# Patient Record
Sex: MALE | Race: WHITE | NOT HISPANIC OR LATINO | URBAN - METROPOLITAN AREA
[De-identification: names, ages, dates, MRNs, and addresses within clinical notes are randomized per-mention and may not be internally consistent; named-entity substitution may affect disease eponyms.]

---

## 2017-03-01 ENCOUNTER — OUTPATIENT (OUTPATIENT)
Dept: OUTPATIENT SERVICES | Facility: HOSPITAL | Age: 82
LOS: 1 days | End: 2017-03-01
Payer: MEDICARE

## 2017-03-01 ENCOUNTER — APPOINTMENT (OUTPATIENT)
Dept: UROLOGY | Facility: CLINIC | Age: 82
End: 2017-03-01

## 2017-03-01 VITALS
HEART RATE: 80 BPM | SYSTOLIC BLOOD PRESSURE: 149 MMHG | RESPIRATION RATE: 16 BRPM | DIASTOLIC BLOOD PRESSURE: 65 MMHG | TEMPERATURE: 98 F

## 2017-03-01 DIAGNOSIS — Z98.89 OTHER SPECIFIED POSTPROCEDURAL STATES: Chronic | ICD-10-CM

## 2017-03-01 DIAGNOSIS — R35.0 FREQUENCY OF MICTURITION: ICD-10-CM

## 2017-03-01 PROCEDURE — 96402 CHEMO HORMON ANTINEOPL SQ/IM: CPT

## 2017-03-01 RX ORDER — LEUPROLIDE ACETATE 30 MG
30 KIT INTRAMUSCULAR
Qty: 1 | Refills: 0 | Status: COMPLETED | OUTPATIENT
Start: 2017-03-01

## 2017-03-01 RX ORDER — LEUPROLIDE ACETATE 30 MG
30 KIT INTRAMUSCULAR
Qty: 1 | Refills: 0 | Status: COMPLETED | OUTPATIENT
Start: 2017-02-28 | End: 2017-03-01

## 2017-03-01 RX ADMIN — LEUPROLIDE ACETATE 0 MG: KIT at 00:00

## 2017-03-02 LAB
APPEARANCE: CLEAR
BACTERIA: NEGATIVE
BILIRUBIN URINE: NEGATIVE
BLOOD URINE: NEGATIVE
COLOR: YELLOW
GLUCOSE QUALITATIVE U: NORMAL MG/DL
HYALINE CASTS: 0 /LPF
KETONES URINE: NEGATIVE
LEUKOCYTE ESTERASE URINE: NEGATIVE
MICROSCOPIC-UA: NORMAL
NITRITE URINE: NEGATIVE
PH URINE: 5.5
PROTEIN URINE: NEGATIVE MG/DL
PSA FREE FLD-MCNC: 24.7 %
PSA FREE SERPL-MCNC: 0.57 NG/ML
PSA SERPL-MCNC: 2.3 NG/ML
RED BLOOD CELLS URINE: 2 /HPF
SPECIFIC GRAVITY URINE: 1.02
SQUAMOUS EPITHELIAL CELLS: 2 /HPF
UROBILINOGEN URINE: NORMAL MG/DL
WHITE BLOOD CELLS URINE: 5 /HPF

## 2017-03-08 DIAGNOSIS — C61 MALIGNANT NEOPLASM OF PROSTATE: ICD-10-CM

## 2017-03-08 DIAGNOSIS — R97.20 ELEVATED PROSTATE SPECIFIC ANTIGEN [PSA]: ICD-10-CM

## 2017-03-28 LAB
APPEARANCE: CLEAR
BACTERIA: NEGATIVE
BILIRUBIN URINE: NEGATIVE
BLOOD URINE: ABNORMAL
COLOR: YELLOW
GLUCOSE QUALITATIVE U: NORMAL MG/DL
KETONES URINE: NEGATIVE
LEUKOCYTE ESTERASE URINE: NEGATIVE
MICROSCOPIC-UA: NORMAL
NITRITE URINE: NEGATIVE
PH URINE: 5.5
PROTEIN URINE: NEGATIVE MG/DL
RED BLOOD CELLS URINE: 4 /HPF
SPECIFIC GRAVITY URINE: 1.02
SQUAMOUS EPITHELIAL CELLS: 0 /HPF
UROBILINOGEN URINE: NORMAL MG/DL
WHITE BLOOD CELLS URINE: 1 /HPF

## 2017-03-29 LAB — BACTERIA UR CULT: NORMAL

## 2017-03-30 LAB — CORE LAB FLUID CYTOLOGY: NORMAL

## 2017-07-06 ENCOUNTER — APPOINTMENT (OUTPATIENT)
Dept: UROLOGY | Facility: CLINIC | Age: 82
End: 2017-07-06

## 2017-07-06 ENCOUNTER — OUTPATIENT (OUTPATIENT)
Dept: OUTPATIENT SERVICES | Facility: HOSPITAL | Age: 82
LOS: 1 days | End: 2017-07-06
Payer: MEDICARE

## 2017-07-06 DIAGNOSIS — R35.0 FREQUENCY OF MICTURITION: ICD-10-CM

## 2017-07-06 DIAGNOSIS — Z98.89 OTHER SPECIFIED POSTPROCEDURAL STATES: Chronic | ICD-10-CM

## 2017-07-06 PROCEDURE — 96402 CHEMO HORMON ANTINEOPL SQ/IM: CPT

## 2017-07-06 RX ORDER — LEUPROLIDE ACETATE 30 MG
30 KIT INTRAMUSCULAR
Qty: 1 | Refills: 0 | Status: COMPLETED | OUTPATIENT
Start: 2017-07-06

## 2017-07-06 RX ORDER — LEUPROLIDE ACETATE 30 MG
30 KIT INTRAMUSCULAR
Qty: 1 | Refills: 0 | Status: COMPLETED | OUTPATIENT
Start: 2017-07-05 | End: 2017-07-06

## 2017-07-06 RX ADMIN — LEUPROLIDE ACETATE 0 MG: KIT at 00:00

## 2017-07-07 LAB
APPEARANCE: CLEAR
BACTERIA: NEGATIVE
BILIRUBIN URINE: NEGATIVE
BLOOD URINE: NEGATIVE
COLOR: ABNORMAL
GLUCOSE QUALITATIVE U: NORMAL MG/DL
HYALINE CASTS: 1 /LPF
KETONES URINE: NEGATIVE
LEUKOCYTE ESTERASE URINE: NEGATIVE
MICROSCOPIC-UA: NORMAL
NITRITE URINE: NEGATIVE
PH URINE: 5.5
PROTEIN URINE: NEGATIVE MG/DL
PSA FREE FLD-MCNC: 26.9
PSA FREE SERPL-MCNC: 0.9 NG/ML
PSA SERPL-MCNC: 3.34 NG/ML
RED BLOOD CELLS URINE: 4 /HPF
SPECIFIC GRAVITY URINE: 1.02
SQUAMOUS EPITHELIAL CELLS: 1 /HPF
UROBILINOGEN URINE: NORMAL MG/DL
WHITE BLOOD CELLS URINE: 1 /HPF

## 2017-07-09 LAB — CORE LAB FLUID CYTOLOGY: NORMAL

## 2017-07-19 DIAGNOSIS — C61 MALIGNANT NEOPLASM OF PROSTATE: ICD-10-CM

## 2017-07-19 DIAGNOSIS — R97.20 ELEVATED PROSTATE SPECIFIC ANTIGEN [PSA]: ICD-10-CM

## 2017-11-29 ENCOUNTER — APPOINTMENT (OUTPATIENT)
Dept: UROLOGY | Facility: CLINIC | Age: 82
End: 2017-11-29
Payer: MEDICARE

## 2017-11-29 ENCOUNTER — OUTPATIENT (OUTPATIENT)
Dept: OUTPATIENT SERVICES | Facility: HOSPITAL | Age: 82
LOS: 1 days | End: 2017-11-29
Payer: MEDICARE

## 2017-11-29 DIAGNOSIS — Z98.89 OTHER SPECIFIED POSTPROCEDURAL STATES: Chronic | ICD-10-CM

## 2017-11-29 DIAGNOSIS — R35.0 FREQUENCY OF MICTURITION: ICD-10-CM

## 2017-11-29 PROCEDURE — 99213 OFFICE O/P EST LOW 20 MIN: CPT

## 2017-11-29 RX ORDER — LEUPROLIDE ACETATE 30 MG
30 KIT INTRAMUSCULAR
Qty: 1 | Refills: 0 | Status: COMPLETED | OUTPATIENT
Start: 2017-11-29

## 2017-11-29 RX ORDER — LEUPROLIDE ACETATE 30 MG
30 KIT INTRAMUSCULAR
Qty: 1 | Refills: 0 | Status: COMPLETED | OUTPATIENT
Start: 2017-11-28 | End: 2017-11-29

## 2017-11-29 RX ADMIN — LEUPROLIDE ACETATE 0 MG: KIT at 00:00

## 2017-11-30 LAB
CORE LAB FLUID CYTOLOGY: NORMAL
PSA FREE FLD-MCNC: 29
PSA FREE SERPL-MCNC: 1.17 NG/ML
PSA SERPL-MCNC: 4.04 NG/ML

## 2017-11-30 PROCEDURE — 96402 CHEMO HORMON ANTINEOPL SQ/IM: CPT

## 2017-12-04 DIAGNOSIS — C61 MALIGNANT NEOPLASM OF PROSTATE: ICD-10-CM

## 2017-12-04 DIAGNOSIS — R97.20 ELEVATED PROSTATE SPECIFIC ANTIGEN [PSA]: ICD-10-CM

## 2017-12-14 LAB
APPEARANCE: CLEAR
BACTERIA: NEGATIVE
BILIRUBIN URINE: NEGATIVE
BLOOD URINE: NEGATIVE
COLOR: ABNORMAL
GLUCOSE QUALITATIVE U: NEGATIVE MG/DL
HYALINE CASTS: 5 /LPF
KETONES URINE: ABNORMAL
LEUKOCYTE ESTERASE URINE: NEGATIVE
MICROSCOPIC-UA: NORMAL
NITRITE URINE: NEGATIVE
PH URINE: 5
PROTEIN URINE: NEGATIVE MG/DL
RED BLOOD CELLS URINE: 8 /HPF
SPECIFIC GRAVITY URINE: 1.02
SQUAMOUS EPITHELIAL CELLS: 2 /HPF
UROBILINOGEN URINE: NEGATIVE MG/DL
WHITE BLOOD CELLS URINE: 3 /HPF

## 2018-01-04 ENCOUNTER — EMERGENCY (EMERGENCY)
Facility: HOSPITAL | Age: 83
LOS: 1 days | Discharge: HOME | End: 2018-01-04
Attending: EMERGENCY MEDICINE | Admitting: EMERGENCY MEDICINE
Payer: MEDICARE

## 2018-01-04 VITALS
RESPIRATION RATE: 17 BRPM | DIASTOLIC BLOOD PRESSURE: 79 MMHG | TEMPERATURE: 99 F | SYSTOLIC BLOOD PRESSURE: 171 MMHG | HEART RATE: 76 BPM | OXYGEN SATURATION: 95 %

## 2018-01-04 VITALS
RESPIRATION RATE: 17 BRPM | HEART RATE: 68 BPM | SYSTOLIC BLOOD PRESSURE: 151 MMHG | TEMPERATURE: 98 F | DIASTOLIC BLOOD PRESSURE: 79 MMHG | OXYGEN SATURATION: 95 %

## 2018-01-04 DIAGNOSIS — Z98.89 OTHER SPECIFIED POSTPROCEDURAL STATES: Chronic | ICD-10-CM

## 2018-01-04 LAB
ALBUMIN SERPL ELPH-MCNC: 4.2 G/DL — SIGNIFICANT CHANGE UP (ref 3.3–5)
ALP SERPL-CCNC: 31 U/L — LOW (ref 40–120)
ALT FLD-CCNC: 28 U/L RC — SIGNIFICANT CHANGE UP (ref 10–45)
ANION GAP SERPL CALC-SCNC: 12 MMOL/L — SIGNIFICANT CHANGE UP (ref 5–17)
AST SERPL-CCNC: 50 U/L — HIGH (ref 10–40)
BASOPHILS # BLD AUTO: 0 K/UL — SIGNIFICANT CHANGE UP (ref 0–0.2)
BASOPHILS NFR BLD AUTO: 0.4 % — SIGNIFICANT CHANGE UP (ref 0–2)
BILIRUB SERPL-MCNC: 0.2 MG/DL — SIGNIFICANT CHANGE UP (ref 0.2–1.2)
BUN SERPL-MCNC: 18 MG/DL — SIGNIFICANT CHANGE UP (ref 7–23)
CALCIUM SERPL-MCNC: 9.4 MG/DL — SIGNIFICANT CHANGE UP (ref 8.4–10.5)
CHLORIDE SERPL-SCNC: 99 MMOL/L — SIGNIFICANT CHANGE UP (ref 96–108)
CK SERPL-CCNC: 110 U/L — SIGNIFICANT CHANGE UP (ref 30–200)
CO2 SERPL-SCNC: 25 MMOL/L — SIGNIFICANT CHANGE UP (ref 22–31)
CREAT SERPL-MCNC: 1.1 MG/DL — SIGNIFICANT CHANGE UP (ref 0.5–1.3)
EOSINOPHIL # BLD AUTO: 0.1 K/UL — SIGNIFICANT CHANGE UP (ref 0–0.5)
EOSINOPHIL NFR BLD AUTO: 1.8 % — SIGNIFICANT CHANGE UP (ref 0–6)
GLUCOSE SERPL-MCNC: 136 MG/DL — HIGH (ref 70–99)
HCT VFR BLD CALC: 38.1 % — LOW (ref 39–50)
HGB BLD-MCNC: 13.4 G/DL — SIGNIFICANT CHANGE UP (ref 13–17)
LYMPHOCYTES # BLD AUTO: 2.2 K/UL — SIGNIFICANT CHANGE UP (ref 1–3.3)
LYMPHOCYTES # BLD AUTO: 41.6 % — SIGNIFICANT CHANGE UP (ref 13–44)
MAGNESIUM SERPL-MCNC: 2.1 MG/DL — SIGNIFICANT CHANGE UP (ref 1.6–2.6)
MCHC RBC-ENTMCNC: 33.5 PG — SIGNIFICANT CHANGE UP (ref 27–34)
MCHC RBC-ENTMCNC: 35.1 GM/DL — SIGNIFICANT CHANGE UP (ref 32–36)
MCV RBC AUTO: 95.6 FL — SIGNIFICANT CHANGE UP (ref 80–100)
MONOCYTES # BLD AUTO: 0.6 K/UL — SIGNIFICANT CHANGE UP (ref 0–0.9)
MONOCYTES NFR BLD AUTO: 10.8 % — SIGNIFICANT CHANGE UP (ref 2–14)
NEUTROPHILS # BLD AUTO: 2.4 K/UL — SIGNIFICANT CHANGE UP (ref 1.8–7.4)
NEUTROPHILS NFR BLD AUTO: 45.4 % — SIGNIFICANT CHANGE UP (ref 43–77)
PHOSPHATE SERPL-MCNC: 2.9 MG/DL — SIGNIFICANT CHANGE UP (ref 2.5–4.5)
PLATELET # BLD AUTO: 152 K/UL — SIGNIFICANT CHANGE UP (ref 150–400)
POTASSIUM SERPL-MCNC: 4.6 MMOL/L — SIGNIFICANT CHANGE UP (ref 3.5–5.3)
POTASSIUM SERPL-SCNC: 4.6 MMOL/L — SIGNIFICANT CHANGE UP (ref 3.5–5.3)
PROT SERPL-MCNC: 7.4 G/DL — SIGNIFICANT CHANGE UP (ref 6–8.3)
RBC # BLD: 3.99 M/UL — LOW (ref 4.2–5.8)
RBC # FLD: 11.1 % — SIGNIFICANT CHANGE UP (ref 10.3–14.5)
SODIUM SERPL-SCNC: 136 MMOL/L — SIGNIFICANT CHANGE UP (ref 135–145)
WBC # BLD: 5.3 K/UL — SIGNIFICANT CHANGE UP (ref 3.8–10.5)
WBC # FLD AUTO: 5.3 K/UL — SIGNIFICANT CHANGE UP (ref 3.8–10.5)

## 2018-01-04 PROCEDURE — 72100 X-RAY EXAM L-S SPINE 2/3 VWS: CPT | Mod: 26

## 2018-01-04 PROCEDURE — 72170 X-RAY EXAM OF PELVIS: CPT

## 2018-01-04 PROCEDURE — 99284 EMERGENCY DEPT VISIT MOD MDM: CPT

## 2018-01-04 PROCEDURE — 99284 EMERGENCY DEPT VISIT MOD MDM: CPT | Mod: 25

## 2018-01-04 PROCEDURE — 96374 THER/PROPH/DIAG INJ IV PUSH: CPT

## 2018-01-04 PROCEDURE — 82550 ASSAY OF CK (CPK): CPT

## 2018-01-04 PROCEDURE — 85027 COMPLETE CBC AUTOMATED: CPT

## 2018-01-04 PROCEDURE — 83735 ASSAY OF MAGNESIUM: CPT

## 2018-01-04 PROCEDURE — 80053 COMPREHEN METABOLIC PANEL: CPT

## 2018-01-04 PROCEDURE — 84100 ASSAY OF PHOSPHORUS: CPT

## 2018-01-04 PROCEDURE — 72100 X-RAY EXAM L-S SPINE 2/3 VWS: CPT

## 2018-01-04 PROCEDURE — 72170 X-RAY EXAM OF PELVIS: CPT | Mod: 26

## 2018-01-04 RX ORDER — OXYCODONE HYDROCHLORIDE 5 MG/1
5 TABLET ORAL ONCE
Qty: 0 | Refills: 0 | Status: DISCONTINUED | OUTPATIENT
Start: 2018-01-04 | End: 2018-01-04

## 2018-01-04 RX ORDER — KETOROLAC TROMETHAMINE 30 MG/ML
15 SYRINGE (ML) INJECTION ONCE
Qty: 0 | Refills: 0 | Status: DISCONTINUED | OUTPATIENT
Start: 2018-01-04 | End: 2018-01-04

## 2018-01-04 RX ORDER — OXYCODONE HYDROCHLORIDE 5 MG/1
1 TABLET ORAL
Qty: 12 | Refills: 0
Start: 2018-01-04 | End: 2018-01-06

## 2018-01-04 RX ORDER — POLYETHYLENE GLYCOL 3350 17 G/17G
17 POWDER, FOR SOLUTION ORAL
Qty: 1 | Refills: 0
Start: 2018-01-04 | End: 2018-01-06

## 2018-01-04 RX ORDER — FAMOTIDINE 10 MG/ML
20 INJECTION INTRAVENOUS ONCE
Qty: 0 | Refills: 0 | Status: COMPLETED | OUTPATIENT
Start: 2018-01-04 | End: 2018-01-04

## 2018-01-04 RX ORDER — SENNA PLUS 8.6 MG/1
2 TABLET ORAL
Qty: 12 | Refills: 0
Start: 2018-01-04 | End: 2018-01-06

## 2018-01-04 RX ORDER — SODIUM CHLORIDE 9 MG/ML
500 INJECTION INTRAMUSCULAR; INTRAVENOUS; SUBCUTANEOUS ONCE
Qty: 0 | Refills: 0 | Status: COMPLETED | OUTPATIENT
Start: 2018-01-04 | End: 2018-01-04

## 2018-01-04 RX ADMIN — OXYCODONE HYDROCHLORIDE 5 MILLIGRAM(S): 5 TABLET ORAL at 20:43

## 2018-01-04 RX ADMIN — Medication 15 MILLIGRAM(S): at 20:43

## 2018-01-04 RX ADMIN — SODIUM CHLORIDE 1000 MILLILITER(S): 9 INJECTION INTRAMUSCULAR; INTRAVENOUS; SUBCUTANEOUS at 20:43

## 2018-01-04 NOTE — ED PROVIDER NOTE - ATTENDING CONTRIBUTION TO CARE
Patient with anterior bilateral thigh pain. No fever. No chills. + history of Prostate CA status post radical prostatectomy and lung metastatic lesion with recent resection. Patient took Tylenol and  Motrin with relief with ibuprofen. No fever, no chills.   5/5 str to bilateral UE and LE, 5/5 sensory to bilateral UE and LE, CN 2-12 intact, normal coordination, normal finger to nose, normal heel to shin, negative Romberg, no pronator drift, no gait instability, 5/5 strength in upper and lower extremities, normal sensory throughout, alert and oriented to person, place, time, and situation. no rashes, no petechiae, cooperative, alert, trachea midline, ctab, non-tachypneic, non-tachycardic  will get xray ls spine and pelvis, will give analgesia, will run ck and electrolytes and reassess  Will follow up on labs, analgesia, reassess and disposition as clinically indicated.   The patient was re-examined after interventions and is feeling much better.  The patient will follow up with their primary physician this week.   No immediate life threatening issues present on history or clinical exam. Patient is a safe disposition home, has capacity and insight into their condition, ambulatory in the emergency department and will follow up with their doctor(s) this week. Patient and family  understand anticipatory guidance were given strict return and follow up precautions.  The patient and family have been informed of all concerning signs and symptoms to return to Emergency Department, the necessity to follow up with the PMD/Clinic/follow up provided within 2-3 days was explained, and the patient and/or family reports understanding of above with capacity and insight.

## 2018-01-04 NOTE — ED PROVIDER NOTE - MEDICAL DECISION MAKING DETAILS
85 y/o M hx of prostate Ca on Leupron, HTN, HLD presents with bilateral radiating thigh pain. DDx includes MSK spasm/strain, prostatic metastasis to bone, IT band hypertonicity.  No red flag syndromes, exam inconsistent with sciatica; low suspicion cord compression or cauda equina. Basic labs, pain control, plain films, ambulate, reassess.

## 2018-01-04 NOTE — ED PROVIDER NOTE - CARE PLAN
Principal Discharge DX:	Bilateral thigh pain  Instructions for follow-up, activity and diet:	There are spine specialists within the [facility] you may call [phone] for your back pain, call and arrange your follow up appointment.   Take oxycodone as written for breakthrough pain, take with Miralax and/or Senna while on this medication. Do not drive or operate equipment while under the influence of oxycodone.   Follow up with your medical doctor in 2-3 days or call our clinic at [phone] and state you were seen in the Emergency Department and would like to be seen in clinic.     Take Tylenol 1 g every six hours and supplement (if allowed by your physician) with ibuprofen 600 mg, with food, every six hours which can be taken three hours apart from the Tylenol to have a layered effect.    Drink at least 2 Liters or 64 Ounces of water each day (UNLESS you are supposed to restrict fluids or have a history of congestive heart failure (CHF)).    Return for any persistent, worsening symptoms, or ANY concerns at all.

## 2018-01-04 NOTE — ED PROVIDER NOTE - PLAN OF CARE
There are spine specialists within the [facility] system you may call [phone] for your back pain, call and arrange your follow up appointment.   Take oxycodone as written for breakthrough pain, take with Miralax and/or Senna while on this medication. Do not drive or operate equipment while under the influence of oxycodone.   Follow up with your medical doctor in 2-3 days or call our clinic at [phone] and state you were seen in the Emergency Department and would like to be seen in clinic.     Take Tylenol 1 g every six hours and supplement (if allowed by your physician) with ibuprofen 600 mg, with food, every six hours which can be taken three hours apart from the Tylenol to have a layered effect.    Drink at least 2 Liters or 64 Ounces of water each day (UNLESS you are supposed to restrict fluids or have a history of congestive heart failure (CHF)).    Return for any persistent, worsening symptoms, or ANY concerns at all.

## 2018-01-04 NOTE — ED ADULT NURSE NOTE - OBJECTIVE STATEMENT
85 y/o male presents to the ED via wheelchair with family. A&Ox3. C/O bilateral hip pain radiating to the front upper leg and lower back. No recent trauma or fall. Pt. states pain woke him up at night, last tylenol taken at 3pm. +easy work of breathing, clear lung sounds. full range of motion of all extremities. no leg drift. motor and sensory intact. peripheral pulse present, cap refill<2 seconds. no edema present. bowel sounds present, nontender and nondistended. pt. denies sob, headache, dizziness, chest pain, n/v/d, or numbness and tingling. 85 y/o male presents to the ED via wheelchair with family. A&Ox3. C/O bilateral hip pain radiating to the front upper leg and lower back. No recent trauma or fall. Pt. states pain woke him up at night, last tylenol taken at 3pm. +easy work of breathing, clear lung sounds. full range of motion of all extremities. no leg drift. motor and sensory intact. peripheral pulse present, cap refill<2 seconds. no edema present. bowel sounds present, nontender and nondistended. pt. denies sob, headache, dizziness, chest pain, n/v/d, or numbness and tingling. Pt. states he was able to bear weight on both legs at home. No incontinence.

## 2018-01-04 NOTE — ED PROVIDER NOTE - OBJECTIVE STATEMENT
83 y/o M hx of HTN, prostate Ca on Leupron, HLD presenting with acute onset of bilateral crampy leg pain that awoke patient from sleep yesterday evening. Patient describes pain a radiating to buttocks bilaterally, relieved with ibuprofen yesterday evening. Patient states that pain recurred today strongly and took tylenol; however was concerned so came to ED. Denies any fever, chills, vomiting, back pain, bladder/bowel dysfunction, saddle anesthesia. 83 y/o M hx of HTN, prostate Ca on Leupron, HLD presenting with acute onset of bilateral crampy leg pain that awoke patient from sleep yesterday evening. Patient describes pain a radiating to buttocks bilaterally, relieved with ibuprofen yesterday evening. Patient states that pain recurred today strongly and took tylenol; however was concerned so came to ED. Denies any fever, chills, vomiting, back pain, bladder/bowel dysfunction, saddle anesthesia, leg weakness. numbness or tingling.   Patient has not experienced pain of similar nature before. Denies any fall, trauma or injury.

## 2018-01-24 ENCOUNTER — APPOINTMENT (OUTPATIENT)
Dept: NUCLEAR MEDICINE | Facility: IMAGING CENTER | Age: 83
End: 2018-01-24
Payer: MEDICARE

## 2018-01-24 ENCOUNTER — OUTPATIENT (OUTPATIENT)
Dept: OUTPATIENT SERVICES | Facility: HOSPITAL | Age: 83
LOS: 1 days | End: 2018-01-24
Payer: MEDICARE

## 2018-01-24 DIAGNOSIS — Z98.89 OTHER SPECIFIED POSTPROCEDURAL STATES: Chronic | ICD-10-CM

## 2018-01-24 DIAGNOSIS — C61 MALIGNANT NEOPLASM OF PROSTATE: ICD-10-CM

## 2018-01-24 PROCEDURE — 78306 BONE IMAGING WHOLE BODY: CPT | Mod: 26

## 2018-01-24 PROCEDURE — 78320: CPT | Mod: 26

## 2018-01-24 PROCEDURE — 78999 UNLISTED MISC PX DX NUC MED: CPT

## 2018-01-24 PROCEDURE — A9561: CPT

## 2018-01-24 PROCEDURE — 78306 BONE IMAGING WHOLE BODY: CPT

## 2018-04-18 ENCOUNTER — APPOINTMENT (OUTPATIENT)
Dept: UROLOGY | Facility: CLINIC | Age: 83
End: 2018-04-18
Payer: MEDICARE

## 2018-04-18 ENCOUNTER — OUTPATIENT (OUTPATIENT)
Dept: OUTPATIENT SERVICES | Facility: HOSPITAL | Age: 83
LOS: 1 days | End: 2018-04-18
Payer: MEDICARE

## 2018-04-18 DIAGNOSIS — Z98.89 OTHER SPECIFIED POSTPROCEDURAL STATES: Chronic | ICD-10-CM

## 2018-04-18 DIAGNOSIS — R35.0 FREQUENCY OF MICTURITION: ICD-10-CM

## 2018-04-18 LAB
APPEARANCE: CLEAR
BACTERIA: NEGATIVE
BILIRUBIN URINE: NEGATIVE
BLOOD URINE: NEGATIVE
COLOR: ABNORMAL
GLUCOSE QUALITATIVE U: NEGATIVE MG/DL
HYALINE CASTS: 4 /LPF
KETONES URINE: NEGATIVE
LEUKOCYTE ESTERASE URINE: NEGATIVE
MICROSCOPIC-UA: NORMAL
NITRITE URINE: NEGATIVE
PH URINE: 5
PROTEIN URINE: NEGATIVE MG/DL
RED BLOOD CELLS URINE: 5 /HPF
SPECIFIC GRAVITY URINE: 1.02
SQUAMOUS EPITHELIAL CELLS: 1 /HPF
UROBILINOGEN URINE: NEGATIVE MG/DL
WHITE BLOOD CELLS URINE: 2 /HPF

## 2018-04-18 PROCEDURE — 99213 OFFICE O/P EST LOW 20 MIN: CPT

## 2018-04-18 PROCEDURE — 96402 CHEMO HORMON ANTINEOPL SQ/IM: CPT

## 2018-04-18 RX ORDER — LEUPROLIDE ACETATE 30 MG
30 KIT INTRAMUSCULAR
Qty: 1 | Refills: 0 | Status: COMPLETED | OUTPATIENT
Start: 2018-04-18

## 2018-04-18 RX ORDER — LEUPROLIDE ACETATE 30 MG
30 KIT INTRAMUSCULAR
Qty: 1 | Refills: 0 | Status: COMPLETED | OUTPATIENT
Start: 2018-04-17 | End: 2018-04-18

## 2018-04-18 RX ADMIN — LEUPROLIDE ACETATE 0 MG: KIT at 00:00

## 2018-04-19 LAB
PSA FREE FLD-MCNC: 28.7
PSA FREE SERPL-MCNC: 1.51 NG/ML
PSA SERPL-MCNC: 5.26 NG/ML

## 2018-04-20 LAB — CORE LAB FLUID CYTOLOGY: NORMAL

## 2018-04-30 DIAGNOSIS — R97.20 ELEVATED PROSTATE SPECIFIC ANTIGEN [PSA]: ICD-10-CM

## 2018-04-30 DIAGNOSIS — C61 MALIGNANT NEOPLASM OF PROSTATE: ICD-10-CM

## 2018-08-29 ENCOUNTER — APPOINTMENT (OUTPATIENT)
Dept: UROLOGY | Facility: CLINIC | Age: 83
End: 2018-08-29
Payer: MEDICARE

## 2018-08-29 ENCOUNTER — OUTPATIENT (OUTPATIENT)
Dept: OUTPATIENT SERVICES | Facility: HOSPITAL | Age: 83
LOS: 1 days | End: 2018-08-29
Payer: MEDICARE

## 2018-08-29 DIAGNOSIS — R97.20 ELEVATED PROSTATE SPECIFIC ANTIGEN [PSA]: ICD-10-CM

## 2018-08-29 DIAGNOSIS — R35.0 FREQUENCY OF MICTURITION: ICD-10-CM

## 2018-08-29 DIAGNOSIS — Z98.89 OTHER SPECIFIED POSTPROCEDURAL STATES: Chronic | ICD-10-CM

## 2018-08-29 DIAGNOSIS — C61 MALIGNANT NEOPLASM OF PROSTATE: ICD-10-CM

## 2018-08-29 PROCEDURE — 99214 OFFICE O/P EST MOD 30 MIN: CPT

## 2018-08-29 PROCEDURE — 96402 CHEMO HORMON ANTINEOPL SQ/IM: CPT

## 2018-08-29 RX ORDER — LEUPROLIDE ACETATE 30 MG
30 KIT INTRAMUSCULAR
Qty: 1 | Refills: 0 | Status: COMPLETED | OUTPATIENT
Start: 2018-08-29

## 2018-08-29 RX ADMIN — LEUPROLIDE ACETATE 0 MG: KIT at 00:00

## 2018-08-30 LAB
APPEARANCE: CLEAR
BACTERIA: NEGATIVE
BILIRUBIN URINE: NEGATIVE
BLOOD URINE: NEGATIVE
COLOR: ABNORMAL
GLUCOSE QUALITATIVE U: NEGATIVE MG/DL
HYALINE CASTS: 7 /LPF
KETONES URINE: NEGATIVE
LEUKOCYTE ESTERASE URINE: NEGATIVE
MICROSCOPIC-UA: NORMAL
NITRITE URINE: NEGATIVE
PH URINE: 5
PROTEIN URINE: ABNORMAL MG/DL
PSA FREE FLD-MCNC: 24.1
PSA FREE SERPL-MCNC: 0.26 NG/ML
PSA SERPL-MCNC: 1.08 NG/ML
RED BLOOD CELLS URINE: 10 /HPF
SPECIFIC GRAVITY URINE: 1.02
SQUAMOUS EPITHELIAL CELLS: 2 /HPF
UROBILINOGEN URINE: NEGATIVE MG/DL
WHITE BLOOD CELLS URINE: 2 /HPF

## 2019-01-10 ENCOUNTER — OUTPATIENT (OUTPATIENT)
Dept: OUTPATIENT SERVICES | Facility: HOSPITAL | Age: 84
LOS: 1 days | End: 2019-01-10
Payer: MEDICARE

## 2019-01-10 ENCOUNTER — APPOINTMENT (OUTPATIENT)
Dept: UROLOGY | Facility: CLINIC | Age: 84
End: 2019-01-10
Payer: MEDICARE

## 2019-01-10 DIAGNOSIS — Z98.89 OTHER SPECIFIED POSTPROCEDURAL STATES: Chronic | ICD-10-CM

## 2019-01-10 DIAGNOSIS — R35.0 FREQUENCY OF MICTURITION: ICD-10-CM

## 2019-01-10 PROCEDURE — 99213 OFFICE O/P EST LOW 20 MIN: CPT

## 2019-01-10 PROCEDURE — 96402 CHEMO HORMON ANTINEOPL SQ/IM: CPT

## 2019-01-10 RX ORDER — LEUPROLIDE ACETATE 30 MG
30 KIT INTRAMUSCULAR
Qty: 1 | Refills: 0 | Status: COMPLETED | OUTPATIENT
Start: 2019-01-10 | End: 2019-01-10

## 2019-01-10 RX ORDER — LEUPROLIDE ACETATE 30 MG
30 KIT INTRAMUSCULAR
Qty: 1 | Refills: 0 | Status: COMPLETED | OUTPATIENT
Start: 2019-01-10

## 2019-01-11 LAB
APPEARANCE: CLEAR
BACTERIA: NEGATIVE
BILIRUBIN URINE: NEGATIVE
BLOOD URINE: NEGATIVE
COLOR: ABNORMAL
GLUCOSE QUALITATIVE U: NEGATIVE MG/DL
HYALINE CASTS: 2 /LPF
KETONES URINE: NEGATIVE
LEUKOCYTE ESTERASE URINE: NEGATIVE
MICROSCOPIC-UA: NORMAL
NITRITE URINE: NEGATIVE
PH URINE: 5
PROTEIN URINE: NEGATIVE MG/DL
PSA FREE FLD-MCNC: 29 %
PSA FREE SERPL-MCNC: 0.18 NG/ML
PSA SERPL-MCNC: 0.63 NG/ML
RED BLOOD CELLS URINE: 3 /HPF
SPECIFIC GRAVITY URINE: 1.02
SQUAMOUS EPITHELIAL CELLS: 1 /HPF
UROBILINOGEN URINE: NEGATIVE MG/DL
WHITE BLOOD CELLS URINE: 1 /HPF

## 2019-01-14 DIAGNOSIS — R97.20 ELEVATED PROSTATE SPECIFIC ANTIGEN [PSA]: ICD-10-CM

## 2019-01-14 DIAGNOSIS — C61 MALIGNANT NEOPLASM OF PROSTATE: ICD-10-CM

## 2019-05-22 ENCOUNTER — APPOINTMENT (OUTPATIENT)
Dept: UROLOGY | Facility: CLINIC | Age: 84
End: 2019-05-22
Payer: MEDICARE

## 2019-05-22 ENCOUNTER — OUTPATIENT (OUTPATIENT)
Dept: OUTPATIENT SERVICES | Facility: HOSPITAL | Age: 84
LOS: 1 days | End: 2019-05-22
Payer: MEDICARE

## 2019-05-22 DIAGNOSIS — Z98.89 OTHER SPECIFIED POSTPROCEDURAL STATES: Chronic | ICD-10-CM

## 2019-05-22 DIAGNOSIS — R35.0 FREQUENCY OF MICTURITION: ICD-10-CM

## 2019-05-22 LAB
APPEARANCE: CLEAR
BACTERIA: NEGATIVE
BILIRUBIN URINE: NEGATIVE
BLOOD URINE: NEGATIVE
COLOR: YELLOW
GLUCOSE QUALITATIVE U: NEGATIVE
HYALINE CASTS: 3 /LPF
KETONES URINE: NEGATIVE
LEUKOCYTE ESTERASE URINE: NEGATIVE
MICROSCOPIC-UA: NORMAL
NITRITE URINE: NEGATIVE
PH URINE: 5.5
PROTEIN URINE: NORMAL
RED BLOOD CELLS URINE: 9 /HPF
SPECIFIC GRAVITY URINE: 1.02
SQUAMOUS EPITHELIAL CELLS: 1 /HPF
UROBILINOGEN URINE: NORMAL
WHITE BLOOD CELLS URINE: 2 /HPF

## 2019-05-22 PROCEDURE — 99214 OFFICE O/P EST MOD 30 MIN: CPT

## 2019-05-22 PROCEDURE — 96402 CHEMO HORMON ANTINEOPL SQ/IM: CPT

## 2019-05-22 RX ORDER — LEUPROLIDE ACETATE 30 MG
30 KIT INTRAMUSCULAR
Qty: 1 | Refills: 0 | Status: COMPLETED | OUTPATIENT
Start: 2019-05-22

## 2019-05-22 RX ORDER — LEUPROLIDE ACETATE 30 MG
30 KIT INTRAMUSCULAR
Qty: 1 | Refills: 0 | Status: COMPLETED | OUTPATIENT
Start: 2019-05-21 | End: 2019-05-22

## 2019-05-22 RX ADMIN — LEUPROLIDE ACETATE 0 MG: KIT at 00:00

## 2019-05-23 LAB
PSA FREE FLD-MCNC: 25 %
PSA FREE SERPL-MCNC: 0.14 NG/ML
PSA SERPL-MCNC: 0.56 NG/ML

## 2019-05-31 DIAGNOSIS — C61 MALIGNANT NEOPLASM OF PROSTATE: ICD-10-CM

## 2019-06-06 ENCOUNTER — EMERGENCY (EMERGENCY)
Facility: HOSPITAL | Age: 84
LOS: 1 days | Discharge: HOME | End: 2019-06-06
Attending: EMERGENCY MEDICINE
Payer: MEDICARE

## 2019-06-06 VITALS
RESPIRATION RATE: 18 BRPM | WEIGHT: 210.1 LBS | OXYGEN SATURATION: 94 % | HEIGHT: 60 IN | TEMPERATURE: 98 F | HEART RATE: 83 BPM | DIASTOLIC BLOOD PRESSURE: 75 MMHG | SYSTOLIC BLOOD PRESSURE: 145 MMHG

## 2019-06-06 VITALS
SYSTOLIC BLOOD PRESSURE: 137 MMHG | HEART RATE: 92 BPM | OXYGEN SATURATION: 95 % | TEMPERATURE: 98 F | RESPIRATION RATE: 18 BRPM | DIASTOLIC BLOOD PRESSURE: 71 MMHG

## 2019-06-06 DIAGNOSIS — Z98.89 OTHER SPECIFIED POSTPROCEDURAL STATES: Chronic | ICD-10-CM

## 2019-06-06 LAB
ALBUMIN SERPL ELPH-MCNC: 4.3 G/DL — SIGNIFICANT CHANGE UP (ref 3.3–5)
ALP SERPL-CCNC: 46 U/L — SIGNIFICANT CHANGE UP (ref 40–120)
ALT FLD-CCNC: 11 U/L — SIGNIFICANT CHANGE UP (ref 10–45)
ANION GAP SERPL CALC-SCNC: 12 MMOL/L — SIGNIFICANT CHANGE UP (ref 5–17)
AST SERPL-CCNC: 36 U/L — SIGNIFICANT CHANGE UP (ref 10–40)
BILIRUB SERPL-MCNC: 0.5 MG/DL — SIGNIFICANT CHANGE UP (ref 0.2–1.2)
BUN SERPL-MCNC: 15 MG/DL — SIGNIFICANT CHANGE UP (ref 7–23)
CALCIUM SERPL-MCNC: 9.9 MG/DL — SIGNIFICANT CHANGE UP (ref 8.4–10.5)
CHLORIDE SERPL-SCNC: 100 MMOL/L — SIGNIFICANT CHANGE UP (ref 96–108)
CO2 SERPL-SCNC: 24 MMOL/L — SIGNIFICANT CHANGE UP (ref 22–31)
CREAT SERPL-MCNC: 1.12 MG/DL — SIGNIFICANT CHANGE UP (ref 0.5–1.3)
GLUCOSE SERPL-MCNC: 159 MG/DL — HIGH (ref 70–99)
HCT VFR BLD CALC: 36.2 % — LOW (ref 39–50)
HGB BLD-MCNC: 12.8 G/DL — LOW (ref 13–17)
MCHC RBC-ENTMCNC: 33.4 PG — SIGNIFICANT CHANGE UP (ref 27–34)
MCHC RBC-ENTMCNC: 35.2 GM/DL — SIGNIFICANT CHANGE UP (ref 32–36)
MCV RBC AUTO: 94.7 FL — SIGNIFICANT CHANGE UP (ref 80–100)
PLATELET # BLD AUTO: 171 K/UL — SIGNIFICANT CHANGE UP (ref 150–400)
POTASSIUM SERPL-MCNC: 4.6 MMOL/L — SIGNIFICANT CHANGE UP (ref 3.5–5.3)
POTASSIUM SERPL-SCNC: 4.6 MMOL/L — SIGNIFICANT CHANGE UP (ref 3.5–5.3)
PROT SERPL-MCNC: 8.2 G/DL — SIGNIFICANT CHANGE UP (ref 6–8.3)
RAPID RVP RESULT: SIGNIFICANT CHANGE UP
RBC # BLD: 3.83 M/UL — LOW (ref 4.2–5.8)
RBC # FLD: 11 % — SIGNIFICANT CHANGE UP (ref 10.3–14.5)
SODIUM SERPL-SCNC: 136 MMOL/L — SIGNIFICANT CHANGE UP (ref 135–145)
WBC # BLD: 5.6 K/UL — SIGNIFICANT CHANGE UP (ref 3.8–10.5)
WBC # FLD AUTO: 5.6 K/UL — SIGNIFICANT CHANGE UP (ref 3.8–10.5)

## 2019-06-06 PROCEDURE — 85027 COMPLETE CBC AUTOMATED: CPT

## 2019-06-06 PROCEDURE — 80053 COMPREHEN METABOLIC PANEL: CPT

## 2019-06-06 PROCEDURE — 87581 M.PNEUMON DNA AMP PROBE: CPT

## 2019-06-06 PROCEDURE — 71045 X-RAY EXAM CHEST 1 VIEW: CPT | Mod: 26

## 2019-06-06 PROCEDURE — 99284 EMERGENCY DEPT VISIT MOD MDM: CPT | Mod: 25

## 2019-06-06 PROCEDURE — 94640 AIRWAY INHALATION TREATMENT: CPT

## 2019-06-06 PROCEDURE — 71045 X-RAY EXAM CHEST 1 VIEW: CPT

## 2019-06-06 PROCEDURE — 99283 EMERGENCY DEPT VISIT LOW MDM: CPT | Mod: 25

## 2019-06-06 PROCEDURE — 87633 RESP VIRUS 12-25 TARGETS: CPT

## 2019-06-06 PROCEDURE — 87486 CHLMYD PNEUM DNA AMP PROBE: CPT

## 2019-06-06 PROCEDURE — 87798 DETECT AGENT NOS DNA AMP: CPT

## 2019-06-06 RX ORDER — IPRATROPIUM/ALBUTEROL SULFATE 18-103MCG
3 AEROSOL WITH ADAPTER (GRAM) INHALATION ONCE
Refills: 0 | Status: COMPLETED | OUTPATIENT
Start: 2019-06-06 | End: 2019-06-06

## 2019-06-06 RX ADMIN — Medication 3 MILLILITER(S): at 04:25

## 2019-06-06 NOTE — ED PROVIDER NOTE - PHYSICAL EXAMINATION
Physical Exam:   General: sitting comfortably in bed, NAD   Neck: supple, full ROM, no lymphadenopathy  CV: RRR S1S2  Lungs: CTA b/l, good air flow b/l   Abd: Soft, NTND   Ext: NT b/l, no edema

## 2019-06-06 NOTE — ED PROVIDER NOTE - NSFOLLOWUPINSTRUCTIONS_ED_ALL_ED_FT
Discharge instructions:    1. Please follow up with your Primary Care Doctor in 1-2 days.  2. Take any prescribed medications as instructed.   3. Be sure to return to the ED if you develop new or worsening symptoms. Specific signs and symptoms to be vigilant of: fever or chills, chest pain, difficulty breathing, palpitations, severe coughing, blood in the sputum.

## 2019-06-06 NOTE — ED PROVIDER NOTE - OBJECTIVE STATEMENT
86 yo M PMHx sig for HTN, HLD, prostate cancer s/p prostatectomy in 1991, now on Leupron, ?R lung lobe resection for mets (per daughter 4 yrs ago), no chemo or radiation p/w worsening cough x5 days productive of grey sputum. Patient reports chills, but denies fevers. Wife recently sick with similar symptoms s/p Abx. Patient saw his PCP this morning for his symptoms and was prescribed Levaquin, steroids and albuterol. He reports no improvement with inhaler use. He presented to the ER due to worsening cough, now causing him resp distress, lightheadedness, dizziness during coughing episodes. He reports runny nose and chest soreness associated with cough. 86 yo M PMHx sig for HTN, HLD, prostate cancer s/p prostatectomy in , now on Leupron, ?R lung lobe resection for mets (per daughter 4 yrs ago), no chemo or radiation p/w worsening cough x5 days productive of grey sputum. Patient reports chills, but denies fevers. Wife recently sick with similar symptoms s/p Abx. Patient saw his PCP this morning for his symptoms and was prescribed Levaquin, steroids and albuterol. He reports no improvement with inhaler use. He presented to the ER due to worsening cough, now causing him resp distress, lightheadedness, dizziness during coughing episodes. He reports runny nose and chest soreness associated with cough.    Attendinyo male presents with cough and chest congestion.  no fever/chills.  tolerating po well.  started on antibiotics, prednisone and albuterol today.  no dyspnea on exertion.

## 2019-06-06 NOTE — ED ADULT NURSE NOTE - OBJECTIVE STATEMENT
86 y/o male with PMH HTN, prostate cancer with mets to lung on hormone therapy, high cholesterol, arrives to ED with c/o cough, chest congestion x 1 week. Patient reports starting monday he had cough, with gray sputum. Went to PCP was prescribed antibiotic (unknown) and prednisone, over last two nights patient has been having dry cough, sweating at night and at times chest pain when coughing. Patient is a/ox4, family at bedside. Patient breath sounds clear BL, dry unproductive cough noted. Abdomen round, distended, NT. Denies fevers/chills, sick contacts or recent travel. Family reports decreased PO intake. No urinary symptoms.

## 2019-06-06 NOTE — ED PROVIDER NOTE - NS ED ROS FT
GENERAL: +chills, no fevers  HEENT: +productive cough, no throat pain, congestion, dysphagia  CARDIAC: +chest soreness assoc with cough, no palpitations  PULM: +cough, no shortness of breath, wheezing   GI: no abdominal pain, nausea, vomiting, diarrhea, constipation  : no dysuria, frequency  NEURO: +lightheadedness assoc with coughing episodes, no headache   MSK: no joint or muscle pain  SKIN: no rashes  HEME: no active bleeding

## 2019-06-06 NOTE — ED PROVIDER NOTE - CLINICAL SUMMARY MEDICAL DECISION MAKING FREE TEXT BOX
86 yo M PMHx HTN, HLD, prostate cancer s/p prostatectomy presents with productive cough. Afebrile with vitals wnl. Will f/u CXR to eval for consolidations and RVP for URI. F/u CBC and CMP for WBC and electrolytes. 86 yo M PMHx HTN, HLD, prostate cancer s/p prostatectomy presents with productive cough. Afebrile with vitals wnl. Will f/u CXR to eval for consolidations and RVP for URI. F/u CBC and CMP for WBC and electrolytes.    5:30am: CXR clear. Patient remains afebrile. WBC wnl. Patient reports some improvement after duoneb tx. Pt OK for discharge with outpatient tx as prescribed by his PCP. Patient agrees with plan.

## 2019-09-18 ENCOUNTER — TRANSCRIPTION ENCOUNTER (OUTPATIENT)
Age: 84
End: 2019-09-18

## 2019-09-25 ENCOUNTER — APPOINTMENT (OUTPATIENT)
Dept: UROLOGY | Facility: CLINIC | Age: 84
End: 2019-09-25
Payer: MEDICARE

## 2019-09-25 ENCOUNTER — OTHER (OUTPATIENT)
Age: 84
End: 2019-09-25

## 2019-09-25 ENCOUNTER — OUTPATIENT (OUTPATIENT)
Dept: OUTPATIENT SERVICES | Facility: HOSPITAL | Age: 84
LOS: 1 days | End: 2019-09-25
Payer: MEDICARE

## 2019-09-25 DIAGNOSIS — Z98.89 OTHER SPECIFIED POSTPROCEDURAL STATES: Chronic | ICD-10-CM

## 2019-09-25 DIAGNOSIS — R35.0 FREQUENCY OF MICTURITION: ICD-10-CM

## 2019-09-25 LAB
APPEARANCE: CLEAR
BACTERIA: NEGATIVE
BILIRUBIN URINE: NEGATIVE
BLOOD URINE: NORMAL
COLOR: YELLOW
GLUCOSE QUALITATIVE U: NEGATIVE
HYALINE CASTS: 3 /LPF
KETONES URINE: NEGATIVE
LEUKOCYTE ESTERASE URINE: NEGATIVE
MICROSCOPIC-UA: NORMAL
NITRITE URINE: NEGATIVE
PH URINE: 5.5
PROTEIN URINE: NORMAL
RED BLOOD CELLS URINE: 13 /HPF
SPECIFIC GRAVITY URINE: 1.03
SQUAMOUS EPITHELIAL CELLS: 4 /HPF
UROBILINOGEN URINE: NORMAL
WHITE BLOOD CELLS URINE: 5 /HPF

## 2019-09-25 PROCEDURE — 99214 OFFICE O/P EST MOD 30 MIN: CPT

## 2019-09-25 PROCEDURE — 96402 CHEMO HORMON ANTINEOPL SQ/IM: CPT

## 2019-09-25 RX ORDER — LEUPROLIDE ACETATE 30 MG
30 KIT INTRAMUSCULAR
Qty: 1 | Refills: 0 | Status: COMPLETED | OUTPATIENT
Start: 2019-09-25

## 2019-09-25 RX ADMIN — LEUPROLIDE ACETATE 0 MG: KIT at 00:00

## 2019-09-25 NOTE — PHYSICAL EXAM
[General Appearance - Well Developed] : well developed [General Appearance - Well Nourished] : well nourished [Normal Appearance] : normal appearance [Well Groomed] : well groomed [General Appearance - In No Acute Distress] : no acute distress [Abdomen Soft] : soft [Abdomen Tenderness] : non-tender [Costovertebral Angle Tenderness] : no ~M costovertebral angle tenderness [Urethral Meatus] : meatus normal [Urinary Bladder Findings] : the bladder was normal on palpation [Scrotum] : the scrotum was normal [Testes Mass (___cm)] : there were no testicular masses [No Prostate Nodules] : no prostate nodules [Edema] : no peripheral edema [] : no respiratory distress [Oriented To Time, Place, And Person] : oriented to person, place, and time [Exaggerated Use Of Accessory Muscles For Inspiration] : no accessory muscle use [Respiration, Rhythm And Depth] : normal respiratory rhythm and effort [Affect] : the affect was normal [Mood] : the mood was normal [Not Anxious] : not anxious [Normal Station and Gait] : the gait and station were normal for the patient's age [No Focal Deficits] : no focal deficits [No Palpable Adenopathy] : no palpable adenopathy

## 2019-09-26 LAB
PSA FREE FLD-MCNC: 26 %
PSA FREE SERPL-MCNC: 0.18 NG/ML
PSA SERPL-MCNC: 0.69 NG/ML

## 2019-10-03 DIAGNOSIS — C61 MALIGNANT NEOPLASM OF PROSTATE: ICD-10-CM

## 2019-10-03 DIAGNOSIS — R97.20 ELEVATED PROSTATE SPECIFIC ANTIGEN [PSA]: ICD-10-CM

## 2020-01-29 ENCOUNTER — APPOINTMENT (OUTPATIENT)
Dept: UROLOGY | Facility: CLINIC | Age: 85
End: 2020-01-29
Payer: MEDICARE

## 2020-01-29 ENCOUNTER — OUTPATIENT (OUTPATIENT)
Dept: OUTPATIENT SERVICES | Facility: HOSPITAL | Age: 85
LOS: 1 days | End: 2020-01-29
Payer: MEDICARE

## 2020-01-29 DIAGNOSIS — Z98.89 OTHER SPECIFIED POSTPROCEDURAL STATES: Chronic | ICD-10-CM

## 2020-01-29 DIAGNOSIS — R35.0 FREQUENCY OF MICTURITION: ICD-10-CM

## 2020-01-29 PROCEDURE — 96402 CHEMO HORMON ANTINEOPL SQ/IM: CPT

## 2020-01-29 PROCEDURE — 99213 OFFICE O/P EST LOW 20 MIN: CPT

## 2020-01-29 RX ORDER — LEUPROLIDE ACETATE 30 MG
30 KIT INTRAMUSCULAR
Qty: 1 | Refills: 0 | Status: COMPLETED | OUTPATIENT
Start: 2020-01-28 | End: 2020-01-29

## 2020-01-29 RX ORDER — LEUPROLIDE ACETATE 30 MG
30 KIT INTRAMUSCULAR
Qty: 1 | Refills: 0 | Status: COMPLETED | OUTPATIENT
Start: 2020-01-29

## 2020-01-29 RX ADMIN — LEUPROLIDE ACETATE 0 MG: KIT at 00:00

## 2020-01-30 LAB
PSA FREE FLD-MCNC: 23 %
PSA FREE SERPL-MCNC: 0.15 NG/ML
PSA SERPL-MCNC: 0.65 NG/ML

## 2020-02-10 DIAGNOSIS — C61 MALIGNANT NEOPLASM OF PROSTATE: ICD-10-CM

## 2020-03-16 ENCOUNTER — RX RENEWAL (OUTPATIENT)
Age: 85
End: 2020-03-16

## 2020-06-03 ENCOUNTER — APPOINTMENT (OUTPATIENT)
Dept: UROLOGY | Facility: CLINIC | Age: 85
End: 2020-06-03
Payer: MEDICARE

## 2020-06-03 ENCOUNTER — OUTPATIENT (OUTPATIENT)
Dept: OUTPATIENT SERVICES | Facility: HOSPITAL | Age: 85
LOS: 1 days | End: 2020-06-03
Payer: MEDICARE

## 2020-06-03 VITALS — TEMPERATURE: 97 F

## 2020-06-03 DIAGNOSIS — Z98.89 OTHER SPECIFIED POSTPROCEDURAL STATES: Chronic | ICD-10-CM

## 2020-06-03 DIAGNOSIS — R35.0 FREQUENCY OF MICTURITION: ICD-10-CM

## 2020-06-03 PROCEDURE — 99213 OFFICE O/P EST LOW 20 MIN: CPT

## 2020-06-03 PROCEDURE — 96402 CHEMO HORMON ANTINEOPL SQ/IM: CPT

## 2020-06-03 RX ORDER — LEUPROLIDE ACETATE 30 MG
30 KIT INTRAMUSCULAR
Qty: 1 | Refills: 0 | Status: COMPLETED | OUTPATIENT
Start: 2020-06-02 | End: 2020-06-03

## 2020-06-03 RX ORDER — LEUPROLIDE ACETATE 30 MG
30 KIT INTRAMUSCULAR
Qty: 1 | Refills: 0 | Status: COMPLETED | OUTPATIENT
Start: 2020-06-03

## 2020-06-03 RX ADMIN — LEUPROLIDE ACETATE 0 MG: KIT at 00:00

## 2020-06-03 NOTE — PHYSICAL EXAM
[General Appearance - Well Developed] : well developed [General Appearance - Well Nourished] : well nourished [Normal Appearance] : normal appearance [General Appearance - In No Acute Distress] : no acute distress [Well Groomed] : well groomed [Abdomen Soft] : soft [Abdomen Tenderness] : non-tender [Costovertebral Angle Tenderness] : no ~M costovertebral angle tenderness [Urethral Meatus] : meatus normal [Urinary Bladder Findings] : the bladder was normal on palpation [Scrotum] : the scrotum was normal [No Prostate Nodules] : no prostate nodules [Testes Mass (___cm)] : there were no testicular masses [Edema] : no peripheral edema [] : no respiratory distress [Respiration, Rhythm And Depth] : normal respiratory rhythm and effort [Oriented To Time, Place, And Person] : oriented to person, place, and time [Exaggerated Use Of Accessory Muscles For Inspiration] : no accessory muscle use [Affect] : the affect was normal [Mood] : the mood was normal [Not Anxious] : not anxious [Normal Station and Gait] : the gait and station were normal for the patient's age [No Focal Deficits] : no focal deficits [No Palpable Adenopathy] : no palpable adenopathy

## 2020-06-04 LAB
APPEARANCE: CLEAR
BACTERIA: NEGATIVE
BILIRUBIN URINE: NEGATIVE
BLOOD URINE: NEGATIVE
COLOR: YELLOW
GLUCOSE QUALITATIVE U: NEGATIVE
HYALINE CASTS: 0 /LPF
KETONES URINE: NEGATIVE
LEUKOCYTE ESTERASE URINE: NEGATIVE
MICROSCOPIC-UA: NORMAL
NITRITE URINE: NEGATIVE
PH URINE: 5.5
PROTEIN URINE: NORMAL
RED BLOOD CELLS URINE: 4 /HPF
SPECIFIC GRAVITY URINE: 1.02
SQUAMOUS EPITHELIAL CELLS: 1 /HPF
UROBILINOGEN URINE: NORMAL
WHITE BLOOD CELLS URINE: 2 /HPF

## 2020-06-05 DIAGNOSIS — C61 MALIGNANT NEOPLASM OF PROSTATE: ICD-10-CM

## 2020-10-07 ENCOUNTER — APPOINTMENT (OUTPATIENT)
Dept: UROLOGY | Facility: CLINIC | Age: 85
End: 2020-10-07
Payer: MEDICARE

## 2020-10-07 ENCOUNTER — OUTPATIENT (OUTPATIENT)
Dept: OUTPATIENT SERVICES | Facility: HOSPITAL | Age: 85
LOS: 1 days | End: 2020-10-07
Payer: MEDICARE

## 2020-10-07 VITALS — TEMPERATURE: 97.5 F

## 2020-10-07 DIAGNOSIS — Z98.89 OTHER SPECIFIED POSTPROCEDURAL STATES: Chronic | ICD-10-CM

## 2020-10-07 DIAGNOSIS — R35.0 FREQUENCY OF MICTURITION: ICD-10-CM

## 2020-10-07 PROCEDURE — 99213 OFFICE O/P EST LOW 20 MIN: CPT

## 2020-10-07 PROCEDURE — 96402 CHEMO HORMON ANTINEOPL SQ/IM: CPT

## 2020-10-07 RX ORDER — LEUPROLIDE ACETATE 30 MG/.5ML
30 INJECTION, SUSPENSION, EXTENDED RELEASE SUBCUTANEOUS
Refills: 0 | Status: COMPLETED | OUTPATIENT
Start: 2020-10-07

## 2020-10-07 RX ORDER — LEUPROLIDE ACETATE 30 MG
30 KIT INTRAMUSCULAR
Qty: 1 | Refills: 0 | Status: DISCONTINUED | OUTPATIENT
Start: 2018-08-28 | End: 2020-10-07

## 2020-10-07 RX ORDER — LEUPROLIDE ACETATE 30 MG
30 KIT INTRAMUSCULAR
Qty: 1 | Refills: 0 | Status: DISCONTINUED | OUTPATIENT
Start: 2019-01-08 | End: 2020-10-07

## 2020-10-07 RX ORDER — LEUPROLIDE ACETATE 30 MG/.5ML
30 INJECTION, SUSPENSION, EXTENDED RELEASE SUBCUTANEOUS DAILY
Qty: 1 | Refills: 0 | Status: COMPLETED | OUTPATIENT
Start: 2020-10-06 | End: 2020-10-07

## 2020-10-07 RX ORDER — LEUPROLIDE ACETATE 30 MG
30 KIT INTRAMUSCULAR
Qty: 1 | Refills: 0 | Status: DISCONTINUED | OUTPATIENT
Start: 2019-09-25 | End: 2020-10-07

## 2020-10-07 RX ADMIN — LEUPROLIDE ACETATE 0 MG: KIT SUBCUTANEOUS at 00:00

## 2020-10-08 LAB
APPEARANCE: CLEAR
BACTERIA: NEGATIVE
BILIRUBIN URINE: NEGATIVE
BLOOD URINE: NEGATIVE
COLOR: NORMAL
GLUCOSE QUALITATIVE U: NEGATIVE
HYALINE CASTS: 0 /LPF
KETONES URINE: NEGATIVE
LEUKOCYTE ESTERASE URINE: NEGATIVE
MICROSCOPIC-UA: NORMAL
NITRITE URINE: NEGATIVE
PH URINE: 6
PROTEIN URINE: NEGATIVE
PSA FREE FLD-MCNC: 27 %
PSA FREE SERPL-MCNC: 0.21 NG/ML
PSA SERPL-MCNC: 0.78 NG/ML
RED BLOOD CELLS URINE: 3 /HPF
SPECIFIC GRAVITY URINE: 1.01
SQUAMOUS EPITHELIAL CELLS: 1 /HPF
UROBILINOGEN URINE: NORMAL
WHITE BLOOD CELLS URINE: 0 /HPF

## 2020-10-12 DIAGNOSIS — C61 MALIGNANT NEOPLASM OF PROSTATE: ICD-10-CM

## 2021-02-10 ENCOUNTER — OUTPATIENT (OUTPATIENT)
Dept: OUTPATIENT SERVICES | Facility: HOSPITAL | Age: 86
LOS: 1 days | End: 2021-02-10
Payer: MEDICARE

## 2021-02-10 ENCOUNTER — APPOINTMENT (OUTPATIENT)
Dept: UROLOGY | Facility: CLINIC | Age: 86
End: 2021-02-10
Payer: MEDICARE

## 2021-02-10 DIAGNOSIS — R35.0 FREQUENCY OF MICTURITION: ICD-10-CM

## 2021-02-10 DIAGNOSIS — Z98.89 OTHER SPECIFIED POSTPROCEDURAL STATES: Chronic | ICD-10-CM

## 2021-02-10 PROCEDURE — 99072 ADDL SUPL MATRL&STAF TM PHE: CPT

## 2021-02-10 PROCEDURE — 96402 CHEMO HORMON ANTINEOPL SQ/IM: CPT

## 2021-02-10 PROCEDURE — 99213 OFFICE O/P EST LOW 20 MIN: CPT

## 2021-02-10 RX ORDER — LEUPROLIDE ACETATE 30 MG/.5ML
30 INJECTION, SUSPENSION, EXTENDED RELEASE SUBCUTANEOUS
Refills: 0 | Status: COMPLETED | OUTPATIENT
Start: 2021-02-10

## 2021-02-10 RX ORDER — LEUPROLIDE ACETATE 30 MG/.5ML
30 INJECTION, SUSPENSION, EXTENDED RELEASE SUBCUTANEOUS DAILY
Qty: 1 | Refills: 0 | Status: COMPLETED | OUTPATIENT
Start: 2021-02-10 | End: 2021-02-10

## 2021-02-10 RX ORDER — LEUPROLIDE ACETATE 30 MG/.5ML
30 INJECTION, SUSPENSION, EXTENDED RELEASE SUBCUTANEOUS DAILY
Refills: 0 | Status: COMPLETED | OUTPATIENT
Start: 2021-02-10

## 2021-02-10 RX ADMIN — LEUPROLIDE ACETATE 30 MG: KIT at 00:00

## 2021-02-10 RX ADMIN — LEUPROLIDE ACETATE 0 MG: KIT SUBCUTANEOUS at 00:00

## 2021-02-11 DIAGNOSIS — C61 MALIGNANT NEOPLASM OF PROSTATE: ICD-10-CM

## 2021-04-28 ENCOUNTER — APPOINTMENT (OUTPATIENT)
Dept: INTERNAL MEDICINE | Facility: CLINIC | Age: 86
End: 2021-04-28
Payer: MEDICARE

## 2021-04-28 VITALS
BODY MASS INDEX: 32.18 KG/M2 | SYSTOLIC BLOOD PRESSURE: 134 MMHG | WEIGHT: 205 LBS | HEART RATE: 78 BPM | OXYGEN SATURATION: 97 % | TEMPERATURE: 97.3 F | HEIGHT: 67 IN | DIASTOLIC BLOOD PRESSURE: 53 MMHG

## 2021-04-28 PROCEDURE — G0447 BEHAVIOR COUNSEL OBESITY 15M: CPT | Mod: 59

## 2021-04-28 PROCEDURE — G0439: CPT

## 2021-04-28 PROCEDURE — G0442 ANNUAL ALCOHOL SCREEN 15 MIN: CPT | Mod: 59

## 2021-04-28 PROCEDURE — G0444 DEPRESSION SCREEN ANNUAL: CPT | Mod: 59

## 2021-04-28 PROCEDURE — 99072 ADDL SUPL MATRL&STAF TM PHE: CPT

## 2021-04-28 NOTE — HEALTH RISK ASSESSMENT
[Good] : ~his/her~  mood as  good [No] : No [No falls in past year] : Patient reported no falls in the past year [None] : None [With Family] : lives with family [Fully functional (bathing, dressing, toileting, transferring, walking, feeding)] : Fully functional (bathing, dressing, toileting, transferring, walking, feeding) [Fully functional (using the telephone, shopping, preparing meals, housekeeping, doing laundry, using] : Fully functional and needs no help or supervision to perform IADLs (using the telephone, shopping, preparing meals, housekeeping, doing laundry, using transportation, managing medications and managing finances) [Reports normal functional visual acuity (ie: able to read med bottle)] : Reports normal functional visual acuity [Smoke Detector] : smoke detector [Carbon Monoxide Detector] : carbon monoxide detector [Safety elements used in home] : safety elements used in home [Seat Belt] :  uses seat belt [Sunscreen] : uses sunscreen [Reviewed no changes] : Reviewed no changes [] : No [Change in mental status noted] : No change in mental status noted [Language] : denies difficulty with language [Behavior] : denies difficulty with behavior [Learning/Retaining New Information] : denies difficulty learning/retaining new information [Handling Complex Tasks] : denies difficulty handling complex tasks [Reasoning] : denies difficulty with reasoning [Spatial Ability and Orientation] : denies difficulty with spatial ability and orientation [Reports changes in hearing] : Reports no changes in hearing [Reports changes in vision] : Reports no changes in vision [Reports changes in dental health] : Reports no changes in dental health [Guns at Home] : no guns at home [Travel to Developing Areas] : does not  travel to developing areas [TB Exposure] : is not being exposed to tuberculosis [Caregiver Concerns] : does not have caregiver concerns [AdvancecareDate] : 04/21

## 2021-04-28 NOTE — ASSESSMENT
[FreeTextEntry1] : \par Preventive visit: patient is up to date with vaccines; he is up to date with colonoscopy screening and sees Urology for prostate cancer; he does not smoke cigarettes and does not misuse alcohol; he feels safe at home and has smoke/CO detectors in the house; he wears seatbelts when in vehicles\par \par Obesity counselling: 15 mins, assessed BMI at 30 and above now in obese range; advised weight loss, exercise routine and diet; patient agreed to start exercise program for target weight loss 20 lbs; assisted patient with resources including nutrition referral as needed and arranged for follow-up to monitor weight loss progress over next several months\par \par Annual alcohol misuse screen, 15 mins, done; negative.\par \par Depression screen performed today, PHQ2=0, negative.\par

## 2021-04-28 NOTE — HISTORY OF PRESENT ILLNESS
[FreeTextEntry1] : Presents to establish care for preventive visit. [de-identified] : \par Sees Ortho Dr. Chase for treatment of prostate cancer with Lupron shots. Patient feels well today and denies any complaints.

## 2021-06-10 ENCOUNTER — APPOINTMENT (OUTPATIENT)
Dept: UROLOGY | Facility: CLINIC | Age: 86
End: 2021-06-10
Payer: MEDICARE

## 2021-06-10 ENCOUNTER — OUTPATIENT (OUTPATIENT)
Dept: OUTPATIENT SERVICES | Facility: HOSPITAL | Age: 86
LOS: 1 days | End: 2021-06-10
Payer: MEDICARE

## 2021-06-10 DIAGNOSIS — R35.0 FREQUENCY OF MICTURITION: ICD-10-CM

## 2021-06-10 DIAGNOSIS — R97.20 ELEVATED PROSTATE SPECIFIC ANTIGEN [PSA]: ICD-10-CM

## 2021-06-10 DIAGNOSIS — C61 MALIGNANT NEOPLASM OF PROSTATE: ICD-10-CM

## 2021-06-10 DIAGNOSIS — Z98.89 OTHER SPECIFIED POSTPROCEDURAL STATES: Chronic | ICD-10-CM

## 2021-06-10 PROCEDURE — 99214 OFFICE O/P EST MOD 30 MIN: CPT | Mod: 25

## 2021-06-10 PROCEDURE — 96402U: CUSTOM | Mod: NC

## 2021-06-10 PROCEDURE — 99072 ADDL SUPL MATRL&STAF TM PHE: CPT

## 2021-06-10 PROCEDURE — 96402 CHEMO HORMON ANTINEOPL SQ/IM: CPT

## 2021-06-10 RX ORDER — LEUPROLIDE ACETATE 30 MG/.5ML
30 INJECTION, SUSPENSION, EXTENDED RELEASE SUBCUTANEOUS DAILY
Qty: 1 | Refills: 0 | Status: COMPLETED | OUTPATIENT
Start: 2021-02-09 | End: 2021-02-10

## 2021-06-10 RX ORDER — LEUPROLIDE ACETATE 30 MG/.5ML
30 INJECTION, SUSPENSION, EXTENDED RELEASE SUBCUTANEOUS
Refills: 0 | Status: COMPLETED | OUTPATIENT
Start: 2021-06-10

## 2021-06-10 RX ORDER — LEUPROLIDE ACETATE 30 MG/.5ML
30 INJECTION, SUSPENSION, EXTENDED RELEASE SUBCUTANEOUS DAILY
Qty: 1 | Refills: 0 | Status: COMPLETED | OUTPATIENT
Start: 2021-06-08 | End: 2021-06-10

## 2021-06-10 RX ADMIN — LEUPROLIDE ACETATE 0 MG: KIT SUBCUTANEOUS at 00:00

## 2021-06-11 LAB
APPEARANCE: CLEAR
BACTERIA: NEGATIVE
BILIRUBIN URINE: NEGATIVE
BLOOD URINE: NEGATIVE
COLOR: YELLOW
GLUCOSE QUALITATIVE U: NEGATIVE
HYALINE CASTS: 4 /LPF
KETONES URINE: NEGATIVE
LEUKOCYTE ESTERASE URINE: NEGATIVE
MICROSCOPIC-UA: NORMAL
NITRITE URINE: NEGATIVE
PH URINE: 5.5
PROTEIN URINE: NORMAL
PSA FREE FLD-MCNC: 37 %
PSA FREE SERPL-MCNC: 0.46 NG/ML
PSA SERPL-MCNC: 1.24 NG/ML
RED BLOOD CELLS URINE: 5 /HPF
SPECIFIC GRAVITY URINE: 1.02
SQUAMOUS EPITHELIAL CELLS: 2 /HPF
UROBILINOGEN URINE: NORMAL
WHITE BLOOD CELLS URINE: 3 /HPF

## 2021-06-25 ENCOUNTER — RESULT REVIEW (OUTPATIENT)
Age: 86
End: 2021-06-25

## 2021-06-25 ENCOUNTER — APPOINTMENT (OUTPATIENT)
Dept: CT IMAGING | Facility: HOSPITAL | Age: 86
End: 2021-06-25

## 2021-06-25 ENCOUNTER — OUTPATIENT (OUTPATIENT)
Dept: OUTPATIENT SERVICES | Facility: HOSPITAL | Age: 86
LOS: 1 days | End: 2021-06-25
Payer: MEDICARE

## 2021-06-25 DIAGNOSIS — C78.00 SECONDARY MALIGNANT NEOPLASM OF UNSPECIFIED LUNG: ICD-10-CM

## 2021-06-25 DIAGNOSIS — J98.4 OTHER DISORDERS OF LUNG: ICD-10-CM

## 2021-06-25 DIAGNOSIS — R91.8 OTHER NONSPECIFIC ABNORMAL FINDING OF LUNG FIELD: ICD-10-CM

## 2021-06-25 DIAGNOSIS — C61 MALIGNANT NEOPLASM OF PROSTATE: ICD-10-CM

## 2021-06-25 DIAGNOSIS — Z98.89 OTHER SPECIFIED POSTPROCEDURAL STATES: Chronic | ICD-10-CM

## 2021-06-25 PROCEDURE — 88305 TISSUE EXAM BY PATHOLOGIST: CPT | Mod: 26

## 2021-06-25 PROCEDURE — 71045 X-RAY EXAM CHEST 1 VIEW: CPT | Mod: 26

## 2021-06-25 PROCEDURE — 88173 CYTOPATH EVAL FNA REPORT: CPT

## 2021-06-25 PROCEDURE — 32408 CORE NDL BX LNG/MED PERQ: CPT

## 2021-06-25 PROCEDURE — 88305 TISSUE EXAM BY PATHOLOGIST: CPT

## 2021-06-25 PROCEDURE — 71045 X-RAY EXAM CHEST 1 VIEW: CPT

## 2021-06-25 PROCEDURE — 88173 CYTOPATH EVAL FNA REPORT: CPT | Mod: 26

## 2021-06-25 PROCEDURE — 88172 CYTP DX EVAL FNA 1ST EA SITE: CPT

## 2021-06-30 LAB — NON-GYNECOLOGICAL CYTOLOGY STUDY: SIGNIFICANT CHANGE UP

## 2021-07-07 NOTE — PRE-ANESTHESIA EVALUATION ADULT - NSATTENDATTESTRD_GEN_ALL_CORE
The patient has been re-examined and I agree with the above assessment or I updated with my findings.
Additional Complaints

## 2021-10-12 ENCOUNTER — APPOINTMENT (OUTPATIENT)
Dept: INTERNAL MEDICINE | Facility: CLINIC | Age: 86
End: 2021-10-12
Payer: MEDICARE

## 2021-10-12 VITALS
SYSTOLIC BLOOD PRESSURE: 118 MMHG | HEART RATE: 66 BPM | DIASTOLIC BLOOD PRESSURE: 65 MMHG | OXYGEN SATURATION: 98 % | BODY MASS INDEX: 31.39 KG/M2 | WEIGHT: 200 LBS | HEIGHT: 67 IN | TEMPERATURE: 97.6 F

## 2021-10-12 DIAGNOSIS — Z00.00 ENCOUNTER FOR GENERAL ADULT MEDICAL EXAMINATION W/OUT ABNORMAL FINDINGS: ICD-10-CM

## 2021-10-12 PROCEDURE — 99213 OFFICE O/P EST LOW 20 MIN: CPT

## 2021-10-12 NOTE — HISTORY OF PRESENT ILLNESS
[FreeTextEntry1] : Depressed, anxious [de-identified] : \par Feeling down, anxious about the current state of things. 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 Also with hx prostate cancer. Pandemic is overwhelming at times too, feels tired, depressed. Has good family system for support, his daughter and his wife.

## 2021-10-12 NOTE — ASSESSMENT
[FreeTextEntry1] : \par Start Zoloft for anxiety, depression. Advised may not feel effect until 4-6 weeks of starting it, but to be consistent with compliance.

## 2021-10-13 ENCOUNTER — APPOINTMENT (OUTPATIENT)
Dept: UROLOGY | Facility: CLINIC | Age: 86
End: 2021-10-13
Payer: MEDICARE

## 2021-10-13 ENCOUNTER — OUTPATIENT (OUTPATIENT)
Dept: OUTPATIENT SERVICES | Facility: HOSPITAL | Age: 86
LOS: 1 days | End: 2021-10-13
Payer: MEDICARE

## 2021-10-13 DIAGNOSIS — R97.20 ELEVATED PROSTATE SPECIFIC ANTIGEN [PSA]: ICD-10-CM

## 2021-10-13 DIAGNOSIS — R35.0 FREQUENCY OF MICTURITION: ICD-10-CM

## 2021-10-13 DIAGNOSIS — Z98.89 OTHER SPECIFIED POSTPROCEDURAL STATES: Chronic | ICD-10-CM

## 2021-10-13 DIAGNOSIS — C61 MALIGNANT NEOPLASM OF PROSTATE: ICD-10-CM

## 2021-10-13 LAB
APPEARANCE: CLEAR
BACTERIA: NEGATIVE
BILIRUBIN URINE: NEGATIVE
BLOOD URINE: NEGATIVE
COLOR: YELLOW
GLUCOSE QUALITATIVE U: NEGATIVE
HYALINE CASTS: 1 /LPF
KETONES URINE: NEGATIVE
LEUKOCYTE ESTERASE URINE: NEGATIVE
MICROSCOPIC-UA: NORMAL
NITRITE URINE: NEGATIVE
PH URINE: 5.5
PROTEIN URINE: NORMAL
RED BLOOD CELLS URINE: 4 /HPF
SPECIFIC GRAVITY URINE: 1.02
SQUAMOUS EPITHELIAL CELLS: 1 /HPF
UROBILINOGEN URINE: NORMAL
WHITE BLOOD CELLS URINE: 1 /HPF

## 2021-10-13 PROCEDURE — 99214 OFFICE O/P EST MOD 30 MIN: CPT | Mod: 25

## 2021-10-13 PROCEDURE — 96402 CHEMO HORMON ANTINEOPL SQ/IM: CPT

## 2021-10-13 PROCEDURE — 96402U: CUSTOM | Mod: NC

## 2021-10-13 RX ORDER — LEUPROLIDE ACETATE 30 MG/.5ML
30 INJECTION, SUSPENSION, EXTENDED RELEASE SUBCUTANEOUS DAILY
Qty: 1 | Refills: 0 | Status: COMPLETED | OUTPATIENT
Start: 2021-10-12 | End: 2021-10-13

## 2021-10-13 RX ORDER — LEUPROLIDE ACETATE 30 MG/.5ML
30 INJECTION, SUSPENSION, EXTENDED RELEASE SUBCUTANEOUS
Refills: 0 | Status: COMPLETED | OUTPATIENT
Start: 2021-10-13

## 2021-10-13 RX ADMIN — LEUPROLIDE ACETATE 0 MG: KIT SUBCUTANEOUS at 00:00

## 2021-10-13 NOTE — PHYSICAL EXAM
[General Appearance - Well Developed] : well developed [General Appearance - Well Nourished] : well nourished [Normal Appearance] : normal appearance [Well Groomed] : well groomed [General Appearance - In No Acute Distress] : no acute distress [Abdomen Soft] : soft [Abdomen Tenderness] : non-tender [Costovertebral Angle Tenderness] : no ~M costovertebral angle tenderness [Urethral Meatus] : meatus normal [Scrotum] : the scrotum was normal [Urinary Bladder Findings] : the bladder was normal on palpation [Testes Mass (___cm)] : there were no testicular masses [No Prostate Nodules] : no prostate nodules [Edema] : no peripheral edema [] : no respiratory distress [Respiration, Rhythm And Depth] : normal respiratory rhythm and effort [Exaggerated Use Of Accessory Muscles For Inspiration] : no accessory muscle use [Oriented To Time, Place, And Person] : oriented to person, place, and time [Affect] : the affect was normal [Not Anxious] : not anxious [Mood] : the mood was normal [Normal Station and Gait] : the gait and station were normal for the patient's age [No Focal Deficits] : no focal deficits [No Palpable Adenopathy] : no palpable adenopathy

## 2021-10-14 LAB
PSA FREE FLD-MCNC: 30 %
PSA FREE SERPL-MCNC: 0.47 NG/ML
PSA SERPL-MCNC: 1.58 NG/ML

## 2021-10-18 LAB
CHOLEST SERPL-MCNC: 138 MG/DL
ESTIMATED AVERAGE GLUCOSE: 108 MG/DL
HBA1C MFR BLD HPLC: 5.4 %
HDLC SERPL-MCNC: 32 MG/DL
LDLC SERPL CALC-MCNC: 71 MG/DL
NONHDLC SERPL-MCNC: 106 MG/DL
TRIGL SERPL-MCNC: 172 MG/DL

## 2022-01-10 ENCOUNTER — RX RENEWAL (OUTPATIENT)
Age: 87
End: 2022-01-10

## 2022-02-16 ENCOUNTER — APPOINTMENT (OUTPATIENT)
Dept: UROLOGY | Facility: CLINIC | Age: 87
End: 2022-02-16
Payer: MEDICARE

## 2022-02-16 ENCOUNTER — OUTPATIENT (OUTPATIENT)
Dept: OUTPATIENT SERVICES | Facility: HOSPITAL | Age: 87
LOS: 1 days | End: 2022-02-16
Payer: MEDICARE

## 2022-02-16 DIAGNOSIS — R35.0 FREQUENCY OF MICTURITION: ICD-10-CM

## 2022-02-16 DIAGNOSIS — Z98.89 OTHER SPECIFIED POSTPROCEDURAL STATES: Chronic | ICD-10-CM

## 2022-02-16 DIAGNOSIS — C61 MALIGNANT NEOPLASM OF PROSTATE: ICD-10-CM

## 2022-02-16 PROCEDURE — 99213 OFFICE O/P EST LOW 20 MIN: CPT | Mod: 25

## 2022-02-16 PROCEDURE — 96402U: CUSTOM | Mod: NC

## 2022-02-16 PROCEDURE — 96402 CHEMO HORMON ANTINEOPL SQ/IM: CPT

## 2022-02-16 RX ORDER — LEUPROLIDE ACETATE 30 MG/.5ML
30 INJECTION, SUSPENSION, EXTENDED RELEASE SUBCUTANEOUS
Refills: 0 | Status: COMPLETED | OUTPATIENT
Start: 2022-02-16

## 2022-02-16 RX ADMIN — LEUPROLIDE ACETATE 0 MG: KIT SUBCUTANEOUS at 00:00

## 2022-02-16 NOTE — PHYSICAL EXAM
[General Appearance - Well Developed] : well developed [General Appearance - Well Nourished] : well nourished [Normal Appearance] : normal appearance [Well Groomed] : well groomed [General Appearance - In No Acute Distress] : no acute distress [Abdomen Soft] : soft [Abdomen Tenderness] : non-tender [Costovertebral Angle Tenderness] : no ~M costovertebral angle tenderness [Urethral Meatus] : meatus normal [Scrotum] : the scrotum was normal [Urinary Bladder Findings] : the bladder was normal on palpation [Testes Mass (___cm)] : there were no testicular masses [No Prostate Nodules] : no prostate nodules [Edema] : no peripheral edema [] : no respiratory distress [Respiration, Rhythm And Depth] : normal respiratory rhythm and effort [Oriented To Time, Place, And Person] : oriented to person, place, and time [Exaggerated Use Of Accessory Muscles For Inspiration] : no accessory muscle use [Affect] : the affect was normal [Mood] : the mood was normal [Not Anxious] : not anxious [Normal Station and Gait] : the gait and station were normal for the patient's age [No Focal Deficits] : no focal deficits [No Palpable Adenopathy] : no palpable adenopathy

## 2022-02-17 LAB
APPEARANCE: CLEAR
BACTERIA: NEGATIVE
BILIRUBIN URINE: NEGATIVE
BLOOD URINE: NEGATIVE
COLOR: YELLOW
GLUCOSE QUALITATIVE U: NEGATIVE
HYALINE CASTS: 0 /LPF
KETONES URINE: NEGATIVE
LEUKOCYTE ESTERASE URINE: NEGATIVE
MICROSCOPIC-UA: NORMAL
NITRITE URINE: NEGATIVE
PH URINE: 5.5
PROTEIN URINE: NEGATIVE
PSA FREE FLD-MCNC: 28 %
PSA FREE SERPL-MCNC: 0.3 NG/ML
PSA SERPL-MCNC: 1.07 NG/ML
RED BLOOD CELLS URINE: 2 /HPF
SPECIFIC GRAVITY URINE: 1.02
SQUAMOUS EPITHELIAL CELLS: 0 /HPF
UROBILINOGEN URINE: NORMAL
WHITE BLOOD CELLS URINE: 1 /HPF

## 2022-03-31 ENCOUNTER — RX RENEWAL (OUTPATIENT)
Age: 87
End: 2022-03-31

## 2022-04-18 ENCOUNTER — RX RENEWAL (OUTPATIENT)
Age: 87
End: 2022-04-18

## 2022-04-19 ENCOUNTER — TRANSCRIPTION ENCOUNTER (OUTPATIENT)
Age: 87
End: 2022-04-19

## 2022-06-22 ENCOUNTER — APPOINTMENT (OUTPATIENT)
Dept: UROLOGY | Facility: CLINIC | Age: 87
End: 2022-06-22
Payer: MEDICARE

## 2022-06-22 ENCOUNTER — OUTPATIENT (OUTPATIENT)
Dept: OUTPATIENT SERVICES | Facility: HOSPITAL | Age: 87
LOS: 1 days | End: 2022-06-22
Payer: MEDICARE

## 2022-06-22 VITALS — DIASTOLIC BLOOD PRESSURE: 65 MMHG | SYSTOLIC BLOOD PRESSURE: 131 MMHG | HEART RATE: 71 BPM

## 2022-06-22 DIAGNOSIS — Z98.89 OTHER SPECIFIED POSTPROCEDURAL STATES: Chronic | ICD-10-CM

## 2022-06-22 DIAGNOSIS — R35.0 FREQUENCY OF MICTURITION: ICD-10-CM

## 2022-06-22 DIAGNOSIS — C61 MALIGNANT NEOPLASM OF PROSTATE: ICD-10-CM

## 2022-06-22 PROCEDURE — 96402 CHEMO HORMON ANTINEOPL SQ/IM: CPT

## 2022-06-22 PROCEDURE — 96402U: CUSTOM | Mod: NC

## 2022-06-22 PROCEDURE — 99213 OFFICE O/P EST LOW 20 MIN: CPT | Mod: 25

## 2022-06-22 RX ORDER — LEUPROLIDE ACETATE 30 MG/.5ML
30 INJECTION, SUSPENSION, EXTENDED RELEASE SUBCUTANEOUS
Refills: 0 | Status: COMPLETED | OUTPATIENT
Start: 2022-06-22

## 2022-06-22 RX ORDER — LEUPROLIDE ACETATE 30 MG
30 KIT INTRAMUSCULAR
Qty: 1 | Refills: 2 | Status: DISCONTINUED | OUTPATIENT
Start: 2022-02-15 | End: 2022-06-22

## 2022-06-22 RX ORDER — LEUPROLIDE ACETATE 30 MG/.5ML
30 INJECTION, SUSPENSION, EXTENDED RELEASE SUBCUTANEOUS DAILY
Qty: 1 | Refills: 0 | Status: COMPLETED | OUTPATIENT
Start: 2022-06-21 | End: 2022-06-22

## 2022-06-22 RX ORDER — LEUPROLIDE ACETATE 30 MG/.5ML
30 INJECTION, SUSPENSION, EXTENDED RELEASE SUBCUTANEOUS DAILY
Qty: 1 | Refills: 0 | Status: COMPLETED | OUTPATIENT
Start: 2022-02-16 | End: 2022-06-22

## 2022-06-22 RX ADMIN — LEUPROLIDE ACETATE 0 MG: KIT SUBCUTANEOUS at 00:00

## 2022-06-23 LAB
APPEARANCE: CLEAR
BACTERIA: NEGATIVE
BILIRUBIN URINE: NEGATIVE
BLOOD URINE: NEGATIVE
COLOR: YELLOW
GLUCOSE QUALITATIVE U: NEGATIVE
HYALINE CASTS: 3 /LPF
KETONES URINE: NEGATIVE
LEUKOCYTE ESTERASE URINE: ABNORMAL
MICROSCOPIC-UA: NORMAL
NITRITE URINE: NEGATIVE
PH URINE: 5.5
PROTEIN URINE: NORMAL
PSA FREE FLD-MCNC: 33 %
PSA FREE SERPL-MCNC: 0.24 NG/ML
PSA SERPL-MCNC: 0.71 NG/ML
RED BLOOD CELLS URINE: 4 /HPF
SPECIFIC GRAVITY URINE: 1.02
SQUAMOUS EPITHELIAL CELLS: 2 /HPF
UROBILINOGEN URINE: NORMAL
WHITE BLOOD CELLS URINE: 4 /HPF

## 2022-07-25 ENCOUNTER — LABORATORY RESULT (OUTPATIENT)
Age: 87
End: 2022-07-25

## 2022-07-25 ENCOUNTER — APPOINTMENT (OUTPATIENT)
Dept: INTERNAL MEDICINE | Facility: CLINIC | Age: 87
End: 2022-07-25

## 2022-07-25 VITALS
SYSTOLIC BLOOD PRESSURE: 136 MMHG | BODY MASS INDEX: 30.45 KG/M2 | HEIGHT: 67 IN | OXYGEN SATURATION: 97 % | HEART RATE: 70 BPM | DIASTOLIC BLOOD PRESSURE: 67 MMHG | WEIGHT: 194 LBS | TEMPERATURE: 97.3 F

## 2022-07-25 DIAGNOSIS — I10 ESSENTIAL (PRIMARY) HYPERTENSION: ICD-10-CM

## 2022-07-25 PROCEDURE — 99215 OFFICE O/P EST HI 40 MIN: CPT

## 2022-07-25 NOTE — ASSESSMENT
[FreeTextEntry1] : \par Continue Zoloft for anxiety, depression. Check labs today. Coordinated follow-up care with Urology for hx prostate cancer.

## 2022-07-25 NOTE — HISTORY OF PRESENT ILLNESS
[FreeTextEntry1] : Depressed, anxious [de-identified] : \par Feeling down, anxious about the current state of things. 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 Also with hx prostate cancer. Pandemic is overwhelming at times too, feels tired, depressed. Has good family system for support, his daughter and his wife.

## 2022-07-27 LAB
25(OH)D3 SERPL-MCNC: 58.3 NG/ML
ALBUMIN SERPL ELPH-MCNC: 4.5 G/DL
ALP BLD-CCNC: 48 U/L
ALT SERPL-CCNC: 18 U/L
ANION GAP SERPL CALC-SCNC: 12 MMOL/L
AST SERPL-CCNC: 49 U/L
BASOPHILS # BLD AUTO: 0.02 K/UL
BASOPHILS NFR BLD AUTO: 0.3 %
BILIRUB SERPL-MCNC: 0.4 MG/DL
BUN SERPL-MCNC: 22 MG/DL
CALCIUM SERPL-MCNC: 9.8 MG/DL
CHLORIDE SERPL-SCNC: 101 MMOL/L
CHOLEST SERPL-MCNC: 151 MG/DL
CO2 SERPL-SCNC: 26 MMOL/L
CREAT SERPL-MCNC: 1.28 MG/DL
EGFR: 54 ML/MIN/1.73M2
EOSINOPHIL # BLD AUTO: 0.15 K/UL
EOSINOPHIL NFR BLD AUTO: 2.6 %
ESTIMATED AVERAGE GLUCOSE: 108 MG/DL
GLUCOSE SERPL-MCNC: 105 MG/DL
HBA1C MFR BLD HPLC: 5.4 %
HCT VFR BLD CALC: 38.6 %
HDLC SERPL-MCNC: 37 MG/DL
HGB BLD-MCNC: 12.7 G/DL
IMM GRANULOCYTES NFR BLD AUTO: 0.2 %
LDLC SERPL CALC-MCNC: 84 MG/DL
LYMPHOCYTES # BLD AUTO: 1.77 K/UL
LYMPHOCYTES NFR BLD AUTO: 30.6 %
MAN DIFF?: NORMAL
MCHC RBC-ENTMCNC: 32.7 PG
MCHC RBC-ENTMCNC: 32.9 GM/DL
MCV RBC AUTO: 99.5 FL
MONOCYTES # BLD AUTO: 0.58 K/UL
MONOCYTES NFR BLD AUTO: 10 %
NEUTROPHILS # BLD AUTO: 3.26 K/UL
NEUTROPHILS NFR BLD AUTO: 56.3 %
NONHDLC SERPL-MCNC: 114 MG/DL
PLATELET # BLD AUTO: 156 K/UL
POTASSIUM SERPL-SCNC: 5.5 MMOL/L
PROT SERPL-MCNC: 7.5 G/DL
RBC # BLD: 3.88 M/UL
RBC # FLD: 12.2 %
SODIUM SERPL-SCNC: 139 MMOL/L
TRIGL SERPL-MCNC: 149 MG/DL
TSH SERPL-ACNC: 4.3 UIU/ML
WBC # FLD AUTO: 5.79 K/UL

## 2022-07-29 ENCOUNTER — RX RENEWAL (OUTPATIENT)
Age: 87
End: 2022-07-29

## 2022-11-02 ENCOUNTER — APPOINTMENT (OUTPATIENT)
Dept: UROLOGY | Facility: CLINIC | Age: 87
End: 2022-11-02

## 2022-11-02 ENCOUNTER — OUTPATIENT (OUTPATIENT)
Dept: OUTPATIENT SERVICES | Facility: HOSPITAL | Age: 87
LOS: 1 days | End: 2022-11-02
Payer: MEDICARE

## 2022-11-02 VITALS
OXYGEN SATURATION: 98 % | TEMPERATURE: 96.6 F | HEART RATE: 69 BPM | SYSTOLIC BLOOD PRESSURE: 149 MMHG | DIASTOLIC BLOOD PRESSURE: 63 MMHG | RESPIRATION RATE: 15 BRPM

## 2022-11-02 DIAGNOSIS — Z98.89 OTHER SPECIFIED POSTPROCEDURAL STATES: Chronic | ICD-10-CM

## 2022-11-02 DIAGNOSIS — R35.0 FREQUENCY OF MICTURITION: ICD-10-CM

## 2022-11-02 PROCEDURE — 96402 CHEMO HORMON ANTINEOPL SQ/IM: CPT

## 2022-11-02 PROCEDURE — 99213 OFFICE O/P EST LOW 20 MIN: CPT | Mod: 25

## 2022-11-02 PROCEDURE — 96402U: CUSTOM | Mod: NC

## 2022-11-02 RX ORDER — LEUPROLIDE ACETATE 30 MG/.5ML
30 INJECTION, SUSPENSION, EXTENDED RELEASE SUBCUTANEOUS DAILY
Qty: 1 | Refills: 0 | Status: COMPLETED | OUTPATIENT
Start: 2022-11-01 | End: 2022-11-02

## 2022-11-02 RX ORDER — LEUPROLIDE ACETATE 30 MG/.5ML
30 INJECTION, SUSPENSION, EXTENDED RELEASE SUBCUTANEOUS
Refills: 0 | Status: COMPLETED | OUTPATIENT
Start: 2022-11-02

## 2022-11-02 RX ADMIN — LEUPROLIDE ACETATE 0 MG: KIT SUBCUTANEOUS at 00:00

## 2022-11-03 LAB
APPEARANCE: CLEAR
BACTERIA: NEGATIVE
BILIRUBIN URINE: NEGATIVE
BLOOD URINE: NEGATIVE
COLOR: YELLOW
GLUCOSE QUALITATIVE U: NEGATIVE
GRANULAR CASTS: 1 /LPF
HYALINE CASTS: 3 /LPF
KETONES URINE: NORMAL
LEUKOCYTE ESTERASE URINE: NEGATIVE
MICROSCOPIC-UA: NORMAL
NITRITE URINE: NEGATIVE
PH URINE: 6
PROTEIN URINE: NORMAL
PSA FREE FLD-MCNC: 41 %
PSA FREE SERPL-MCNC: 7.92 NG/ML
PSA SERPL-MCNC: 19.1 NG/ML
RED BLOOD CELLS URINE: 2 /HPF
SPECIFIC GRAVITY URINE: >=1.03
SQUAMOUS EPITHELIAL CELLS: 9 /HPF
UROBILINOGEN URINE: NORMAL
WHITE BLOOD CELLS URINE: 5 /HPF

## 2022-11-09 DIAGNOSIS — C61 MALIGNANT NEOPLASM OF PROSTATE: ICD-10-CM

## 2022-11-21 LAB
PSA FREE FLD-MCNC: 36 %
PSA FREE SERPL-MCNC: 7.81 NG/ML
PSA SERPL-MCNC: 21.8 NG/ML

## 2022-12-08 ENCOUNTER — APPOINTMENT (OUTPATIENT)
Dept: INTERNAL MEDICINE | Facility: CLINIC | Age: 87
End: 2022-12-08

## 2022-12-08 VITALS
HEART RATE: 66 BPM | WEIGHT: 195 LBS | TEMPERATURE: 97.2 F | SYSTOLIC BLOOD PRESSURE: 133 MMHG | HEIGHT: 67 IN | DIASTOLIC BLOOD PRESSURE: 64 MMHG | BODY MASS INDEX: 30.61 KG/M2 | OXYGEN SATURATION: 97 %

## 2022-12-08 PROCEDURE — 99215 OFFICE O/P EST HI 40 MIN: CPT

## 2022-12-08 NOTE — HISTORY OF PRESENT ILLNESS
[No Pertinent Cardiac History] : no history of aortic stenosis, atrial fibrillation, coronary artery disease, recent myocardial infarction, or implantable device/pacemaker [Aortic Stenosis] : no aortic stenosis [Atrial Fibrillation] : no atrial fibrillation [Coronary Artery Disease] : no coronary artery disease [Recent Myocardial Infarction] : no recent myocardial infarction [Implantable Device/Pacemaker] : no implantable device/pacemaker [No Pertinent Pulmonary History] : no history of asthma, COPD, sleep apnea, or smoking [Asthma] : no asthma [COPD] : no COPD [Sleep Apnea] : no sleep apnea [Smoker] : not a smoker [No Adverse Anesthesia Reaction] : no adverse anesthesia reaction in self or family member [Self] : no previous adverse anesthesia reaction [Chronic Anticoagulation] : no chronic anticoagulation [Chronic Kidney Disease] : no chronic kidney disease [Diabetes] : no diabetes [(Patient denies any chest pain, claudication, dyspnea on exertion, orthopnea, palpitations or syncope)] : Patient denies any chest pain, claudication, dyspnea on exertion, orthopnea, palpitations or syncope [Good (7-10 METs)] : Good (7-10 METs) [FreeTextEntry1] : tear duct surgery [FreeTextEntry2] : 12/12/22 [FreeTextEntry3] : Dr. Lewis Orozco [Family Member] : family member

## 2022-12-08 NOTE — ASSESSMENT
[High Risk Surgery - Intraperitoneal, Intrathoracic or Supringuinal Vascular Procedures] : High Risk Surgery - Intraperitoneal, Intrathoracic or Supringuinal Vascular Procedures - No (0) [Ischemic Heart Disease] : Ischemic Heart Disease - No (0) [Congestive Heart Failure] : Congestive Heart Failure - No (0) [Prior Cerebrovascular Accident or TIA] : Prior Cerebrovascular Accident or TIA - No (0) [Creatinine >= 2mg/dL (1 Point)] : Creatinine >= 2mg/dL - No (0) [Insulin-dependent Diabetic (1 Point)] : Insulin-dependent Diabetic - No (0) [0] : 0 , RCRI Class: I, Risk of Post-Op Cardiac Complications: 3.9%, 95% CI for Risk Estimate: 2.8% - 5.4% [Patient Optimized for Surgery] : Patient optimized for surgery [No Further Testing Recommended] : no further testing recommended [FreeTextEntry4] : \par EKG reviewed and normal. There is no medical contraindication to proceed with the planned surgery at this time.

## 2023-01-11 ENCOUNTER — RX RENEWAL (OUTPATIENT)
Age: 88
End: 2023-01-11

## 2023-01-18 ENCOUNTER — TRANSCRIPTION ENCOUNTER (OUTPATIENT)
Age: 88
End: 2023-01-18

## 2023-01-24 ENCOUNTER — OUTPATIENT (OUTPATIENT)
Dept: OUTPATIENT SERVICES | Facility: HOSPITAL | Age: 88
LOS: 1 days | End: 2023-01-24
Payer: MEDICARE

## 2023-01-24 ENCOUNTER — APPOINTMENT (OUTPATIENT)
Dept: ULTRASOUND IMAGING | Facility: CLINIC | Age: 88
End: 2023-01-24
Payer: MEDICARE

## 2023-01-24 DIAGNOSIS — R97.20 ELEVATED PROSTATE SPECIFIC ANTIGEN [PSA]: ICD-10-CM

## 2023-01-24 DIAGNOSIS — Z00.8 ENCOUNTER FOR OTHER GENERAL EXAMINATION: ICD-10-CM

## 2023-01-24 DIAGNOSIS — Z98.89 OTHER SPECIFIED POSTPROCEDURAL STATES: Chronic | ICD-10-CM

## 2023-01-24 PROCEDURE — 76870 US EXAM SCROTUM: CPT

## 2023-01-24 PROCEDURE — 76870 US EXAM SCROTUM: CPT | Mod: 26

## 2023-02-02 ENCOUNTER — RX RENEWAL (OUTPATIENT)
Age: 88
End: 2023-02-02

## 2023-02-15 ENCOUNTER — OUTPATIENT (OUTPATIENT)
Dept: OUTPATIENT SERVICES | Facility: HOSPITAL | Age: 88
LOS: 1 days | End: 2023-02-15
Payer: MEDICARE

## 2023-02-15 ENCOUNTER — APPOINTMENT (OUTPATIENT)
Dept: UROLOGY | Facility: CLINIC | Age: 88
End: 2023-02-15
Payer: MEDICARE

## 2023-02-15 VITALS
HEART RATE: 82 BPM | SYSTOLIC BLOOD PRESSURE: 187 MMHG | RESPIRATION RATE: 16 BRPM | TEMPERATURE: 97.6 F | DIASTOLIC BLOOD PRESSURE: 72 MMHG | OXYGEN SATURATION: 99 %

## 2023-02-15 DIAGNOSIS — R97.20 ELEVATED PROSTATE, SPECIFIC ANTIGEN [PSA]: ICD-10-CM

## 2023-02-15 DIAGNOSIS — R35.0 FREQUENCY OF MICTURITION: ICD-10-CM

## 2023-02-15 DIAGNOSIS — Z98.89 OTHER SPECIFIED POSTPROCEDURAL STATES: Chronic | ICD-10-CM

## 2023-02-15 PROCEDURE — 96402U: CUSTOM | Mod: NC

## 2023-02-15 PROCEDURE — 99213 OFFICE O/P EST LOW 20 MIN: CPT | Mod: 25

## 2023-02-15 PROCEDURE — 96402 CHEMO HORMON ANTINEOPL SQ/IM: CPT

## 2023-02-15 RX ORDER — LEUPROLIDE ACETATE 30 MG/.5ML
30 INJECTION, SUSPENSION, EXTENDED RELEASE SUBCUTANEOUS DAILY
Qty: 1 | Refills: 0 | Status: COMPLETED | OUTPATIENT
Start: 2023-02-14 | End: 2023-02-15

## 2023-02-15 RX ORDER — LEUPROLIDE ACETATE 30 MG/.5ML
30 INJECTION, SUSPENSION, EXTENDED RELEASE SUBCUTANEOUS
Refills: 0 | Status: COMPLETED | OUTPATIENT
Start: 2023-02-15

## 2023-02-15 RX ADMIN — LEUPROLIDE ACETATE 0 MG: KIT SUBCUTANEOUS at 00:00

## 2023-02-17 DIAGNOSIS — C61 MALIGNANT NEOPLASM OF PROSTATE: ICD-10-CM

## 2023-02-17 DIAGNOSIS — R97.20 ELEVATED PROSTATE SPECIFIC ANTIGEN [PSA]: ICD-10-CM

## 2023-02-20 ENCOUNTER — INPATIENT (INPATIENT)
Facility: HOSPITAL | Age: 88
LOS: 2 days | Discharge: HOME | DRG: 700 | End: 2023-02-23
Attending: UROLOGY | Admitting: UROLOGY
Payer: MEDICARE

## 2023-02-20 VITALS
HEIGHT: 66 IN | SYSTOLIC BLOOD PRESSURE: 148 MMHG | TEMPERATURE: 98 F | OXYGEN SATURATION: 97 % | HEART RATE: 84 BPM | DIASTOLIC BLOOD PRESSURE: 80 MMHG | RESPIRATION RATE: 20 BRPM | WEIGHT: 188.94 LBS

## 2023-02-20 DIAGNOSIS — Z98.89 OTHER SPECIFIED POSTPROCEDURAL STATES: Chronic | ICD-10-CM

## 2023-02-20 LAB
ALBUMIN SERPL ELPH-MCNC: 4.5 G/DL — SIGNIFICANT CHANGE UP (ref 3.3–5)
ALP SERPL-CCNC: 37 U/L — LOW (ref 40–120)
ALT FLD-CCNC: 13 U/L — SIGNIFICANT CHANGE UP (ref 10–45)
ANION GAP SERPL CALC-SCNC: 14 MMOL/L — SIGNIFICANT CHANGE UP (ref 5–17)
APPEARANCE UR: ABNORMAL
AST SERPL-CCNC: 48 U/L — HIGH (ref 10–40)
BACTERIA # UR AUTO: NEGATIVE — SIGNIFICANT CHANGE UP
BASOPHILS # BLD AUTO: 0.03 K/UL — SIGNIFICANT CHANGE UP (ref 0–0.2)
BASOPHILS NFR BLD AUTO: 0.4 % — SIGNIFICANT CHANGE UP (ref 0–2)
BILIRUB SERPL-MCNC: 0.2 MG/DL — SIGNIFICANT CHANGE UP (ref 0.2–1.2)
BILIRUB UR-MCNC: NEGATIVE — SIGNIFICANT CHANGE UP
BUN SERPL-MCNC: 26 MG/DL — HIGH (ref 7–23)
CALCIUM SERPL-MCNC: 9.9 MG/DL — SIGNIFICANT CHANGE UP (ref 8.4–10.5)
CHLORIDE SERPL-SCNC: 98 MMOL/L — SIGNIFICANT CHANGE UP (ref 96–108)
CO2 SERPL-SCNC: 24 MMOL/L — SIGNIFICANT CHANGE UP (ref 22–31)
COLOR SPEC: ABNORMAL
CREAT SERPL-MCNC: 1.1 MG/DL — SIGNIFICANT CHANGE UP (ref 0.5–1.3)
DIFF PNL FLD: ABNORMAL
EGFR: 64 ML/MIN/1.73M2 — SIGNIFICANT CHANGE UP
EOSINOPHIL # BLD AUTO: 0.03 K/UL — SIGNIFICANT CHANGE UP (ref 0–0.5)
EOSINOPHIL NFR BLD AUTO: 0.4 % — SIGNIFICANT CHANGE UP (ref 0–6)
EPI CELLS # UR: 1 /HPF — SIGNIFICANT CHANGE UP
GLUCOSE SERPL-MCNC: 151 MG/DL — HIGH (ref 70–99)
GLUCOSE UR QL: NEGATIVE — SIGNIFICANT CHANGE UP
HCT VFR BLD CALC: 37.3 % — LOW (ref 39–50)
HGB BLD-MCNC: 12.5 G/DL — LOW (ref 13–17)
HYALINE CASTS # UR AUTO: 1 /LPF — SIGNIFICANT CHANGE UP (ref 0–2)
IMM GRANULOCYTES NFR BLD AUTO: 0.1 % — SIGNIFICANT CHANGE UP (ref 0–0.9)
KETONES UR-MCNC: NEGATIVE — SIGNIFICANT CHANGE UP
LEUKOCYTE ESTERASE UR-ACNC: NEGATIVE — SIGNIFICANT CHANGE UP
LYMPHOCYTES # BLD AUTO: 1.4 K/UL — SIGNIFICANT CHANGE UP (ref 1–3.3)
LYMPHOCYTES # BLD AUTO: 20.5 % — SIGNIFICANT CHANGE UP (ref 13–44)
MCHC RBC-ENTMCNC: 32.7 PG — SIGNIFICANT CHANGE UP (ref 27–34)
MCHC RBC-ENTMCNC: 33.5 GM/DL — SIGNIFICANT CHANGE UP (ref 32–36)
MCV RBC AUTO: 97.6 FL — SIGNIFICANT CHANGE UP (ref 80–100)
MONOCYTES # BLD AUTO: 0.51 K/UL — SIGNIFICANT CHANGE UP (ref 0–0.9)
MONOCYTES NFR BLD AUTO: 7.5 % — SIGNIFICANT CHANGE UP (ref 2–14)
NEUTROPHILS # BLD AUTO: 4.86 K/UL — SIGNIFICANT CHANGE UP (ref 1.8–7.4)
NEUTROPHILS NFR BLD AUTO: 71.1 % — SIGNIFICANT CHANGE UP (ref 43–77)
NITRITE UR-MCNC: NEGATIVE — SIGNIFICANT CHANGE UP
NRBC # BLD: 0 /100 WBCS — SIGNIFICANT CHANGE UP (ref 0–0)
PH UR: 7 — SIGNIFICANT CHANGE UP (ref 5–8)
PLATELET # BLD AUTO: 173 K/UL — SIGNIFICANT CHANGE UP (ref 150–400)
POTASSIUM SERPL-MCNC: 5.2 MMOL/L — SIGNIFICANT CHANGE UP (ref 3.5–5.3)
POTASSIUM SERPL-SCNC: 5.2 MMOL/L — SIGNIFICANT CHANGE UP (ref 3.5–5.3)
PROT SERPL-MCNC: 7.9 G/DL — SIGNIFICANT CHANGE UP (ref 6–8.3)
PROT UR-MCNC: ABNORMAL
RBC # BLD: 3.82 M/UL — LOW (ref 4.2–5.8)
RBC # FLD: 12 % — SIGNIFICANT CHANGE UP (ref 10.3–14.5)
RBC CASTS # UR COMP ASSIST: >100 /HPF — HIGH (ref 0–4)
SODIUM SERPL-SCNC: 136 MMOL/L — SIGNIFICANT CHANGE UP (ref 135–145)
SP GR SPEC: 1.02 — SIGNIFICANT CHANGE UP (ref 1.01–1.02)
UROBILINOGEN FLD QL: NEGATIVE — SIGNIFICANT CHANGE UP
WBC # BLD: 6.84 K/UL — SIGNIFICANT CHANGE UP (ref 3.8–10.5)
WBC # FLD AUTO: 6.84 K/UL — SIGNIFICANT CHANGE UP (ref 3.8–10.5)
WBC UR QL: 2 /HPF — SIGNIFICANT CHANGE UP (ref 0–5)

## 2023-02-20 PROCEDURE — 99285 EMERGENCY DEPT VISIT HI MDM: CPT

## 2023-02-20 NOTE — ED PROVIDER NOTE - PROGRESS NOTE DETAILS
Hoang PGY2   Garrett continues to drain blood w/clots. UA not concerning for infection.   Consulted urology for CBI. Hoang PGY2  Admit to urology for further management.

## 2023-02-20 NOTE — ED PROVIDER NOTE - PHYSICAL EXAMINATION
Vitals: I have reviewed the patients vital signs  General: Well dressed, well appearing, no acute distress  HEENT: Atraumatic, normocephalic, airway patent  Eyes: EOMI, tracking appropriately  Neck: no tracheal deviation, no JVD  Chest/Lungs: no trauma, symmetric chest rise, speaking in complete sentences, no WOB  Heart: skin and extremities well perfused, regular rate and rhythm  Abdomen: soft, nontender, chinchilla catheter in place   Neuro: A+Ox3, ambulating without difficulty, CN grossly intact  MSK: strength at baseline in all extremities, no muscle wasting or atrophy  Skin: no cyanosis, no jaundice, no new emergent lesions

## 2023-02-20 NOTE — ED PROVIDER NOTE - CLINICAL SUMMARY MEDICAL DECISION MAKING FREE TEXT BOX
Patient is a 89 year-old-male with history of prostate cancer, HTN and HLD presents with acute urinary retention and hematuria. Garrett catheter placed by rapid assessment nurse, drained about 800cc of sydney blood at bedside; continue to monitor. Basic labs unremarkable. Will send for UA/UC. Disposition pending reassessment. Will consult urology consult if continue to drain sydney blood. Patient is a 89 year-old-male with history of prostate cancer, HTN and HLD presents with acute urinary retention and hematuria. Chinchilla catheter placed by rapid assessment nurse, drained about 800cc of sydney blood at bedside; continue to monitor. Basic labs unremarkable. Will send for UA/UC. Disposition pending reassessment. Will consult urology consult if continue to drain sydney blood.    Charo Newsome MD - Attending Physician: Pt here with urinary retention and hematuria. Very uncomfortable on arrival, chinchilla placed with clots, did not clear on drainage, so urology consulted. Labs nonactionable, UA bloody but no overt signs of infection. Dispo per Urology

## 2023-02-20 NOTE — ED ADULT NURSE NOTE - NSFALLRSKUNASSIST_ED_ALL_ED
Reason for Call:  Form, our goal is to have forms completed with 72 hours, however, some forms may require a visit or additional information.    Type of letter, form or note:  medical    Who is the form from?: [facility] (if other please explain)    Where did the form come from: form was faxed in    What clinic location was the form placed at?: [place]    Where the form was placed: [facility]    What number is listed as a contact on the form?: [phone]       Additional comments: please complete, fax to [phone]    Call taken on 4/10/2019 at 5:02 PM by Estefania Andres         no

## 2023-02-20 NOTE — ED PROVIDER NOTE - RAPID ASSESSMENT
89M w/ PMHx of kidney stones and prostate cancer presents to ED c/o hematuria, dysuria a/w discomfort onset this morning. Pt states increase frequency of urination and presence of clots. Pt denies F/C/V, back pain and being on blood thinners. 89M w/ PMHx of kidney stones and prostate cancer presents to ED c/o hematuria, dysuria and incomplete voiding sensation a/w discomfort onset this morning. Pt states increase frequency of urination and presence of clots. Pt denies F/C/V, back pain and being on blood thinners.    Aissatou Rai PA-C: The scribe's documentation has been prepared under my direction and personally reviewed by me in its entirety. I confirm that the note above accurately reflects history obtained.   Patient was rapidly assessed via telemedicine encounter; a limited history was obtained. The patient will be seen and further examined and worked up in the main ED and their care will be completed by the main ED team. Receiving team will follow up on labs, analgesia, any clinical imaging, and perform reassessment and disposition of the patient as clinically indicated. All decisions regarding the progression of care will be made at their discretion. 89M w/ PMHx of kidney stones and prostate cancer presents to ED c/o hematuria, dysuria and incomplete voiding sensation a/w discomfort onset this morning. Pt states increase frequency of urination and presence of clots. Pt denies F/C/V, back pain and being on blood thinners.    Aissatou Rai PA-C: The scribe's documentation has been prepared under my direction and personally reviewed by me in its entirety. I confirm that the note above accurately reflects history obtained.   Patient was rapidly assessed via telemedicine encounter; a limited history was obtained. The patient will be seen and further examined and worked up in the main ED and their care will be completed by the main ED team. Receiving team will follow up on labs, analgesia, any clinical imaging, and perform reassessment and disposition of the patient as clinically indicated. All decisions regarding the progression of care will be made at their discretion.  Patient seen in the Pa- Dr. Muller available for questions regarding this patient and no questions were asked. 89M w/ PMHx of kidney stones and prostate cancer presents to ED c/o hematuria, dysuria and incomplete voiding sensation a/w discomfort onset this morning. Pt states increase frequency of urination and presence of clots. Pt denies F/C/V, back pain and being on blood thinners.    Aissatou Rai PA-C: The scribe's documentation has been prepared under my direction and personally reviewed by me in its entirety. I confirm that the note above accurately reflects history obtained.   Patient was rapidly assessed via telemedicine encounter; a limited history was obtained. The patient will be seen and further examined and worked up in the main ED and their care will be completed by the main ED team. Receiving team will follow up on labs, analgesia, any clinical imaging, and perform reassessment and disposition of the patient as clinically indicated. All decisions regarding the progression of care will be made at their discretion.  Patient seen in the Pa- Dr. Muller available for questions regarding this patient and no questions were asked.    I have reviewed the rapid assessment note above. This note reflects the initial assessment performed by an ACP in triage during high volume times.  I was available for supervision if needed but was not presented this assessment nor directly involved in the rapid assessment of this patient. NATHANIEL Anders MD

## 2023-02-20 NOTE — ED PROVIDER NOTE - OBJECTIVE STATEMENT
[place]i-speaking; daughter Carmen Vaz providing translation service  Patient is a 89 year-old-male with history of prostate cancer, HTN and HLD presents with acute urinary retention. States that around 1230, started to notice hematuria with dysuria and an incomplete voiding sensation. Brought to the ED, while in the waiting room, started to have more discomfort due to retention; has not urinated since 1230. Denies fever, chills, nausea, vomiting. No recent illness. Urologist: Dr. Chloe Chase

## 2023-02-20 NOTE — ED ADULT NURSE NOTE - OBJECTIVE STATEMENT
89y M hx prostate ca & kidney stones presents with hematuria and incomplete bladder emptying x 1 day. pt endorses when he is urinating he is passing some clots, experiencing frequency and not emptying bladder completely. on arrival blader scan showed retention and pt c/o suprapubic pain. Indwelling urinary  catheter inserted using sterile technique. Procedure, risks, and benefits of catheter explained to patient, patient verbalized understanding. Second RN present to confirm sterility. Pt tolerated well. Urinary catheter drained clear urine, no clots visualized. Bedside drainage to gravity. Stat lock in place.

## 2023-02-20 NOTE — ED ADULT NURSE REASSESSMENT NOTE - NS ED NURSE REASSESS COMMENT FT1
Garrett catheter placed with MD order. Aseptic technique maintained. Two RNs at bedside during procedure. Catheter draining sydney red blood initially.

## 2023-02-20 NOTE — ED PROVIDER NOTE - GENITOURINARY [+], MLM
HEMATURIA V-Y Flap Text: The defect edges were debeveled with a #15 scalpel blade.  Given the location of the defect, shape of the defect and the proximity to free margins a V-Y flap was deemed most appropriate.  Using a sterile surgical marker, an appropriate advancement flap was drawn incorporating the defect and placing the expected incisions within the relaxed skin tension lines where possible.    The area thus outlined was incised deep to adipose tissue with a #15 scalpel blade.  The skin margins were undermined to an appropriate distance in all directions utilizing iris scissors.

## 2023-02-21 DIAGNOSIS — R33.9 RETENTION OF URINE, UNSPECIFIED: ICD-10-CM

## 2023-02-21 LAB
ANION GAP SERPL CALC-SCNC: 13 MMOL/L — SIGNIFICANT CHANGE UP (ref 5–17)
BUN SERPL-MCNC: 29 MG/DL — HIGH (ref 7–23)
CALCIUM SERPL-MCNC: 9.4 MG/DL — SIGNIFICANT CHANGE UP (ref 8.4–10.5)
CHLORIDE SERPL-SCNC: 101 MMOL/L — SIGNIFICANT CHANGE UP (ref 96–108)
CO2 SERPL-SCNC: 23 MMOL/L — SIGNIFICANT CHANGE UP (ref 22–31)
CREAT SERPL-MCNC: 1.27 MG/DL — SIGNIFICANT CHANGE UP (ref 0.5–1.3)
CULTURE RESULTS: NO GROWTH — SIGNIFICANT CHANGE UP
EGFR: 54 ML/MIN/1.73M2 — LOW
GLUCOSE SERPL-MCNC: 112 MG/DL — HIGH (ref 70–99)
HCT VFR BLD CALC: 31.6 % — LOW (ref 39–50)
HGB BLD-MCNC: 10.7 G/DL — LOW (ref 13–17)
MCHC RBC-ENTMCNC: 33.1 PG — SIGNIFICANT CHANGE UP (ref 27–34)
MCHC RBC-ENTMCNC: 33.9 GM/DL — SIGNIFICANT CHANGE UP (ref 32–36)
MCV RBC AUTO: 97.8 FL — SIGNIFICANT CHANGE UP (ref 80–100)
NRBC # BLD: 0 /100 WBCS — SIGNIFICANT CHANGE UP (ref 0–0)
PLATELET # BLD AUTO: 145 K/UL — LOW (ref 150–400)
POTASSIUM SERPL-MCNC: 4.6 MMOL/L — SIGNIFICANT CHANGE UP (ref 3.5–5.3)
POTASSIUM SERPL-SCNC: 4.6 MMOL/L — SIGNIFICANT CHANGE UP (ref 3.5–5.3)
RBC # BLD: 3.23 M/UL — LOW (ref 4.2–5.8)
RBC # FLD: 12.1 % — SIGNIFICANT CHANGE UP (ref 10.3–14.5)
SARS-COV-2 RNA SPEC QL NAA+PROBE: SIGNIFICANT CHANGE UP
SODIUM SERPL-SCNC: 137 MMOL/L — SIGNIFICANT CHANGE UP (ref 135–145)
SPECIMEN SOURCE: SIGNIFICANT CHANGE UP
WBC # BLD: 5.89 K/UL — SIGNIFICANT CHANGE UP (ref 3.8–10.5)
WBC # FLD AUTO: 5.89 K/UL — SIGNIFICANT CHANGE UP (ref 3.8–10.5)

## 2023-02-21 PROCEDURE — 51700 IRRIGATION OF BLADDER: CPT | Mod: 59

## 2023-02-21 PROCEDURE — 52000 CYSTOURETHROSCOPY: CPT

## 2023-02-21 PROCEDURE — 99232 SBSQ HOSP IP/OBS MODERATE 35: CPT

## 2023-02-21 RX ORDER — KETOROLAC TROMETHAMINE 30 MG/ML
15 SYRINGE (ML) INJECTION ONCE
Refills: 0 | Status: DISCONTINUED | OUTPATIENT
Start: 2023-02-21 | End: 2023-02-21

## 2023-02-21 RX ORDER — APALUTAMIDE 240 MG/1
240 TABLET, FILM COATED ORAL
Refills: 0 | Status: DISCONTINUED | OUTPATIENT
Start: 2023-02-21 | End: 2023-02-23

## 2023-02-21 RX ORDER — MORPHINE SULFATE 50 MG/1
4 CAPSULE, EXTENDED RELEASE ORAL ONCE
Refills: 0 | Status: DISCONTINUED | OUTPATIENT
Start: 2023-02-21 | End: 2023-02-21

## 2023-02-21 RX ORDER — METOPROLOL TARTRATE 50 MG
25 TABLET ORAL DAILY
Refills: 0 | Status: DISCONTINUED | OUTPATIENT
Start: 2023-02-21 | End: 2023-02-23

## 2023-02-21 RX ORDER — SERTRALINE 25 MG/1
25 TABLET, FILM COATED ORAL DAILY
Refills: 0 | Status: DISCONTINUED | OUTPATIENT
Start: 2023-02-21 | End: 2023-02-23

## 2023-02-21 RX ORDER — ACETAMINOPHEN 500 MG
650 TABLET ORAL EVERY 6 HOURS
Refills: 0 | Status: DISCONTINUED | OUTPATIENT
Start: 2023-02-21 | End: 2023-02-23

## 2023-02-21 RX ORDER — SODIUM CHLORIDE 9 MG/ML
1000 INJECTION, SOLUTION INTRAVENOUS
Refills: 0 | Status: DISCONTINUED | OUTPATIENT
Start: 2023-02-21 | End: 2023-02-23

## 2023-02-21 RX ORDER — ONDANSETRON 8 MG/1
4 TABLET, FILM COATED ORAL EVERY 6 HOURS
Refills: 0 | Status: DISCONTINUED | OUTPATIENT
Start: 2023-02-21 | End: 2023-02-23

## 2023-02-21 RX ORDER — CEFTRIAXONE 500 MG/1
1000 INJECTION, POWDER, FOR SOLUTION INTRAMUSCULAR; INTRAVENOUS EVERY 24 HOURS
Refills: 0 | Status: DISCONTINUED | OUTPATIENT
Start: 2023-02-21 | End: 2023-02-23

## 2023-02-21 RX ORDER — SENNA PLUS 8.6 MG/1
2 TABLET ORAL AT BEDTIME
Refills: 0 | Status: DISCONTINUED | OUTPATIENT
Start: 2023-02-21 | End: 2023-02-23

## 2023-02-21 RX ORDER — OXYBUTYNIN CHLORIDE 5 MG
5 TABLET ORAL EVERY 8 HOURS
Refills: 0 | Status: DISCONTINUED | OUTPATIENT
Start: 2023-02-21 | End: 2023-02-23

## 2023-02-21 RX ORDER — LISINOPRIL 2.5 MG/1
40 TABLET ORAL DAILY
Refills: 0 | Status: DISCONTINUED | OUTPATIENT
Start: 2023-02-21 | End: 2023-02-23

## 2023-02-21 RX ORDER — POLYETHYLENE GLYCOL 3350 17 G/17G
17 POWDER, FOR SOLUTION ORAL DAILY
Refills: 0 | Status: DISCONTINUED | OUTPATIENT
Start: 2023-02-21 | End: 2023-02-23

## 2023-02-21 RX ORDER — ATORVASTATIN CALCIUM 80 MG/1
10 TABLET, FILM COATED ORAL AT BEDTIME
Refills: 0 | Status: DISCONTINUED | OUTPATIENT
Start: 2023-02-21 | End: 2023-02-23

## 2023-02-21 RX ORDER — HEPARIN SODIUM 5000 [USP'U]/ML
5000 INJECTION INTRAVENOUS; SUBCUTANEOUS EVERY 8 HOURS
Refills: 0 | Status: DISCONTINUED | OUTPATIENT
Start: 2023-02-21 | End: 2023-02-23

## 2023-02-21 RX ADMIN — MORPHINE SULFATE 4 MILLIGRAM(S): 50 CAPSULE, EXTENDED RELEASE ORAL at 00:37

## 2023-02-21 RX ADMIN — Medication 25 MILLIGRAM(S): at 15:34

## 2023-02-21 RX ADMIN — HEPARIN SODIUM 5000 UNIT(S): 5000 INJECTION INTRAVENOUS; SUBCUTANEOUS at 15:34

## 2023-02-21 RX ADMIN — Medication 15 MILLIGRAM(S): at 00:37

## 2023-02-21 RX ADMIN — CEFTRIAXONE 100 MILLIGRAM(S): 500 INJECTION, POWDER, FOR SOLUTION INTRAMUSCULAR; INTRAVENOUS at 04:38

## 2023-02-21 RX ADMIN — Medication 15 MILLIGRAM(S): at 01:10

## 2023-02-21 RX ADMIN — SODIUM CHLORIDE 75 MILLILITER(S): 9 INJECTION, SOLUTION INTRAVENOUS at 04:53

## 2023-02-21 RX ADMIN — ATORVASTATIN CALCIUM 10 MILLIGRAM(S): 80 TABLET, FILM COATED ORAL at 22:12

## 2023-02-21 RX ADMIN — HEPARIN SODIUM 5000 UNIT(S): 5000 INJECTION INTRAVENOUS; SUBCUTANEOUS at 22:11

## 2023-02-21 RX ADMIN — Medication 650 MILLIGRAM(S): at 14:10

## 2023-02-21 RX ADMIN — MORPHINE SULFATE 4 MILLIGRAM(S): 50 CAPSULE, EXTENDED RELEASE ORAL at 01:10

## 2023-02-21 RX ADMIN — SERTRALINE 25 MILLIGRAM(S): 25 TABLET, FILM COATED ORAL at 15:34

## 2023-02-21 NOTE — ED ADULT NURSE REASSESSMENT NOTE - NS ED NURSE REASSESS COMMENT FT1
CBI bags changed multiple times and drop titrated to pink tinged urine color- no new clots visualized at this time. pt resting comfortable. Garrett bag emptied x 3. pending bed assignment.

## 2023-02-21 NOTE — ED ADULT NURSE REASSESSMENT NOTE - NS ED NURSE REASSESS COMMENT FT1
1200 CBI in progress. Garrett bag emptired multiple times. Urine remains reddish. Pt A&Ox4. Denies dizziness. Continues to await bed upstairs. Iv fluid infusing well via IV pump 1200 CBI in progress. Garrett bag emptired multiple times. Urine remains reddish. Pt A&Ox4. Denies dizziness. Continues to await bed upstairs. Iv fluid infusing well via IV pump. Urology GAURANG Penn notified about reddish urine

## 2023-02-21 NOTE — PATIENT PROFILE ADULT - HOW PATIENT ADDRESSED, PROFILE
Henry Rhombic Flap Text: The defect edges were debeveled with a #15 scalpel blade.  Given the location of the defect and the proximity to free margins a rhombic flap was deemed most appropriate.  Using a sterile surgical marker, an appropriate rhombic flap was drawn incorporating the defect.    The area thus outlined was incised deep to adipose tissue with a #15 scalpel blade.  The skin margins were undermined to an appropriate distance in all directions utilizing iris scissors.

## 2023-02-21 NOTE — H&P ADULT - HISTORY OF PRESENT ILLNESS
89 year-old-male with history of prostate cancer s/p prostatectomy in 1991 and XRT, currently on lupron, HTN and HLD presents with acute urinary retention. States he had been having hematuria this morning when suddenly around 12:30pm, he was unable to void completely. In the ED, he was able to be decompressed by 16f chinchilla placement, which removed ~800cc of urine. He otherwise denies fevers, chills, nausea, vomiting, dysuria, flank pain. Subsequently, urology was consulted for gross hematuria and possible need for CBI. Upon arrival to the ED, was attempted to irrigate, without ability to clear urine. Then tried to upsize to a 3 way catheter, without success, thus requiring cystoscopy (see note). Ultimately patient required dilation and 22f 3 way was placed and CBI was initiated. ~100cc of clot evacuated.  In the ED, AVSS. Labs showed WBC 6.84, H/h 12.5/37.3, Cr 1.10.

## 2023-02-21 NOTE — H&P ADULT - NSHPPHYSICALEXAM_GEN_ALL_CORE
General: NAD, Lying in bed comfortably  Neuro: A+Ox3, no focal deficits  Resp: No respiratory distress or accessory muscle use. no supplemental O2  Abd: Soft, NT/ND, no rebound/guarding  : s/p cysto and urethral dilation for stricture for chinchilla placement; 22f 3 way irrigated with NS, evacuated 100cc of clot. cbi wide open, urine peach  Vascular: All 4 extremities warm and appear well perfused  Skin: Intact, no breakdown  Musculoskeletal: All 4 extremities moving spontaneously, no limitations.

## 2023-02-21 NOTE — H&P ADULT - NSHPLABSRESULTS_GEN_ALL_CORE
12.5   6.84  )-----------( 173      ( 2023 17:26 )             37.3       02-20    136  |  98  |  26<H>  ----------------------------<  151<H>  5.2   |  24  |  1.10    Ca    9.9      2023 17:26    TPro  7.9  /  Alb  4.5  /  TBili  0.2  /  DBili  x   /  AST  48<H>  /  ALT  13  /  AlkPhos  37<L>  02        Urinalysis Basic - ( 2023 20:35 )    Color: Red / Appearance: Slightly Turbid / S.022 / pH: x  Gluc: x / Ketone: Negative  / Bili: Negative / Urobili: Negative   Blood: x / Protein: 300 mg/dL / Nitrite: Negative   Leuk Esterase: Negative / RBC: >100 /hpf / WBC 2 /HPF   Sq Epi: x / Non Sq Epi: 1 /hpf / Bacteria: Negative

## 2023-02-21 NOTE — PATIENT PROFILE ADULT - PRO INTERPRETER NEED 2
See Long My Chart message & last dictation.  I called pt & agreed she needs RTN appt with  as advised by other providers.  She agreed & we made appt.    I asked Benja Hodges to transfer outside images & reports of injections & MRI Hip that she mentioned in her message.    Call back prn  Pt agreed.  Amada Hill RN.    
[place]

## 2023-02-21 NOTE — H&P ADULT - ATTENDING COMMENTS
Pt seen and examined  chinchilla in place, clearing  comfortable  d/w pt and family plans for management, and criteria for discharge

## 2023-02-21 NOTE — PATIENT PROFILE ADULT - FALL HARM RISK - RISK INTERVENTIONS
Per Dr. Virgie Nam, Infection Control called (spoke to Brad Tidwell) and notified of pts s/s and plan is to transfer pt to ER at [facility].  Report given to Stephen Rodriguez Assistance OOB with selected safe patient handling equipment/Assistance with ambulation/Communicate Fall Risk and Risk Factors to all staff, patient, and family/Reinforce activity limits and safety measures with patient and family/Visual Cue: Yellow wristband/Bed in lowest position, wheels locked, appropriate side rails in place/Call bell, personal items and telephone in reach/Instruct patient to call for assistance before getting out of bed or chair/Non-slip footwear when patient is out of bed/[place] to call system/Physically safe environment - no spills, clutter or unnecessary equipment/Purposeful Proactive Rounding/Room/bathroom lighting operational, light cord in reach

## 2023-02-21 NOTE — ED ADULT NURSE REASSESSMENT NOTE - NS ED NURSE REASSESS COMMENT FT1
0711 Pt in ER gold rm 15. TBA. No bed yet. A&Ox4. Fall risk precautions maintained. Chinchilla intact. CBI in progress. Emptied 2005cc from chinchilla. IV intact without sx of infilt R ACF with IV fluid infusing well via IV pump.

## 2023-02-21 NOTE — H&P ADULT - ASSESSMENT
89 year-old-male with history of prostate cancer s/p prostatectomy in 1991 and XRT, currently on lupron, HTN and HLD presented to the ED with clot retention. Patient s/p cystoscopy, urethral dilation for placement of 22f 3 way, for bulbar stricture, now on CBI.  -admit to urology to Dr. Hoenig  -keep chinchilla  -irrigate PRN  -continue CBI, wean as tolerated  -trend h/h  -f/u urine culture  -abx for ppx given extensive  manipulation  -patient seen and examined with Dr. Flores, d/w Dr. Hoenig

## 2023-02-21 NOTE — PROCEDURE NOTE - ADDITIONAL PROCEDURE DETAILS
Pt with hx of prostatectomy with adjuvant radiation Px with hematuria.  Unable to pass 16 fr catheter for hematuria.  Informed consent was obtained and patient underwent cystoscopy which revealed multiple pendulous and bulbar urethral strictures.  Able to pass a wire through and thencystoscope over wire to enter into the patient's bladder.  At this point cystoscope was withdrawn and amplantz dilators used to dilate urethra to 24 fr.  At this point a 22 fr catheter was passed over a wire with + return of bloody urine. ~100cc of clot then evacuated and CBI initiated.

## 2023-02-22 LAB
ANION GAP SERPL CALC-SCNC: 12 MMOL/L — SIGNIFICANT CHANGE UP (ref 5–17)
BUN SERPL-MCNC: 24 MG/DL — HIGH (ref 7–23)
CALCIUM SERPL-MCNC: 8.9 MG/DL — SIGNIFICANT CHANGE UP (ref 8.4–10.5)
CHLORIDE SERPL-SCNC: 103 MMOL/L — SIGNIFICANT CHANGE UP (ref 96–108)
CO2 SERPL-SCNC: 23 MMOL/L — SIGNIFICANT CHANGE UP (ref 22–31)
CREAT SERPL-MCNC: 1.26 MG/DL — SIGNIFICANT CHANGE UP (ref 0.5–1.3)
EGFR: 55 ML/MIN/1.73M2 — LOW
GLUCOSE SERPL-MCNC: 144 MG/DL — HIGH (ref 70–99)
HCT VFR BLD CALC: 29.8 % — LOW (ref 39–50)
HGB BLD-MCNC: 9.8 G/DL — LOW (ref 13–17)
MCHC RBC-ENTMCNC: 32.3 PG — SIGNIFICANT CHANGE UP (ref 27–34)
MCHC RBC-ENTMCNC: 32.9 GM/DL — SIGNIFICANT CHANGE UP (ref 32–36)
MCV RBC AUTO: 98.3 FL — SIGNIFICANT CHANGE UP (ref 80–100)
NRBC # BLD: 0 /100 WBCS — SIGNIFICANT CHANGE UP (ref 0–0)
PLATELET # BLD AUTO: 135 K/UL — LOW (ref 150–400)
POTASSIUM SERPL-MCNC: 4.2 MMOL/L — SIGNIFICANT CHANGE UP (ref 3.5–5.3)
POTASSIUM SERPL-SCNC: 4.2 MMOL/L — SIGNIFICANT CHANGE UP (ref 3.5–5.3)
RBC # BLD: 3.03 M/UL — LOW (ref 4.2–5.8)
RBC # FLD: 12.1 % — SIGNIFICANT CHANGE UP (ref 10.3–14.5)
SODIUM SERPL-SCNC: 138 MMOL/L — SIGNIFICANT CHANGE UP (ref 135–145)
WBC # BLD: 5.03 K/UL — SIGNIFICANT CHANGE UP (ref 3.8–10.5)
WBC # FLD AUTO: 5.03 K/UL — SIGNIFICANT CHANGE UP (ref 3.8–10.5)

## 2023-02-22 PROCEDURE — 99231 SBSQ HOSP IP/OBS SF/LOW 25: CPT

## 2023-02-22 RX ORDER — LIDOCAINE 4 G/100G
1 CREAM TOPICAL
Refills: 0 | Status: DISCONTINUED | OUTPATIENT
Start: 2023-02-22 | End: 2023-02-23

## 2023-02-22 RX ADMIN — Medication 650 MILLIGRAM(S): at 11:16

## 2023-02-22 RX ADMIN — HEPARIN SODIUM 5000 UNIT(S): 5000 INJECTION INTRAVENOUS; SUBCUTANEOUS at 13:24

## 2023-02-22 RX ADMIN — HEPARIN SODIUM 5000 UNIT(S): 5000 INJECTION INTRAVENOUS; SUBCUTANEOUS at 05:05

## 2023-02-22 RX ADMIN — LIDOCAINE 1 APPLICATION(S): 4 CREAM TOPICAL at 15:29

## 2023-02-22 RX ADMIN — LISINOPRIL 40 MILLIGRAM(S): 2.5 TABLET ORAL at 06:16

## 2023-02-22 RX ADMIN — Medication 650 MILLIGRAM(S): at 06:11

## 2023-02-22 RX ADMIN — SERTRALINE 25 MILLIGRAM(S): 25 TABLET, FILM COATED ORAL at 11:26

## 2023-02-22 RX ADMIN — CEFTRIAXONE 100 MILLIGRAM(S): 500 INJECTION, POWDER, FOR SOLUTION INTRAMUSCULAR; INTRAVENOUS at 05:05

## 2023-02-22 RX ADMIN — Medication 5 MILLIGRAM(S): at 15:39

## 2023-02-22 RX ADMIN — Medication 25 MILLIGRAM(S): at 06:16

## 2023-02-22 RX ADMIN — ATORVASTATIN CALCIUM 10 MILLIGRAM(S): 80 TABLET, FILM COATED ORAL at 22:44

## 2023-02-22 RX ADMIN — HEPARIN SODIUM 5000 UNIT(S): 5000 INJECTION INTRAVENOUS; SUBCUTANEOUS at 22:44

## 2023-02-22 RX ADMIN — Medication 650 MILLIGRAM(S): at 10:31

## 2023-02-22 RX ADMIN — Medication 650 MILLIGRAM(S): at 05:11

## 2023-02-22 RX ADMIN — POLYETHYLENE GLYCOL 3350 17 GRAM(S): 17 POWDER, FOR SOLUTION ORAL at 23:55

## 2023-02-22 NOTE — PROGRESS NOTE ADULT - ASSESSMENT
89 year-old-male with history of prostate cancer s/p prostatectomy in 1991 and XRT, currently on lupron, HTN and HLD admitted with clot retention. Patient s/p cystoscopy, urethral dilation for placement of 22f 3 way, for bulbar stricture, now on CBI.  - keep chinchilla  - irrigate PRN  - continue CBI, wean as tolerated  - trend h/h  - urine culture- neg  - abx for ppx given extensive  manipulation  - am labs  - d/c planning if able to wean off CBI

## 2023-02-22 NOTE — CONSULT NOTE ADULT - SUBJECTIVE AND OBJECTIVE BOX
HPI  88 yo man met prostate cancer lately on lupron and apalutamide, adm with urinary retension with CBI hematuria has improved  pmh sh fh unchanged comp ros neg  physical  elderly'  vs  t98.5 69 127/61 18 96 pct  lungs clear  cor s1s2  abd soft nontender  ext no edema  chinchilla with koolaid colored urine    data  wbc 5030 hgb 9.8 plt [id number] cr 1.27  u/s scrotum 1 cm nodule indeterminate

## 2023-02-22 NOTE — PROGRESS NOTE ADULT - SUBJECTIVE AND OBJECTIVE BOX
UROLOGY PA PROGRESS NOTE:     Subjective:  no acute events overnight. CBI continued without issue.     Objective:  Vital signs  T(F): , Max: 99.3 (23 @ 13:47)  HR: 69 (23 @ 05:00)  BP: 127/61 (23 @ 05:00)  SpO2: 96% (23 @ 05:00)  Wt(kg): --    Output      @ 07:01  -   @ 07:00  --------------------------------------------------------  IN: 1224 mL / OUT: 0 mL / NET: 1224 mL    CBI    Physical Exam:  Gen: NAD  Abd: soft, NT/ND  : +chinchilla draining clear on slow rate CBI    Labs:    5.03  / 29.8  /x        5.89  / 31.6  /1.27                           9.8    5.03  )-----------( 135      ( 2023 07:09 )             29.8         137  |  101  |  29<H>  ----------------------------<  112<H>  4.6   |  23  |  1.27    Ca    9.4      2023 06:12    TPro  7.9  /  Alb  4.5  /  TBili  0.2  /  DBili  x   /  AST  48<H>  /  ALT  13  /  AlkPhos  37<L>        Urinalysis Basic - ( 2023 20:35 )    Color: Red / Appearance: Slightly Turbid / S.022 / pH: x  Gluc: x / Ketone: Negative  / Bili: Negative / Urobili: Negative   Blood: x / Protein: 300 mg/dL / Nitrite: Negative   Leuk Esterase: Negative / RBC: >100 /hpf / WBC 2 /HPF   Sq Epi: x / Non Sq Epi: 1 /hpf / Bacteria: Negative        Urine Cx:    Culture - Urine (collected 2023 20:36)  Source: Clean Catch Clean Catch (Midstream)  Final Report (2023 20:05):    No growth

## 2023-02-22 NOTE — CONSULT NOTE ADULT - ASSESSMENT
met prostate cancer on lupron and apalutamide has scrotal nodule indeterminate ?met, urology f/u required for this  urinary retension improved with CBI.

## 2023-02-23 ENCOUNTER — TRANSCRIPTION ENCOUNTER (OUTPATIENT)
Age: 88
End: 2023-02-23

## 2023-02-23 VITALS
TEMPERATURE: 98 F | DIASTOLIC BLOOD PRESSURE: 66 MMHG | HEART RATE: 71 BPM | RESPIRATION RATE: 18 BRPM | SYSTOLIC BLOOD PRESSURE: 118 MMHG | OXYGEN SATURATION: 94 %

## 2023-02-23 LAB
ANION GAP SERPL CALC-SCNC: 12 MMOL/L — SIGNIFICANT CHANGE UP (ref 5–17)
BUN SERPL-MCNC: 18 MG/DL — SIGNIFICANT CHANGE UP (ref 7–23)
CALCIUM SERPL-MCNC: 9.6 MG/DL — SIGNIFICANT CHANGE UP (ref 8.4–10.5)
CHLORIDE SERPL-SCNC: 102 MMOL/L — SIGNIFICANT CHANGE UP (ref 96–108)
CO2 SERPL-SCNC: 24 MMOL/L — SIGNIFICANT CHANGE UP (ref 22–31)
CREAT SERPL-MCNC: 1.11 MG/DL — SIGNIFICANT CHANGE UP (ref 0.5–1.3)
EGFR: 63 ML/MIN/1.73M2 — SIGNIFICANT CHANGE UP
GLUCOSE SERPL-MCNC: 106 MG/DL — HIGH (ref 70–99)
HCT VFR BLD CALC: 32.5 % — LOW (ref 39–50)
HGB BLD-MCNC: 10.8 G/DL — LOW (ref 13–17)
MCHC RBC-ENTMCNC: 32.8 PG — SIGNIFICANT CHANGE UP (ref 27–34)
MCHC RBC-ENTMCNC: 33.2 GM/DL — SIGNIFICANT CHANGE UP (ref 32–36)
MCV RBC AUTO: 98.8 FL — SIGNIFICANT CHANGE UP (ref 80–100)
NRBC # BLD: 0 /100 WBCS — SIGNIFICANT CHANGE UP (ref 0–0)
PLATELET # BLD AUTO: 164 K/UL — SIGNIFICANT CHANGE UP (ref 150–400)
POTASSIUM SERPL-MCNC: 4.6 MMOL/L — SIGNIFICANT CHANGE UP (ref 3.5–5.3)
POTASSIUM SERPL-SCNC: 4.6 MMOL/L — SIGNIFICANT CHANGE UP (ref 3.5–5.3)
RBC # BLD: 3.29 M/UL — LOW (ref 4.2–5.8)
RBC # FLD: 12.2 % — SIGNIFICANT CHANGE UP (ref 10.3–14.5)
SODIUM SERPL-SCNC: 138 MMOL/L — SIGNIFICANT CHANGE UP (ref 135–145)
WBC # BLD: 7.09 K/UL — SIGNIFICANT CHANGE UP (ref 3.8–10.5)
WBC # FLD AUTO: 7.09 K/UL — SIGNIFICANT CHANGE UP (ref 3.8–10.5)

## 2023-02-23 PROCEDURE — 80048 BASIC METABOLIC PNL TOTAL CA: CPT

## 2023-02-23 PROCEDURE — 80053 COMPREHEN METABOLIC PANEL: CPT

## 2023-02-23 PROCEDURE — 76770 US EXAM ABDO BACK WALL COMP: CPT

## 2023-02-23 PROCEDURE — 81001 URINALYSIS AUTO W/SCOPE: CPT

## 2023-02-23 PROCEDURE — 87635 SARS-COV-2 COVID-19 AMP PRB: CPT

## 2023-02-23 PROCEDURE — 96375 TX/PRO/DX INJ NEW DRUG ADDON: CPT

## 2023-02-23 PROCEDURE — 85025 COMPLETE CBC W/AUTO DIFF WBC: CPT

## 2023-02-23 PROCEDURE — 76857 US EXAM PELVIC LIMITED: CPT

## 2023-02-23 PROCEDURE — 85027 COMPLETE CBC AUTOMATED: CPT

## 2023-02-23 PROCEDURE — 99285 EMERGENCY DEPT VISIT HI MDM: CPT | Mod: 25

## 2023-02-23 PROCEDURE — 76770 US EXAM ABDO BACK WALL COMP: CPT | Mod: 26

## 2023-02-23 PROCEDURE — G0378: CPT

## 2023-02-23 PROCEDURE — 87086 URINE CULTURE/COLONY COUNT: CPT

## 2023-02-23 PROCEDURE — 76857 US EXAM PELVIC LIMITED: CPT | Mod: 26

## 2023-02-23 PROCEDURE — 96374 THER/PROPH/DIAG INJ IV PUSH: CPT

## 2023-02-23 PROCEDURE — 36415 COLL VENOUS BLD VENIPUNCTURE: CPT

## 2023-02-23 RX ORDER — METOPROLOL TARTRATE 50 MG
0 TABLET ORAL
Qty: 0 | Refills: 0 | DISCHARGE

## 2023-02-23 RX ORDER — METOPROLOL TARTRATE 50 MG
1 TABLET ORAL
Qty: 0 | Refills: 0 | DISCHARGE
Start: 2023-02-23

## 2023-02-23 RX ADMIN — Medication 25 MILLIGRAM(S): at 05:54

## 2023-02-23 RX ADMIN — SODIUM CHLORIDE 75 MILLILITER(S): 9 INJECTION, SOLUTION INTRAVENOUS at 06:53

## 2023-02-23 RX ADMIN — CEFTRIAXONE 100 MILLIGRAM(S): 500 INJECTION, POWDER, FOR SOLUTION INTRAMUSCULAR; INTRAVENOUS at 05:53

## 2023-02-23 RX ADMIN — HEPARIN SODIUM 5000 UNIT(S): 5000 INJECTION INTRAVENOUS; SUBCUTANEOUS at 06:53

## 2023-02-23 RX ADMIN — SERTRALINE 25 MILLIGRAM(S): 25 TABLET, FILM COATED ORAL at 12:13

## 2023-02-23 RX ADMIN — LISINOPRIL 40 MILLIGRAM(S): 2.5 TABLET ORAL at 05:54

## 2023-02-23 NOTE — DISCHARGE NOTE NURSING/CASE MANAGEMENT/SOCIAL WORK - NSDCPEFALRISK_GEN_ALL_CORE
For information on Fall & Injury Prevention, visit: [email or web address] OR  [email or web address] OR  https://www.cdc.gov/steadi/patient.html

## 2023-02-23 NOTE — DISCHARGE NOTE PROVIDER - CARE PROVIDER_API CALL
Chloe Chase)  Urology  [address] [place] [address], [place] [address]  Phone: [phone]  Fax: [phone]  Follow Up Time: 1 week

## 2023-02-23 NOTE — DISCHARGE NOTE PROVIDER - NSDCMRMEDTOKEN_GEN_ALL_CORE_FT
acetaminophen 325 mg oral tablet: 2 tab(s) orally every 6 hours, As needed, For Temp over 37.9 C (100.2 F)  calcium: 1 tab(s) orally once a day  docusate sodium 100 mg oral capsule: 1 cap(s) orally 3 times a day  Erleada 60 mg oral tablet: 4 tab(s) orally once a day  Flax Seed Oil oral capsule:  orally   lovastatin 40 mg oral tablet: 1 tab(s) orally once a day  Lupron:   metoprolol succinate 25 mg oral tablet, extended release: 1 tab(s) orally once a day  MiraLax oral powder for reconstitution: 17 gram(s) orally once a day   Multiple Vitamins oral tablet: 1 tab(s) orally once a day  multivitamin: 1 tab(s) orally once a day  quinapril 40 mg oral tablet: 1 tab(s) orally once a day  senna 15 mg oral tablet: 2 tab(s) orally 2 times a day, As Needed   sertraline 25 mg oral tablet: 1 tab(s) orally once a day  vitamin D 3: 1 cap(s) orally once a day  vitamin E: 1 cap(s) orally once a day

## 2023-02-23 NOTE — DISCHARGE NOTE PROVIDER - HOSPITAL COURSE
88yo male admitted 2/21 with gross hematuria. He was also noted to have a bulbar stricture which required a cysto dilation prior to starting CBI.   Pt irrigated of about 100cc of clot. 2/22 CBI was clamped. Required hand irrigation x2 but otherwise remained a clear red/pink.   2/23 - renal bladder US performed. no hydro and no clot in bladder. irrigation resulted in no additional clots. urine remained light pink in chinchilla.   H/H stable, urine cx negative. ambulating , no pain. no abx on discharge. home with chinchilla. f/u next week. 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 He was also noted to have a bulbar stricture which required a cysto dilation prior to starting CBI.   Pt irrigated of about 100cc of clot. 2/22 CBI was clamped. Required hand irrigation x2 but otherwise remained a clear red/pink.   2/23 - renal bladder US performed. no hydro and no clot in bladder. irrigation resulted in no additional clots. urine remained light pink in chinchilla.   H/H stable, urine cx negative. ambulating , no pain. no abx on discharge. home with chinchilla. f/u next week.

## 2023-02-23 NOTE — PROGRESS NOTE ADULT - ASSESSMENT
89 year-old-male with history of prostate cancer s/p prostatectomy in 1991 and XRT, currently on lupron, HTN and HLD admitted with clot retention. Patient s/p cystoscopy, urethral dilation for placement of 22f 3 way, for bulbar stricture with gross heamturia requiring cbi which is now held     - keep chinchilla  - irrigate PRN  - trend h/h  - urine culture- neg  - abx for ppx given extensive  manipulation  - am labs  - d/c planning for today home with chinchilla

## 2023-02-23 NOTE — DISCHARGE NOTE PROVIDER - NSRESEARCHGRANT_PROPHYLAXISRECOMFT_GEN_A_CORE
Pt with acute coughing fit, c/o feeling like \"I need to cough it all up\". Speaking in broken sentences, RR~35, SPO2 90% on 4L NC, increased to 8L with improvement of sats to 98% in approx 10min. MD notified, no PRN inhalers or breathing treatments in MAR. RT called and enroute to assess for possibility of going back on BiPAP. RN remains at bedside. This is a surgical and/or non-medical patient.

## 2023-02-23 NOTE — DISCHARGE NOTE NURSING/CASE MANAGEMENT/SOCIAL WORK - PATIENT PORTAL LINK FT
You can access the FollowMyHealth Patient Portal offered by [facility] by registering at the following website: [email or web address]. By joining [facility]’s FollowMyHealth portal, you will also be able to view your health information using other applications (apps) compatible with our system.

## 2023-02-23 NOTE — DISCHARGE NOTE PROVIDER - NSDCCPCAREPLAN_GEN_ALL_CORE_FT
PRINCIPAL DISCHARGE DIAGNOSIS  Diagnosis: Urinary retention  Assessment and Plan of Treatment: Call the office if you have fever greater than 101, difficulty urinating, pain not relieved with pain medication, nausea/vomiting. Drink plenty of fluids.  No heavy lifting or straining for 4 to 6 weeks. Avoid becoming constipated. You may have intermittent pink tinged urine and urinary dribbling.  This is normal.   If your urine becomes bright red or with clots, please call the office.      SECONDARY DISCHARGE DIAGNOSES  Diagnosis: Hematuria  Assessment and Plan of Treatment:

## 2023-02-23 NOTE — PROGRESS NOTE ADULT - SUBJECTIVE AND OBJECTIVE BOX
Subjective  feeling well; cbi remains off   Objective    Vital signs  T(F): , Max: 98.6 (02-23-23 @ 05:05)  HR: 65 (02-23-23 @ 05:05)  BP: 123/68 (02-23-23 @ 05:05)  SpO2: 94% (02-23-23 @ 05:05)  Wt(kg): --    Output     02-22 @ 07:01  -  02-23 @ 07:00  --------------------------------------------------------  IN: 410 mL / OUT: 3450 mL / NET: -3040 mL        Gen awake alert nad axox3  Abd flat soft ntnd   Back no cvat bl    nonpalp bladder chinchilla in place urine dilute strawberry     Labs      02-22 @ 07:13    WBC --    / Hct --    / SCr 1.26     02-22 @ 07:09    WBC 5.03  / Hct 29.8  / SCr --         Urine Cx: Culture - Urine (02.20.23 @ 20:36)    Specimen Source: Clean Catch Clean Catch (Midstream)    Culture Results:   No growth    Imaging  < from: US Testicles (01.24.23 @ 15:02) >  RIGHT:  Right testis: 3.2 cm x 0.9 cm x  2.1 cm. Diffusely hypoechoic with no   masses or areas of architectural distortion. Diminished arterial blood   flow.  Right epididymis: Within normal limits.  Right hydrocele: None.  Right varicocele: None.    1 cm hypoechoic solid nodule separate from the testis within the lateral   scrotum or inguinal canal.    LEFT:  Left testis: 2.7 cm x 1.0 cm x 2.3 cm. Diffusely hypoechoic with no   masses or areas of architectural distortion.Diminished arterial blood   flow.  Left epididymis: Within normal limits.  Left hydrocele: None.  Left varicocele: None.    IMPRESSION:    Bilateral testicular atrophy.    Indeterminate 1 cm nodule in the right hemiscrotum separate from testis.      < end of copied text >

## 2023-02-24 ENCOUNTER — NON-APPOINTMENT (OUTPATIENT)
Age: 88
End: 2023-02-24

## 2023-02-26 ENCOUNTER — INPATIENT (INPATIENT)
Facility: HOSPITAL | Age: 88
LOS: 5 days | Discharge: HOME HEALTH | DRG: 669 | End: 2023-03-04
Attending: UROLOGY | Admitting: HOSPITALIST
Payer: MEDICARE

## 2023-02-26 VITALS
RESPIRATION RATE: 18 BRPM | WEIGHT: 160.06 LBS | HEART RATE: 81 BPM | SYSTOLIC BLOOD PRESSURE: 131 MMHG | HEIGHT: 66 IN | DIASTOLIC BLOOD PRESSURE: 74 MMHG | OXYGEN SATURATION: 98 % | TEMPERATURE: 98 F

## 2023-02-26 DIAGNOSIS — Z98.89 OTHER SPECIFIED POSTPROCEDURAL STATES: Chronic | ICD-10-CM

## 2023-02-26 LAB
BASOPHILS # BLD AUTO: 0.03 K/UL — SIGNIFICANT CHANGE UP (ref 0–0.2)
BASOPHILS NFR BLD AUTO: 0.5 % — SIGNIFICANT CHANGE UP (ref 0–2)
EOSINOPHIL # BLD AUTO: 0.16 K/UL — SIGNIFICANT CHANGE UP (ref 0–0.5)
EOSINOPHIL NFR BLD AUTO: 2.6 % — SIGNIFICANT CHANGE UP (ref 0–6)
HCT VFR BLD CALC: 28.5 % — LOW (ref 39–50)
HGB BLD-MCNC: 9.4 G/DL — LOW (ref 13–17)
IMM GRANULOCYTES NFR BLD AUTO: 0.5 % — SIGNIFICANT CHANGE UP (ref 0–0.9)
LYMPHOCYTES # BLD AUTO: 1.43 K/UL — SIGNIFICANT CHANGE UP (ref 1–3.3)
LYMPHOCYTES # BLD AUTO: 23.3 % — SIGNIFICANT CHANGE UP (ref 13–44)
MCHC RBC-ENTMCNC: 32.5 PG — SIGNIFICANT CHANGE UP (ref 27–34)
MCHC RBC-ENTMCNC: 33 GM/DL — SIGNIFICANT CHANGE UP (ref 32–36)
MCV RBC AUTO: 98.6 FL — SIGNIFICANT CHANGE UP (ref 80–100)
MONOCYTES # BLD AUTO: 0.78 K/UL — SIGNIFICANT CHANGE UP (ref 0–0.9)
MONOCYTES NFR BLD AUTO: 12.7 % — SIGNIFICANT CHANGE UP (ref 2–14)
NEUTROPHILS # BLD AUTO: 3.7 K/UL — SIGNIFICANT CHANGE UP (ref 1.8–7.4)
NEUTROPHILS NFR BLD AUTO: 60.4 % — SIGNIFICANT CHANGE UP (ref 43–77)
NRBC # BLD: 0 /100 WBCS — SIGNIFICANT CHANGE UP (ref 0–0)
PLATELET # BLD AUTO: 187 K/UL — SIGNIFICANT CHANGE UP (ref 150–400)
RBC # BLD: 2.89 M/UL — LOW (ref 4.2–5.8)
RBC # FLD: 12.3 % — SIGNIFICANT CHANGE UP (ref 10.3–14.5)
WBC # BLD: 6.13 K/UL — SIGNIFICANT CHANGE UP (ref 3.8–10.5)
WBC # FLD AUTO: 6.13 K/UL — SIGNIFICANT CHANGE UP (ref 3.8–10.5)

## 2023-02-26 PROCEDURE — 99285 EMERGENCY DEPT VISIT HI MDM: CPT

## 2023-02-26 NOTE — CONSULT NOTE ADULT - SUBJECTIVE AND OBJECTIVE BOX
HPI:  89y Male with PMHx of prostate cancer s/p prostatectomy in 1991 and XRT, currently on lupron, HTN and HLD presenting to ED with penile and abdominal pain since 9pm tonight. 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 On 2/23 a renal US was done which showed no clot in bladder, urine remained pink in color off CBI and was sent home with the catheter for a follow up in 1 week with Dr Chase. Pt states urine has been pink since discharge, however today noted to have more red urine in tubing. Around 9pm, pt developed excruciating penile pain and abdominal pressure and called EMS. As per patient, pain resolved in 1 hour when patient felt a clot pass along with urine afterwords and since then, patient has been comfortable. Chinchilla tubing with clear urine along with some small clots. Pt endorsed dysuria prior, now resolved. Denies fever/chills, nausea/vomiting, increased activity, tugging of catheter, straining to have bowel movements. Pt is not on AC.     PAST MEDICAL & SURGICAL HISTORY:  HTN (hypertension)      HLD (hyperlipidemia)      Prostate cancer      S/P prostatectomy          FAMILY HISTORY:  No known  malignancy     Social History:  Denies alcohol and drug abuse, nonsmoker     MEDICATIONS  (STANDING):    MEDICATIONS  (PRN):    Allergies    No Known Allergies    Intolerances      REVIEW OF SYSTEMS: Pertinent positives and negatives as stated in HPI, otherwise negative    Vital signs  T(C): 36.7 (02-26-23 @ 21:55), Max: 36.7 (02-26-23 @ 21:55)  HR: 81 (02-26-23 @ 21:55)  BP: 131/74 (02-26-23 @ 21:55)  SpO2: 98% (02-26-23 @ 21:55)  Wt(kg): --    Physical Exam  Gen: NAD  HEENT: normocephalic, atraumatic  Pulm: CTA b/l, No respiratory distress   CV: S1 S2, RRR  Abd: Soft, NT, ND, no rebound tenderness or guarding  : Circumcised, no lesions.  No discharge or blood at urethral meatus. 22F 3 way chinchilla in place with 3rd port capped, draining clear urine with some clot in tubing. Irrigated with sterile water with aspiration of ~20cc clot. Chinchilla draining clear urine.   MSK:  No CVAT, Moving all extremities, full ROM in all extremities, No edema present  NEURO: A&Ox3, no focal neurological deficits, CN 2-12 grossly intact  SKIN: warm, dry, no rash.    LABS: pending       Urine Cx: pending       Radiology:     Bedside ED US pending      < from: US Urinary Bladder (02.23.23 @ 11:38) >  ACC: [id number] EXAM:  US URINARY BLADDER   ORDERED BY: BAR PASTRANA     PROCEDURE DATE:  02/23/2023          INTERPRETATION:  CLINICAL INFORMATION: Gross hematuria.    COMPARISON: Renal sonogram dated 2/23/2023.    TECHNIQUE: Sonography of the bladder. Sterile saline was administered   through the Chinchilla catheter to distend the bladder.    FINDINGS:  Bladder: Moderately distended. Chinchilla catheter balloon. No echogenic   material within the bladder lumen to suggest presence of blood clot.    Reproductive organs: Prostate not well visualized.    IMPRESSION:  Normal bladder ultrasound. No blood clot seen within the bladder lumen.        --- End of Report ---            PAULETTE BOWEN MD; Attending Radiologist  This document has been electronically signed. Feb 23 2023 11:48AM    < end of copied text >   HPI:  89y Male with PMHx of prostate cancer s/p prostatectomy in 1991 and XRT, currently on lupron, HTN and HLD presenting to ED with penile and abdominal pain since 9pm tonight. 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 On 2/23 a renal US was done which showed no clot in bladder, urine remained pink in color off CBI and was sent home with the catheter for a follow up in 1 week with Dr Chase. Pt states urine has been pink since discharge, however today noted to have more red urine in tubing. Around 9pm, pt developed excruciating penile pain and abdominal pressure and called EMS. As per patient, pain resolved in 1 hour when patient felt a clot pass along with urine afterwords and since then, patient has been comfortable. Chinchilla tubing with clear urine along with some small clots. Pt endorsed dysuria prior, now resolved. Denies fever/chills, nausea/vomiting, increased activity, tugging of catheter, straining to have bowel movements. Pt is not on AC.     PAST MEDICAL & SURGICAL HISTORY:  HTN (hypertension)      HLD (hyperlipidemia)      Prostate cancer      S/P prostatectomy          FAMILY HISTORY:  No known  malignancy     Social History:  Denies alcohol and drug abuse, nonsmoker     MEDICATIONS  (STANDING):    MEDICATIONS  (PRN):    Allergies    No Known Allergies    Intolerances      REVIEW OF SYSTEMS: Pertinent positives and negatives as stated in HPI, otherwise negative    Vital signs  T(C): 36.7 (02-26-23 @ 21:55), Max: 36.7 (02-26-23 @ 21:55)  HR: 81 (02-26-23 @ 21:55)  BP: 131/74 (02-26-23 @ 21:55)  SpO2: 98% (02-26-23 @ 21:55)  Wt(kg): --    Physical Exam  Gen: NAD  HEENT: normocephalic, atraumatic  Pulm: CTA b/l, No respiratory distress   CV: S1 S2, RRR  Abd: Soft, NT, ND, no rebound tenderness or guarding  : Circumcised, no lesions.  No discharge or blood at urethral meatus. 22F 3 way chinchilla in place with 3rd port capped, draining clear urine with some clot in tubing. Irrigated with sterile water with aspiration of ~20cc clot. Chinchilla draining clear urine.   MSK:  No CVAT, Moving all extremities, full ROM in all extremities, No edema present  NEURO: A&Ox3, no focal neurological deficits, CN 2-12 grossly intact  SKIN: warm, dry, no rash.    LABS: pending       Urine Cx: pending       Radiology:     Bedside ED US pending      < from: US Urinary Bladder (02.23.23 @ 11:38) >  ACC: [id number] EXAM:  US URINARY BLADDER   ORDERED BY: BAR PASTRANA     PROCEDURE DATE:  02/23/2023          INTERPRETATION:  CLINICAL INFORMATION: Gross hematuria.    COMPARISON: Renal sonogram dated 2/23/2023.    TECHNIQUE: Sonography of the bladder. Sterile saline was administered   through the Chinchilla catheter to distend the bladder.    FINDINGS:  Bladder: Moderately distended. Chinchilla catheter balloon. No echogenic   material within the bladder lumen to suggest presence of blood clot.    Reproductive organs: Prostate not well visualized.    IMPRESSION:  Normal bladder ultrasound. No blood clot seen within the bladder lumen.        --- End of Report ---            PAULETTE BOWEN MD; Attending Radiologist  This document has been electronically signed. Feb 23 2023 11:48AM    < end of copied text >   HPI:  89y Male with PMHx of prostate cancer s/p prostatectomy in 1991 and XRT, currently on lupron, HTN and HLD presenting to ED with penile and abdominal pain since 9pm tonight. 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 On 2/23 a renal US was done which showed no clot in bladder, urine remained pink in color off CBI and was sent home with the catheter for a follow up in 1 week with Dr Chase. Pt states urine has been pink since discharge, however today noted to have more red urine in tubing. Around 9pm, pt developed excruciating penile pain and abdominal pressure and called EMS. As per patient, pain resolved in 1 hour when patient felt a clot pass along with urine afterwords and since then, patient has been comfortable. Chinchilla tubing with clear urine along with some small clots. Pt endorsed dysuria prior, now resolved. Denies fever/chills, nausea/vomiting, increased activity, tugging of catheter, straining to have bowel movements. Pt is not on AC.     In ED: pt is AVSS, H/H 9.4/28.5 from 10.8/32.5 on 2/23, BMP, UA pending. bedside US pending.     PAST MEDICAL & SURGICAL HISTORY:    HTN (hypertension)      HLD (hyperlipidemia)      Prostate cancer      S/P prostatectomy          FAMILY HISTORY:  No known  malignancy     Social History:  Denies alcohol and drug abuse, nonsmoker     MEDICATIONS  (STANDING):    MEDICATIONS  (PRN):    Allergies    No Known Allergies    Intolerances      REVIEW OF SYSTEMS: Pertinent positives and negatives as stated in HPI, otherwise negative    Vital signs  T(C): 36.7 (02-26-23 @ 21:55), Max: 36.7 (02-26-23 @ 21:55)  HR: 81 (02-26-23 @ 21:55)  BP: 131/74 (02-26-23 @ 21:55)  SpO2: 98% (02-26-23 @ 21:55)  Wt(kg): --    Physical Exam  Gen: NAD  HEENT: normocephalic, atraumatic  Pulm: CTA b/l, No respiratory distress   CV: S1 S2, RRR  Abd: Soft, NT, ND, no rebound tenderness or guarding  : Circumcised, no lesions.  No discharge or blood at urethral meatus. 22F 3 way chinchilla in place with 3rd port capped, draining clear urine with some clot in tubing. Irrigated with sterile water with aspiration of ~20cc clot. Chinchilla draining clear urine.   MSK:  No CVAT, Moving all extremities, full ROM in all extremities, No edema present  NEURO: A&Ox3, no focal neurological deficits, CN 2-12 grossly intact  SKIN: warm, dry, no rash.    LABS: pending       Urine Cx: pending       Radiology:     Bedside ED US pending      < from: US Urinary Bladder (02.23.23 @ 11:38) >  ACC: [id number] EXAM:  US URINARY BLADDER   ORDERED BY: BAR PASTRANA     PROCEDURE DATE:  02/23/2023          INTERPRETATION:  CLINICAL INFORMATION: Gross hematuria.    COMPARISON: Renal sonogram dated 2/23/2023.    TECHNIQUE: Sonography of the bladder. Sterile saline was administered   through the Chinchilla catheter to distend the bladder.    FINDINGS:  Bladder: Moderately distended. Chinchilla catheter balloon. No echogenic   material within the bladder lumen to suggest presence of blood clot.    Reproductive organs: Prostate not well visualized.    IMPRESSION:  Normal bladder ultrasound. No blood clot seen within the bladder lumen.        --- End of Report ---            PAULETTE BOWEN MD; Attending Radiologist  This document has been electronically signed. Feb 23 2023 11:48AM    < end of copied text >   HPI:  89y Male with PMHx of prostate cancer s/p prostatectomy in 1991 and XRT, currently on lupron, HTN and HLD presenting to ED with penile and abdominal pain since 9pm tonight. 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 On 2/23 a renal US was done which showed no clot in bladder, urine remained pink in color off CBI and was sent home with the catheter for a follow up in 1 week with Dr Chase. Pt states urine has been pink since discharge, however today noted to have more red urine in tubing. Also has been having intermittent leakage of urine around catheter since discharge. Around 9pm, pt developed excruciating penile pain and abdominal pressure and called EMS. As per patient, pain resolved in 1 hour when patient felt a clot pass along with urine afterwords and since then, patient has been comfortable. Chinchilla tubing with clear urine along with some small clots. Pt endorsed dysuria prior, now resolved. Denies fever/chills, nausea/vomiting, increased activity, tugging of catheter, straining to have bowel movements. Pt is not on AC.     In ED: pt is AVSS, H/H 9.4/28.5 from 10.8/32.5 on 2/23, BMP, UA pending. bedside US pending.     PAST MEDICAL & SURGICAL HISTORY:    HTN (hypertension)      HLD (hyperlipidemia)      Prostate cancer      S/P prostatectomy          FAMILY HISTORY:  No known  malignancy     Social History:  Denies alcohol and drug abuse, nonsmoker     MEDICATIONS  (STANDING):    MEDICATIONS  (PRN):    Allergies    No Known Allergies    Intolerances      REVIEW OF SYSTEMS: Pertinent positives and negatives as stated in HPI, otherwise negative    Vital signs  T(C): 36.7 (02-26-23 @ 21:55), Max: 36.7 (02-26-23 @ 21:55)  HR: 81 (02-26-23 @ 21:55)  BP: 131/74 (02-26-23 @ 21:55)  SpO2: 98% (02-26-23 @ 21:55)  Wt(kg): --    Physical Exam  Gen: NAD  HEENT: normocephalic, atraumatic  Pulm: CTA b/l, No respiratory distress   CV: S1 S2, RRR  Abd: Soft, NT, ND, no rebound tenderness or guarding  : Circumcised, no lesions.  No discharge or blood at urethral meatus. 22F 3 way chinchilla in place with 3rd port capped, draining clear urine with some clot in tubing. Irrigated with sterile water with aspiration of ~20cc clot. Chinchilla draining clear urine.   MSK:  No CVAT, Moving all extremities, full ROM in all extremities, No edema present  NEURO: A&Ox3, no focal neurological deficits, CN 2-12 grossly intact  SKIN: warm, dry, no rash.    LABS: pending       Urine Cx: pending       Radiology:     Bedside ED US pending      < from: US Urinary Bladder (02.23.23 @ 11:38) >  ACC: [id number] EXAM:  US URINARY BLADDER   ORDERED BY: BAR PASTRANA     PROCEDURE DATE:  02/23/2023          INTERPRETATION:  CLINICAL INFORMATION: Gross hematuria.    COMPARISON: Renal sonogram dated 2/23/2023.    TECHNIQUE: Sonography of the bladder. Sterile saline was administered   through the Chinchilla catheter to distend the bladder.    FINDINGS:  Bladder: Moderately distended. Chinchilla catheter balloon. No echogenic   material within the bladder lumen to suggest presence of blood clot.    Reproductive organs: Prostate not well visualized.    IMPRESSION:  Normal bladder ultrasound. No blood clot seen within the bladder lumen.        --- End of Report ---            PAULETTE BOWEN MD; Attending Radiologist  This document has been electronically signed. Feb 23 2023 11:48AM    < end of copied text >   HPI:  89y Male with PMHx of prostate cancer s/p prostatectomy in 1991 and XRT, currently on lupron, HTN and HLD presenting to ED with penile and abdominal pain since 9pm tonight. 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 On 2/23 a renal US was done which showed no clot in bladder, urine remained pink in color off CBI and was sent home with the catheter for a follow up in 1 week with Dr Chase. Pt states urine has been pink since discharge, however today noted to have more red urine in tubing. Also has been having intermittent leakage of urine around catheter since discharge. Around 9pm, pt developed excruciating penile pain and abdominal pressure and called EMS. As per patient, pain resolved in 1 hour when patient felt a clot pass along with urine afterwords and since then, patient has been comfortable. Chinchilla tubing with clear urine along with some small clots. Pt endorsed dysuria prior, now resolved. Denies fever/chills, nausea/vomiting, increased activity, tugging of catheter, straining to have bowel movements. Pt is not on AC.     In ED: pt is AVSS, H/H 9.4/28.5 from 10.8/32.5 on 2/23, Cr. 1.09, UA pending.     PAST MEDICAL & SURGICAL HISTORY:    HTN (hypertension)      HLD (hyperlipidemia)      Prostate cancer      S/P prostatectomy          FAMILY HISTORY:  No known  malignancy     Social History:  Denies alcohol and drug abuse, nonsmoker     MEDICATIONS  (STANDING):    MEDICATIONS  (PRN):    Allergies    No Known Allergies    Intolerances      REVIEW OF SYSTEMS: Pertinent positives and negatives as stated in HPI, otherwise negative    Vital signs  T(C): 36.7 (02-26-23 @ 21:55), Max: 36.7 (02-26-23 @ 21:55)  HR: 81 (02-26-23 @ 21:55)  BP: 131/74 (02-26-23 @ 21:55)  SpO2: 98% (02-26-23 @ 21:55)  Wt(kg): --    Physical Exam  Gen: NAD  HEENT: normocephalic, atraumatic  Pulm: CTA b/l, No respiratory distress   CV: S1 S2, RRR  Abd: Soft, NT, ND, no rebound tenderness or guarding  : Circumcised, no lesions.  No discharge or blood at urethral meatus. 22F 3 way chinchilla in place with 3rd port capped, draining clear urine with some clot in tubing. Irrigated with sterile water with aspiration of ~20cc clot. Chinchilla draining clear urine.   MSK:  No CVAT, Moving all extremities, full ROM in all extremities, No edema present  NEURO: A&Ox3, no focal neurological deficits, CN 2-12 grossly intact  SKIN: warm, dry, no rash.    LABS: pending       Urine Cx: pending       Radiology:     Bedside ED US pending      < from: US Urinary Bladder (02.23.23 @ 11:38) >  ACC: [id number] EXAM:  US URINARY BLADDER   ORDERED BY: BAR PASTRANA     PROCEDURE DATE:  02/23/2023          INTERPRETATION:  CLINICAL INFORMATION: Gross hematuria.    COMPARISON: Renal sonogram dated 2/23/2023.    TECHNIQUE: Sonography of the bladder. Sterile saline was administered   through the Chinchilla catheter to distend the bladder.    FINDINGS:  Bladder: Moderately distended. Chinchilla catheter balloon. No echogenic   material within the bladder lumen to suggest presence of blood clot.    Reproductive organs: Prostate not well visualized.    IMPRESSION:  Normal bladder ultrasound. No blood clot seen within the bladder lumen.        --- End of Report ---            PAULETTE BOWEN MD; Attending Radiologist  This document has been electronically signed. Feb 23 2023 11:48AM    < end of copied text >

## 2023-02-26 NOTE — CONSULT NOTE ADULT - ASSESSMENT
89y Male with PMHx of prostate cancer s/p prostatectomy in 1991 and XRT, currently on lupron, HTN and HLD presenting to ED with penile and abdominal pain since 9pm tonight. 89y Male with PMHx of prostate cancer s/p prostatectomy in 1991 and XRT, currently on lupron, HTN and HLD presenting to ED with penile and abdominal pain since 9pm tonight. Chinchilla irrigated with aspiration of ~20cc clot, chinchilla draining clear urine. Pt is AVSS, H/H 9.4/28.5 from 10.8/32.5 on 2/23, BMP, UA pending. bedside US pending.  89y Male with PMHx of prostate cancer s/p prostatectomy in 1991 and XRT, currently on lupron, HTN and HLD presenting to ED with penile and abdominal pain since 9pm tonight. Chinchilla irrigated with aspiration of ~20cc clot, chinchilla draining clear urine. Pt is AVSS, H/H 9.4/28.5 from 10.8/32.5 on 2/23, Cr 1.09, UA pending.     -recommend increased hydration, continue using stool softeners   -follow up UA  -no acute urological intervention at this time   -dc home with chinchilla   -patient should keep follow up with Dr. Chase for this week  89y Male with PMHx of prostate cancer s/p prostatectomy in 1991 and XRT, currently on lupron, HTN and HLD presenting to ED with penile and abdominal pain since 9pm tonight. Chinchilla irrigated with aspiration of ~20cc clot, chinchilla draining clear urine. Pt is AVSS, H/H 9.4/28.5 from 10.8/32.5 on 2/23, Cr 1.09, UA pending.     -recommend increased hydration, continue using stool softeners   -follow up UA  -no acute urological intervention at this time   -dc home with chinchilla   -patient should keep follow up with Dr. Chase for this week     case discussed with on call resident Dr. Ahmadi

## 2023-02-26 NOTE — ED ADULT NURSE NOTE - OBJECTIVE STATEMENT
89 year-old-male with history of prostate cancer s/p prostatectomy in 1991, HTN and HLD recently discharge with clot retention in [place]. now presenting to ED with hematuria and clots in [place] cath associated with burning and pain. denies fever, SOB, chest, abd pain, NVD, not on any AC.

## 2023-02-27 DIAGNOSIS — Z29.9 ENCOUNTER FOR PROPHYLACTIC MEASURES, UNSPECIFIED: ICD-10-CM

## 2023-02-27 DIAGNOSIS — I10 ESSENTIAL (PRIMARY) HYPERTENSION: ICD-10-CM

## 2023-02-27 DIAGNOSIS — R31.9 HEMATURIA, UNSPECIFIED: ICD-10-CM

## 2023-02-27 DIAGNOSIS — C61 MALIGNANT NEOPLASM OF PROSTATE: ICD-10-CM

## 2023-02-27 DIAGNOSIS — E78.5 HYPERLIPIDEMIA, UNSPECIFIED: ICD-10-CM

## 2023-02-27 LAB
ALBUMIN SERPL ELPH-MCNC: 3.3 G/DL — SIGNIFICANT CHANGE UP (ref 3.3–5)
ALBUMIN SERPL ELPH-MCNC: 3.5 G/DL — SIGNIFICANT CHANGE UP (ref 3.3–5)
ALBUMIN SERPL ELPH-MCNC: 4 G/DL — SIGNIFICANT CHANGE UP (ref 3.3–5)
ALP SERPL-CCNC: 35 U/L — LOW (ref 40–120)
ALP SERPL-CCNC: 36 U/L — LOW (ref 40–120)
ALP SERPL-CCNC: 41 U/L — SIGNIFICANT CHANGE UP (ref 40–120)
ALT FLD-CCNC: 16 U/L — SIGNIFICANT CHANGE UP (ref 10–45)
ALT FLD-CCNC: 18 U/L — SIGNIFICANT CHANGE UP (ref 10–45)
ALT FLD-CCNC: 21 U/L — SIGNIFICANT CHANGE UP (ref 10–45)
ANION GAP SERPL CALC-SCNC: 10 MMOL/L — SIGNIFICANT CHANGE UP (ref 5–17)
ANION GAP SERPL CALC-SCNC: 11 MMOL/L — SIGNIFICANT CHANGE UP (ref 5–17)
ANION GAP SERPL CALC-SCNC: 11 MMOL/L — SIGNIFICANT CHANGE UP (ref 5–17)
ANION GAP SERPL CALC-SCNC: 12 MMOL/L — SIGNIFICANT CHANGE UP (ref 5–17)
APPEARANCE UR: CLEAR — SIGNIFICANT CHANGE UP
APTT BLD: 31.2 SEC — SIGNIFICANT CHANGE UP (ref 27.5–35.5)
AST SERPL-CCNC: 44 U/L — HIGH (ref 10–40)
AST SERPL-CCNC: 48 U/L — HIGH (ref 10–40)
AST SERPL-CCNC: 52 U/L — HIGH (ref 10–40)
BACTERIA # UR AUTO: NEGATIVE — SIGNIFICANT CHANGE UP
BILIRUB SERPL-MCNC: 0.2 MG/DL — SIGNIFICANT CHANGE UP (ref 0.2–1.2)
BILIRUB SERPL-MCNC: 0.2 MG/DL — SIGNIFICANT CHANGE UP (ref 0.2–1.2)
BILIRUB SERPL-MCNC: 0.3 MG/DL — SIGNIFICANT CHANGE UP (ref 0.2–1.2)
BILIRUB UR-MCNC: NEGATIVE — SIGNIFICANT CHANGE UP
BUN SERPL-MCNC: 12 MG/DL — SIGNIFICANT CHANGE UP (ref 7–23)
BUN SERPL-MCNC: 13 MG/DL — SIGNIFICANT CHANGE UP (ref 7–23)
BUN SERPL-MCNC: 14 MG/DL — SIGNIFICANT CHANGE UP (ref 7–23)
BUN SERPL-MCNC: 16 MG/DL — SIGNIFICANT CHANGE UP (ref 7–23)
CALCIUM SERPL-MCNC: 8.9 MG/DL — SIGNIFICANT CHANGE UP (ref 8.4–10.5)
CALCIUM SERPL-MCNC: 9 MG/DL — SIGNIFICANT CHANGE UP (ref 8.4–10.5)
CALCIUM SERPL-MCNC: 9.3 MG/DL — SIGNIFICANT CHANGE UP (ref 8.4–10.5)
CALCIUM SERPL-MCNC: 9.3 MG/DL — SIGNIFICANT CHANGE UP (ref 8.4–10.5)
CHLORIDE SERPL-SCNC: 100 MMOL/L — SIGNIFICANT CHANGE UP (ref 96–108)
CHLORIDE SERPL-SCNC: 103 MMOL/L — SIGNIFICANT CHANGE UP (ref 96–108)
CHLORIDE SERPL-SCNC: 105 MMOL/L — SIGNIFICANT CHANGE UP (ref 96–108)
CHLORIDE SERPL-SCNC: 99 MMOL/L — SIGNIFICANT CHANGE UP (ref 96–108)
CO2 SERPL-SCNC: 22 MMOL/L — SIGNIFICANT CHANGE UP (ref 22–31)
CO2 SERPL-SCNC: 24 MMOL/L — SIGNIFICANT CHANGE UP (ref 22–31)
CO2 SERPL-SCNC: 25 MMOL/L — SIGNIFICANT CHANGE UP (ref 22–31)
CO2 SERPL-SCNC: 25 MMOL/L — SIGNIFICANT CHANGE UP (ref 22–31)
COLOR SPEC: COLORLESS — SIGNIFICANT CHANGE UP
CREAT SERPL-MCNC: 0.99 MG/DL — SIGNIFICANT CHANGE UP (ref 0.5–1.3)
CREAT SERPL-MCNC: 1.03 MG/DL — SIGNIFICANT CHANGE UP (ref 0.5–1.3)
CREAT SERPL-MCNC: 1.09 MG/DL — SIGNIFICANT CHANGE UP (ref 0.5–1.3)
CREAT SERPL-MCNC: 1.1 MG/DL — SIGNIFICANT CHANGE UP (ref 0.5–1.3)
CULTURE RESULTS: NO GROWTH — SIGNIFICANT CHANGE UP
DIFF PNL FLD: ABNORMAL
EGFR: 64 ML/MIN/1.73M2 — SIGNIFICANT CHANGE UP
EGFR: 65 ML/MIN/1.73M2 — SIGNIFICANT CHANGE UP
EGFR: 69 ML/MIN/1.73M2 — SIGNIFICANT CHANGE UP
EGFR: 73 ML/MIN/1.73M2 — SIGNIFICANT CHANGE UP
EPI CELLS # UR: 0 /HPF — SIGNIFICANT CHANGE UP
FLUAV AG NPH QL: SIGNIFICANT CHANGE UP
FLUBV AG NPH QL: SIGNIFICANT CHANGE UP
GLUCOSE SERPL-MCNC: 102 MG/DL — HIGH (ref 70–99)
GLUCOSE SERPL-MCNC: 120 MG/DL — HIGH (ref 70–99)
GLUCOSE SERPL-MCNC: 98 MG/DL — SIGNIFICANT CHANGE UP (ref 70–99)
GLUCOSE SERPL-MCNC: 99 MG/DL — SIGNIFICANT CHANGE UP (ref 70–99)
GLUCOSE UR QL: NEGATIVE — SIGNIFICANT CHANGE UP
INR BLD: 1.09 RATIO — SIGNIFICANT CHANGE UP (ref 0.88–1.16)
KETONES UR-MCNC: NEGATIVE — SIGNIFICANT CHANGE UP
LEUKOCYTE ESTERASE UR-ACNC: ABNORMAL
LIDOCAIN IGE QN: 60 U/L — SIGNIFICANT CHANGE UP (ref 7–60)
MAGNESIUM SERPL-MCNC: 2.2 MG/DL — SIGNIFICANT CHANGE UP (ref 1.6–2.6)
MAGNESIUM SERPL-MCNC: 2.2 MG/DL — SIGNIFICANT CHANGE UP (ref 1.6–2.6)
NITRITE UR-MCNC: NEGATIVE — SIGNIFICANT CHANGE UP
PH UR: 7 — SIGNIFICANT CHANGE UP (ref 5–8)
PHOSPHATE SERPL-MCNC: 3.2 MG/DL — SIGNIFICANT CHANGE UP (ref 2.5–4.5)
PHOSPHATE SERPL-MCNC: 3.3 MG/DL — SIGNIFICANT CHANGE UP (ref 2.5–4.5)
POTASSIUM SERPL-MCNC: 4.2 MMOL/L — SIGNIFICANT CHANGE UP (ref 3.5–5.3)
POTASSIUM SERPL-MCNC: 4.4 MMOL/L — SIGNIFICANT CHANGE UP (ref 3.5–5.3)
POTASSIUM SERPL-MCNC: 4.5 MMOL/L — SIGNIFICANT CHANGE UP (ref 3.5–5.3)
POTASSIUM SERPL-MCNC: 4.8 MMOL/L — SIGNIFICANT CHANGE UP (ref 3.5–5.3)
POTASSIUM SERPL-SCNC: 4.2 MMOL/L — SIGNIFICANT CHANGE UP (ref 3.5–5.3)
POTASSIUM SERPL-SCNC: 4.4 MMOL/L — SIGNIFICANT CHANGE UP (ref 3.5–5.3)
POTASSIUM SERPL-SCNC: 4.5 MMOL/L — SIGNIFICANT CHANGE UP (ref 3.5–5.3)
POTASSIUM SERPL-SCNC: 4.8 MMOL/L — SIGNIFICANT CHANGE UP (ref 3.5–5.3)
PROT SERPL-MCNC: 6.2 G/DL — SIGNIFICANT CHANGE UP (ref 6–8.3)
PROT SERPL-MCNC: 6.5 G/DL — SIGNIFICANT CHANGE UP (ref 6–8.3)
PROT SERPL-MCNC: 7.1 G/DL — SIGNIFICANT CHANGE UP (ref 6–8.3)
PROT UR-MCNC: ABNORMAL
PROTHROM AB SERPL-ACNC: 12.5 SEC — SIGNIFICANT CHANGE UP (ref 10.5–13.4)
RBC CASTS # UR COMP ASSIST: 41 /HPF — HIGH (ref 0–4)
RSV RNA NPH QL NAA+NON-PROBE: SIGNIFICANT CHANGE UP
SARS-COV-2 RNA SPEC QL NAA+PROBE: SIGNIFICANT CHANGE UP
SODIUM SERPL-SCNC: 135 MMOL/L — SIGNIFICANT CHANGE UP (ref 135–145)
SODIUM SERPL-SCNC: 135 MMOL/L — SIGNIFICANT CHANGE UP (ref 135–145)
SODIUM SERPL-SCNC: 137 MMOL/L — SIGNIFICANT CHANGE UP (ref 135–145)
SODIUM SERPL-SCNC: 140 MMOL/L — SIGNIFICANT CHANGE UP (ref 135–145)
SP GR SPEC: 1 — LOW (ref 1.01–1.02)
SPECIMEN SOURCE: SIGNIFICANT CHANGE UP
UROBILINOGEN FLD QL: NEGATIVE — SIGNIFICANT CHANGE UP
WBC UR QL: 3 /HPF — SIGNIFICANT CHANGE UP (ref 0–5)

## 2023-02-27 PROCEDURE — 99223 1ST HOSP IP/OBS HIGH 75: CPT | Mod: GC

## 2023-02-27 PROCEDURE — 76775 US EXAM ABDO BACK WALL LIM: CPT | Mod: 26

## 2023-02-27 RX ORDER — SERTRALINE 25 MG/1
25 TABLET, FILM COATED ORAL DAILY
Refills: 0 | Status: DISCONTINUED | OUTPATIENT
Start: 2023-02-27 | End: 2023-03-04

## 2023-02-27 RX ORDER — ACETAMINOPHEN 500 MG
650 TABLET ORAL EVERY 6 HOURS
Refills: 0 | Status: DISCONTINUED | OUTPATIENT
Start: 2023-02-27 | End: 2023-03-04

## 2023-02-27 RX ORDER — SODIUM CHLORIDE 9 MG/ML
500 INJECTION, SOLUTION INTRAVENOUS ONCE
Refills: 0 | Status: COMPLETED | OUTPATIENT
Start: 2023-02-27 | End: 2023-02-27

## 2023-02-27 RX ORDER — SODIUM CHLORIDE 9 MG/ML
500 INJECTION INTRAMUSCULAR; INTRAVENOUS; SUBCUTANEOUS ONCE
Refills: 0 | Status: COMPLETED | OUTPATIENT
Start: 2023-02-27 | End: 2023-02-27

## 2023-02-27 RX ADMIN — SERTRALINE 25 MILLIGRAM(S): 25 TABLET, FILM COATED ORAL at 18:45

## 2023-02-27 RX ADMIN — SODIUM CHLORIDE 500 MILLILITER(S): 9 INJECTION INTRAMUSCULAR; INTRAVENOUS; SUBCUTANEOUS at 05:04

## 2023-02-27 RX ADMIN — SODIUM CHLORIDE 500 MILLILITER(S): 9 INJECTION, SOLUTION INTRAVENOUS at 03:03

## 2023-02-27 NOTE — ED PROVIDER NOTE - COVID-19 ORDERING FACILITY
Office of Ayaka TINEO -  Pt's transportation company made an error today in her  time resulting in her needing to cancel new patient appointment today.  She indicates next option is mid September.  Pt asking if she can be seen sooner for acute issues (mentioned in CM encounter on 8/7/2020) including breast pain, dizziness, hot flashes.  Are there any options for a sooner appt for these needs?  Thank you for your assistance.  LK          Situation: Pt called [facility] and left voicemail.  Contacted pt back at this time.   states she had issue with transportation again and had to cancel appt to establish care with new PCP.  No availability until mid September.        Key Assessments:  She states she had issue with transportation again and had to cancel appt to establish care with new PCP.  No availability until mid September.   She is concerned with waiting that long due to issues with breast pain and hot flashes described last week to [facility].  She is hoping to be seen for these issues sooner and can work through complete appt/est care at a later time.  She also would like to schedule a mammogram as she is due for this in August.        Actions Taken: Message to new provider to identify if any sooner appointments available for acute issues.    Advised pt that if no sooner appointments, she can follow up with Dr. Prabhakar until she can be seen by new provider and she understands this.     Plan: Contact pt back once appointment options identified.    Mammogran order will be needed as well.    See hyperlinks within encounter for full documentation     LAURA/Lynette

## 2023-02-27 NOTE — H&P ADULT - ASSESSMENT
88yo M w/ pmhx Prostate CA s/p prostatectomy (1991) and XRT on Lupron, also HTN, HLD presented to ED with worsening pelvic and genital area pain that started over the weekend and acutely worsened the night prior to presentation after cystoscopic dilation and CBI on recent admission.

## 2023-02-27 NOTE — ED PROVIDER NOTE - COVID-19 RESULT DATE/TIME
Timothy Griffith)  Urology  [address], [address] [place] [address]  Phone: [phone]  Fax: [phone]  Follow Up Time: 1 week  
21-Feb-2023 03:24

## 2023-02-27 NOTE — PROGRESS NOTE ADULT - SUBJECTIVE AND OBJECTIVE BOX
UROLOGY PA PROGRESS NOTE:     Subjective:  pt admitted overnight for post obstructive diuresis. no longer having gross hematuria.     Objective:  Vital signs  T(F): , Max: 99.6 (23 @ 23:50)  HR: 74 (23 @ 03:39)  BP: 150/60 (23 @ 03:39)  SpO2: 99% (23 @ 03:39)  Wt(kg): --    Output   1500cc / 4 hours     Physical Exam:  Gen: NAD  Abd: soft, NT/ND  : +chinchilla draining clear urine    Labs:    x     / x     /0.99     6.13  / 28.5  /1.09                           9.4    6.13  )-----------( 187      ( 2023 23:41 )             28.5         137  |  103  |  14  ----------------------------<  102<H>  4.4   |  22  |  0.99    Ca    8.9      2023 05:10    TPro  6.2  /  Alb  3.3  /  TBili  0.3  /  DBili  x   /  AST  48<H>  /  ALT  18  /  AlkPhos  35<L>      PT/INR - ( 2023 23:41 )   PT: 12.5 sec;   INR: 1.09 ratio         PTT - ( 2023 23:41 )  PTT:31.2 sec  Urinalysis Basic - ( 2023 01:12 )    Color: Colorless / Appearance: Clear / S.005 / pH: x  Gluc: x / Ketone: Negative  / Bili: Negative / Urobili: Negative   Blood: x / Protein: 30 mg/dL / Nitrite: Negative   Leuk Esterase: Small / RBC: 41 /hpf / WBC 3 /HPF   Sq Epi: x / Non Sq Epi: 0 /hpf / Bacteria: Negative

## 2023-02-27 NOTE — H&P ADULT - ATTENDING COMMENTS
Patient seen and evaluated on  bedside rounds.Patient with recent acute urinary retention s/p chinchilla with cystoscopy with bulbar stricture presents with acute urinary retention 2/2 large clot, evacuated. at time of interview, paitent without pain, has clear urine. No chest pain, sOB, abdominal pain, n/v/d/c, numbness or tingling, cramping of extremities.   Labs reviewed    #Acute urinary retnetion with post-obstructive diuresis  -monitor overnight with strict i/o, BMP q6-8 hours.   -encourage po intake and fliuds.  -if maintains electrolytes and no further clotting episodes, can likely be ischarged with outpatient urology follow up.     #hematuria  -currently resolved, appreciate urology input, no CBI, keep chinchilla in.    rest as above

## 2023-02-27 NOTE — H&P ADULT - NSHPLABSRESULTS_GEN_ALL_CORE
.  LABS:                         9.4    6.13  )-----------( 187      ( 2023 23:41 )             28.5         140  |  105  |  13  ----------------------------<  98  4.8   |  25  |  1.10    Ca    9.0      2023 13:35  Phos  3.2       Mg     2.2         TPro  6.5  /  Alb  3.5  /  TBili  0.2  /  DBili  x   /  AST  44<H>  /  ALT  16  /  AlkPhos  36<L>      PT/INR - ( 2023 23:41 )   PT: 12.5 sec;   INR: 1.09 ratio         PTT - ( 2023 23:41 )  PTT:31.2 sec  Urinalysis Basic - ( 2023 01:12 )    Color: Colorless / Appearance: Clear / S.005 / pH: x  Gluc: x / Ketone: Negative  / Bili: Negative / Urobili: Negative   Blood: x / Protein: 30 mg/dL / Nitrite: Negative   Leuk Esterase: Small / RBC: 41 /hpf / WBC 3 /HPF   Sq Epi: x / Non Sq Epi: 0 /hpf / Bacteria: Negative

## 2023-02-27 NOTE — ED PROVIDER NOTE - NS ED ROS FT
Review of Systems:  -General: no fever   -Gastrointestinal: +abdominal pain, no nausea, no vomiting  -Genitourinary: see hpi  -Endocrine: No h/o diabetes or thyroid disease    All else negative unless otherwise specified elsewhere in this note.

## 2023-02-27 NOTE — H&P ADULT - PROBLEM SELECTOR PLAN 1
pt presented w/ after severe pelvic & genital pain, and acute urinary retention that resolved after passing clot  - Urology consulted in ED; irrigated chinchilla and aspirated 20cc clot  - now w/ clear urine; small clots seen in chinchilla tube  - pt now comfortable; now w/ post obstructives diuresis w/ UOP ~1.5L in ~4hrs  - monitor electrolytes q6h  - Strict I/Os  - f/u Uro recs

## 2023-02-27 NOTE — ED ADULT NURSE REASSESSMENT NOTE - NS ED NURSE REASSESS COMMENT FT1
Received report from WILIAM Morley. Pt is awake, alert, and speaking in full coherent sentences. Vital signs are stable. Pt resting comfortably in stretcher. Side rails up, bed in lowest position, and call bell within reach. Pt awaiting admitting bed.

## 2023-02-27 NOTE — H&P ADULT - NSHPPHYSICALEXAM_GEN_ALL_CORE
PHYSICAL EXAM    Vital Signs Last 24 Hrs  T(C): 36.9 (27 Feb 2023 09:31), Max: 37.6 (26 Feb 2023 23:50)  T(F): 98.4 (27 Feb 2023 09:31), Max: 99.6 (26 Feb 2023 23:50)  HR: 69 (27 Feb 2023 09:31) (69 - 81)  BP: 119/62 (27 Feb 2023 09:31) (119/62 - 150/60)  BP(mean): 68 (27 Feb 2023 07:30) (68 - 85)  RR: 18 (27 Feb 2023 09:31) (16 - 18)  SpO2: 96% (27 Feb 2023 09:31) (96% - 99%)    Parameters below as of 27 Feb 2023 09:31  Patient On (Oxygen Delivery Method): room air          GENERAL: NAD, lying comfortably in bed   HEAD:  Atraumatic, Normocephalic  EYES: EOMI b/l, PERRLA b/l, conjunctiva and sclera clear  NECK: Supple, No JVD, No LAD   CHEST/LUNG: Clear to auscultation bilaterally; No wheeze or rhonchi  HEART: Regular rate and rhythm; S1 and S2 present, No murmurs, rubs, or gallops  ABDOMEN: Soft, Nontender, Nondistended; Bowel sounds present  EXTREMITIES:  2+ Peripheral Pulses, No clubbing, cyanosis, or edema  NEURO: AAOx3, non-focal   SKIN: No rashes or lesions PHYSICAL EXAM    Vital Signs Last 24 Hrs  T(C): 36.9 (27 Feb 2023 09:31), Max: 37.6 (26 Feb 2023 23:50)  T(F): 98.4 (27 Feb 2023 09:31), Max: 99.6 (26 Feb 2023 23:50)  HR: 69 (27 Feb 2023 09:31) (69 - 81)  BP: 119/62 (27 Feb 2023 09:31) (119/62 - 150/60)  BP(mean): 68 (27 Feb 2023 07:30) (68 - 85)  RR: 18 (27 Feb 2023 09:31) (16 - 18)  SpO2: 96% (27 Feb 2023 09:31) (96% - 99%)    Parameters below as of 27 Feb 2023 09:31  Patient On (Oxygen Delivery Method): room air          GENERAL: NAD, lying comfortably in bed   HEAD:  Atraumatic, Normocephalic  EYES: EOMI b/l, PERRLA b/l, conjunctiva and sclera clear  NECK: Supple, No JVD, No LAD   CHEST/LUNG: Clear to auscultation bilaterally; No wheeze or rhonchi  HEART: Regular rate and rhythm; S1 and S2 present, No murmurs, rubs, or gallops  ABDOMEN: Soft, Nontender, Nondistended; Bowel sounds present  : No penile tenderness to palpation. Chinchilla with grossly clear urine in chinchilla bag  EXTREMITIES:  2+ Peripheral Pulses, No clubbing, cyanosis, or edema  NEURO: AAOx3, non-focal   SKIN: No rashes or lesions

## 2023-02-27 NOTE — PATIENT PROFILE ADULT - NSPROPTRIGHTREPNAME_GEN_A__NUR
----- Message from Liana Worthington MD sent at 5/7/2020  9:12 AM CDT -----  Please inform patient of negative result   Jessee Tijerina (daughter)

## 2023-02-27 NOTE — PATIENT PROFILE ADULT - FALL HARM RISK - HARM RISK INTERVENTIONS

## 2023-02-27 NOTE — ED PROVIDER NOTE - ATTENDING CONTRIBUTION TO CARE
Attending note (Chago): 89 male, history of prostate cancer s/p prostatectomy 1991 and XRT, on Lupron, also history of HTN, HLD, presenting with pain in lower abdomen and genitals since approximately 9 PM tonight.  Of note patient had recently presented to this hospital with acute urinary retention status post placement of 16 [place] Garrett with hematuria admitted to hospital and required cystoscopy dilatation due to bulbar stricture and continuous bladder irrigation.  Is now status post flushing of Garrett with clear yellow urine draining.  Point-of-care ultrasound shows small amount of heterogenic contents within the bladder but not appearing to be in retention now.  Suspect some clot still retained within the bladder but given clear yellow drainage and in consultation with urology PA further irrigation or CBI is not warranted.  Additionally urology recommending not attending replacement of Garrett catheter.  We will check screening labs including CBC (rule out anemia), CMP (to rule out electrolyte abnormalities or renal dysfunction), and urinalysis (evaluate for urinary tract infection).  Send urinary culture.  Monitor urine output for signs of postobstructive diuresis or increased clotting.  If continues to have normal urine output and there are no actionable lab abnormalities, can likely discharge with outpatient urology follow-up.  Please see above progress notes above for updates to medical decision making and the patient's clinical course.

## 2023-02-27 NOTE — ED PROVIDER NOTE - CLINICAL SUMMARY MEDICAL DECISION MAKING FREE TEXT BOX
Attending note (Chago): 89 male, history of prostate cancer s/p prostatectomy 1991 and XRT, on Lupron, also history of HTN, HLD, presenting with pain in lower abdomen and genitals since approximately 9 PM tonight.  Of note patient had recently presented to this hospital with acute urinary retention status post placement of 16 [place] Garrett with hematuria admitted to hospital and required cystoscopy dilatation due to bulbar stricture and continuous bladder irrigation.  Is now status post flushing of Garrett with clear yellow urine draining.  Point-of-care ultrasound shows small amount of heterogenic contents within the bladder but not appearing to be in retention now.  Suspect some clot still retained within the bladder but given clear yellow drainage and in consultation with urology PA further irrigation or CBI is not warranted.  Additionally urology recommending not attending replacement of Garrett catheter.  We will check screening labs including CBC (rule out anemia), CMP (to rule out electrolyte abnormalities or renal dysfunction), and urinalysis (evaluate for urinary tract infection).  Send urinary culture.  Monitor urine output for signs of postobstructive diuresis or increased clotting.  If continues to have normal urine output and there are no actionable lab abnormalities, can likely discharge with outpatient urology follow-up.  Please see above progress notes above for updates to medical decision making and the patient's clinical course.

## 2023-02-27 NOTE — ED PROVIDER NOTE - PHYSICAL EXAMINATION
On Physical Exam:  General: well appearing, in NAD, speaking clearly in full sentences and without difficulty; cooperative with exam  HEENT: anicteric sclera, airway patent  Neck: no JVD  Cardiac: s1s2/regular  Lungs: CTABL  Abdomen: soft nontender/nondistended  : mild bladder tenderness but no distension; chinchilla catheter in place (patient seen after irrigation and chinchilla now draining clear yellow urine but noted to have blood/clots in bag). Penis with no lesions or bleeding at meatus, no urine leaking around catheter at this time  Skin: intact, no rash  Extremities: no peripheral edema, no gross deformities

## 2023-02-27 NOTE — PROGRESS NOTE ADULT - ASSESSMENT
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 chinchilla was irrigated and cleared to a yellow color, but now with post obstructive diuresis. Currently with 1500cc of clear yellow urine if chinchilla bag, last drained about 4 hours ago according to ED staff.   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 chinchilla was irrigated and cleared to a yellow color, but now with post obstructive diuresis. Currently with 1500cc of clear yellow urine if chinchilla bag, last drained about 4 hours ago according to ED staff.     Patient seen again in the afternoon with Dr. Barahona. Chinchilla draining clear yellow urine    - pt now admitted to medicine for concern for post-obstructive diuresis   - strict I&Os, please record hourly urine output  - pt should drink to thirst, if unable to keep up with self hydration, primary team may consider supplementing with IVF based on hourly urine output  - q6hour BMP, replete electrolytes as needed  - please call urology with further questions   - pt to continue chinchilla catheter   - patient to follow up with Dr. Chase 1-2 weeks after discharge

## 2023-02-27 NOTE — ED PROVIDER NOTE - PROGRESS NOTE DETAILS
Attending note (Chago): after initial output and flushing (total ~1L but with irrigation), over last 1/2 hour has put out ~500cc of urine. Will give 500cc bolus of iv lfuid to replete and then reassess output in next hour.

## 2023-02-27 NOTE — H&P ADULT - HISTORY OF PRESENT ILLNESS
IN PROGRESS    89yo M w/ mhx of prostate cancer s/p prostatectomy 1991 and XRT, on Lupron, also history of HTN, HLD, presenting with pain in lower abdomen and genitals since approximately 9 PM tonight.  Of note patient had recently presented to this hospital with acute urinary retention status post placement of 16 [place] Garrett with hematuria admitted to hospital and required cystoscopy dilatation due to bulbar stricture and continuous bladder irrigation. Ultimately discharged 2/23, and had been doing well at home until tonight.  Family notes that there has been blood and clots in bladder preceding pain.  Denies any trauma.  No fevers no vomiting. 89yo M w/ pmhx Prostate CA s/p prostatectomy (1991) and XRT on Lupron, also HTN, HLD presented to ED with worsening pelvic and genital area pain that started over the weekend and acutely worsened the night prior to presentation. He describes the pain as sharp, 10/10 in severity starting in the "bladder" and radiating to his genital area; he called EMS and was brought into the ED. He reported feeling like a clot passed and afterwards felt better.  Denies fever/chills, nausea/vomiting, constipation, abnormal bleeding, SOB, chest pain.    Of note, the patient was recently admitted for hematuria and acute urinary retention; was found to have bulbar stricture. Urology performed cytoscopic dilation and placed a catheter for CBI. Once the urine clear the patient was discharged home with indwelling chinchilla and instructed to follow up in 1 week with Urology. Since then the patient reported having worsening pelvic/genital pain which ultimately lead his to this ED presentation. He reported having pink to red urine and some leakage around the chinchilla.       ED COURSE  In th ED the patient was afebrile, hemodynamically stable. Urology was consulted who upon examination irrigated the chinchilla and found 20cc clot on aspiration. He received 1L LR. U/A was remarkable for RBC, blood, protein and LE.

## 2023-02-27 NOTE — ED CLERICAL - NSCLERICAL TASK_GEN_ALL_ED
Pre-Hospital Care Report (PCR) Pt will ambulate using RW or least restrictive AD for 100 ft with CGA by discharge to facilitate return to PLOF.

## 2023-02-28 ENCOUNTER — TRANSCRIPTION ENCOUNTER (OUTPATIENT)
Age: 88
End: 2023-02-28

## 2023-02-28 ENCOUNTER — APPOINTMENT (OUTPATIENT)
Dept: UROLOGY | Facility: CLINIC | Age: 88
End: 2023-02-28

## 2023-02-28 LAB
ALBUMIN SERPL ELPH-MCNC: 3.4 G/DL — SIGNIFICANT CHANGE UP (ref 3.3–5)
ALP SERPL-CCNC: 39 U/L — LOW (ref 40–120)
ALT FLD-CCNC: 18 U/L — SIGNIFICANT CHANGE UP (ref 10–45)
ANION GAP SERPL CALC-SCNC: 10 MMOL/L — SIGNIFICANT CHANGE UP (ref 5–17)
ANION GAP SERPL CALC-SCNC: 11 MMOL/L — SIGNIFICANT CHANGE UP (ref 5–17)
ANION GAP SERPL CALC-SCNC: 9 MMOL/L — SIGNIFICANT CHANGE UP (ref 5–17)
AST SERPL-CCNC: 42 U/L — HIGH (ref 10–40)
BILIRUB SERPL-MCNC: 0.3 MG/DL — SIGNIFICANT CHANGE UP (ref 0.2–1.2)
BUN SERPL-MCNC: 12 MG/DL — SIGNIFICANT CHANGE UP (ref 7–23)
BUN SERPL-MCNC: 12 MG/DL — SIGNIFICANT CHANGE UP (ref 7–23)
BUN SERPL-MCNC: 13 MG/DL — SIGNIFICANT CHANGE UP (ref 7–23)
CALCIUM SERPL-MCNC: 9 MG/DL — SIGNIFICANT CHANGE UP (ref 8.4–10.5)
CALCIUM SERPL-MCNC: 9 MG/DL — SIGNIFICANT CHANGE UP (ref 8.4–10.5)
CALCIUM SERPL-MCNC: 9.1 MG/DL — SIGNIFICANT CHANGE UP (ref 8.4–10.5)
CHLORIDE SERPL-SCNC: 102 MMOL/L — SIGNIFICANT CHANGE UP (ref 96–108)
CHLORIDE SERPL-SCNC: 103 MMOL/L — SIGNIFICANT CHANGE UP (ref 96–108)
CHLORIDE SERPL-SCNC: 104 MMOL/L — SIGNIFICANT CHANGE UP (ref 96–108)
CO2 SERPL-SCNC: 24 MMOL/L — SIGNIFICANT CHANGE UP (ref 22–31)
CO2 SERPL-SCNC: 25 MMOL/L — SIGNIFICANT CHANGE UP (ref 22–31)
CO2 SERPL-SCNC: 26 MMOL/L — SIGNIFICANT CHANGE UP (ref 22–31)
CREAT SERPL-MCNC: 1.04 MG/DL — SIGNIFICANT CHANGE UP (ref 0.5–1.3)
CREAT SERPL-MCNC: 1.09 MG/DL — SIGNIFICANT CHANGE UP (ref 0.5–1.3)
CREAT SERPL-MCNC: 1.22 MG/DL — SIGNIFICANT CHANGE UP (ref 0.5–1.3)
EGFR: 57 ML/MIN/1.73M2 — LOW
EGFR: 65 ML/MIN/1.73M2 — SIGNIFICANT CHANGE UP
EGFR: 69 ML/MIN/1.73M2 — SIGNIFICANT CHANGE UP
GLUCOSE SERPL-MCNC: 101 MG/DL — HIGH (ref 70–99)
GLUCOSE SERPL-MCNC: 109 MG/DL — HIGH (ref 70–99)
GLUCOSE SERPL-MCNC: 99 MG/DL — SIGNIFICANT CHANGE UP (ref 70–99)
HCT VFR BLD CALC: 26.9 % — LOW (ref 39–50)
HGB BLD-MCNC: 8.8 G/DL — LOW (ref 13–17)
MAGNESIUM SERPL-MCNC: 2.1 MG/DL — SIGNIFICANT CHANGE UP (ref 1.6–2.6)
MAGNESIUM SERPL-MCNC: 2.2 MG/DL — SIGNIFICANT CHANGE UP (ref 1.6–2.6)
MAGNESIUM SERPL-MCNC: 2.2 MG/DL — SIGNIFICANT CHANGE UP (ref 1.6–2.6)
MCHC RBC-ENTMCNC: 32.6 PG — SIGNIFICANT CHANGE UP (ref 27–34)
MCHC RBC-ENTMCNC: 32.7 GM/DL — SIGNIFICANT CHANGE UP (ref 32–36)
MCV RBC AUTO: 99.6 FL — SIGNIFICANT CHANGE UP (ref 80–100)
NRBC # BLD: 0 /100 WBCS — SIGNIFICANT CHANGE UP (ref 0–0)
PHOSPHATE SERPL-MCNC: 3.6 MG/DL — SIGNIFICANT CHANGE UP (ref 2.5–4.5)
PHOSPHATE SERPL-MCNC: 3.7 MG/DL — SIGNIFICANT CHANGE UP (ref 2.5–4.5)
PHOSPHATE SERPL-MCNC: 4 MG/DL — SIGNIFICANT CHANGE UP (ref 2.5–4.5)
PLATELET # BLD AUTO: 198 K/UL — SIGNIFICANT CHANGE UP (ref 150–400)
POTASSIUM SERPL-MCNC: 4.3 MMOL/L — SIGNIFICANT CHANGE UP (ref 3.5–5.3)
POTASSIUM SERPL-MCNC: 4.4 MMOL/L — SIGNIFICANT CHANGE UP (ref 3.5–5.3)
POTASSIUM SERPL-MCNC: 4.5 MMOL/L — SIGNIFICANT CHANGE UP (ref 3.5–5.3)
POTASSIUM SERPL-SCNC: 4.3 MMOL/L — SIGNIFICANT CHANGE UP (ref 3.5–5.3)
POTASSIUM SERPL-SCNC: 4.4 MMOL/L — SIGNIFICANT CHANGE UP (ref 3.5–5.3)
POTASSIUM SERPL-SCNC: 4.5 MMOL/L — SIGNIFICANT CHANGE UP (ref 3.5–5.3)
PROT SERPL-MCNC: 6.4 G/DL — SIGNIFICANT CHANGE UP (ref 6–8.3)
RBC # BLD: 2.7 M/UL — LOW (ref 4.2–5.8)
RBC # FLD: 12.3 % — SIGNIFICANT CHANGE UP (ref 10.3–14.5)
SODIUM SERPL-SCNC: 137 MMOL/L — SIGNIFICANT CHANGE UP (ref 135–145)
SODIUM SERPL-SCNC: 138 MMOL/L — SIGNIFICANT CHANGE UP (ref 135–145)
SODIUM SERPL-SCNC: 139 MMOL/L — SIGNIFICANT CHANGE UP (ref 135–145)
WBC # BLD: 4.13 K/UL — SIGNIFICANT CHANGE UP (ref 3.8–10.5)
WBC # FLD AUTO: 4.13 K/UL — SIGNIFICANT CHANGE UP (ref 3.8–10.5)

## 2023-02-28 PROCEDURE — 99233 SBSQ HOSP IP/OBS HIGH 50: CPT | Mod: GC

## 2023-02-28 PROCEDURE — 51700 IRRIGATION OF BLADDER: CPT

## 2023-02-28 PROCEDURE — 99233 SBSQ HOSP IP/OBS HIGH 50: CPT | Mod: 25

## 2023-02-28 RX ORDER — POLYETHYLENE GLYCOL 3350 17 G/17G
17 POWDER, FOR SOLUTION ORAL DAILY
Refills: 0 | Status: DISCONTINUED | OUTPATIENT
Start: 2023-02-28 | End: 2023-03-01

## 2023-02-28 RX ORDER — METOPROLOL TARTRATE 50 MG
25 TABLET ORAL DAILY
Refills: 0 | Status: DISCONTINUED | OUTPATIENT
Start: 2023-02-28 | End: 2023-03-04

## 2023-02-28 RX ORDER — MORPHINE SULFATE 50 MG/1
2 CAPSULE, EXTENDED RELEASE ORAL EVERY 8 HOURS
Refills: 0 | Status: DISCONTINUED | OUTPATIENT
Start: 2023-02-28 | End: 2023-02-28

## 2023-02-28 RX ADMIN — MORPHINE SULFATE 2 MILLIGRAM(S): 50 CAPSULE, EXTENDED RELEASE ORAL at 10:38

## 2023-02-28 RX ADMIN — MORPHINE SULFATE 2 MILLIGRAM(S): 50 CAPSULE, EXTENDED RELEASE ORAL at 22:10

## 2023-02-28 RX ADMIN — POLYETHYLENE GLYCOL 3350 17 GRAM(S): 17 POWDER, FOR SOLUTION ORAL at 11:24

## 2023-02-28 RX ADMIN — Medication 650 MILLIGRAM(S): at 08:47

## 2023-02-28 RX ADMIN — Medication 25 MILLIGRAM(S): at 16:31

## 2023-02-28 RX ADMIN — Medication 650 MILLIGRAM(S): at 00:08

## 2023-02-28 RX ADMIN — MORPHINE SULFATE 2 MILLIGRAM(S): 50 CAPSULE, EXTENDED RELEASE ORAL at 23:10

## 2023-02-28 RX ADMIN — Medication 650 MILLIGRAM(S): at 09:17

## 2023-02-28 RX ADMIN — Medication 650 MILLIGRAM(S): at 01:08

## 2023-02-28 RX ADMIN — MORPHINE SULFATE 2 MILLIGRAM(S): 50 CAPSULE, EXTENDED RELEASE ORAL at 10:08

## 2023-02-28 RX ADMIN — SERTRALINE 25 MILLIGRAM(S): 25 TABLET, FILM COATED ORAL at 11:22

## 2023-02-28 NOTE — CHART NOTE - NSCHARTNOTEFT_GEN_A_CORE
Called to see patient for catheter with hematuria and not draining, patient in pain.  Irrigated with NS and sterile water, about 40cc of clot, now a light punch color.    Will watch for now, if does not improve may need CBI.  Patient also experiencing bladder spasms.  Can try Valium 2mg PO.

## 2023-02-28 NOTE — DISCHARGE NOTE PROVIDER - HOSPITAL COURSE
HPI:  89yo M w/ pmhx Prostate CA s/p prostatectomy (1991) and XRT on Lupron, also HTN, HLD presented to ED with worsening pelvic and genital area pain that started over the weekend and acutely worsened the night prior to presentation. He describes the pain as sharp, 10/10 in severity starting in the "bladder" and radiating to his genital area; he called EMS and was brought into the ED. He reported feeling like a clot passed and afterwards felt better.  Denies fever/chills, nausea/vomiting, constipation, abnormal bleeding, SOB, chest pain.    Of note, the patient was recently admitted for hematuria and acute urinary retention; was found to have bulbar stricture. Urology performed cytoscopic dilation and placed a catheter for CBI. Once the urine clear the patient was discharged home with indwelling chinchilla and instructed to follow up in 1 week with Urology. Since then the patient reported having worsening pelvic/genital pain which ultimately lead his to this ED presentation. He reported having pink to red urine and some leakage around the chinchilla.       ED COURSE  In th ED the patient was afebrile, hemodynamically stable. Urology was consulted who upon examination irrigated the chinchilla and found 20cc clot on aspiration. He received 1L LR. U/A was remarkable for RBC, blood, protein and LE.    (27 Feb 2023 09:32)    Hospital Course  During admission patient had no further episodes of pelvic/genital pain or hematuria. He had urine output of >3L overnight. Electrolytes were monitored q6h without abnormalities. The patient remained hemodynamically stable and was discharged home. He will need to follow up w/ Urology in 1-2 weeks.    HPI:  88yo M w/ pmhx Prostate CA s/p prostatectomy (1991) and XRT on Lupron, also HTN, HLD presented to ED with worsening pelvic and genital area pain that started over the weekend and acutely worsened the night prior to presentation. He describes the pain as sharp, 10/10 in severity starting in the "bladder" and radiating to his genital area; he called EMS and was brought into the ED. He reported feeling like a clot passed and afterwards felt better.  Denies fever/chills, nausea/vomiting, constipation, abnormal bleeding, SOB, chest pain.    Of note, the patient was recently admitted for hematuria and acute urinary retention; was found to have bulbar stricture. Urology performed cytoscopic dilation and placed a catheter for CBI. Once the urine clear the patient was discharged home with indwelling chinchilla and instructed to follow up in 1 week with Urology. Since then the patient reported having worsening pelvic/genital pain which ultimately lead his to this ED presentation. He reported having pink to red urine and some leakage around the chinchilla.       ED COURSE  In th ED the patient was afebrile, hemodynamically stable. Urology was consulted who upon examination irrigated the chinchilla and found 20cc clot on aspiration. He received 1L LR. U/A was remarkable for RBC, blood, protein and LE.    (27 Feb 2023 09:32)    Hospital Course  During admission patient had no further episodes of pelvic/genital pain or hematuria. He had urine output of >3L overnight. Electrolytes were monitored q6h without abnormalities. He experienced one episode of severe pain associated with sydney hematuria. Urology reevaluated him and were able to aspirate a 40cc blood clot. Afterwards the patient was comfortable and urine improved. The patient remained hemodynamically stable and was discharged home. He will need to follow up w/ Urology in 1-2 weeks.    HPI:  88yo M w/ pmhx Prostate CA s/p prostatectomy (1991) and XRT on Lupron, also HTN, HLD presented to ED with worsening pelvic and genital area pain that started over the weekend and acutely worsened the night prior to presentation. He describes the pain as sharp, 10/10 in severity starting in the "bladder" and radiating to his genital area; he called EMS and was brought into the ED. He reported feeling like a clot passed and afterwards felt better.  Denies fever/chills, nausea/vomiting, constipation, abnormal bleeding, SOB, chest pain.    Of note, the patient was recently admitted for hematuria and acute urinary retention; was found to have bulbar stricture. Urology performed cytoscopic dilation and placed a catheter for CBI. Once the urine clear the patient was discharged home with indwelling chinchilla and instructed to follow up in 1 week with Urology. Since then the patient reported having worsening pelvic/genital pain which ultimately lead his to this ED presentation. He reported having pink to red urine and some leakage around the chinchilla.       ED COURSE  In th ED the patient was afebrile, hemodynamically stable. Urology was consulted who upon examination irrigated the chinchilla and found 20cc clot on aspiration. He received 1L LR. U/A was remarkable for RBC, blood, protein and LE.    (27 Feb 2023 09:32)    Hospital Course  During admission patient had no further episodes of pelvic/genital pain or hematuria. He had urine output of >3L overnight. Electrolytes were monitored q6h without abnormalities. He experienced one episode of severe pain associated with sydney hematuria. Urology reevaluated him and were able to aspirate a 40cc blood clot. Underwent cysto clot evacuation 3/1/23. He was placed on ALUM cbi for 24 hours and urine remained clear thereafter. He was dcd home in stable condition with chinchilla catheter    HPI:  88yo M w/ pmhx Prostate CA s/p prostatectomy (1991) and XRT on Lupron, also HTN, HLD presented to ED with worsening pelvic and genital area pain that started over the weekend and acutely worsened the night prior to presentation. He describes the pain as sharp, 10/10 in severity starting in the "bladder" and radiating to his genital area; he called EMS and was brought into the ED. He reported feeling like a clot passed and afterwards felt better.  Denies fever/chills, nausea/vomiting, constipation, abnormal bleeding, SOB, chest pain.    Of note, the patient was recently admitted for hematuria and acute urinary retention; was found to have bulbar stricture. Urology performed cytoscopic dilation and placed a catheter for CBI. Once the urine clear the patient was discharged home with indwelling chinchilla and instructed to follow up in 1 week with Urology. Since then the patient reported having worsening pelvic/genital pain which ultimately lead his to this ED presentation. He reported having pink to red urine and some leakage around the chinchilla.       ED COURSE  In th ED the patient was afebrile, hemodynamically stable. Urology was consulted who upon examination irrigated the chinchilla and found 20cc clot on aspiration. He received 1L LR. U/A was remarkable for RBC, blood, protein and LE.    (27 Feb 2023 09:32)    Hospital Course  During admission patient had no further episodes of pelvic/genital pain or hematuria. He had urine output of >3L overnight. Electrolytes were monitored q6h without abnormalities. He experienced one episode of severe pain associated with sydney hematuria. Urology reevaluated him and were able to aspirate a 40cc blood clot. Underwent cysto clot evacuation 3/1/23. He was placed on ALUM cbi for 24 hours and urine remained clear thereafter. Chinchilla was removed for TOV, pt passed with acceptable pvr of 17cc. urine remained yellow. on 3/3 orthostatic and received 1U pRBCs with appropriate response. Pt did well post op. At the time of discharge, the patient was hemodynamically stable, was tolerating PO diet, was voiding urine and passing stool, was ambulating, and was comfortable with adequate pain control. Pt/family given discharge plan/instructions and agrees with plan.

## 2023-02-28 NOTE — PROGRESS NOTE ADULT - PROBLEM SELECTOR PLAN 5
DVT ppx: SCDs  Diet: regular DASH  Dispo: pending hospital course DVT ppx: SCDs  Diet: regular DASH  Dispo: pending hospital course    Daughter updated via phone 2/28.

## 2023-02-28 NOTE — PROGRESS NOTE ADULT - SUBJECTIVE AND OBJECTIVE BOX
UROLOGY PA PROGRESS NOTE:     Subjective:  Pt admitted for post obstructive diuresis. Urine bloody once again after being clear for a day.    Objective:  Afeb, VSS    Physical Exam:  Gen: NAD  Abd: soft, NT/ND  : +chinchilla draining dark bloody urine    Labs:  02-27  x     / x     /0.99   02-26  6.13  / 28.5  /1.09                           9.4    6.13  )-----------( 187      ( 26 Feb 2023 23:41 )             28.5     02-27    137  |  103  |  14  ----------------------------<  102<H>  4.4   |  22  |  0.99    Ca    8.9      27 Feb 2023 05:10    TPro  6.2  /  Alb  3.3  /  TBili  0.3  /  DBili  x   /  AST  48<H>  /  ALT  18  /  AlkPhos  35<L>  02-27    PT/INR - ( 26 Feb 2023 23:41 )   PT: 12.5 sec;   INR: 1.09 ratio         PTT - ( 26 Feb 2023 23:41 )  PTT:31.2 sec  Urinalysis Basic - ( 27 Feb 2023 01:12 )

## 2023-02-28 NOTE — DISCHARGE NOTE PROVIDER - NSDCFUSCHEDAPPT_GEN_ALL_CORE_FT
[facility]  UROLOGY [address]  Scheduled Appointment: 02/28/2023    Chloe Chase  [facility]  UROLOGY [address]  Scheduled Appointment: 02/28/2023     Chloe Chase Physician [facility]  UROLOGY [address]  Scheduled Appointment: 03/29/2023

## 2023-02-28 NOTE — DISCHARGE NOTE PROVIDER - NSDCCPCAREPLAN_GEN_ALL_CORE_FT
PRINCIPAL DISCHARGE DIAGNOSIS  Diagnosis: Hematuria  Assessment and Plan of Treatment: You were readmitted after recently being in the hospital due to severe pain and bloody urine. Urology reevaluated you and determined you have some retained clots that were causing your symptoms. The recommended you follow up in their office.   If you experience pain or hematuria again please return to the ED. It is expected to experienced mild blood in the urine (pink appearance and transparent) and small clots, if this worsens call your urologist or return to the ED.       PRINCIPAL DISCHARGE DIAGNOSIS  Diagnosis: Hematuria  Assessment and Plan of Treatment: You required a clot evacuation and Alum continuous bladder irrigations   Call the office if you experience fever, chills, uncontrolled pain, the inability to tolerate liquids, or the urine does not drain  You will be going home with chinchilla catheter. Please care for it as you have been instructed.   do not take a bath while chinchilla is in place , continue to walk frequently, return to daily living activities slowly, no heavy lifting greater than 10lbs for 4-6 weeks, follow up Dr Hoenig in one week       PRINCIPAL DISCHARGE DIAGNOSIS  Diagnosis: Hematuria  Assessment and Plan of Treatment: You required a clot evacuation and Alum continuous bladder irrigations   Call the office if you experience fever, chills, uncontrolled pain, the inability to tolerate liquids, inability to urinate or urine becomes bloody or with clots.  continue to walk frequently, return to daily living activities slowly, no heavy lifting greater than 10lbs for 4-6 weeks, follow up Dr Hoenig in one week

## 2023-02-28 NOTE — DISCHARGE NOTE PROVIDER - NSDCFUADDAPPT_GEN_ALL_CORE_FT
APPTS ARE READY TO BE MADE: [X] YES    Best Family or Patient Contact (if needed): Jessee Toth (Daughter) [phone]  A  dditional Information about above appointments (if needed):    1: Needs new urology appointment with Dr. Chase 1-2 weeks  2:   3:     Other comments or requests:    APPTS ARE READY TO BE MADE: [X] YES    Best Family or Patient Contact (if needed): Jessee Toth (Daughter) [phone]    Additional Information about above appointments (if needed):    1: Needs new urology appointment with Dr. Chase 1-2 weeks  2:   3:     Other comments or requests:    APPTS ARE READY TO BE MADE: [X] YES    Best Family or Patient Contact (if needed): Jessee Toth (Daughter) [phone]    Additional Information about above appointments (if needed):    1: Needs new urology appointment with Dr. Chase 1-2 weeks  2:   3:     Other comments or requests:   Patient was previously scheduled with Dr. Chloe Chase on 3/29/23 at 8:50AM at [address] [place] [address].

## 2023-02-28 NOTE — CHART NOTE - NSCHARTNOTEFT_GEN_A_CORE
Patient seen and evaluated at bedside. Urine cherry red.  CBI set up at bedside, fast drip, urine peach.    Plan for patient to go to OR tomorrow for cystoscopy, clot evac, fulguration.    -please keep patient npo p mn  -please ensure active type & screen  -trend h/h  -will obtain consent in AM  -continue cbi

## 2023-02-28 NOTE — PROGRESS NOTE ADULT - SUBJECTIVE AND OBJECTIVE BOX
PROGRESS NOTE:   Authored by Dr. Medardo La / Internal Medicine Resident    Patient is a 89y old  Male who presents with a chief complaint of Hematuria and urinary retention (2023 09:32)      SUBJECTIVE / OVERNIGHT EVENTS:    ADDITIONAL REVIEW OF SYSTEMS:    MEDICATIONS  (STANDING):  sertraline 25 milliGRAM(s) Oral daily    MEDICATIONS  (PRN):  acetaminophen     Tablet .. 650 milliGRAM(s) Oral every 6 hours PRN Temp greater or equal to 38C (100.4F), Mild Pain (1 - 3), Moderate Pain (4 - 6)      CAPILLARY BLOOD GLUCOSE        I&O's Summary    2023 07:01  -  2023 07:00  --------------------------------------------------------  IN: 540 mL / OUT: 3900 mL / NET: -3360 mL        PHYSICAL EXAM:  Vital Signs Last 24 Hrs  T(C): 36.8 (2023 05:31), Max: 37 (2023 18:09)  T(F): 98.2 (2023 05:31), Max: 98.6 (2023 18:09)  HR: 71 (2023 05:31) (69 - 81)  BP: 132/60 (2023 05:31) (119/62 - 138/52)  BP(mean): 68 (2023 07:30) (68 - 68)  RR: 18 (2023 05:31) (17 - 18)  SpO2: 96% (2023 05:31) (95% - 96%)    Parameters below as of 2023 05:31  Patient On (Oxygen Delivery Method): room air        GENERAL: NAD, lying comfortably in bed  HEAD: Atraumatic, normocephalic  EYES: EOMI b/l PERRLA b/l, conjunctiva and sclera clear  NECK: Supple, No JVD, No LAD  RESPIRATORY: Normal respiratory effort; lungs are clear to auscultation bilaterally  CARDIOVASCULAR: Regular rate and rhythm, normal S1 and S2, no murmur/rub/gallop; No lower extremity edema  ABDOMEN: Nontender, normoactive bowel sounds, no rebound/guarding; No hepatosplenomegaly  MUSCULOSKELETAL: no clubbing or cyanosis of digits; no joint swelling or tenderness to palpation  NEURO: Non focal   PSYCH: A+O to person, place, and time; affect appropriate    LABS:                          9.4    6.13  )-----------( 187      ( 2023 23:41 )             28.5         139  |  104  |  12  ----------------------------<  101<H>  4.4   |  24  |  1.04    Ca    9.0      2023 00:20  Phos  3.7       Mg     2.2         TPro  6.5  /  Alb  3.5  /  TBili  0.2  /  DBili  x   /  AST  44<H>  /  ALT  16  /  AlkPhos  36<L>      PT/INR - ( 2023 23:41 )   PT: 12.5 sec;   INR: 1.09 ratio         PTT - ( 2023 23:41 )  PTT:31.2 sec      Urinalysis Basic - ( 2023 01:12 )    Color: Colorless / Appearance: Clear / S.005 / pH: x  Gluc: x / Ketone: Negative  / Bili: Negative / Urobili: Negative   Blood: x / Protein: 30 mg/dL / Nitrite: Negative   Leuk Esterase: Small / RBC: 41 /hpf / WBC 3 /HPF   Sq Epi: x / Non Sq Epi: 0 /hpf / Bacteria: Negative        Culture - Urine (collected 2023 01:12)  Source: Catheterized Catheterized  Final Report (2023 22:53):    No growth       PROGRESS NOTE:   Authored by Dr. Medardo La / Internal Medicine Resident    Patient is a 89y old  Male who presents with a chief complaint of Hematuria and urinary retention (2023 09:32)      SUBJECTIVE / OVERNIGHT EVENTS: No overnight events. In the morning prior to rounds patient experienced severe pelvic pain that self resolved. Examined at bedside, chinchilla bag full of blood. Urology called.    ADDITIONAL REVIEW OF SYSTEMS:  Constitutional: No fever, No weight loss, good appetite/po intake  Head: No headache   Eyes: No blurry vision, No diplopia  Neuro: No tremors, No muscle weakness   Cardiovascular: No chest pain, No palpitations  Respiratory: No SOB, No cough  GI: No nausea, No vomiting, No diarrhea  : heamturia, severe pelvic pain  Skin: No rash  MSK: No joint pain   Psych: No depression  Heme: No abnormal bruising, no abnormal bleeding      MEDICATIONS  (STANDING):  sertraline 25 milliGRAM(s) Oral daily    MEDICATIONS  (PRN):  acetaminophen     Tablet .. 650 milliGRAM(s) Oral every 6 hours PRN Temp greater or equal to 38C (100.4F), Mild Pain (1 - 3), Moderate Pain (4 - 6)      CAPILLARY BLOOD GLUCOSE        I&O's Summary    2023 07:01  -  2023 07:00  --------------------------------------------------------  IN: 540 mL / OUT: 3900 mL / NET: -3360 mL        PHYSICAL EXAM:  Vital Signs Last 24 Hrs  T(C): 36.8 (2023 05:31), Max: 37 (2023 18:09)  T(F): 98.2 (2023 05:31), Max: 98.6 (2023 18:09)  HR: 71 (2023 05:31) (69 - 81)  BP: 132/60 (2023 05:31) (119/62 - 138/52)  BP(mean): 68 (2023 07:30) (68 - 68)  RR: 18 (2023 05:31) (17 - 18)  SpO2: 96% (2023 05:31) (95% - 96%)    Parameters below as of 2023 05:31  Patient On (Oxygen Delivery Method): room air        GENERAL: NAD, lying comfortably in bed  HEAD: Atraumatic, normocephalic  EYES: EOMI b/l PERRLA b/l, conjunctiva and sclera clear  NECK: Supple, No JVD, No LAD  RESPIRATORY: Normal respiratory effort; lungs are clear to auscultation bilaterally  CARDIOVASCULAR: Regular rate and rhythm, normal S1 and S2, no murmur/rub/gallop; No lower extremity edema  ABDOMEN: Nontender, normoactive bowel sounds, no rebound/guarding; No hepatosplenomegaly  MUSCULOSKELETAL: no clubbing or cyanosis of digits; no joint swelling or tenderness to palpation  NEURO: Non focal   PSYCH: A+O to person, place, and time; affect appropriate  : blood seen around urinary meatus, blood un chinchilla bag and tube    LABS:                          9.4    6.13  )-----------( 187      ( 2023 23:41 )             28.5     -    139  |  104  |  12  ----------------------------<  101<H>  4.4   |  24  |  1.04    Ca    9.0      2023 00:20  Phos  3.7       Mg     2.2         TPro  6.5  /  Alb  3.5  /  TBili  0.2  /  DBili  x   /  AST  44<H>  /  ALT  16  /  AlkPhos  36<L>  -27    PT/INR - ( 2023 23:41 )   PT: 12.5 sec;   INR: 1.09 ratio         PTT - ( 2023 23:41 )  PTT:31.2 sec      Urinalysis Basic - ( 2023 01:12 )    Color: Colorless / Appearance: Clear / S.005 / pH: x  Gluc: x / Ketone: Negative  / Bili: Negative / Urobili: Negative   Blood: x / Protein: 30 mg/dL / Nitrite: Negative   Leuk Esterase: Small / RBC: 41 /hpf / WBC 3 /HPF   Sq Epi: x / Non Sq Epi: 0 /hpf / Bacteria: Negative        Culture - Urine (collected 2023 01:12)  Source: Catheterized Catheterized  Final Report (2023 22:53):    No growth

## 2023-02-28 NOTE — PROGRESS NOTE ADULT - PROBLEM SELECTOR PLAN 1
pt presented w/ after severe pelvic & genital pain, and acute urinary retention that resolved after passing clot  - Urology consulted in ED; irrigated chinchilla and aspirated 20cc clot  - now w/ clear urine; small clots seen in chinchilla tube  - pt now comfortable; now w/ post obstructives diuresis w/ UOP ~1.5L in ~4hrs  - monitor electrolytes q6h  - Strict I/Os: -3.3L  - f/u Uro recs pt presented w/ after severe pelvic & genital pain, and acute urinary retention that resolved after passing clot  - Urology consulted in ED; irrigated chinchilla and aspirated 20cc clot  - now w/ clear urine; small clots seen in chinchilla tube  - pt now comfortable; now w/ post obstructives diuresis w/ UOP ~1.5L in ~4hrs  - monitor electrolytes q6h  - Strict I/Os: -3.3L  - f/u Uro recs  - this morning had episode of severe pain and hematuria > urology evaluated and aspirated 40cc clot; urine improved afterwards.

## 2023-02-28 NOTE — DISCHARGE NOTE PROVIDER - CARE PROVIDERS DIRECT ADDRESSES
[email or web address]

## 2023-02-28 NOTE — DISCHARGE NOTE PROVIDER - NSDCMRMEDTOKEN_GEN_ALL_CORE_FT
acetaminophen 325 mg oral tablet: 2 tab(s) orally every 6 hours, As needed, For Temp over 37.9 C (100.2 F)  docusate sodium 100 mg oral capsule: 1 cap(s) orally 3 times a day  Erleada 60 mg oral tablet: 4 tab(s) orally once a day  Flax Seed Oil oral capsule:  orally   lovastatin 40 mg oral tablet: 1 tab(s) orally once a day  Lupron:   metoprolol succinate 25 mg oral tablet, extended release: 1 tab(s) orally once a day  MiraLax oral powder for reconstitution: 17 gram(s) orally once a day   Multiple Vitamins oral tablet: 1 tab(s) orally once a day  quinapril 40 mg oral tablet: 1 tab(s) orally once a day  senna 15 mg oral tablet: 2 tab(s) orally 2 times a day, As Needed   sertraline 25 mg oral tablet: 1 tab(s) orally once a day   acetaminophen 325 mg oral tablet: 2 tab(s) orally every 6 hours, As needed, For Temp over 37.9 C (100.2 F)  docusate sodium 100 mg oral capsule: 1 cap(s) orally 3 times a day  Erleada 60 mg oral tablet: 4 tab(s) orally once a day  Flax Seed Oil oral capsule:  orally   lovastatin 40 mg oral tablet: 1 tab(s) orally once a day  Lupron:   metoprolol succinate 25 mg oral tablet, extended release: 1 tab(s) orally once a day  MiraLax oral powder for reconstitution: 17 gram(s) orally once a day   Multiple Vitamins oral tablet: 1 tab(s) orally once a day  quinapril 40 mg oral tablet: 1 tab(s) orally once a day  senna 15 mg oral tablet: 2 tab(s) orally 2 times a day, As Needed   senna leaf extract oral tablet: 2 tab(s) orally once a day (at bedtime)  sertraline 25 mg oral tablet: 1 tab(s) orally once a day   acetaminophen 325 mg oral tablet: 2 tab(s) orally every 6 hours, As needed, For Temp over 37.9 C (100.2 F)  docusate sodium 100 mg oral capsule: 1 cap(s) orally 3 times a day  Erleada 60 mg oral tablet: 4 tab(s) orally once a day  Flax Seed Oil oral capsule:  orally   Lupron:   metoprolol succinate 25 mg oral tablet, extended release: 1 tab(s) orally once a day  Multiple Vitamins oral tablet: 1 tab(s) orally once a day  quinapril 40 mg oral tablet: 1 tab(s) orally once a day  senna leaf extract oral tablet: 2 tab(s) orally once a day (at bedtime)  sertraline 25 mg oral tablet: 1 tab(s) orally once a day

## 2023-02-28 NOTE — DISCHARGE NOTE PROVIDER - CARE PROVIDER_API CALL
Hoenig, David M (MD)  Urology  [facility]- Dept. of Urology, [address] [place] [address]  Phone: [phone]  Fax: [phone]  Follow Up Time:

## 2023-02-28 NOTE — DISCHARGE NOTE PROVIDER - NSDCCPTREATMENT_GEN_ALL_CORE_FT
PRINCIPAL PROCEDURE  Procedure: Cystoscopy, with bladder fulguration, with clot evacuation if indicated  Findings and Treatment: for management of radiation cystitis. As well as 24 hour intravesicular instillation of ALUM.

## 2023-02-28 NOTE — PROGRESS NOTE ADULT - ASSESSMENT
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 Urine initially cleared, but has become bloody once again.    Required manual irrigation and evacuation of clots today    Urine still bloody  Start CBI  Irrigate prn for clots  Plan for OR tomorrow for cystoscopy clot evacuation if urine color does not improve  NPO after midnight

## 2023-02-28 NOTE — DISCHARGE NOTE PROVIDER - NSDCACTIVITY_GEN_ALL_CORE
No restrictions Return to Work/School allowed/Showering allowed/Stairs allowed/Walking - Indoors allowed/No heavy lifting/straining/Walking - Outdoors allowed

## 2023-02-28 NOTE — PROGRESS NOTE ADULT - PROBLEM SELECTOR PLAN 4
- s/p radical prostectomy in 1991  - on Erleada 240mg QD and Lepron  - called daughter to confirm meds; not able to reach her - s/p radical prostectomy in 1991  - on Erleada 240mg QD and Lepron

## 2023-03-01 LAB
ANION GAP SERPL CALC-SCNC: 13 MMOL/L — SIGNIFICANT CHANGE UP (ref 5–17)
ANION GAP SERPL CALC-SCNC: 9 MMOL/L — SIGNIFICANT CHANGE UP (ref 5–17)
BLD GP AB SCN SERPL QL: NEGATIVE — SIGNIFICANT CHANGE UP
BUN SERPL-MCNC: 14 MG/DL — SIGNIFICANT CHANGE UP (ref 7–23)
BUN SERPL-MCNC: 15 MG/DL — SIGNIFICANT CHANGE UP (ref 7–23)
CALCIUM SERPL-MCNC: 9.2 MG/DL — SIGNIFICANT CHANGE UP (ref 8.4–10.5)
CALCIUM SERPL-MCNC: 9.5 MG/DL — SIGNIFICANT CHANGE UP (ref 8.4–10.5)
CHLORIDE SERPL-SCNC: 99 MMOL/L — SIGNIFICANT CHANGE UP (ref 96–108)
CHLORIDE SERPL-SCNC: 99 MMOL/L — SIGNIFICANT CHANGE UP (ref 96–108)
CO2 SERPL-SCNC: 22 MMOL/L — SIGNIFICANT CHANGE UP (ref 22–31)
CO2 SERPL-SCNC: 28 MMOL/L — SIGNIFICANT CHANGE UP (ref 22–31)
CREAT SERPL-MCNC: 1.13 MG/DL — SIGNIFICANT CHANGE UP (ref 0.5–1.3)
CREAT SERPL-MCNC: 1.2 MG/DL — SIGNIFICANT CHANGE UP (ref 0.5–1.3)
EGFR: 58 ML/MIN/1.73M2 — LOW
EGFR: 62 ML/MIN/1.73M2 — SIGNIFICANT CHANGE UP
GLUCOSE SERPL-MCNC: 115 MG/DL — HIGH (ref 70–99)
GLUCOSE SERPL-MCNC: 98 MG/DL — SIGNIFICANT CHANGE UP (ref 70–99)
HCT VFR BLD CALC: 27 % — LOW (ref 39–50)
HGB BLD-MCNC: 9.3 G/DL — LOW (ref 13–17)
MAGNESIUM SERPL-MCNC: 2.2 MG/DL — SIGNIFICANT CHANGE UP (ref 1.6–2.6)
MAGNESIUM SERPL-MCNC: 2.2 MG/DL — SIGNIFICANT CHANGE UP (ref 1.6–2.6)
MCHC RBC-ENTMCNC: 33.1 PG — SIGNIFICANT CHANGE UP (ref 27–34)
MCHC RBC-ENTMCNC: 34.4 GM/DL — SIGNIFICANT CHANGE UP (ref 32–36)
MCV RBC AUTO: 96.1 FL — SIGNIFICANT CHANGE UP (ref 80–100)
NRBC # BLD: 0 /100 WBCS — SIGNIFICANT CHANGE UP (ref 0–0)
PHOSPHATE SERPL-MCNC: 3.9 MG/DL — SIGNIFICANT CHANGE UP (ref 2.5–4.5)
PHOSPHATE SERPL-MCNC: 4.2 MG/DL — SIGNIFICANT CHANGE UP (ref 2.5–4.5)
PLATELET # BLD AUTO: 217 K/UL — SIGNIFICANT CHANGE UP (ref 150–400)
POTASSIUM SERPL-MCNC: 4.3 MMOL/L — SIGNIFICANT CHANGE UP (ref 3.5–5.3)
POTASSIUM SERPL-MCNC: 4.6 MMOL/L — SIGNIFICANT CHANGE UP (ref 3.5–5.3)
POTASSIUM SERPL-SCNC: 4.3 MMOL/L — SIGNIFICANT CHANGE UP (ref 3.5–5.3)
POTASSIUM SERPL-SCNC: 4.6 MMOL/L — SIGNIFICANT CHANGE UP (ref 3.5–5.3)
RBC # BLD: 2.81 M/UL — LOW (ref 4.2–5.8)
RBC # FLD: 12.2 % — SIGNIFICANT CHANGE UP (ref 10.3–14.5)
RH IG SCN BLD-IMP: POSITIVE — SIGNIFICANT CHANGE UP
SODIUM SERPL-SCNC: 134 MMOL/L — LOW (ref 135–145)
SODIUM SERPL-SCNC: 136 MMOL/L — SIGNIFICANT CHANGE UP (ref 135–145)
WBC # BLD: 4.83 K/UL — SIGNIFICANT CHANGE UP (ref 3.8–10.5)
WBC # FLD AUTO: 4.83 K/UL — SIGNIFICANT CHANGE UP (ref 3.8–10.5)

## 2023-03-01 PROCEDURE — 52001 CYSTO W/IRRG&EVAC MLT CLOTS: CPT | Mod: 59

## 2023-03-01 PROCEDURE — 99233 SBSQ HOSP IP/OBS HIGH 50: CPT | Mod: GC

## 2023-03-01 PROCEDURE — 52214 CYSTOSCOPY AND TREATMENT: CPT

## 2023-03-01 RX ORDER — SENNA PLUS 8.6 MG/1
2 TABLET ORAL AT BEDTIME
Refills: 0 | Status: DISCONTINUED | OUTPATIENT
Start: 2023-03-01 | End: 2023-03-04

## 2023-03-01 RX ORDER — HYDROMORPHONE HYDROCHLORIDE 2 MG/ML
0.25 INJECTION INTRAMUSCULAR; INTRAVENOUS; SUBCUTANEOUS
Refills: 0 | Status: DISCONTINUED | OUTPATIENT
Start: 2023-03-01 | End: 2023-03-04

## 2023-03-01 RX ORDER — SODIUM CHLORIDE 9 MG/ML
1000 INJECTION, SOLUTION INTRAVENOUS
Refills: 0 | Status: DISCONTINUED | OUTPATIENT
Start: 2023-03-01 | End: 2023-03-04

## 2023-03-01 RX ORDER — ONDANSETRON 8 MG/1
4 TABLET, FILM COATED ORAL ONCE
Refills: 0 | Status: DISCONTINUED | OUTPATIENT
Start: 2023-03-01 | End: 2023-03-01

## 2023-03-01 RX ORDER — ALUMINUM SULFATE
1 POWDER (GRAM) MISCELLANEOUS DAILY
Refills: 0 | Status: DISCONTINUED | OUTPATIENT
Start: 2023-03-01 | End: 2023-03-04

## 2023-03-01 RX ORDER — MORPHINE SULFATE 50 MG/1
2 CAPSULE, EXTENDED RELEASE ORAL
Refills: 0 | Status: DISCONTINUED | OUTPATIENT
Start: 2023-03-01 | End: 2023-03-01

## 2023-03-01 RX ORDER — CEFAZOLIN SODIUM 1 G
2000 VIAL (EA) INJECTION EVERY 8 HOURS
Refills: 0 | Status: DISCONTINUED | OUTPATIENT
Start: 2023-03-01 | End: 2023-03-04

## 2023-03-01 RX ORDER — POLYETHYLENE GLYCOL 3350 17 G/17G
17 POWDER, FOR SOLUTION ORAL DAILY
Refills: 0 | Status: DISCONTINUED | OUTPATIENT
Start: 2023-03-01 | End: 2023-03-04

## 2023-03-01 RX ORDER — HEPARIN SODIUM 5000 [USP'U]/ML
5000 INJECTION INTRAVENOUS; SUBCUTANEOUS EVERY 8 HOURS
Refills: 0 | Status: DISCONTINUED | OUTPATIENT
Start: 2023-03-01 | End: 2023-03-04

## 2023-03-01 RX ADMIN — SODIUM CHLORIDE 50 MILLILITER(S): 9 INJECTION, SOLUTION INTRAVENOUS at 23:04

## 2023-03-01 RX ADMIN — Medication 1 APPLICATION(S): at 17:55

## 2023-03-01 RX ADMIN — MORPHINE SULFATE 2 MILLIGRAM(S): 50 CAPSULE, EXTENDED RELEASE ORAL at 16:35

## 2023-03-01 RX ADMIN — HYDROMORPHONE HYDROCHLORIDE 0.25 MILLIGRAM(S): 2 INJECTION INTRAMUSCULAR; INTRAVENOUS; SUBCUTANEOUS at 16:28

## 2023-03-01 RX ADMIN — HYDROMORPHONE HYDROCHLORIDE 0.25 MILLIGRAM(S): 2 INJECTION INTRAMUSCULAR; INTRAVENOUS; SUBCUTANEOUS at 16:11

## 2023-03-01 RX ADMIN — HEPARIN SODIUM 5000 UNIT(S): 5000 INJECTION INTRAVENOUS; SUBCUTANEOUS at 23:03

## 2023-03-01 RX ADMIN — SODIUM CHLORIDE 50 MILLILITER(S): 9 INJECTION, SOLUTION INTRAVENOUS at 16:38

## 2023-03-01 RX ADMIN — Medication 650 MILLIGRAM(S): at 07:13

## 2023-03-01 RX ADMIN — SENNA PLUS 2 TABLET(S): 8.6 TABLET ORAL at 23:03

## 2023-03-01 RX ADMIN — Medication 25 MILLIGRAM(S): at 06:10

## 2023-03-01 RX ADMIN — MORPHINE SULFATE 2 MILLIGRAM(S): 50 CAPSULE, EXTENDED RELEASE ORAL at 16:50

## 2023-03-01 RX ADMIN — Medication 650 MILLIGRAM(S): at 06:13

## 2023-03-01 RX ADMIN — SODIUM CHLORIDE 50 MILLILITER(S): 9 INJECTION, SOLUTION INTRAVENOUS at 12:00

## 2023-03-01 RX ADMIN — Medication 100 MILLIGRAM(S): at 23:03

## 2023-03-01 RX ADMIN — MORPHINE SULFATE 2 MILLIGRAM(S): 50 CAPSULE, EXTENDED RELEASE ORAL at 15:54

## 2023-03-01 RX ADMIN — MORPHINE SULFATE 2 MILLIGRAM(S): 50 CAPSULE, EXTENDED RELEASE ORAL at 16:28

## 2023-03-01 RX ADMIN — SERTRALINE 25 MILLIGRAM(S): 25 TABLET, FILM COATED ORAL at 12:00

## 2023-03-01 NOTE — PROGRESS NOTE ADULT - PROBLEM SELECTOR PLAN 1
pt presented w/ after severe pelvic & genital pain, and acute urinary retention that resolved after passing clot  - Urology consulted in ED; irrigated chinchilla and aspirated 20cc clot  - now w/ clear urine; small clots seen in chinchilla tube  - pt now comfortable; now w/ post obstructives diuresis w/ UOP ~1.5L in ~4hrs  - monitor electrolytes q6h  - Strict I/Os: -3.3L  - f/u Uro recs  - this morning had episode of severe pain and hematuria > urology evaluated and aspirated 40cc clot; urine improved afterwards.  - recurrent hematuria after urine cleared; CBI started; Uro to take him to OR for cytoscopy if no improvement.

## 2023-03-01 NOTE — PROGRESS NOTE ADULT - PROBLEM SELECTOR PLAN 3
- on Lovastatin 40mg QD at home  - hold for now as this is non-formulary; can restart on discharge
- on Lovastatin 40mg QD at home  - hold for now as this is non-formulary; can restart on discharge

## 2023-03-01 NOTE — PROGRESS NOTE ADULT - SUBJECTIVE AND OBJECTIVE BOX
Post op Check    Pt seen and examined c/o back pain. improved with repositioning. Pain is now controlled. Denies SOB/CP/N/V.     Vital Signs Last 24 Hrs  T(C): 36.4 (01 Mar 2023 16:00), Max: 36.9 (01 Mar 2023 01:32)  T(F): 97.5 (01 Mar 2023 16:00), Max: 98.4 (01 Mar 2023 01:32)  HR: 65 (01 Mar 2023 16:30) (65 - 81)  BP: 123/60 (01 Mar 2023 16:30) (122/66 - 177/74)  BP(mean): 86 (01 Mar 2023 16:30) (68 - 115)  RR: 14 (01 Mar 2023 16:30) (14 - 18)  SpO2: 100% (01 Mar 2023 16:30) (95% - 100%)    Parameters below as of 01 Mar 2023 16:30  Patient On (Oxygen Delivery Method): nasal cannula  O2 Flow (L/min): 2      I&O's Summary    28 Feb 2023 07:01  -  01 Mar 2023 07:00  --------------------------------------------------------  IN: 960 mL / OUT: 2000 mL / NET: -1040 mL    CBI    Physical Exam  Gen: NAD, A&Ox3  Pulm: No respiratory distress, no subcostal retractions  CV: RRR, no JVD  Abd: Soft, NT, ND  : +chinchilla draining clear on CBI                           8.8    4.13  )-----------( 198      ( 28 Feb 2023 07:06 )             26.9       03-01    136  |  99  |  14  ----------------------------<  98  4.6   |  28  |  1.20    Ca    9.5      01 Mar 2023 07:16  Phos  4.2     03-01  Mg     2.2     03-01    TPro  6.4  /  Alb  3.4  /  TBili  0.3  /  DBili  x   /  AST  42<H>  /  ALT  18  /  AlkPhos  39<L>  02-28

## 2023-03-01 NOTE — BRIEF OPERATIVE NOTE - OPERATION/FINDINGS
Procedure: cystoscopy, clot evacuation and fulguration   Preop dx: gross hematuria  Postop dx: gross hematuria

## 2023-03-01 NOTE — PROGRESS NOTE ADULT - PROBLEM SELECTOR PLAN 5
DVT ppx: SCDs  Diet: regular DASH  Dispo: pending hospital course    Daughter updated via phone 2/28.

## 2023-03-01 NOTE — PROGRESS NOTE ADULT - SUBJECTIVE AND OBJECTIVE BOX
DAILY PROGRESS NOTE:         24 hr events:  cbi on overnight, clear this am so clamped    Objective:    Vital Signs Last 24 Hrs  T(C): 36.7 (01 Mar 2023 05:21), Max: 36.9 (01 Mar 2023 01:32)  T(F): 98 (01 Mar 2023 05:21), Max: 98.4 (01 Mar 2023 01:32)  HR: 68 (01 Mar 2023 05:21) (68 - 88)  BP: 122/66 (01 Mar 2023 05:21) (122/66 - 147/65)  BP(mean): --  RR: 18 (01 Mar 2023 05:21) (16 - 18)  SpO2: 95% (01 Mar 2023 05:21) (95% - 96%)    Parameters below as of 01 Mar 2023 05:21  Patient On (Oxygen Delivery Method): room air        I&O's Detail    28 Feb 2023 07:01  -  01 Mar 2023 07:00  --------------------------------------------------------  IN:    Oral Fluid: 960 mL  Total IN: 960 mL    OUT:    Indwelling Catheter - Urethral (mL): 2000 mL  Total OUT: 2000 mL    Total NET: -1040 mL          Physical Exam:    General: NAD, well-nourished  Resp: Breathing comfortably on RA  CV: regular rate   : urine clear yellow off cbi       Laboratory Results:                          8.8    4.13  )-----------( 198      ( 28 Feb 2023 07:06 )             26.9     03-01    136  |  99  |  14  ----------------------------<  98  4.6   |  28  |  1.20    Ca    9.5      01 Mar 2023 07:16  Phos  4.2     03-01  Mg     2.2     03-01    TPro  6.4  /  Alb  3.4  /  TBili  0.3  /  DBili  x   /  AST  42<H>  /  ALT  18  /  AlkPhos  39<L>  02-28

## 2023-03-01 NOTE — PROGRESS NOTE ADULT - SUBJECTIVE AND OBJECTIVE BOX
PROGRESS NOTE:   Authored by Dr. Medardo La / Internal Medicine Resident    Patient is a 89y old  Male who presents with a chief complaint of Hematuria and urinary retention (28 Feb 2023 23:36)      SUBJECTIVE / OVERNIGHT EVENTS:    ADDITIONAL REVIEW OF SYSTEMS:    MEDICATIONS  (STANDING):  metoprolol succinate ER 25 milliGRAM(s) Oral daily  sertraline 25 milliGRAM(s) Oral daily    MEDICATIONS  (PRN):  acetaminophen     Tablet .. 650 milliGRAM(s) Oral every 6 hours PRN Temp greater or equal to 38C (100.4F), Mild Pain (1 - 3), Moderate Pain (4 - 6)  morphine  - Injectable 2 milliGRAM(s) IV Push every 8 hours PRN Severe Pain (7 - 10)  polyethylene glycol 3350 17 Gram(s) Oral daily PRN Constipation      CAPILLARY BLOOD GLUCOSE        I&O's Summary    28 Feb 2023 07:01  -  01 Mar 2023 07:00  --------------------------------------------------------  IN: 960 mL / OUT: 2000 mL / NET: -1040 mL        PHYSICAL EXAM:  Vital Signs Last 24 Hrs  T(C): 36.7 (01 Mar 2023 05:21), Max: 36.9 (01 Mar 2023 01:32)  T(F): 98 (01 Mar 2023 05:21), Max: 98.4 (01 Mar 2023 01:32)  HR: 68 (01 Mar 2023 05:21) (68 - 88)  BP: 122/66 (01 Mar 2023 05:21) (122/66 - 147/65)  BP(mean): --  RR: 18 (01 Mar 2023 05:21) (16 - 18)  SpO2: 95% (01 Mar 2023 05:21) (95% - 96%)    Parameters below as of 01 Mar 2023 05:21  Patient On (Oxygen Delivery Method): room air        GENERAL: NAD, lying comfortably in bed  HEAD: Atraumatic, normocephalic  EYES: EOMI b/l PERRLA b/l, conjunctiva and sclera clear  NECK: Supple, No JVD, No LAD  RESPIRATORY: Normal respiratory effort; lungs are clear to auscultation bilaterally  CARDIOVASCULAR: Regular rate and rhythm, normal S1 and S2, no murmur/rub/gallop; No lower extremity edema  ABDOMEN: Nontender, normoactive bowel sounds, no rebound/guarding; No hepatosplenomegaly  MUSCULOSKELETAL: no clubbing or cyanosis of digits; no joint swelling or tenderness to palpation  NEURO: Non focal   PSYCH: A+O to person, place, and time; affect appropriate    LABS:                          8.8    4.13  )-----------( 198      ( 28 Feb 2023 07:06 )             26.9     02-28    138  |  103  |  13  ----------------------------<  109<H>  4.3   |  26  |  1.22    Ca    9.0      28 Feb 2023 16:30  Phos  3.6     02-28  Mg     2.1     02-28    TPro  6.4  /  Alb  3.4  /  TBili  0.3  /  DBili  x   /  AST  42<H>  /  ALT  18  /  AlkPhos  39<L>  02-28              Culture - Urine (collected 27 Feb 2023 01:12)  Source: Catheterized Catheterized  Final Report (27 Feb 2023 22:53):    No growth       PROGRESS NOTE:   Authored by Dr. Medardo La / Internal Medicine Resident    Patient is a 89y old  Male who presents with a chief complaint of Hematuria and urinary retention (28 Feb 2023 23:36)      SUBJECTIVE / OVERNIGHT EVENTS: Overnight no further episodes of pain. Had recurrence of hematuria. Uro evaluated; possible OR w/ uro today.     ADDITIONAL REVIEW OF SYSTEMS:  Review of Systems:  Constitutional: No fever, No weight loss, good appetite/po intake, + tired  Head: No headache   Eyes: No blurry vision, No diplopia  Neuro: No tremors, No muscle weakness   Cardiovascular: No chest pain, No palpitations  Respiratory: No SOB, No cough  GI: No nausea, No vomiting, No diarrhea  : No dysuria, + hematuria  Skin: No rash  MSK: No joint pain   Psych: No depression  Heme: No abnormal bruising, no abnormal bleeding      MEDICATIONS  (STANDING):  metoprolol succinate ER 25 milliGRAM(s) Oral daily  sertraline 25 milliGRAM(s) Oral daily    MEDICATIONS  (PRN):  acetaminophen     Tablet .. 650 milliGRAM(s) Oral every 6 hours PRN Temp greater or equal to 38C (100.4F), Mild Pain (1 - 3), Moderate Pain (4 - 6)  morphine  - Injectable 2 milliGRAM(s) IV Push every 8 hours PRN Severe Pain (7 - 10)  polyethylene glycol 3350 17 Gram(s) Oral daily PRN Constipation      CAPILLARY BLOOD GLUCOSE        I&O's Summary    28 Feb 2023 07:01  -  01 Mar 2023 07:00  --------------------------------------------------------  IN: 960 mL / OUT: 2000 mL / NET: -1040 mL        PHYSICAL EXAM:  Vital Signs Last 24 Hrs  T(C): 36.7 (01 Mar 2023 05:21), Max: 36.9 (01 Mar 2023 01:32)  T(F): 98 (01 Mar 2023 05:21), Max: 98.4 (01 Mar 2023 01:32)  HR: 68 (01 Mar 2023 05:21) (68 - 88)  BP: 122/66 (01 Mar 2023 05:21) (122/66 - 147/65)  BP(mean): --  RR: 18 (01 Mar 2023 05:21) (16 - 18)  SpO2: 95% (01 Mar 2023 05:21) (95% - 96%)    Parameters below as of 01 Mar 2023 05:21  Patient On (Oxygen Delivery Method): room air        GENERAL: NAD, lying comfortably in bed  HEAD: Atraumatic, normocephalic  EYES: EOMI b/l PERRLA b/l, conjunctiva and sclera clear  NECK: Supple, No JVD, No LAD  RESPIRATORY: Normal respiratory effort; lungs are clear to auscultation bilaterally  CARDIOVASCULAR: Regular rate and rhythm, normal S1 and S2, no murmur/rub/gallop; No lower extremity edema  ABDOMEN: Nontender, normoactive bowel sounds, no rebound/guarding; No hepatosplenomegaly  MUSCULOSKELETAL: no clubbing or cyanosis of digits; no joint swelling or tenderness to palpation  NEURO: Non focal   PSYCH: A+O to person, place, and time; affect appropriate  : CBI in place; hematuria in bag and tube    LABS:                          8.8    4.13  )-----------( 198      ( 28 Feb 2023 07:06 )             26.9     02-28    138  |  103  |  13  ----------------------------<  109<H>  4.3   |  26  |  1.22    Ca    9.0      28 Feb 2023 16:30  Phos  3.6     02-28  Mg     2.1     02-28    TPro  6.4  /  Alb  3.4  /  TBili  0.3  /  DBili  x   /  AST  42<H>  /  ALT  18  /  AlkPhos  39<L>  02-28              Culture - Urine (collected 27 Feb 2023 01:12)  Source: Catheterized Catheterized  Final Report (27 Feb 2023 22:53):    No growth

## 2023-03-01 NOTE — PROGRESS NOTE ADULT - ASSESSMENT
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   DVT prophylaxis/OOB  Incentive spirometry  continue CBI  alum for 24 hours  Analgesia and antiemetics as needed  regular Diet  f/u PACU labs   AM labs  ancef

## 2023-03-01 NOTE — PROGRESS NOTE ADULT - ASSESSMENT
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 Urine initially cleared, but has become bloody once again.    --cbi clear on am rounds, clamped  --will reassess color  --will plan for OR today for cysto/clot evac. will re-evaluate OR plan if patient remains clear off cbi  --h/h from yesterday stable. pending rpt value today

## 2023-03-01 NOTE — PROGRESS NOTE ADULT - PROBLEM SELECTOR PLAN 2
at home on Metoprolol Succ 25mg  - can restart home meds
at home on Metoprolol Succ 25mg  - can restart home meds

## 2023-03-02 ENCOUNTER — TRANSCRIPTION ENCOUNTER (OUTPATIENT)
Age: 88
End: 2023-03-02

## 2023-03-02 LAB
ANION GAP SERPL CALC-SCNC: 10 MMOL/L — SIGNIFICANT CHANGE UP (ref 5–17)
BUN SERPL-MCNC: 14 MG/DL — SIGNIFICANT CHANGE UP (ref 7–23)
CALCIUM SERPL-MCNC: 9 MG/DL — SIGNIFICANT CHANGE UP (ref 8.4–10.5)
CHLORIDE SERPL-SCNC: 100 MMOL/L — SIGNIFICANT CHANGE UP (ref 96–108)
CO2 SERPL-SCNC: 27 MMOL/L — SIGNIFICANT CHANGE UP (ref 22–31)
CREAT SERPL-MCNC: 1.12 MG/DL — SIGNIFICANT CHANGE UP (ref 0.5–1.3)
EGFR: 63 ML/MIN/1.73M2 — SIGNIFICANT CHANGE UP
GLUCOSE SERPL-MCNC: 104 MG/DL — HIGH (ref 70–99)
HCT VFR BLD CALC: 25.9 % — LOW (ref 39–50)
HGB BLD-MCNC: 8.7 G/DL — LOW (ref 13–17)
MCHC RBC-ENTMCNC: 33 PG — SIGNIFICANT CHANGE UP (ref 27–34)
MCHC RBC-ENTMCNC: 33.6 GM/DL — SIGNIFICANT CHANGE UP (ref 32–36)
MCV RBC AUTO: 98.1 FL — SIGNIFICANT CHANGE UP (ref 80–100)
NRBC # BLD: 0 /100 WBCS — SIGNIFICANT CHANGE UP (ref 0–0)
PLATELET # BLD AUTO: 219 K/UL — SIGNIFICANT CHANGE UP (ref 150–400)
POTASSIUM SERPL-MCNC: 4.3 MMOL/L — SIGNIFICANT CHANGE UP (ref 3.5–5.3)
POTASSIUM SERPL-SCNC: 4.3 MMOL/L — SIGNIFICANT CHANGE UP (ref 3.5–5.3)
RBC # BLD: 2.64 M/UL — LOW (ref 4.2–5.8)
RBC # FLD: 12.5 % — SIGNIFICANT CHANGE UP (ref 10.3–14.5)
SODIUM SERPL-SCNC: 137 MMOL/L — SIGNIFICANT CHANGE UP (ref 135–145)
WBC # BLD: 5.22 K/UL — SIGNIFICANT CHANGE UP (ref 3.8–10.5)
WBC # FLD AUTO: 5.22 K/UL — SIGNIFICANT CHANGE UP (ref 3.8–10.5)

## 2023-03-02 PROCEDURE — 99232 SBSQ HOSP IP/OBS MODERATE 35: CPT | Mod: 25

## 2023-03-02 RX ORDER — SODIUM CHLORIDE 9 MG/ML
500 INJECTION, SOLUTION INTRAVENOUS ONCE
Refills: 0 | Status: COMPLETED | OUTPATIENT
Start: 2023-03-02 | End: 2023-03-02

## 2023-03-02 RX ORDER — LIDOCAINE 4 G/100G
1 CREAM TOPICAL
Refills: 0 | Status: DISCONTINUED | OUTPATIENT
Start: 2023-03-02 | End: 2023-03-04

## 2023-03-02 RX ADMIN — Medication 25 MILLIGRAM(S): at 05:19

## 2023-03-02 RX ADMIN — Medication 1 APPLICATION(S): at 11:23

## 2023-03-02 RX ADMIN — POLYETHYLENE GLYCOL 3350 17 GRAM(S): 17 POWDER, FOR SOLUTION ORAL at 11:22

## 2023-03-02 RX ADMIN — HEPARIN SODIUM 5000 UNIT(S): 5000 INJECTION INTRAVENOUS; SUBCUTANEOUS at 05:19

## 2023-03-02 RX ADMIN — Medication 100 MILLIGRAM(S): at 05:19

## 2023-03-02 RX ADMIN — Medication 100 MILLIGRAM(S): at 13:13

## 2023-03-02 RX ADMIN — Medication 650 MILLIGRAM(S): at 16:44

## 2023-03-02 RX ADMIN — HEPARIN SODIUM 5000 UNIT(S): 5000 INJECTION INTRAVENOUS; SUBCUTANEOUS at 13:13

## 2023-03-02 RX ADMIN — Medication 650 MILLIGRAM(S): at 17:44

## 2023-03-02 RX ADMIN — LIDOCAINE 1 APPLICATION(S): 4 CREAM TOPICAL at 23:33

## 2023-03-02 RX ADMIN — SENNA PLUS 2 TABLET(S): 8.6 TABLET ORAL at 21:41

## 2023-03-02 RX ADMIN — SERTRALINE 25 MILLIGRAM(S): 25 TABLET, FILM COATED ORAL at 11:22

## 2023-03-02 RX ADMIN — Medication 100 MILLIGRAM(S): at 21:41

## 2023-03-02 RX ADMIN — HEPARIN SODIUM 5000 UNIT(S): 5000 INJECTION INTRAVENOUS; SUBCUTANEOUS at 21:41

## 2023-03-02 RX ADMIN — SODIUM CHLORIDE 500 MILLILITER(S): 9 INJECTION, SOLUTION INTRAVENOUS at 10:11

## 2023-03-02 NOTE — DISCHARGE NOTE NURSING/CASE MANAGEMENT/SOCIAL WORK - NSDCFUADDAPPT_GEN_ALL_CORE_FT
APPTS ARE READY TO BE MADE: [X] YES    Best Family or Patient Contact (if needed): Jessee Toth (Daughter) [phone]    Additional Information about above appointments (if needed):    1: Needs new urology appointment with Dr. Chase 1-2 weeks  2:   3:     Other comments or requests:

## 2023-03-02 NOTE — DISCHARGE NOTE NURSING/CASE MANAGEMENT/SOCIAL WORK - PATIENT PORTAL LINK FT
You can access the FollowMyHealth Patient Portal offered by [facility] by registering at the following website: [email or web address]. By joining [facility]’s FollowMyHealth portal, you will also be able to view your health information using other applications (apps) compatible with our system.

## 2023-03-02 NOTE — PROGRESS NOTE ADULT - ASSESSMENT
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     DVT prophylaxis/OOB  Incentive spirometry  continue CBI  alum for 24 hours  Analgesia and antiemetics as needed  regular Diet  AM labs  ancef

## 2023-03-02 NOTE — PROGRESS NOTE ADULT - SUBJECTIVE AND OBJECTIVE BOX
Subjective  feeling well without complaints; Alum running overnight   Objective    Vital signs  T(F): , Max: 98.6 (03-02-23 @ 05:07)  HR: 79 (03-02-23 @ 05:07)  BP: 108/65 (03-02-23 @ 05:07)  SpO2: 94% (03-02-23 @ 05:07)  Wt(kg): --    Output     03-01 @ 07:01  -  03-02 @ 07:00  --------------------------------------------------------  IN: 1470 mL / OUT: 850 mL / NET: 620 mL        Gen awake alert nad axox3  Abd soft ntnd   Back no cvat bl   chinchilla in place urine yellow on slow drip     Labs      03-02 @ 06:18    WBC 5.22  / Hct 25.9  / SCr 1.12     03-01 @ 16:43    WBC 4.83  / Hct 27.0  / SCr 1.13       Urine Cx: Culture - Urine (02.27.23 @ 01:12)    Specimen Source: Catheterized Catheterized    Culture Results:   No growth    Imaging  < from: US Urinary Bladder (02.23.23 @ 11:38) >  IMPRESSION:  Normal bladder ultrasound. No blood clot seen within the bladder lumen.        < end of copied text >

## 2023-03-02 NOTE — DISCHARGE NOTE NURSING/CASE MANAGEMENT/SOCIAL WORK - NSDCPEFALRISK_GEN_ALL_CORE
For information on Fall & Injury Prevention, visit: [email or web address] OR  [email or web address] OR  https://www.cdc.gov/steadi/patient.html

## 2023-03-03 LAB
ANION GAP SERPL CALC-SCNC: 11 MMOL/L — SIGNIFICANT CHANGE UP (ref 5–17)
BUN SERPL-MCNC: 15 MG/DL — SIGNIFICANT CHANGE UP (ref 7–23)
CALCIUM SERPL-MCNC: 9 MG/DL — SIGNIFICANT CHANGE UP (ref 8.4–10.5)
CHLORIDE SERPL-SCNC: 99 MMOL/L — SIGNIFICANT CHANGE UP (ref 96–108)
CO2 SERPL-SCNC: 27 MMOL/L — SIGNIFICANT CHANGE UP (ref 22–31)
CREAT SERPL-MCNC: 1.15 MG/DL — SIGNIFICANT CHANGE UP (ref 0.5–1.3)
EGFR: 61 ML/MIN/1.73M2 — SIGNIFICANT CHANGE UP
GLUCOSE SERPL-MCNC: 102 MG/DL — HIGH (ref 70–99)
HCT VFR BLD CALC: 24.7 % — LOW (ref 39–50)
HCT VFR BLD CALC: 27.6 % — LOW (ref 39–50)
HGB BLD-MCNC: 8.3 G/DL — LOW (ref 13–17)
HGB BLD-MCNC: 9.1 G/DL — LOW (ref 13–17)
MCHC RBC-ENTMCNC: 32.2 PG — SIGNIFICANT CHANGE UP (ref 27–34)
MCHC RBC-ENTMCNC: 33 GM/DL — SIGNIFICANT CHANGE UP (ref 32–36)
MCHC RBC-ENTMCNC: 33.6 GM/DL — SIGNIFICANT CHANGE UP (ref 32–36)
MCHC RBC-ENTMCNC: 33.6 PG — SIGNIFICANT CHANGE UP (ref 27–34)
MCV RBC AUTO: 100 FL — SIGNIFICANT CHANGE UP (ref 80–100)
MCV RBC AUTO: 97.5 FL — SIGNIFICANT CHANGE UP (ref 80–100)
NRBC # BLD: 0 /100 WBCS — SIGNIFICANT CHANGE UP (ref 0–0)
NRBC # BLD: 0 /100 WBCS — SIGNIFICANT CHANGE UP (ref 0–0)
PLATELET # BLD AUTO: 216 K/UL — SIGNIFICANT CHANGE UP (ref 150–400)
PLATELET # BLD AUTO: 219 K/UL — SIGNIFICANT CHANGE UP (ref 150–400)
POTASSIUM SERPL-MCNC: 4.2 MMOL/L — SIGNIFICANT CHANGE UP (ref 3.5–5.3)
POTASSIUM SERPL-SCNC: 4.2 MMOL/L — SIGNIFICANT CHANGE UP (ref 3.5–5.3)
RBC # BLD: 2.47 M/UL — LOW (ref 4.2–5.8)
RBC # BLD: 2.83 M/UL — LOW (ref 4.2–5.8)
RBC # FLD: 12.6 % — SIGNIFICANT CHANGE UP (ref 10.3–14.5)
RBC # FLD: 13 % — SIGNIFICANT CHANGE UP (ref 10.3–14.5)
SODIUM SERPL-SCNC: 137 MMOL/L — SIGNIFICANT CHANGE UP (ref 135–145)
WBC # BLD: 5.59 K/UL — SIGNIFICANT CHANGE UP (ref 3.8–10.5)
WBC # BLD: 6.27 K/UL — SIGNIFICANT CHANGE UP (ref 3.8–10.5)
WBC # FLD AUTO: 5.59 K/UL — SIGNIFICANT CHANGE UP (ref 3.8–10.5)
WBC # FLD AUTO: 6.27 K/UL — SIGNIFICANT CHANGE UP (ref 3.8–10.5)

## 2023-03-03 PROCEDURE — 99232 SBSQ HOSP IP/OBS MODERATE 35: CPT

## 2023-03-03 RX ORDER — SENNA PLUS 8.6 MG/1
2 TABLET ORAL
Qty: 0 | Refills: 0 | DISCHARGE
Start: 2023-03-03

## 2023-03-03 RX ORDER — PHENAZOPYRIDINE HCL 100 MG
100 TABLET ORAL ONCE
Refills: 0 | Status: COMPLETED | OUTPATIENT
Start: 2023-03-03 | End: 2023-03-04

## 2023-03-03 RX ADMIN — HEPARIN SODIUM 5000 UNIT(S): 5000 INJECTION INTRAVENOUS; SUBCUTANEOUS at 05:19

## 2023-03-03 RX ADMIN — Medication 100 MILLIGRAM(S): at 13:17

## 2023-03-03 RX ADMIN — SERTRALINE 25 MILLIGRAM(S): 25 TABLET, FILM COATED ORAL at 11:54

## 2023-03-03 RX ADMIN — Medication 100 MILLIGRAM(S): at 22:02

## 2023-03-03 RX ADMIN — POLYETHYLENE GLYCOL 3350 17 GRAM(S): 17 POWDER, FOR SOLUTION ORAL at 11:54

## 2023-03-03 RX ADMIN — LIDOCAINE 1 APPLICATION(S): 4 CREAM TOPICAL at 11:54

## 2023-03-03 RX ADMIN — Medication 25 MILLIGRAM(S): at 05:19

## 2023-03-03 RX ADMIN — HEPARIN SODIUM 5000 UNIT(S): 5000 INJECTION INTRAVENOUS; SUBCUTANEOUS at 22:02

## 2023-03-03 RX ADMIN — HEPARIN SODIUM 5000 UNIT(S): 5000 INJECTION INTRAVENOUS; SUBCUTANEOUS at 13:17

## 2023-03-03 RX ADMIN — Medication 100 MILLIGRAM(S): at 05:19

## 2023-03-03 NOTE — PROGRESS NOTE ADULT - SUBJECTIVE AND OBJECTIVE BOX
Subjective  feeling well without complaints   Objective    Vital signs  T(F): , Max: 98.9 (03-03-23 @ 05:12)  HR: 70 (03-03-23 @ 05:12)  BP: 122/68 (03-03-23 @ 05:12)  SpO2: 95% (03-03-23 @ 05:12)  Wt(kg): --    Output     03-02 @ 07:01  -  03-03 @ 07:00  --------------------------------------------------------  IN: 1820 mL / OUT: 8550 mL / NET: -6730 mL        Gen awake alert nad axox3  Abd soft ntnd   Back no cvat bl    nonpalp bladder chinchilla in place urine yellow     Labs      03-02 @ 06:18    WBC 5.22  / Hct 25.9  / SCr 1.12     03-01 @ 16:43    WBC 4.83  / Hct 27.0  / SCr 1.13       Urine Cx: Culture - Urine (02.27.23 @ 01:12)    Specimen Source: Catheterized Catheterized    Culture Results:   No growth

## 2023-03-03 NOTE — PROVIDER CONTACT NOTE (OTHER) - SITUATION
127/70 laying, sitting 98/61, pt stood up and felt hot and dizzy, no BP reading achieved and pt back in the bed
pt first oob. hypotensive 80/40s when laying and standing. also complains of some dizziness upon standing

## 2023-03-03 NOTE — PROGRESS NOTE ADULT - ATTENDING COMMENTS
As above  Voiding trial  Check PVR  Transfuse 1 unit PRBC for acute blood loss anemia
OR today for cystoscopy, clot evacuation and fulguration  Will likely start alum irrigation post op if radiation cystitis confirmed  consent obtained
Pt seen and examined with team  See initial consult note  Pt now admitted for post obstructive diuresis  Urine now clear yellow  Outpatient follow up with Dr. Chase once discharged
As above  s/p cystoscopy clot evac and fulguration  Alum via CBI x 24 hrs, then can resume normal CBI
Patient seen on bedside rounds. PAitent in acute pain this morning, urology re-called, aspirated 40cc of clot. Garrett bag with blood=tinged urine, no furhter clots, abdominal pain is improved.     #Acute urinary retnetion with post-obstructive diuresis  -CBI placed by urology overnight, still with red urine.   -plan for cystoscopy today with urology.   -appreciate urology recs.   -gentle LR @ 50cc/hr given output and slight rise un creatinine.       #hematuria  -see above    rest as above
Patient seen on bedside rounds. PAitent in acute pain this morning, urology re-called, aspirated 40cc of clot. Garrett bag with blood=tinged urine, no furhter clots, abdominal pain is improved.     #Acute urinary retnetion with post-obstructive diuresis  -electrolytes normal, but new obstructive symptoms this morning with new extra ction of large clot burden.  -follow up urology re: duration of monitoring and need for CBI.  -can check BMP q12 hrs.     #hematuria  -see above    rest as above

## 2023-03-03 NOTE — PROGRESS NOTE ADULT - ASSESSMENT
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

## 2023-03-03 NOTE — PROVIDER CONTACT NOTE (OTHER) - ASSESSMENT
vss no signs of bleeding. CBI clamped and chinchilla draining yellow clear urine. pt dizzy and hot upon standing. 3/2 pt was orthostatic but improved after 500cc bolus
Symptomatic dizziness upon standing. On CBI, running slow, urine yellow and clear.

## 2023-03-04 VITALS
DIASTOLIC BLOOD PRESSURE: 69 MMHG | OXYGEN SATURATION: 95 % | TEMPERATURE: 98 F | HEART RATE: 79 BPM | RESPIRATION RATE: 18 BRPM | SYSTOLIC BLOOD PRESSURE: 145 MMHG

## 2023-03-04 LAB
ANION GAP SERPL CALC-SCNC: 13 MMOL/L — SIGNIFICANT CHANGE UP (ref 5–17)
BLD GP AB SCN SERPL QL: NEGATIVE — SIGNIFICANT CHANGE UP
BUN SERPL-MCNC: 19 MG/DL — SIGNIFICANT CHANGE UP (ref 7–23)
CALCIUM SERPL-MCNC: 8.9 MG/DL — SIGNIFICANT CHANGE UP (ref 8.4–10.5)
CHLORIDE SERPL-SCNC: 101 MMOL/L — SIGNIFICANT CHANGE UP (ref 96–108)
CO2 SERPL-SCNC: 25 MMOL/L — SIGNIFICANT CHANGE UP (ref 22–31)
CREAT SERPL-MCNC: 1.18 MG/DL — SIGNIFICANT CHANGE UP (ref 0.5–1.3)
EGFR: 59 ML/MIN/1.73M2 — LOW
GLUCOSE SERPL-MCNC: 105 MG/DL — HIGH (ref 70–99)
HCT VFR BLD CALC: 28.6 % — LOW (ref 39–50)
HGB BLD-MCNC: 9.4 G/DL — LOW (ref 13–17)
MCHC RBC-ENTMCNC: 32.8 PG — SIGNIFICANT CHANGE UP (ref 27–34)
MCHC RBC-ENTMCNC: 32.9 GM/DL — SIGNIFICANT CHANGE UP (ref 32–36)
MCV RBC AUTO: 99.7 FL — SIGNIFICANT CHANGE UP (ref 80–100)
NRBC # BLD: 0 /100 WBCS — SIGNIFICANT CHANGE UP (ref 0–0)
PLATELET # BLD AUTO: 214 K/UL — SIGNIFICANT CHANGE UP (ref 150–400)
POTASSIUM SERPL-MCNC: 4.3 MMOL/L — SIGNIFICANT CHANGE UP (ref 3.5–5.3)
POTASSIUM SERPL-SCNC: 4.3 MMOL/L — SIGNIFICANT CHANGE UP (ref 3.5–5.3)
RBC # BLD: 2.87 M/UL — LOW (ref 4.2–5.8)
RBC # FLD: 13.4 % — SIGNIFICANT CHANGE UP (ref 10.3–14.5)
RH IG SCN BLD-IMP: POSITIVE — SIGNIFICANT CHANGE UP
SODIUM SERPL-SCNC: 139 MMOL/L — SIGNIFICANT CHANGE UP (ref 135–145)
WBC # BLD: 5.35 K/UL — SIGNIFICANT CHANGE UP (ref 3.8–10.5)
WBC # FLD AUTO: 5.35 K/UL — SIGNIFICANT CHANGE UP (ref 3.8–10.5)

## 2023-03-04 PROCEDURE — P9016: CPT

## 2023-03-04 PROCEDURE — 36415 COLL VENOUS BLD VENIPUNCTURE: CPT

## 2023-03-04 PROCEDURE — 76775 US EXAM ABDO BACK WALL LIM: CPT

## 2023-03-04 PROCEDURE — 85025 COMPLETE CBC W/AUTO DIFF WBC: CPT

## 2023-03-04 PROCEDURE — 83690 ASSAY OF LIPASE: CPT

## 2023-03-04 PROCEDURE — 85730 THROMBOPLASTIN TIME PARTIAL: CPT

## 2023-03-04 PROCEDURE — 84100 ASSAY OF PHOSPHORUS: CPT

## 2023-03-04 PROCEDURE — 86923 COMPATIBILITY TEST ELECTRIC: CPT

## 2023-03-04 PROCEDURE — 97161 PT EVAL LOW COMPLEX 20 MIN: CPT

## 2023-03-04 PROCEDURE — 86901 BLOOD TYPING SEROLOGIC RH(D): CPT

## 2023-03-04 PROCEDURE — 85610 PROTHROMBIN TIME: CPT

## 2023-03-04 PROCEDURE — 81001 URINALYSIS AUTO W/SCOPE: CPT

## 2023-03-04 PROCEDURE — 86900 BLOOD TYPING SEROLOGIC ABO: CPT

## 2023-03-04 PROCEDURE — 85027 COMPLETE CBC AUTOMATED: CPT

## 2023-03-04 PROCEDURE — 80048 BASIC METABOLIC PNL TOTAL CA: CPT

## 2023-03-04 PROCEDURE — 80053 COMPREHEN METABOLIC PANEL: CPT

## 2023-03-04 PROCEDURE — 83735 ASSAY OF MAGNESIUM: CPT

## 2023-03-04 PROCEDURE — 86850 RBC ANTIBODY SCREEN: CPT

## 2023-03-04 PROCEDURE — 36430 TRANSFUSION BLD/BLD COMPNT: CPT

## 2023-03-04 PROCEDURE — 99232 SBSQ HOSP IP/OBS MODERATE 35: CPT

## 2023-03-04 PROCEDURE — 99285 EMERGENCY DEPT VISIT HI MDM: CPT

## 2023-03-04 PROCEDURE — 87637 SARSCOV2&INF A&B&RSV AMP PRB: CPT

## 2023-03-04 PROCEDURE — 87086 URINE CULTURE/COLONY COUNT: CPT

## 2023-03-04 RX ADMIN — SERTRALINE 25 MILLIGRAM(S): 25 TABLET, FILM COATED ORAL at 12:01

## 2023-03-04 RX ADMIN — Medication 100 MILLIGRAM(S): at 06:55

## 2023-03-04 RX ADMIN — Medication 25 MILLIGRAM(S): at 06:55

## 2023-03-04 RX ADMIN — Medication 100 MILLIGRAM(S): at 12:04

## 2023-03-04 RX ADMIN — HEPARIN SODIUM 5000 UNIT(S): 5000 INJECTION INTRAVENOUS; SUBCUTANEOUS at 06:55

## 2023-03-04 NOTE — PHYSICAL THERAPY INITIAL EVALUATION ADULT - PERTINENT HX OF CURRENT PROBLEM, REHAB EVAL
Patient admitted with sharp pelvic and genital pain, hematuria, urinary retention. Pt with h/o prostate ca proctectomy 1991. Pt with recent admit for hematuria, urinary retention, bulbar stricture. Cystoscopic dilation and CBI. Sent home with indwelling catheter. Increased pain since d/c presents today with above c/o. Irrigations performed in ED and on floor 2/28. CBI set on third episode of hematuria. OR 3/1 cystoscopy with clot evacuation. Decreased h/h 8.3/24.7 + transfusion 3/3.

## 2023-03-04 NOTE — PROGRESS NOTE ADULT - ASSESSMENT
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 voided urine now yellow    DVT prophylaxis/OOB  Incentive spirometry  Analgesia and antiemetics as needed  regular Diet  AM labs reviewed  ancef inpt  likely dc home later today

## 2023-03-04 NOTE — PROGRESS NOTE ADULT - REASON FOR ADMISSION
Hematuria and urinary retention

## 2023-03-04 NOTE — PROGRESS NOTE ADULT - SUBJECTIVE AND OBJECTIVE BOX
Subjective  pt seen and examined. no overnight events. feels well. denies acute complaints. voiding yellow urine. able to stand and walk around. would like to go home    Objective    Vital signs  T(F): , Max: 98.9 (03-03-23 @ 17:41)  HR: 79 (03-04-23 @ 05:17)  BP: 113/63 (03-04-23 @ 05:17)  SpO2: 95% (03-04-23 @ 05:17)  Wt(kg): --    Output     OUT:    Indwelling Catheter - Urethral (mL): 700 mL    Voided (mL): 1300 mL  Total OUT: 2000 mL    Total NET: -2000 mL      OUT:    Voided (mL): 200 mL  Total OUT: 200 mL    Total NET: -200 mL          Gen: NAD  Resp: no resp distress  Abd: s/nt/nd  : voided urine yellow in urinal    Labs                          9.4    5.35  )-----------( 214      ( 04 Mar 2023 07:20 )             28.6     03-04    139  |  101  |  19  ----------------------------<  105<H>  4.3   |  25  |  1.18    Ca    8.9      04 Mar 2023 07:19

## 2023-03-06 ENCOUNTER — NON-APPOINTMENT (OUTPATIENT)
Age: 88
End: 2023-03-06

## 2023-03-24 ENCOUNTER — TRANSCRIPTION ENCOUNTER (OUTPATIENT)
Age: 88
End: 2023-03-24

## 2023-03-24 NOTE — PRE-OP CHECKLIST - DNR CLARIFICATION FORM COMPLETED
Prep Survey    Flowsheet Row Responses   [facility] patient discharged from? [facility]   Is LACE score < 7 ? No   Eligibility Readm Mgmt   Discharge diagnosis Acute appendicitis with localized peritonitis s/p Laparoscopic appendectomy   Does the patient have one of the following disease processes/diagnoses(primary or secondary)? General Surgery   Does the patient have Home health ordered? No   Is there a DME ordered? No   Prep survey completed? Yes          Karla REECE - Registered Nurse        
n/a

## 2023-03-29 ENCOUNTER — APPOINTMENT (OUTPATIENT)
Dept: UROLOGY | Facility: CLINIC | Age: 88
End: 2023-03-29
Payer: MEDICARE

## 2023-03-29 PROCEDURE — 88112 CYTOPATH CELL ENHANCE TECH: CPT | Mod: 26

## 2023-03-29 PROCEDURE — 99214 OFFICE O/P EST MOD 30 MIN: CPT

## 2023-03-30 LAB
APPEARANCE: ABNORMAL
BACTERIA UR CULT: NORMAL
BACTERIA: NEGATIVE
BILIRUBIN URINE: NEGATIVE
BLOOD URINE: ABNORMAL
COLOR: YELLOW
GLUCOSE QUALITATIVE U: NEGATIVE
HYALINE CASTS: 0 /LPF
KETONES URINE: NEGATIVE
LEUKOCYTE ESTERASE URINE: ABNORMAL
MICROSCOPIC-UA: NORMAL
NITRITE URINE: NEGATIVE
PH URINE: 6
PROTEIN URINE: ABNORMAL
RED BLOOD CELLS URINE: 11 /HPF
SPECIFIC GRAVITY URINE: 1.02
SQUAMOUS EPITHELIAL CELLS: 1 /HPF
UROBILINOGEN URINE: NORMAL
WHITE BLOOD CELLS URINE: 78 /HPF

## 2023-04-03 LAB — URINE CYTOLOGY: NORMAL

## 2023-04-25 ENCOUNTER — RX RENEWAL (OUTPATIENT)
Age: 88
End: 2023-04-25

## 2023-06-14 ENCOUNTER — EMERGENCY (EMERGENCY)
Facility: HOSPITAL | Age: 88
LOS: 1 days | Discharge: HOME | End: 2023-06-14
Attending: STUDENT IN AN ORGANIZED HEALTH CARE EDUCATION/TRAINING PROGRAM | Admitting: STUDENT IN AN ORGANIZED HEALTH CARE EDUCATION/TRAINING PROGRAM
Payer: MEDICARE

## 2023-06-14 VITALS
HEIGHT: 66 IN | OXYGEN SATURATION: 93 % | DIASTOLIC BLOOD PRESSURE: 74 MMHG | SYSTOLIC BLOOD PRESSURE: 207 MMHG | RESPIRATION RATE: 22 BRPM | TEMPERATURE: 98 F | WEIGHT: 184.97 LBS | HEART RATE: 87 BPM

## 2023-06-14 VITALS — DIASTOLIC BLOOD PRESSURE: 63 MMHG | HEART RATE: 92 BPM | SYSTOLIC BLOOD PRESSURE: 112 MMHG

## 2023-06-14 DIAGNOSIS — Z98.89 OTHER SPECIFIED POSTPROCEDURAL STATES: Chronic | ICD-10-CM

## 2023-06-14 LAB
APPEARANCE UR: ABNORMAL
BACTERIA # UR AUTO: ABNORMAL /HPF
BILIRUB UR-MCNC: NEGATIVE — SIGNIFICANT CHANGE UP
COLOR SPEC: ABNORMAL
DIFF PNL FLD: ABNORMAL
EPI CELLS # UR: PRESENT
GLUCOSE UR QL: NEGATIVE MG/DL — SIGNIFICANT CHANGE UP
KETONES UR-MCNC: NEGATIVE MG/DL — SIGNIFICANT CHANGE UP
LEUKOCYTE ESTERASE UR-ACNC: ABNORMAL
NITRITE UR-MCNC: NEGATIVE — SIGNIFICANT CHANGE UP
PH UR: 5.5 — SIGNIFICANT CHANGE UP (ref 5–8)
PROT UR-MCNC: 300 MG/DL
RBC CASTS # UR COMP ASSIST: SIGNIFICANT CHANGE UP /HPF (ref 0–4)
SP GR SPEC: 1.01 — SIGNIFICANT CHANGE UP (ref 1–1.03)
UROBILINOGEN FLD QL: 0.2 MG/DL — SIGNIFICANT CHANGE UP (ref 0.2–1)
WBC UR QL: 32 /HPF — HIGH (ref 0–5)

## 2023-06-14 PROCEDURE — 99284 EMERGENCY DEPT VISIT MOD MDM: CPT

## 2023-06-14 PROCEDURE — 81001 URINALYSIS AUTO W/SCOPE: CPT

## 2023-06-14 PROCEDURE — 87077 CULTURE AEROBIC IDENTIFY: CPT

## 2023-06-14 PROCEDURE — 99283 EMERGENCY DEPT VISIT LOW MDM: CPT

## 2023-06-14 PROCEDURE — 87186 SC STD MICRODIL/AGAR DIL: CPT

## 2023-06-14 PROCEDURE — 87086 URINE CULTURE/COLONY COUNT: CPT

## 2023-06-14 RX ORDER — CEFPODOXIME PROXETIL 100 MG
200 TABLET ORAL ONCE
Refills: 0 | Status: COMPLETED | OUTPATIENT
Start: 2023-06-14 | End: 2023-06-14

## 2023-06-14 RX ORDER — CEFPODOXIME PROXETIL 100 MG
1 TABLET ORAL
Qty: 20 | Refills: 0
Start: 2023-06-14 | End: 2023-06-23

## 2023-06-14 RX ADMIN — Medication 200 MILLIGRAM(S): at 19:10

## 2023-06-14 NOTE — ED PROVIDER NOTE - WET READ LAUNCH FT
Patients wife Ruth calling and states that patient has had a cough for awhile now. Elena was treated with antibiotic for sinus infection.  States cough is continuing and now coughing up streaks of blood in phlegm which has been going on for a couple weeks now.   does have occ wheezing.  Denies fever.  Ruth advised to schedule an appointment today and Ruth verbalized understanding.    Lisa Priest RN on 2/24/2022 at 7:56 AM      Reason for Disposition    Coughing up paul-colored (reddish-brown) or blood-tinged sputum    Additional Information    Negative: Coughed up > 1 tablespoon (15 ml) blood (Exception: blood-tinged sputum)    Negative: Fever > 103 F (39.4 C)    Negative: Fever > 101 F (38.3 C) and over 60 years of age    Negative: Fever > 100.0 F (37.8 C) and has diabetes mellitus or a weak immune system (e.g., HIV positive, cancer chemotherapy, organ transplant, splenectomy, chronic steroids)    Negative: Fever > 100.0 F (37.8 C) and bedridden (e.g., nursing home patient, stroke, chronic illness, recovering from surgery)    Negative: Increasing ankle swelling    Negative: Wheezing is present    Negative: Patient sounds very sick or weak to the triager    Negative: Chest pain present when not coughing    Negative: Difficulty breathing    Negative: Passed out (i.e., fainted, collapsed and was not responding)    Negative: Previous asthma attacks and this feels like asthma attack    Negative: Bluish (or gray) lips or face    Negative: Severe difficulty breathing (e.g., struggling for each breath, speaks in single words)    Negative: Rapid onset of cough and has hives    Negative: Coughing started suddenly after medicine, an allergic food or bee sting    Negative: Difficulty breathing after exposure to flames, smoke, or fumes    Negative: Sounds like a life-threatening emergency to the triager    Negative: SEVERE coughing spells (e.g., whooping sound after coughing, vomiting after coughing)    Protocols  used: COUGH-A-OH       There are no Wet Read(s) to document.

## 2023-06-14 NOTE — ED PROVIDER NOTE - PROGRESS NOTE DETAILS
chinchilla placed by RN. + UTI with blood. no clots, chinchilla draining. will discharge with antibiotics and uro follow up.

## 2023-06-14 NOTE — ED PROVIDER NOTE - OBJECTIVE STATEMENT
89 male, history of prostate cancer s/p prostatectomy 1991 and XRT, on Lupron, also history of HTN, HLD, 89 male, history of prostate cancer s/p prostatectomy 1991 and XRT, on Lupron, also history of HTN, HLD, had some burining with urination then saw a small clot, has not been able to urinate since this morning. no fevers, chills.

## 2023-06-14 NOTE — ED PROVIDER NOTE - NSFOLLOWUPINSTRUCTIONS_ED_ALL_ED_FT
Please follow up with your Primary Care Physician and any specialists- Urologist. If you can not see your urologist you can contact the on call urologist listed below..  Please take your medications as prescribed and or instructed.  If your symptoms persist or worsen, please seek care. Either return to the Emergency Department, go to urgent care or see your primary care doctor.  Please refer to general information and instructions attached or below:     Urinary Tract Infection, Adult  ImageA urinary tract infection (UTI) is an infection of any part of the urinary tract, which includes the kidneys, ureters, bladder, and urethra. These organs make, store, and get rid of urine in the body. UTI can be a bladder infection (cystitis) or kidney infection (pyelonephritis).    What are the causes?  This infection may be caused by fungi, viruses, or bacteria. Bacteria are the most common cause of UTIs. This condition can also be caused by repeated incomplete emptying of the bladder during urination.    What increases the risk?  This condition is more likely to develop if:    You ignore your need to urinate or hold urine for long periods of time.  You do not empty your bladder completely during urination.  You wipe back to front after urinating or having a bowel movement, if you are female.  You are uncircumcised, if you are male.  You are constipated.  You have a urinary catheter that stays in place (indwelling).  You have a weak defense (immune) system.  You have a medical condition that affects your bowels, kidneys, or bladder.  You have diabetes.  You take antibiotic medicines frequently or for long periods of time, and the antibiotics no longer work well against certain types of infections (antibiotic resistance).  You take medicines that irritate your urinary tract.  You are exposed to chemicals that irritate your urinary tract.  You are female.    What are the signs or symptoms?  Symptoms of this condition include:    Fever.  Frequent urination or passing small amounts of urine frequently.  Needing to urinate urgently.  Pain or burning with urination.  Urine that smells bad or unusual.  Cloudy urine.  Pain in the lower abdomen or back.  Trouble urinating.  Blood in the urine.  Vomiting or being less hungry than normal.  Diarrhea or abdominal pain.  Vaginal discharge, if you are female.    How is this diagnosed?  This condition is diagnosed with a medical history and physical exam. You will also need to provide a urine sample to test your urine. Other tests may be done, including:    Blood tests.  Sexually transmitted disease (STD) testing.    If you have had more than one UTI, a cystoscopy or imaging studies may be done to determine the cause of the infections.    How is this treated?  Treatment for this condition often includes a combination of two or more of the following:    Antibiotic medicine.  Other medicines to treat less common causes of UTI.  Over-the-counter medicines to treat pain.  Drinking enough water to stay hydrated.    Follow these instructions at home:  Take over-the-counter and prescription medicines only as told by your health care provider.  If you were prescribed an antibiotic, take it as told by your health care provider. Do not stop taking the antibiotic even if you start to feel better.  Avoid alcohol, caffeine, tea, and carbonated beverages. They can irritate your bladder.  Drink enough fluid to keep your urine clear or pale yellow.  Keep all follow-up visits as told by your health care provider. This is important.  ImageMake sure to:    Empty your bladder often and completely. Do not hold urine for long periods of time.  Empty your bladder before and after sex.  Wipe from front to back after a bowel movement if you are female. Use each tissue one time when you wipe.    Contact a health care provider if:  You have back pain.  You have a fever.  You feel nauseous or vomit.  Your symptoms do not get better after 3 days.  Your symptoms go away and then return.  Get help right away if:  You have severe back pain or lower abdominal pain.  You are vomiting and cannot keep down any medicines or water.  This information is not intended to replace advice given to you by your health care provider. Make sure you discuss any questions you have with your health care provider.

## 2023-06-14 NOTE — ED ADULT NURSE NOTE - NSFALLUNIVINTERV_ED_ALL_ED
Bed/Stretcher in lowest position, wheels locked, appropriate side rails in place/Call bell, personal items and telephone in reach/Instruct patient to call for assistance before getting out of bed/chair/stretcher/Non-slip footwear applied when patient is off stretcher/[place] to call system/Physically safe environment - no spills, clutter or unnecessary equipment/Purposeful proactive rounding/Room/bathroom lighting operational, light cord in reach

## 2023-06-14 NOTE — ED ADULT NURSE NOTE - OBJECTIVE STATEMENT
Unable to urinate, although noted dribbling of urine.  No noted blood clots.  Noted 385ml urine in bladder via bladder scanner.  14 Fr coude initiated as per MD.  Pt tolerated well with noted tea colored urine output with not blood clots noted.

## 2023-06-14 NOTE — ED PROVIDER NOTE - PATIENT PORTAL LINK FT
You can access the FollowMyHealth Patient Portal offered by [facility] by registering at the following website: [email or web address]. By joining [employer]’s FollowMyHealth portal, you will also be able to view your health information using other applications (apps) compatible with our system.

## 2023-06-14 NOTE — ED PROVIDER NOTE - PHYSICAL EXAMINATION
Vital signs as available reviewed.  General:  appears uncomfortable.  Head:  Normocephalic, atraumatic.  Eyes:  Conjunctiva pink, no icterus.  Abdomen:  suprapubic fullness, lower abdominal tenderness to palpation.  Musculoskeletal:  No obvious deformity.  Neurologic: Alert and oriented, moving all extremities.  Skin:  Warm and dry.

## 2023-06-14 NOTE — ED PROVIDER NOTE - CARE PLAN
Principal Discharge DX:	Hemorrhagic cystitis  Secondary Diagnosis:	Acute UTI  Secondary Diagnosis:	Hematuria   1

## 2023-06-14 NOTE — ED ADULT TRIAGE NOTE - CHIEF COMPLAINT QUOTE
I have bladder issues.  I had a procedure a few months ago for a blockage or a stricture.  last night, I started with burning with urination and today, the pain is severe, and I now have blood.  last output was this am.

## 2023-06-15 ENCOUNTER — EMERGENCY (EMERGENCY)
Facility: HOSPITAL | Age: 88
LOS: 1 days | Discharge: HOME | End: 2023-06-15
Attending: EMERGENCY MEDICINE | Admitting: EMERGENCY MEDICINE
Payer: MEDICARE

## 2023-06-15 ENCOUNTER — NON-APPOINTMENT (OUTPATIENT)
Age: 88
End: 2023-06-15

## 2023-06-15 VITALS
TEMPERATURE: 99 F | SYSTOLIC BLOOD PRESSURE: 126 MMHG | RESPIRATION RATE: 17 BRPM | OXYGEN SATURATION: 98 % | DIASTOLIC BLOOD PRESSURE: 64 MMHG | HEART RATE: 67 BPM

## 2023-06-15 VITALS
RESPIRATION RATE: 18 BRPM | DIASTOLIC BLOOD PRESSURE: 61 MMHG | TEMPERATURE: 98 F | SYSTOLIC BLOOD PRESSURE: 99 MMHG | OXYGEN SATURATION: 97 % | HEART RATE: 81 BPM | HEIGHT: 66 IN | WEIGHT: 184.97 LBS

## 2023-06-15 DIAGNOSIS — Z98.89 OTHER SPECIFIED POSTPROCEDURAL STATES: Chronic | ICD-10-CM

## 2023-06-15 PROCEDURE — 99284 EMERGENCY DEPT VISIT MOD MDM: CPT

## 2023-06-15 PROCEDURE — 99284 EMERGENCY DEPT VISIT MOD MDM: CPT | Mod: 25

## 2023-06-15 PROCEDURE — 74176 CT ABD & PELVIS W/O CONTRAST: CPT | Mod: MA

## 2023-06-15 PROCEDURE — 74176 CT ABD & PELVIS W/O CONTRAST: CPT | Mod: 26,MA

## 2023-06-15 RX ORDER — LIDOCAINE HCL 20 MG/ML
10 VIAL (ML) INJECTION ONCE
Refills: 0 | Status: DISCONTINUED | OUTPATIENT
Start: 2023-06-15 | End: 2023-06-18

## 2023-06-15 NOTE — ED ADULT TRIAGE NOTE - CHIEF COMPLAINT QUOTE
pt reports he was discharged from ED earlier last night, had chinchilla catheter placed. pt reports he has burning and continued pain. pt reports he was discharged from ED earlier last night, had chinchilla catheter placed. pt reports he has burning, bleeding and continued pain.

## 2023-06-15 NOTE — ED ADULT NURSE NOTE - NSFALLUNIVINTERV_ED_ALL_ED
Bed/Stretcher in lowest position, wheels locked, appropriate side rails in place/Call bell, personal items and telephone in reach/Instruct patient to call for assistance before getting out of bed/chair/stretcher/Non-slip footwear applied when patient is off stretcher/[place] to call system/Physically safe environment - no spills, clutter or unnecessary equipment/Purposeful proactive rounding/Room/bathroom lighting operational, light cord in reach

## 2023-06-15 NOTE — ED PROVIDER NOTE - PATIENT PORTAL LINK FT
You can access the FollowMyHealth Patient Portal offered by [facility] by registering at the following website: [email or web address]. By joining [employer]’s FollowMyHealth portal, you will also be able to view your health information using other applications (apps) compatible with our system.

## 2023-06-15 NOTE — ED PROVIDER NOTE - NSFOLLOWUPINSTRUCTIONS_ED_ALL_ED_FT
ORTIZ CATHETER PLACEMENT AND CARE - AfterCare(R) Instructions(ER/ED)    Ortiz Catheter Placement and Care    WHAT YOU NEED TO KNOW:    A Ortiz catheter is a sterile tube that is inserted into your bladder to drain urine. It is also called an indwelling urinary catheter.    DISCHARGE INSTRUCTIONS:    Return to the emergency department if:    Your catheter comes out.    You suddenly have material that looks like sand in the tubing or drainage bag.    No urine is draining into the bag and you have checked the system.    You have pain in your hip, back, pelvis, or lower abdomen.    You are confused or cannot think clearly.  Call your doctor or urologist if:    You have a fever.    You have bladder spasms for more than 1 day after the catheter is placed.    You see blood in the tubing or drainage bag.    You have a rash or itching where the catheter tube is secured to your skin.    Urine leaks from or around the catheter, tubing, or drainage bag.    The closed drainage system has accidently come open or apart.    You see a layer of crystals inside the tubing.    You have questions or concerns about your condition or care.  Care for your catheter and drainage bag: You can reduce your risk for infection and injury by caring for your catheter and drainage bag properly.    Wash your hands often. Wash before and after you touch your catheter, tubing, or drainage bag. Use soap and water. Wear clean disposable gloves when you care for your catheter or disconnect the drainage bag. Wash your hands before you prepare or eat food.  Handwashing      Clean your genital area 2 times every day. Clean your catheter area and anal opening after every bowel movement.  For men: Use a soapy cloth to clean the tip of your penis. Start where the catheter enters. Wipe backward making sure to pull back the foreskin. Then use a cloth with clear water in the same direction to clean away the soap.    For women: Use a soapy cloth to clean the area that the catheter enters your body. Make sure to separate your labia and wipe toward the anus. Then use a cloth with clear water and wipe in the same direction.    Secure the catheter tube so you do not pull or move the catheter. This helps prevent pain and bladder spasms. Healthcare providers will show you how to use medical tape or a strap to secure the catheter tube to your body.    Keep a closed drainage system. Your catheter should always be attached to the drainage bag to form a closed system. Do not disconnect any part of the closed system unless you need to change the bag.    Keep the drainage bag below the level of your waist. This helps stop urine from moving back up the tubing and into your bladder. Do not loop or kink the tubing. This can cause urine to back up and collect in your bladder. Do not let the drainage bag touch or lie on the floor.    Empty the drainage bag when needed. The weight of a full drainage bag can be painful. Empty the drainage bag every 3 to 6 hours or when it is ? full.    Clean and change the drainage bag as directed. Ask your healthcare provider how often you should change the drainage bag and what cleaning solution to use. Wear disposable gloves when you change the bag. Do not allow the end of the catheter or tubing to touch anything. Clean the ends with an alcohol pad before you reconnect them.  What to do if problems develop:    No urine is draining into the bag:  Check for kinks in the tubing and straighten them out.    Check the tape or strap used to secure the catheter tube to your skin. Make sure it is not blocking the tube.    Make sure you are not sitting or lying on the tubing.    Make sure the urine bag is hanging below the level of your waist.    Urine leaks from or around the catheter, tubing, or drainage bag: Check if the closed drainage system has accidently come open or apart. Clean the catheter and tubing ends with a new alcohol pad and reconnect them.  Follow up with your doctor or urologist as directed: Write down your questions so you remember to ask them during your visits.

## 2023-06-15 NOTE — ED PROVIDER NOTE - PROGRESS NOTE DETAILS
Pt endorsed to Dr. Cooney to follow Reevaluated patient at bedside.  Patient feeling well.  Discussed the results of all diagnostic testing in ED and copies of all reports given, they are already on abx, already aware of mets, comfortable with plan to d/c to f/u as outpt with  his uro (jenny).   An opportunity to ask questions was given.  Discussed the importance of prompt, close medical follow-up.  Patient will return with any changes, concerns or persistent / worsening symptoms.  Understanding of all instructions verbalized.

## 2023-06-15 NOTE — ED ADULT NURSE NOTE - OBJECTIVE STATEMENT
received pt from triage with chinchilla catheter in place with leg bag pt c/o s/p discharged yesterday evening  home and experience burning and bloody urine and pain pt placed on strercher with foey irrigated  with some clots noted and chinchilla bag changed awaiting to be seen by Md pt verbalizes some relief from pain received pt from triage with chinchilla catheter in place with leg bag pt c/o s/p discharged yesterday evening  home and experience burning and bloody urine and pain pt placed on strercher with foey irrigated  with some clots noted and chinchilla bag changed awaiting to be seen by Md pt verbalizes some relief from pain pt evaluated and chinchilla catheter changed to 16 fr and draining at present pt with no c/o of pain  and awaiting for ct scan

## 2023-06-15 NOTE — ED PROVIDER NOTE - OBJECTIVE STATEMENT
89-year-old male who is presenting with his daughter who reports patient had dysuria that started yesterday morning and about 1 to 2 PM in the afternoon he began to have hematuria followed by urinary retention.  Patient was seen in the [facility] ED yesterday afternoon had a Garrett catheter placed, urine showed UTI and he was discharged home with antibiotics.  Patient is presenting again to the ED for catheter not draining.  Catheter was flushed and intermittently drained but then was stopped again.  Patient had a 14 Dutch Garrett that was placed yesterday.  Abdomen is soft nontender.  Patient is already on antibiotics.  No vomiting.  No change Garrett to a larger catheter to prevent no obstruction catheter from bladder debris.  Daughter is also concerned that patient may have a new mass as he has a history of prostate cancer 30 years ago so she requested a CT scan.

## 2023-06-15 NOTE — ED PROVIDER NOTE - CARE PROVIDER_API CALL
Chloe Chase  Urology  [address] [place] [address], [address] [address]  Phone: [phone]  Fax: [phone]  Follow Up Time: 1-3 Days

## 2023-06-15 NOTE — ED ADULT NURSE NOTE - CHIEF COMPLAINT QUOTE
pt reports he was discharged from ED earlier last night, had chinchilla catheter placed. pt reports he has burning, bleeding and continued pain.

## 2023-06-15 NOTE — ED ADULT NURSE REASSESSMENT NOTE - NS ED NURSE REASSESS COMMENT FT1
0715:  received patient.  he is alert / oriented x4.  chinchilla draining, urine remains bloody.  family at bedside.  kendra segal.

## 2023-06-15 NOTE — ED PROVIDER NOTE - CLINICAL SUMMARY MEDICAL DECISION MAKING FREE TEXT BOX
89-year-old female presenting with Garrett catheter malfunction.  Catheter was flushed intermittently began to drain.  Will place a larger catheter.  A 16 Omani was placed.  CT abdomen pelvis to rule out stone or bladder mass as cause for hematuria.  Urinalysis from yesterday was reviewed shows UTI.  Patient started on antibiotics.  Reassess after CT.

## 2023-06-18 LAB
-  CEFTRIAXONE: SIGNIFICANT CHANGE UP
-  LEVOFLOXACIN: SIGNIFICANT CHANGE UP
-  PENICILLIN: SIGNIFICANT CHANGE UP
-  VANCOMYCIN: SIGNIFICANT CHANGE UP
CULTURE RESULTS: SIGNIFICANT CHANGE UP
METHOD TYPE: SIGNIFICANT CHANGE UP
ORGANISM # SPEC MICROSCOPIC CNT: SIGNIFICANT CHANGE UP
ORGANISM # SPEC MICROSCOPIC CNT: SIGNIFICANT CHANGE UP
SPECIMEN SOURCE: SIGNIFICANT CHANGE UP

## 2023-06-19 NOTE — PRE-ANESTHESIA EVALUATION ADULT - NSANTHDISPORD_GEN_ALL_CORE
Fili Galindo M.D.  [facility]  [place]  Phone:  [phone]  Fax:  [phone]:  Chief Complaint   Patient presents with    New Patient     New patient here to establish care with Dr Nicole Palmer. [employer]  is used for this visit. His previous provider lives too far away. Patient has a history of hypothyroidism, htn and high cholesterol     Cholesterol Problem     Patient has not been taking his atorvastin because it is hard for him to swallow pills due to a surgery on his neck for cancer. He was previously on crestor and he could swallow that. Gastroesophageal Reflux     Patient has not been taking Omeprazole because he is not having heartburn right now. Thyroid Problem     Patient states he takes levothyroxine, he had a surgery on his neck many years ago- he is not sure what the surgery for other than to remove cancer. He used to work in a [employer] and it caused an infection. Hypertension     Patient takes his blood pressure medication as needed. He is under the care of a cardiologist.           HISTORY OF PRESENT ILLNESS:  Mr. Eden Schmidt is a 70 y.o. male  who presents as a new patient. Patient states that he has had a history of thyroid cancer. He has had surgery on his thyroid. He currently takes Synthroid 50 mcg daily. He has a history of hypercholesterolemia. He was given a prescription for Lipitor but is unable to swallow tablets. He is to take Crestor and would like to get that instead of the Lipitor. He denies any muscle pain or cramping. His blood pressure has been under control. He takes Cozaar 25 mg daily and Metoprolol 25 mg daily. He also sees a cardiologist. Denies chest pain, shortness of breath, headaches or blurred vision. No other complaints. Taking medications as prescribed. Medications reviewed.     HISTORY:  No Known Allergies      REVIEW OF SYSTEMS:  Review of systems is as
PACU

## 2023-06-21 ENCOUNTER — APPOINTMENT (OUTPATIENT)
Dept: UROLOGY | Facility: CLINIC | Age: 88
End: 2023-06-21
Payer: MEDICARE

## 2023-06-21 ENCOUNTER — OUTPATIENT (OUTPATIENT)
Dept: OUTPATIENT SERVICES | Facility: HOSPITAL | Age: 88
LOS: 1 days | End: 2023-06-21
Payer: MEDICARE

## 2023-06-21 VITALS
HEART RATE: 67 BPM | OXYGEN SATURATION: 99 % | SYSTOLIC BLOOD PRESSURE: 121 MMHG | WEIGHT: 185 LBS | RESPIRATION RATE: 16 BRPM | DIASTOLIC BLOOD PRESSURE: 71 MMHG | BODY MASS INDEX: 29.03 KG/M2 | HEIGHT: 67 IN | TEMPERATURE: 97.4 F

## 2023-06-21 DIAGNOSIS — N30.90 CYSTITIS, UNSPECIFIED W/OUT HEMATURIA: ICD-10-CM

## 2023-06-21 DIAGNOSIS — Z98.89 OTHER SPECIFIED POSTPROCEDURAL STATES: Chronic | ICD-10-CM

## 2023-06-21 DIAGNOSIS — R35.0 FREQUENCY OF MICTURITION: ICD-10-CM

## 2023-06-21 PROCEDURE — 96402 CHEMO HORMON ANTINEOPL SQ/IM: CPT

## 2023-06-21 PROCEDURE — 99214 OFFICE O/P EST MOD 30 MIN: CPT | Mod: 25

## 2023-06-21 PROCEDURE — 96402U: CUSTOM | Mod: NC

## 2023-06-21 PROCEDURE — 88112 CYTOPATH CELL ENHANCE TECH: CPT | Mod: 26

## 2023-06-21 RX ORDER — BICALUTAMIDE 50 MG/1
50 TABLET ORAL
Qty: 90 | Refills: 0 | Status: DISCONTINUED | COMMUNITY
Start: 2019-01-10 | End: 2023-06-21

## 2023-06-21 RX ORDER — LEUPROLIDE ACETATE 30 MG/.5ML
30 INJECTION, SUSPENSION, EXTENDED RELEASE SUBCUTANEOUS DAILY
Qty: 1 | Refills: 0 | Status: COMPLETED | OUTPATIENT
Start: 2023-06-20 | End: 2023-06-21

## 2023-06-21 RX ORDER — LEUPROLIDE ACETATE 30 MG/.5ML
30 INJECTION, SUSPENSION, EXTENDED RELEASE SUBCUTANEOUS
Refills: 0 | Status: COMPLETED | OUTPATIENT
Start: 2023-06-21

## 2023-06-21 RX ORDER — BICALUTAMIDE 50 MG/1
50 TABLET ORAL
Qty: 90 | Refills: 0 | Status: DISCONTINUED | COMMUNITY
Start: 2020-11-03 | End: 2023-06-21

## 2023-06-21 RX ORDER — BICALUTAMIDE 50 MG/1
50 TABLET ORAL
Qty: 90 | Refills: 3 | Status: DISCONTINUED | COMMUNITY
Start: 2018-04-19 | End: 2023-06-21

## 2023-06-21 RX ORDER — BICALUTAMIDE 50 MG/1
50 TABLET ORAL
Qty: 90 | Refills: 3 | Status: DISCONTINUED | COMMUNITY
Start: 2020-11-06 | End: 2023-06-21

## 2023-06-21 RX ORDER — NIRMATRELVIR AND RITONAVIR 300-100 MG
20 X 150 MG & KIT ORAL
Qty: 1 | Refills: 0 | Status: DISCONTINUED | COMMUNITY
Start: 2022-11-18 | End: 2023-06-21

## 2023-06-21 RX ADMIN — LEUPROLIDE ACETATE 0 MG: KIT SUBCUTANEOUS at 00:00

## 2023-06-21 NOTE — PHYSICAL EXAM
[General Appearance - Well Developed] : well developed [General Appearance - Well Nourished] : well nourished [Normal Appearance] : normal appearance [Well Groomed] : well groomed [General Appearance - In No Acute Distress] : no acute distress [Abdomen Soft] : soft [Abdomen Tenderness] : non-tender [Costovertebral Angle Tenderness] : no ~M costovertebral angle tenderness [Urethral Meatus] : meatus normal [Urinary Bladder Findings] : the bladder was normal on palpation [Scrotum] : the scrotum was normal [No Prostate Nodules] : no prostate nodules [Testes Mass (___cm)] : there were no testicular masses [] : no respiratory distress [Edema] : no peripheral edema [Respiration, Rhythm And Depth] : normal respiratory rhythm and effort [Exaggerated Use Of Accessory Muscles For Inspiration] : no accessory muscle use [Oriented To Time, Place, And Person] : oriented to person, place, and time [Affect] : the affect was normal [Mood] : the mood was normal [Not Anxious] : not anxious [Normal Station and Gait] : the gait and station were normal for the patient's age [No Focal Deficits] : no focal deficits [No Palpable Adenopathy] : no palpable adenopathy

## 2023-06-22 ENCOUNTER — EMERGENCY (EMERGENCY)
Facility: HOSPITAL | Age: 88
LOS: 1 days | Discharge: HOME | End: 2023-06-22
Attending: EMERGENCY MEDICINE | Admitting: EMERGENCY MEDICINE
Payer: MEDICARE

## 2023-06-22 VITALS
TEMPERATURE: 98 F | HEIGHT: 66 IN | RESPIRATION RATE: 18 BRPM | SYSTOLIC BLOOD PRESSURE: 116 MMHG | DIASTOLIC BLOOD PRESSURE: 70 MMHG | OXYGEN SATURATION: 98 % | WEIGHT: 184.97 LBS | HEART RATE: 78 BPM

## 2023-06-22 DIAGNOSIS — Z98.89 OTHER SPECIFIED POSTPROCEDURAL STATES: Chronic | ICD-10-CM

## 2023-06-22 LAB
APPEARANCE: ABNORMAL
BACTERIA: ABNORMAL /HPF
BILIRUBIN URINE: NEGATIVE
BLOOD URINE: ABNORMAL
CAST: 2 /LPF
COLOR: YELLOW
EPITHELIAL CELLS: 10 /HPF
GLUCOSE QUALITATIVE U: NEGATIVE MG/DL
KETONES URINE: NEGATIVE MG/DL
LEUKOCYTE ESTERASE URINE: ABNORMAL
MICROSCOPIC-UA: NORMAL
NITRITE URINE: NEGATIVE
PH URINE: 6
PROTEIN URINE: >=1000 MG/DL
RED BLOOD CELLS URINE: ABNORMAL /HPF
REVIEW: NORMAL
SPECIFIC GRAVITY URINE: 1.02
URINE CYTOLOGY: NORMAL
UROBILINOGEN URINE: 0.2 MG/DL
WHITE BLOOD CELLS URINE: 86 /HPF

## 2023-06-22 PROCEDURE — 99284 EMERGENCY DEPT VISIT MOD MDM: CPT

## 2023-06-22 NOTE — ED ADULT NURSE NOTE - NSFALLASSESSNEED_ED_ALL_ED
2 RN Skin Assessment Completed by Amy FREED RN and Dee Dee JEFFERSON RN.    Head: WDL  Ears: WDL  Nose: WDL  Mouth: WDL  Neck: WDL  Breasts/Chest: WDL  Shoulder Blades: WDL  Spine: WDL  (R) Arm/Elbow/hand: WDL  (L) Arm/Elbow/hand: WDL  Abdomen:WDL  Groin: WDL  Sacrum/Coccyx/Buttocks: WDL  (R) Leg: WDL  (L) Leg: WDL  (R) Heel/Foot/Toe: redness and blanching  (L) Heel/Foot/Toe: redness and blanching          Devices in place: ECG, BP Cuff and Pulse Ox    Interventions in place: Pillows, Q2 turns, Low air loss mattress , Barrier Cream and Dri-Cyrus Pads    Possible skin injury found: No    Pictures uploaded into Epic: N/A  Wound Consult Placed: N/A       yes

## 2023-06-22 NOTE — ED ADULT NURSE NOTE - NSFALLHARMRISKINTERV_ED_ALL_ED
Assistance OOB with selected safe patient handling equipment if applicable/Assistance with ambulation/Communicate risk of Fall with Harm to all staff, patient, and family/Monitor gait and stability/Provide visual cue: red socks, yellow wristband, yellow gown, etc/Reinforce activity limits and safety measures with patient and family/Bed in lowest position, wheels locked, appropriate side rails in place/Call bell, personal items and telephone in reach/Instruct patient to call for assistance before getting out of bed/chair/stretcher/Non-slip footwear applied when patient is off stretcher/[place] to call system/Physically safe environment - no spills, clutter or unnecessary equipment/Purposeful Proactive Rounding/Room/bathroom lighting operational, light cord in reach

## 2023-06-22 NOTE — ED ADULT NURSE NOTE - OBJECTIVE STATEMENT
Patient A&O x 4 with c/c of urinary retention since Wednesday. As per patient, he had a chinchilla placed and removed due to urinary retention. Patient stated he is currently in pain 6/10. Patient had a bladder scan performed 390 mL of urine noted; patient urinated slightly afterwards with pain upon urination, 350 mL noted. Patient stated he feels relieved, VSS, no signs of distress noted, all safety measures in place. Will continue to provide care for patient.

## 2023-06-22 NOTE — ED ADULT NURSE NOTE - CHPI ED NUR SYMPTOMS NEG
2nd attempt to contact pt for enrollment in Covid Surveillance program. No answer x2. Left voicemail. Due to 2 unsuccessful attempts to reach pt for enrollment, per surveillance policy, will un-enroll pt from surveillance and enroll in text home symptom monitoring. DID NOT remove surveillance tasks so pt still eligible to participate by logging into [email or web address] and submitting program consent. Sent [email or web address] message advising of this. No further attempts to reach pt for enrollment will be made.     Reason for Disposition   Message left on identified voice mail    Protocols used: NO CONTACT OR DUPLICATE CONTACT CALL-A-AH      
no abdominal distension

## 2023-06-23 VITALS
OXYGEN SATURATION: 95 % | SYSTOLIC BLOOD PRESSURE: 130 MMHG | DIASTOLIC BLOOD PRESSURE: 72 MMHG | HEART RATE: 65 BPM | TEMPERATURE: 98 F | RESPIRATION RATE: 16 BRPM

## 2023-06-23 LAB
APPEARANCE UR: CLEAR — SIGNIFICANT CHANGE UP
BILIRUB UR-MCNC: NEGATIVE — SIGNIFICANT CHANGE UP
COLOR SPEC: YELLOW — SIGNIFICANT CHANGE UP
DIFF PNL FLD: ABNORMAL
GLUCOSE UR QL: NEGATIVE MG/DL — SIGNIFICANT CHANGE UP
KETONES UR-MCNC: NEGATIVE MG/DL — SIGNIFICANT CHANGE UP
LEUKOCYTE ESTERASE UR-ACNC: ABNORMAL
NITRITE UR-MCNC: NEGATIVE — SIGNIFICANT CHANGE UP
PH UR: 6.5 — SIGNIFICANT CHANGE UP (ref 5–8)
PROT UR-MCNC: 30 MG/DL
SP GR SPEC: 1.01 — SIGNIFICANT CHANGE UP (ref 1–1.03)
UROBILINOGEN FLD QL: 0.2 MG/DL — SIGNIFICANT CHANGE UP (ref 0.2–1)

## 2023-06-23 PROCEDURE — 99283 EMERGENCY DEPT VISIT LOW MDM: CPT

## 2023-06-23 PROCEDURE — 87086 URINE CULTURE/COLONY COUNT: CPT

## 2023-06-23 PROCEDURE — 81001 URINALYSIS AUTO W/SCOPE: CPT

## 2023-06-23 RX ORDER — LEVOFLOXACIN 5 MG/ML
500 INJECTION, SOLUTION INTRAVENOUS EVERY 24 HOURS
Refills: 0 | Status: DISCONTINUED | OUTPATIENT
Start: 2023-06-23 | End: 2023-06-23

## 2023-06-23 RX ORDER — LEVOFLOXACIN 5 MG/ML
1 INJECTION, SOLUTION INTRAVENOUS
Qty: 7 | Refills: 0
Start: 2023-06-23 | End: 2023-06-29

## 2023-06-23 NOTE — ED PROVIDER NOTE - IV ALTEPLASE ADMIN OUTSIDE HIDDEN
show

## 2023-06-23 NOTE — ED PROVIDER NOTE - NSFOLLOWUPINSTRUCTIONS_ED_ALL_ED_FT
1) Take Levaquin 1 tablet once a day for 7 days  2)Follow with Dr. Rena Aviles tomorrow 1) Take Levaquin 1 tablet once a day for 7 days  2)Follow with Dr. Chase Call tomorrow    Urinary Tract Infection, Adult    A urinary tract infection (UTI) is an infection of any part of the urinary tract. The urinary tract includes:  The kidneys.  The ureters.  The bladder.  The urethra.  These organs make, store, and get rid of pee (urine) in the body.    What are the causes?  This infection is caused by germs (bacteria) in your genital area. These germs grow and cause swelling (inflammation) of your urinary tract.    What increases the risk?  The following factors may make you more likely to develop this condition:  Using a small, thin tube (catheter) to drain pee.  Not being able to control when you pee or poop (incontinence).  Being female. If you are female, these things can increase the risk:  Using these methods to prevent pregnancy:  A medicine that kills sperm (spermicide).  A device that blocks sperm (diaphragm).  Having low levels of a female hormone (estrogen).  Being pregnant.  You are more likely to develop this condition if:  You have genes that add to your risk.  You are sexually active.  You take antibiotic medicines.  You have trouble peeing because of:  A prostate that is bigger than normal, if you are male.  A blockage in the part of your body that drains pee from the bladder.  A kidney stone.  A nerve condition that affects your bladder.  Not getting enough to drink.  Not peeing often enough.  You have other conditions, such as:  Diabetes.  A weak disease-fighting system (immune system).  Sickle cell disease.  Gout.  Injury of the spine.  What are the signs or symptoms?  Symptoms of this condition include:  Needing to pee right away.  Peeing small amounts often.  Pain or burning when peeing.  Blood in the pee.  Pee that smells bad or not like normal.  Trouble peeing.  Pee that is cloudy.  Fluid coming from the vagina, if you are female.  Pain in the belly or lower back.  Other symptoms include:  Vomiting.  Not feeling hungry.  Feeling mixed up (confused). This may be the first symptom in older adults.  Being tired and grouchy (irritable).  A fever.  Watery poop (diarrhea).  How is this treated?  Taking antibiotic medicine.  Taking other medicines.  Drinking enough water.  In some cases, you may need to see a specialist.    Follow these instructions at home:    Medicines    Take over-the-counter and prescription medicines only as told by your doctor.  If you were prescribed an antibiotic medicine, take it as told by your doctor. Do not stop taking it even if you start to feel better.  General instructions    Make sure you:  Pee until your bladder is empty.  Do not hold pee for a long time.  Empty your bladder after sex.  Wipe from front to back after peeing or pooping if you are a female. Use each tissue one time when you wipe.  Drink enough fluid to keep your pee pale yellow.  Keep all follow-up visits.  Contact a doctor if:  You do not get better after 1–2 days.  Your symptoms go away and then come back.  Get help right away if:  You have very bad back pain.  You have very bad pain in your lower belly.  You have a fever.  You have chills.  You feeling like you will vomit or you vomit.  Summary  A urinary tract infection (UTI) is an infection of any part of the urinary tract.  This condition is caused by germs in your genital area.  There are many risk factors for a UTI.  Treatment includes antibiotic medicines.  Drink enough fluid to keep your pee pale yellow.  This information is not intended to replace advice given to you by your health care provider. Make sure you discuss any questions you have with your health care provider.

## 2023-06-23 NOTE — ED PROVIDER NOTE - CLINICAL SUMMARY MEDICAL DECISION MAKING FREE TEXT BOX
88 y/o male with hx of prostate Ca on Lupron, seen here 2x last week for urinary retention had chinchilla placed, chinchilla removed yesterday, pt unable to urinate for several hours tonight, + dysuria, no hematuria, no fever, no chills, no lower back pain, no CP, no SOB,   on bladder scan pt with 399cc of urine, pt able to spontaneously urinate 400 cc of urine, c/o dysuria,   Pt finished course of cefpodoxime, urine culture shows streptococcus gally... which is sensitive to ceftriaxone  Will obtain repeat U/A, urine culture, if u/a positive will treat with antibiotics  will give fluids and see if able to urinate on his own  will have follow up with angelica

## 2023-06-23 NOTE — ED PROVIDER NOTE - PATIENT PORTAL LINK FT
You can access the FollowMyHealth Patient Portal offered by [facility] by registering at the following website: [email or web address]. By joining [employer]’s FollowMyHealth portal, you will also be able to view your health information using other applications (apps) compatible with our system.

## 2023-06-23 NOTE — ED PROVIDER NOTE - PROGRESS NOTE DETAILS
Pt and daughter aware of CT findings from last week, Pt signed out to me by Dr. Quinonez to reassess pt after voiding trial.   Pt was able to void spontaneously  Stable for discharge

## 2023-06-23 NOTE — ED PROVIDER NOTE - CARE PROVIDER_API CALL
Chloe Chase  Urology  [address] [place] [address], [address]  [place] [address]  Phone: [phone]  Fax: [phone]  Established Patient  Follow Up Time: Urgent

## 2023-06-23 NOTE — ED PROVIDER NOTE - TEMPLATE, MLM
Family Medicine Office Visit  Kenia Dewitt 13 y o  male   MRN: [id number] : 2006  ENCOUNTER: 3/1/2022 1:22 PM    Assessment & Plan   This is a healthy 79-year-old male here for pre participation sports physical   Physical examination within normal limits  1  Routine sports physical exam  Pre participation sports physical form completed and signed, returned to patient  2  Environmental allergies  loratadine (CLARITIN) 10 mg tablet             Nutrition and Exercise Counseling: The patient's Body mass index is 22 35 kg/m²  This is 75 %ile (Z= 0 67) based on CDC (Boys, 2-20 Years) BMI-for-age based on BMI available as of 3/1/2022  Nutrition counseling provided:  Educational material provided to patient/parent regarding nutrition  Exercise counseling provided:  Educational material provided to patient/family on physical activity  Depression Screening and Follow-up Plan:     Depression screening was negative with PHQ-A score of 0  Patient does not have thoughts of ending their life in the past month  Patient has not attempted suicide in their lifetime  Call office if has onset of new symptoms and/or has any injury during sports or otherwise  Patient and father verbalized understanding and agree with the plan  Return in about 1 year (around 3/1/2023) for Annual physical, or sooner prn  Subjective   CC: Physical Exam      HPI  Kenia Dewitt is a 13y o -year-old male who  has a past medical history of Asthma and Erythema infectiosum (fifth disease) ()        Today he presents for Physical Exam    Feels well  No complaints   Place both basketball and baseball  Today's physical is for upcoming baseball season, he has just finished his basketball season   Father patient states that he has played sports for many seasons without issues   Denies history of concussion  Denies history of any joint injuries including shoulders and knees    Denies extremity weakness and fatigue  Denies dizziness   Patient would like refill for allergy medications  He is taking 10 mg Claritin daily p r n  and Flonase 2 sprays in each nostril daily p r n  [place] Simple  All vaccinations including HPV vaccine up-to-date except for COVID vaccine and flu vaccine  Father does not want patient to get either at this time  Offered to as her any questions they had about either vaccine but they both stated that it not have any questions  Review of Systems   Constitutional: Negative for chills, fatigue, fever and unexpected weight change  Eyes: Negative for visual disturbance  Respiratory: Negative for cough, chest tightness, shortness of breath and wheezing  Cardiovascular: Negative for chest pain and palpitations  Gastrointestinal: Negative for abdominal pain, constipation, diarrhea, nausea and vomiting  Endocrine: Negative for polydipsia and polyuria  Genitourinary: Negative for penile pain, penile swelling, scrotal swelling and testicular pain  Musculoskeletal: Negative for arthralgias, joint swelling, neck pain and neck stiffness  Skin: Negative for color change and rash  Neurological: Negative for dizziness, light-headedness and headaches  Psychiatric/Behavioral: Negative for dysphoric mood  The patient is not nervous/anxious  Allergies   Allergen Reactions    Grass Extracts  [Gramineae Pollens]     Seasonal Ic  [Cholestatin]        Past Medical History, Past Surgical History, Family History, and Social History were reviewed and updated today as appropriate  Objective   BP (!) 100/52   Pulse 84   Temp 98 °F (36 7 °C)   Resp 16   Ht 5' 8" (1 727 m)   Wt 66 7 kg (147 lb)   SpO2 98%   BMI 22 35 kg/m²     Physical Exam  Constitutional:       General: He is not in acute distress  Appearance: Normal appearance  He is not ill-appearing, toxic-appearing or diaphoretic  HENT:      Head: Normocephalic and atraumatic        Right Ear: Tympanic membrane, ear canal and external ear normal       Left Ear: Tympanic membrane, ear canal and external ear normal       Nose: Nose normal       Mouth/Throat:      Mouth: Mucous membranes are moist       Pharynx: No oropharyngeal exudate  Eyes:      Extraocular Movements: Extraocular movements intact  Pupils: Pupils are equal, round, and reactive to light  Cardiovascular:      Rate and Rhythm: Normal rate and regular rhythm  Pulses: Normal pulses  Heart sounds: Normal heart sounds  Pulmonary:      Effort: Pulmonary effort is normal       Breath sounds: Normal breath sounds  Abdominal:      General: Abdomen is flat  Bowel sounds are normal       Palpations: Abdomen is soft  Musculoskeletal:         General: Normal range of motion  Cervical back: Neck supple  Comments: Normal active and passive shoulder, hip, and knee range of motion  No scoliosis noted on exam    Lymphadenopathy:      Cervical: No cervical adenopathy  Skin:     General: Skin is warm and dry  Capillary Refill: Capillary refill takes less than 2 seconds  Neurological:      Mental Status: He is alert  Sensory: No sensory deficit  Motor: No weakness  Gait: Gait normal    Psychiatric:         Mood and Affect: Mood normal             Labs: I have reviewed all lab results           Wade Brady MD   [address]    Parts of this note were dictated using [employer] dictation software Abdominal Pain, N/V/D

## 2023-06-23 NOTE — ED PROVIDER NOTE - OBJECTIVE STATEMENT
89 y/o male with hx of prostate cancer on Lupron, seen here 2x last week for urinary retention had chinchilla placed then changed over to larger one, yesterday pt had chinchilla removed, today pt unable to urinate for several hours, here pt complains of dysuria, able to urinate on his own, bladder scan showed 399cc, here pt put out approximately 400cc and felt relief but complaining of dysuria, no fever, no chills, no CP, no SOB, c/o chronic upper back pain,

## 2023-06-24 LAB
CULTURE RESULTS: NO GROWTH — SIGNIFICANT CHANGE UP
SPECIMEN SOURCE: SIGNIFICANT CHANGE UP

## 2023-06-26 DIAGNOSIS — N30.90 CYSTITIS, UNSPECIFIED WITHOUT HEMATURIA: ICD-10-CM

## 2023-06-26 DIAGNOSIS — R31.0 GROSS HEMATURIA: ICD-10-CM

## 2023-06-26 LAB — BACTERIA UR CULT: ABNORMAL

## 2023-07-20 ENCOUNTER — EMERGENCY (EMERGENCY)
Facility: HOSPITAL | Age: 88
LOS: 1 days | Discharge: HOME | End: 2023-07-20
Attending: EMERGENCY MEDICINE | Admitting: EMERGENCY MEDICINE
Payer: MEDICARE

## 2023-07-20 VITALS
HEART RATE: 88 BPM | TEMPERATURE: 99 F | HEIGHT: 66 IN | OXYGEN SATURATION: 97 % | WEIGHT: 188.94 LBS | DIASTOLIC BLOOD PRESSURE: 68 MMHG | SYSTOLIC BLOOD PRESSURE: 126 MMHG | RESPIRATION RATE: 16 BRPM

## 2023-07-20 VITALS
HEART RATE: 79 BPM | OXYGEN SATURATION: 96 % | DIASTOLIC BLOOD PRESSURE: 76 MMHG | TEMPERATURE: 98 F | RESPIRATION RATE: 16 BRPM | SYSTOLIC BLOOD PRESSURE: 134 MMHG

## 2023-07-20 DIAGNOSIS — Z98.89 OTHER SPECIFIED POSTPROCEDURAL STATES: Chronic | ICD-10-CM

## 2023-07-20 LAB
APPEARANCE UR: ABNORMAL
BILIRUB UR-MCNC: NEGATIVE — SIGNIFICANT CHANGE UP
COLOR SPEC: SIGNIFICANT CHANGE UP
DIFF PNL FLD: ABNORMAL
GLUCOSE UR QL: NEGATIVE MG/DL — SIGNIFICANT CHANGE UP
KETONES UR-MCNC: NEGATIVE MG/DL — SIGNIFICANT CHANGE UP
LEUKOCYTE ESTERASE UR-ACNC: ABNORMAL
NITRITE UR-MCNC: POSITIVE
PH UR: 7 — SIGNIFICANT CHANGE UP (ref 5–8)
PROT UR-MCNC: 300 MG/DL
SP GR SPEC: 1.02 — SIGNIFICANT CHANGE UP (ref 1–1.03)
UROBILINOGEN FLD QL: 1 MG/DL — SIGNIFICANT CHANGE UP (ref 0.2–1)

## 2023-07-20 PROCEDURE — 99284 EMERGENCY DEPT VISIT MOD MDM: CPT

## 2023-07-20 PROCEDURE — 99283 EMERGENCY DEPT VISIT LOW MDM: CPT

## 2023-07-20 PROCEDURE — 87086 URINE CULTURE/COLONY COUNT: CPT

## 2023-07-20 PROCEDURE — 87077 CULTURE AEROBIC IDENTIFY: CPT

## 2023-07-20 PROCEDURE — 87186 SC STD MICRODIL/AGAR DIL: CPT

## 2023-07-20 PROCEDURE — 81001 URINALYSIS AUTO W/SCOPE: CPT

## 2023-07-20 RX ORDER — CEFUROXIME AXETIL 250 MG
1 TABLET ORAL
Qty: 20 | Refills: 0
Start: 2023-07-20 | End: 2023-07-29

## 2023-07-20 RX ORDER — CEFUROXIME AXETIL 250 MG
500 TABLET ORAL ONCE
Refills: 0 | Status: COMPLETED | OUTPATIENT
Start: 2023-07-20 | End: 2023-07-20

## 2023-07-20 RX ADMIN — Medication 500 MILLIGRAM(S): at 22:47

## 2023-07-20 NOTE — ED ADULT TRIAGE NOTE - CHIEF COMPLAINT QUOTE
c/o burning with urination x few days, noted blood in urine this afternoon, bladder pain, pt states he is able to fully empty bladder when urinating, does not feel he is retaining urine.

## 2023-07-20 NOTE — ED ADULT NURSE NOTE - NSFALLUNIVINTERV_ED_ALL_ED
Bed/Stretcher in lowest position, wheels locked, appropriate side rails in place/Call bell, personal items and telephone in reach/Instruct patient to call for assistance before getting out of bed/chair/stretcher/Non-slip footwear applied when patient is off stretcher/[place] to call system/Physically safe environment - no spills, clutter or unnecessary equipment/Purposeful proactive rounding/Room/bathroom lighting operational, light cord in reach

## 2023-07-20 NOTE — ED PROVIDER NOTE - OBJECTIVE STATEMENT
89-year-old male with significant past medical history for frequent UTI, prostate CA, hyperlipidemia, hypertension presents to the ED with complaints of dysuria, burning with urination and suprapubic tenderness times few days.  Patient denies any fevers.  Patient with recent UTI 1 month ago.

## 2023-07-20 NOTE — ED PROVIDER NOTE - PATIENT PORTAL LINK FT
You can access the FollowMyHealth Patient Portal offered by [facility] by registering at the following website: [email or web address]. By joining [employer]’s FollowMyHealth portal, you will also be able to view your health information using other applications (apps) compatible with our system.

## 2023-07-20 NOTE — ED PROVIDER NOTE - CLINICAL SUMMARY MEDICAL DECISION MAKING FREE TEXT BOX
89-year-old male with frequent UTIs now with dysuria times few days.  UA urine culture antibiotics as needed.

## 2023-07-21 ENCOUNTER — NON-APPOINTMENT (OUTPATIENT)
Age: 88
End: 2023-07-21

## 2023-07-22 NOTE — ED PROVIDER NOTE - OBJECTIVE STATEMENT
Attending note (Chago): 89 male, history of prostate cancer s/p prostatectomy 1991 and XRT, on Lupron, also history of HTN, HLD, presenting with pain in lower abdomen and genitals since approximately 9 PM tonight.  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  Ultimately discharged 2/23, and had been doing well at home until tonight.  Family notes that there has been blood and clots in bladder preceding pain.  Denies any trauma.  No fevers no vomiting. Pablo Dougherty)

## 2023-08-12 LAB
APPEARANCE: ABNORMAL
BACTERIA: NEGATIVE /HPF
BILIRUBIN URINE: NEGATIVE
BLOOD URINE: NEGATIVE
CAST: 5 /LPF
COLOR: YELLOW
EPITHELIAL CELLS: 14 /HPF
GLUCOSE QUALITATIVE U: NEGATIVE MG/DL
KETONES URINE: NEGATIVE MG/DL
LEUKOCYTE ESTERASE URINE: ABNORMAL
MICROSCOPIC-UA: NORMAL
NITRITE URINE: NEGATIVE
PH URINE: 5.5
PROTEIN URINE: 100 MG/DL
RED BLOOD CELLS URINE: 8 /HPF
REVIEW: NORMAL
SPECIFIC GRAVITY URINE: 1.02
UROBILINOGEN URINE: 0.2 MG/DL
WHITE BLOOD CELLS URINE: 29 /HPF

## 2023-08-13 LAB — BACTERIA UR CULT: NORMAL

## 2023-08-31 ENCOUNTER — APPOINTMENT (OUTPATIENT)
Dept: UROLOGY | Facility: CLINIC | Age: 88
End: 2023-08-31

## 2023-10-25 ENCOUNTER — APPOINTMENT (OUTPATIENT)
Dept: UROLOGY | Facility: CLINIC | Age: 88
End: 2023-10-25
Payer: MEDICARE

## 2023-10-25 ENCOUNTER — OUTPATIENT (OUTPATIENT)
Dept: OUTPATIENT SERVICES | Facility: HOSPITAL | Age: 88
LOS: 1 days | End: 2023-10-25
Payer: MEDICARE

## 2023-10-25 VITALS
HEART RATE: 75 BPM | RESPIRATION RATE: 16 BRPM | TEMPERATURE: 97.6 F | OXYGEN SATURATION: 98 % | DIASTOLIC BLOOD PRESSURE: 72 MMHG | SYSTOLIC BLOOD PRESSURE: 134 MMHG

## 2023-10-25 DIAGNOSIS — R31.0 GROSS HEMATURIA: ICD-10-CM

## 2023-10-25 DIAGNOSIS — R35.0 FREQUENCY OF MICTURITION: ICD-10-CM

## 2023-10-25 DIAGNOSIS — Z98.89 OTHER SPECIFIED POSTPROCEDURAL STATES: Chronic | ICD-10-CM

## 2023-10-25 PROCEDURE — 96402 CHEMO HORMON ANTINEOPL SQ/IM: CPT

## 2023-10-25 PROCEDURE — 99214 OFFICE O/P EST MOD 30 MIN: CPT | Mod: 25

## 2023-10-25 PROCEDURE — 96401U: CUSTOM | Mod: NC

## 2023-10-25 RX ORDER — LEUPROLIDE ACETATE 30 MG/.5ML
30 INJECTION, SUSPENSION, EXTENDED RELEASE SUBCUTANEOUS
Refills: 0 | Status: COMPLETED | OUTPATIENT
Start: 2023-10-25

## 2023-10-25 RX ORDER — LEUPROLIDE ACETATE 30 MG/.5ML
30 INJECTION, SUSPENSION, EXTENDED RELEASE SUBCUTANEOUS DAILY
Qty: 1 | Refills: 0 | Status: COMPLETED | OUTPATIENT
Start: 2023-10-24 | End: 2023-10-25

## 2023-10-25 RX ADMIN — LEUPROLIDE ACETATE 0 MG: KIT SUBCUTANEOUS at 00:00

## 2023-10-26 DIAGNOSIS — R31.0 GROSS HEMATURIA: ICD-10-CM

## 2023-10-26 DIAGNOSIS — C61 MALIGNANT NEOPLASM OF PROSTATE: ICD-10-CM

## 2023-10-26 LAB
APPEARANCE: ABNORMAL
BACTERIA UR CULT: NORMAL
BACTERIA: NEGATIVE /HPF
BILIRUBIN URINE: NEGATIVE
BLOOD URINE: ABNORMAL
CAST: 3 /LPF
COLOR: NORMAL
EPITHELIAL CELLS: 2 /HPF
GLUCOSE QUALITATIVE U: NEGATIVE MG/DL
KETONES URINE: NEGATIVE MG/DL
LEUKOCYTE ESTERASE URINE: ABNORMAL
MICROSCOPIC-UA: NORMAL
NITRITE URINE: NEGATIVE
PH URINE: 5.5
PROTEIN URINE: 100 MG/DL
RED BLOOD CELLS URINE: 153 /HPF
REVIEW: NORMAL
SPECIFIC GRAVITY URINE: 1.02
URINE CYTOLOGY: NORMAL
UROBILINOGEN URINE: 0.2 MG/DL
WHITE BLOOD CELLS URINE: 54 /HPF

## 2023-10-31 ENCOUNTER — TRANSCRIPTION ENCOUNTER (OUTPATIENT)
Age: 88
End: 2023-10-31

## 2023-11-02 NOTE — DISCHARGE NOTE NURSING/CASE MANAGEMENT/SOCIAL WORK - NURSING SECTION COMPLETE
Patient/Caregiver provided printed discharge information. I have re-evaluated the patient's fluid status and reviewed vital signs. Clinical perfusion assessment was performed.

## 2023-11-10 ENCOUNTER — EMERGENCY (EMERGENCY)
Facility: HOSPITAL | Age: 88
LOS: 1 days | Discharge: HOME | End: 2023-11-10
Attending: INTERNAL MEDICINE | Admitting: INTERNAL MEDICINE
Payer: MEDICARE

## 2023-11-10 VITALS
OXYGEN SATURATION: 99 % | RESPIRATION RATE: 20 BRPM | TEMPERATURE: 98 F | WEIGHT: 186.07 LBS | HEIGHT: 66 IN | DIASTOLIC BLOOD PRESSURE: 72 MMHG | HEART RATE: 78 BPM | SYSTOLIC BLOOD PRESSURE: 191 MMHG

## 2023-11-10 VITALS
SYSTOLIC BLOOD PRESSURE: 125 MMHG | TEMPERATURE: 98 F | RESPIRATION RATE: 18 BRPM | OXYGEN SATURATION: 100 % | DIASTOLIC BLOOD PRESSURE: 68 MMHG | HEART RATE: 69 BPM

## 2023-11-10 DIAGNOSIS — Z98.89 OTHER SPECIFIED POSTPROCEDURAL STATES: Chronic | ICD-10-CM

## 2023-11-10 LAB
APPEARANCE UR: ABNORMAL
APPEARANCE UR: ABNORMAL
BACTERIA # UR AUTO: ABNORMAL /HPF
BACTERIA # UR AUTO: ABNORMAL /HPF
BILIRUB UR-MCNC: NEGATIVE — SIGNIFICANT CHANGE UP
BILIRUB UR-MCNC: NEGATIVE — SIGNIFICANT CHANGE UP
COLOR SPEC: YELLOW — SIGNIFICANT CHANGE UP
COLOR SPEC: YELLOW — SIGNIFICANT CHANGE UP
DIFF PNL FLD: ABNORMAL
DIFF PNL FLD: ABNORMAL
EPI CELLS # UR: SIGNIFICANT CHANGE UP
EPI CELLS # UR: SIGNIFICANT CHANGE UP
GLUCOSE UR QL: NEGATIVE MG/DL — SIGNIFICANT CHANGE UP
GLUCOSE UR QL: NEGATIVE MG/DL — SIGNIFICANT CHANGE UP
KETONES UR-MCNC: NEGATIVE MG/DL — SIGNIFICANT CHANGE UP
KETONES UR-MCNC: NEGATIVE MG/DL — SIGNIFICANT CHANGE UP
LEUKOCYTE ESTERASE UR-ACNC: ABNORMAL
LEUKOCYTE ESTERASE UR-ACNC: ABNORMAL
NITRITE UR-MCNC: NEGATIVE — SIGNIFICANT CHANGE UP
NITRITE UR-MCNC: NEGATIVE — SIGNIFICANT CHANGE UP
PH UR: 5.5 — SIGNIFICANT CHANGE UP (ref 5–8)
PH UR: 5.5 — SIGNIFICANT CHANGE UP (ref 5–8)
PROT UR-MCNC: 100 MG/DL
PROT UR-MCNC: 100 MG/DL
RBC CASTS # UR COMP ASSIST: >50 /HPF — SIGNIFICANT CHANGE UP (ref 0–4)
RBC CASTS # UR COMP ASSIST: >50 /HPF — SIGNIFICANT CHANGE UP (ref 0–4)
SP GR SPEC: 1.02 — SIGNIFICANT CHANGE UP (ref 1–1.03)
SP GR SPEC: 1.02 — SIGNIFICANT CHANGE UP (ref 1–1.03)
UROBILINOGEN FLD QL: 0.2 MG/DL — SIGNIFICANT CHANGE UP (ref 0.2–1)
UROBILINOGEN FLD QL: 0.2 MG/DL — SIGNIFICANT CHANGE UP (ref 0.2–1)
WBC UR QL: SIGNIFICANT CHANGE UP /HPF (ref 0–5)
WBC UR QL: SIGNIFICANT CHANGE UP /HPF (ref 0–5)

## 2023-11-10 PROCEDURE — 51702 INSERT TEMP BLADDER CATH: CPT

## 2023-11-10 PROCEDURE — 99283 EMERGENCY DEPT VISIT LOW MDM: CPT | Mod: 25

## 2023-11-10 PROCEDURE — 99284 EMERGENCY DEPT VISIT MOD MDM: CPT | Mod: 25

## 2023-11-10 PROCEDURE — 81001 URINALYSIS AUTO W/SCOPE: CPT

## 2023-11-10 RX ORDER — CEFUROXIME AXETIL 250 MG
1 TABLET ORAL
Qty: 20 | Refills: 0
Start: 2023-11-10 | End: 2023-11-19

## 2023-11-10 RX ORDER — CEFUROXIME AXETIL 250 MG
500 TABLET ORAL ONCE
Refills: 0 | Status: COMPLETED | OUTPATIENT
Start: 2023-11-10 | End: 2023-11-10

## 2023-11-10 RX ADMIN — Medication 500 MILLIGRAM(S): at 03:56

## 2023-11-10 NOTE — ED PROVIDER NOTE - PATIENT PORTAL LINK FT
You can access the FollowMyHealth Patient Portal offered by [facility] by registering at the following website: [email or web address]. By joining [employer]’s FollowMyHealth portal, you will also be able to view your health information using other applications (apps) compatible with our system.

## 2023-11-10 NOTE — ED PROVIDER NOTE - NSFOLLOWUPINSTRUCTIONS_ED_ALL_ED_FT
Follow up with your urologist or the referral provided   Take Ceftin 500mg twice daily for the Urinary Tract Infection    A urinary tract infection (UTI) is an infection of any part of the urinary tract, which includes the kidneys, ureters, bladder, and urethra. Risk factors include ignoring your need to urinate, wiping back to front if female, being an uncircumcised male, and having diabetes or a weak immune system. Symptoms include frequent urination, pain or burning with urination, foul smelling urine, cloudy urine, pain in the lower abdomen, blood in the urine, and fever. If you were prescribed an antibiotic medicine, take it as told by your health care provider. Do not stop taking the antibiotic even if you start to feel better.    SEEK IMMEDIATE MEDICAL CARE IF YOU HAVE ANY OF THE FOLLOWING SYMPTOMS: severe back or abdominal pain, fever, inability to keep fluids or medicine down, dizziness/lightheadedness, or a change in mental status.

## 2023-11-10 NOTE — ED ADULT NURSE REASSESSMENT NOTE - NS ED NURSE REASSESS COMMENT FT1
pt with 14 FR coude in place by MD Ferrari pt with clear urine output  noted and urine sent to lab awaiting for dispo
pt stable reavaluated medicated with ceftin and chinchilla changed to leg bag vital signs stable urine clear and d/c home to follow up with urologist

## 2023-11-10 NOTE — ED ADULT NURSE NOTE - OBJECTIVE STATEMENT
received pt with c/o urinary complications last urination at 2000 pt evaluated by MD and chinchilla in place

## 2023-11-10 NOTE — ED PROVIDER NOTE - CONSTITUTIONAL, MLM
Progress Notes by Magui Jara PTA at 2017  4:30 PM     Author: Magui Jara PTA Service: -- Author Type: Physical Therapist Assistant    Filed: 2017  5:41 PM Encounter Date: 2017 Status: Attested    : Magui Jara PTA (Physical Therapist Assistant) Cosigner: Steffanie Pedroza PT at 2017  8:59 AM    Attestation signed by Steffanie Pedroza PT at 2017  8:59 AM    Pt will be discharged. Goals are met.                [facility] Daily Progress     Patient Name: Hillary Signh  Date: 2017  Visit #: 7  PTA visit #: 5  Referral Diagnosis: Acute right - sided low back pain without sciatica  Referring provider: Rachel Mckeon PA-C  Visit Diagnosis:     ICD-10-CM    1. Acute right-sided low back pain without sciatica M54.5    2. Generalized muscle weakness M62.81          Assessment:     Initial goals have been met.  Pt should do well with an independent home program.  NIKKO: 20%  Goal Status: Met  Pt. will be independent with home exercise program in : 4 weeks (to self-manage apin and improve function) MET  Pt will: do yardwork for 2 hours with less than 3/10 pain in 8 weeks: Met  Pt will: be able to walk for 60 min for return to recreational activities in 8 weeks with less than 3/10 pain. Met     Plan / Patient Education:     No further visits scheduled at this time. Pt was instructed to call this clinic if questions arise.     Subjective:   Pt reports she has not had any R LE tingling since her last visit. Pt had recently taken a long road trip. Pt states she did get out of her car every 2 hours.   Pt states she is 90% improved.   Pt was able to mow her lawn for 10 minutes. Pt report some aggravation with this activity.     Pain Ratin/10        Objective:   Lumbar ROM: All planes WNL and painfree  LE strength:   Hip extension: 5/5 B  Hip AB: 5/5 B  Hip ER: +4/5 B        Treatment Today     TREATMENT MINUTES COMMENTS   Evaluation     Self-care/ Home  management     Manual therapy     Neuromuscular Re-education     Therapeutic Activity     Therapeutic Exercises 30 See flow sheet  Reviewed and finalized HEP.   Gait training     Modality__________________                Total 30    Blank areas are intentional and mean the treatment did not include these items.       Magui Jara, PTA,CLT  9/25/2017          Well appearing, awake, alert, oriented to person, place, time/situation and in mild to moderate  distress. normal...

## 2023-11-10 NOTE — ED PROVIDER NOTE - CARE PROVIDER_API CALL
Norman Lester Olaf  Urology  [address] [address], [address]  [place] [address]  Phone: [phone]  Fax: [phone]  Follow Up Time: 1-3 Days

## 2023-11-10 NOTE — ED PROVIDER NOTE - CLINICAL SUMMARY MEDICAL DECISION MAKING FREE TEXT BOX
acute urinary retention, Garrett cathter inserted, Take Ceftin 500mg twice daily for the Urinary Tract Infection

## 2023-11-14 ENCOUNTER — OUTPATIENT (OUTPATIENT)
Dept: OUTPATIENT SERVICES | Facility: HOSPITAL | Age: 88
LOS: 1 days | End: 2023-11-14
Payer: MEDICARE

## 2023-11-14 ENCOUNTER — APPOINTMENT (OUTPATIENT)
Dept: UROLOGY | Facility: CLINIC | Age: 88
End: 2023-11-14

## 2023-11-14 ENCOUNTER — EMERGENCY (EMERGENCY)
Facility: HOSPITAL | Age: 88
LOS: 1 days | Discharge: HOME | End: 2023-11-14
Attending: EMERGENCY MEDICINE | Admitting: EMERGENCY MEDICINE
Payer: MEDICARE

## 2023-11-14 ENCOUNTER — APPOINTMENT (OUTPATIENT)
Dept: UROLOGY | Facility: CLINIC | Age: 88
End: 2023-11-14
Payer: MEDICARE

## 2023-11-14 VITALS — DIASTOLIC BLOOD PRESSURE: 73 MMHG | HEART RATE: 73 BPM | RESPIRATION RATE: 16 BRPM | SYSTOLIC BLOOD PRESSURE: 171 MMHG

## 2023-11-14 VITALS
RESPIRATION RATE: 18 BRPM | DIASTOLIC BLOOD PRESSURE: 72 MMHG | OXYGEN SATURATION: 98 % | TEMPERATURE: 98 F | HEART RATE: 80 BPM | SYSTOLIC BLOOD PRESSURE: 140 MMHG

## 2023-11-14 VITALS
OXYGEN SATURATION: 97 % | HEART RATE: 78 BPM | DIASTOLIC BLOOD PRESSURE: 65 MMHG | RESPIRATION RATE: 18 BRPM | HEIGHT: 66 IN | SYSTOLIC BLOOD PRESSURE: 145 MMHG | WEIGHT: 184.97 LBS

## 2023-11-14 DIAGNOSIS — Z98.89 OTHER SPECIFIED POSTPROCEDURAL STATES: Chronic | ICD-10-CM

## 2023-11-14 DIAGNOSIS — Z97.8 PRESENCE OF OTHER SPECIFIED DEVICES: ICD-10-CM

## 2023-11-14 DIAGNOSIS — R33.8 OTHER RETENTION OF URINE: ICD-10-CM

## 2023-11-14 DIAGNOSIS — R35.0 FREQUENCY OF MICTURITION: ICD-10-CM

## 2023-11-14 PROCEDURE — 51700 IRRIGATION OF BLADDER: CPT

## 2023-11-14 PROCEDURE — 99283 EMERGENCY DEPT VISIT LOW MDM: CPT | Mod: 25

## 2023-11-14 PROCEDURE — 51702 INSERT TEMP BLADDER CATH: CPT

## 2023-11-14 PROCEDURE — 51798 US URINE CAPACITY MEASURE: CPT

## 2023-11-14 PROCEDURE — 99284 EMERGENCY DEPT VISIT MOD MDM: CPT

## 2023-11-14 NOTE — ED ADULT NURSE NOTE - NSICDXPASTMEDICALHX_GEN_ALL_CORE_FT
06-Jan-2020 10:00 PAST MEDICAL HISTORY:  HLD (hyperlipidemia)     HTN (hypertension)     Prostate cancer

## 2023-11-14 NOTE — ED PROVIDER NOTE - PHYSICAL EXAMINATION
Gen: Alert, NAD  Head/eyes: NC/AT, PERRL  ENT: airway patent  Neck: supple  Pulm/lung: Bilateral clear BS  CV/heart: RRR  GI/Abd: soft, NT/ND, +suprapubic fullness +BS, no guarding/rebound tenderness  Musculoskeletal: no edema/erythema/cyanosis  Skin: no rash  Neuro: AAOx3, grossly intact

## 2023-11-14 NOTE — ED ADULT NURSE NOTE - NSFALLHARMRISKINTERV_ED_ALL_ED

## 2023-11-14 NOTE — ED PROVIDER NOTE - CLINICAL SUMMARY MEDICAL DECISION MAKING FREE TEXT BOX
89-year-old male history of hyperlipidemia hypertension prostate CA status post prostatectomy complaining of difficulty urinating since having his Chinchilla removed at his urologist office around 4 PM today.  Was seen 4 days ago for similar complaint had a Chinchilla placed in the ED started on Ceftin currently still on it.  Denies any fever or chills.  Denies any nausea vomiting.  Urologist Dr. Sánchez Chase.    urinary retention, chinchilla catheter, pt on ceftin currently, f/u with urologist

## 2023-11-14 NOTE — ED PROVIDER NOTE - PATIENT PORTAL LINK FT
You can access the FollowMyHealth Patient Portal offered by [facility] by registering at the following website: [email or web address]. By joining [employer]’s FollowMyHealth portal, you will also be able to view your health information using other applications (apps) compatible with our system.

## 2023-11-14 NOTE — ED PROVIDER NOTE - CARE PROVIDER_API CALL
Chloe Chase  Urology  [address] [place] [address], [address] [address]  Phone: [phone]  Fax: [phone]  Follow Up Time:

## 2023-11-14 NOTE — ED PROVIDER NOTE - OBJECTIVE STATEMENT
89-year-old male history of hyperlipidemia hypertension prostate CA status post prostatectomy complaining of difficulty urinating since having his Garrett removed at his urologist office around 4 PM today.  Was seen 4 days ago for similar complaint had a Garrett placed in the ED started on Ceftin currently still on it.  Denies any fever or chills.  Denies any nausea vomiting.  Urologist Dr. Sánchez Chase.

## 2023-11-14 NOTE — ED PROVIDER NOTE - NSFOLLOWUPINSTRUCTIONS_ED_ALL_ED_FT
1) Follow-up with your urologist. Call today / next business day for prompt follow-up.  2) Return to Emergency room for any worsening or persistent pain, weakness, fever, or any other concerning symptoms.  3) See attached instruction sheets for additional information, including information regarding signs and symptoms to look out for, reasons to seek immediate care and other important instructions.  4) Follow-up with any specialists as discussed / noted as well.  5) Continue ceftin as prescribed. 1) Follow-up with your urologist. Call today / next business day for prompt follow-up.  2) Return to Emergency room for any worsening or persistent pain, weakness, fever, or any other concerning symptoms.  3) See attached instruction sheets for additional information, including information regarding signs and symptoms to look out for, reasons to seek immediate care and other important instructions.  4) Follow-up with any specialists as discussed / noted as well.  5) Continue ceftin as prescribed.    Acute urinary retention is a condition in which a person is unable to pass urine or can only pass a little urine. This condition can happen suddenly and last for a short time. If left untreated, it can become long-term (chronic) and result in kidney damage or other serious complications.    What are the causes?  This condition may be caused by:  Obstruction or narrowing of the tube that drains the bladder (urethra). This may be caused by surgery, problems with nearby organs, or injury to the bladder or urethra.  Problems with the nerves in the bladder.  Tumors in the area of the pelvis, bladder, or urethra.  Certain medicines.  Bladder or urinary tract infection.  Constipation.  What increases the risk?  This condition is more likely to develop in older men. As men age, their prostate may become larger and may start to press or squeeze on the bladder or the urethra. Other chronic health conditions can increase the risk of acute urinary retention. These include:  Diseases such as multiple sclerosis.  Spinal cord injuries.  Diabetes.  Degenerative cognitive conditions, such as delirium or dementia.  Psychological conditions. A man may hold his urine due to trauma or because he does not want to use the bathroom.  What are the signs or symptoms?  Symptoms of this condition include:  Trouble urinating.  Pain in the lower abdomen.  How is this diagnosed?  This condition is diagnosed based on a physical exam and your medical history. You may also have other tests, including:  An ultrasound of the bladder or kidneys or both.  Blood tests.  A urine analysis.  Additional tests may be needed, such as a CT scan, MRI, and kidney or bladder function tests.  How is this treated?  Treatment for this condition may include:  Medicines.  Placing a thin, sterile tube (catheter) into the bladder to drain urine out of the body. This is called an indwelling urinary catheter. After it is inserted, the catheter is held in place with a small balloon that is filled with sterile water. Urine drains from the catheter into a collection bag outside of the body.  Behavioral therapy.  Treatment for other conditions.  If needed, you may be treated in the hospital for kidney function problems or to manage other complications.    Follow these instructions at home:  Medicines    Take over-the-counter and prescription medicines only as told by your health care provider. Avoid certain medicines, such as decongestants, antihistamines, and some prescription medicines. Do not take any medicine unless your health care provider approves.  If you were prescribed an antibiotic medicine, take it as told by your health care provider. Do not stop using the antibiotic even if you start to feel better.  General instructions    Do not use any products that contain nicotine or tobacco. These products include cigarettes, chewing tobacco, and vaping devices, such as e-cigarettes. If you need help quitting, ask your health care provider.  Drink enough fluid to keep your urine pale yellow.  If you have an indwelling urinary catheter, follow the instructions from your health care provider.  Monitor any changes in your symptoms. Tell your health care provider about any changes.  If instructed, monitor your blood pressure at home. Report changes as told by your health care provider.  Keep all follow-up visits. This is important.  Contact a health care provider if:  You have uncomfortable bladder contractions that you cannot control (spasms).  You leak urine with the spasms.  Get help right away if:  You have chills or a fever.  You have blood in your urine.  You have a catheter and the following happens:  Your catheter stops draining urine.  Your catheter falls out.  Summary  Acute urinary retention is a condition in which a person is unable to pass urine or can only pass a little urine. If left untreated, this condition can result in kidney damage or other serious complications.  An enlarged prostate may cause this condition. As men age, their prostate gland may become larger and may press or squeeze on the bladder or the urethra.  Treatment for this condition may include medicines and placement of an indwelling urinary catheter.  Monitor any changes in your symptoms. Tell your health care provider about any changes.  This information is not intended to replace advice given to you by your health care provider. Make sure you discuss any questions you have with your health care provider.

## 2023-11-14 NOTE — ED ADULT TRIAGE NOTE - CHIEF COMPLAINT QUOTE
Patient ambulatory to triage due to in ability to urinate.  Patient had Garrett removed from his Urologist office.  (Dr. Chase)

## 2023-11-29 ENCOUNTER — APPOINTMENT (OUTPATIENT)
Dept: INTERNAL MEDICINE | Facility: CLINIC | Age: 88
End: 2023-11-29
Payer: MEDICARE

## 2023-11-29 ENCOUNTER — OUTPATIENT (OUTPATIENT)
Dept: OUTPATIENT SERVICES | Facility: HOSPITAL | Age: 88
LOS: 1 days | End: 2023-11-29
Payer: MEDICARE

## 2023-11-29 VITALS
BODY MASS INDEX: 29.19 KG/M2 | HEIGHT: 67 IN | SYSTOLIC BLOOD PRESSURE: 160 MMHG | OXYGEN SATURATION: 90 % | DIASTOLIC BLOOD PRESSURE: 60 MMHG | HEART RATE: 57 BPM | WEIGHT: 186 LBS

## 2023-11-29 VITALS — DIASTOLIC BLOOD PRESSURE: 50 MMHG | SYSTOLIC BLOOD PRESSURE: 122 MMHG

## 2023-11-29 DIAGNOSIS — Z87.898 PERSONAL HISTORY OF OTHER SPECIFIED CONDITIONS: ICD-10-CM

## 2023-11-29 DIAGNOSIS — I10 ESSENTIAL (PRIMARY) HYPERTENSION: ICD-10-CM

## 2023-11-29 DIAGNOSIS — Z23 ENCOUNTER FOR IMMUNIZATION: ICD-10-CM

## 2023-11-29 DIAGNOSIS — F41.9 ANXIETY DISORDER, UNSPECIFIED: ICD-10-CM

## 2023-11-29 DIAGNOSIS — Z13.39 ENCOUNTER FOR SCREENING EXAM FOR OTHER MENTAL HEALTH AND BEHAVIORAL DISORDERS: ICD-10-CM

## 2023-11-29 DIAGNOSIS — Z00.00 ENCOUNTER FOR GENERAL ADULT MEDICAL EXAMINATION WITHOUT ABNORMAL FINDINGS: ICD-10-CM

## 2023-11-29 DIAGNOSIS — Z98.89 OTHER SPECIFIED POSTPROCEDURAL STATES: Chronic | ICD-10-CM

## 2023-11-29 DIAGNOSIS — D64.9 ANEMIA, UNSPECIFIED: ICD-10-CM

## 2023-11-29 DIAGNOSIS — Z86.69 PERSONAL HISTORY OF OTHER DISEASES OF THE NERVOUS SYSTEM AND SENSE ORGANS: ICD-10-CM

## 2023-11-29 DIAGNOSIS — Z01.818 ENCOUNTER FOR OTHER PREPROCEDURAL EXAMINATION: ICD-10-CM

## 2023-11-29 DIAGNOSIS — F32.A ANXIETY DISORDER, UNSPECIFIED: ICD-10-CM

## 2023-11-29 DIAGNOSIS — Z87.891 PERSONAL HISTORY OF NICOTINE DEPENDENCE: ICD-10-CM

## 2023-11-29 DIAGNOSIS — Z13.31 ENCOUNTER FOR SCREENING FOR DEPRESSION: ICD-10-CM

## 2023-11-29 DIAGNOSIS — F32.A DEPRESSION, UNSPECIFIED: ICD-10-CM

## 2023-11-29 PROCEDURE — G0439: CPT

## 2023-11-29 RX ORDER — APALUTAMIDE 240 MG/1
TABLET, FILM COATED ORAL
Refills: 0 | Status: ACTIVE | COMMUNITY
Start: 2023-06-21

## 2023-11-29 RX ORDER — TAMSULOSIN HYDROCHLORIDE 0.4 MG/1
0.4 CAPSULE ORAL
Qty: 90 | Refills: 0 | Status: DISCONTINUED | COMMUNITY
Start: 2023-11-14 | End: 2023-11-29

## 2023-11-29 RX ORDER — CEFUROXIME AXETIL 500 MG/1
500 TABLET ORAL
Qty: 60 | Refills: 3 | Status: DISCONTINUED | COMMUNITY
Start: 2023-07-24 | End: 2023-11-29

## 2023-11-30 ENCOUNTER — TRANSCRIPTION ENCOUNTER (OUTPATIENT)
Age: 88
End: 2023-11-30

## 2023-11-30 ENCOUNTER — NON-APPOINTMENT (OUTPATIENT)
Age: 88
End: 2023-11-30

## 2023-11-30 LAB
ALBUMIN SERPL ELPH-MCNC: 4.1 G/DL
ALP BLD-CCNC: 61 U/L
ALT SERPL-CCNC: 9 U/L
ANION GAP SERPL CALC-SCNC: 13 MMOL/L
AST SERPL-CCNC: 31 U/L
BILIRUB SERPL-MCNC: 0.5 MG/DL
BUN SERPL-MCNC: 14 MG/DL
CALCIUM SERPL-MCNC: 9.6 MG/DL
CHLORIDE SERPL-SCNC: 99 MMOL/L
CHOLEST SERPL-MCNC: 183 MG/DL
CO2 SERPL-SCNC: 25 MMOL/L
CREAT SERPL-MCNC: 1.17 MG/DL
EGFR: 59 ML/MIN/1.73M2
ESTIMATED AVERAGE GLUCOSE: 114 MG/DL
FERRITIN SERPL-MCNC: 123 NG/ML
GLUCOSE SERPL-MCNC: 121 MG/DL
HBA1C MFR BLD HPLC: 5.6 %
HCT VFR BLD CALC: 40.2 %
HDLC SERPL-MCNC: 42 MG/DL
HGB BLD-MCNC: 12.9 G/DL
IRON SATN MFR SERPL: 22 %
IRON SERPL-MCNC: 65 UG/DL
LDLC SERPL CALC-MCNC: 117 MG/DL
MCHC RBC-ENTMCNC: 31.9 PG
MCHC RBC-ENTMCNC: 32.1 GM/DL
MCV RBC AUTO: 99.3 FL
NONHDLC SERPL-MCNC: 142 MG/DL
PLATELET # BLD AUTO: 233 K/UL
POTASSIUM SERPL-SCNC: 5.1 MMOL/L
PROT SERPL-MCNC: 7.3 G/DL
RBC # BLD: 4.05 M/UL
RBC # FLD: 12.2 %
SODIUM SERPL-SCNC: 137 MMOL/L
TIBC SERPL-MCNC: 291 UG/DL
TRIGL SERPL-MCNC: 138 MG/DL
TSH SERPL-ACNC: 29.2 UIU/ML
UIBC SERPL-MCNC: 226 UG/DL
VIT B12 SERPL-MCNC: 633 PG/ML
WBC # FLD AUTO: 6.07 K/UL

## 2023-12-01 ENCOUNTER — RX RENEWAL (OUTPATIENT)
Age: 88
End: 2023-12-01

## 2023-12-03 ENCOUNTER — EMERGENCY (EMERGENCY)
Facility: HOSPITAL | Age: 88
LOS: 1 days | Discharge: HOME | End: 2023-12-03
Attending: STUDENT IN AN ORGANIZED HEALTH CARE EDUCATION/TRAINING PROGRAM | Admitting: STUDENT IN AN ORGANIZED HEALTH CARE EDUCATION/TRAINING PROGRAM
Payer: MEDICARE

## 2023-12-03 ENCOUNTER — INPATIENT (INPATIENT)
Facility: HOSPITAL | Age: 88
LOS: 2 days | Discharge: HOME HEALTH | DRG: 700 | End: 2023-12-06
Attending: UROLOGY | Admitting: UROLOGY
Payer: MEDICARE

## 2023-12-03 VITALS
RESPIRATION RATE: 16 BRPM | WEIGHT: 186.07 LBS | HEART RATE: 78 BPM | TEMPERATURE: 98 F | SYSTOLIC BLOOD PRESSURE: 100 MMHG | OXYGEN SATURATION: 98 % | DIASTOLIC BLOOD PRESSURE: 61 MMHG | HEIGHT: 66 IN

## 2023-12-03 VITALS
OXYGEN SATURATION: 96 % | DIASTOLIC BLOOD PRESSURE: 68 MMHG | TEMPERATURE: 98 F | HEART RATE: 86 BPM | RESPIRATION RATE: 16 BRPM | SYSTOLIC BLOOD PRESSURE: 122 MMHG

## 2023-12-03 VITALS
OXYGEN SATURATION: 98 % | SYSTOLIC BLOOD PRESSURE: 91 MMHG | RESPIRATION RATE: 15 BRPM | HEART RATE: 96 BPM | HEIGHT: 66 IN | WEIGHT: 184.97 LBS | DIASTOLIC BLOOD PRESSURE: 56 MMHG | TEMPERATURE: 98 F

## 2023-12-03 DIAGNOSIS — R31.0 GROSS HEMATURIA: ICD-10-CM

## 2023-12-03 DIAGNOSIS — Z98.89 OTHER SPECIFIED POSTPROCEDURAL STATES: Chronic | ICD-10-CM

## 2023-12-03 LAB
ALBUMIN SERPL ELPH-MCNC: 3.2 G/DL — LOW (ref 3.3–5)
ALBUMIN SERPL ELPH-MCNC: 3.2 G/DL — LOW (ref 3.3–5)
ALBUMIN SERPL ELPH-MCNC: 3.6 G/DL — SIGNIFICANT CHANGE UP (ref 3.3–5)
ALBUMIN SERPL ELPH-MCNC: 3.6 G/DL — SIGNIFICANT CHANGE UP (ref 3.3–5)
ALP SERPL-CCNC: 54 U/L — SIGNIFICANT CHANGE UP (ref 40–120)
ALP SERPL-CCNC: 54 U/L — SIGNIFICANT CHANGE UP (ref 40–120)
ALP SERPL-CCNC: 55 U/L — SIGNIFICANT CHANGE UP (ref 40–120)
ALP SERPL-CCNC: 55 U/L — SIGNIFICANT CHANGE UP (ref 40–120)
ALT FLD-CCNC: 14 U/L — SIGNIFICANT CHANGE UP (ref 12–78)
ALT FLD-CCNC: 14 U/L — SIGNIFICANT CHANGE UP (ref 12–78)
ALT FLD-CCNC: 7 U/L — LOW (ref 10–45)
ALT FLD-CCNC: 7 U/L — LOW (ref 10–45)
ANION GAP SERPL CALC-SCNC: 14 MMOL/L — SIGNIFICANT CHANGE UP (ref 5–17)
ANION GAP SERPL CALC-SCNC: 14 MMOL/L — SIGNIFICANT CHANGE UP (ref 5–17)
ANION GAP SERPL CALC-SCNC: 8 MMOL/L — SIGNIFICANT CHANGE UP (ref 5–17)
ANION GAP SERPL CALC-SCNC: 8 MMOL/L — SIGNIFICANT CHANGE UP (ref 5–17)
APPEARANCE UR: ABNORMAL
APTT BLD: 33.8 SEC — SIGNIFICANT CHANGE UP (ref 24.5–35.6)
APTT BLD: 33.8 SEC — SIGNIFICANT CHANGE UP (ref 24.5–35.6)
AST SERPL-CCNC: 29 U/L — SIGNIFICANT CHANGE UP (ref 10–40)
AST SERPL-CCNC: 29 U/L — SIGNIFICANT CHANGE UP (ref 10–40)
AST SERPL-CCNC: 34 U/L — SIGNIFICANT CHANGE UP (ref 15–37)
AST SERPL-CCNC: 34 U/L — SIGNIFICANT CHANGE UP (ref 15–37)
BACTERIA # UR AUTO: ABNORMAL /HPF
BASOPHILS # BLD AUTO: 0.01 K/UL — SIGNIFICANT CHANGE UP (ref 0–0.2)
BASOPHILS # BLD AUTO: 0.01 K/UL — SIGNIFICANT CHANGE UP (ref 0–0.2)
BASOPHILS # BLD AUTO: 0.02 K/UL — SIGNIFICANT CHANGE UP (ref 0–0.2)
BASOPHILS # BLD AUTO: 0.02 K/UL — SIGNIFICANT CHANGE UP (ref 0–0.2)
BASOPHILS NFR BLD AUTO: 0.2 % — SIGNIFICANT CHANGE UP (ref 0–2)
BASOPHILS NFR BLD AUTO: 0.2 % — SIGNIFICANT CHANGE UP (ref 0–2)
BASOPHILS NFR BLD AUTO: 0.4 % — SIGNIFICANT CHANGE UP (ref 0–2)
BASOPHILS NFR BLD AUTO: 0.4 % — SIGNIFICANT CHANGE UP (ref 0–2)
BILIRUB SERPL-MCNC: 0.3 MG/DL — SIGNIFICANT CHANGE UP (ref 0.2–1.2)
BILIRUB SERPL-MCNC: 0.3 MG/DL — SIGNIFICANT CHANGE UP (ref 0.2–1.2)
BILIRUB SERPL-MCNC: 0.5 MG/DL — SIGNIFICANT CHANGE UP (ref 0.2–1.2)
BILIRUB SERPL-MCNC: 0.5 MG/DL — SIGNIFICANT CHANGE UP (ref 0.2–1.2)
BILIRUB UR-MCNC: ABNORMAL
BILIRUB UR-MCNC: ABNORMAL
BILIRUB UR-MCNC: NEGATIVE — SIGNIFICANT CHANGE UP
BILIRUB UR-MCNC: NEGATIVE — SIGNIFICANT CHANGE UP
BLD GP AB SCN SERPL QL: NEGATIVE — SIGNIFICANT CHANGE UP
BLD GP AB SCN SERPL QL: NEGATIVE — SIGNIFICANT CHANGE UP
BUN SERPL-MCNC: 21 MG/DL — SIGNIFICANT CHANGE UP (ref 7–23)
CALCIUM SERPL-MCNC: 9.1 MG/DL — SIGNIFICANT CHANGE UP (ref 8.4–10.5)
CALCIUM SERPL-MCNC: 9.1 MG/DL — SIGNIFICANT CHANGE UP (ref 8.4–10.5)
CALCIUM SERPL-MCNC: 9.2 MG/DL — SIGNIFICANT CHANGE UP (ref 8.5–10.1)
CALCIUM SERPL-MCNC: 9.2 MG/DL — SIGNIFICANT CHANGE UP (ref 8.5–10.1)
CAST: 0 /LPF — SIGNIFICANT CHANGE UP (ref 0–4)
CAST: 0 /LPF — SIGNIFICANT CHANGE UP (ref 0–4)
CHLORIDE SERPL-SCNC: 103 MMOL/L — SIGNIFICANT CHANGE UP (ref 96–108)
CHLORIDE SERPL-SCNC: 103 MMOL/L — SIGNIFICANT CHANGE UP (ref 96–108)
CHLORIDE SERPL-SCNC: 99 MMOL/L — SIGNIFICANT CHANGE UP (ref 96–108)
CHLORIDE SERPL-SCNC: 99 MMOL/L — SIGNIFICANT CHANGE UP (ref 96–108)
CO2 SERPL-SCNC: 24 MMOL/L — SIGNIFICANT CHANGE UP (ref 22–31)
CO2 SERPL-SCNC: 24 MMOL/L — SIGNIFICANT CHANGE UP (ref 22–31)
CO2 SERPL-SCNC: 26 MMOL/L — SIGNIFICANT CHANGE UP (ref 22–31)
CO2 SERPL-SCNC: 26 MMOL/L — SIGNIFICANT CHANGE UP (ref 22–31)
COLOR SPEC: ABNORMAL
COMMENT - URINE: SIGNIFICANT CHANGE UP
COMMENT - URINE: SIGNIFICANT CHANGE UP
CREAT SERPL-MCNC: 1.3 MG/DL — SIGNIFICANT CHANGE UP (ref 0.5–1.3)
CREAT SERPL-MCNC: 1.3 MG/DL — SIGNIFICANT CHANGE UP (ref 0.5–1.3)
CREAT SERPL-MCNC: 1.34 MG/DL — HIGH (ref 0.5–1.3)
CREAT SERPL-MCNC: 1.34 MG/DL — HIGH (ref 0.5–1.3)
DIFF PNL FLD: ABNORMAL
EGFR: 50 ML/MIN/1.73M2 — LOW
EGFR: 50 ML/MIN/1.73M2 — LOW
EGFR: 52 ML/MIN/1.73M2 — LOW
EGFR: 52 ML/MIN/1.73M2 — LOW
EOSINOPHIL # BLD AUTO: 0.03 K/UL — SIGNIFICANT CHANGE UP (ref 0–0.5)
EOSINOPHIL # BLD AUTO: 0.03 K/UL — SIGNIFICANT CHANGE UP (ref 0–0.5)
EOSINOPHIL # BLD AUTO: 0.06 K/UL — SIGNIFICANT CHANGE UP (ref 0–0.5)
EOSINOPHIL # BLD AUTO: 0.06 K/UL — SIGNIFICANT CHANGE UP (ref 0–0.5)
EOSINOPHIL NFR BLD AUTO: 0.6 % — SIGNIFICANT CHANGE UP (ref 0–6)
EOSINOPHIL NFR BLD AUTO: 0.6 % — SIGNIFICANT CHANGE UP (ref 0–6)
EOSINOPHIL NFR BLD AUTO: 1 % — SIGNIFICANT CHANGE UP (ref 0–6)
EOSINOPHIL NFR BLD AUTO: 1 % — SIGNIFICANT CHANGE UP (ref 0–6)
GLUCOSE SERPL-MCNC: 116 MG/DL — HIGH (ref 70–99)
GLUCOSE SERPL-MCNC: 116 MG/DL — HIGH (ref 70–99)
GLUCOSE SERPL-MCNC: 140 MG/DL — HIGH (ref 70–99)
GLUCOSE SERPL-MCNC: 140 MG/DL — HIGH (ref 70–99)
GLUCOSE UR QL: ABNORMAL
GLUCOSE UR QL: ABNORMAL
GLUCOSE UR QL: NEGATIVE MG/DL — SIGNIFICANT CHANGE UP
GLUCOSE UR QL: NEGATIVE MG/DL — SIGNIFICANT CHANGE UP
HCT VFR BLD CALC: 30.9 % — LOW (ref 39–50)
HCT VFR BLD CALC: 30.9 % — LOW (ref 39–50)
HCT VFR BLD CALC: 31.3 % — LOW (ref 39–50)
HCT VFR BLD CALC: 31.3 % — LOW (ref 39–50)
HGB BLD-MCNC: 10.5 G/DL — LOW (ref 13–17)
HGB BLD-MCNC: 10.5 G/DL — LOW (ref 13–17)
HGB BLD-MCNC: 10.7 G/DL — LOW (ref 13–17)
HGB BLD-MCNC: 10.7 G/DL — LOW (ref 13–17)
IMM GRANULOCYTES NFR BLD AUTO: 0.3 % — SIGNIFICANT CHANGE UP (ref 0–0.9)
IMM GRANULOCYTES NFR BLD AUTO: 0.3 % — SIGNIFICANT CHANGE UP (ref 0–0.9)
IMM GRANULOCYTES NFR BLD AUTO: 0.4 % — SIGNIFICANT CHANGE UP (ref 0–0.9)
IMM GRANULOCYTES NFR BLD AUTO: 0.4 % — SIGNIFICANT CHANGE UP (ref 0–0.9)
INR BLD: 1.14 RATIO — SIGNIFICANT CHANGE UP (ref 0.85–1.18)
INR BLD: 1.14 RATIO — SIGNIFICANT CHANGE UP (ref 0.85–1.18)
KETONES UR-MCNC: ABNORMAL
KETONES UR-MCNC: ABNORMAL
KETONES UR-MCNC: NEGATIVE MG/DL — SIGNIFICANT CHANGE UP
KETONES UR-MCNC: NEGATIVE MG/DL — SIGNIFICANT CHANGE UP
LEUKOCYTE ESTERASE UR-ACNC: ABNORMAL
LYMPHOCYTES # BLD AUTO: 1.18 K/UL — SIGNIFICANT CHANGE UP (ref 1–3.3)
LYMPHOCYTES # BLD AUTO: 1.18 K/UL — SIGNIFICANT CHANGE UP (ref 1–3.3)
LYMPHOCYTES # BLD AUTO: 1.25 K/UL — SIGNIFICANT CHANGE UP (ref 1–3.3)
LYMPHOCYTES # BLD AUTO: 1.25 K/UL — SIGNIFICANT CHANGE UP (ref 1–3.3)
LYMPHOCYTES # BLD AUTO: 20.1 % — SIGNIFICANT CHANGE UP (ref 13–44)
LYMPHOCYTES # BLD AUTO: 20.1 % — SIGNIFICANT CHANGE UP (ref 13–44)
LYMPHOCYTES # BLD AUTO: 24.3 % — SIGNIFICANT CHANGE UP (ref 13–44)
LYMPHOCYTES # BLD AUTO: 24.3 % — SIGNIFICANT CHANGE UP (ref 13–44)
MCHC RBC-ENTMCNC: 32.8 PG — SIGNIFICANT CHANGE UP (ref 27–34)
MCHC RBC-ENTMCNC: 34 GM/DL — SIGNIFICANT CHANGE UP (ref 32–36)
MCHC RBC-ENTMCNC: 34 GM/DL — SIGNIFICANT CHANGE UP (ref 32–36)
MCHC RBC-ENTMCNC: 34.2 GM/DL — SIGNIFICANT CHANGE UP (ref 32–36)
MCHC RBC-ENTMCNC: 34.2 GM/DL — SIGNIFICANT CHANGE UP (ref 32–36)
MCV RBC AUTO: 96 FL — SIGNIFICANT CHANGE UP (ref 80–100)
MCV RBC AUTO: 96 FL — SIGNIFICANT CHANGE UP (ref 80–100)
MCV RBC AUTO: 96.6 FL — SIGNIFICANT CHANGE UP (ref 80–100)
MCV RBC AUTO: 96.6 FL — SIGNIFICANT CHANGE UP (ref 80–100)
MONOCYTES # BLD AUTO: 0.64 K/UL — SIGNIFICANT CHANGE UP (ref 0–0.9)
MONOCYTES # BLD AUTO: 0.64 K/UL — SIGNIFICANT CHANGE UP (ref 0–0.9)
MONOCYTES # BLD AUTO: 0.71 K/UL — SIGNIFICANT CHANGE UP (ref 0–0.9)
MONOCYTES # BLD AUTO: 0.71 K/UL — SIGNIFICANT CHANGE UP (ref 0–0.9)
MONOCYTES NFR BLD AUTO: 12.1 % — SIGNIFICANT CHANGE UP (ref 2–14)
MONOCYTES NFR BLD AUTO: 12.1 % — SIGNIFICANT CHANGE UP (ref 2–14)
MONOCYTES NFR BLD AUTO: 12.5 % — SIGNIFICANT CHANGE UP (ref 2–14)
MONOCYTES NFR BLD AUTO: 12.5 % — SIGNIFICANT CHANGE UP (ref 2–14)
NEUTROPHILS # BLD AUTO: 3.18 K/UL — SIGNIFICANT CHANGE UP (ref 1.8–7.4)
NEUTROPHILS # BLD AUTO: 3.18 K/UL — SIGNIFICANT CHANGE UP (ref 1.8–7.4)
NEUTROPHILS # BLD AUTO: 3.88 K/UL — SIGNIFICANT CHANGE UP (ref 1.8–7.4)
NEUTROPHILS # BLD AUTO: 3.88 K/UL — SIGNIFICANT CHANGE UP (ref 1.8–7.4)
NEUTROPHILS NFR BLD AUTO: 61.8 % — SIGNIFICANT CHANGE UP (ref 43–77)
NEUTROPHILS NFR BLD AUTO: 61.8 % — SIGNIFICANT CHANGE UP (ref 43–77)
NEUTROPHILS NFR BLD AUTO: 66.3 % — SIGNIFICANT CHANGE UP (ref 43–77)
NEUTROPHILS NFR BLD AUTO: 66.3 % — SIGNIFICANT CHANGE UP (ref 43–77)
NITRITE UR-MCNC: NEGATIVE — SIGNIFICANT CHANGE UP
NITRITE UR-MCNC: NEGATIVE — SIGNIFICANT CHANGE UP
NITRITE UR-MCNC: POSITIVE
NITRITE UR-MCNC: POSITIVE
NRBC # BLD: 0 /100 WBCS — SIGNIFICANT CHANGE UP (ref 0–0)
PH UR: 7.5 — SIGNIFICANT CHANGE UP (ref 5–8)
PH UR: 7.5 — SIGNIFICANT CHANGE UP (ref 5–8)
PH UR: 8.5 — SIGNIFICANT CHANGE UP (ref 5–8)
PH UR: 8.5 — SIGNIFICANT CHANGE UP (ref 5–8)
PLATELET # BLD AUTO: 208 K/UL — SIGNIFICANT CHANGE UP (ref 150–400)
PLATELET # BLD AUTO: 208 K/UL — SIGNIFICANT CHANGE UP (ref 150–400)
PLATELET # BLD AUTO: 217 K/UL — SIGNIFICANT CHANGE UP (ref 150–400)
PLATELET # BLD AUTO: 217 K/UL — SIGNIFICANT CHANGE UP (ref 150–400)
POTASSIUM SERPL-MCNC: 4.4 MMOL/L — SIGNIFICANT CHANGE UP (ref 3.5–5.3)
POTASSIUM SERPL-MCNC: 4.4 MMOL/L — SIGNIFICANT CHANGE UP (ref 3.5–5.3)
POTASSIUM SERPL-MCNC: 5 MMOL/L — SIGNIFICANT CHANGE UP (ref 3.5–5.3)
POTASSIUM SERPL-MCNC: 5 MMOL/L — SIGNIFICANT CHANGE UP (ref 3.5–5.3)
POTASSIUM SERPL-SCNC: 4.4 MMOL/L — SIGNIFICANT CHANGE UP (ref 3.5–5.3)
POTASSIUM SERPL-SCNC: 4.4 MMOL/L — SIGNIFICANT CHANGE UP (ref 3.5–5.3)
POTASSIUM SERPL-SCNC: 5 MMOL/L — SIGNIFICANT CHANGE UP (ref 3.5–5.3)
POTASSIUM SERPL-SCNC: 5 MMOL/L — SIGNIFICANT CHANGE UP (ref 3.5–5.3)
PROT SERPL-MCNC: 6.9 G/DL — SIGNIFICANT CHANGE UP (ref 6–8.3)
PROT SERPL-MCNC: 6.9 G/DL — SIGNIFICANT CHANGE UP (ref 6–8.3)
PROT SERPL-MCNC: 7.1 G/DL — SIGNIFICANT CHANGE UP (ref 6–8.3)
PROT SERPL-MCNC: 7.1 G/DL — SIGNIFICANT CHANGE UP (ref 6–8.3)
PROT UR-MCNC: >=1000 MG/DL
PROT UR-MCNC: >=1000 MG/DL
PROT UR-MCNC: ABNORMAL
PROT UR-MCNC: ABNORMAL
PROTHROM AB SERPL-ACNC: 12.5 SEC — SIGNIFICANT CHANGE UP (ref 9.5–13)
PROTHROM AB SERPL-ACNC: 12.5 SEC — SIGNIFICANT CHANGE UP (ref 9.5–13)
RBC # BLD: 3.2 M/UL — LOW (ref 4.2–5.8)
RBC # BLD: 3.2 M/UL — LOW (ref 4.2–5.8)
RBC # BLD: 3.26 M/UL — LOW (ref 4.2–5.8)
RBC # BLD: 3.26 M/UL — LOW (ref 4.2–5.8)
RBC # FLD: 11.9 % — SIGNIFICANT CHANGE UP (ref 10.3–14.5)
RBC # FLD: 11.9 % — SIGNIFICANT CHANGE UP (ref 10.3–14.5)
RBC # FLD: 12 % — SIGNIFICANT CHANGE UP (ref 10.3–14.5)
RBC # FLD: 12 % — SIGNIFICANT CHANGE UP (ref 10.3–14.5)
RBC CASTS # UR COMP ASSIST: ABNORMAL /HPF
REVIEW: SIGNIFICANT CHANGE UP
REVIEW: SIGNIFICANT CHANGE UP
RH IG SCN BLD-IMP: POSITIVE — SIGNIFICANT CHANGE UP
RH IG SCN BLD-IMP: POSITIVE — SIGNIFICANT CHANGE UP
SODIUM SERPL-SCNC: 137 MMOL/L — SIGNIFICANT CHANGE UP (ref 135–145)
SP GR SPEC: 1.02 — SIGNIFICANT CHANGE UP (ref 1–1.03)
SP GR SPEC: 1.02 — SIGNIFICANT CHANGE UP (ref 1–1.03)
SP GR SPEC: <=1.005 — SIGNIFICANT CHANGE UP
SP GR SPEC: <=1.005 — SIGNIFICANT CHANGE UP
SQUAMOUS # UR AUTO: 0 /HPF — SIGNIFICANT CHANGE UP (ref 0–5)
SQUAMOUS # UR AUTO: 0 /HPF — SIGNIFICANT CHANGE UP (ref 0–5)
UROBILINOGEN FLD QL: 0.2 MG/DL — SIGNIFICANT CHANGE UP (ref 0.2–1)
UROBILINOGEN FLD QL: 0.2 MG/DL — SIGNIFICANT CHANGE UP (ref 0.2–1)
UROBILINOGEN FLD QL: ABNORMAL
UROBILINOGEN FLD QL: ABNORMAL
WBC # BLD: 5.14 K/UL — SIGNIFICANT CHANGE UP (ref 3.8–10.5)
WBC # BLD: 5.14 K/UL — SIGNIFICANT CHANGE UP (ref 3.8–10.5)
WBC # BLD: 5.86 K/UL — SIGNIFICANT CHANGE UP (ref 3.8–10.5)
WBC # BLD: 5.86 K/UL — SIGNIFICANT CHANGE UP (ref 3.8–10.5)
WBC # FLD AUTO: 5.14 K/UL — SIGNIFICANT CHANGE UP (ref 3.8–10.5)
WBC # FLD AUTO: 5.14 K/UL — SIGNIFICANT CHANGE UP (ref 3.8–10.5)
WBC # FLD AUTO: 5.86 K/UL — SIGNIFICANT CHANGE UP (ref 3.8–10.5)
WBC # FLD AUTO: 5.86 K/UL — SIGNIFICANT CHANGE UP (ref 3.8–10.5)
WBC UR QL: 6 /HPF — HIGH (ref 0–5)
WBC UR QL: 6 /HPF — HIGH (ref 0–5)
WBC UR QL: >998 /HPF — HIGH (ref 0–5)
WBC UR QL: >998 /HPF — HIGH (ref 0–5)

## 2023-12-03 PROCEDURE — 74177 CT ABD & PELVIS W/CONTRAST: CPT | Mod: 26,MA

## 2023-12-03 PROCEDURE — 36415 COLL VENOUS BLD VENIPUNCTURE: CPT

## 2023-12-03 PROCEDURE — 99284 EMERGENCY DEPT VISIT MOD MDM: CPT

## 2023-12-03 PROCEDURE — 51703 INSERT BLADDER CATH COMPLEX: CPT

## 2023-12-03 PROCEDURE — 87186 SC STD MICRODIL/AGAR DIL: CPT

## 2023-12-03 PROCEDURE — 85025 COMPLETE CBC W/AUTO DIFF WBC: CPT

## 2023-12-03 PROCEDURE — 87086 URINE CULTURE/COLONY COUNT: CPT

## 2023-12-03 PROCEDURE — 99285 EMERGENCY DEPT VISIT HI MDM: CPT

## 2023-12-03 PROCEDURE — 87077 CULTURE AEROBIC IDENTIFY: CPT

## 2023-12-03 PROCEDURE — 99284 EMERGENCY DEPT VISIT MOD MDM: CPT | Mod: 25

## 2023-12-03 PROCEDURE — 80053 COMPREHEN METABOLIC PANEL: CPT

## 2023-12-03 PROCEDURE — 81001 URINALYSIS AUTO W/SCOPE: CPT

## 2023-12-03 RX ORDER — LISINOPRIL 2.5 MG/1
40 TABLET ORAL DAILY
Refills: 0 | Status: DISCONTINUED | OUTPATIENT
Start: 2023-12-03 | End: 2023-12-06

## 2023-12-03 RX ORDER — ACETAMINOPHEN 500 MG
1000 TABLET ORAL ONCE
Refills: 0 | Status: DISCONTINUED | OUTPATIENT
Start: 2023-12-03 | End: 2023-12-06

## 2023-12-03 RX ORDER — CEFTRIAXONE 500 MG/1
1000 INJECTION, POWDER, FOR SOLUTION INTRAMUSCULAR; INTRAVENOUS ONCE
Refills: 0 | Status: DISCONTINUED | OUTPATIENT
Start: 2023-12-03 | End: 2023-12-03

## 2023-12-03 RX ORDER — ATORVASTATIN CALCIUM 80 MG/1
10 TABLET, FILM COATED ORAL AT BEDTIME
Refills: 0 | Status: DISCONTINUED | OUTPATIENT
Start: 2023-12-03 | End: 2023-12-06

## 2023-12-03 RX ORDER — SENNA PLUS 8.6 MG/1
2 TABLET ORAL AT BEDTIME
Refills: 0 | Status: DISCONTINUED | OUTPATIENT
Start: 2023-12-03 | End: 2023-12-06

## 2023-12-03 RX ORDER — SODIUM CHLORIDE 9 MG/ML
500 INJECTION, SOLUTION INTRAVENOUS ONCE
Refills: 0 | Status: COMPLETED | OUTPATIENT
Start: 2023-12-03 | End: 2023-12-03

## 2023-12-03 RX ORDER — METOPROLOL TARTRATE 50 MG
25 TABLET ORAL DAILY
Refills: 0 | Status: DISCONTINUED | OUTPATIENT
Start: 2023-12-03 | End: 2023-12-06

## 2023-12-03 RX ORDER — HEPARIN SODIUM 5000 [USP'U]/ML
5000 INJECTION INTRAVENOUS; SUBCUTANEOUS EVERY 8 HOURS
Refills: 0 | Status: DISCONTINUED | OUTPATIENT
Start: 2023-12-03 | End: 2023-12-06

## 2023-12-03 RX ORDER — ONDANSETRON 8 MG/1
4 TABLET, FILM COATED ORAL EVERY 6 HOURS
Refills: 0 | Status: DISCONTINUED | OUTPATIENT
Start: 2023-12-03 | End: 2023-12-06

## 2023-12-03 RX ORDER — CEFEPIME 1 G/1
2000 INJECTION, POWDER, FOR SOLUTION INTRAMUSCULAR; INTRAVENOUS ONCE
Refills: 0 | Status: COMPLETED | OUTPATIENT
Start: 2023-12-03 | End: 2023-12-03

## 2023-12-03 RX ORDER — CEFTRIAXONE 500 MG/1
1000 INJECTION, POWDER, FOR SOLUTION INTRAMUSCULAR; INTRAVENOUS EVERY 24 HOURS
Refills: 0 | Status: DISCONTINUED | OUTPATIENT
Start: 2023-12-04 | End: 2023-12-06

## 2023-12-03 RX ORDER — OXYBUTYNIN CHLORIDE 5 MG
5 TABLET ORAL EVERY 8 HOURS
Refills: 0 | Status: DISCONTINUED | OUTPATIENT
Start: 2023-12-03 | End: 2023-12-06

## 2023-12-03 RX ORDER — LIDOCAINE 4 G/100G
1 CREAM TOPICAL ONCE
Refills: 0 | Status: DISCONTINUED | OUTPATIENT
Start: 2023-12-03 | End: 2023-12-03

## 2023-12-03 RX ORDER — SERTRALINE 25 MG/1
25 TABLET, FILM COATED ORAL DAILY
Refills: 0 | Status: DISCONTINUED | OUTPATIENT
Start: 2023-12-03 | End: 2023-12-06

## 2023-12-03 RX ORDER — SODIUM CHLORIDE 9 MG/ML
1000 INJECTION INTRAMUSCULAR; INTRAVENOUS; SUBCUTANEOUS
Refills: 0 | Status: DISCONTINUED | OUTPATIENT
Start: 2023-12-03 | End: 2023-12-06

## 2023-12-03 RX ORDER — LIDOCAINE HCL 20 MG/ML
5 VIAL (ML) INJECTION ONCE
Refills: 0 | Status: DISCONTINUED | OUTPATIENT
Start: 2023-12-03 | End: 2023-12-06

## 2023-12-03 RX ORDER — SENNA PLUS 8.6 MG/1
2 TABLET ORAL AT BEDTIME
Refills: 0 | Status: DISCONTINUED | OUTPATIENT
Start: 2023-12-03 | End: 2023-12-03

## 2023-12-03 RX ORDER — CEFTRIAXONE 500 MG/1
INJECTION, POWDER, FOR SOLUTION INTRAMUSCULAR; INTRAVENOUS
Refills: 0 | Status: DISCONTINUED | OUTPATIENT
Start: 2023-12-03 | End: 2023-12-03

## 2023-12-03 RX ORDER — ACETAMINOPHEN 500 MG
1000 TABLET ORAL ONCE
Refills: 0 | Status: COMPLETED | OUTPATIENT
Start: 2023-12-03 | End: 2023-12-03

## 2023-12-03 RX ADMIN — CEFEPIME 100 MILLIGRAM(S): 1 INJECTION, POWDER, FOR SOLUTION INTRAMUSCULAR; INTRAVENOUS at 20:45

## 2023-12-03 RX ADMIN — SENNA PLUS 2 TABLET(S): 8.6 TABLET ORAL at 21:52

## 2023-12-03 RX ADMIN — SODIUM CHLORIDE 50 MILLILITER(S): 9 INJECTION INTRAMUSCULAR; INTRAVENOUS; SUBCUTANEOUS at 21:55

## 2023-12-03 RX ADMIN — SODIUM CHLORIDE 500 MILLILITER(S): 9 INJECTION, SOLUTION INTRAVENOUS at 18:14

## 2023-12-03 RX ADMIN — ATORVASTATIN CALCIUM 10 MILLIGRAM(S): 80 TABLET, FILM COATED ORAL at 21:52

## 2023-12-03 RX ADMIN — Medication 400 MILLIGRAM(S): at 18:12

## 2023-12-03 RX ADMIN — HEPARIN SODIUM 5000 UNIT(S): 5000 INJECTION INTRAVENOUS; SUBCUTANEOUS at 21:55

## 2023-12-03 NOTE — H&P ADULT - ASSESSMENT
89y M past medical history hyperlipidemia, hypertension, prostate Cancer status post radiation and prostatectomy, prior urethral strictures, radiation cystitis here with gross hematuria requiring CBI    - admit to Dr. Chase  - continue CBI and wean as tolerated  - abx  - f/u urine culture  - trend h/h, cr  - home meds added

## 2023-12-03 NOTE — ED PROVIDER NOTE - IV ALTEPLASE INCLUSION HIDDEN
"Pt called. Continues to have abdominal pain and diarrhea. Intake poor. States as soon as she eats it \"goes right through her\".Advised she needs to complete stool testing that was ordered. Pt states she will complete it. Also pt wondering if Depo shot could have caused this? Marisabel Garcia RN  " show

## 2023-12-03 NOTE — ED PROCEDURE NOTE - PROCEDURE ADDITIONAL DETAILS
POCUS: Emergency Department Focused Ultrasound performed at patient's bedside.  The complete report will be available in PACS.      Bladder appears to be decompressed around Garrett catheter balloon.  No obvious hydronephrosis of bilateral kidneys however multiple renal cyst visualized.  Recommend CT abdomen pelvis for further characterization of renal cysts

## 2023-12-03 NOTE — ED ADULT NURSE NOTE - CHIEF COMPLAINT QUOTE
Patient complaining of lower abdominal pain and blood in urine has a chinchilla catheter inserted 2 weeks ago being seen by Dr. Chase prescribed oxybutinin

## 2023-12-03 NOTE — ED PROVIDER NOTE - PHYSICAL EXAMINATION
GEN: Pt fatigued, non-toxic, mildly uncomfortable appearing, alert.  PSYCH: Affect and mood appropriate.  EYES: Sclera white w/o injection, EOMI.  ENT: Neck supple. Airway patent.  RESP: Normal effort.  CARDIAC: RRR.  ABD: Soft, suprapubic ttp, no rebound or guarding.  MSK: BAILON spontaneously.  NEURO: No focal motor or sensory deficits.  VASC: Strong distal pulses.  SKIN: No rashes or lesions. GEN: Pt fatigued, non-toxic, mildly uncomfortable appearing, alert.  PSYCH: Affect and mood appropriate.  EYES: Sclera white w/o injection, EOMI.  ENT: Neck supple. Airway patent.  RESP: Normal effort.  CARDIAC: RRR.  ABD: Soft, suprapubic ttp, no rebound or guarding. Garrett w/ dark merlot colored urine.  MSK: BAILON spontaneously.  NEURO: No focal motor or sensory deficits.  VASC: Strong distal pulses.  SKIN: No rashes or lesions.

## 2023-12-03 NOTE — ED ADULT NURSE NOTE - OBJECTIVE STATEMENT
89yM, PMH HTN, HLD, prostate Ca s/p radiation and prostatectomy, prior urethral strictures, presents to the ED complaining of gross hematuria with suprapubic abdominal pain x 2 days. Pt was seen at [facility] this evening for same sx, they replaced his 16 Bermudian chinchilla with a 14 Bermudian, flushed the chinchilla and d/cd him home. arlier today. Hematuria and pain persisted so came to [facility] for evaluation. Pt is not on thinners. Gross neuro intact, no difficulty speaking in complete sentences, pulses x 4, moving all extremities, abdomen soft and round, skin intact. Pt denies fevers/chills, numbness/tingling, weakness, headache, dizziness, vision changes, cp, sob, cough, n/v/d, dysuria, bloody stools, back pain. 89yM, PMH HTN, HLD, prostate Ca s/p radiation and prostatectomy, prior urethral strictures, presents to the ED complaining of gross hematuria with suprapubic abdominal pain x 2 days. Pt was seen at [facility] this evening for same sx, they replaced his 16 Niuean chinchilla with a 14 Niuean, flushed the chinchilla and d/cd him home. arlier today. Hematuria and pain persisted so came to [facility] for evaluation. Pt is not on thinners. Gross neuro intact, no difficulty speaking in complete sentences, pulses x 4, moving all extremities, abdomen soft and round, skin intact. Pt denies fevers/chills, numbness/tingling, weakness, headache, dizziness, vision changes, cp, sob, cough, n/v/d, dysuria, bloody stools, back pain.

## 2023-12-03 NOTE — ED ADULT NURSE NOTE - NSFALLHARMRISKINTERV_ED_ALL_ED
Communicate risk of Fall with Harm to all staff, patient, and family/Provide visual cue: red socks, yellow wristband, yellow gown, etc/Reinforce activity limits and safety measures with patient and family/Bed in lowest position, wheels locked, appropriate side rails in place/Call bell, personal items and telephone in reach/Instruct patient to call for assistance before getting out of bed/chair/stretcher/Non-slip footwear applied when patient is off stretcher/[place] to call system/Physically safe environment - no spills, clutter or unnecessary equipment/Purposeful Proactive Rounding/Room/bathroom lighting operational, light cord in reach Communicate risk of Fall with Harm to all staff, patient, and family/Provide visual cue: red socks, yellow wristband, yellow gown, etc/Reinforce activity limits and safety measures with patient and family/Bed in lowest position, wheels locked, appropriate side rails in place/Call bell, personal items and telephone in reach/Instruct patient to call for assistance before getting out of bed/chair/stretcher/Non-slip footwear applied when patient is off stretcher/[place] to call system/Physically safe environment - no spills, clutter or unnecessary equipment/Purposeful Proactive Rounding/Room/bathroom lighting operational, light cord in reach Communicate risk of Fall with Harm to all staff, patient, and family/Provide visual cue: red socks, yellow wristband, yellow gown, etc/Reinforce activity limits and safety measures with patient and family/Bed in lowest position, wheels locked, appropriate side rails in place/Call bell, personal items and telephone in reach/Instruct patient to call for assistance before getting out of bed/chair/stretcher/Non-slip footwear applied when patient is off stretcher/[place] to call system/Physically safe environment - no spills, clutter or unnecessary equipment/Purposeful Proactive Rounding/Room/bathroom lighting operational, light cord in reach

## 2023-12-03 NOTE — ED PROVIDER NOTE - PATIENT PORTAL LINK FT
You can access the FollowMyHealth Patient Portal offered by [facility] by registering at the following website: [email or web address]. By joining [employer]’s FollowMyHealth portal, you will also be able to view your health information using other applications (apps) compatible with our system. You can access the FollowMyHealth Patient Portal offered by [facility] by registering at the following website: [email or web address]. By joining [employer]’s FollowMyHealth portal, you will also be able to view your health information using other applications (apps) compatible with our system. You can access the FollowMyHealth Patient Portal offered by [facility] by registering at the following website: [email or web address]. By joining [employer]’s FollowMyHealth portal, you will also be able to view your health information using other applications (apps) compatible with our system.

## 2023-12-03 NOTE — H&P ADULT - HISTORY OF PRESENT ILLNESS
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  Patient was seen at outside hospital ED earlier today.  Had 16 Lebanese Garrett exchanged for 14 Lebanese Garrett.  Patient was discharged and had continuing hematuria so came here for evaluation.  No AC use.  No fevers, chills, nausea, vomiting. Urologist Dr. Chloe Chase. 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  Patient was seen at outside hospital ED earlier today.  Had 16 Liberian Garrett exchanged for 14 Liberian Garrett.  Patient was discharged and had continuing hematuria so came here for evaluation.  No AC use.  No fevers, chills, nausea, vomiting. Urologist Dr. Chloe Chase.

## 2023-12-03 NOTE — ED PROVIDER NOTE - PROGRESS NOTE DETAILS
Sánchez Benjamin PA-C: Spoke with urology, will see pt in ED. Attending MD Jesus: 20 Occitan three-way catheter placed by urology.  Hand irrigated significant amount of clot out.  Some lightening of urine however starting to become more hematuric.  They will be starting CBI now.  Nitrite positive urine with some evidence of cystitis radiographically so we will treat with cefepime for now.  Ultimate disposition will be in accordance with response to CBI and urology recommendations. Attending MD Jesus: 20 Khmer three-way catheter placed by urology.  Hand irrigated significant amount of clot out.  Some lightening of urine however starting to become more hematuric.  They will be starting CBI now.  Nitrite positive urine with some evidence of cystitis radiographically so we will treat with cefepime for now.  Ultimate disposition will be in accordance with response to CBI and urology recommendations. Sánchez Benjamin PA-C: CT results d/w pt and daughter at bedside: Finding of 5.5cm mass vs blood clot. Daughter already aware of hepatic lesion and pt follows with oncology. 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  pt currently getting CBI with pink tinged fluid in chinchilla, no clots.

## 2023-12-03 NOTE — PROCEDURE NOTE - ADDITIONAL PROCEDURE DETAILS
using traditional sterile technique a 20f six eye placed with dark maroon urine return. catheter irrigated with NS with removal of ~100cc of clot. area reprepped and 20f 3way placed. started on medium gtt cbi with pink output.

## 2023-12-03 NOTE — H&P ADULT - NSHPLABSRESULTS_GEN_ALL_CORE
10.7   5.86  )-----------( 217      ( 03 Dec 2023 17:27 )             31.3         137  |  99  |  21  ----------------------------<  140<H>  4.4   |  24  |  1.34<H>    Ca    9.1      03 Dec 2023 17:27    TPro  6.9  /  Alb  3.6  /  TBili  0.3  /  DBili  x   /  AST  29  /  ALT  7<L>  /  AlkPhos  54      Urinalysis Basic - ( 03 Dec 2023 17:43 )    Color: Red / Appearance: Turbid / S.021 / pH: x  Gluc: x / Ketone: Large  / Bili: Large / Urobili: >=8 mg/dL   Blood: x / Protein: 300 mg/dL / Nitrite: Positive   Leuk Esterase: Large / RBC: >50 /HPF / WBC >50 /HPF   Sq Epi: x / Non Sq Epi: 0 /HPF / Bacteria: Few /HPF    < from: CT Abdomen and Pelvis w/ IV Cont (23 @ 18:00) >    ACC: [id number] EXAM:  CT ABDOMEN AND PELVIS IC   ORDERED BY: JHONATAN MCDANIEL     PROCEDURE DATE:  2023          INTERPRETATION:  CLINICAL INFORMATION: Gross hematuria. Suprapubic pain.    COMPARISON: CT 6/15/2023.    CONTRAST/COMPLICATIONS:  IV Contrast: Omnipaque 350  90 cc administered   10 cc discarded  Oral Contrast: NONE  Complications: None reported at time of study completion    PROCEDURE:  CT of the Abdomen and Pelvis was performed.  Sagittal and coronal reformats were performed.    FINDINGS:  LOWER CHEST: Heart is enlarged. Coronary artery calcification. Aortic   valve calcification.    LIVER:  Mildly nodular liver contour. Left hepatic lobe cyst. Vague areas   of hypoattenuation within the periphery of the right hepatic lobe.  BILE DUCTS: Normal caliber.  GALLBLADDER: Within normal limits.  SPLEEN: Within normal limits.  PANCREAS: Within normal limits.  ADRENALS: Within normal limits.  KIDNEYS/URETERS: No hydronephrosis. Bilateral renal cysts. No solid renal   mass.    BLADDER: Wall thickened. Mucosal hyperenhancement. Pericystic   infiltration. A 5.5 cm irregular density within the bladder either   reflects a large blood clot or tumor.  REPRODUCTIVE ORGANS: Prostatectomy.    BOWEL: No bowel obstruction.  PERITONEUM: No ascites.  VESSELS: Atherosclerotic change. Occluded SMA with distal reconstitution   of flow. No CT signs of acute bowel ischemia.  RETROPERITONEUM/LYMPH NODES: No lymphadenopathy.  ABDOMINAL WALL: Within normal limits.  BONES: Degenerative changes of the spine.    IMPRESSION:  *  Cystitis. A 5.5 cm density within the bladder either reflects large   blood clot or tumor.  *  Mildly nodular liver contour. Vague areas of hypoattenuation within   the periphery of the right hepatic lobe. Nonemergent MRI abdomen with IV   contrast recommended to evaluate for hepatic tumor.  *  Proximal occlusion of the SMA with distal reconstitution of flow. No   CT signs of acute bowel ischemia.      --- End of Report ---             KEITH DE GUZMAN DO; Attending Radiologist  This document has been electronically signed. Dec  3 2023  6:29PM    < end of copied text > 10.7   5.86  )-----------( 217      ( 03 Dec 2023 17:27 )             31.3         137  |  99  |  21  ----------------------------<  140<H>  4.4   |  24  |  1.34<H>    Ca    9.1      03 Dec 2023 17:27    TPro  6.9  /  Alb  3.6  /  TBili  0.3  /  DBili  x   /  AST  29  /  ALT  7<L>  /  AlkPhos  54      Urinalysis Basic - ( 03 Dec 2023 17:43 )    Color: Red / Appearance: Turbid / S.021 / pH: x  Gluc: x / Ketone: Large  / Bili: Large / Urobili: >=8 mg/dL   Blood: x / Protein: 300 mg/dL / Nitrite: Positive   Leuk Esterase: Large / RBC: >50 /HPF / WBC >50 /HPF   Sq Epi: x / Non Sq Epi: 0 /HPF / Bacteria: Few /HPF    < from: CT Abdomen and Pelvis w/ IV Cont (23 @ 18:00) >    ACC: [id number] EXAM:  CT ABDOMEN AND PELVIS IC   ORDERED BY: JHONATAN MCDANIEL     PROCEDURE DATE:  2023          INTERPRETATION:  CLINICAL INFORMATION: Gross hematuria. Suprapubic pain.    COMPARISON: CT 6/15/2023.    CONTRAST/COMPLICATIONS:  IV Contrast: Omnipaque 350  90 cc administered   10 cc discarded  Oral Contrast: NONE  Complications: None reported at time of study completion    PROCEDURE:  CT of the Abdomen and Pelvis was performed.  Sagittal and coronal reformats were performed.    FINDINGS:  LOWER CHEST: Heart is enlarged. Coronary artery calcification. Aortic   valve calcification.    LIVER:  Mildly nodular liver contour. Left hepatic lobe cyst. Vague areas   of hypoattenuation within the periphery of the right hepatic lobe.  BILE DUCTS: Normal caliber.  GALLBLADDER: Within normal limits.  SPLEEN: Within normal limits.  PANCREAS: Within normal limits.  ADRENALS: Within normal limits.  KIDNEYS/URETERS: No hydronephrosis. Bilateral renal cysts. No solid renal   mass.    BLADDER: Wall thickened. Mucosal hyperenhancement. Pericystic   infiltration. A 5.5 cm irregular density within the bladder either   reflects a large blood clot or tumor.  REPRODUCTIVE ORGANS: Prostatectomy.    BOWEL: No bowel obstruction.  PERITONEUM: No ascites.  VESSELS: Atherosclerotic change. Occluded SMA with distal reconstitution   of flow. No CT signs of acute bowel ischemia.  RETROPERITONEUM/LYMPH NODES: No lymphadenopathy.  ABDOMINAL WALL: Within normal limits.  BONES: Degenerative changes of the spine.    IMPRESSION:  *  Cystitis. A 5.5 cm density within the bladder either reflects large   blood clot or tumor.  *  Mildly nodular liver contour. Vague areas of hypoattenuation within   the periphery of the right hepatic lobe. Nonemergent MRI abdomen with IV   contrast recommended to evaluate for hepatic tumor.  *  Proximal occlusion of the SMA with distal reconstitution of flow. No   CT signs of acute bowel ischemia.      --- End of Report ---             KEITH DE GUZMAN DO; Attending Radiologist  This document has been electronically signed. Dec  3 2023  6:29PM    < end of copied text >

## 2023-12-03 NOTE — ED PROVIDER NOTE - OBJECTIVE STATEMENT
89y M past medical history hyperlipidemia, hypertension, prostate Cancer status post radiation and prostatectomy, prior urethral strictures, presents to ED complaining of gross hematuria with suprapubic abdominal pain x 2 days.  Patient was seen at outside hospital ED earlier today.  Had 16 Slovenian Garrett exchanged for 14 Slovenian Garrett.  Patient was discharged and had continuing hematuria so came here for evaluation.  No AC use.  No fevers, chills, nausea, vomiting. Urologist Dr. Chloe Chase. 89y M past medical history hyperlipidemia, hypertension, prostate Cancer status post radiation and prostatectomy, prior urethral strictures, presents to ED complaining of gross hematuria with suprapubic abdominal pain x 2 days.  Patient was seen at outside hospital ED earlier today.  Had 16 Arabic Garrett exchanged for 14 Arabic Garrett.  Patient was discharged and had continuing hematuria so came here for evaluation.  No AC use.  No fevers, chills, nausea, vomiting. Urologist Dr. Chloe Chase.

## 2023-12-03 NOTE — ED PROVIDER NOTE - OBJECTIVE STATEMENT
89-year-old male history of hyperlipidemia hypertension prostate CA status post prostatectomy complaining of blood in chinchilla bag and difficulty urinating. pt had chinchilla placed 2 weeks ago, before weekend noted leaking around chinchilla, called urologist dr dominguez and was given rx for oxybutynin to help with bladder spasms. then noted to have blood in the urine which is not causing clot and obstructing the outflow causing abd discomfort. no fevrs, chills, nausea, vomiting

## 2023-12-03 NOTE — ED PROVIDER NOTE - CLINICAL SUMMARY MEDICAL DECISION MAKING FREE TEXT BOX
89-year-old male history of hyperlipidemia hypertension prostate CA status post prostatectomy complaining of difficulty urinating since noting blood in his urine/chinchilla bag last 2 days. no fevers, chills   will flush chinchilla   check labs

## 2023-12-03 NOTE — ED ADULT NURSE REASSESSMENT NOTE - NS ED NURSE REASSESS COMMENT FT1
Report taken from LUIS SWAIN. Pt and family introduced to oncoming RN and updated on plan of care. pt is A&OX4, VSS. Call bell in reach, pt educated on use. Bed locked and in lowest position. Denies any needs or complaints at this time.

## 2023-12-03 NOTE — ED ADULT NURSE NOTE - CHIEF COMPLAINT QUOTE
seen in [place] for the same issue still has blood in the bag seen in [place] for the same issue still has blood in the bag

## 2023-12-03 NOTE — ED PROVIDER NOTE - ATTENDING APP SHARED VISIT CONTRIBUTION OF CARE
Attending MD Jesus:   I personally have seen and examined this patient. I have performed a substantive portion of the visit including all aspects of the medical decision making.   Physician assistant note reviewed and agree on plan of care and except where noted.        89-year-old gentleman with a history of hypertension hyperlipidemia prostate cancer status post prostatectomy is presenting for suprapubic pain and gross hematuria from Garrett catheter.  Per report patient has had Garrett catheter for 2 weeks due to intermittent retention and hematuria.  Denies any fevers or chills.  Patient was started on oxybutynin for bladder spasms recently.  His urologist is Dr. Chase.  He is still draining urine but it is very dark and bloody.    Patient was seen at outside hospital this morning and he had a 14 Chinese Garrett catheter placed because a larger catheter was not able to be successfully placed.    Examination notable for heart rate 96 blood pressure borderline at 91/50 6 repeat 110 in the room in Gold bed 7.  Patient appears somewhat uncomfortable, the abdomen is soft nondistended there is tenderness in the suprapubic region.  Garrett catheter leg bag with dark Mer[place] colored urine.    ED POCUS shows collapsed bladder around Garrett catheter balloon, bilateral multiple renal cysts noted.    Patient presenting for evaluation of ongoing gross hematuria and suprapubic pain.  Concern for ongoing  hemorrhage, there is no evidence of clot retention on bedside POCUS.  Will obtain urology consultation to help upsize urinary catheter and consider manual irrigation/CBI.  Will obtain CT abdomen pelvis with contrast to rule out alternative source for  hemorrhage.  Will obtain CBC coagulation profile type and screen.        *The above represents an initial assessment/impression. Please refer to progress notes for potential changes in patient clinical course* Attending MD Jesus:   I personally have seen and examined this patient. I have performed a substantive portion of the visit including all aspects of the medical decision making.   Physician assistant note reviewed and agree on plan of care and except where noted.        89-year-old gentleman with a history of hypertension hyperlipidemia prostate cancer status post prostatectomy is presenting for suprapubic pain and gross hematuria from Garrett catheter.  Per report patient has had Garrett catheter for 2 weeks due to intermittent retention and hematuria.  Denies any fevers or chills.  Patient was started on oxybutynin for bladder spasms recently.  His urologist is Dr. Chase.  He is still draining urine but it is very dark and bloody.    Patient was seen at outside hospital this morning and he had a 14 Kinyarwanda Garrett catheter placed because a larger catheter was not able to be successfully placed.    Examination notable for heart rate 96 blood pressure borderline at 91/50 6 repeat 110 in the room in Gold bed 7.  Patient appears somewhat uncomfortable, the abdomen is soft nondistended there is tenderness in the suprapubic region.  Garrett catheter leg bag with dark Merlot colored urine.    ED POCUS shows collapsed bladder around Garrett catheter balloon, bilateral multiple renal cysts noted.    Patient presenting for evaluation of ongoing gross hematuria and suprapubic pain.  Concern for ongoing  hemorrhage, there is no evidence of clot retention on bedside POCUS.  Will obtain urology consultation to help upsize urinary catheter and consider manual irrigation/CBI.  Will obtain CT abdomen pelvis with contrast to rule out alternative source for  hemorrhage.  Will obtain CBC coagulation profile type and screen.        *The above represents an initial assessment/impression. Please refer to progress notes for potential changes in patient clinical course*

## 2023-12-03 NOTE — ED ADULT TRIAGE NOTE - CHIEF COMPLAINT QUOTE
Patient complaining of lower abdominal pain and blood in urine Patient complaining of lower abdominal pain and blood in urine has a chinchilla catheter inserted 2 weeks ago being seen by Dr. Chase prescribed oxybutinin

## 2023-12-03 NOTE — ED PROVIDER NOTE - NSFOLLOWUPINSTRUCTIONS_ED_ALL_ED_FT
Follow up with urologist as soon as possible    1. TAKE ALL MEDICATIONS AS DIRECTED.    2. FOR PAIN OR FEVER YOU CAN TAKE ACETAMINOPHEN (TYLENOL) AS NEEDED, AS DIRECTED ON PACKAGING.  3. FOLLOW UP WITH YOUR PRIMARY DOCTOR WITHIN 5 DAYS AS DIRECTED.  4. IF YOU HAD LABS OR IMAGING DONE, YOU WERE GIVEN COPIES OF ALL LABS AND/OR IMAGING RESULTS FROM YOUR ER VISIT--PLEASE TAKE THEM WITH YOU TO YOUR FOLLOW UP APPOINTMENTS.  5. IF NEEDED, CALL PATIENT ACCESS SERVICES AT [phone] TO FIND A PRIMARY CARE PHYSICIAN.  OR CALL [phone] TO MAKE AN APPOINTMENT WITH THE CLINIC.  6. RETURN TO THE ER FOR ANY WORSENING SYMPTOMS OR CONCERNS.      English    Hematuria, Adult    Hematuria is blood in the urine. Blood may be visible in the urine, or it may be identified with a test. This condition can be caused by infections of the bladder, urethra, kidney, or prostate. Other possible causes include:  Kidney stones.  Cancer of the urinary tract.  Too much calcium in the urine.  Conditions that are passed from parent to child (inherited conditions).  Exercise that requires a lot of energy.  Infections can usually be treated with medicine, and a kidney stone usually will pass through your urine. If neither of these is the cause of your hematuria, more tests may be needed to identify the cause of your symptoms.    It is very important to tell your health care provider about any blood in your urine, even if it is painless or the blood stops without treatment. Blood in the urine, when it happens and then stops and then happens again, can be a symptom of a very serious condition, including cancer. There is no pain in the initial stages of many urinary cancers.    Follow these instructions at home:  Medicines    Take over-the-counter and prescription medicines only as told by your health care provider.  If you were prescribed an antibiotic medicine, take it as told by your health care provider. Do not stop taking the antibiotic even if you start to feel better.  Eating and drinking    Drink enough fluid to keep your urine pale yellow. It is recommended that you drink 3–4 quarts (2.8–3.8 L) a day. If you have been diagnosed with an infection, drinking cranberry juice in addition to large amounts of water is recommended.  Avoid caffeine, tea, and carbonated beverages. These tend to irritate the bladder.  Avoid alcohol because it may irritate the prostate (in males).  General instructions    If you have been diagnosed with a kidney stone, follow your health care provider's instructions about straining your urine to catch the stone.  Empty your bladder often. Avoid holding urine for long periods of time.  If you are female:  After a bowel movement, wipe from front to back and use each piece of toilet paper only once.  Empty your bladder before and after sex.  Pay attention to any changes in your symptoms. Tell your health care provider about any changes or any new symptoms.  It is up to you to get the results of any tests. Ask your health care provider, or the department that is doing the test, when your results will be ready.  Keep all follow-up visits. This is important.  Contact a health care provider if:  You develop back pain.  You have a fever or chills.  You have nausea or vomiting.  Your symptoms do not improve after 3 days.  Your symptoms get worse.  Get help right away if:  You develop severe vomiting and are unable to take medicine without vomiting.  You develop severe pain in your back or abdomen even though you are taking medicine.  You pass a large amount of blood in your urine.  You pass blood clots in your urine.  You feel very weak or like you might faint.  You faint.  Summary  Hematuria is blood in the urine. It has many possible causes.  It is very important that you tell your health care provider about any blood in your urine, even if it is painless or the blood stops without treatment.  Take over-the-counter and prescription medicines only as told by your health care provider.  Drink enough fluid to keep your urine pale yellow.  This information is not intended to replace advice given to you by your health care provider. Make sure you discuss any questions you have with your health care provider.    Document Revised: 08/18/2021 Document Reviewed: 08/18/2021  Elsevier Patient Education © 2023 Else[employer] Inc. Follow up with urologist as soon as possible    1. TAKE ALL MEDICATIONS AS DIRECTED.    2. FOR PAIN OR FEVER YOU CAN TAKE ACETAMINOPHEN (TYLENOL) AS NEEDED, AS DIRECTED ON PACKAGING.  3. FOLLOW UP WITH YOUR PRIMARY DOCTOR WITHIN 5 DAYS AS DIRECTED.  4. IF YOU HAD LABS OR IMAGING DONE, YOU WERE GIVEN COPIES OF ALL LABS AND/OR IMAGING RESULTS FROM YOUR ER VISIT--PLEASE TAKE THEM WITH YOU TO YOUR FOLLOW UP APPOINTMENTS.  5. IF NEEDED, CALL PATIENT ACCESS SERVICES AT [phone] TO FIND A PRIMARY CARE PHYSICIAN.  OR CALL [phone] TO MAKE AN APPOINTMENT WITH THE CLINIC.  6. RETURN TO THE ER FOR ANY WORSENING SYMPTOMS OR CONCERNS.      English    Hematuria, Adult    Hematuria is blood in the urine. Blood may be visible in the urine, or it may be identified with a test. This condition can be caused by infections of the bladder, urethra, kidney, or prostate. Other possible causes include:  Kidney stones.  Cancer of the urinary tract.  Too much calcium in the urine.  Conditions that are passed from parent to child (inherited conditions).  Exercise that requires a lot of energy.  Infections can usually be treated with medicine, and a kidney stone usually will pass through your urine. If neither of these is the cause of your hematuria, more tests may be needed to identify the cause of your symptoms.    It is very important to tell your health care provider about any blood in your urine, even if it is painless or the blood stops without treatment. Blood in the urine, when it happens and then stops and then happens again, can be a symptom of a very serious condition, including cancer. There is no pain in the initial stages of many urinary cancers.    Follow these instructions at home:  Medicines    Take over-the-counter and prescription medicines only as told by your health care provider.  If you were prescribed an antibiotic medicine, take it as told by your health care provider. Do not stop taking the antibiotic even if you start to feel better.  Eating and drinking    Drink enough fluid to keep your urine pale yellow. It is recommended that you drink 3–4 quarts (2.8–3.8 L) a day. If you have been diagnosed with an infection, drinking cranberry juice in addition to large amounts of water is recommended.  Avoid caffeine, tea, and carbonated beverages. These tend to irritate the bladder.  Avoid alcohol because it may irritate the prostate (in males).  General instructions    If you have been diagnosed with a kidney stone, follow your health care provider's instructions about straining your urine to catch the stone.  Empty your bladder often. Avoid holding urine for long periods of time.  If you are female:  After a bowel movement, wipe from front to back and use each piece of toilet paper only once.  Empty your bladder before and after sex.  Pay attention to any changes in your symptoms. Tell your health care provider about any changes or any new symptoms.  It is up to you to get the results of any tests. Ask your health care provider, or the department that is doing the test, when your results will be ready.  Keep all follow-up visits. This is important.  Contact a health care provider if:  You develop back pain.  You have a fever or chills.  You have nausea or vomiting.  Your symptoms do not improve after 3 days.  Your symptoms get worse.  Get help right away if:  You develop severe vomiting and are unable to take medicine without vomiting.  You develop severe pain in your back or abdomen even though you are taking medicine.  You pass a large amount of blood in your urine.  You pass blood clots in your urine.  You feel very weak or like you might faint.  You faint.  Summary  Hematuria is blood in the urine. It has many possible causes.  It is very important that you tell your health care provider about any blood in your urine, even if it is painless or the blood stops without treatment.  Take over-the-counter and prescription medicines only as told by your health care provider.  Drink enough fluid to keep your urine pale yellow.  This information is not intended to replace advice given to you by your health care provider. Make sure you discuss any questions you have with your health care provider.    Document Revised: 08/18/2021 Document Reviewed: 08/18/2021  Elsevier Patient Education © 2023 Elsevier Inc. Follow up with urologist as soon as possible    1. TAKE ALL MEDICATIONS AS DIRECTED.    2. FOR PAIN OR FEVER YOU CAN TAKE ACETAMINOPHEN (TYLENOL) AS NEEDED, AS DIRECTED ON PACKAGING.  3. FOLLOW UP WITH YOUR PRIMARY DOCTOR WITHIN 5 DAYS AS DIRECTED.  4. IF YOU HAD LABS OR IMAGING DONE, YOU WERE GIVEN COPIES OF ALL LABS AND/OR IMAGING RESULTS FROM YOUR ER VISIT--PLEASE TAKE THEM WITH YOU TO YOUR FOLLOW UP APPOINTMENTS.  5. IF NEEDED, CALL PATIENT ACCESS SERVICES AT [phone] TO FIND A PRIMARY CARE PHYSICIAN.  OR CALL [phone] TO MAKE AN APPOINTMENT WITH THE CLINIC.  6. RETURN TO THE ER FOR ANY WORSENING SYMPTOMS OR CONCERNS.      English    Hematuria, Adult    Hematuria is blood in the urine. Blood may be visible in the urine, or it may be identified with a test. This condition can be caused by infections of the bladder, urethra, kidney, or prostate. Other possible causes include:  Kidney stones.  Cancer of the urinary tract.  Too much calcium in the urine.  Conditions that are passed from parent to child (inherited conditions).  Exercise that requires a lot of energy.  Infections can usually be treated with medicine, and a kidney stone usually will pass through your urine. If neither of these is the cause of your hematuria, more tests may be needed to identify the cause of your symptoms.    It is very important to tell your health care provider about any blood in your urine, even if it is painless or the blood stops without treatment. Blood in the urine, when it happens and then stops and then happens again, can be a symptom of a very serious condition, including cancer. There is no pain in the initial stages of many urinary cancers.    Follow these instructions at home:  Medicines    Take over-the-counter and prescription medicines only as told by your health care provider.  If you were prescribed an antibiotic medicine, take it as told by your health care provider. Do not stop taking the antibiotic even if you start to feel better.  Eating and drinking    Drink enough fluid to keep your urine pale yellow. It is recommended that you drink 3–4 quarts (2.8–3.8 L) a day. If you have been diagnosed with an infection, drinking cranberry juice in addition to large amounts of water is recommended.  Avoid caffeine, tea, and carbonated beverages. These tend to irritate the bladder.  Avoid alcohol because it may irritate the prostate (in males).  General instructions    If you have been diagnosed with a kidney stone, follow your health care provider's instructions about straining your urine to catch the stone.  Empty your bladder often. Avoid holding urine for long periods of time.  If you are female:  After a bowel movement, wipe from front to back and use each piece of toilet paper only once.  Empty your bladder before and after sex.  Pay attention to any changes in your symptoms. Tell your health care provider about any changes or any new symptoms.  It is up to you to get the results of any tests. Ask your health care provider, or the department that is doing the test, when your results will be ready.  Keep all follow-up visits. This is important.  Contact a health care provider if:  You develop back pain.  You have a fever or chills.  You have nausea or vomiting.  Your symptoms do not improve after 3 days.  Your symptoms get worse.  Get help right away if:  You develop severe vomiting and are unable to take medicine without vomiting.  You develop severe pain in your back or abdomen even though you are taking medicine.  You pass a large amount of blood in your urine.  You pass blood clots in your urine.  You feel very weak or like you might faint.  You faint.  Summary  Hematuria is blood in the urine. It has many possible causes.  It is very important that you tell your health care provider about any blood in your urine, even if it is painless or the blood stops without treatment.  Take over-the-counter and prescription medicines only as told by your health care provider.  Drink enough fluid to keep your urine pale yellow.  This information is not intended to replace advice given to you by your health care provider. Make sure you discuss any questions you have with your health care provider.    Document Revised: 08/18/2021 Document Reviewed: 08/18/2021  Elsevier Patient Education © 2023 Elsevier Inc.

## 2023-12-03 NOTE — ED ADULT TRIAGE NOTE - CHIEF COMPLAINT QUOTE
seen in [place] for the same issue still has blood in the bag seen in [place] for the same issue still has blood in the bag

## 2023-12-03 NOTE — ED PROVIDER NOTE - PROGRESS NOTE DETAILS
chinchilla flushed multiple times byt nurse candice, each time providing initial relief and then pain recurred and feels obstructed. spoke with pt family and decide to replace chinchilla, chinchilla removed and nurse having difficulty replacing it 14Fr placed and has been flowing, obsevred for some time with continued bloody urine but no clots, willdc, will need urgent uro fu

## 2023-12-03 NOTE — H&P ADULT - NSHPPHYSICALEXAM_GEN_ALL_CORE
Vital Signs Last 24 Hrs  T(C): 36.4 (03 Dec 2023 17:30), Max: 36.9 (03 Dec 2023 16:30)  T(F): 97.5 (03 Dec 2023 17:30), Max: 98.4 (03 Dec 2023 16:30)  HR: 72 (03 Dec 2023 18:05) (70 - 96)  BP: 108/52 (03 Dec 2023 18:05) (91/56 - 122/68)  BP(mean): 69 (03 Dec 2023 18:05) (66 - 69)  RR: 16 (03 Dec 2023 18:05) (15 - 16)  SpO2: 98% (03 Dec 2023 18:05) (96% - 98%)    O2 Parameters below as of 03 Dec 2023 18:05  Patient On (Oxygen Delivery Method): room air    General: NAD, Lying in bed comfortably  Neuro: A+Ox3, no focal deficits  Resp: No respiratory distress or accessory muscle use. no supplemental O2  Abd: Soft, NT/ND, no rebound/guarding  : 14f chinchilla in place with dark maroon urine draining, unable to irrigate. balloon deflated and removed.    using traditional sterile technique a 20f six eye placed with dark maroon urine return. catheter irrigated with NS with removal of ~100cc of clot. area reprepped and 20f 3way placed. started on medium gtt cbi with pink output.
no concerns

## 2023-12-03 NOTE — ED PROVIDER NOTE - CARE PROVIDER_API CALL
Chloe Chase  Urology  [address] [place] [address], [address] [address]  Phone: [phone]  Fax: [phone]  Follow Up Time: Urgent   Chloe Chase  Urology  [address] [place] [address], [address] [address]  Phone: [phone]  Fax: [phone]  Follow Up Time: Urgent   Chloe Chase  Urology  [address] [place] [address], [address] [address]  Phone: [phone]  Fax: [phone]  Follow Up Time: Urgent

## 2023-12-03 NOTE — ED PROVIDER NOTE - CARE PROVIDERS DIRECT ADDRESSES
[email or web address]

## 2023-12-04 LAB
ANION GAP SERPL CALC-SCNC: 11 MMOL/L — SIGNIFICANT CHANGE UP (ref 5–17)
ANION GAP SERPL CALC-SCNC: 11 MMOL/L — SIGNIFICANT CHANGE UP (ref 5–17)
BLD GP AB SCN SERPL QL: NEGATIVE — SIGNIFICANT CHANGE UP
BLD GP AB SCN SERPL QL: NEGATIVE — SIGNIFICANT CHANGE UP
BUN SERPL-MCNC: 17 MG/DL — SIGNIFICANT CHANGE UP (ref 7–23)
BUN SERPL-MCNC: 17 MG/DL — SIGNIFICANT CHANGE UP (ref 7–23)
CALCIUM SERPL-MCNC: 9.2 MG/DL — SIGNIFICANT CHANGE UP (ref 8.4–10.5)
CALCIUM SERPL-MCNC: 9.2 MG/DL — SIGNIFICANT CHANGE UP (ref 8.4–10.5)
CHLORIDE SERPL-SCNC: 102 MMOL/L — SIGNIFICANT CHANGE UP (ref 96–108)
CHLORIDE SERPL-SCNC: 102 MMOL/L — SIGNIFICANT CHANGE UP (ref 96–108)
CO2 SERPL-SCNC: 25 MMOL/L — SIGNIFICANT CHANGE UP (ref 22–31)
CO2 SERPL-SCNC: 25 MMOL/L — SIGNIFICANT CHANGE UP (ref 22–31)
CREAT SERPL-MCNC: 1.09 MG/DL — SIGNIFICANT CHANGE UP (ref 0.5–1.3)
CREAT SERPL-MCNC: 1.09 MG/DL — SIGNIFICANT CHANGE UP (ref 0.5–1.3)
CULTURE RESULTS: SIGNIFICANT CHANGE UP
CULTURE RESULTS: SIGNIFICANT CHANGE UP
EGFR: 64 ML/MIN/1.73M2 — SIGNIFICANT CHANGE UP
EGFR: 64 ML/MIN/1.73M2 — SIGNIFICANT CHANGE UP
GLUCOSE SERPL-MCNC: 106 MG/DL — HIGH (ref 70–99)
GLUCOSE SERPL-MCNC: 106 MG/DL — HIGH (ref 70–99)
HCT VFR BLD CALC: 31.5 % — LOW (ref 39–50)
HCT VFR BLD CALC: 31.5 % — LOW (ref 39–50)
HGB BLD-MCNC: 10.7 G/DL — LOW (ref 13–17)
HGB BLD-MCNC: 10.7 G/DL — LOW (ref 13–17)
MCHC RBC-ENTMCNC: 33.1 PG — SIGNIFICANT CHANGE UP (ref 27–34)
MCHC RBC-ENTMCNC: 33.1 PG — SIGNIFICANT CHANGE UP (ref 27–34)
MCHC RBC-ENTMCNC: 34 GM/DL — SIGNIFICANT CHANGE UP (ref 32–36)
MCHC RBC-ENTMCNC: 34 GM/DL — SIGNIFICANT CHANGE UP (ref 32–36)
MCV RBC AUTO: 97.5 FL — SIGNIFICANT CHANGE UP (ref 80–100)
MCV RBC AUTO: 97.5 FL — SIGNIFICANT CHANGE UP (ref 80–100)
NRBC # BLD: 0 /100 WBCS — SIGNIFICANT CHANGE UP (ref 0–0)
NRBC # BLD: 0 /100 WBCS — SIGNIFICANT CHANGE UP (ref 0–0)
PLATELET # BLD AUTO: 193 K/UL — SIGNIFICANT CHANGE UP (ref 150–400)
PLATELET # BLD AUTO: 193 K/UL — SIGNIFICANT CHANGE UP (ref 150–400)
POTASSIUM SERPL-MCNC: 4.3 MMOL/L — SIGNIFICANT CHANGE UP (ref 3.5–5.3)
POTASSIUM SERPL-MCNC: 4.3 MMOL/L — SIGNIFICANT CHANGE UP (ref 3.5–5.3)
POTASSIUM SERPL-SCNC: 4.3 MMOL/L — SIGNIFICANT CHANGE UP (ref 3.5–5.3)
POTASSIUM SERPL-SCNC: 4.3 MMOL/L — SIGNIFICANT CHANGE UP (ref 3.5–5.3)
RBC # BLD: 3.23 M/UL — LOW (ref 4.2–5.8)
RBC # BLD: 3.23 M/UL — LOW (ref 4.2–5.8)
RBC # FLD: 12 % — SIGNIFICANT CHANGE UP (ref 10.3–14.5)
RBC # FLD: 12 % — SIGNIFICANT CHANGE UP (ref 10.3–14.5)
RH IG SCN BLD-IMP: POSITIVE — SIGNIFICANT CHANGE UP
RH IG SCN BLD-IMP: POSITIVE — SIGNIFICANT CHANGE UP
SODIUM SERPL-SCNC: 138 MMOL/L — SIGNIFICANT CHANGE UP (ref 135–145)
SODIUM SERPL-SCNC: 138 MMOL/L — SIGNIFICANT CHANGE UP (ref 135–145)
SPECIMEN SOURCE: SIGNIFICANT CHANGE UP
SPECIMEN SOURCE: SIGNIFICANT CHANGE UP
WBC # BLD: 4.18 K/UL — SIGNIFICANT CHANGE UP (ref 3.8–10.5)
WBC # BLD: 4.18 K/UL — SIGNIFICANT CHANGE UP (ref 3.8–10.5)
WBC # FLD AUTO: 4.18 K/UL — SIGNIFICANT CHANGE UP (ref 3.8–10.5)
WBC # FLD AUTO: 4.18 K/UL — SIGNIFICANT CHANGE UP (ref 3.8–10.5)

## 2023-12-04 RX ADMIN — Medication 1 TABLET(S): at 12:18

## 2023-12-04 RX ADMIN — HEPARIN SODIUM 5000 UNIT(S): 5000 INJECTION INTRAVENOUS; SUBCUTANEOUS at 06:02

## 2023-12-04 RX ADMIN — Medication 5 MILLIGRAM(S): at 06:01

## 2023-12-04 RX ADMIN — CEFTRIAXONE 100 MILLIGRAM(S): 500 INJECTION, POWDER, FOR SOLUTION INTRAMUSCULAR; INTRAVENOUS at 07:10

## 2023-12-04 RX ADMIN — HEPARIN SODIUM 5000 UNIT(S): 5000 INJECTION INTRAVENOUS; SUBCUTANEOUS at 12:18

## 2023-12-04 RX ADMIN — SENNA PLUS 2 TABLET(S): 8.6 TABLET ORAL at 22:50

## 2023-12-04 RX ADMIN — LISINOPRIL 40 MILLIGRAM(S): 2.5 TABLET ORAL at 06:01

## 2023-12-04 RX ADMIN — Medication 25 MILLIGRAM(S): at 06:01

## 2023-12-04 RX ADMIN — SODIUM CHLORIDE 50 MILLILITER(S): 9 INJECTION INTRAMUSCULAR; INTRAVENOUS; SUBCUTANEOUS at 06:02

## 2023-12-04 RX ADMIN — SERTRALINE 25 MILLIGRAM(S): 25 TABLET, FILM COATED ORAL at 12:18

## 2023-12-04 RX ADMIN — HEPARIN SODIUM 5000 UNIT(S): 5000 INJECTION INTRAVENOUS; SUBCUTANEOUS at 22:50

## 2023-12-04 RX ADMIN — ATORVASTATIN CALCIUM 10 MILLIGRAM(S): 80 TABLET, FILM COATED ORAL at 22:50

## 2023-12-04 NOTE — PATIENT PROFILE ADULT - LANGUAGE ASSISTANCE NEEDED
Patient is able to communicate and make needs known in English ./No-Patient/Caregiver offered and refused free interpretation services.

## 2023-12-04 NOTE — PATIENT PROFILE ADULT - FALL HARM RISK - HARM RISK INTERVENTIONS
Assistance with ambulation/Assistance OOB with selected safe patient handling equipment/Communicate Risk of Fall with Harm to all staff/Discuss with provider need for PT consult/Monitor gait and stability/Provide patient with walking aids - walker, cane, crutches/Reinforce activity limits and safety measures with patient and family/Tailored Fall Risk Interventions/Visual Cue: Yellow wristband and red socks/Bed in lowest position, wheels locked, appropriate side rails in place/Call bell, personal items and telephone in reach/Instruct patient to call for assistance before getting out of bed or chair/Non-slip footwear when patient is out of bed/[place] to call system/Physically safe environment - no spills, clutter or unnecessary equipment/Purposeful Proactive Rounding/Room/bathroom lighting operational, light cord in reach Assistance with ambulation/Assistance OOB with selected safe patient handling equipment/Communicate Risk of Fall with Harm to all staff/Discuss with provider need for PT consult/Monitor gait and stability/Provide patient with walking aids - walker, cane, crutches/Reinforce activity limits and safety measures with patient and family/Tailored Fall Risk Interventions/Visual Cue: Yellow wristband and red socks/Bed in lowest position, wheels locked, appropriate side rails in place/Call bell, personal items and telephone in reach/Instruct patient to call for assistance before getting out of bed or chair/Non-slip footwear when patient is out of bed/[place] to call system/Physically safe environment - no spills, clutter or unnecessary equipment/Purposeful Proactive Rounding/Room/bathroom lighting operational, light cord in reach

## 2023-12-04 NOTE — PHYSICAL THERAPY INITIAL EVALUATION ADULT - PERTINENT HX OF CURRENT PROBLEM, REHAB EVAL
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  Patient was seen at outside hospital ED earlier today.  Had 16 Ukrainian Garrett exchanged for 14 Ukrainian Garrett.  Patient was discharged and had continuing hematuria so came here for evaluation.  No AC use.  No fevers, chills, nausea, vomiting. Hosp Course: Started on CBI; started on IV abx; CT abd:  +Cystitis. A 5.5 cm density within the bladder either reflects large blood clot or tumor. Mildly nodular liver contour. Vague areas of hypoattenuation within the periphery of the right hepatic lobe. Nonemergent MRI abdomen with IV contrast recommended to evaluate for hepatic tumor. 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  Patient was seen at outside hospital ED earlier today.  Had 16 Malagasy Garrett exchanged for 14 Malagasy Garrett.  Patient was discharged and had continuing hematuria so came here for evaluation.  No AC use.  No fevers, chills, nausea, vomiting. Hosp Course: Started on CBI; started on IV abx; CT abd:  +Cystitis. A 5.5 cm density within the bladder either reflects large blood clot or tumor. Mildly nodular liver contour. Vague areas of hypoattenuation within the periphery of the right hepatic lobe. Nonemergent MRI abdomen with IV contrast recommended to evaluate for hepatic tumor.

## 2023-12-04 NOTE — PATIENT PROFILE ADULT - INTERPRETATION SERVICES DECLINED
COLONOSCOPY  Dr. Giraldo   [phone]    DIET:    Resume your usual diet.      ACTIVITY:    · Rest quietly at home for the remainder of the day.     · Do not do anything that requires coordination the day of the procedure, such as climbing ladders, using a sharp knife or operating machinery.     · Do not drive until tomorrow morning.    · Resume normal medications, unless specified.       WHAT TO EXPECT:    · Mild abdominal discomfort, bloating and gas pains.     · A scant amount of rectal bleeding following a biopsy, polypectomy or if you have hemorrhoids, is normal.    · Return of your usual bowel movements in 1-2 days.    If  you had a polyp removed or biopsy performed, these specimens will be sent to the pathology laboratory.  It takes between 3-5 business days to get results.  You will be contacted with results.     PROBLEMS TO WATCH FOR--NOTIFY YOUR SURGEON IF YOU HAVE ANY OF THESE SYMPTOMS:    1. Severe abdominal pain or bloating.     2. Chills or temperature over 101 degrees.    3. Large amount of bright red rectal bleeding, blood clots or black colored stool.    4. Vomiting blood or black colored liquid.         Impression:  - small mixed type hemorrhoids, otherwise normal colonoscopy up to the terminal ileum    Recommendations  · High fiber diet, avoid constipation  · Repeat colonoscopy 5 years for personal history of colon polyps      Anesthesia: Monitored Anesthesia Care (MAC)    You’re due to have surgery. During surgery, you’ll be given medicine called anesthesia. This will keep you comfortable and pain-free. Your surgeon will use monitored anesthesia care (MAC). This sheet tells you more about this type of anesthesia.   What is monitored anesthesia care?  MAC keeps you very drowsy during surgery. You may be awake, but you will likely not remember much. And you won’t feel pain. With MAC, medicines are given through an IV line into a vein in your arm or hand. A local anesthetic will usually be injected  into the skin and muscle around the surgical site to numb it. The anesthesia provider monitors you during the procedure. He or she checks your heart rate and rhythm, blood pressure, and blood oxygen level.   Anesthesia tools and medicines that may be near you during your procedure  You will likely have:  · A pulse oximeter on the end of your finger. This measures your blood oxygen level.  · Electrocardiography leads (electrodes) on your chest. These record your heart rate and rhythm.  · Medicines given through an IV. These relax you and prevent pain. You may be awake or sleep lightly. If you have local anesthetic, it's injected directly into your skin.  · A face mask to give you oxygen, if needed.  Possible risks  MAC has some risks. These include:  · Breathing problems  · Nausea and vomiting  · Allergic reaction to the anesthetic   Anesthesia safety  Tips for anesthesia safety include the following:   · Follow all instructions you are given for how long not to eat or drink before your procedure.  · Be sure your healthcare provider knows what medicines you take, especially any anti-inflammatory medicine or blood thinners. This includes aspirin and any other over-the-counter medicines, herbs, and supplements.  · Have an adult family member or friend drive you home after the procedure.  · For the first 24 hours after your surgery:  ? Don't drive or use heavy equipment.  ? Don't make important decisions or sign documents.  ? Don't drink alcohol.  ? Have someone stay with you, if possible. They can watch for problems and help keep you safe.  [employer] last reviewed this educational content on 10/1/2019  © 8315-1078 [employer], [employer]. [address] [place] [address], [place] [address]. All rights reserved. This information is not intended as a substitute for professional medical care. Always follow your healthcare professional's instructions.         Patient/Caregiver requests family/friend to interpret.

## 2023-12-05 LAB
CULTURE RESULTS: SIGNIFICANT CHANGE UP
CULTURE RESULTS: SIGNIFICANT CHANGE UP
HCT VFR BLD CALC: 29.9 % — LOW (ref 39–50)
HCT VFR BLD CALC: 29.9 % — LOW (ref 39–50)
HGB BLD-MCNC: 10 G/DL — LOW (ref 13–17)
HGB BLD-MCNC: 10 G/DL — LOW (ref 13–17)
MCHC RBC-ENTMCNC: 33.4 GM/DL — SIGNIFICANT CHANGE UP (ref 32–36)
MCHC RBC-ENTMCNC: 33.4 GM/DL — SIGNIFICANT CHANGE UP (ref 32–36)
MCHC RBC-ENTMCNC: 33.4 PG — SIGNIFICANT CHANGE UP (ref 27–34)
MCHC RBC-ENTMCNC: 33.4 PG — SIGNIFICANT CHANGE UP (ref 27–34)
MCV RBC AUTO: 100 FL — SIGNIFICANT CHANGE UP (ref 80–100)
MCV RBC AUTO: 100 FL — SIGNIFICANT CHANGE UP (ref 80–100)
NRBC # BLD: 0 /100 WBCS — SIGNIFICANT CHANGE UP (ref 0–0)
NRBC # BLD: 0 /100 WBCS — SIGNIFICANT CHANGE UP (ref 0–0)
PLATELET # BLD AUTO: 195 K/UL — SIGNIFICANT CHANGE UP (ref 150–400)
PLATELET # BLD AUTO: 195 K/UL — SIGNIFICANT CHANGE UP (ref 150–400)
RBC # BLD: 2.99 M/UL — LOW (ref 4.2–5.8)
RBC # BLD: 2.99 M/UL — LOW (ref 4.2–5.8)
RBC # FLD: 12.3 % — SIGNIFICANT CHANGE UP (ref 10.3–14.5)
RBC # FLD: 12.3 % — SIGNIFICANT CHANGE UP (ref 10.3–14.5)
SPECIMEN SOURCE: SIGNIFICANT CHANGE UP
SPECIMEN SOURCE: SIGNIFICANT CHANGE UP
WBC # BLD: 3.72 K/UL — LOW (ref 3.8–10.5)
WBC # BLD: 3.72 K/UL — LOW (ref 3.8–10.5)
WBC # FLD AUTO: 3.72 K/UL — LOW (ref 3.8–10.5)
WBC # FLD AUTO: 3.72 K/UL — LOW (ref 3.8–10.5)

## 2023-12-05 PROCEDURE — 99231 SBSQ HOSP IP/OBS SF/LOW 25: CPT

## 2023-12-05 RX ADMIN — HEPARIN SODIUM 5000 UNIT(S): 5000 INJECTION INTRAVENOUS; SUBCUTANEOUS at 21:50

## 2023-12-05 RX ADMIN — SERTRALINE 25 MILLIGRAM(S): 25 TABLET, FILM COATED ORAL at 12:22

## 2023-12-05 RX ADMIN — SODIUM CHLORIDE 50 MILLILITER(S): 9 INJECTION INTRAMUSCULAR; INTRAVENOUS; SUBCUTANEOUS at 12:23

## 2023-12-05 RX ADMIN — SENNA PLUS 2 TABLET(S): 8.6 TABLET ORAL at 21:51

## 2023-12-05 RX ADMIN — CEFTRIAXONE 100 MILLIGRAM(S): 500 INJECTION, POWDER, FOR SOLUTION INTRAMUSCULAR; INTRAVENOUS at 06:00

## 2023-12-05 RX ADMIN — HEPARIN SODIUM 5000 UNIT(S): 5000 INJECTION INTRAVENOUS; SUBCUTANEOUS at 05:37

## 2023-12-05 RX ADMIN — Medication 25 MILLIGRAM(S): at 05:37

## 2023-12-05 RX ADMIN — LISINOPRIL 40 MILLIGRAM(S): 2.5 TABLET ORAL at 05:37

## 2023-12-05 RX ADMIN — ATORVASTATIN CALCIUM 10 MILLIGRAM(S): 80 TABLET, FILM COATED ORAL at 21:51

## 2023-12-05 RX ADMIN — Medication 1 TABLET(S): at 12:22

## 2023-12-05 RX ADMIN — HEPARIN SODIUM 5000 UNIT(S): 5000 INJECTION INTRAVENOUS; SUBCUTANEOUS at 14:35

## 2023-12-06 ENCOUNTER — TRANSCRIPTION ENCOUNTER (OUTPATIENT)
Age: 88
End: 2023-12-06

## 2023-12-06 VITALS
RESPIRATION RATE: 18 BRPM | TEMPERATURE: 98 F | HEART RATE: 72 BPM | OXYGEN SATURATION: 98 % | DIASTOLIC BLOOD PRESSURE: 58 MMHG | SYSTOLIC BLOOD PRESSURE: 129 MMHG

## 2023-12-06 LAB
CULTURE RESULTS: ABNORMAL
CULTURE RESULTS: ABNORMAL
HCT VFR BLD CALC: 28.3 % — LOW (ref 39–50)
HCT VFR BLD CALC: 28.3 % — LOW (ref 39–50)
HGB BLD-MCNC: 9.4 G/DL — LOW (ref 13–17)
HGB BLD-MCNC: 9.4 G/DL — LOW (ref 13–17)
MCHC RBC-ENTMCNC: 33.1 PG — SIGNIFICANT CHANGE UP (ref 27–34)
MCHC RBC-ENTMCNC: 33.1 PG — SIGNIFICANT CHANGE UP (ref 27–34)
MCHC RBC-ENTMCNC: 33.2 GM/DL — SIGNIFICANT CHANGE UP (ref 32–36)
MCHC RBC-ENTMCNC: 33.2 GM/DL — SIGNIFICANT CHANGE UP (ref 32–36)
MCV RBC AUTO: 99.6 FL — SIGNIFICANT CHANGE UP (ref 80–100)
MCV RBC AUTO: 99.6 FL — SIGNIFICANT CHANGE UP (ref 80–100)
NRBC # BLD: 0 /100 WBCS — SIGNIFICANT CHANGE UP (ref 0–0)
NRBC # BLD: 0 /100 WBCS — SIGNIFICANT CHANGE UP (ref 0–0)
PLATELET # BLD AUTO: 206 K/UL — SIGNIFICANT CHANGE UP (ref 150–400)
PLATELET # BLD AUTO: 206 K/UL — SIGNIFICANT CHANGE UP (ref 150–400)
RBC # BLD: 2.84 M/UL — LOW (ref 4.2–5.8)
RBC # BLD: 2.84 M/UL — LOW (ref 4.2–5.8)
RBC # FLD: 12.1 % — SIGNIFICANT CHANGE UP (ref 10.3–14.5)
RBC # FLD: 12.1 % — SIGNIFICANT CHANGE UP (ref 10.3–14.5)
WBC # BLD: 3.39 K/UL — LOW (ref 3.8–10.5)
WBC # BLD: 3.39 K/UL — LOW (ref 3.8–10.5)
WBC # FLD AUTO: 3.39 K/UL — LOW (ref 3.8–10.5)
WBC # FLD AUTO: 3.39 K/UL — LOW (ref 3.8–10.5)

## 2023-12-06 PROCEDURE — 80053 COMPREHEN METABOLIC PANEL: CPT

## 2023-12-06 PROCEDURE — 87086 URINE CULTURE/COLONY COUNT: CPT

## 2023-12-06 PROCEDURE — 80048 BASIC METABOLIC PNL TOTAL CA: CPT

## 2023-12-06 PROCEDURE — 86900 BLOOD TYPING SEROLOGIC ABO: CPT

## 2023-12-06 PROCEDURE — 86901 BLOOD TYPING SEROLOGIC RH(D): CPT

## 2023-12-06 PROCEDURE — 99231 SBSQ HOSP IP/OBS SF/LOW 25: CPT

## 2023-12-06 PROCEDURE — 81001 URINALYSIS AUTO W/SCOPE: CPT

## 2023-12-06 PROCEDURE — 74177 CT ABD & PELVIS W/CONTRAST: CPT | Mod: MA

## 2023-12-06 PROCEDURE — 85610 PROTHROMBIN TIME: CPT

## 2023-12-06 PROCEDURE — 96375 TX/PRO/DX INJ NEW DRUG ADDON: CPT

## 2023-12-06 PROCEDURE — 85730 THROMBOPLASTIN TIME PARTIAL: CPT

## 2023-12-06 PROCEDURE — 85027 COMPLETE CBC AUTOMATED: CPT

## 2023-12-06 PROCEDURE — 96374 THER/PROPH/DIAG INJ IV PUSH: CPT

## 2023-12-06 PROCEDURE — 99285 EMERGENCY DEPT VISIT HI MDM: CPT | Mod: 25

## 2023-12-06 PROCEDURE — 85025 COMPLETE CBC W/AUTO DIFF WBC: CPT

## 2023-12-06 PROCEDURE — 76770 US EXAM ABDO BACK WALL COMP: CPT | Mod: 26

## 2023-12-06 PROCEDURE — 86850 RBC ANTIBODY SCREEN: CPT

## 2023-12-06 PROCEDURE — 76770 US EXAM ABDO BACK WALL COMP: CPT

## 2023-12-06 PROCEDURE — 97161 PT EVAL LOW COMPLEX 20 MIN: CPT

## 2023-12-06 RX ORDER — CEFDINIR 250 MG/5ML
1 POWDER, FOR SUSPENSION ORAL
Qty: 14 | Refills: 0
Start: 2023-12-06 | End: 2023-12-12

## 2023-12-06 RX ADMIN — Medication 1 TABLET(S): at 11:27

## 2023-12-06 RX ADMIN — Medication 25 MILLIGRAM(S): at 06:01

## 2023-12-06 RX ADMIN — CEFTRIAXONE 100 MILLIGRAM(S): 500 INJECTION, POWDER, FOR SOLUTION INTRAMUSCULAR; INTRAVENOUS at 06:02

## 2023-12-06 RX ADMIN — HEPARIN SODIUM 5000 UNIT(S): 5000 INJECTION INTRAVENOUS; SUBCUTANEOUS at 06:01

## 2023-12-06 RX ADMIN — SERTRALINE 25 MILLIGRAM(S): 25 TABLET, FILM COATED ORAL at 11:27

## 2023-12-06 RX ADMIN — LISINOPRIL 40 MILLIGRAM(S): 2.5 TABLET ORAL at 06:01

## 2023-12-06 NOTE — PROGRESS NOTE ADULT - ASSESSMENT
A/P: 89yo Male with radiation cystitis admitted with gross hematuria, now clear off CBI  - f/u am labs   - trend h/h  - f/u urine cx  - cont CTX   - dc home with chinchilla  
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

## 2023-12-06 NOTE — PROGRESS NOTE ADULT - SUBJECTIVE AND OBJECTIVE BOX
UROLOGY PA PROGRESS NOTE:     Subjective:  no acute events overnight. admitted yesterday with gross hematuria and started on CBI     Objective:  Vital signs  T(F): , Max: 98.4 (23 @ 16:30)  HR: 66 (23 @ 05:57)  BP: 134/64 (23 @ 05:57)  SpO2: 98% (23 @ 05:57)  Wt(kg): --    Output      @ 07:01  -   @ 07:00  --------------------------------------------------------  IN: 300 mL / OUT: 0 mL / NET: 300 mL    CBI    Physical Exam:  Gen: NAD   Abd: soft, NT/ND  : +chinchilla draining clear on moderate rate CBI, attempted clamping, but output became red, restarted CBI at slow rate     Labs:    4.18  / 31.5  /x        5.86  / 31.3  /1.34                           10.7   4.18  )-----------( 193      ( 04 Dec 2023 07:24 )             31.5         137  |  99  |  21  ----------------------------<  140<H>  4.4   |  24  |  1.34<H>    Ca    9.1      03 Dec 2023 17:27    TPro  6.9  /  Alb  3.6  /  TBili  0.3  /  DBili  x   /  AST  29  /  ALT  7<L>  /  AlkPhos  54      PT/INR - ( 03 Dec 2023 17:27 )   PT: 12.5 sec;   INR: 1.14 ratio         PTT - ( 03 Dec 2023 17:27 )  PTT:33.8 sec  Urinalysis Basic - ( 03 Dec 2023 17:43 )    Color: Red / Appearance: Turbid / S.021 / pH: x  Gluc: x / Ketone: Large  / Bili: Large / Urobili: >=8 mg/dL   Blood: x / Protein: 300 mg/dL / Nitrite: Positive   Leuk Esterase: Large / RBC: >50 /HPF / WBC >50 /HPF   Sq Epi: x / Non Sq Epi: 0 /HPF / Bacteria: Few /HPF        Urine Cx:        
UROLOGY DAILY PROGRESS NOTE:     Subjective: Patient seen and examined at bedside. No overnight events. c/o dysuria       Objective:  Vital signs  T(F): , Max: 98.8 (12-04-23 @ 13:35)  HR: 70 (12-05-23 @ 05:20)  BP: 124/64 (12-05-23 @ 05:20)  SpO2: 98% (12-05-23 @ 05:20)  Wt(kg): --    I&O's Summary    04 Dec 2023 07:01  -  05 Dec 2023 07:00  --------------------------------------------------------  IN: 1290 mL / OUT: 0 mL / NET: 1290 mL        Gen: NAD  Pulm: No respiratory distress, no subcostal retractions  CV: RRR, no JVD  Abd: Soft, NT, ND  : CBI clear     Labs:  12-04  4.18  / 31.5  /1.09   12-03  5.86  / 31.3  /1.34                           10.7   4.18  )-----------( 193      ( 04 Dec 2023 07:24 )             31.5     12-04    138  |  102  |  17  ----------------------------<  106<H>  4.3   |  25  |  1.09    Ca    9.2      04 Dec 2023 07:24    TPro  6.9  /  Alb  3.6  /  TBili  0.3  /  DBili  x   /  AST  29  /  ALT  7<L>  /  AlkPhos  54  12-03    PT/INR - ( 03 Dec 2023 17:27 )   PT: 12.5 sec;   INR: 1.14 ratio         PTT - ( 03 Dec 2023 17:27 )  PTT:33.8 sec    Urine Cx:  Culture - Urine (12.03.23 @ 10:12)    Specimen Source: Catheterized Catheterized   Culture Results:   >100,000 CFU/ml Gram Negative Rods      
UROLOGY DAILY PROGRESS NOTE:     Subjective: Patient seen and examined at bedside. No overnight events.       Objective:  Vital signs  T(F): , Max: 98.4 (12-05-23 @ 09:48)  HR: 65 (12-06-23 @ 05:10)  BP: 118/61 (12-06-23 @ 05:10)  SpO2: 95% (12-06-23 @ 05:10)  Wt(kg): --    I&O's Summary    05 Dec 2023 07:01  -  06 Dec 2023 07:00  --------------------------------------------------------  IN: 240 mL / OUT: 2750 mL / NET: -2510 mL        Gen: NAD  Pulm: No respiratory distress, no subcostal retractions  CV: RRR, no JVD  Abd: Soft, NT, ND  : CBI off- chinchilla- clear pink urine     Labs:  12-05  3.72  / 29.9  /x                              10.0   3.72  )-----------( 195      ( 05 Dec 2023 07:23 )             29.9               Urine Cx:  Culture - Urine (12.03.23 @ 10:12)    Specimen Source: Catheterized Catheterized   Culture Results:   Culture grew 3 or more types of organisms which indicate  collection contamination; consider recollection only if clinically  indicated.

## 2023-12-06 NOTE — DISCHARGE NOTE PROVIDER - NSDCMRMEDTOKEN_GEN_ALL_CORE_FT
cefdinir 300 mg oral capsule: 1 cap(s) orally 2 times a day  Erleada 60 mg oral tablet: 2 tab(s) orally once a day  Flax Seed Oil oral capsule:  orally   lisinopril 40 mg oral tablet: 1 tab(s) orally once a day  lovastatin 40 mg oral tablet: 1 tab(s) orally once a day  Lupron:   metoprolol succinate 25 mg oral tablet, extended release: 1 tab(s) orally once a day  Multiple Vitamins oral tablet: 1 tab(s) orally once a day  senna leaf extract oral tablet: 2 tab(s) orally once a day (at bedtime)  sertraline 25 mg oral tablet: 1 tab(s) orally once a day

## 2023-12-06 NOTE — DISCHARGE NOTE PROVIDER - NSDCCPCAREPLAN_GEN_ALL_CORE_FT
PRINCIPAL DISCHARGE DIAGNOSIS  Diagnosis: Gross hematuria  Assessment and Plan of Treatment: Call the office if you have fever greater than 101, difficulty urinating, pain not relieved with pain medication, nausea/vomiting.   continue chinchilla to legbag. follow up with Dr. Chase in the office regarding removal.   complete entire course of antiibotics as prescribed.    
Pt aaox4, presents to ed from home c/o watery diarrhea and lower abdominal pain x1 day. /93 in triage. Pt denies eating any new or undercooked foods. Pt denies cp, sob, n/v, fever/chills, headache, urinary s/s, numbness, tingling, weakness, rash. Pt notes that he recently started putting milk in his coffee and thinks it might be bothering his stomach. Pt also c/o right pinky pain and swelling x4 weeks after he banged it on "a machine". Respirations even and unlabored. NAD noted at this time.

## 2023-12-06 NOTE — DISCHARGE NOTE PROVIDER - CARE PROVIDERS DIRECT ADDRESSES
[email or web address]

## 2023-12-06 NOTE — DISCHARGE NOTE PROVIDER - NSDCFUSCHEDAPPT_GEN_ALL_CORE_FT
[facility]  UROLOGY [address] [place] [address] R  Scheduled Appointment: 02/28/2024    Chloe Chase  [facility]  UROLOGY [address] [place] [address] R  Scheduled Appointment: 02/28/2024     [facility]  UROLOGY [address] [place] [address] R  Scheduled Appointment: 02/28/2024    Chloe Chase  [facility]  UROLOGY [address] [place] [address] R  Scheduled Appointment: 02/28/2024     [facility]  UROLOGY [address] [place] [address] R  Scheduled Appointment: 12/13/2023    Chloe Chase  [facility]  UROLOGY [address] [place] [address] R  Scheduled Appointment: 12/13/2023    [facility]  UROLOGY [address] [place] [address] R  Scheduled Appointment: 02/28/2024    Chloe Chase  [facility]  UROLOGY [address] [place] [address] R  Scheduled Appointment: 02/28/2024     [facility]  UROLOGY [address] [place] [address] R  Scheduled Appointment: 12/13/2023    Chloe Chase  [facility]  UROLOGY [address] [place] [address] R  Scheduled Appointment: 12/13/2023    [facility]  UROLOGY [address] [place] [address] R  Scheduled Appointment: 02/28/2024    Chloe Chsae  [facility]  UROLOGY [address] [place] [address] R  Scheduled Appointment: 02/28/2024

## 2023-12-06 NOTE — DISCHARGE NOTE NURSING/CASE MANAGEMENT/SOCIAL WORK - PATIENT PORTAL LINK FT
You can access the FollowMyHealth Patient Portal offered by [facility] by registering at the following website: [email or web address]. By joining [facility]’s FollowMyHealth portal, you will also be able to view your health information using other applications (apps) compatible with our system. You can access the FollowMyHealth Patient Portal offered by [facility] by registering at the following website: [email or web address]. By joining [facility]’s FollowMyHealth portal, you will also be able to view your health information using other applications (apps) compatible with our system.

## 2023-12-06 NOTE — DISCHARGE NOTE PROVIDER - CARE PROVIDER_API CALL
Chloe Chase  Urology  [address] [place] [address], [address] [address]  Phone: [phone]  Fax: [phone]  Follow Up Time: 1 week   Chloe Chase  Urology  [address] [place] [address], [address] [address]  Phone: [phone]  Fax: [phone]  Follow Up Time: 1 week

## 2023-12-06 NOTE — DISCHARGE NOTE NURSING/CASE MANAGEMENT/SOCIAL WORK - NSDCPEFALRISK_GEN_ALL_CORE
For information on Fall & Injury Prevention, visit: [email or web address] OR  [email or web address] OR  https://www.cdc.gov/steadi/patient.html For information on Fall & Injury Prevention, visit: [email or web address] OR  [email or web address] OR  https://www.cdc.gov/steadi/patient.html

## 2023-12-06 NOTE — DISCHARGE NOTE PROVIDER - HOSPITAL COURSE
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 He had a 14f chinchilla placed at outside hospital. this was switched to a 20f 3way and started on CBI. urine culture initially growing GNR , then switched to contaminated. repeat urine cx negative. pt continued on CTX while in house.   CBI was clamped on 12/5, urine remained a light thompson color.   12/6 pt cleared for discharge. AVSS, hemodynamically stable. home with chinchilla to legbag. 7days cefdinir. 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 He had a 14f chinchilla placed at outside hospital. this was switched to a 20f 3way and started on CBI. urine culture initially growing GNR , then switched to contaminated. repeat urine cx negative. pt continued on CTX while in house.   CBI was clamped on 12/5, urine remained a light thompson color.   12/6 pt cleared for discharge. AVSS, hemodynamically stable. home with chinchilla to legbag. 7days cefdinir.

## 2023-12-08 LAB
-  AMIKACIN: SIGNIFICANT CHANGE UP
-  AMIKACIN: SIGNIFICANT CHANGE UP
-  AMOXICILLIN/CLAVULANIC ACID: SIGNIFICANT CHANGE UP
-  AMOXICILLIN/CLAVULANIC ACID: SIGNIFICANT CHANGE UP
-  AMPICILLIN/SULBACTAM: SIGNIFICANT CHANGE UP
-  AMPICILLIN/SULBACTAM: SIGNIFICANT CHANGE UP
-  AMPICILLIN: SIGNIFICANT CHANGE UP
-  AMPICILLIN: SIGNIFICANT CHANGE UP
-  AZTREONAM: SIGNIFICANT CHANGE UP
-  CEFAZOLIN: SIGNIFICANT CHANGE UP
-  CEFAZOLIN: SIGNIFICANT CHANGE UP
-  CEFEPIME: SIGNIFICANT CHANGE UP
-  CEFOTAXIME: SIGNIFICANT CHANGE UP
-  CEFOTAXIME: SIGNIFICANT CHANGE UP
-  CEFOXITIN: SIGNIFICANT CHANGE UP
-  CEFOXITIN: SIGNIFICANT CHANGE UP
-  CEFTAZIDIME: SIGNIFICANT CHANGE UP
-  CEFTAZIDIME: SIGNIFICANT CHANGE UP
-  CEFTRIAXONE: SIGNIFICANT CHANGE UP
-  CEFUROXIME: SIGNIFICANT CHANGE UP
-  CEFUROXIME: SIGNIFICANT CHANGE UP
-  CIPROFLOXACIN: SIGNIFICANT CHANGE UP
-  ERTAPENEM: SIGNIFICANT CHANGE UP
-  ERTAPENEM: SIGNIFICANT CHANGE UP
-  GENTAMICIN: SIGNIFICANT CHANGE UP
-  IMIPENEM: SIGNIFICANT CHANGE UP
-  IMIPENEM: SIGNIFICANT CHANGE UP
-  LEVOFLOXACIN: SIGNIFICANT CHANGE UP
-  MEROPENEM: SIGNIFICANT CHANGE UP
-  NITROFURANTOIN: SIGNIFICANT CHANGE UP
-  NITROFURANTOIN: SIGNIFICANT CHANGE UP
-  PIPERACILLIN/TAZOBACTAM: SIGNIFICANT CHANGE UP
-  TOBRAMYCIN: SIGNIFICANT CHANGE UP
-  TRIMETHOPRIM/SULFAMETHOXAZOLE: SIGNIFICANT CHANGE UP
CULTURE RESULTS: ABNORMAL
CULTURE RESULTS: ABNORMAL
METHOD TYPE: SIGNIFICANT CHANGE UP

## 2023-12-09 LAB
-  AMIKACIN: SIGNIFICANT CHANGE UP
-  AMIKACIN: SIGNIFICANT CHANGE UP
-  AZTREONAM: SIGNIFICANT CHANGE UP
-  AZTREONAM: SIGNIFICANT CHANGE UP
-  CEFEPIME: SIGNIFICANT CHANGE UP
-  CEFEPIME: SIGNIFICANT CHANGE UP
-  CEFTRIAXONE: SIGNIFICANT CHANGE UP
-  CEFTRIAXONE: SIGNIFICANT CHANGE UP
-  CIPROFLOXACIN: SIGNIFICANT CHANGE UP
-  CIPROFLOXACIN: SIGNIFICANT CHANGE UP
-  GENTAMICIN: SIGNIFICANT CHANGE UP
-  GENTAMICIN: SIGNIFICANT CHANGE UP
-  LEVOFLOXACIN: SIGNIFICANT CHANGE UP
-  LEVOFLOXACIN: SIGNIFICANT CHANGE UP
-  MEROPENEM: SIGNIFICANT CHANGE UP
-  MEROPENEM: SIGNIFICANT CHANGE UP
-  PIPERACILLIN/TAZOBACTAM: SIGNIFICANT CHANGE UP
-  PIPERACILLIN/TAZOBACTAM: SIGNIFICANT CHANGE UP
-  TOBRAMYCIN: SIGNIFICANT CHANGE UP
-  TOBRAMYCIN: SIGNIFICANT CHANGE UP
-  TRIMETHOPRIM/SULFAMETHOXAZOLE: SIGNIFICANT CHANGE UP
-  TRIMETHOPRIM/SULFAMETHOXAZOLE: SIGNIFICANT CHANGE UP
CULTURE RESULTS: ABNORMAL
CULTURE RESULTS: ABNORMAL
METHOD TYPE: SIGNIFICANT CHANGE UP
METHOD TYPE: SIGNIFICANT CHANGE UP

## 2023-12-13 ENCOUNTER — APPOINTMENT (OUTPATIENT)
Dept: UROLOGY | Facility: CLINIC | Age: 88
End: 2023-12-13

## 2023-12-14 RX ORDER — NIRMATRELVIR AND RITONAVIR 150-100 MG
10 X 150 MG & KIT ORAL
Qty: 1 | Refills: 0 | Status: DISCONTINUED | COMMUNITY
Start: 2023-12-13 | End: 2023-12-14

## 2023-12-21 ENCOUNTER — OUTPATIENT (OUTPATIENT)
Dept: OUTPATIENT SERVICES | Facility: HOSPITAL | Age: 88
LOS: 1 days | End: 2023-12-21
Payer: MEDICARE

## 2023-12-21 ENCOUNTER — APPOINTMENT (OUTPATIENT)
Dept: UROLOGY | Facility: CLINIC | Age: 88
End: 2023-12-21
Payer: MEDICARE

## 2023-12-21 VITALS — DIASTOLIC BLOOD PRESSURE: 68 MMHG | HEART RATE: 78 BPM | SYSTOLIC BLOOD PRESSURE: 137 MMHG

## 2023-12-21 DIAGNOSIS — Z97.8 PRESENCE OF OTHER SPECIFIED DEVICES: ICD-10-CM

## 2023-12-21 DIAGNOSIS — R35.0 FREQUENCY OF MICTURITION: ICD-10-CM

## 2023-12-21 DIAGNOSIS — Z98.89 OTHER SPECIFIED POSTPROCEDURAL STATES: Chronic | ICD-10-CM

## 2023-12-21 PROCEDURE — 51702 INSERT TEMP BLADDER CATH: CPT

## 2023-12-21 RX ORDER — METHENAMINE MANDELATE 1000 MG/1
1 TABLET, FILM COATED ORAL
Qty: 180 | Refills: 3 | Status: DISCONTINUED | COMMUNITY
Start: 2023-11-10 | End: 2023-12-21

## 2023-12-22 ENCOUNTER — LABORATORY RESULT (OUTPATIENT)
Age: 88
End: 2023-12-22

## 2023-12-28 ENCOUNTER — INPATIENT (INPATIENT)
Facility: HOSPITAL | Age: 88
LOS: 4 days | Discharge: HOME HEALTH | DRG: 189 | End: 2024-01-02
Attending: INTERNAL MEDICINE | Admitting: INTERNAL MEDICINE
Payer: MEDICARE

## 2023-12-28 ENCOUNTER — RX RENEWAL (OUTPATIENT)
Age: 88
End: 2023-12-28

## 2023-12-28 VITALS
WEIGHT: 184.09 LBS | DIASTOLIC BLOOD PRESSURE: 56 MMHG | SYSTOLIC BLOOD PRESSURE: 92 MMHG | TEMPERATURE: 97 F | HEART RATE: 86 BPM | HEIGHT: 66 IN | OXYGEN SATURATION: 97 % | RESPIRATION RATE: 20 BRPM

## 2023-12-28 DIAGNOSIS — N39.0 URINARY TRACT INFECTION, SITE NOT SPECIFIED: ICD-10-CM

## 2023-12-28 DIAGNOSIS — Z98.89 OTHER SPECIFIED POSTPROCEDURAL STATES: Chronic | ICD-10-CM

## 2023-12-28 DIAGNOSIS — E78.5 HYPERLIPIDEMIA, UNSPECIFIED: ICD-10-CM

## 2023-12-28 DIAGNOSIS — R09.02 HYPOXEMIA: ICD-10-CM

## 2023-12-28 DIAGNOSIS — U07.1 COVID-19: ICD-10-CM

## 2023-12-28 DIAGNOSIS — C61 MALIGNANT NEOPLASM OF PROSTATE: ICD-10-CM

## 2023-12-28 DIAGNOSIS — J96.01 ACUTE RESPIRATORY FAILURE WITH HYPOXIA: ICD-10-CM

## 2023-12-28 DIAGNOSIS — I10 ESSENTIAL (PRIMARY) HYPERTENSION: ICD-10-CM

## 2023-12-28 LAB
ALBUMIN SERPL ELPH-MCNC: 3.5 G/DL — SIGNIFICANT CHANGE UP (ref 3.3–5)
ALBUMIN SERPL ELPH-MCNC: 3.5 G/DL — SIGNIFICANT CHANGE UP (ref 3.3–5)
ALP SERPL-CCNC: 61 U/L — SIGNIFICANT CHANGE UP (ref 40–120)
ALP SERPL-CCNC: 61 U/L — SIGNIFICANT CHANGE UP (ref 40–120)
ALT FLD-CCNC: 24 U/L — SIGNIFICANT CHANGE UP (ref 10–45)
ALT FLD-CCNC: 24 U/L — SIGNIFICANT CHANGE UP (ref 10–45)
ANION GAP SERPL CALC-SCNC: 13 MMOL/L — SIGNIFICANT CHANGE UP (ref 5–17)
ANION GAP SERPL CALC-SCNC: 13 MMOL/L — SIGNIFICANT CHANGE UP (ref 5–17)
APPEARANCE UR: ABNORMAL
APPEARANCE UR: ABNORMAL
APTT BLD: 31.5 SEC — SIGNIFICANT CHANGE UP (ref 24.5–35.6)
APTT BLD: 31.5 SEC — SIGNIFICANT CHANGE UP (ref 24.5–35.6)
AST SERPL-CCNC: 47 U/L — HIGH (ref 10–40)
AST SERPL-CCNC: 47 U/L — HIGH (ref 10–40)
BACTERIA # UR AUTO: ABNORMAL /HPF
BACTERIA # UR AUTO: ABNORMAL /HPF
BASE EXCESS BLDV CALC-SCNC: -1 MMOL/L — SIGNIFICANT CHANGE UP (ref -2–3)
BASE EXCESS BLDV CALC-SCNC: -1 MMOL/L — SIGNIFICANT CHANGE UP (ref -2–3)
BASOPHILS # BLD AUTO: 0.02 K/UL — SIGNIFICANT CHANGE UP (ref 0–0.2)
BASOPHILS # BLD AUTO: 0.02 K/UL — SIGNIFICANT CHANGE UP (ref 0–0.2)
BASOPHILS NFR BLD AUTO: 0.2 % — SIGNIFICANT CHANGE UP (ref 0–2)
BASOPHILS NFR BLD AUTO: 0.2 % — SIGNIFICANT CHANGE UP (ref 0–2)
BILIRUB SERPL-MCNC: 0.3 MG/DL — SIGNIFICANT CHANGE UP (ref 0.2–1.2)
BILIRUB SERPL-MCNC: 0.3 MG/DL — SIGNIFICANT CHANGE UP (ref 0.2–1.2)
BILIRUB UR-MCNC: NEGATIVE — SIGNIFICANT CHANGE UP
BILIRUB UR-MCNC: NEGATIVE — SIGNIFICANT CHANGE UP
BUN SERPL-MCNC: 21 MG/DL — SIGNIFICANT CHANGE UP (ref 7–23)
BUN SERPL-MCNC: 21 MG/DL — SIGNIFICANT CHANGE UP (ref 7–23)
CA-I SERPL-SCNC: 1.19 MMOL/L — SIGNIFICANT CHANGE UP (ref 1.15–1.33)
CA-I SERPL-SCNC: 1.19 MMOL/L — SIGNIFICANT CHANGE UP (ref 1.15–1.33)
CALCIUM SERPL-MCNC: 9.5 MG/DL — SIGNIFICANT CHANGE UP (ref 8.4–10.5)
CALCIUM SERPL-MCNC: 9.5 MG/DL — SIGNIFICANT CHANGE UP (ref 8.4–10.5)
CAST: 25 /LPF — HIGH (ref 0–4)
CAST: 25 /LPF — HIGH (ref 0–4)
CHLORIDE BLDV-SCNC: 97 MMOL/L — SIGNIFICANT CHANGE UP (ref 96–108)
CHLORIDE BLDV-SCNC: 97 MMOL/L — SIGNIFICANT CHANGE UP (ref 96–108)
CHLORIDE SERPL-SCNC: 96 MMOL/L — SIGNIFICANT CHANGE UP (ref 96–108)
CHLORIDE SERPL-SCNC: 96 MMOL/L — SIGNIFICANT CHANGE UP (ref 96–108)
CO2 BLDV-SCNC: 26 MMOL/L — SIGNIFICANT CHANGE UP (ref 22–26)
CO2 BLDV-SCNC: 26 MMOL/L — SIGNIFICANT CHANGE UP (ref 22–26)
CO2 SERPL-SCNC: 23 MMOL/L — SIGNIFICANT CHANGE UP (ref 22–31)
CO2 SERPL-SCNC: 23 MMOL/L — SIGNIFICANT CHANGE UP (ref 22–31)
COLOR SPEC: YELLOW — SIGNIFICANT CHANGE UP
COLOR SPEC: YELLOW — SIGNIFICANT CHANGE UP
CREAT SERPL-MCNC: 1.23 MG/DL — SIGNIFICANT CHANGE UP (ref 0.5–1.3)
CREAT SERPL-MCNC: 1.23 MG/DL — SIGNIFICANT CHANGE UP (ref 0.5–1.3)
DIFF PNL FLD: ABNORMAL
DIFF PNL FLD: ABNORMAL
EGFR: 56 ML/MIN/1.73M2 — LOW
EGFR: 56 ML/MIN/1.73M2 — LOW
EOSINOPHIL # BLD AUTO: 0.05 K/UL — SIGNIFICANT CHANGE UP (ref 0–0.5)
EOSINOPHIL # BLD AUTO: 0.05 K/UL — SIGNIFICANT CHANGE UP (ref 0–0.5)
EOSINOPHIL NFR BLD AUTO: 0.6 % — SIGNIFICANT CHANGE UP (ref 0–6)
EOSINOPHIL NFR BLD AUTO: 0.6 % — SIGNIFICANT CHANGE UP (ref 0–6)
FLUAV AG NPH QL: SIGNIFICANT CHANGE UP
FLUAV AG NPH QL: SIGNIFICANT CHANGE UP
FLUBV AG NPH QL: SIGNIFICANT CHANGE UP
FLUBV AG NPH QL: SIGNIFICANT CHANGE UP
GAS PNL BLDV: 130 MMOL/L — LOW (ref 136–145)
GAS PNL BLDV: 130 MMOL/L — LOW (ref 136–145)
GAS PNL BLDV: SIGNIFICANT CHANGE UP
GAS PNL BLDV: SIGNIFICANT CHANGE UP
GLUCOSE BLDV-MCNC: 120 MG/DL — HIGH (ref 70–99)
GLUCOSE BLDV-MCNC: 120 MG/DL — HIGH (ref 70–99)
GLUCOSE SERPL-MCNC: 122 MG/DL — HIGH (ref 70–99)
GLUCOSE SERPL-MCNC: 122 MG/DL — HIGH (ref 70–99)
GLUCOSE UR QL: NEGATIVE MG/DL — SIGNIFICANT CHANGE UP
GLUCOSE UR QL: NEGATIVE MG/DL — SIGNIFICANT CHANGE UP
HCO3 BLDV-SCNC: 24 MMOL/L — SIGNIFICANT CHANGE UP (ref 22–29)
HCO3 BLDV-SCNC: 24 MMOL/L — SIGNIFICANT CHANGE UP (ref 22–29)
HCT VFR BLD CALC: 30.5 % — LOW (ref 39–50)
HCT VFR BLD CALC: 30.5 % — LOW (ref 39–50)
HCT VFR BLDA CALC: 34 % — LOW (ref 39–51)
HCT VFR BLDA CALC: 34 % — LOW (ref 39–51)
HGB BLD CALC-MCNC: 11.3 G/DL — LOW (ref 12.6–17.4)
HGB BLD CALC-MCNC: 11.3 G/DL — LOW (ref 12.6–17.4)
HGB BLD-MCNC: 10.1 G/DL — LOW (ref 13–17)
HGB BLD-MCNC: 10.1 G/DL — LOW (ref 13–17)
IMM GRANULOCYTES NFR BLD AUTO: 0.4 % — SIGNIFICANT CHANGE UP (ref 0–0.9)
IMM GRANULOCYTES NFR BLD AUTO: 0.4 % — SIGNIFICANT CHANGE UP (ref 0–0.9)
INR BLD: 1.17 RATIO — SIGNIFICANT CHANGE UP (ref 0.85–1.18)
INR BLD: 1.17 RATIO — SIGNIFICANT CHANGE UP (ref 0.85–1.18)
KETONES UR-MCNC: NEGATIVE MG/DL — SIGNIFICANT CHANGE UP
KETONES UR-MCNC: NEGATIVE MG/DL — SIGNIFICANT CHANGE UP
LACTATE BLDV-MCNC: 2 MMOL/L — SIGNIFICANT CHANGE UP (ref 0.5–2)
LACTATE BLDV-MCNC: 2 MMOL/L — SIGNIFICANT CHANGE UP (ref 0.5–2)
LEUKOCYTE ESTERASE UR-ACNC: ABNORMAL
LEUKOCYTE ESTERASE UR-ACNC: ABNORMAL
LYMPHOCYTES # BLD AUTO: 1.15 K/UL — SIGNIFICANT CHANGE UP (ref 1–3.3)
LYMPHOCYTES # BLD AUTO: 1.15 K/UL — SIGNIFICANT CHANGE UP (ref 1–3.3)
LYMPHOCYTES # BLD AUTO: 14.1 % — SIGNIFICANT CHANGE UP (ref 13–44)
LYMPHOCYTES # BLD AUTO: 14.1 % — SIGNIFICANT CHANGE UP (ref 13–44)
MCHC RBC-ENTMCNC: 32.4 PG — SIGNIFICANT CHANGE UP (ref 27–34)
MCHC RBC-ENTMCNC: 32.4 PG — SIGNIFICANT CHANGE UP (ref 27–34)
MCHC RBC-ENTMCNC: 33.1 GM/DL — SIGNIFICANT CHANGE UP (ref 32–36)
MCHC RBC-ENTMCNC: 33.1 GM/DL — SIGNIFICANT CHANGE UP (ref 32–36)
MCV RBC AUTO: 97.8 FL — SIGNIFICANT CHANGE UP (ref 80–100)
MCV RBC AUTO: 97.8 FL — SIGNIFICANT CHANGE UP (ref 80–100)
MONOCYTES # BLD AUTO: 0.58 K/UL — SIGNIFICANT CHANGE UP (ref 0–0.9)
MONOCYTES # BLD AUTO: 0.58 K/UL — SIGNIFICANT CHANGE UP (ref 0–0.9)
MONOCYTES NFR BLD AUTO: 7.1 % — SIGNIFICANT CHANGE UP (ref 2–14)
MONOCYTES NFR BLD AUTO: 7.1 % — SIGNIFICANT CHANGE UP (ref 2–14)
NEUTROPHILS # BLD AUTO: 6.33 K/UL — SIGNIFICANT CHANGE UP (ref 1.8–7.4)
NEUTROPHILS # BLD AUTO: 6.33 K/UL — SIGNIFICANT CHANGE UP (ref 1.8–7.4)
NEUTROPHILS NFR BLD AUTO: 77.6 % — HIGH (ref 43–77)
NEUTROPHILS NFR BLD AUTO: 77.6 % — HIGH (ref 43–77)
NITRITE UR-MCNC: POSITIVE
NITRITE UR-MCNC: POSITIVE
NRBC # BLD: 0 /100 WBCS — SIGNIFICANT CHANGE UP (ref 0–0)
NRBC # BLD: 0 /100 WBCS — SIGNIFICANT CHANGE UP (ref 0–0)
NT-PROBNP SERPL-SCNC: 700 PG/ML — HIGH (ref 0–300)
NT-PROBNP SERPL-SCNC: 700 PG/ML — HIGH (ref 0–300)
PCO2 BLDV: 42 MMHG — SIGNIFICANT CHANGE UP (ref 42–55)
PCO2 BLDV: 42 MMHG — SIGNIFICANT CHANGE UP (ref 42–55)
PH BLDV: 7.37 — SIGNIFICANT CHANGE UP (ref 7.32–7.43)
PH BLDV: 7.37 — SIGNIFICANT CHANGE UP (ref 7.32–7.43)
PH UR: 5.5 — SIGNIFICANT CHANGE UP (ref 5–8)
PH UR: 5.5 — SIGNIFICANT CHANGE UP (ref 5–8)
PLATELET # BLD AUTO: 257 K/UL — SIGNIFICANT CHANGE UP (ref 150–400)
PLATELET # BLD AUTO: 257 K/UL — SIGNIFICANT CHANGE UP (ref 150–400)
PO2 BLDV: 17 MMHG — LOW (ref 25–45)
PO2 BLDV: 17 MMHG — LOW (ref 25–45)
POTASSIUM BLDV-SCNC: 3.8 MMOL/L — SIGNIFICANT CHANGE UP (ref 3.5–5.1)
POTASSIUM BLDV-SCNC: 3.8 MMOL/L — SIGNIFICANT CHANGE UP (ref 3.5–5.1)
POTASSIUM SERPL-MCNC: 3.9 MMOL/L — SIGNIFICANT CHANGE UP (ref 3.5–5.3)
POTASSIUM SERPL-MCNC: 3.9 MMOL/L — SIGNIFICANT CHANGE UP (ref 3.5–5.3)
POTASSIUM SERPL-SCNC: 3.9 MMOL/L — SIGNIFICANT CHANGE UP (ref 3.5–5.3)
POTASSIUM SERPL-SCNC: 3.9 MMOL/L — SIGNIFICANT CHANGE UP (ref 3.5–5.3)
PROT SERPL-MCNC: 7.3 G/DL — SIGNIFICANT CHANGE UP (ref 6–8.3)
PROT SERPL-MCNC: 7.3 G/DL — SIGNIFICANT CHANGE UP (ref 6–8.3)
PROT UR-MCNC: 100 MG/DL
PROT UR-MCNC: 100 MG/DL
PROTHROM AB SERPL-ACNC: 12.2 SEC — SIGNIFICANT CHANGE UP (ref 9.5–13)
PROTHROM AB SERPL-ACNC: 12.2 SEC — SIGNIFICANT CHANGE UP (ref 9.5–13)
RBC # BLD: 3.12 M/UL — LOW (ref 4.2–5.8)
RBC # BLD: 3.12 M/UL — LOW (ref 4.2–5.8)
RBC # FLD: 12.8 % — SIGNIFICANT CHANGE UP (ref 10.3–14.5)
RBC # FLD: 12.8 % — SIGNIFICANT CHANGE UP (ref 10.3–14.5)
RBC CASTS # UR COMP ASSIST: 41 /HPF — HIGH (ref 0–4)
RBC CASTS # UR COMP ASSIST: 41 /HPF — HIGH (ref 0–4)
REVIEW: SIGNIFICANT CHANGE UP
REVIEW: SIGNIFICANT CHANGE UP
RSV RNA NPH QL NAA+NON-PROBE: SIGNIFICANT CHANGE UP
RSV RNA NPH QL NAA+NON-PROBE: SIGNIFICANT CHANGE UP
SAO2 % BLDV: 20.1 % — LOW (ref 67–88)
SAO2 % BLDV: 20.1 % — LOW (ref 67–88)
SARS-COV-2 RNA SPEC QL NAA+PROBE: DETECTED
SARS-COV-2 RNA SPEC QL NAA+PROBE: DETECTED
SODIUM SERPL-SCNC: 132 MMOL/L — LOW (ref 135–145)
SODIUM SERPL-SCNC: 132 MMOL/L — LOW (ref 135–145)
SP GR SPEC: 1.01 — SIGNIFICANT CHANGE UP (ref 1–1.03)
SP GR SPEC: 1.01 — SIGNIFICANT CHANGE UP (ref 1–1.03)
SQUAMOUS # UR AUTO: 4 /HPF — SIGNIFICANT CHANGE UP (ref 0–5)
SQUAMOUS # UR AUTO: 4 /HPF — SIGNIFICANT CHANGE UP (ref 0–5)
TROPONIN T, HIGH SENSITIVITY RESULT: 19 NG/L — SIGNIFICANT CHANGE UP (ref 0–51)
TROPONIN T, HIGH SENSITIVITY RESULT: 19 NG/L — SIGNIFICANT CHANGE UP (ref 0–51)
TROPONIN T, HIGH SENSITIVITY RESULT: 23 NG/L — SIGNIFICANT CHANGE UP (ref 0–51)
TROPONIN T, HIGH SENSITIVITY RESULT: 23 NG/L — SIGNIFICANT CHANGE UP (ref 0–51)
UROBILINOGEN FLD QL: 0.2 MG/DL — SIGNIFICANT CHANGE UP (ref 0.2–1)
UROBILINOGEN FLD QL: 0.2 MG/DL — SIGNIFICANT CHANGE UP (ref 0.2–1)
WBC # BLD: 8.16 K/UL — SIGNIFICANT CHANGE UP (ref 3.8–10.5)
WBC # BLD: 8.16 K/UL — SIGNIFICANT CHANGE UP (ref 3.8–10.5)
WBC # FLD AUTO: 8.16 K/UL — SIGNIFICANT CHANGE UP (ref 3.8–10.5)
WBC # FLD AUTO: 8.16 K/UL — SIGNIFICANT CHANGE UP (ref 3.8–10.5)
WBC UR QL: 399 /HPF — HIGH (ref 0–5)
WBC UR QL: 399 /HPF — HIGH (ref 0–5)

## 2023-12-28 PROCEDURE — 74177 CT ABD & PELVIS W/CONTRAST: CPT | Mod: 26,MA

## 2023-12-28 PROCEDURE — 71275 CT ANGIOGRAPHY CHEST: CPT | Mod: 26,MA

## 2023-12-28 PROCEDURE — 71045 X-RAY EXAM CHEST 1 VIEW: CPT | Mod: 26

## 2023-12-28 PROCEDURE — 99285 EMERGENCY DEPT VISIT HI MDM: CPT

## 2023-12-28 RX ORDER — LEVOTHYROXINE SODIUM 125 MCG
50 TABLET ORAL DAILY
Refills: 0 | Status: DISCONTINUED | OUTPATIENT
Start: 2023-12-28 | End: 2024-01-02

## 2023-12-28 RX ORDER — ATORVASTATIN CALCIUM 80 MG/1
10 TABLET, FILM COATED ORAL AT BEDTIME
Refills: 0 | Status: DISCONTINUED | OUTPATIENT
Start: 2023-12-28 | End: 2024-01-02

## 2023-12-28 RX ORDER — METOPROLOL TARTRATE 50 MG
25 TABLET ORAL DAILY
Refills: 0 | Status: DISCONTINUED | OUTPATIENT
Start: 2023-12-28 | End: 2024-01-02

## 2023-12-28 RX ORDER — SERTRALINE 25 MG/1
25 TABLET, FILM COATED ORAL DAILY
Refills: 0 | Status: DISCONTINUED | OUTPATIENT
Start: 2023-12-28 | End: 2024-01-02

## 2023-12-28 RX ORDER — ENOXAPARIN SODIUM 100 MG/ML
40 INJECTION SUBCUTANEOUS EVERY 24 HOURS
Refills: 0 | Status: DISCONTINUED | OUTPATIENT
Start: 2023-12-28 | End: 2024-01-02

## 2023-12-28 RX ORDER — APALUTAMIDE 240 MG/1
2 TABLET, FILM COATED ORAL
Refills: 0 | DISCHARGE

## 2023-12-28 RX ORDER — SODIUM CHLORIDE 9 MG/ML
500 INJECTION INTRAMUSCULAR; INTRAVENOUS; SUBCUTANEOUS ONCE
Refills: 0 | Status: COMPLETED | OUTPATIENT
Start: 2023-12-28 | End: 2023-12-28

## 2023-12-28 RX ORDER — CHOLECALCIFEROL (VITAMIN D3) 125 MCG
1000 CAPSULE ORAL DAILY
Refills: 0 | Status: DISCONTINUED | OUTPATIENT
Start: 2023-12-28 | End: 2024-01-02

## 2023-12-28 RX ORDER — LISINOPRIL 2.5 MG/1
40 TABLET ORAL DAILY
Refills: 0 | Status: DISCONTINUED | OUTPATIENT
Start: 2023-12-28 | End: 2024-01-02

## 2023-12-28 RX ADMIN — SODIUM CHLORIDE 500 MILLILITER(S): 9 INJECTION INTRAMUSCULAR; INTRAVENOUS; SUBCUTANEOUS at 14:23

## 2023-12-28 RX ADMIN — SODIUM CHLORIDE 500 MILLILITER(S): 9 INJECTION INTRAMUSCULAR; INTRAVENOUS; SUBCUTANEOUS at 13:30

## 2023-12-28 RX ADMIN — SERTRALINE 25 MILLIGRAM(S): 25 TABLET, FILM COATED ORAL at 23:24

## 2023-12-28 RX ADMIN — ATORVASTATIN CALCIUM 10 MILLIGRAM(S): 80 TABLET, FILM COATED ORAL at 23:23

## 2023-12-28 NOTE — ED PROVIDER NOTE - CLINICAL SUMMARY MEDICAL DECISION MAKING FREE TEXT BOX
Patient presents emergency department complaining of chest pain, shortness of breath and fatigue.  Patient is hemodynamically stable afebrile on presentation. .  Patient was clear to auscultation.  Given patient presentation and patient physical exam is significant for diffuse abdominal tenderness.  Given patient presentation and history we will obtain lab work to evaluate for any acute pathologies.  Will also obtain CT scans of the chest and abdomen to evaluate for any acute pathology.  Pending labs and imaging for further disposition. Patient presents emergency department complaining of chest pain, shortness of breath and fatigue.  Patient is hemodynamically stable afebrile on presentation. .  Patient was clear to auscultation.  Given patient presentation and patient physical exam is significant for diffuse abdominal tenderness.  Given patient presentation and history we will obtain lab work to evaluate for any acute pathologies.  Will also obtain CT scans of the chest and abdomen to evaluate for any acute pathology.  Pending labs and imaging for further disposition.  Attending Triny Gregorio: 89 yo mlae presenting with sob and chest discomfort with associated fatigue. upon arrival pt tachynpnic. pt denies any falls or trauma. pocus performed shwoing preserved ef, a line predominant lung fields and ivc with respiratroyr variation. as a line predominant lung fields less likely pulmonary edema as cause of sob. no fevers or productive cough making pna less likely. pt denies any black stools and conjunctiva pink making anemia as cause of sob less likely. with tachypnea and sob concern for possible PE as cause of sob. pt also reports some abdominal pain. no evidence of aaa on pocus. will obtain labs, cta chest and ct abd/pelv to further evaluate. will david need admission.

## 2023-12-28 NOTE — PATIENT PROFILE ADULT - FALL HARM RISK - HARM RISK INTERVENTIONS
Assistance with ambulation/Assistance OOB with selected safe patient handling equipment/Communicate Risk of Fall with Harm to all staff/Discuss with provider need for PT consult/Monitor gait and stability/Provide patient with walking aids - walker, cane, crutches/Reinforce activity limits and safety measures with patient and family/Tailored Fall Risk Interventions/Visual Cue: Yellow wristband and red socks/Bed in lowest position, wheels locked, appropriate side rails in place/Call bell, personal items and telephone in reach/Instruct patient to call for assistance before getting out of bed or chair/Non-slip footwear when patient is out of bed/[place] to call system/Physically safe environment - no spills, clutter or unnecessary equipment/Purposeful Proactive Rounding/Room/bathroom lighting operational, light cord in reach Assistance with ambulation/Assistance OOB with selected safe patient handling equipment/Communicate Risk of Fall with Harm to all staff/Discuss with provider need for PT consult/Monitor gait and stability/Provide patient with walking aids - walker, cane, crutches/Reinforce activity limits and safety measures with patient and family/Tailored Fall Risk Interventions/Visual Cue: Yellow wristband and red socks/Bed in lowest position, wheels locked, appropriate side rails in place/Call bell, personal items and telephone in reach/Instruct patient to call for assistance before getting out of bed or chair/Non-slip footwear when patient is out of bed/[place] to call system/Physically safe environment - no spills, clutter or unnecessary equipment/Purposeful Proactive Rounding/Room/bathroom lighting operational, light cord in reach

## 2023-12-28 NOTE — ED PROVIDER NOTE - PHYSICAL EXAMINATION
Physical Exam:  Gen: NAD, AOx3, non-toxic appearing,  Head: NCAT  HEENT: EOMI, PEERLA, normal conjunctiva, tongue midline, oral mucosa moist  Lung: CTAB, no respiratory distress, no wheezes/rhonchi/rales B/L  CV: RRR  Abd: diffuse abd tenderness, no guarding  MSK: no visible deformities, ROM normal in UE/LE, no back pain  Neuro: No focal sensory or motor deficits  Skin: Warm, well perfused, no rash, no leg swelling  Psych: normal affect, calm Physical Exam:  Gen: NAD, AOx3, non-toxic appearing,  Head: NCAT  HEENT: EOMI, PEERLA, normal conjunctiva, tongue midline, oral mucosa moist  Lung: CTAB, no respiratory distress, no wheezes/rhonchi/rales B/L  CV: RRR  Abd: diffuse abd tenderness, no guarding  MSK: no visible deformities, ROM normal in UE/LE, no back pain  Neuro: No focal sensory or motor deficits  Skin: Warm, well perfused, no rash, no leg swelling  Psych: normal affect, calm  Attending Triny Gregorio: Gen: NAD, heent: atrauamtic, perrla, mmm,  neck; nttp, no nuchal rigidity, chest: nttp, no crepitus, cv: rrr, lungs: ctab, abd: soft, mild ttp lower abdomen, nondistended, no peritoneal signs, , no guarding, ext: wwp, neg homans, skin: no rash, neuro: awake and alert, following commands, speech clear, sensation and strength intact, no focal deficits

## 2023-12-28 NOTE — H&P ADULT - ASSESSMENT
89-year-old male with past medical history of hypertension, hyperlipidemia, prostate cancer status postradiation and prostatectomy, radiation cystitis who presents emergency department complaining of chest pain, shortness of breath.  Patient states over the last week he has been more fatigued than usual.  States he is normally able to ambulate without assistance but recently he has been unable to due to being tired.  Patient denies any fevers or chills.  Patient was diagnosed with COVID 3 weeks ago after being discharged from the hospital.  Patient denies any blood in his urine.  Patient has a Garrett in place

## 2023-12-28 NOTE — ED ADULT NURSE REASSESSMENT NOTE - NS ED NURSE REASSESS COMMENT FT1
Per MD Sheela Gregorio - current Chinchilla catheter can remain in place- chinchilla last changed on 12/21. Urine culture to be obtained and sent.

## 2023-12-28 NOTE — ED ADULT NURSE NOTE - OBJECTIVE STATEMENT
PT is a 89 yr old male with pmh of prostate CA currently in treatment with Garrett catheter (changed 12/21) coming from home for chest pain. Per patient's daughter- patient had covid 2 weeks ago and after minor symptoms began to improve- over the last few days however he has slowly declined again. PT has been lethargic- generalized weakness with increased difficulty walking- band like chest pain/upper abd pain- and sob. PT in the ED is sob with minimal movements- and has moments of increased wob. Pt's daughter states at night he has been having "convulsions" that make him feel like he cannot catch his breath but denies fevers. Pt is afebrile rectally in the ED- vitals stable. Pt does not require O2 at this time- 100% on RA. PT skin intact- some erythema to the buttock that blanches.

## 2023-12-28 NOTE — ED PROVIDER NOTE - ATTENDING CONTRIBUTION TO CARE
Attending MD Triny Gregorio:  I personally have seen and examined this patient.  Resident note reviewed and agree on plan of care and except where noted.  See HPI, PE, and MDM for details.

## 2023-12-28 NOTE — H&P ADULT - PROBLEM SELECTOR PLAN 1
Exact cause unknown. ?Post COVID .  Will check  .  Will check TTE .   Pulmonary and cardiology consulted.  < from: CT Angio Chest PE Protocol w/ IV Cont (12.28.23 @ 15:54) >    IMPRESSION:  Limited evaluation for segmental and subsegmental pulmonary embolism. No   main, leftright, or lobar pulmonary embolism.    Indeterminate 2.4 cm left lower lobe nodular opacity. Malignancy should   be considered.    No acute intra-abdominal pathology.

## 2023-12-28 NOTE — H&P ADULT - TIME BILLING
Admission H&P including bedside history ,examination ,reviewing test results and treatement plan. D/W patient and his daughter. Consults called. D/W ACP Admission H&P including bedside history ,examination ,reviewing test results and treatement plan. D/W patient and his daughter. Consults called. D/W ACP.

## 2023-12-28 NOTE — ED ADULT NURSE NOTE - NSFALLHARMRISKINTERV_ED_ALL_ED
Communicate risk of Fall with Harm to all staff, patient, and family/Provide visual cue: red socks, yellow wristband, yellow gown, etc/Reinforce activity limits and safety measures with patient and family/Bed in lowest position, wheels locked, appropriate side rails in place/Call bell, personal items and telephone in reach/Instruct patient to call for assistance before getting out of bed/chair/stretcher/Non-slip footwear applied when patient is off stretcher/[place] to call system/Physically safe environment - no spills, clutter or unnecessary equipment/Purposeful Proactive Rounding/Room/bathroom lighting operational, light cord in reach Communicate risk of Fall with Harm to all staff, patient, and family/Provide visual cue: red socks, yellow wristband, yellow gown, etc/Reinforce activity limits and safety measures with patient and family/Bed in lowest position, wheels locked, appropriate side rails in place/Call bell, personal items and telephone in reach/Instruct patient to call for assistance before getting out of bed/chair/stretcher/Non-slip footwear applied when patient is off stretcher/[place] to call system/Physically safe environment - no spills, clutter or unnecessary equipment/Purposeful Proactive Rounding/Room/bathroom lighting operational, light cord in reach

## 2023-12-28 NOTE — ED PROVIDER NOTE - OBJECTIVE STATEMENT
Patient is a 89-year-old male with past medical history of hypertension, hyperlipidemia, prostate cancer status postradiation and prostatectomy, radiation cystitis who presents emergency department complaining of chest pain, shortness of breath.  Patient states over the last week he has been more fatigued than usual.  States he is normally able to ambulate without assistance but recently he has been unable to due to being tired.  Patient denies any fevers or chills.  Patient was diagnosed with COVID 3 weeks ago after being discharged from the hospital.  Patient denies any blood in his urine.  Patient has a Garrett in place

## 2023-12-28 NOTE — ED ADULT TRIAGE NOTE - CHIEF COMPLAINT QUOTE
chest pain and SOB since last night  hx covid 2 weeks ago and chinchilla catheter intact and draining

## 2023-12-29 DIAGNOSIS — R06.02 SHORTNESS OF BREATH: ICD-10-CM

## 2023-12-29 DIAGNOSIS — R31.9 HEMATURIA, UNSPECIFIED: ICD-10-CM

## 2023-12-29 DIAGNOSIS — R07.9 CHEST PAIN, UNSPECIFIED: ICD-10-CM

## 2023-12-29 DIAGNOSIS — R91.1 SOLITARY PULMONARY NODULE: ICD-10-CM

## 2023-12-29 LAB
ANION GAP SERPL CALC-SCNC: 11 MMOL/L — SIGNIFICANT CHANGE UP (ref 5–17)
ANION GAP SERPL CALC-SCNC: 11 MMOL/L — SIGNIFICANT CHANGE UP (ref 5–17)
BLD GP AB SCN SERPL QL: NEGATIVE — SIGNIFICANT CHANGE UP
BLD GP AB SCN SERPL QL: NEGATIVE — SIGNIFICANT CHANGE UP
BUN SERPL-MCNC: 15 MG/DL — SIGNIFICANT CHANGE UP (ref 7–23)
BUN SERPL-MCNC: 15 MG/DL — SIGNIFICANT CHANGE UP (ref 7–23)
CALCIUM SERPL-MCNC: 8.7 MG/DL — SIGNIFICANT CHANGE UP (ref 8.4–10.5)
CALCIUM SERPL-MCNC: 8.7 MG/DL — SIGNIFICANT CHANGE UP (ref 8.4–10.5)
CHLORIDE SERPL-SCNC: 103 MMOL/L — SIGNIFICANT CHANGE UP (ref 96–108)
CHLORIDE SERPL-SCNC: 103 MMOL/L — SIGNIFICANT CHANGE UP (ref 96–108)
CO2 SERPL-SCNC: 22 MMOL/L — SIGNIFICANT CHANGE UP (ref 22–31)
CO2 SERPL-SCNC: 22 MMOL/L — SIGNIFICANT CHANGE UP (ref 22–31)
CREAT SERPL-MCNC: 1.03 MG/DL — SIGNIFICANT CHANGE UP (ref 0.5–1.3)
CREAT SERPL-MCNC: 1.03 MG/DL — SIGNIFICANT CHANGE UP (ref 0.5–1.3)
EGFR: 69 ML/MIN/1.73M2 — SIGNIFICANT CHANGE UP
EGFR: 69 ML/MIN/1.73M2 — SIGNIFICANT CHANGE UP
GLUCOSE SERPL-MCNC: 91 MG/DL — SIGNIFICANT CHANGE UP (ref 70–99)
GLUCOSE SERPL-MCNC: 91 MG/DL — SIGNIFICANT CHANGE UP (ref 70–99)
HCT VFR BLD CALC: 24.2 % — LOW (ref 39–50)
HCT VFR BLD CALC: 25.5 % — LOW (ref 39–50)
HCT VFR BLD CALC: 25.5 % — LOW (ref 39–50)
HGB BLD-MCNC: 7.9 G/DL — LOW (ref 13–17)
HGB BLD-MCNC: 7.9 G/DL — LOW (ref 13–17)
HGB BLD-MCNC: 8.2 G/DL — LOW (ref 13–17)
HGB BLD-MCNC: 8.2 G/DL — LOW (ref 13–17)
HGB BLD-MCNC: 8.6 G/DL — LOW (ref 13–17)
HGB BLD-MCNC: 8.6 G/DL — LOW (ref 13–17)
MCHC RBC-ENTMCNC: 32.6 GM/DL — SIGNIFICANT CHANGE UP (ref 32–36)
MCHC RBC-ENTMCNC: 32.6 GM/DL — SIGNIFICANT CHANGE UP (ref 32–36)
MCHC RBC-ENTMCNC: 32.6 PG — SIGNIFICANT CHANGE UP (ref 27–34)
MCHC RBC-ENTMCNC: 32.6 PG — SIGNIFICANT CHANGE UP (ref 27–34)
MCHC RBC-ENTMCNC: 33 PG — SIGNIFICANT CHANGE UP (ref 27–34)
MCHC RBC-ENTMCNC: 33 PG — SIGNIFICANT CHANGE UP (ref 27–34)
MCHC RBC-ENTMCNC: 33.5 PG — SIGNIFICANT CHANGE UP (ref 27–34)
MCHC RBC-ENTMCNC: 33.5 PG — SIGNIFICANT CHANGE UP (ref 27–34)
MCHC RBC-ENTMCNC: 33.7 GM/DL — SIGNIFICANT CHANGE UP (ref 32–36)
MCHC RBC-ENTMCNC: 33.7 GM/DL — SIGNIFICANT CHANGE UP (ref 32–36)
MCHC RBC-ENTMCNC: 33.9 GM/DL — SIGNIFICANT CHANGE UP (ref 32–36)
MCHC RBC-ENTMCNC: 33.9 GM/DL — SIGNIFICANT CHANGE UP (ref 32–36)
MCV RBC AUTO: 100 FL — SIGNIFICANT CHANGE UP (ref 80–100)
MCV RBC AUTO: 100 FL — SIGNIFICANT CHANGE UP (ref 80–100)
MCV RBC AUTO: 97.7 FL — SIGNIFICANT CHANGE UP (ref 80–100)
MCV RBC AUTO: 97.7 FL — SIGNIFICANT CHANGE UP (ref 80–100)
MCV RBC AUTO: 98.8 FL — SIGNIFICANT CHANGE UP (ref 80–100)
MCV RBC AUTO: 98.8 FL — SIGNIFICANT CHANGE UP (ref 80–100)
NRBC # BLD: 0 /100 WBCS — SIGNIFICANT CHANGE UP (ref 0–0)
PLATELET # BLD AUTO: 165 K/UL — SIGNIFICANT CHANGE UP (ref 150–400)
PLATELET # BLD AUTO: 165 K/UL — SIGNIFICANT CHANGE UP (ref 150–400)
PLATELET # BLD AUTO: 183 K/UL — SIGNIFICANT CHANGE UP (ref 150–400)
PLATELET # BLD AUTO: 183 K/UL — SIGNIFICANT CHANGE UP (ref 150–400)
PLATELET # BLD AUTO: 211 K/UL — SIGNIFICANT CHANGE UP (ref 150–400)
PLATELET # BLD AUTO: 211 K/UL — SIGNIFICANT CHANGE UP (ref 150–400)
POTASSIUM SERPL-MCNC: 4.3 MMOL/L — SIGNIFICANT CHANGE UP (ref 3.5–5.3)
POTASSIUM SERPL-MCNC: 4.3 MMOL/L — SIGNIFICANT CHANGE UP (ref 3.5–5.3)
POTASSIUM SERPL-SCNC: 4.3 MMOL/L — SIGNIFICANT CHANGE UP (ref 3.5–5.3)
POTASSIUM SERPL-SCNC: 4.3 MMOL/L — SIGNIFICANT CHANGE UP (ref 3.5–5.3)
RAPID RVP RESULT: SIGNIFICANT CHANGE UP
RAPID RVP RESULT: SIGNIFICANT CHANGE UP
RBC # BLD: 2.42 M/UL — LOW (ref 4.2–5.8)
RBC # BLD: 2.42 M/UL — LOW (ref 4.2–5.8)
RBC # BLD: 2.45 M/UL — LOW (ref 4.2–5.8)
RBC # BLD: 2.45 M/UL — LOW (ref 4.2–5.8)
RBC # BLD: 2.61 M/UL — LOW (ref 4.2–5.8)
RBC # BLD: 2.61 M/UL — LOW (ref 4.2–5.8)
RBC # FLD: 12.8 % — SIGNIFICANT CHANGE UP (ref 10.3–14.5)
RBC # FLD: 12.9 % — SIGNIFICANT CHANGE UP (ref 10.3–14.5)
RBC # FLD: 12.9 % — SIGNIFICANT CHANGE UP (ref 10.3–14.5)
RH IG SCN BLD-IMP: POSITIVE — SIGNIFICANT CHANGE UP
RH IG SCN BLD-IMP: POSITIVE — SIGNIFICANT CHANGE UP
SARS-COV-2 RNA SPEC QL NAA+PROBE: SIGNIFICANT CHANGE UP
SARS-COV-2 RNA SPEC QL NAA+PROBE: SIGNIFICANT CHANGE UP
SODIUM SERPL-SCNC: 136 MMOL/L — SIGNIFICANT CHANGE UP (ref 135–145)
SODIUM SERPL-SCNC: 136 MMOL/L — SIGNIFICANT CHANGE UP (ref 135–145)
WBC # BLD: 5.16 K/UL — SIGNIFICANT CHANGE UP (ref 3.8–10.5)
WBC # BLD: 5.16 K/UL — SIGNIFICANT CHANGE UP (ref 3.8–10.5)
WBC # BLD: 5.33 K/UL — SIGNIFICANT CHANGE UP (ref 3.8–10.5)
WBC # BLD: 5.33 K/UL — SIGNIFICANT CHANGE UP (ref 3.8–10.5)
WBC # BLD: 5.84 K/UL — SIGNIFICANT CHANGE UP (ref 3.8–10.5)
WBC # BLD: 5.84 K/UL — SIGNIFICANT CHANGE UP (ref 3.8–10.5)
WBC # FLD AUTO: 5.16 K/UL — SIGNIFICANT CHANGE UP (ref 3.8–10.5)
WBC # FLD AUTO: 5.16 K/UL — SIGNIFICANT CHANGE UP (ref 3.8–10.5)
WBC # FLD AUTO: 5.33 K/UL — SIGNIFICANT CHANGE UP (ref 3.8–10.5)
WBC # FLD AUTO: 5.33 K/UL — SIGNIFICANT CHANGE UP (ref 3.8–10.5)
WBC # FLD AUTO: 5.84 K/UL — SIGNIFICANT CHANGE UP (ref 3.8–10.5)
WBC # FLD AUTO: 5.84 K/UL — SIGNIFICANT CHANGE UP (ref 3.8–10.5)

## 2023-12-29 PROCEDURE — 93321 DOPPLER ECHO F-UP/LMTD STD: CPT | Mod: 26

## 2023-12-29 PROCEDURE — 93308 TTE F-UP OR LMTD: CPT | Mod: 26

## 2023-12-29 PROCEDURE — 99232 SBSQ HOSP IP/OBS MODERATE 35: CPT

## 2023-12-29 RX ADMIN — LISINOPRIL 40 MILLIGRAM(S): 2.5 TABLET ORAL at 05:49

## 2023-12-29 RX ADMIN — SERTRALINE 25 MILLIGRAM(S): 25 TABLET, FILM COATED ORAL at 13:05

## 2023-12-29 RX ADMIN — Medication 1 TABLET(S): at 13:04

## 2023-12-29 RX ADMIN — ENOXAPARIN SODIUM 40 MILLIGRAM(S): 100 INJECTION SUBCUTANEOUS at 05:49

## 2023-12-29 RX ADMIN — ATORVASTATIN CALCIUM 10 MILLIGRAM(S): 80 TABLET, FILM COATED ORAL at 21:23

## 2023-12-29 RX ADMIN — Medication 50 MICROGRAM(S): at 05:49

## 2023-12-29 RX ADMIN — Medication 25 MILLIGRAM(S): at 05:51

## 2023-12-29 RX ADMIN — Medication 1000 UNIT(S): at 13:05

## 2023-12-29 NOTE — CONSULT NOTE ADULT - ASSESSMENT
89yo M presenting with chest pain likely 2/2 COVID and urology consulted for dark urine in the setting of anemia. Patients urine color is currently clear yellow with minimal irrigation, no clots, and patient is hemodynamically stable with H/H of 8.6/25.5.    Recommendations  - Would not transfuse patient for Hgb of 8.6 and vitals otherwise stable with clear yellow urine after irrigation  - Patient likely has old clot/debris in bladder causing urine to be tea colored  - Continue chinchilla catheter  - No indication for CBI at this time  - Please contact urology if patient develops gross hematuria  - Discussed with Dr. Batista

## 2023-12-29 NOTE — PHYSICAL THERAPY INITIAL EVALUATION ADULT - NSPTDMEREC_GEN_A_CORE
Patient will require a rolling walker at home due to their dx of  COVID  to help complete MRADL's./rolling walker

## 2023-12-29 NOTE — PHYSICAL THERAPY INITIAL EVALUATION ADULT - ADDITIONAL COMMENTS
Pt lives with his wife in an apartment on the second floor. PTA pt was independent with all functional mobility and ADLs. No DME

## 2023-12-29 NOTE — PROGRESS NOTE ADULT - SUBJECTIVE AND OBJECTIVE BOX
Date of Service  : 12-29-23     INTERVAL HPI/OVERNIGHT EVENTS: I feel fine.   Vital Signs Last 24 Hrs  T(C): 37 (29 Dec 2023 12:30), Max: 37.4 (29 Dec 2023 05:12)  T(F): 98.6 (29 Dec 2023 12:30), Max: 99.4 (29 Dec 2023 05:12)  HR: 55 (29 Dec 2023 12:30) (55 - 72)  BP: 120/56 (29 Dec 2023 12:30) (102/43 - 121/63)  BP(mean): 71 (28 Dec 2023 19:37) (58 - 71)  RR: 18 (29 Dec 2023 12:30) (18 - 20)  SpO2: 96% (29 Dec 2023 12:30) (96% - 100%)    Parameters below as of 29 Dec 2023 12:30  Patient On (Oxygen Delivery Method): room air      I&O's Summary    28 Dec 2023 07:01  -  29 Dec 2023 07:00  --------------------------------------------------------  IN: 200 mL / OUT: 1150 mL / NET: -950 mL    29 Dec 2023 07:01  -  29 Dec 2023 13:56  --------------------------------------------------------  IN: 0 mL / OUT: 500 mL / NET: -500 mL      MEDICATIONS  (STANDING):  atorvastatin 10 milliGRAM(s) Oral at bedtime  cholecalciferol 1000 Unit(s) Oral daily  enoxaparin Injectable 40 milliGRAM(s) SubCutaneous every 24 hours  levothyroxine 50 MICROGram(s) Oral daily  lisinopril 40 milliGRAM(s) Oral daily  metoprolol succinate ER 25 milliGRAM(s) Oral daily  multivitamin 1 Tablet(s) Oral daily  sertraline 25 milliGRAM(s) Oral daily    MEDICATIONS  (PRN):    LABS:                        8.2    5.33  )-----------( 183      ( 29 Dec 2023 10:54 )             24.2     12-29    136  |  103  |  15  ----------------------------<  91  4.3   |  22  |  1.03    Ca    8.7      29 Dec 2023 06:59    TPro  7.3  /  Alb  3.5  /  TBili  0.3  /  DBili  x   /  AST  47<H>  /  ALT  24  /  AlkPhos  61  12-28    PT/INR - ( 28 Dec 2023 12:38 )   PT: 12.2 sec;   INR: 1.17 ratio         PTT - ( 28 Dec 2023 12:38 )  PTT:31.5 sec  Urinalysis Basic - ( 29 Dec 2023 06:59 )    Color: x / Appearance: x / SG: x / pH: x  Gluc: 91 mg/dL / Ketone: x  / Bili: x / Urobili: x   Blood: x / Protein: x / Nitrite: x   Leuk Esterase: x / RBC: x / WBC x   Sq Epi: x / Non Sq Epi: x / Bacteria: x      CAPILLARY BLOOD GLUCOSE            Urinalysis Basic - ( 29 Dec 2023 06:59 )    Color: x / Appearance: x / SG: x / pH: x  Gluc: 91 mg/dL / Ketone: x  / Bili: x / Urobili: x   Blood: x / Protein: x / Nitrite: x   Leuk Esterase: x / RBC: x / WBC x   Sq Epi: x / Non Sq Epi: x / Bacteria: x      REVIEW OF SYSTEMS:  CONSTITUTIONAL: No fever, weight loss, or fatigue  EYES: No eye pain, visual disturbances, or discharge  ENMT:  No difficulty hearing, tinnitus, vertigo; No sinus or throat pain  NECK: No pain or stiffness  RESPIRATORY: No cough, wheezing, chills or hemoptysis; No shortness of breath  CARDIOVASCULAR: No chest pain, palpitations, dizziness, or leg swelling  GASTROINTESTINAL: No abdominal or epigastric pain. No nausea, vomiting, or hematemesis; No diarrhea or constipation. No melena or hematochezia.  GENITOURINARY: No dysuria, frequency, hematuria, or incontinence  NEUROLOGICAL: No headaches, memory loss, loss of strength, numbness, or tremors      Consultant(s) Notes Reviewed:  [x ] YES  [ ] NO    PHYSICAL EXAM:  GENERAL: NAD, well-groomed, well-developed,not in any distress ,  HEAD:  Atraumatic, Normocephalic  EYES: EOMI, PERRLA, conjunctiva and sclera clear  ENMT: No tonsillar erythema, exudates, or enlargement; Moist mucous membranes, Good dentition, No lesions  NECK: Supple, No JVD, Normal thyroid  NERVOUS SYSTEM:  Alert & Oriented X3, No focal deficit   CHEST/LUNG: Good air entry bilateral with no  rales, rhonchi, wheezing, or rubs  HEART: Regular rate and rhythm; No murmurs, rubs, or gallops  ABDOMEN: Soft, Nontender, Nondistended; Bowel sounds present  EXTREMITIES:  2+ Peripheral Pulses, No clubbing, cyanosis, or edema    Care Discussed with Consultants/Other Providers [ x] YES  [ ] NO

## 2023-12-29 NOTE — PHYSICAL THERAPY INITIAL EVALUATION ADULT - PLANNED THERAPY INTERVENTIONS, PT EVAL
GOAL: Pt will negotiate up/down 2 flight of stairs +HR independently in 2 weeks/balance training/bed mobility training/gait training/strengthening/transfer training

## 2023-12-29 NOTE — PHYSICAL THERAPY INITIAL EVALUATION ADULT - PERTINENT HX OF CURRENT PROBLEM, REHAB EVAL
89-year-old male with past medical history of hypertension, hyperlipidemia, prostate cancer status postradiation and prostatectomy, radiation cystitis who presents emergency department complaining of chest pain, shortness of breath.  Patient states over the last week he has been more fatigued than usual.  States he is normally able to ambulate without assistance but recently he has been unable to due to being tired.  Patient denies any fevers or chills.  Patient was diagnosed with COVID 3 weeks ago after being discharged from the hospital.  Patient denies any blood in his urine.  Patient has a Garrett in place  CT Angio PE/Abdomen & Pelvis (12/28): Limited evaluation for segmental and subsegmental pulmonary embolism. No main, left right, or lobar pulmonary embolism. Indeterminate 2.4 cm left lower lobe nodular opacity. Malignancy should be considered. No acute intra-abdominal pathology. XRay Chest (12/28): No focal consolidation.

## 2023-12-29 NOTE — CONSULT NOTE ADULT - PROBLEM SELECTOR RECOMMENDATION 9
unclear cause  -CTA chest neg PE, no PNA  -+Emphysema  -Resolved at this time. No c/o SOB.  -Normoxic  -Keep sats >88% with o2 PRN. Check o2 sats on RA with ambulation.   -Duoneb q6h PRN

## 2023-12-29 NOTE — PHYSICAL THERAPY INITIAL EVALUATION ADULT - GENERAL OBSERVATIONS, REHAB EVAL
Pt received supine in bed, NAD, +IVL, +chinchilla, +bed alarm. NC off patient. Ecuadorean speaking,  used ([place] ID: [id number]) Pt received supine in bed, NAD, +IVL, +chinchilla, +bed alarm. NC off patient. Gabonese speaking,  used ([place] ID: [id number]) Pt received supine in bed, NAD, +IVL, +chinchilla, +bed alarm, +tele. NC off patient. Yakut speaking,  used ([place] ID: [id number]) Pt received supine in bed, NAD, +IVL, +chinchilla, +bed alarm, +tele. NC off patient. Polish speaking,  used ([place] ID: [id number])

## 2023-12-29 NOTE — CONSULT NOTE ADULT - SUBJECTIVE AND OBJECTIVE BOX
HPI  89y M past medical history hyperlipidemia, hypertension, prostate Cancer status post radiation and prostatectomy, prior urethral strictures, radiation cystitis presents to ED complaining of gross hematuria with suprapubic abdominal pain x 2 days.  Patient was seen at outside hospital ED earlier today.  Had 16 [place]an Chinchilal exchanged for 14 [place]an Chinchilla.  Patient was discharged and had continuing hematuria so came here for evaluation.  No AC use.  No fevers, chills, nausea, vomiting. Urologist Dr. Chloe Chase.    Patient is a 89yo M presenting with chest pain found to have COVID. Urology was consulted because patient has history of prostate cancer s/p radiation c/b radiation cystitis and urine color was dark this admission. Patient had recent admission under the urology service where he was found to have hematuria which resolved with CBI. This admission patient has been hemodynamically stable, afebrile. Labs are notable of WBC 5.1, H/H of 8.6/25.5, and a Cr of 1.03. Patients chinchilla was found to be draining translucent tea colored urine and after irrigation was crystal clear.      PAST MEDICAL & SURGICAL HISTORY:  HTN (hypertension)      HLD (hyperlipidemia)      Prostate cancer      S/P prostatectomy          MEDICATIONS  (STANDING):  atorvastatin 10 milliGRAM(s) Oral at bedtime  cholecalciferol 1000 Unit(s) Oral daily  enoxaparin Injectable 40 milliGRAM(s) SubCutaneous every 24 hours  levothyroxine 50 MICROGram(s) Oral daily  lisinopril 40 milliGRAM(s) Oral daily  metoprolol succinate ER 25 milliGRAM(s) Oral daily  multivitamin 1 Tablet(s) Oral daily  sertraline 25 milliGRAM(s) Oral daily    MEDICATIONS  (PRN):      FAMILY HISTORY:      Allergies    No Known Allergies    Intolerances        SOCIAL HISTORY:    REVIEW OF SYSTEMS: Otherwise negative as stated in [facility]    Physical Exam  Vital signs  T(C): 37 (12-29-23 @ 12:30), Max: 37.4 (12-29-23 @ 05:12)  HR: 80 (12-29-23 @ 15:15)  BP: 127/63 (12-29-23 @ 15:15)  SpO2: 95% (12-29-23 @ 15:15)  Wt(kg): --    Output    OUT:    Indwelling Catheter - Urethral (mL): 1150 mL  Total OUT: 1150 mL    Total NET: -1150 mL      OUT:    Indwelling Catheter - Urethral (mL): 500 mL  Total OUT: 500 mL    Total NET: -500 mL          Gen:  NAD    Pulm:  No respiratory distress  	  CV:  RRR    GI:  S/ND/NT    :  Chinchilla catheter in place draining translucent tea colored urine which irrigated to crystal clear with 2 syringes no clots noted.     MSK:      LABS:      12-29 @ 14:15    WBC 5.16  / Hct 25.5  / SCr --       12-29 @ 10:54    WBC 5.33  / Hct 24.2  / SCr --       12-29    136  |  103  |  15  ----------------------------<  91  4.3   |  22  |  1.03    Ca    8.7      29 Dec 2023 06:59    TPro  7.3  /  Alb  3.5  /  TBili  0.3  /  DBili  x   /  AST  47<H>  /  ALT  24  /  AlkPhos  61  12-28    PT/INR - ( 28 Dec 2023 12:38 )   PT: 12.2 sec;   INR: 1.17 ratio         PTT - ( 28 Dec 2023 12:38 )  PTT:31.5 sec  Urinalysis Basic - ( 29 Dec 2023 06:59 )    Color: x / Appearance: x / SG: x / pH: x  Gluc: 91 mg/dL / Ketone: x  / Bili: x / Urobili: x   Blood: x / Protein: x / Nitrite: x   Leuk Esterase: x / RBC: x / WBC x   Sq Epi: x / Non Sq Epi: x / Bacteria: x        Urine Cx: pending  Blood Cx:    RADIOLOGY:     HPI  89y M past medical history hyperlipidemia, hypertension, prostate Cancer status post radiation and prostatectomy, prior urethral strictures, radiation cystitis presents to ED complaining of gross hematuria with suprapubic abdominal pain x 2 days.  Patient was seen at outside hospital ED earlier today.  Had 16 [place]guillan Chinchilla exchanged for 14 Anguillan Chinchilla.  Patient was discharged and had continuing hematuria so came here for evaluation.  No AC use.  No fevers, chills, nausea, vomiting. Urologist Dr. Chloe Chase.    Patient is a 89yo M presenting with chest pain found to have COVID. Urology was consulted because patient has history of prostate cancer s/p radiation c/b radiation cystitis and urine color was dark this admission. Patient had recent admission under the urology service where he was found to have hematuria which resolved with CBI. This admission patient has been hemodynamically stable, afebrile. Labs are notable of WBC 5.1, H/H of 8.6/25.5, and a Cr of 1.03. Patients chinchilla was found to be draining translucent tea colored urine and after irrigation was crystal clear.      PAST MEDICAL & SURGICAL HISTORY:  HTN (hypertension)      HLD (hyperlipidemia)      Prostate cancer      S/P prostatectomy          MEDICATIONS  (STANDING):  atorvastatin 10 milliGRAM(s) Oral at bedtime  cholecalciferol 1000 Unit(s) Oral daily  enoxaparin Injectable 40 milliGRAM(s) SubCutaneous every 24 hours  levothyroxine 50 MICROGram(s) Oral daily  lisinopril 40 milliGRAM(s) Oral daily  metoprolol succinate ER 25 milliGRAM(s) Oral daily  multivitamin 1 Tablet(s) Oral daily  sertraline 25 milliGRAM(s) Oral daily    MEDICATIONS  (PRN):      FAMILY HISTORY:      Allergies    No Known Allergies    Intolerances        SOCIAL HISTORY:    REVIEW OF SYSTEMS: Otherwise negative as stated in [facility]    Physical Exam  Vital signs  T(C): 37 (12-29-23 @ 12:30), Max: 37.4 (12-29-23 @ 05:12)  HR: 80 (12-29-23 @ 15:15)  BP: 127/63 (12-29-23 @ 15:15)  SpO2: 95% (12-29-23 @ 15:15)  Wt(kg): --    Output    OUT:    Indwelling Catheter - Urethral (mL): 1150 mL  Total OUT: 1150 mL    Total NET: -1150 mL      OUT:    Indwelling Catheter - Urethral (mL): 500 mL  Total OUT: 500 mL    Total NET: -500 mL          Gen:  NAD    Pulm:  No respiratory distress  	  CV:  RRR    GI:  S/ND/NT    :  Chinchilla catheter in place draining translucent tea colored urine which irrigated to crystal clear with 2 syringes no clots noted.     MSK:      LABS:      12-29 @ 14:15    WBC 5.16  / Hct 25.5  / SCr --       12-29 @ 10:54    WBC 5.33  / Hct 24.2  / SCr --       12-29    136  |  103  |  15  ----------------------------<  91  4.3   |  22  |  1.03    Ca    8.7      29 Dec 2023 06:59    TPro  7.3  /  Alb  3.5  /  TBili  0.3  /  DBili  x   /  AST  47<H>  /  ALT  24  /  AlkPhos  61  12-28    PT/INR - ( 28 Dec 2023 12:38 )   PT: 12.2 sec;   INR: 1.17 ratio         PTT - ( 28 Dec 2023 12:38 )  PTT:31.5 sec  Urinalysis Basic - ( 29 Dec 2023 06:59 )    Color: x / Appearance: x / SG: x / pH: x  Gluc: 91 mg/dL / Ketone: x  / Bili: x / Urobili: x   Blood: x / Protein: x / Nitrite: x   Leuk Esterase: x / RBC: x / WBC x   Sq Epi: x / Non Sq Epi: x / Bacteria: x        Urine Cx: pending  Blood Cx:    RADIOLOGY:

## 2023-12-29 NOTE — CONSULT NOTE ADULT - PROBLEM SELECTOR RECOMMENDATION 2
-CT chest with 2.4 cm LLL nodular opacity. Per pts daughter - pt followed with Dr. Figueredo in the past, s/p biopsy of LLL nodule - s/p adeno. 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   -Pt reportedly has close f/u with Dr. Lovett as an outpatient with recent f/u CT.   -F/u with Dr. Lovett and Dr. Figueredo as an outpatient

## 2023-12-29 NOTE — CONSULT NOTE ADULT - SUBJECTIVE AND OBJECTIVE BOX
Cardiovascular Disease Initial Evaluation  Date of service: 12-29-23 @ 15:58    CHIEF COMPLAINT: Chest pain     HISTORY OF PRESENT ILLNESS: 89-year-old male with past medical history of hypertension, hyperlipidemia, prostate cancer status postradiation and prostatectomy, radiation cystitis who presents emergency department complaining of chest pain, shortness of breath.  Patient states over the last week he has been more fatigued than usual.  States he is normally able to ambulate without assistance but recently he has been unable to due to being tired.  Patient denies any fevers or chills.  Patient was diagnosed with COVID 3 weeks ago after being discharged from the hospital. Currently patient is chest pain free. SOB improving. He denies CAD history. He states he follows with a cardiologist at [place], Dr. Orozco.       Allergies    No Known Allergies    Intolerances    	    MEDICATIONS:  enoxaparin Injectable 40 milliGRAM(s) SubCutaneous every 24 hours  lisinopril 40 milliGRAM(s) Oral daily  metoprolol succinate ER 25 milliGRAM(s) Oral daily        sertraline 25 milliGRAM(s) Oral daily      atorvastatin 10 milliGRAM(s) Oral at bedtime  levothyroxine 50 MICROGram(s) Oral daily    cholecalciferol 1000 Unit(s) Oral daily  multivitamin 1 Tablet(s) Oral daily      PAST MEDICAL & SURGICAL HISTORY:  HTN (hypertension)      HLD (hyperlipidemia)      Prostate cancer      S/P prostatectomy          FAMILY HISTORY:      SOCIAL HISTORY:    The patient is a nonsmoker       REVIEW OF SYSTEMS:  See HPI, otherwise complete 14 point review of systems negative    [x ] All others negative	  [ ] Unable to obtain    PHYSICAL EXAM:  T(C): 37 (12-29-23 @ 12:30), Max: 37.4 (12-29-23 @ 05:12)  HR: 80 (12-29-23 @ 15:15) (55 - 80)  BP: 127/63 (12-29-23 @ 15:15) (116/49 - 127/63)  RR: 18 (12-29-23 @ 12:30) (18 - 20)  SpO2: 95% (12-29-23 @ 15:15) (95% - 100%)  Wt(kg): --  I&O's Summary    28 Dec 2023 07:01  -  29 Dec 2023 07:00  --------------------------------------------------------  IN: 200 mL / OUT: 1150 mL / NET: -950 mL    29 Dec 2023 07:01  -  29 Dec 2023 15:58  --------------------------------------------------------  IN: 0 mL / OUT: 500 mL / NET: -500 mL        Appearance: No Acute Distress; resting comfortably  HEENT:  Normal oral mucosa, PERRL, EOMI	  Cardiovascular: Normal S1 S2, No JVD, No murmurs/rubs/gallops  Respiratory: Normal respiratory effort; Lungs clear to auscultation bilaterally  Gastrointestinal:  Soft, Non-tender, + BS	  Skin: No rashes, No ecchymoses, No cyanosis	  Neurologic: Non-focal; no weakness  Extremities: No clubbing, cyanosis or edema  Vascular: Peripheral pulses palpable 2+ bilaterally  Psychiatry: A & O x 3, Mood & affect appropriate    Laboratory Data:	 	    CBC Full  -  ( 29 Dec 2023 14:15 )  WBC Count : 5.16 K/uL  Hemoglobin : 8.6 g/dL  Hematocrit : 25.5 %  Platelet Count - Automated : 165 K/uL  Mean Cell Volume : 97.7 fl  Mean Cell Hemoglobin : 33.0 pg  Mean Cell Hemoglobin Concentration : 33.7 gm/dL  Auto Neutrophil # : x  Auto Lymphocyte # : x  Auto Monocyte # : x  Auto Eosinophil # : x  Auto Basophil # : x  Auto Neutrophil % : x  Auto Lymphocyte % : x  Auto Monocyte % : x  Auto Eosinophil % : x  Auto Basophil % : x    12-29    136  |  103  |  15  ----------------------------<  91  4.3   |  22  |  1.03  12-28    132<L>  |  96  |  21  ----------------------------<  122<H>  3.9   |  23  |  1.23    Ca    8.7      29 Dec 2023 06:59  Ca    9.5      28 Dec 2023 12:38    TPro  7.3  /  Alb  3.5  /  TBili  0.3  /  DBili  x   /  AST  47<H>  /  ALT  24  /  AlkPhos  61  12-28      proBNP:   Lipid Profile:   HgA1c:   TSH:       CARDIAC MARKERS:            Interpretation of Telemetry:Sinus    ECG: Sinus rhythm with PVCs  RADIOLOGY:  OTHER: 	    PREVIOUS DIAGNOSTIC TESTING:    [x ] Echocardiogram: 12/2023   1. Left ventricular systolic function is normal with an ejection fraction of 62 % by Lind's method of disks.   2. Mildly enlarged right ventricular cavity size and normal systolic function.   3. No significant valvular disease.   4. No prior echocardiogram is available for comparison.    [ ] Catheterization:  [ ] Stress Test:  	    Assessment:  89-year-old male with past medical history of hypertension, hyperlipidemia, prostate cancer status postradiation and prostatectomy, radiation cystitis who presents emergency department complaining of chest pain    Plan of Care:    #Chest pain  - ACS ruled out  - EKG on admission shows sinus rhythm with PVCs  - CTA chest limited but shows no PE in the mainstem, there is a 2.4 cm left lower lobe nodular opacity concerning for malignancy.   - Pain appears to be non-cardiac in nature  - Echo 12/2023 shows normal LV systolic function   - No further cardiac work up planned at this time  - Defer to primary team for CT findings    #HTN  - BP acceptable  - Continue Lisinopril    #HTN  - Statin therapy    #Hypothyroidism  - Synthroid        72 minutes spent on total encounter; more than 50% of the visit was spent counseling and/or coordinating care by the attending physician.   	  Kevin Zelaya DO [facility]  Cardiovascular Diseases  [phone]     Cardiovascular Disease Initial Evaluation  Date of service: 12-29-23 @ 15:58    CHIEF COMPLAINT: Chest pain     HISTORY OF PRESENT ILLNESS: 89-year-old male with past medical history of hypertension, hyperlipidemia, prostate cancer status postradiation and prostatectomy, radiation cystitis who presents emergency department complaining of chest pain, shortness of breath.  Patient states over the last week he has been more fatigued than usual.  States he is normally able to ambulate without assistance but recently he has been unable to due to being tired.  Patient denies any fevers or chills.  Patient was diagnosed with COVID 3 weeks ago after being discharged from the hospital. Currently patient is chest pain free. SOB improving. He denies CAD history. He states he follows with a cardiologist at [place], Dr. Orozco.       Allergies    No Known Allergies    Intolerances    	    MEDICATIONS:  enoxaparin Injectable 40 milliGRAM(s) SubCutaneous every 24 hours  lisinopril 40 milliGRAM(s) Oral daily  metoprolol succinate ER 25 milliGRAM(s) Oral daily        sertraline 25 milliGRAM(s) Oral daily      atorvastatin 10 milliGRAM(s) Oral at bedtime  levothyroxine 50 MICROGram(s) Oral daily    cholecalciferol 1000 Unit(s) Oral daily  multivitamin 1 Tablet(s) Oral daily      PAST MEDICAL & SURGICAL HISTORY:  HTN (hypertension)      HLD (hyperlipidemia)      Prostate cancer      S/P prostatectomy          FAMILY HISTORY:      SOCIAL HISTORY:    The patient is a nonsmoker       REVIEW OF SYSTEMS:  See HPI, otherwise complete 14 point review of systems negative    [x ] All others negative	  [ ] Unable to obtain    PHYSICAL EXAM:  T(C): 37 (12-29-23 @ 12:30), Max: 37.4 (12-29-23 @ 05:12)  HR: 80 (12-29-23 @ 15:15) (55 - 80)  BP: 127/63 (12-29-23 @ 15:15) (116/49 - 127/63)  RR: 18 (12-29-23 @ 12:30) (18 - 20)  SpO2: 95% (12-29-23 @ 15:15) (95% - 100%)  Wt(kg): --  I&O's Summary    28 Dec 2023 07:01  -  29 Dec 2023 07:00  --------------------------------------------------------  IN: 200 mL / OUT: 1150 mL / NET: -950 mL    29 Dec 2023 07:01  -  29 Dec 2023 15:58  --------------------------------------------------------  IN: 0 mL / OUT: 500 mL / NET: -500 mL        Appearance: No Acute Distress; resting comfortably  HEENT:  Normal oral mucosa, PERRL, EOMI	  Cardiovascular: Normal S1 S2, No JVD, No murmurs/rubs/gallops  Respiratory: Normal respiratory effort; Lungs clear to auscultation bilaterally  Gastrointestinal:  Soft, Non-tender, + BS	  Skin: No rashes, No ecchymoses, No cyanosis	  Neurologic: Non-focal; no weakness  Extremities: No clubbing, cyanosis or edema  Vascular: Peripheral pulses palpable 2+ bilaterally  Psychiatry: A & O x 3, Mood & affect appropriate    Laboratory Data:	 	    CBC Full  -  ( 29 Dec 2023 14:15 )  WBC Count : 5.16 K/uL  Hemoglobin : 8.6 g/dL  Hematocrit : 25.5 %  Platelet Count - Automated : 165 K/uL  Mean Cell Volume : 97.7 fl  Mean Cell Hemoglobin : 33.0 pg  Mean Cell Hemoglobin Concentration : 33.7 gm/dL  Auto Neutrophil # : x  Auto Lymphocyte # : x  Auto Monocyte # : x  Auto Eosinophil # : x  Auto Basophil # : x  Auto Neutrophil % : x  Auto Lymphocyte % : x  Auto Monocyte % : x  Auto Eosinophil % : x  Auto Basophil % : x    12-29    136  |  103  |  15  ----------------------------<  91  4.3   |  22  |  1.03  12-28    132<L>  |  96  |  21  ----------------------------<  122<H>  3.9   |  23  |  1.23    Ca    8.7      29 Dec 2023 06:59  Ca    9.5      28 Dec 2023 12:38    TPro  7.3  /  Alb  3.5  /  TBili  0.3  /  DBili  x   /  AST  47<H>  /  ALT  24  /  AlkPhos  61  12-28      proBNP:   Lipid Profile:   HgA1c:   TSH:       CARDIAC MARKERS:            Interpretation of Telemetry:Sinus    ECG: Sinus rhythm with PVCs  RADIOLOGY:  OTHER: 	    PREVIOUS DIAGNOSTIC TESTING:    [x ] Echocardiogram: 12/2023   1. Left ventricular systolic function is normal with an ejection fraction of 62 % by Lind's method of disks.   2. Mildly enlarged right ventricular cavity size and normal systolic function.   3. No significant valvular disease.   4. No prior echocardiogram is available for comparison.    [ ] Catheterization:  [ ] Stress Test:  	    Assessment:  89-year-old male with past medical history of hypertension, hyperlipidemia, prostate cancer status postradiation and prostatectomy, radiation cystitis who presents emergency department complaining of chest pain    Plan of Care:    #Chest pain  - ACS ruled out  - EKG on admission shows sinus rhythm with PVCs  - CTA chest limited but shows no PE in the mainstem, there is a 2.4 cm left lower lobe nodular opacity concerning for malignancy.   - Pain appears to be non-cardiac in nature  - Echo 12/2023 shows normal LV systolic function   - No further cardiac work up planned at this time  - Defer to primary team for CT findings    #HTN  - BP acceptable  - Continue Lisinopril    #HTN  - Statin therapy    #Hypothyroidism  - Synthroid        72 minutes spent on total encounter; more than 50% of the visit was spent counseling and/or coordinating care by the attending physician.   	  Kevin Zelaya DO [facility]  Cardiovascular Diseases  [phone]

## 2023-12-29 NOTE — CONSULT NOTE ADULT - ASSESSMENT
89 y/o M with PMH of HTN, HLD, prostate CA s/p postradiation and prostatectomy, radiation cystitis. Presents to the ED with c/o CP, SOB, fatigue.  Patient states over the last week he has been more fatigued than usual.  Patient was diagnosed with COVID 3 weeks ago after being discharged from the hospital. CTA chest neg PE, but found to have 2.4 cm LLL nodular opacity. Per pts daughter - pt followed with Dr. Figueredo in the past, s/p biopsy of LLL nodule - s/p adeno. 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 Pt now off o2, with no c/o SOB, CP.

## 2023-12-29 NOTE — PROGRESS NOTE ADULT - ASSESSMENT
89-year-old male with past medical history of hypertension, hyperlipidemia, prostate cancer status postradiation and prostatectomy, radiation cystitis who presents emergency department complaining of chest pain, shortness of breath.  Patient states over the last week he has been more fatigued than usual.  States he is normally able to ambulate without assistance but recently he has been unable to due to being tired.  Patient denies any fevers or chills.  Patient was diagnosed with COVID 3 weeks ago after being discharged from the hospital.  Patient denies any blood in his urine.  Patient has a Garrett in place       Problem/Plan - 1:  ·  Problem: Acute respiratory failure with hypoxia.   ·  Plan: Exact cause unknown. ?Post COVID .  Will check  .  Will check TTE .   Pulmonary and cardiology help appreciated.   < from: CT Angio Chest PE Protocol w/ IV Cont (12.28.23 @ 15:54) >    IMPRESSION:  Limited evaluation for segmental and subsegmental pulmonary embolism. No   main, leftright, or lobar pulmonary embolism.    Indeterminate 2.4 cm left lower lobe nodular opacity. Malignancy should   be considered.    No acute intra-abdominal pathology.     Problem/Plan - 2:  ·  Problem: HTN (hypertension).   ·  Plan: BP meds with hold parameters.     Problem/Plan - 3:  ·  Problem: HLD (hyperlipidemia).   ·  Plan: Statin.     Problem/Plan - 4:  ·  Problem: Prostatic cancer.   ·  Plan: S/P Surgery and Radiation .  ON Chemotherapy.     Problem/Plan - 5:  ·  Problem: 2019 novel coronavirus disease (COVID-19).   ·  Plan: Had COVID >2 weeks ago.     Problem/Plan - 6:  ·  Problem: UTI (urinary tract infection).   ·  Plan: No symptoms .     Problem/Plan - 7:  ·  Problem: Hematuria .   ·  Plan: Urology help appreciated.      Problem/Plan - 8:  ·  Problem: Anemia .   ·  Plan: Trending CBC. PRBC for Hgb <7.5 G

## 2023-12-29 NOTE — CONSULT NOTE ADULT - SUBJECTIVE AND OBJECTIVE BOX
PULMONARY CONSULT    HPI: 89 y/o M with PMH of HTN, HLD, prostate CA s/p postradiation and prostatectomy, radiation cystitis. Presents to the ED with c/o CP, SOB.  Patient states over the last week he has been more fatigued than usual.  Patient was diagnosed with COVID 3 weeks ago after being discharged from the hospital. CTA chest neg PE, but found to have 2.4 cm LLL nodular opacity.     PAST MEDICAL & SURGICAL HISTORY:  HTN (hypertension)  HLD (hyperlipidemia)  Prostate cancer  S/P prostatectomy        Allergies  No Known Allergies      FAMILY HISTORY:    Social history:     Review of Systems:  CONSTITUTIONAL: No fever, chills, or fatigue  EYES: No eye pain, visual disturbances, or discharge  ENMT:  No difficulty hearing, tinnitus, vertigo; No sinus or throat pain  NECK: No pain or stiffness  RESPIRATORY: Per above  CARDIOVASCULAR: No chest pain, palpitations, dizziness, or leg swelling  GASTROINTESTINAL: No abdominal or epigastric pain. No nausea, vomiting, or hematemesis; No diarrhea or constipation. No melena or hematochezia.  GENITOURINARY: Per above   NEUROLOGICAL: No headaches, memory loss, loss of strength, numbness, or tremors  SKIN: No itching, burning, rashes, or lesions   MUSCULOSKELETAL: No joint pain or swelling; No muscle, back, or extremity pain  PSYCHIATRIC: No depression, anxiety, mood swings, or difficulty sleeping      Medications:  MEDICATIONS  (STANDING):  atorvastatin 10 milliGRAM(s) Oral at bedtime  cholecalciferol 1000 Unit(s) Oral daily  enoxaparin Injectable 40 milliGRAM(s) SubCutaneous every 24 hours  levothyroxine 50 MICROGram(s) Oral daily  lisinopril 40 milliGRAM(s) Oral daily  metoprolol succinate ER 25 milliGRAM(s) Oral daily  multivitamin 1 Tablet(s) Oral daily  sertraline 25 milliGRAM(s) Oral daily    MEDICATIONS  (PRN):            Vital Signs Last 24 Hrs  T(C): 37.4 (29 Dec 2023 05:12), Max: 37.4 (29 Dec 2023 05:12)  T(F): 99.4 (29 Dec 2023 05:12), Max: 99.4 (29 Dec 2023 05:12)  HR: 72 (29 Dec 2023 05:12) (66 - 77)  BP: 121/63 (29 Dec 2023 05:12) (102/43 - 121/63)  BP(mean): 71 (28 Dec 2023 19:37) (58 - 76)  RR: 18 (29 Dec 2023 05:12) (18 - 22)  SpO2: 96% (29 Dec 2023 05:12) (87% - 100%)    Parameters below as of 29 Dec 2023 05:12  Patient On (Oxygen Delivery Method): nasal cannula  O2 Flow (L/min): 3        VBG pH 7.37 12-28 @ 12:16  VBG pCO2 42 12-28 @ 12:16  VBG O2 sat 20.1 12-28 @ 12:16  VBG lactate 2.0 12-28 @ 12:16        12-28 @ 07:01  -  12-29 @ 07:00  --------------------------------------------------------  IN: 200 mL / OUT: 1150 mL / NET: -950 mL          LABS:                        8.2    5.33  )-----------( 183      ( 29 Dec 2023 10:54 )             24.2     12-29    136  |  103  |  15  ----------------------------<  91  4.3   |  22  |  1.03    Ca    8.7      29 Dec 2023 06:59    TPro  7.3  /  Alb  3.5  /  TBili  0.3  /  DBili  x   /  AST  47<H>  /  ALT  24  /  AlkPhos  61  12-28          PT/INR - ( 28 Dec 2023 12:38 )   PT: 12.2 sec;   INR: 1.17 ratio         PTT - ( 28 Dec 2023 12:38 )  PTT:31.5 sec  Urinalysis Basic - ( 29 Dec 2023 06:59 )    Color: x / Appearance: x / SG: x / pH: x  Gluc: 91 mg/dL / Ketone: x  / Bili: x / Urobili: x   Blood: x / Protein: x / Nitrite: x   Leuk Esterase: x / RBC: x / WBC x   Sq Epi: x / Non Sq Epi: x / Bacteria: x            Physical Examination:    General: No acute distress.      HEENT: Pupils equal, reactive to light.  Symmetric.    PULM: Clear to auscultation bilaterally, no significant sputum production    CVS: S1, S2    ABD: Soft, nondistended, nontender, normoactive bowel sounds, no masses    EXT: No edema, nontender    SKIN: Warm and well perfused, no rashes noted.    NEURO: Alert, oriented, interactive, nonfocal    RADIOLOGY REVIEWED    CT chest:    < from: CT Angio Chest PE Protocol w/ IV Cont (12.28.23 @ 15:54) >  FINDINGS:  CHEST:  LUNGS AND LARGE AIRWAYS: 2.4 cm left lower lobe nodular opacity. Left   lower lobe surgical clip. Emphysema with peripheral reticulation. Apical   blebs. Patent central airways.  PLEURA: No pleural effusion.  VESSELS: Limited evaluation for segmental and subsegmental pulmonary   embolism. No main, left right, or lobar pulmonary embolism. . Aortic   calcifications. Calcifications of the aortic arch, descending aorta, and   its proximal branches.  HEART: Heart is mildly enlarged. No pericardial effusion. Aortic valve   calcification.  MEDIASTINUM AND REILLY: No lymphadenopathy.  CHEST WALL AND LOWER NECK: Within normal limits.    ABDOMEN AND PELVIS:  LIVER: Mild nodular contour. Stable left hepatic lobe cyst.  BILE DUCTS: Normal caliber.  GALLBLADDER: Within normal limits.  SPLEEN: Within normal limits.  PANCREAS: Within normal limits.  ADRENALS: Within normal limits.  KIDNEYS/URETERS: Bilateral renal cysts measuring up to 6.5 cm in the   lower left renal pole. Additional subcentimeter hypodensities too small   to characterize.    BLADDER: Collapsed around Garrett catheter. Question density around Garrett   catheter. Evaluated on prior study  REPRODUCTIVE ORGANS: Prostatectomy.    BOWEL: No bowel obstruction. Appendix is normal.  PERITONEUM: No ascites.  VESSELS: Atherosclerotic changes.  RETROPERITONEUM/LYMPH NODES: No lymphadenopathy.  ABDOMINAL WALL: Small fat-containing umbilical hernia.  BONES: Degenerative changes of the spine.    IMPRESSION:  Limited evaluation for segmental and subsegmental pulmonary embolism. No   main, leftright, or lobar pulmonary embolism.    Indeterminate 2.4 cm left lower lobe nodular opacity. Malignancy should   be considered.    No acute intra-abdominal pathology.    < end of copied text >     PULMONARY CONSULT    HPI: 89 y/o M with PMH of HTN, HLD, prostate CA s/p postradiation and prostatectomy, radiation cystitis. Presents to the ED with c/o CP, SOB.  Patient states over the last week he has been more fatigued than usual.  Patient was diagnosed with COVID 3 weeks ago after being discharged from the hospital. CTA chest neg PE, but found to have 2.4 cm LLL nodular opacity.     PAST MEDICAL & SURGICAL HISTORY:  HTN (hypertension)  HLD (hyperlipidemia)  Prostate cancer  S/P prostatectomy        Allergies  No Known Allergies      FAMILY HISTORY:    Social history:     Review of Systems:  CONSTITUTIONAL: No fever, chills, or fatigue  EYES: No eye pain, visual disturbances, or discharge  ENMT:  No difficulty hearing, tinnitus, vertigo; No sinus or throat pain  NECK: No pain or stiffness  RESPIRATORY: Per above  CARDIOVASCULAR: No chest pain, palpitations, dizziness, or leg swelling  GASTROINTESTINAL: No abdominal or epigastric pain. No nausea, vomiting, or hematemesis; No diarrhea or constipation. No melena or hematochezia.  GENITOURINARY: Per above   NEUROLOGICAL: No headaches, memory loss, loss of strength, numbness, or tremors  SKIN: No itching, burning, rashes, or lesions   MUSCULOSKELETAL: No joint pain or swelling; No muscle, back, or extremity pain  PSYCHIATRIC: No depression, anxiety, mood swings, or difficulty sleeping      Medications:  MEDICATIONS  (STANDING):  atorvastatin 10 milliGRAM(s) Oral at bedtime  cholecalciferol 1000 Unit(s) Oral daily  enoxaparin Injectable 40 milliGRAM(s) SubCutaneous every 24 hours  levothyroxine 50 MICROGram(s) Oral daily  lisinopril 40 milliGRAM(s) Oral daily  metoprolol succinate ER 25 milliGRAM(s) Oral daily  multivitamin 1 Tablet(s) Oral daily  sertraline 25 milliGRAM(s) Oral daily    MEDICATIONS  (PRN):            Vital Signs Last 24 Hrs  T(C): 37.4 (29 Dec 2023 05:12), Max: 37.4 (29 Dec 2023 05:12)  T(F): 99.4 (29 Dec 2023 05:12), Max: 99.4 (29 Dec 2023 05:12)  HR: 72 (29 Dec 2023 05:12) (66 - 77)  BP: 121/63 (29 Dec 2023 05:12) (102/43 - 121/63)  BP(mean): 71 (28 Dec 2023 19:37) (58 - 76)  RR: 18 (29 Dec 2023 05:12) (18 - 22)  SpO2: 96% (29 Dec 2023 05:12) (87% - 100%)    Parameters below as of 29 Dec 2023 05:12  Patient On (Oxygen Delivery Method): nasal cannula  O2 Flow (L/min): 3        VBG pH 7.37 12-28 @ 12:16  VBG pCO2 42 12-28 @ 12:16  VBG O2 sat 20.1 12-28 @ 12:16  VBG lactate 2.0 12-28 @ 12:16        12-28 @ 07:01  -  12-29 @ 07:00  --------------------------------------------------------  IN: 200 mL / OUT: 1150 mL / NET: -950 mL          LABS:                        8.2    5.33  )-----------( 183      ( 29 Dec 2023 10:54 )             24.2     12-29    136  |  103  |  15  ----------------------------<  91  4.3   |  22  |  1.03    Ca    8.7      29 Dec 2023 06:59    TPro  7.3  /  Alb  3.5  /  TBili  0.3  /  DBili  x   /  AST  47<H>  /  ALT  24  /  AlkPhos  61  12-28          PT/INR - ( 28 Dec 2023 12:38 )   PT: 12.2 sec;   INR: 1.17 ratio         PTT - ( 28 Dec 2023 12:38 )  PTT:31.5 sec  Urinalysis Basic - ( 29 Dec 2023 06:59 )    Color: x / Appearance: x / SG: x / pH: x  Gluc: 91 mg/dL / Ketone: x  / Bili: x / Urobili: x   Blood: x / Protein: x / Nitrite: x   Leuk Esterase: x / RBC: x / WBC x   Sq Epi: x / Non Sq Epi: x / Bacteria: x            Physical Examination:    General: No acute distress.      HEENT: Pupils equal, reactive to light.  Symmetric.    PULM: Clear to auscultation bilaterally, no significant sputum production    CVS: S1, S2    ABD: Soft, nondistended, nontender, normoactive bowel sounds, no masses    EXT: No edema, nontender    SKIN: Warm and well perfused, no rashes noted.    NEURO: Alert, oriented, interactive, nonfocal    RADIOLOGY REVIEWED    CT chest:    < from: CT Angio Chest PE Protocol w/ IV Cont (12.28.23 @ 15:54) >  FINDINGS:  CHEST:  LUNGS AND LARGE AIRWAYS: 2.4 cm left lower lobe nodular opacity. Left   lower lobe surgical clip. Emphysema with peripheral reticulation. Apical   blebs. Patent central airways.  PLEURA: No pleural effusion.  VESSELS: Limited evaluation for segmental and subsegmental pulmonary   embolism. No main, left right, or lobar pulmonary embolism. . Aortic   calcifications. Calcifications of the aortic arch, descending aorta, and   its proximal branches.  HEART: Heart is mildly enlarged. No pericardial effusion. Aortic valve   calcification.  MEDIASTINUM AND REILLY: No lymphadenopathy.  CHEST WALL AND LOWER NECK: Within normal limits.    ABDOMEN AND PELVIS:  LIVER: Mild nodular contour. Stable left hepatic lobe cyst.  BILE DUCTS: Normal caliber.  GALLBLADDER: Within normal limits.  SPLEEN: Within normal limits.  PANCREAS: Within normal limits.  ADRENALS: Within normal limits.  KIDNEYS/URETERS: Bilateral renal cysts measuring up to 6.5 cm in the   lower left renal pole. Additional subcentimeter hypodensities too small   to characterize.    BLADDER: Collapsed around Garrett catheter. Question density around Garrett   catheter. Evaluated on prior study  REPRODUCTIVE ORGANS: Prostatectomy.    BOWEL: No bowel obstruction. Appendix is normal.  PERITONEUM: No ascites.  VESSELS: Atherosclerotic changes.  RETROPERITONEUM/LYMPH NODES: No lymphadenopathy.  ABDOMINAL WALL: Small fat-containing umbilical hernia.  BONES: Degenerative changes of the spine.    IMPRESSION:  Limited evaluation for segmental and subsegmental pulmonary embolism. No   main, leftright, or lobar pulmonary embolism.    Indeterminate 2.4 cm left lower lobe nodular opacity. Malignancy should   be considered.    No acute intra-abdominal pathology.    < end of copied text >     PULMONARY CONSULT    HPI: 89 y/o M with PMH of HTN, HLD, prostate CA s/p postradiation and prostatectomy, radiation cystitis. Presents to the ED with c/o CP, SOB, fatigue.  Patient states over the last week he has been more fatigued than usual.  Patient was diagnosed with COVID 3 weeks ago after being discharged from the hospital. CTA chest neg PE, but found to have 2.4 cm LLL nodular opacity. Per pts daughter - pt followed with Dr. Figueredo in the past, s/p biopsy of LLL nodule - s/p adeno. 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 Pt now off o2, with no c/o SOB, CP. Denies sputum production, fevers.          PAST MEDICAL & SURGICAL HISTORY:  HTN (hypertension)  HLD (hyperlipidemia)  Prostate cancer  S/P prostatectomy        Allergies  No Known Allergies      FAMILY HISTORY:    Social history: former smoker     Review of Systems:  CONSTITUTIONAL: Per above   EYES: No eye pain, visual disturbances, or discharge  ENMT:  No difficulty hearing, tinnitus, vertigo; No sinus or throat pain  NECK: No pain or stiffness  RESPIRATORY: Per above  CARDIOVASCULAR: Per above   GASTROINTESTINAL: No abdominal or epigastric pain. No nausea, vomiting, or hematemesis; No diarrhea or constipation. No melena or hematochezia.  GENITOURINARY: Per above   NEUROLOGICAL: No headaches, memory loss, loss of strength, numbness, or tremors  SKIN: No itching, burning, rashes, or lesions   MUSCULOSKELETAL: No joint pain or swelling; No muscle, back, or extremity pain  PSYCHIATRIC: No depression, anxiety, mood swings, or difficulty sleeping      Medications:  MEDICATIONS  (STANDING):  atorvastatin 10 milliGRAM(s) Oral at bedtime  cholecalciferol 1000 Unit(s) Oral daily  enoxaparin Injectable 40 milliGRAM(s) SubCutaneous every 24 hours  levothyroxine 50 MICROGram(s) Oral daily  lisinopril 40 milliGRAM(s) Oral daily  metoprolol succinate ER 25 milliGRAM(s) Oral daily  multivitamin 1 Tablet(s) Oral daily  sertraline 25 milliGRAM(s) Oral daily          Vital Signs Last 24 Hrs  T(C): 37.4 (29 Dec 2023 05:12), Max: 37.4 (29 Dec 2023 05:12)  T(F): 99.4 (29 Dec 2023 05:12), Max: 99.4 (29 Dec 2023 05:12)  HR: 72 (29 Dec 2023 05:12) (66 - 77)  BP: 121/63 (29 Dec 2023 05:12) (102/43 - 121/63)  BP(mean): 71 (28 Dec 2023 19:37) (58 - 76)  RR: 18 (29 Dec 2023 05:12) (18 - 22)  SpO2: 96% (29 Dec 2023 05:12) (87% - 100%)    Parameters below as of 29 Dec 2023 05:12  Patient On (Oxygen Delivery Method): nasal cannula  O2 Flow (L/min): 3        VBG pH 7.37 12-28 @ 12:16  VBG pCO2 42 12-28 @ 12:16  VBG O2 sat 20.1 12-28 @ 12:16  VBG lactate 2.0 12-28 @ 12:16        12-28 @ 07:01  -  12-29 @ 07:00  --------------------------------------------------------  IN: 200 mL / OUT: 1150 mL / NET: -950 mL          LABS:                        8.2    5.33  )-----------( 183      ( 29 Dec 2023 10:54 )             24.2     12-29    136  |  103  |  15  ----------------------------<  91  4.3   |  22  |  1.03    Ca    8.7      29 Dec 2023 06:59    TPro  7.3  /  Alb  3.5  /  TBili  0.3  /  DBili  x   /  AST  47<H>  /  ALT  24  /  AlkPhos  61  12-28          PT/INR - ( 28 Dec 2023 12:38 )   PT: 12.2 sec;   INR: 1.17 ratio         PTT - ( 28 Dec 2023 12:38 )  PTT:31.5 sec  Urinalysis Basic - ( 29 Dec 2023 06:59 )    Color: x / Appearance: x / SG: x / pH: x  Gluc: 91 mg/dL / Ketone: x  / Bili: x / Urobili: x   Blood: x / Protein: x / Nitrite: x   Leuk Esterase: x / RBC: x / WBC x   Sq Epi: x / Non Sq Epi: x / Bacteria: x            Physical Examination:    General: No acute distress.      HEENT: Pupils equal, reactive to light.  Symmetric.    PULM: Clear to auscultation bilaterally, no significant sputum production    CVS: S1, S2    ABD: Soft, nondistended, nontender, normoactive bowel sounds, no masses    EXT: No edema, nontender    SKIN: Warm and well perfused, no rashes noted.    NEURO: Alert, oriented, interactive, nonfocal        RADIOLOGY REVIEWED    CT chest:    < from: CT Angio Chest PE Protocol w/ IV Cont (12.28.23 @ 15:54) >  FINDINGS:  CHEST:  LUNGS AND LARGE AIRWAYS: 2.4 cm left lower lobe nodular opacity. Left   lower lobe surgical clip. Emphysema with peripheral reticulation. Apical   blebs. Patent central airways.  PLEURA: No pleural effusion.  VESSELS: Limited evaluation for segmental and subsegmental pulmonary   embolism. No main, left right, or lobar pulmonary embolism. . Aortic   calcifications. Calcifications of the aortic arch, descending aorta, and   its proximal branches.  HEART: Heart is mildly enlarged. No pericardial effusion. Aortic valve   calcification.  MEDIASTINUM AND REILLY: No lymphadenopathy.  CHEST WALL AND LOWER NECK: Within normal limits.    ABDOMEN AND PELVIS:  LIVER: Mild nodular contour. Stable left hepatic lobe cyst.  BILE DUCTS: Normal caliber.  GALLBLADDER: Within normal limits.  SPLEEN: Within normal limits.  PANCREAS: Within normal limits.  ADRENALS: Within normal limits.  KIDNEYS/URETERS: Bilateral renal cysts measuring up to 6.5 cm in the   lower left renal pole. Additional subcentimeter hypodensities too small   to characterize.    BLADDER: Collapsed around Garrett catheter. Question density around Garrett   catheter. Evaluated on prior study  REPRODUCTIVE ORGANS: Prostatectomy.    BOWEL: No bowel obstruction. Appendix is normal.  PERITONEUM: No ascites.  VESSELS: Atherosclerotic changes.  RETROPERITONEUM/LYMPH NODES: No lymphadenopathy.  ABDOMINAL WALL: Small fat-containing umbilical hernia.  BONES: Degenerative changes of the spine.    IMPRESSION:  Limited evaluation for segmental and subsegmental pulmonary embolism. No   main, leftright, or lobar pulmonary embolism.    Indeterminate 2.4 cm left lower lobe nodular opacity. Malignancy should   be considered.    No acute intra-abdominal pathology.    < end of copied text >     PULMONARY CONSULT    HPI: 89 y/o M with PMH of HTN, HLD, prostate CA s/p postradiation and prostatectomy, radiation cystitis. Presents to the ED with c/o CP, SOB, fatigue.  Patient states over the last week he has been more fatigued than usual.  Patient was diagnosed with COVID 3 weeks ago after being discharged from the hospital. CTA chest neg PE, but found to have 2.4 cm LLL nodular opacity. Per pts daughter - pt followed with Dr. Figueredo in the past, s/p biopsy of LLL nodule - s/p adeno. 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 Pt now off o2, with no c/o SOB, CP. Denies sputum production, fevers.          PAST MEDICAL & SURGICAL HISTORY:  HTN (hypertension)  HLD (hyperlipidemia)  Prostate cancer  S/P prostatectomy        Allergies  No Known Allergies      FAMILY HISTORY:    Social history: former smoker     Review of Systems:  CONSTITUTIONAL: Per above   EYES: No eye pain, visual disturbances, or discharge  ENMT:  No difficulty hearing, tinnitus, vertigo; No sinus or throat pain  NECK: No pain or stiffness  RESPIRATORY: Per above  CARDIOVASCULAR: Per above   GASTROINTESTINAL: No abdominal or epigastric pain. No nausea, vomiting, or hematemesis; No diarrhea or constipation. No melena or hematochezia.  GENITOURINARY: Per above   NEUROLOGICAL: No headaches, memory loss, loss of strength, numbness, or tremors  SKIN: No itching, burning, rashes, or lesions   MUSCULOSKELETAL: No joint pain or swelling; No muscle, back, or extremity pain  PSYCHIATRIC: No depression, anxiety, mood swings, or difficulty sleeping      Medications:  MEDICATIONS  (STANDING):  atorvastatin 10 milliGRAM(s) Oral at bedtime  cholecalciferol 1000 Unit(s) Oral daily  enoxaparin Injectable 40 milliGRAM(s) SubCutaneous every 24 hours  levothyroxine 50 MICROGram(s) Oral daily  lisinopril 40 milliGRAM(s) Oral daily  metoprolol succinate ER 25 milliGRAM(s) Oral daily  multivitamin 1 Tablet(s) Oral daily  sertraline 25 milliGRAM(s) Oral daily          Vital Signs Last 24 Hrs  T(C): 37.4 (29 Dec 2023 05:12), Max: 37.4 (29 Dec 2023 05:12)  T(F): 99.4 (29 Dec 2023 05:12), Max: 99.4 (29 Dec 2023 05:12)  HR: 72 (29 Dec 2023 05:12) (66 - 77)  BP: 121/63 (29 Dec 2023 05:12) (102/43 - 121/63)  BP(mean): 71 (28 Dec 2023 19:37) (58 - 76)  RR: 18 (29 Dec 2023 05:12) (18 - 22)  SpO2: 96% (29 Dec 2023 05:12) (87% - 100%)    Parameters below as of 29 Dec 2023 05:12  Patient On (Oxygen Delivery Method): nasal cannula  O2 Flow (L/min): 3        VBG pH 7.37 12-28 @ 12:16  VBG pCO2 42 12-28 @ 12:16  VBG O2 sat 20.1 12-28 @ 12:16  VBG lactate 2.0 12-28 @ 12:16        12-28 @ 07:01  -  12-29 @ 07:00  --------------------------------------------------------  IN: 200 mL / OUT: 1150 mL / NET: -950 mL          LABS:                        8.2    5.33  )-----------( 183      ( 29 Dec 2023 10:54 )             24.2     12-29    136  |  103  |  15  ----------------------------<  91  4.3   |  22  |  1.03    Ca    8.7      29 Dec 2023 06:59    TPro  7.3  /  Alb  3.5  /  TBili  0.3  /  DBili  x   /  AST  47<H>  /  ALT  24  /  AlkPhos  61  12-28          PT/INR - ( 28 Dec 2023 12:38 )   PT: 12.2 sec;   INR: 1.17 ratio         PTT - ( 28 Dec 2023 12:38 )  PTT:31.5 sec  Urinalysis Basic - ( 29 Dec 2023 06:59 )    Color: x / Appearance: x / SG: x / pH: x  Gluc: 91 mg/dL / Ketone: x  / Bili: x / Urobili: x   Blood: x / Protein: x / Nitrite: x   Leuk Esterase: x / RBC: x / WBC x   Sq Epi: x / Non Sq Epi: x / Bacteria: x            Physical Examination:    General: No acute distress.      HEENT: Pupils equal, reactive to light.  Symmetric.    PULM: Clear to auscultation bilaterally, no significant sputum production    CVS: S1, S2    ABD: Soft, nondistended, nontender, normoactive bowel sounds, no masses    EXT: No edema, nontender    SKIN: Warm and well perfused, no rashes noted.    NEURO: Alert, oriented, interactive, nonfocal        RADIOLOGY REVIEWED    CT chest:    < from: CT Angio Chest PE Protocol w/ IV Cont (12.28.23 @ 15:54) >  FINDINGS:  CHEST:  LUNGS AND LARGE AIRWAYS: 2.4 cm left lower lobe nodular opacity. Left   lower lobe surgical clip. Emphysema with peripheral reticulation. Apical   blebs. Patent central airways.  PLEURA: No pleural effusion.  VESSELS: Limited evaluation for segmental and subsegmental pulmonary   embolism. No main, left right, or lobar pulmonary embolism. . Aortic   calcifications. Calcifications of the aortic arch, descending aorta, and   its proximal branches.  HEART: Heart is mildly enlarged. No pericardial effusion. Aortic valve   calcification.  MEDIASTINUM AND REILLY: No lymphadenopathy.  CHEST WALL AND LOWER NECK: Within normal limits.    ABDOMEN AND PELVIS:  LIVER: Mild nodular contour. Stable left hepatic lobe cyst.  BILE DUCTS: Normal caliber.  GALLBLADDER: Within normal limits.  SPLEEN: Within normal limits.  PANCREAS: Within normal limits.  ADRENALS: Within normal limits.  KIDNEYS/URETERS: Bilateral renal cysts measuring up to 6.5 cm in the   lower left renal pole. Additional subcentimeter hypodensities too small   to characterize.    BLADDER: Collapsed around Garrett catheter. Question density around Garrett   catheter. Evaluated on prior study  REPRODUCTIVE ORGANS: Prostatectomy.    BOWEL: No bowel obstruction. Appendix is normal.  PERITONEUM: No ascites.  VESSELS: Atherosclerotic changes.  RETROPERITONEUM/LYMPH NODES: No lymphadenopathy.  ABDOMINAL WALL: Small fat-containing umbilical hernia.  BONES: Degenerative changes of the spine.    IMPRESSION:  Limited evaluation for segmental and subsegmental pulmonary embolism. No   main, leftright, or lobar pulmonary embolism.    Indeterminate 2.4 cm left lower lobe nodular opacity. Malignancy should   be considered.    No acute intra-abdominal pathology.    < end of copied text >

## 2023-12-30 LAB
ALBUMIN SERPL ELPH-MCNC: 2.5 G/DL — LOW (ref 3.3–5)
ALBUMIN SERPL ELPH-MCNC: 2.5 G/DL — LOW (ref 3.3–5)
ALP SERPL-CCNC: 58 U/L — SIGNIFICANT CHANGE UP (ref 40–120)
ALP SERPL-CCNC: 58 U/L — SIGNIFICANT CHANGE UP (ref 40–120)
ALT FLD-CCNC: 23 U/L — SIGNIFICANT CHANGE UP (ref 10–45)
ALT FLD-CCNC: 23 U/L — SIGNIFICANT CHANGE UP (ref 10–45)
ANION GAP SERPL CALC-SCNC: 12 MMOL/L — SIGNIFICANT CHANGE UP (ref 5–17)
ANION GAP SERPL CALC-SCNC: 12 MMOL/L — SIGNIFICANT CHANGE UP (ref 5–17)
AST SERPL-CCNC: 41 U/L — HIGH (ref 10–40)
AST SERPL-CCNC: 41 U/L — HIGH (ref 10–40)
BILIRUB SERPL-MCNC: 0.4 MG/DL — SIGNIFICANT CHANGE UP (ref 0.2–1.2)
BILIRUB SERPL-MCNC: 0.4 MG/DL — SIGNIFICANT CHANGE UP (ref 0.2–1.2)
BUN SERPL-MCNC: 13 MG/DL — SIGNIFICANT CHANGE UP (ref 7–23)
BUN SERPL-MCNC: 13 MG/DL — SIGNIFICANT CHANGE UP (ref 7–23)
CALCIUM SERPL-MCNC: 9.2 MG/DL — SIGNIFICANT CHANGE UP (ref 8.4–10.5)
CALCIUM SERPL-MCNC: 9.2 MG/DL — SIGNIFICANT CHANGE UP (ref 8.4–10.5)
CHLORIDE SERPL-SCNC: 97 MMOL/L — SIGNIFICANT CHANGE UP (ref 96–108)
CHLORIDE SERPL-SCNC: 97 MMOL/L — SIGNIFICANT CHANGE UP (ref 96–108)
CO2 SERPL-SCNC: 23 MMOL/L — SIGNIFICANT CHANGE UP (ref 22–31)
CO2 SERPL-SCNC: 23 MMOL/L — SIGNIFICANT CHANGE UP (ref 22–31)
CREAT SERPL-MCNC: 1.05 MG/DL — SIGNIFICANT CHANGE UP (ref 0.5–1.3)
CREAT SERPL-MCNC: 1.05 MG/DL — SIGNIFICANT CHANGE UP (ref 0.5–1.3)
EGFR: 67 ML/MIN/1.73M2 — SIGNIFICANT CHANGE UP
EGFR: 67 ML/MIN/1.73M2 — SIGNIFICANT CHANGE UP
GLUCOSE SERPL-MCNC: 91 MG/DL — SIGNIFICANT CHANGE UP (ref 70–99)
GLUCOSE SERPL-MCNC: 91 MG/DL — SIGNIFICANT CHANGE UP (ref 70–99)
HCT VFR BLD CALC: 28.3 % — LOW (ref 39–50)
HCT VFR BLD CALC: 28.3 % — LOW (ref 39–50)
HGB BLD-MCNC: 9.2 G/DL — LOW (ref 13–17)
HGB BLD-MCNC: 9.2 G/DL — LOW (ref 13–17)
MCHC RBC-ENTMCNC: 32.5 GM/DL — SIGNIFICANT CHANGE UP (ref 32–36)
MCHC RBC-ENTMCNC: 32.5 GM/DL — SIGNIFICANT CHANGE UP (ref 32–36)
MCHC RBC-ENTMCNC: 32.6 PG — SIGNIFICANT CHANGE UP (ref 27–34)
MCHC RBC-ENTMCNC: 32.6 PG — SIGNIFICANT CHANGE UP (ref 27–34)
MCV RBC AUTO: 100.4 FL — HIGH (ref 80–100)
MCV RBC AUTO: 100.4 FL — HIGH (ref 80–100)
NRBC # BLD: 0 /100 WBCS — SIGNIFICANT CHANGE UP (ref 0–0)
NRBC # BLD: 0 /100 WBCS — SIGNIFICANT CHANGE UP (ref 0–0)
PLATELET # BLD AUTO: 190 K/UL — SIGNIFICANT CHANGE UP (ref 150–400)
PLATELET # BLD AUTO: 190 K/UL — SIGNIFICANT CHANGE UP (ref 150–400)
POTASSIUM SERPL-MCNC: 4.3 MMOL/L — SIGNIFICANT CHANGE UP (ref 3.5–5.3)
POTASSIUM SERPL-MCNC: 4.3 MMOL/L — SIGNIFICANT CHANGE UP (ref 3.5–5.3)
POTASSIUM SERPL-SCNC: 4.3 MMOL/L — SIGNIFICANT CHANGE UP (ref 3.5–5.3)
POTASSIUM SERPL-SCNC: 4.3 MMOL/L — SIGNIFICANT CHANGE UP (ref 3.5–5.3)
PROT SERPL-MCNC: 6.1 G/DL — SIGNIFICANT CHANGE UP (ref 6–8.3)
PROT SERPL-MCNC: 6.1 G/DL — SIGNIFICANT CHANGE UP (ref 6–8.3)
RBC # BLD: 2.82 M/UL — LOW (ref 4.2–5.8)
RBC # BLD: 2.82 M/UL — LOW (ref 4.2–5.8)
RBC # FLD: 12.4 % — SIGNIFICANT CHANGE UP (ref 10.3–14.5)
RBC # FLD: 12.4 % — SIGNIFICANT CHANGE UP (ref 10.3–14.5)
SODIUM SERPL-SCNC: 132 MMOL/L — LOW (ref 135–145)
SODIUM SERPL-SCNC: 132 MMOL/L — LOW (ref 135–145)
WBC # BLD: 6.59 K/UL — SIGNIFICANT CHANGE UP (ref 3.8–10.5)
WBC # BLD: 6.59 K/UL — SIGNIFICANT CHANGE UP (ref 3.8–10.5)
WBC # FLD AUTO: 6.59 K/UL — SIGNIFICANT CHANGE UP (ref 3.8–10.5)
WBC # FLD AUTO: 6.59 K/UL — SIGNIFICANT CHANGE UP (ref 3.8–10.5)

## 2023-12-30 RX ADMIN — ENOXAPARIN SODIUM 40 MILLIGRAM(S): 100 INJECTION SUBCUTANEOUS at 06:02

## 2023-12-30 RX ADMIN — Medication 1000 UNIT(S): at 10:14

## 2023-12-30 RX ADMIN — Medication 1 TABLET(S): at 10:15

## 2023-12-30 RX ADMIN — SERTRALINE 25 MILLIGRAM(S): 25 TABLET, FILM COATED ORAL at 10:15

## 2023-12-30 RX ADMIN — LISINOPRIL 40 MILLIGRAM(S): 2.5 TABLET ORAL at 06:02

## 2023-12-30 RX ADMIN — Medication 25 MILLIGRAM(S): at 06:02

## 2023-12-30 NOTE — PROGRESS NOTE ADULT - SUBJECTIVE AND OBJECTIVE BOX
Date of Service  : 12-30-23     INTERVAL HPI/OVERNIGHT EVENTS: Seen and examined earlier. I feel fine.   Vital Signs Last 24 Hrs  T(C): 37.9 (30 Dec 2023 12:00), Max: 37.9 (30 Dec 2023 12:00)  T(F): 100.3 (30 Dec 2023 12:00), Max: 100.3 (30 Dec 2023 12:00)  HR: 71 (30 Dec 2023 12:00) (67 - 75)  BP: 116/68 (30 Dec 2023 12:00) (116/68 - 135/60)  BP(mean): --  RR: 18 (30 Dec 2023 12:19) (18 - 18)  SpO2: 96% (30 Dec 2023 12:19) (95% - 99%)    Parameters below as of 30 Dec 2023 12:19  Patient On (Oxygen Delivery Method): room air      I&O's Summary    29 Dec 2023 07:01  -  30 Dec 2023 07:00  --------------------------------------------------------  IN: 670 mL / OUT: 2150 mL / NET: -1480 mL    30 Dec 2023 07:01  -  30 Dec 2023 17:18  --------------------------------------------------------  IN: 320 mL / OUT: 450 mL / NET: -130 mL      MEDICATIONS  (STANDING):  atorvastatin 10 milliGRAM(s) Oral at bedtime  cholecalciferol 1000 Unit(s) Oral daily  enoxaparin Injectable 40 milliGRAM(s) SubCutaneous every 24 hours  levothyroxine 50 MICROGram(s) Oral daily  lisinopril 40 milliGRAM(s) Oral daily  metoprolol succinate ER 25 milliGRAM(s) Oral daily  multivitamin 1 Tablet(s) Oral daily  sertraline 25 milliGRAM(s) Oral daily    MEDICATIONS  (PRN):    LABS:                        9.2    6.59  )-----------( 190      ( 30 Dec 2023 07:05 )             28.3     12-30    132<L>  |  97  |  13  ----------------------------<  91  4.3   |  23  |  1.05    Ca    9.2      30 Dec 2023 07:05    TPro  6.1  /  Alb  2.5<L>  /  TBili  0.4  /  DBili  x   /  AST  41<H>  /  ALT  23  /  AlkPhos  58  12-30      Urinalysis Basic - ( 30 Dec 2023 07:05 )    Color: x / Appearance: x / SG: x / pH: x  Gluc: 91 mg/dL / Ketone: x  / Bili: x / Urobili: x   Blood: x / Protein: x / Nitrite: x   Leuk Esterase: x / RBC: x / WBC x   Sq Epi: x / Non Sq Epi: x / Bacteria: x      CAPILLARY BLOOD GLUCOSE            Urinalysis Basic - ( 30 Dec 2023 07:05 )    Color: x / Appearance: x / SG: x / pH: x  Gluc: 91 mg/dL / Ketone: x  / Bili: x / Urobili: x   Blood: x / Protein: x / Nitrite: x   Leuk Esterase: x / RBC: x / WBC x   Sq Epi: x / Non Sq Epi: x / Bacteria: x      REVIEW OF SYSTEMS:  CONSTITUTIONAL: No fever, weight loss, or fatigue  EYES: No eye pain, visual disturbances, or discharge  ENMT:  No difficulty hearing, tinnitus, vertigo; No sinus or throat pain  NECK: No pain or stiffness  RESPIRATORY: No cough, wheezing, chills or hemoptysis; No shortness of breath  CARDIOVASCULAR: No chest pain, palpitations, dizziness, or leg swelling  GASTROINTESTINAL: No abdominal or epigastric pain. No nausea, vomiting, or hematemesis; No diarrhea or constipation. No melena or hematochezia.  GENITOURINARY: No dysuria, frequency, hematuria, or incontinence  NEUROLOGICAL: No headaches, memory loss, loss of strength, numbness, or tremors      Consultant(s) Notes Reviewed:  [x ] YES  [ ] NO    PHYSICAL EXAM:  GENERAL: NAD, well-groomed, well-developed,not in any distress ,  HEAD:  Atraumatic, Normocephalic  NECK: Supple, No JVD, Normal thyroid  NERVOUS SYSTEM:  Alert & Oriented X3, No focal deficit   CHEST/LUNG: Good air entry bilateral with no  rales, rhonchi, wheezing, or rubs  HEART: Regular rate and rhythm; No murmurs, rubs, or gallops  ABDOMEN: Soft, Nontender, Nondistended; Bowel sounds present  EXTREMITIES:  2+ Peripheral Pulses, No clubbing, cyanosis, or edema  Garrett +    Care Discussed with Consultants/Other Providers [ x] YES  [ ] NO

## 2023-12-30 NOTE — PROGRESS NOTE ADULT - ASSESSMENT
89-year-old male with past medical history of hypertension, hyperlipidemia, prostate cancer status postradiation and prostatectomy, radiation cystitis who presents emergency department complaining of chest pain, shortness of breath.  Patient states over the last week he has been more fatigued than usual.  States he is normally able to ambulate without assistance but recently he has been unable to due to being tired.  Patient denies any fevers or chills.  Patient was diagnosed with COVID 3 weeks ago after being discharged from the hospital.  Patient denies any blood in his urine.  Patient has a Garrett in place       Problem/Plan - 1:  ·  Problem: Acute respiratory failure with hypoxia.   ·  Plan: Exact cause unknown. ?Post COVID .     < from: TTE W or WO Ultrasound Enhancing Agent (12.29.23 @ 11:05) >  CONCLUSIONS:      1. Left ventricular systolic function is normal with an ejection fraction of 62 % by Lind's method of disks.   2. Mildly enlarged right ventricular cavity size and systolic function.   3.No significant valvular disease.   4. No prior echocardiogram is available for comparison.    < end of copied text >  Pulmonary and cardiology help appreciated.   < from: CT Angio Chest PE Protocol w/ IV Cont (12.28.23 @ 15:54) >    IMPRESSION:  Limited evaluation for segmental and subsegmental pulmonary embolism. No   main, leftright, or lobar pulmonary embolism.    Indeterminate 2.4 cm left lower lobe nodular opacity. Malignancy should   be considered.    No acute intra-abdominal pathology.     Problem/Plan - 2:  ·  Problem: HTN (hypertension).   ·  Plan: BP meds with hold parameters.     Problem/Plan - 3:  ·  Problem: HLD (hyperlipidemia).   ·  Plan: Statin.     Problem/Plan - 4:  ·  Problem: Prostatic cancer.   ·  Plan: S/P Surgery and Radiation .  ON Chemotherapy.     Problem/Plan - 5:  ·  Problem: 2019 novel coronavirus disease (COVID-19).   ·  Plan: Had COVID >2 weeks ago.     Problem/Plan - 6:  ·  Problem: UTI (urinary tract infection).   ·  Plan: No symptoms .     Problem/Plan - 7:  ·  Problem: Hematuria .   ·  Plan: Urology help appreciated.      Problem/Plan - 8:  ·  Problem: Anemia .   ·  Plan: Trending CBC. PRBC for Hgb <7.5 G

## 2023-12-30 NOTE — PROGRESS NOTE ADULT - SUBJECTIVE AND OBJECTIVE BOX
Follow-up Pulm Progress Note    O2 sats 98% on 3LNC  Placed on room air - was tolerating room air 12/29  Denies CP, SOB     Medications:  MEDICATIONS  (STANDING):  atorvastatin 10 milliGRAM(s) Oral at bedtime  cholecalciferol 1000 Unit(s) Oral daily  enoxaparin Injectable 40 milliGRAM(s) SubCutaneous every 24 hours  levothyroxine 50 MICROGram(s) Oral daily  lisinopril 40 milliGRAM(s) Oral daily  metoprolol succinate ER 25 milliGRAM(s) Oral daily  multivitamin 1 Tablet(s) Oral daily  sertraline 25 milliGRAM(s) Oral daily            Vital Signs Last 24 Hrs  T(C): 37.4 (30 Dec 2023 06:00), Max: 37.4 (30 Dec 2023 06:00)  T(F): 99.4 (30 Dec 2023 06:00), Max: 99.4 (30 Dec 2023 06:00)  HR: 75 (30 Dec 2023 06:00) (55 - 80)  BP: 135/60 (30 Dec 2023 06:00) (120/56 - 135/60)  BP(mean): --  RR: 18 (30 Dec 2023 06:00) (18 - 18)  SpO2: 99% (30 Dec 2023 06:00) (95% - 99%)    Parameters below as of 30 Dec 2023 06:00  Patient On (Oxygen Delivery Method): room air          VBG pH 7.37 12-28 @ 12:16    VBG pCO2 42 12-28 @ 12:16    VBG O2 sat 20.1 12-28 @ 12:16    VBG lactate 2.0 12-28 @ 12:16      12-29 @ 07:01  -  12-30 @ 07:00  --------------------------------------------------------  IN: 670 mL / OUT: 2150 mL / NET: -1480 mL          LABS:                        9.2    6.59  )-----------( 190      ( 30 Dec 2023 07:05 )             28.3     12-30    132<L>  |  97  |  13  ----------------------------<  91  4.3   |  23  |  1.05    Ca    9.2      30 Dec 2023 07:05    TPro  6.1  /  Alb  2.5<L>  /  TBili  0.4  /  DBili  x   /  AST  41<H>  /  ALT  23  /  AlkPhos  58  12-30          CAPILLARY BLOOD GLUCOSE        PT/INR - ( 28 Dec 2023 12:38 )   PT: 12.2 sec;   INR: 1.17 ratio         PTT - ( 28 Dec 2023 12:38 )  PTT:31.5 sec  Urinalysis Basic - ( 30 Dec 2023 07:05 )    Color: x / Appearance: x / SG: x / pH: x  Gluc: 91 mg/dL / Ketone: x  / Bili: x / Urobili: x   Blood: x / Protein: x / Nitrite: x   Leuk Esterase: x / RBC: x / WBC x   Sq Epi: x / Non Sq Epi: x / Bacteria: x      Physical Examination:  PULM: Clear to auscultation bilaterally  CVS: S1, S2 heard    RADIOLOGY REVIEWED    CT chest:    < from: CT Angio Chest PE Protocol w/ IV Cont (12.28.23 @ 15:54) >  FINDINGS:  CHEST:  LUNGS AND LARGE AIRWAYS: 2.4 cm left lower lobe nodular opacity. Left   lower lobe surgical clip. Emphysema with peripheral reticulation. Apical   blebs. Patent central airways.  PLEURA: No pleural effusion.  VESSELS: Limited evaluation for segmental and subsegmental pulmonary   embolism. No main, left right, or lobar pulmonary embolism. . Aortic   calcifications. Calcifications of the aortic arch, descending aorta, and   its proximal branches.  HEART: Heart is mildly enlarged. No pericardial effusion. Aortic valve   calcification.  MEDIASTINUM AND REILLY: No lymphadenopathy.  CHEST WALL AND LOWER NECK: Within normal limits.    ABDOMEN AND PELVIS:  LIVER: Mild nodular contour. Stable left hepatic lobe cyst.  BILE DUCTS: Normal caliber.  GALLBLADDER: Within normal limits.  SPLEEN: Within normal limits.  PANCREAS: Within normal limits.  ADRENALS: Within normal limits.  KIDNEYS/URETERS: Bilateral renal cysts measuring up to 6.5 cm in the   lower left renal pole. Additional subcentimeter hypodensities too small   to characterize.    BLADDER: Collapsed around Garrett catheter. Question density around Garrett   catheter. Evaluated on prior study  REPRODUCTIVE ORGANS: Prostatectomy.    BOWEL: No bowel obstruction. Appendix is normal.  PERITONEUM: No ascites.  VESSELS: Atherosclerotic changes.  RETROPERITONEUM/LYMPH NODES: No lymphadenopathy.  ABDOMINAL WALL: Small fat-containing umbilical hernia.  BONES: Degenerative changes of the spine.    IMPRESSION:  Limited evaluation for segmental and subsegmental pulmonary embolism. No   main, leftright, or lobar pulmonary embolism.    Indeterminate 2.4 cm left lower lobe nodular opacity. Malignancy should   be considered.    No acute intra-abdominal pathology.    < end of copied text >  < from: TTE W or WO Ultrasound Enhancing Agent (12.29.23 @ 11:05) >  _______________________________________________________________________________________     CONCLUSIONS:      1. Left ventricular systolic function is normal with an ejection fraction of 62 % by Lidn's method of disks.   2. Mildly enlarged right ventricular cavity size and systolic function.   3.No significant valvular disease.   4. No prior echocardiogram is available for comparison.    ________________________________________________________________________________________  FINDINGS:     Left Ventricle:  Left ventricular systolic function is normal with a calculated ejection fraction of 62 % by the Lind's biplane method of disks. Unable to assess left ventricular diastolic function due to insufficient data. Left ventricular endocardium is not well visualized; however, the left ventricular systolic function appears grossly normal. There is no evidence of a left ventricular thrombus.     Right Ventricle:  The right ventricular cavity is mildly enlarged in size and normal systolic function. Tricuspid annular plane systolic excursion (TAPSE) is 2.4 cm (normal >=1.7 cm).     Aortic Valve:  The aortic valve is tricuspid with normal leaflet excursion with normal systolic excursion. There is calcification of the aortic valve leaflets.     Mitral Valve:  Structurally normal mitral valve with normal leaflet excursion. There is normal leaflet mobility of the mitral valve. There is calcification of the mitral valve annulus.     Pericardium:  No pericardial effusion seen.  ____________________________________________________________________  QUANTITATIVE DATA:  Left Ventricle Measurements: (Indexed to BSA)     IVSd (2D):   0.9 cm  LVPWd (2D):  0.9 cm  LVIDd (2D):  5.4 cm  LVIDs (2D):  3.6 cm  LV Mass:     178 g  93.4 g/m²  BiPlane LV EF%: 62 %    Aorta Measurements: (normal range) (Indexed to BSA)     Sinuses of Valsalva: 3.90 cm (3.1 - 3.7 cm)       Right Ventricle Measurements:     TAPSE: 2.4 cm       LVOT / RVOT/ Qp/Qs Data: (Indexed to BSA)  LVOT Vmax: 0.92 m/s  LVOT VTI:  19.80 cm    ________________________________________________________________________________________  Electronically signed on 12/29/2023 at 2:08:49 PM by Mason Sandhu M.D.         *** Final ***    < end of copied text >   Follow-up Pulm Progress Note    O2 sats 98% on 3LNC  Placed on room air - was tolerating room air 12/29  Denies CP, SOB     Medications:  MEDICATIONS  (STANDING):  atorvastatin 10 milliGRAM(s) Oral at bedtime  cholecalciferol 1000 Unit(s) Oral daily  enoxaparin Injectable 40 milliGRAM(s) SubCutaneous every 24 hours  levothyroxine 50 MICROGram(s) Oral daily  lisinopril 40 milliGRAM(s) Oral daily  metoprolol succinate ER 25 milliGRAM(s) Oral daily  multivitamin 1 Tablet(s) Oral daily  sertraline 25 milliGRAM(s) Oral daily            Vital Signs Last 24 Hrs  T(C): 37.4 (30 Dec 2023 06:00), Max: 37.4 (30 Dec 2023 06:00)  T(F): 99.4 (30 Dec 2023 06:00), Max: 99.4 (30 Dec 2023 06:00)  HR: 75 (30 Dec 2023 06:00) (55 - 80)  BP: 135/60 (30 Dec 2023 06:00) (120/56 - 135/60)  BP(mean): --  RR: 18 (30 Dec 2023 06:00) (18 - 18)  SpO2: 99% (30 Dec 2023 06:00) (95% - 99%)    Parameters below as of 30 Dec 2023 06:00  Patient On (Oxygen Delivery Method): room air          VBG pH 7.37 12-28 @ 12:16    VBG pCO2 42 12-28 @ 12:16    VBG O2 sat 20.1 12-28 @ 12:16    VBG lactate 2.0 12-28 @ 12:16      12-29 @ 07:01  -  12-30 @ 07:00  --------------------------------------------------------  IN: 670 mL / OUT: 2150 mL / NET: -1480 mL          LABS:                        9.2    6.59  )-----------( 190      ( 30 Dec 2023 07:05 )             28.3     12-30    132<L>  |  97  |  13  ----------------------------<  91  4.3   |  23  |  1.05    Ca    9.2      30 Dec 2023 07:05    TPro  6.1  /  Alb  2.5<L>  /  TBili  0.4  /  DBili  x   /  AST  41<H>  /  ALT  23  /  AlkPhos  58  12-30          CAPILLARY BLOOD GLUCOSE        PT/INR - ( 28 Dec 2023 12:38 )   PT: 12.2 sec;   INR: 1.17 ratio         PTT - ( 28 Dec 2023 12:38 )  PTT:31.5 sec  Urinalysis Basic - ( 30 Dec 2023 07:05 )    Color: x / Appearance: x / SG: x / pH: x  Gluc: 91 mg/dL / Ketone: x  / Bili: x / Urobili: x   Blood: x / Protein: x / Nitrite: x   Leuk Esterase: x / RBC: x / WBC x   Sq Epi: x / Non Sq Epi: x / Bacteria: x      Physical Examination:  PULM: Clear to auscultation bilaterally  CVS: S1, S2 heard    RADIOLOGY REVIEWED    CT chest:    < from: CT Angio Chest PE Protocol w/ IV Cont (12.28.23 @ 15:54) >  FINDINGS:  CHEST:  LUNGS AND LARGE AIRWAYS: 2.4 cm left lower lobe nodular opacity. Left   lower lobe surgical clip. Emphysema with peripheral reticulation. Apical   blebs. Patent central airways.  PLEURA: No pleural effusion.  VESSELS: Limited evaluation for segmental and subsegmental pulmonary   embolism. No main, left right, or lobar pulmonary embolism. . Aortic   calcifications. Calcifications of the aortic arch, descending aorta, and   its proximal branches.  HEART: Heart is mildly enlarged. No pericardial effusion. Aortic valve   calcification.  MEDIASTINUM AND REILLY: No lymphadenopathy.  CHEST WALL AND LOWER NECK: Within normal limits.    ABDOMEN AND PELVIS:  LIVER: Mild nodular contour. Stable left hepatic lobe cyst.  BILE DUCTS: Normal caliber.  GALLBLADDER: Within normal limits.  SPLEEN: Within normal limits.  PANCREAS: Within normal limits.  ADRENALS: Within normal limits.  KIDNEYS/URETERS: Bilateral renal cysts measuring up to 6.5 cm in the   lower left renal pole. Additional subcentimeter hypodensities too small   to characterize.    BLADDER: Collapsed around Garrett catheter. Question density around Garrett   catheter. Evaluated on prior study  REPRODUCTIVE ORGANS: Prostatectomy.    BOWEL: No bowel obstruction. Appendix is normal.  PERITONEUM: No ascites.  VESSELS: Atherosclerotic changes.  RETROPERITONEUM/LYMPH NODES: No lymphadenopathy.  ABDOMINAL WALL: Small fat-containing umbilical hernia.  BONES: Degenerative changes of the spine.    IMPRESSION:  Limited evaluation for segmental and subsegmental pulmonary embolism. No   main, leftright, or lobar pulmonary embolism.    Indeterminate 2.4 cm left lower lobe nodular opacity. Malignancy should   be considered.    No acute intra-abdominal pathology.    < end of copied text >  < from: TTE W or WO Ultrasound Enhancing Agent (12.29.23 @ 11:05) >  _______________________________________________________________________________________     CONCLUSIONS:      1. Left ventricular systolic function is normal with an ejection fraction of 62 % by Lind's method of disks.   2. Mildly enlarged right ventricular cavity size and systolic function.   3.No significant valvular disease.   4. No prior echocardiogram is available for comparison.    ________________________________________________________________________________________  FINDINGS:     Left Ventricle:  Left ventricular systolic function is normal with a calculated ejection fraction of 62 % by the Lind's biplane method of disks. Unable to assess left ventricular diastolic function due to insufficient data. Left ventricular endocardium is not well visualized; however, the left ventricular systolic function appears grossly normal. There is no evidence of a left ventricular thrombus.     Right Ventricle:  The right ventricular cavity is mildly enlarged in size and normal systolic function. Tricuspid annular plane systolic excursion (TAPSE) is 2.4 cm (normal >=1.7 cm).     Aortic Valve:  The aortic valve is tricuspid with normal leaflet excursion with normal systolic excursion. There is calcification of the aortic valve leaflets.     Mitral Valve:  Structurally normal mitral valve with normal leaflet excursion. There is normal leaflet mobility of the mitral valve. There is calcification of the mitral valve annulus.     Pericardium:  No pericardial effusion seen.  ____________________________________________________________________  QUANTITATIVE DATA:  Left Ventricle Measurements: (Indexed to BSA)     IVSd (2D):   0.9 cm  LVPWd (2D):  0.9 cm  LVIDd (2D):  5.4 cm  LVIDs (2D):  3.6 cm  LV Mass:     178 g  93.4 g/m²  BiPlane LV EF%: 62 %    Aorta Measurements: (normal range) (Indexed to BSA)     Sinuses of Valsalva: 3.90 cm (3.1 - 3.7 cm)       Right Ventricle Measurements:     TAPSE: 2.4 cm       LVOT / RVOT/ Qp/Qs Data: (Indexed to BSA)  LVOT Vmax: 0.92 m/s  LVOT VTI:  19.80 cm    ________________________________________________________________________________________  Electronically signed on 12/29/2023 at 2:08:49 PM by Mason Sandhu M.D.         *** Final ***    < end of copied text >

## 2023-12-30 NOTE — CHART NOTE - NSCHARTNOTEFT_GEN_A_CORE
Patient will require a rolling walker at home due to their diagnosis of prostate cancer  and Covid -19 , deconditioning   to help complete MRADLs.  Earline Hernandez NP-C [id number] Patient will require a rolling walker at home due to their diagnosis of prostate cancer  and Covid -19 , deconditioning   to help complete MRADLs.  Earline Hernandez NP-C [id number]

## 2023-12-30 NOTE — PROGRESS NOTE ADULT - SUBJECTIVE AND OBJECTIVE BOX
Cardiovascular Disease Progress Note  Date of service: 12-30-23 @ 08:44    Overnight events: No acute events overnight.  Patient is in no distress.   Otherwise review of systems negative    Objective Findings:  T(C): 37.4 (12-30-23 @ 06:00), Max: 37.4 (12-30-23 @ 06:00)  HR: 75 (12-30-23 @ 06:00) (55 - 80)  BP: 135/60 (12-30-23 @ 06:00) (117/58 - 135/60)  RR: 18 (12-30-23 @ 06:00) (18 - 18)  SpO2: 99% (12-30-23 @ 06:00) (95% - 99%)  Wt(kg): --  Daily     Daily       Physical Exam:  Gen: NAD; Patient resting comfortably  HEENT: EOMI, Normocephalic/ atraumatic  CV: RRR, normal S1 + S2, no m/r/g  Lungs:  Normal respiratory effort; clear to auscultation bilaterally  Abd: soft, non-tender; bowel sounds present  Ext: No edema; warm and well perfused    Telemetry: N/a    Laboratory Data:                        9.2    6.59  )-----------( 190      ( 30 Dec 2023 07:05 )             28.3     12-30    132<L>  |  97  |  13  ----------------------------<  91  4.3   |  23  |  1.05    Ca    9.2      30 Dec 2023 07:05    TPro  6.1  /  Alb  2.5<L>  /  TBili  0.4  /  DBili  x   /  AST  41<H>  /  ALT  23  /  AlkPhos  58  12-30    PT/INR - ( 28 Dec 2023 12:38 )   PT: 12.2 sec;   INR: 1.17 ratio         PTT - ( 28 Dec 2023 12:38 )  PTT:31.5 sec          Inpatient Medications:  MEDICATIONS  (STANDING):  atorvastatin 10 milliGRAM(s) Oral at bedtime  cholecalciferol 1000 Unit(s) Oral daily  enoxaparin Injectable 40 milliGRAM(s) SubCutaneous every 24 hours  levothyroxine 50 MICROGram(s) Oral daily  lisinopril 40 milliGRAM(s) Oral daily  metoprolol succinate ER 25 milliGRAM(s) Oral daily  multivitamin 1 Tablet(s) Oral daily  sertraline 25 milliGRAM(s) Oral daily      Assessment: 89-year-old male with past medical history of hypertension, hyperlipidemia, prostate cancer status postradiation and prostatectomy, radiation cystitis who presents emergency department complaining of chest pain    Plan of Care:    #Chest pain  - ACS ruled out  - EKG on admission shows sinus rhythm with PVCs  - CTA chest limited but shows no PE in the mainstem, there is a 2.4 cm left lower lobe nodular opacity concerning for malignancy.   - Pain appears to be non-cardiac in nature  - Echo 12/2023 shows normal LV systolic function   - No further cardiac work up planned at this time  - Defer to primary team for CT findings    #HTN  - BP acceptable  - Continue Lisinopril    #HTN  - Statin therapy    #Hypothyroidism  - Synthroid    #Hematuria  - Improved  - No intervention planned  - Urology input appreciated.     #ACP (advance care planning)-  Advanced care planning was discussed with the patient.  Risks, benefits and alternatives of medical treatment and procedures were discussed in detail and all questions were answered. 30 additional minutes spent addressing advance care plans.          Over 25 minutes spent on total encounter; more than 50% of the visit was spent counseling and/or coordinating care by the attending physician.      Kevin Zelaya DO [facility]  Cardiovascular Disease  [phone] Cardiovascular Disease Progress Note  Date of service: 12-30-23 @ 08:44    Overnight events: No acute events overnight.  Patient is in no distress.   Otherwise review of systems negative    Objective Findings:  T(C): 37.4 (12-30-23 @ 06:00), Max: 37.4 (12-30-23 @ 06:00)  HR: 75 (12-30-23 @ 06:00) (55 - 80)  BP: 135/60 (12-30-23 @ 06:00) (117/58 - 135/60)  RR: 18 (12-30-23 @ 06:00) (18 - 18)  SpO2: 99% (12-30-23 @ 06:00) (95% - 99%)  Wt(kg): --  Daily     Daily       Physical Exam:  Gen: NAD; Patient resting comfortably  HEENT: EOMI, Normocephalic/ atraumatic  CV: RRR, normal S1 + S2, no m/r/g  Lungs:  Normal respiratory effort; clear to auscultation bilaterally  Abd: soft, non-tender; bowel sounds present  Ext: No edema; warm and well perfused    Telemetry: N/a    Laboratory Data:                        9.2    6.59  )-----------( 190      ( 30 Dec 2023 07:05 )             28.3     12-30    132<L>  |  97  |  13  ----------------------------<  91  4.3   |  23  |  1.05    Ca    9.2      30 Dec 2023 07:05    TPro  6.1  /  Alb  2.5<L>  /  TBili  0.4  /  DBili  x   /  AST  41<H>  /  ALT  23  /  AlkPhos  58  12-30    PT/INR - ( 28 Dec 2023 12:38 )   PT: 12.2 sec;   INR: 1.17 ratio         PTT - ( 28 Dec 2023 12:38 )  PTT:31.5 sec          Inpatient Medications:  MEDICATIONS  (STANDING):  atorvastatin 10 milliGRAM(s) Oral at bedtime  cholecalciferol 1000 Unit(s) Oral daily  enoxaparin Injectable 40 milliGRAM(s) SubCutaneous every 24 hours  levothyroxine 50 MICROGram(s) Oral daily  lisinopril 40 milliGRAM(s) Oral daily  metoprolol succinate ER 25 milliGRAM(s) Oral daily  multivitamin 1 Tablet(s) Oral daily  sertraline 25 milliGRAM(s) Oral daily      Assessment: 89-year-old male with past medical history of hypertension, hyperlipidemia, prostate cancer status postradiation and prostatectomy, radiation cystitis who presents emergency department complaining of chest pain    Plan of Care:    #Chest pain  - ACS ruled out  - EKG on admission shows sinus rhythm with PVCs  - CTA chest limited but shows no PE in the mainstem, there is a 2.4 cm left lower lobe nodular opacity concerning for malignancy.   - Pain appears to be non-cardiac in nature  - Echo 12/2023 shows normal LV systolic function   - No further cardiac work up planned at this time  - Defer to primary team for CT findings    #HTN  - BP acceptable  - Continue Lisinopril    #HTN  - Statin therapy    #Hypothyroidism  - Synthroid    #Hematuria  - Improved  - No intervention planned  - Urology input appreciated.     #ACP (advance care planning)-  Advanced care planning was discussed with the patient.  Risks, benefits and alternatives of medical treatment and procedures were discussed in detail and all questions were answered. 30 additional minutes spent addressing advance care plans.          Over 25 minutes spent on total encounter; more than 50% of the visit was spent counseling and/or coordinating care by the attending physician.      Kevin Zelaya DO [facility]  Cardiovascular Disease  [phone] Cardiovascular Disease Progress Note  Date of service: 12-30-23 @ 08:44    Overnight events: No acute events overnight.  Patient is in no distress.   Otherwise review of systems negative    Objective Findings:  T(C): 37.4 (12-30-23 @ 06:00), Max: 37.4 (12-30-23 @ 06:00)  HR: 75 (12-30-23 @ 06:00) (55 - 80)  BP: 135/60 (12-30-23 @ 06:00) (117/58 - 135/60)  RR: 18 (12-30-23 @ 06:00) (18 - 18)  SpO2: 99% (12-30-23 @ 06:00) (95% - 99%)  Wt(kg): --  Daily     Daily       Physical Exam:  Gen: NAD; Patient resting comfortably  HEENT: EOMI, Normocephalic/ atraumatic  CV: RRR, normal S1 + S2, no m/r/g  Lungs:  Normal respiratory effort; clear to auscultation bilaterally  Abd: soft, non-tender; bowel sounds present  Ext: No edema; warm and well perfused    Telemetry: N/a    Laboratory Data:                        9.2    6.59  )-----------( 190      ( 30 Dec 2023 07:05 )             28.3     12-30    132<L>  |  97  |  13  ----------------------------<  91  4.3   |  23  |  1.05    Ca    9.2      30 Dec 2023 07:05    TPro  6.1  /  Alb  2.5<L>  /  TBili  0.4  /  DBili  x   /  AST  41<H>  /  ALT  23  /  AlkPhos  58  12-30    PT/INR - ( 28 Dec 2023 12:38 )   PT: 12.2 sec;   INR: 1.17 ratio         PTT - ( 28 Dec 2023 12:38 )  PTT:31.5 sec          Inpatient Medications:  MEDICATIONS  (STANDING):  atorvastatin 10 milliGRAM(s) Oral at bedtime  cholecalciferol 1000 Unit(s) Oral daily  enoxaparin Injectable 40 milliGRAM(s) SubCutaneous every 24 hours  levothyroxine 50 MICROGram(s) Oral daily  lisinopril 40 milliGRAM(s) Oral daily  metoprolol succinate ER 25 milliGRAM(s) Oral daily  multivitamin 1 Tablet(s) Oral daily  sertraline 25 milliGRAM(s) Oral daily      Assessment: 89-year-old male with past medical history of hypertension, hyperlipidemia, prostate cancer status postradiation and prostatectomy, radiation cystitis who presents emergency department complaining of chest pain    Plan of Care:    #Chest pain  - ACS ruled out  - EKG on admission shows sinus rhythm with PVCs  - CTA chest limited but shows no PE in the mainstem, there is a 2.4 cm left lower lobe nodular opacity concerning for malignancy.   - Pain appears to be non-cardiac in nature  - Echo 12/2023 shows normal LV systolic function   - No further cardiac work up planned at this time  - Defer to primary team for CT findings    #HTN  - BP acceptable  - Continue Lisinopril    #HLD  - Statin therapy    #Hypothyroidism  - Synthroid    #Hematuria  - Improved  - No intervention planned  - Urology input appreciated.     #ACP (advance care planning)-  Advanced care planning was discussed with the patient.  Risks, benefits and alternatives of medical treatment and procedures were discussed in detail and all questions were answered. 30 additional minutes spent addressing advance care plans.          Over 25 minutes spent on total encounter; more than 50% of the visit was spent counseling and/or coordinating care by the attending physician.      Kevin Zelaya DO [facility]  Cardiovascular Disease  [phone] Cardiovascular Disease Progress Note  Date of service: 12-30-23 @ 08:44    Overnight events: No acute events overnight.  Patient is in no distress.   Otherwise review of systems negative    Objective Findings:  T(C): 37.4 (12-30-23 @ 06:00), Max: 37.4 (12-30-23 @ 06:00)  HR: 75 (12-30-23 @ 06:00) (55 - 80)  BP: 135/60 (12-30-23 @ 06:00) (117/58 - 135/60)  RR: 18 (12-30-23 @ 06:00) (18 - 18)  SpO2: 99% (12-30-23 @ 06:00) (95% - 99%)  Wt(kg): --  Daily     Daily       Physical Exam:  Gen: NAD; Patient resting comfortably  HEENT: EOMI, Normocephalic/ atraumatic  CV: RRR, normal S1 + S2, no m/r/g  Lungs:  Normal respiratory effort; clear to auscultation bilaterally  Abd: soft, non-tender; bowel sounds present  Ext: No edema; warm and well perfused    Telemetry: N/a    Laboratory Data:                        9.2    6.59  )-----------( 190      ( 30 Dec 2023 07:05 )             28.3     12-30    132<L>  |  97  |  13  ----------------------------<  91  4.3   |  23  |  1.05    Ca    9.2      30 Dec 2023 07:05    TPro  6.1  /  Alb  2.5<L>  /  TBili  0.4  /  DBili  x   /  AST  41<H>  /  ALT  23  /  AlkPhos  58  12-30    PT/INR - ( 28 Dec 2023 12:38 )   PT: 12.2 sec;   INR: 1.17 ratio         PTT - ( 28 Dec 2023 12:38 )  PTT:31.5 sec          Inpatient Medications:  MEDICATIONS  (STANDING):  atorvastatin 10 milliGRAM(s) Oral at bedtime  cholecalciferol 1000 Unit(s) Oral daily  enoxaparin Injectable 40 milliGRAM(s) SubCutaneous every 24 hours  levothyroxine 50 MICROGram(s) Oral daily  lisinopril 40 milliGRAM(s) Oral daily  metoprolol succinate ER 25 milliGRAM(s) Oral daily  multivitamin 1 Tablet(s) Oral daily  sertraline 25 milliGRAM(s) Oral daily      Assessment: 89-year-old male with past medical history of hypertension, hyperlipidemia, prostate cancer status postradiation and prostatectomy, radiation cystitis who presents emergency department complaining of chest pain    Plan of Care:    #Chest pain  - ACS ruled out  - EKG on admission shows sinus rhythm with PVCs  - CTA chest limited but shows no PE in the mainstem, there is a 2.4 cm left lower lobe nodular opacity concerning for malignancy.   - Pain appears to be non-cardiac in nature  - Echo 12/2023 shows normal LV systolic function   - No further cardiac work up planned at this time  - Defer to primary team for CT findings    #HTN  - BP acceptable  - Continue Lisinopril    #HLD  - Statin therapy    #Hypothyroidism  - Synthroid    #Hematuria  - Improved  - No intervention planned  - Urology input appreciated.     #ACP (advance care planning)-  Advanced care planning was discussed with the patient.  Risks, benefits and alternatives of medical treatment and procedures were discussed in detail and all questions were answered. 30 additional minutes spent addressing advance care plans.          Over 25 minutes spent on total encounter; more than 50% of the visit was spent counseling and/or coordinating care by the attending physician.      Kevin Zelaya DO [facility]  Cardiovascular Disease  [phone]

## 2023-12-30 NOTE — PROGRESS NOTE ADULT - ASSESSMENT
89 y/o M with PMH of HTN, HLD, prostate CA s/p postradiation and prostatectomy, radiation cystitis. Presents to the ED with c/o CP, SOB, fatigue.  Patient states over the last week he has been more fatigued than usual.  Patient was diagnosed with COVID 3 weeks ago after being discharged from the hospital. CTA chest neg PE, but found to have 2.4 cm LLL nodular opacity. Per pts daughter - pt followed with Dr. Figueredo in the past, s/p biopsy of LLL nodule - s/p adeno. 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 Pt now off o2, with no c/o SOB, CP.  89 y/o M with PMH of HTN, HLD, prostate CA s/p postradiation and prostatectomy, radiation cystitis. Presents to the ED with c/o CP, SOB, fatigue.  Patient states over the last week he has been more fatigued than usual.  Patient was diagnosed with COVID 3 weeks ago after being discharged from the hospital. CTA chest neg PE, but found to have 2.4 cm LLL nodular opacity. Per pts daughter - pt followed with Dr. Figueredo in the past, s/p biopsy of LLL nodule - s/p adeno. 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 Pt now off o2, with no c/o SOB, CP.

## 2023-12-31 LAB
HCT VFR BLD CALC: 27.3 % — LOW (ref 39–50)
HCT VFR BLD CALC: 27.3 % — LOW (ref 39–50)
HGB BLD-MCNC: 9.3 G/DL — LOW (ref 13–17)
HGB BLD-MCNC: 9.3 G/DL — LOW (ref 13–17)
MCHC RBC-ENTMCNC: 33 PG — SIGNIFICANT CHANGE UP (ref 27–34)
MCHC RBC-ENTMCNC: 33 PG — SIGNIFICANT CHANGE UP (ref 27–34)
MCHC RBC-ENTMCNC: 34.1 GM/DL — SIGNIFICANT CHANGE UP (ref 32–36)
MCHC RBC-ENTMCNC: 34.1 GM/DL — SIGNIFICANT CHANGE UP (ref 32–36)
MCV RBC AUTO: 96.8 FL — SIGNIFICANT CHANGE UP (ref 80–100)
MCV RBC AUTO: 96.8 FL — SIGNIFICANT CHANGE UP (ref 80–100)
NRBC # BLD: 0 /100 WBCS — SIGNIFICANT CHANGE UP (ref 0–0)
NRBC # BLD: 0 /100 WBCS — SIGNIFICANT CHANGE UP (ref 0–0)
PLATELET # BLD AUTO: 221 K/UL — SIGNIFICANT CHANGE UP (ref 150–400)
PLATELET # BLD AUTO: 221 K/UL — SIGNIFICANT CHANGE UP (ref 150–400)
RBC # BLD: 2.82 M/UL — LOW (ref 4.2–5.8)
RBC # BLD: 2.82 M/UL — LOW (ref 4.2–5.8)
RBC # FLD: 12.6 % — SIGNIFICANT CHANGE UP (ref 10.3–14.5)
RBC # FLD: 12.6 % — SIGNIFICANT CHANGE UP (ref 10.3–14.5)
WBC # BLD: 8.45 K/UL — SIGNIFICANT CHANGE UP (ref 3.8–10.5)
WBC # BLD: 8.45 K/UL — SIGNIFICANT CHANGE UP (ref 3.8–10.5)
WBC # FLD AUTO: 8.45 K/UL — SIGNIFICANT CHANGE UP (ref 3.8–10.5)
WBC # FLD AUTO: 8.45 K/UL — SIGNIFICANT CHANGE UP (ref 3.8–10.5)

## 2023-12-31 RX ORDER — ACETAMINOPHEN 500 MG
975 TABLET ORAL ONCE
Refills: 0 | Status: COMPLETED | OUTPATIENT
Start: 2023-12-31 | End: 2023-12-31

## 2023-12-31 RX ORDER — POLYETHYLENE GLYCOL 3350 17 G/17G
17 POWDER, FOR SOLUTION ORAL DAILY
Refills: 0 | Status: DISCONTINUED | OUTPATIENT
Start: 2023-12-31 | End: 2024-01-02

## 2023-12-31 RX ORDER — ACETAMINOPHEN 500 MG
650 TABLET ORAL EVERY 6 HOURS
Refills: 0 | Status: DISCONTINUED | OUTPATIENT
Start: 2023-12-31 | End: 2024-01-02

## 2023-12-31 RX ADMIN — Medication 1 TABLET(S): at 11:18

## 2023-12-31 RX ADMIN — Medication 1000 UNIT(S): at 11:18

## 2023-12-31 RX ADMIN — SERTRALINE 25 MILLIGRAM(S): 25 TABLET, FILM COATED ORAL at 11:19

## 2023-12-31 RX ADMIN — Medication 650 MILLIGRAM(S): at 23:01

## 2023-12-31 RX ADMIN — Medication 25 MILLIGRAM(S): at 07:00

## 2023-12-31 RX ADMIN — Medication 650 MILLIGRAM(S): at 23:30

## 2023-12-31 RX ADMIN — Medication 975 MILLIGRAM(S): at 02:47

## 2023-12-31 RX ADMIN — ATORVASTATIN CALCIUM 10 MILLIGRAM(S): 80 TABLET, FILM COATED ORAL at 22:05

## 2023-12-31 RX ADMIN — Medication 975 MILLIGRAM(S): at 01:07

## 2023-12-31 NOTE — PROGRESS NOTE ADULT - SUBJECTIVE AND OBJECTIVE BOX
Date of Service  : 12-31-23     INTERVAL HPI/OVERNIGHT EVENTS: I feel fine.   Vital Signs Last 24 Hrs  T(C): 36.4 (31 Dec 2023 06:54), Max: 37.9 (30 Dec 2023 12:00)  T(F): 97.5 (31 Dec 2023 06:54), Max: 100.3 (30 Dec 2023 12:00)  HR: 62 (31 Dec 2023 06:54) (62 - 79)  BP: 119/65 (31 Dec 2023 06:54) (116/68 - 128/58)  BP(mean): --  RR: 18 (31 Dec 2023 06:54) (18 - 18)  SpO2: 98% (31 Dec 2023 06:54) (92% - 98%)    Parameters below as of 31 Dec 2023 06:54  Patient On (Oxygen Delivery Method): room air      I&O's Summary    30 Dec 2023 07:01  -  31 Dec 2023 07:00  --------------------------------------------------------  IN: 320 mL / OUT: 450 mL / NET: -130 mL      MEDICATIONS  (STANDING):  atorvastatin 10 milliGRAM(s) Oral at bedtime  cholecalciferol 1000 Unit(s) Oral daily  enoxaparin Injectable 40 milliGRAM(s) SubCutaneous every 24 hours  levothyroxine 50 MICROGram(s) Oral daily  lisinopril 40 milliGRAM(s) Oral daily  metoprolol succinate ER 25 milliGRAM(s) Oral daily  multivitamin 1 Tablet(s) Oral daily  sertraline 25 milliGRAM(s) Oral daily    MEDICATIONS  (PRN):    LABS:                        9.2    6.59  )-----------( 190      ( 30 Dec 2023 07:05 )             28.3     12-30    132<L>  |  97  |  13  ----------------------------<  91  4.3   |  23  |  1.05    Ca    9.2      30 Dec 2023 07:05    TPro  6.1  /  Alb  2.5<L>  /  TBili  0.4  /  DBili  x   /  AST  41<H>  /  ALT  23  /  AlkPhos  58  12-30      Urinalysis Basic - ( 30 Dec 2023 07:05 )    Color: x / Appearance: x / SG: x / pH: x  Gluc: 91 mg/dL / Ketone: x  / Bili: x / Urobili: x   Blood: x / Protein: x / Nitrite: x   Leuk Esterase: x / RBC: x / WBC x   Sq Epi: x / Non Sq Epi: x / Bacteria: x      CAPILLARY BLOOD GLUCOSE            Urinalysis Basic - ( 30 Dec 2023 07:05 )    Color: x / Appearance: x / SG: x / pH: x  Gluc: 91 mg/dL / Ketone: x  / Bili: x / Urobili: x   Blood: x / Protein: x / Nitrite: x   Leuk Esterase: x / RBC: x / WBC x   Sq Epi: x / Non Sq Epi: x / Bacteria: x      REVIEW OF SYSTEMS:  CONSTITUTIONAL: No fever, weight loss, or fatigue  EYES: No eye pain, visual disturbances, or discharge  ENMT:  No difficulty hearing, tinnitus, vertigo; No sinus or throat pain  NECK: No pain or stiffness  RESPIRATORY: No cough, wheezing, chills or hemoptysis; No shortness of breath  CARDIOVASCULAR: No chest pain, palpitations, dizziness, or leg swelling  GASTROINTESTINAL: No abdominal or epigastric pain. No nausea, vomiting, or hematemesis; No diarrhea or constipation. No melena or hematochezia.  GENITOURINARY: No dysuria, frequency, hematuria, or incontinence  NEUROLOGICAL: No headaches, memory loss, loss of strength, numbness, or tremors      Consultant(s) Notes Reviewed:  [x ] YES  [ ] NO    PHYSICAL EXAM:  GENERAL: NAD, well-groomed, well-developed,not in any distress ,  HEAD:  Atraumatic, Normocephalic  EYES: EOMI, PERRLA, conjunctiva and sclera clear  ENMT: No tonsillar erythema, exudates, or enlargement; Moist mucous membranes, Good dentition, No lesions  NECK: Supple, No JVD, Normal thyroid  NERVOUS SYSTEM:  Alert & Oriented X3, No focal deficit   CHEST/LUNG: Good air entry bilateral with no  rales, rhonchi, wheezing, or rubs  HEART: Regular rate and rhythm; No murmurs, rubs, or gallops  ABDOMEN: Soft, Nontender, Nondistended; Bowel sounds present  EXTREMITIES:  2+ Peripheral Pulses, No clubbing, cyanosis, or edema    Care Discussed with Consultants/Other Providers [ x] YES  [ ] NO

## 2023-12-31 NOTE — PROGRESS NOTE ADULT - SUBJECTIVE AND OBJECTIVE BOX
Cardiovascular Disease Progress Note  Date of service: 12-31-23 @ 08:30    Overnight events: No acute events overnight.  Patient is in no distress.   Otherwise review of systems negative    Objective Findings:  T(C): 36.4 (12-31-23 @ 06:54), Max: 37.9 (12-30-23 @ 12:00)  HR: 62 (12-31-23 @ 06:54) (62 - 79)  BP: 119/65 (12-31-23 @ 06:54) (116/68 - 128/58)  RR: 18 (12-31-23 @ 06:54) (18 - 18)  SpO2: 98% (12-31-23 @ 06:54) (92% - 98%)  Wt(kg): --  Daily     Daily       Physical Exam:  Gen: NAD; Patient resting comfortably  HEENT: EOMI, Normocephalic/ atraumatic  CV: RRR, normal S1 + S2, no m/r/g  Lungs:  Normal respiratory effort; clear to auscultation bilaterally  Abd: soft, non-tender; bowel sounds present  Ext: No edema; warm and well perfused    Telemetry: Sinus     Laboratory Data:                        9.2    6.59  )-----------( 190      ( 30 Dec 2023 07:05 )             28.3     12-30    132<L>  |  97  |  13  ----------------------------<  91  4.3   |  23  |  1.05    Ca    9.2      30 Dec 2023 07:05    TPro  6.1  /  Alb  2.5<L>  /  TBili  0.4  /  DBili  x   /  AST  41<H>  /  ALT  23  /  AlkPhos  58  12-30              Inpatient Medications:  MEDICATIONS  (STANDING):  atorvastatin 10 milliGRAM(s) Oral at bedtime  cholecalciferol 1000 Unit(s) Oral daily  enoxaparin Injectable 40 milliGRAM(s) SubCutaneous every 24 hours  levothyroxine 50 MICROGram(s) Oral daily  lisinopril 40 milliGRAM(s) Oral daily  metoprolol succinate ER 25 milliGRAM(s) Oral daily  multivitamin 1 Tablet(s) Oral daily  sertraline 25 milliGRAM(s) Oral daily      Assessment:  89-year-old male with past medical history of hypertension, hyperlipidemia, prostate cancer status postradiation and prostatectomy, radiation cystitis who presents emergency department complaining of chest pain    Plan of Care:    #Chest pain  - ACS ruled out  - EKG on admission shows sinus rhythm with PVCs  - CTA chest limited but shows no PE in the mainstem, there is a 2.4 cm left lower lobe nodular opacity concerning for malignancy.   - Pain appears to be non-cardiac in nature  - Echo 12/2023 shows normal LV systolic function   - No further cardiac work up planned at this time  - Defer to primary team for CT findings    #HTN  - BP acceptable  - Continue Lisinopril    #HLD  - Statin therapy    #Hypothyroidism  - Synthroid    #Hematuria  - Improved  - No intervention planned as per urology recommendations           Over 25 minutes spent on total encounter; more than 50% of the visit was spent counseling and/or coordinating care by the attending physician.      Kevin Zelaya DO [facility]  Cardiovascular Disease  [phone] Cardiovascular Disease Progress Note  Date of service: 12-31-23 @ 08:30    Overnight events: No acute events overnight.  Patient is in no distress.   Otherwise review of systems negative    Objective Findings:  T(C): 36.4 (12-31-23 @ 06:54), Max: 37.9 (12-30-23 @ 12:00)  HR: 62 (12-31-23 @ 06:54) (62 - 79)  BP: 119/65 (12-31-23 @ 06:54) (116/68 - 128/58)  RR: 18 (12-31-23 @ 06:54) (18 - 18)  SpO2: 98% (12-31-23 @ 06:54) (92% - 98%)  Wt(kg): --  Daily     Daily       Physical Exam:  Gen: NAD; Patient resting comfortably  HEENT: EOMI, Normocephalic/ atraumatic  CV: RRR, normal S1 + S2, no m/r/g  Lungs:  Normal respiratory effort; clear to auscultation bilaterally  Abd: soft, non-tender; bowel sounds present  Ext: No edema; warm and well perfused    Telemetry: Sinus     Laboratory Data:                        9.2    6.59  )-----------( 190      ( 30 Dec 2023 07:05 )             28.3     12-30    132<L>  |  97  |  13  ----------------------------<  91  4.3   |  23  |  1.05    Ca    9.2      30 Dec 2023 07:05    TPro  6.1  /  Alb  2.5<L>  /  TBili  0.4  /  DBili  x   /  AST  41<H>  /  ALT  23  /  AlkPhos  58  12-30              Inpatient Medications:  MEDICATIONS  (STANDING):  atorvastatin 10 milliGRAM(s) Oral at bedtime  cholecalciferol 1000 Unit(s) Oral daily  enoxaparin Injectable 40 milliGRAM(s) SubCutaneous every 24 hours  levothyroxine 50 MICROGram(s) Oral daily  lisinopril 40 milliGRAM(s) Oral daily  metoprolol succinate ER 25 milliGRAM(s) Oral daily  multivitamin 1 Tablet(s) Oral daily  sertraline 25 milliGRAM(s) Oral daily      Assessment:  89-year-old male with past medical history of hypertension, hyperlipidemia, prostate cancer status postradiation and prostatectomy, radiation cystitis who presents emergency department complaining of chest pain    Plan of Care:    #Chest pain  - ACS ruled out  - EKG on admission shows sinus rhythm with PVCs  - CTA chest limited but shows no PE in the mainstem, there is a 2.4 cm left lower lobe nodular opacity concerning for malignancy.   - Pain appears to be non-cardiac in nature  - Echo 12/2023 shows normal LV systolic function   - No further cardiac work up planned at this time  - Defer to primary team for CT findings    #HTN  - BP acceptable  - Continue Lisinopril    #HLD  - Statin therapy    #Hypothyroidism  - Synthroid    #Hematuria  - Improved  - No intervention planned as per urology recommendations           Over 25 minutes spent on total encounter; more than 50% of the visit was spent counseling and/or coordinating care by the attending physician.      Kevin Zelaya DO [facility]  Cardiovascular Disease  [phone]

## 2023-12-31 NOTE — CHART NOTE - NSCHARTNOTEFT_GEN_A_CORE
d/w Urology PA , Urine is now clear , no sediments or  blood noted , they recommend  Lidocaine Gel  and  Pyridium for burning .  When he is being discharged as long as urine is clear no  flushes required , Pt is encouraged  to drink more  fluids .  Garrett  cath care  and perineal care encouraged   Earline Hernandez NP-C [id number] d/w Urology PA , Urine is now clear , no sediments or  blood noted , they recommend  Lidocaine Gel  and  Pyridium for burning .  When he is being discharged as long as urine is clear no  flushes required , Pt is encouraged  to drink more  fluids .  Garrett  cath care  and perineal care encouraged   Earline Hernandez NP-C [id number]

## 2023-12-31 NOTE — PROGRESS NOTE ADULT - ASSESSMENT
89-year-old male with past medical history of hypertension, hyperlipidemia, prostate cancer status postradiation and prostatectomy, radiation cystitis who presents emergency department complaining of chest pain, shortness of breath.  Patient states over the last week he has been more fatigued than usual.  States he is normally able to ambulate without assistance but recently he has been unable to due to being tired.  Patient denies any fevers or chills.  Patient was diagnosed with COVID 3 weeks ago after being discharged from the hospital.  Patient denies any blood in his urine.  Patient has a Garrett in place       Problem/Plan - 1:  ·  Problem: Acute respiratory failure with hypoxia.   ·  Plan: Resolved.   Exact cause unknown. ?Post COVID .     < from: TTE W or WO Ultrasound Enhancing Agent (12.29.23 @ 11:05) >  CONCLUSIONS:      1. Left ventricular systolic function is normal with an ejection fraction of 62 % by Lind's method of disks.   2. Mildly enlarged right ventricular cavity size and systolic function.   3.No significant valvular disease.   4. No prior echocardiogram is available for comparison.    < end of copied text >  Pulmonary and cardiology help appreciated.   < from: CT Angio Chest PE Protocol w/ IV Cont (12.28.23 @ 15:54) >    IMPRESSION:  Limited evaluation for segmental and subsegmental pulmonary embolism. No   main, leftright, or lobar pulmonary embolism.    Indeterminate 2.4 cm left lower lobe nodular opacity. Malignancy should   be considered.    No acute intra-abdominal pathology.     Problem/Plan - 2:  ·  Problem: HTN (hypertension).   ·  Plan: BP meds with hold parameters.     Problem/Plan - 3:  ·  Problem: HLD (hyperlipidemia).   ·  Plan: Statin.     Problem/Plan - 4:  ·  Problem: Prostatic cancer.   ·  Plan: S/P Surgery and Radiation .  ON Chemotherapy.     Problem/Plan - 5:  ·  Problem: 2019 novel coronavirus disease (COVID-19).   ·  Plan: Had COVID >2 weeks ago.     Problem/Plan - 6:  ·  Problem: UTI (urinary tract infection).   ·  Plan: No symptoms .     Problem/Plan - 7:  ·  Problem: Hematuria .   ·  Plan: Urology help appreciated.      Problem/Plan - 8:  ·  Problem: Anemia .   ·  Plan: Trending CBC. PRBC for Hgb <7.5 G       Dispo : DC planning home ending repeat labs and Urology follow up.

## 2024-01-01 ENCOUNTER — TRANSCRIPTION ENCOUNTER (OUTPATIENT)
Age: 89
End: 2024-01-01

## 2024-01-01 PROCEDURE — 99222 1ST HOSP IP/OBS MODERATE 55: CPT

## 2024-01-01 RX ORDER — LISINOPRIL 2.5 MG/1
1 TABLET ORAL
Refills: 0 | DISCHARGE

## 2024-01-01 RX ORDER — LISINOPRIL 2.5 MG/1
1 TABLET ORAL
Qty: 0 | Refills: 0 | DISCHARGE
Start: 2024-01-01

## 2024-01-01 RX ORDER — SERTRALINE 25 MG/1
1 TABLET, FILM COATED ORAL
Qty: 0 | Refills: 0 | DISCHARGE
Start: 2024-01-01

## 2024-01-01 RX ORDER — SERTRALINE 25 MG/1
1 TABLET, FILM COATED ORAL
Qty: 0 | Refills: 0 | DISCHARGE

## 2024-01-01 RX ADMIN — Medication 25 MILLIGRAM(S): at 05:31

## 2024-01-01 RX ADMIN — Medication 650 MILLIGRAM(S): at 17:35

## 2024-01-01 RX ADMIN — Medication 650 MILLIGRAM(S): at 18:41

## 2024-01-01 RX ADMIN — Medication 1 TABLET(S): at 12:17

## 2024-01-01 RX ADMIN — SERTRALINE 25 MILLIGRAM(S): 25 TABLET, FILM COATED ORAL at 12:17

## 2024-01-01 RX ADMIN — ATORVASTATIN CALCIUM 10 MILLIGRAM(S): 80 TABLET, FILM COATED ORAL at 22:09

## 2024-01-01 RX ADMIN — ENOXAPARIN SODIUM 40 MILLIGRAM(S): 100 INJECTION SUBCUTANEOUS at 05:30

## 2024-01-01 RX ADMIN — Medication 1000 UNIT(S): at 12:17

## 2024-01-01 NOTE — DISCHARGE NOTE PROVIDER - NSDCFUSCHEDAPPT_GEN_ALL_CORE_FT
[facility]  UROLOGY [address] [place] [address] R  Scheduled Appointment: 02/28/2024    Chloe Chase  [facility]  UROLOGY [address] [place] [address] R  Scheduled Appointment: 02/28/2024     [facility]  UROLOGY [address] [place] [address] R  Scheduled Appointment: 02/28/2024    Chloe Chase  [facility]  UROLOGY [address] [place] [address] R  Scheduled Appointment: 02/28/2024

## 2024-01-01 NOTE — DISCHARGE NOTE NURSING/CASE MANAGEMENT/SOCIAL WORK - NSDCDMETYPESERV_GEN_ALL_CORE_FT
dme company they will deliver rolling walker when they get approval dme company they will deliver rolling walker

## 2024-01-01 NOTE — DISCHARGE NOTE PROVIDER - NSDCFUADDAPPT_GEN_ALL_CORE_FT
s/p antibiotic course  Pt expected to have minimal  Hematuria  If chinchilla obstructed , or not draining   or if Pt is in retension May flush the chinchilla with 30cc  sterile saline  and pull out    Pt expected to have minimal  Hematuria  If chinchilla obstructed , or not draining   or if Pt is in retension May flush the chinchilla with 30cc  sterile saline  and pull out        Pt expected to have minimal  Hematuria  If chinchilla obstructed , or not draining   or if Pt is in retension May flush the chinchilla with 30cc  sterile saline  and pull out   Please follow up with PCP  DR Rush  with in a week      c/w Chinchilla Catheter   Pt expected to have minimal  Hematuria  If chinchilla obstructed , or not draining   or if Pt is in retention May flush the Chinchilla with 30cc  sterile saline  and pull out   Please follow up with PCP  DR Rush  with in a week

## 2024-01-01 NOTE — PROGRESS NOTE ADULT - SUBJECTIVE AND OBJECTIVE BOX
Cardiovascular Disease Progress Note  Date of service: 01-01-24 @ 09:16    Overnight events: No acute events overnight.  Patient is in no distress.   Otherwise review of systems negative    Objective Findings:  T(C): 36.6 (01-01-24 @ 05:30), Max: 36.8 (12-31-23 @ 22:17)  HR: 65 (01-01-24 @ 05:30) (63 - 68)  BP: 113/62 (01-01-24 @ 05:30) (113/62 - 143/62)  RR: 16 (01-01-24 @ 05:30) (16 - 18)  SpO2: 93% (01-01-24 @ 05:30) (93% - 100%)  Wt(kg): --  Daily     Daily       Physical Exam:  Gen: NAD; Patient resting comfortably  HEENT: EOMI, Normocephalic/ atraumatic  CV: RRR, normal S1 + S2, no m/r/g  Lungs:  Normal respiratory effort; clear to auscultation bilaterally  Abd: soft, non-tender; bowel sounds present  Ext: No edema; warm and well perfused    Telemetry: Sinus     Laboratory Data:                        9.3    8.45  )-----------( 221      ( 31 Dec 2023 09:22 )             27.3                     Inpatient Medications:  MEDICATIONS  (STANDING):  atorvastatin 10 milliGRAM(s) Oral at bedtime  cholecalciferol 1000 Unit(s) Oral daily  enoxaparin Injectable 40 milliGRAM(s) SubCutaneous every 24 hours  levothyroxine 50 MICROGram(s) Oral daily  lisinopril 40 milliGRAM(s) Oral daily  metoprolol succinate ER 25 milliGRAM(s) Oral daily  multivitamin 1 Tablet(s) Oral daily  sertraline 25 milliGRAM(s) Oral daily      Assessment:  89-year-old male with past medical history of hypertension, hyperlipidemia, prostate cancer status postradiation and prostatectomy, radiation cystitis who presents emergency department complaining of chest pain    Plan of Care:    #Chest pain  - ACS ruled out  - EKG on admission shows sinus rhythm with PVCs  - CTA chest limited but shows no PE in the mainstem, there is a 2.4 cm left lower lobe nodular opacity concerning for malignancy.   - Pain appears to be non-cardiac in nature  - Echo 12/2023 shows normal LV systolic function   - No further cardiac work up planned at this time  - Defer to primary team for CT findings    #HTN  - BP acceptable  - Continue Lisinopril    #HLD  - Statin therapy    #Hypothyroidism  - Synthroid    #Hematuria  - Improved  - No intervention planned  - Urology input appreciated.             Over 25 minutes spent on total encounter; more than 50% of the visit was spent counseling and/or coordinating care by the attending physician.      Kevin eZlaya DO [facility]  Cardiovascular Disease  [phone] Cardiovascular Disease Progress Note  Date of service: 01-01-24 @ 09:16    Overnight events: No acute events overnight.  Patient is in no distress.   Otherwise review of systems negative    Objective Findings:  T(C): 36.6 (01-01-24 @ 05:30), Max: 36.8 (12-31-23 @ 22:17)  HR: 65 (01-01-24 @ 05:30) (63 - 68)  BP: 113/62 (01-01-24 @ 05:30) (113/62 - 143/62)  RR: 16 (01-01-24 @ 05:30) (16 - 18)  SpO2: 93% (01-01-24 @ 05:30) (93% - 100%)  Wt(kg): --  Daily     Daily       Physical Exam:  Gen: NAD; Patient resting comfortably  HEENT: EOMI, Normocephalic/ atraumatic  CV: RRR, normal S1 + S2, no m/r/g  Lungs:  Normal respiratory effort; clear to auscultation bilaterally  Abd: soft, non-tender; bowel sounds present  Ext: No edema; warm and well perfused    Telemetry: Sinus     Laboratory Data:                        9.3    8.45  )-----------( 221      ( 31 Dec 2023 09:22 )             27.3                     Inpatient Medications:  MEDICATIONS  (STANDING):  atorvastatin 10 milliGRAM(s) Oral at bedtime  cholecalciferol 1000 Unit(s) Oral daily  enoxaparin Injectable 40 milliGRAM(s) SubCutaneous every 24 hours  levothyroxine 50 MICROGram(s) Oral daily  lisinopril 40 milliGRAM(s) Oral daily  metoprolol succinate ER 25 milliGRAM(s) Oral daily  multivitamin 1 Tablet(s) Oral daily  sertraline 25 milliGRAM(s) Oral daily      Assessment:  89-year-old male with past medical history of hypertension, hyperlipidemia, prostate cancer status postradiation and prostatectomy, radiation cystitis who presents emergency department complaining of chest pain    Plan of Care:    #Chest pain  - ACS ruled out  - EKG on admission shows sinus rhythm with PVCs  - CTA chest limited but shows no PE in the mainstem, there is a 2.4 cm left lower lobe nodular opacity concerning for malignancy.   - Pain appears to be non-cardiac in nature  - Echo 12/2023 shows normal LV systolic function   - No further cardiac work up planned at this time  - Defer to primary team for CT findings    #HTN  - BP acceptable  - Continue Lisinopril    #HLD  - Statin therapy    #Hypothyroidism  - Synthroid    #Hematuria  - Improved  - No intervention planned  - Urology input appreciated.             Over 25 minutes spent on total encounter; more than 50% of the visit was spent counseling and/or coordinating care by the attending physician.      Kevin Zleaya DO [facility]  Cardiovascular Disease  [phone]

## 2024-01-01 NOTE — DISCHARGE NOTE PROVIDER - PROVIDER TOKENS
PROVIDER:[TOKEN:[[id number]]] PROVIDER:[TOKEN:[[id number]]] PROVIDER:[TOKEN:[[id number]]],PROVIDER:[TOKEN:[[id number]]] PROVIDER:[TOKEN:[[id number]]],PROVIDER:[TOKEN:[[id number]]]

## 2024-01-01 NOTE — DISCHARGE NOTE NURSING/CASE MANAGEMENT/SOCIAL WORK - PATIENT PORTAL LINK FT
You can access the FollowMyHealth Patient Portal offered by [facility] by registering at the following website: [email or web address]. By joining [employer]’s FollowMyHealth portal, you will also be able to view your health information using other applications (apps) compatible with our system. You can access the FollowMyHealth Patient Portal offered by [facility] by registering at the following website: [email or web address]. By joining [employer]’s FollowMyHealth portal, you will also be able to view your health information using other applications (apps) compatible with our system.

## 2024-01-01 NOTE — CONSULT NOTE ADULT - SUBJECTIVE AND OBJECTIVE BOX
Patient is a 89y old  Male who presents with a chief complaint of Chest pain ,SOB (01 Jan 2024 13:34)    HPI:  89-year-old male with past medical history of hypertension, hyperlipidemia, prostate cancer status postradiation and prostatectomy, radiation cystitis who presents emergency department complaining of chest pain, shortness of breath.  Patient states over the last week he has been more fatigued than usual.  States he is normally able to ambulate without assistance but recently he has been unable to due to being tired.  Patient denies any fevers or chills.  Patient was diagnosed with COVID 3 weeks ago after being discharged from the hospital.  Patient denies any blood in his urine.  Patient has a Chinchilla in place (28 Dec 2023 19:37)      PAST MEDICAL & SURGICAL HISTORY:  HTN (hypertension)      HLD (hyperlipidemia)      Prostate cancer      S/P prostatectomy          Social history:     Smoking,    ETOH,      IVDU       FAMILY HISTORY:  - Reviewed,Non contributory   REVIEW OF SYSTEMS  C/O changing color of urine in chinchilla bag    General:	Denies any malaise fatigue or chills. Fevers absent    Skin: No rash  	  Ophthalmologic: Denies any visual complaints, discharge redness or photophobia  	  ENMT: No nasal discharge, headache, sinus congestion or throat pain. No dental complaints    Respiratory and Thorax: Occasional cough, no sputum or chest pain. Denies shortness of breath  	  Cardiovascular:	No chest pain, palpitations or dizziness    Gastrointestinal:	NO nausea, abdominal pain or diarrhea.    Genitourinary:	No suprapubic pain. No flank pain    Musculoskeletal:	No joint swelling or pain. No weakness    Neurological:  No confusion, dizziness. No extremity weakness. No bowel incontinence	    Psychiatric: No delusions or hallucinations	    Hematology/Lymphatics:	No LN swelling. No gum bleeding     Endocrine:	No recent weight gain or loss. No abnormal heat/cold intolerance    Allergic/Immunologic:	No hives or rash   Allergies    No Known Allergies    Intolerances        Antimicrobials:          Vital Signs Last 24 Hrs  T(C): 36.4 (01 Jan 2024 11:39), Max: 36.8 (31 Dec 2023 22:17)  T(F): 97.5 (01 Jan 2024 11:39), Max: 98.3 (31 Dec 2023 22:17)  HR: 92 (01 Jan 2024 11:39) (65 - 92)  BP: 106/66 (01 Jan 2024 11:39) (106/66 - 143/62)  BP(mean): --  RR: 18 (01 Jan 2024 11:39) (16 - 18)  SpO2: 97% (01 Jan 2024 11:39) (93% - 98%)    Parameters below as of 01 Jan 2024 11:39  Patient On (Oxygen Delivery Method): room air        PHYSICAL EXAM: Pleasant patient in no acute distress.      Constitutional: Comfortable. Awake and alert  No cachexia     Eyes:  EOMI. NO discharge or conjunctival injection    ENMT: No sinus tenderness. No thrush. No pharyngeal exudate or erythema. Fair dental hygiene    Neck: Supple, No LN      Respiratory: Good air entry bilaterally, clear to auscultation    Cardiovascular: S1 S2 wnl, No murmurs, rub or gallops    Gastrointestinal: Soft BS(+) no tenderness no masses ,No rebound or guarding, + chinchilla with dark brownish urine    Genitourinary: No CVA tenderness     Extremities: No cyanosis, clubbing or edema.    Vascular: peripheral pulses felt    Neurological: AAO X 3,No grossly focal deficits    Skin: No rash     Lymph Nodes: No palpable LNs    Musculoskeletal: No joint swelling or LOM    Psychiatric: Affect normal.                                9.3    8.45  )-----------( 221      ( 31 Dec 2023 09:22 )             27.3                 RECENT CULTURES:  12-29 @ 00:19  Catheterized Catheterized  --  --  --    >100,000 CFU/ml Klebsiella pneumoniae  --      < from: POCUS ED TTE 2D F/U, Limited w/o Cont. (12.29.23 @ 09:26) >  Procedure was performed in the Emergency Department by a credentialed   Emergency Medicine Attending Physician    EXAM:  ER TTE LIMITED      ORDER COMMENTS:      PROCEDURE DATE:  12/29/2023    FOCUSED ED ULTRASOUND REPORT          INTERPRETATION:  A focused transthoracic cardiac ultrasound examination   was performed.  No pericardial effusion was present.  No global wall motion abnormality was identified  Preserved ejection fraction  IVC flat  A line predominant lung fields    IMPRESSION:  No Pericardial Effusion.    < end of copied text >  < from: Xray Chest 1 View- PORTABLE-Urgent (12.28.23 @ 16:41) >  CC: [id number] EXAM:  XR CHEST PORTABLE URGENT 1V   ORDERED BY:  DARREN NAGEL     PROCEDURE DATE:  12/28/2023          INTERPRETATION:  EXAMINATION: XR CHEST URGENT    CLINICAL INDICATION: Chest Pain    TECHNIQUE: Single frontal, portable view of the chest was obtained.    COMPARISON: Chest x-ray 6/25/2021.    FINDINGS:    The heart is normal in size.  No focal consolidation. There is a focal linear opacity at the left lung   base that likely represents linear atelectasis.  There is no pneumothorax or pleural effusion.  No acute abnormalities in the visualized osseous structures.    IMPRESSION:  No focal opacity.    < end of copied text >  < from: CT Abdomen and Pelvis w/ IV Cont (12.28.23 @ 15:56) >  ACC: [id number] EXAM:  CT ABDOMEN AND PELVIS IC   ORDERED BY:  DARREN NAGEL     ACC: [id number] EXAM:  CT ANGIO CHEST PULM ART WAWIC   ORDERED BY:  DARREN NAGEL     PROCEDURE DATE:  12/28/2023          INTERPRETATION:  CLINICAL INFORMATION: Chest pain.    COMPARISON: Chest CT dated 4/8/2021, CT abdomen and pelvis dated 12/3/2023    CONTRAST/COMPLICATIONS:  IV Contrast: 90 cc Omnipaque  Oral Contrast: NONE  Complications: None reported at time of study completion    PROCEDURE:  CT Angiography of the Chest was performed followed by portal venous phase   imaging of the Abdomen and Pelvis.  Sagittal and coronal reformats were performed as well as 3D (MIP)   reconstructions.    FINDINGS:  CHEST:  LUNGS AND LARGE AIRWAYS: 2.4 cm left lower lobe nodular opacity. Left   lower lobe surgical clip. Emphysema with peripheral reticulation. Apical   blebs. Patent central airways.  PLEURA: No pleural effusion.  VESSELS: Limited evaluation for segmental and subsegmental pulmonary   embolism. No main, left right, or lobar pulmonary embolism. . Aortic   calcifications. Calcifications of the aortic arch, descending aorta, and   its proximal branches.  HEART: Heart is mildly enlarged. No pericardial effusion. Aortic valve   calcification.  MEDIASTINUM AND REILLY: No lymphadenopathy.  CHEST WALL AND LOWER NECK: Within normal limits.    ABDOMEN AND PELVIS:  LIVER: Mild nodular contour. Stable left hepatic lobe cyst.  BILE DUCTS: Normal caliber.  GALLBLADDER: Within normal limits.  SPLEEN: Within normal limits.  PANCREAS: Within normal limits.  ADRENALS: Within normal limits.  KIDNEYS/URETERS: Bilateral renal cysts measuring up to 6.5 cm in the   lower left renal pole. Additional subcentimeter hypodensities too small   to characterize.    BLADDER: Collapsed around Chinchilla catheter. Question density around Chinchilla   catheter. Evaluated on prior study  REPRODUCTIVE ORGANS: Prostatectomy.    BOWEL: No bowel obstruction. Appendix is normal.  PERITONEUM: No ascites.  VESSELS: Atherosclerotic changes.  RETROPERITONEUM/LYMPH NODES: No lymphadenopathy.  ABDOMINAL WALL: Small fat-containing umbilical hernia.  BONES: Degenerative changes of the spine.    IMPRESSION:  Limited evaluation for segmental and subsegmental pulmonary embolism. No   main, leftright, or lobar pulmonary embolism.    Indeterminate 2.4 cm left lower lobe nodular opacity. Malignancy should   be considered.    No acute intra-abdominal pathology.    < end of copied text >         Patient is a 89y old  Male who presents with a chief complaint of Chest pain ,SOB (01 Jan 2024 13:34)    HPI:  89-year-old male with past medical history of hypertension, hyperlipidemia, prostate cancer status postradiation and prostatectomy, radiation cystitis who presents emergency department complaining of chest pain, shortness of breath.  Patient states over the last week he has been more fatigued than usual.  States he is normally able to ambulate without assistance but recently he has been unable to due to being tired.  Patient denies any fevers or chills.  Patient was diagnosed with COVID 3 weeks ago after being discharged from the hospital.  Patient denies any blood in his urine.  Patient has a Chinchilla in place (28 Dec 2023 19:37)      PAST MEDICAL & SURGICAL HISTORY:  HTN (hypertension)      HLD (hyperlipidemia)      Prostate cancer      S/P prostatectomy          Social history:     Smoking,    ETOH,      IVDU       FAMILY HISTORY:  - Reviewed,Non contributory   REVIEW OF SYSTEMS  C/O changing color of urine in chinchilla bag    General:	Denies any malaise fatigue or chills. Fevers absent    Skin: No rash  	  Ophthalmologic: Denies any visual complaints, discharge redness or photophobia  	  ENMT: No nasal discharge, headache, sinus congestion or throat pain. No dental complaints    Respiratory and Thorax: Occasional cough, no sputum or chest pain. Denies shortness of breath  	  Cardiovascular:	No chest pain, palpitations or dizziness    Gastrointestinal:	NO nausea, abdominal pain or diarrhea.    Genitourinary:	No suprapubic pain. No flank pain    Musculoskeletal:	No joint swelling or pain. No weakness    Neurological:  No confusion, dizziness. No extremity weakness. No bowel incontinence	    Psychiatric: No delusions or hallucinations	    Hematology/Lymphatics:	No LN swelling. No gum bleeding     Endocrine:	No recent weight gain or loss. No abnormal heat/cold intolerance    Allergic/Immunologic:	No hives or rash   Allergies    No Known Allergies    Intolerances        Antimicrobials:          Vital Signs Last 24 Hrs  T(C): 36.4 (01 Jan 2024 11:39), Max: 36.8 (31 Dec 2023 22:17)  T(F): 97.5 (01 Jan 2024 11:39), Max: 98.3 (31 Dec 2023 22:17)  HR: 92 (01 Jan 2024 11:39) (65 - 92)  BP: 106/66 (01 Jan 2024 11:39) (106/66 - 143/62)  BP(mean): --  RR: 18 (01 Jan 2024 11:39) (16 - 18)  SpO2: 97% (01 Jan 2024 11:39) (93% - 98%)    Parameters below as of 01 Jan 2024 11:39  Patient On (Oxygen Delivery Method): room air        PHYSICAL EXAM: Pleasant patient in no acute distress.      Constitutional: Comfortable. Awake and alert  No cachexia     Eyes:  EOMI. NO discharge or conjunctival injection    ENMT: No sinus tenderness. No thrush. No pharyngeal exudate or erythema. Fair dental hygiene    Neck: Supple, No LN      Respiratory: Good air entry bilaterally, clear to auscultation    Cardiovascular: S1 S2 wnl, No murmurs, rub or gallops    Gastrointestinal: Soft BS(+) no tenderness no masses ,No rebound or guarding, + chinchilla with dark brownish urine    Genitourinary: No CVA tenderness     Extremities: No cyanosis, clubbing or edema.    Vascular: peripheral pulses felt    Neurological: AAO X 3,No grossly focal deficits    Skin: No rash     Lymph Nodes: No palpable LNs    Musculoskeletal: No joint swelling or LOM    Psychiatric: Affect normal.                                9.3    8.45  )-----------( 221      ( 31 Dec 2023 09:22 )             27.3                 RECENT CULTURES:  12-29 @ 00:19  Catheterized Catheterized  --  --  --    >100,000 CFU/ml Klebsiella pneumoniae  --      < from: POCUS ED TTE 2D F/U, Limited w/o Cont. (12.29.23 @ 09:26) >  Procedure was performed in the Emergency Department by a credentialed   Emergency Medicine Attending Physician    EXAM:  ER TTE LIMITED      ORDER COMMENTS:      PROCEDURE DATE:  12/29/2023    FOCUSED ED ULTRASOUND REPORT          INTERPRETATION:  A focused transthoracic cardiac ultrasound examination   was performed.  No pericardial effusion was present.  No global wall motion abnormality was identified  Preserved ejection fraction  IVC flat  A line predominant lung fields    IMPRESSION:  No Pericardial Effusion.    < end of copied text >  < from: Xray Chest 1 View- PORTABLE-Urgent (12.28.23 @ 16:41) >  CC: [id number] EXAM:  XR CHEST PORTABLE URGENT 1V   ORDERED BY:  DARREN NAGEL     PROCEDURE DATE:  12/28/2023          INTERPRETATION:  EXAMINATION: XR CHEST URGENT    CLINICAL INDICATION: Chest Pain    TECHNIQUE: Single frontal, portable view of the chest was obtained.    COMPARISON: Chest x-ray 6/25/2021.    FINDINGS:    The heart is normal in size.  No focal consolidation. There is a focal linear opacity at the left lung   base that likely represents linear atelectasis.  There is no pneumothorax or pleural effusion.  No acute abnormalities in the visualized osseous structures.    IMPRESSION:  No focal opacity.    < end of copied text >  < from: CT Abdomen and Pelvis w/ IV Cont (12.28.23 @ 15:56) >  ACC: [id number] EXAM:  CT ABDOMEN AND PELVIS IC   ORDERED BY:  DARREN NAGEL     ACC: [id number] EXAM:  CT ANGIO CHEST PULM ART WAWIC   ORDERED BY:  DARREN NAGEL     PROCEDURE DATE:  12/28/2023          INTERPRETATION:  CLINICAL INFORMATION: Chest pain.    COMPARISON: Chest CT dated 4/8/2021, CT abdomen and pelvis dated 12/3/2023    CONTRAST/COMPLICATIONS:  IV Contrast: 90 cc Omnipaque  Oral Contrast: NONE  Complications: None reported at time of study completion    PROCEDURE:  CT Angiography of the Chest was performed followed by portal venous phase   imaging of the Abdomen and Pelvis.  Sagittal and coronal reformats were performed as well as 3D (MIP)   reconstructions.    FINDINGS:  CHEST:  LUNGS AND LARGE AIRWAYS: 2.4 cm left lower lobe nodular opacity. Left   lower lobe surgical clip. Emphysema with peripheral reticulation. Apical   blebs. Patent central airways.  PLEURA: No pleural effusion.  VESSELS: Limited evaluation for segmental and subsegmental pulmonary   embolism. No main, left right, or lobar pulmonary embolism. . Aortic   calcifications. Calcifications of the aortic arch, descending aorta, and   its proximal branches.  HEART: Heart is mildly enlarged. No pericardial effusion. Aortic valve   calcification.  MEDIASTINUM AND REILLY: No lymphadenopathy.  CHEST WALL AND LOWER NECK: Within normal limits.    ABDOMEN AND PELVIS:  LIVER: Mild nodular contour. Stable left hepatic lobe cyst.  BILE DUCTS: Normal caliber.  GALLBLADDER: Within normal limits.  SPLEEN: Within normal limits.  PANCREAS: Within normal limits.  ADRENALS: Within normal limits.  KIDNEYS/URETERS: Bilateral renal cysts measuring up to 6.5 cm in the   lower left renal pole. Additional subcentimeter hypodensities too small   to characterize.    BLADDER: Collapsed around Chinchilla catheter. Question density around Chinchilla   catheter. Evaluated on prior study  REPRODUCTIVE ORGANS: Prostatectomy.    BOWEL: No bowel obstruction. Appendix is normal.  PERITONEUM: No ascites.  VESSELS: Atherosclerotic changes.  RETROPERITONEUM/LYMPH NODES: No lymphadenopathy.  ABDOMINAL WALL: Small fat-containing umbilical hernia.  BONES: Degenerative changes of the spine.    IMPRESSION:  Limited evaluation for segmental and subsegmental pulmonary embolism. No   main, leftright, or lobar pulmonary embolism.    Indeterminate 2.4 cm left lower lobe nodular opacity. Malignancy should   be considered.    No acute intra-abdominal pathology.    < end of copied text >

## 2024-01-01 NOTE — DISCHARGE NOTE NURSING/CASE MANAGEMENT/SOCIAL WORK - NSDCFUADDAPPT_GEN_ALL_CORE_FT
Pt expected to have minimal  Hematuria  If chinchilla obstructed , or not draining   or if Pt is in retension May flush the chinchilla with 30cc  sterile saline  and pull out   Please follow up with PCP  DR Rush  with in a week

## 2024-01-01 NOTE — DISCHARGE NOTE NURSING/CASE MANAGEMENT/SOCIAL WORK - NSDCPEFALRISK_GEN_ALL_CORE
For information on Fall & Injury Prevention, visit: [email or web address] OR  [email or web address] OR  https://www.cdc.gov/steadi/patient.html For information on Fall & Injury Prevention, visit: [email or web address] OR  [email or web address] OR  https://www.cdc.gov/steadi/patient.html

## 2024-01-01 NOTE — CONSULT NOTE ADULT - ASSESSMENT
89-year-old male with past medical history of hypertension, hyperlipidemia, prostate cancer status postradiation and prostatectomy, radiation cystitis who presents emergency department complaining of chest pain, shortness of breath. 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 Treated with ceftriaxone, DC'd with cefdinir. He reported urine cleared in color. He was well till he developed shortness of breath and chest pain across his lower chest. He had been + for COVID post DC (again + 12/28). cardiac work up negative for acute cardiac event, CT with nodular infiltrate Left lung- for OPD work up. He has been afebrile without elevated WBC. He has no pain, no suprapubic or flank pain or tenderness. Urine culture with Klebsiella obtained via catheter likely represent colonization and not supported by clinical fundings for UTI.  Would reserve antibiotics for clinical scenario c/w UTI.  Agree with not starting antibiotics at this time.      Sánchez Colbert MD  pager [phone]  office [phone]  ID service will be available starting 1/4/24 89-year-old male with past medical history of hypertension, hyperlipidemia, prostate cancer status postradiation and prostatectomy, radiation cystitis who presents emergency department complaining of chest pain, shortness of breath. 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 Treated with ceftriaxone, DC'd with cefdinir. He reported urine cleared in color. He was well till he developed shortness of breath and chest pain across his lower chest. He had been + for COVID post DC (again + 12/28). cardiac work up negative for acute cardiac event, CT with nodular infiltrate Left lung- for OPD work up. He has been afebrile without elevated WBC. He has no pain, no suprapubic or flank pain or tenderness. Urine culture with Klebsiella obtained via catheter likely represent colonization and not supported by clinical fundings for UTI.  Would reserve antibiotics for clinical scenario c/w UTI.  Agree with not starting antibiotics at this time.      Sánchez Colbert MD  pager [phone]  office [phone]  ID service will be available starting 1/4/24

## 2024-01-01 NOTE — DISCHARGE NOTE NURSING/CASE MANAGEMENT/SOCIAL WORK - NSSCNAMETXT_GEN_ALL_CORE
[facility] at home  [facility] at home  [facility] at home  [facility] at home

## 2024-01-01 NOTE — DISCHARGE NOTE PROVIDER - NSDCCPCAREPLAN_GEN_ALL_CORE_FT
[History reviewed] : History reviewed. [Medications and Allergies reviewed] : Medications and allergies reviewed. PRINCIPAL DISCHARGE DIAGNOSIS  Diagnosis: Hypoxia  Assessment and Plan of Treatment: resolved      SECONDARY DISCHARGE DIAGNOSES  Diagnosis: Radiation cystitis  Assessment and Plan of Treatment: s/p antibiotic course   Pt expected to have minimal  Hematuria   If chinchilla obstructed , or not draining   or if Pt is in retension May flush the chinchilla with 30cc  sterile saline  and pull out     PRINCIPAL DISCHARGE DIAGNOSIS  Diagnosis: Hypoxia  Assessment and Plan of Treatment: resolved      SECONDARY DISCHARGE DIAGNOSES  Diagnosis: Radiation cystitis  Assessment and Plan of Treatment:   Pt expected to have minimal  Hematuria   If chinchilla obstructed , or not draining   or if Pt is in retension May flush the chinchilla with 30cc  sterile saline  and pull out  please follow up with DR Rendon  Urologist     PRINCIPAL DISCHARGE DIAGNOSIS  Diagnosis: Hypoxia  Assessment and Plan of Treatment: resolved  CTA negative for PE      SECONDARY DISCHARGE DIAGNOSES  Diagnosis: Radiation cystitis  Assessment and Plan of Treatment:   Pt expected to have minimal  Hematuria   If chinchilla obstructed , or not draining   or if Pt is in retension May flush the chinchilla with 30cc  sterile saline  and pull out  please follow up with DR Rendon  Urologist    Diagnosis: Atypical chest pain  Assessment and Plan of Treatment:   HOME CARE INSTRUCTIONS  For the next few days, avoid physical activities that bring on chest pain. Continue physical activities as directed.  Do not smoke.  Avoid drinking alcohol.   Only take over-the-counter or prescription medicine for pain, discomfort, or fever as directed by your caregiver.  Follow your caregiver's suggestions for further testing if your chest pain does not go away.  Keep any follow-up appointments you made. If you do not go to an appointment, you could develop lasting (chronic) problems with pain. If there is any problem keeping an appointment, you must call to reschedule.   SEEK MEDICAL CARE IF:  You think you are having problems from the medicine you are taking. Read your medicine instructions carefully.  Your chest pain does not go away, even after treatment.  You develop a rash with blisters on your chest.  SEEK IMMEDIATE MEDICAL CARE IF:  You have increased chest pain or pain that spreads to your arm, neck, jaw, back, or abdomen.   You develop shortness of breath, an increasing cough, or you are coughing up blood.  You have severe back or abdominal pain, feel nauseous, or vomit.  You develop severe weakness, fainting, or chills.  You have a fever.  THIS IS AN EMERGENCY. Do not wait to see if the pain will go away. Get medical help at once. Call your local emergency services (_____________________). Do not drive yourself to the hospital.       PRINCIPAL DISCHARGE DIAGNOSIS  Diagnosis: Hypoxia  Assessment and Plan of Treatment: resolved  CTA negative for PE      SECONDARY DISCHARGE DIAGNOSES  Diagnosis: Radiation cystitis  Assessment and Plan of Treatment: Pt expected to have minimal  Hematuria   If chinchilla obstructed , or not draining   or if Pt is in retension May flush the chinchilla with 30cc  sterile saline.  please follow up with DR Rendon  Urologist    Diagnosis: Atypical chest pain  Assessment and Plan of Treatment:   HOME CARE INSTRUCTIONS  For the next few days, avoid physical activities that bring on chest pain. Continue physical activities as directed.  Do not smoke.  Avoid drinking alcohol.   Only take over-the-counter or prescription medicine for pain, discomfort, or fever as directed by your caregiver.  Follow your caregiver's suggestions for further testing if your chest pain does not go away.  Keep any follow-up appointments you made. If you do not go to an appointment, you could develop lasting (chronic) problems with pain. If there is any problem keeping an appointment, you must call to reschedule.   SEEK MEDICAL CARE IF:  You think you are having problems from the medicine you are taking. Read your medicine instructions carefully.  Your chest pain does not go away, even after treatment.  You develop a rash with blisters on your chest.  SEEK IMMEDIATE MEDICAL CARE IF:  You have increased chest pain or pain that spreads to your arm, neck, jaw, back, or abdomen.   You develop shortness of breath, an increasing cough, or you are coughing up blood.  You have severe back or abdominal pain, feel nauseous, or vomit.  You develop severe weakness, fainting, or chills.  You have a fever.  THIS IS AN EMERGENCY. Do not wait to see if the pain will go away. Get medical help at once. Call your local emergency services (_____________________). Do not drive yourself to the hospital.       PRINCIPAL DISCHARGE DIAGNOSIS  Diagnosis: Hypoxia  Assessment and Plan of Treatment: resolved  CTA negative for PE      SECONDARY DISCHARGE DIAGNOSES  Diagnosis: Radiation cystitis  Assessment and Plan of Treatment: Patient expected to have minimal  Hematuria.   If chinchilla obstructed/not draining or if experiencing bloody urine May flush the chinchilla with 60cc of sterile saline very gently and slowly and pull pack 60 cc very gently.   please follow up with DR Rendon  Urologist    Diagnosis: Atypical chest pain  Assessment and Plan of Treatment:   HOME CARE INSTRUCTIONS  For the next few days, avoid physical activities that bring on chest pain. Continue physical activities as directed.  Do not smoke.  Avoid drinking alcohol.   Only take over-the-counter or prescription medicine for pain, discomfort, or fever as directed by your caregiver.  Follow your caregiver's suggestions for further testing if your chest pain does not go away.  Keep any follow-up appointments you made. If you do not go to an appointment, you could develop lasting (chronic) problems with pain. If there is any problem keeping an appointment, you must call to reschedule.   SEEK MEDICAL CARE IF:  You think you are having problems from the medicine you are taking. Read your medicine instructions carefully.  Your chest pain does not go away, even after treatment.  You develop a rash with blisters on your chest.  SEEK IMMEDIATE MEDICAL CARE IF:  You have increased chest pain or pain that spreads to your arm, neck, jaw, back, or abdomen.   You develop shortness of breath, an increasing cough, or you are coughing up blood.  You have severe back or abdominal pain, feel nauseous, or vomit.  You develop severe weakness, fainting, or chills.  You have a fever.  THIS IS AN EMERGENCY. Do not wait to see if the pain will go away. Get medical help at once. Call your local emergency services (_____________________). Do not drive yourself to the hospital.

## 2024-01-01 NOTE — PROGRESS NOTE ADULT - ASSESSMENT
89-year-old male with past medical history of hypertension, hyperlipidemia, prostate cancer status postradiation and prostatectomy, radiation cystitis who presents emergency department complaining of chest pain, shortness of breath.  Patient states over the last week he has been more fatigued than usual.  States he is normally able to ambulate without assistance but recently he has been unable to due to being tired.  Patient denies any fevers or chills.  Patient was diagnosed with COVID 3 weeks ago after being discharged from the hospital.  Patient denies any blood in his urine.  Patient has a Garrett in place       Problem/Plan - 1:  ·  Problem: Acute respiratory failure with hypoxia.   ·  Plan: Resolved.   Exact cause unknown. ?Post COVID .     < from: TTE W or WO Ultrasound Enhancing Agent (12.29.23 @ 11:05) >  CONCLUSIONS:      1. Left ventricular systolic function is normal with an ejection fraction of 62 % by Lind's method of disks.   2. Mildly enlarged right ventricular cavity size and systolic function.   3.No significant valvular disease.   4. No prior echocardiogram is available for comparison.    < end of copied text >  Pulmonary and cardiology help appreciated.   < from: CT Angio Chest PE Protocol w/ IV Cont (12.28.23 @ 15:54) >    IMPRESSION:  Limited evaluation for segmental and subsegmental pulmonary embolism. No   main, leftright, or lobar pulmonary embolism.    Indeterminate 2.4 cm left lower lobe nodular opacity. Malignancy should   be considered.    No acute intra-abdominal pathology.     Problem/Plan - 2:  ·  Problem: HTN (hypertension).   ·  Plan: BP meds with hold parameters.     Problem/Plan - 3:  ·  Problem: HLD (hyperlipidemia).   ·  Plan: Statin.     Problem/Plan - 4:  ·  Problem: Prostatic cancer.   ·  Plan: S/P Surgery and Radiation .  ON Chemotherapy.     Problem/Plan - 5:  ·  Problem: 2019 novel coronavirus disease (COVID-19).   ·  Plan: Had COVID >2 weeks ago.     Problem/Plan - 6:  ·  Problem: UTI (urinary tract infection).   ·  Plan: Urine Culture K Pneumonia .  Will call ID .      Problem/Plan - 7:  ·  Problem: Hematuria .   ·  Plan: S/P Garrett .  Urology help appreciated.      Problem/Plan - 8:  ·  Problem: Anemia .   ·  Plan: Trending CBC. PRBC for Hgb <7.5 G      Problem/Plan - 7:  ·  Problem: Hyponatremia .   ·  Plan: Rpt BMP.     Dispo : DC planning home pending repeat labs and outpt Urology follow up.

## 2024-01-01 NOTE — DISCHARGE NOTE PROVIDER - CARE PROVIDER_API CALL
Chloe Chase  Urology  [address] [place] [address], [address] [address]  Phone: [phone]  Fax: [phone]  Follow Up Time:    Chloe Chase  Urology  [address] [place] [address], [address] [address]  Phone: [phone]  Fax: [phone]  Follow Up Time:    Chloe Chase  Urology  [address] [facility], [facility] [address]  [place] [address]  Phone: [phone]  Fax: [phone]  Follow Up Time:     Kristal Rush  Internal Medicine  [address] [address]  Phone: [phone]  Fax: [phone]  Follow Up Time:    Chloe Chase  Urology  [address] [facility], [facility] [address]  [place] [address]  Phone: [phone]  Fax: [phone]  Follow Up Time:     Kristal Rush  Internal Medicine  [address] [address]  Phone: [phone]  Fax: [phone]  Follow Up Time:

## 2024-01-01 NOTE — PROGRESS NOTE ADULT - SUBJECTIVE AND OBJECTIVE BOX
Date of Service  : 01-01-24     INTERVAL HPI/OVERNIGHT EVENTS: I feel better so want to go home.   Vital Signs Last 24 Hrs  T(C): 36.4 (01 Jan 2024 11:39), Max: 36.8 (31 Dec 2023 22:17)  T(F): 97.5 (01 Jan 2024 11:39), Max: 98.3 (31 Dec 2023 22:17)  HR: 92 (01 Jan 2024 11:39) (65 - 92)  BP: 106/66 (01 Jan 2024 11:39) (106/66 - 143/62)  BP(mean): --  RR: 18 (01 Jan 2024 11:39) (16 - 18)  SpO2: 97% (01 Jan 2024 11:39) (93% - 98%)    Parameters below as of 01 Jan 2024 11:39  Patient On (Oxygen Delivery Method): room air      I&O's Summary    31 Dec 2023 07:01  -  01 Jan 2024 07:00  --------------------------------------------------------  IN: 270 mL / OUT: 2950 mL / NET: -2680 mL      MEDICATIONS  (STANDING):  atorvastatin 10 milliGRAM(s) Oral at bedtime  cholecalciferol 1000 Unit(s) Oral daily  enoxaparin Injectable 40 milliGRAM(s) SubCutaneous every 24 hours  levothyroxine 50 MICROGram(s) Oral daily  lisinopril 40 milliGRAM(s) Oral daily  metoprolol succinate ER 25 milliGRAM(s) Oral daily  multivitamin 1 Tablet(s) Oral daily  sertraline 25 milliGRAM(s) Oral daily    MEDICATIONS  (PRN):  acetaminophen     Tablet .. 650 milliGRAM(s) Oral every 6 hours PRN Temp greater or equal to 38C (100.4F), Mild Pain (1 - 3)  polyethylene glycol 3350 17 Gram(s) Oral daily PRN Constipation    LABS:                        9.3    8.45  )-----------( 221      ( 31 Dec 2023 09:22 )             27.3               CAPILLARY BLOOD GLUCOSE              REVIEW OF SYSTEMS:  CONSTITUTIONAL: No fever, weight loss, or fatigue  EYES: No eye pain, visual disturbances, or discharge  ENMT:  No difficulty hearing, tinnitus, vertigo; No sinus or throat pain  NECK: No pain or stiffness  RESPIRATORY: No cough, wheezing, chills or hemoptysis; No shortness of breath  CARDIOVASCULAR: No chest pain, palpitations, dizziness, or leg swelling  GASTROINTESTINAL: No abdominal or epigastric pain. No nausea, vomiting, or hematemesis; No diarrhea or constipation. No melena or hematochezia.  GENITOURINARY: No dysuria, frequency, hematuria, or incontinence  NEUROLOGICAL: No headaches, memory loss, loss of strength, numbness, or tremors      RADIOLOGY & ADDITIONAL TESTS:    Consultant(s) Notes Reviewed:  [x ] YES  [ ] NO    PHYSICAL EXAM:  GENERAL: NAD, well-groomed, well-developed,not in any distress ,  HEAD:  Atraumatic, Normocephalic  EYES: EOMI, PERRLA, conjunctiva and sclera clear  ENMT: No tonsillar erythema, exudates, or enlargement; Moist mucous membranes, Good dentition, No lesions  NECK: Supple, No JVD, Normal thyroid  NERVOUS SYSTEM:  Alert & Oriented X3, No focal deficit   CHEST/LUNG: Good air entry bilateral with no  rales, rhonchi, wheezing, or rubs  HEART: Regular rate and rhythm; No murmurs, rubs, or gallops  ABDOMEN: Soft, Nontender, Nondistended; Bowel sounds present  EXTREMITIES:  2+ Peripheral Pulses, No clubbing, cyanosis, or edema      Care Discussed with Consultants/Other Providers [ x] YES  [ ] NO

## 2024-01-01 NOTE — DISCHARGE NOTE PROVIDER - CARE PROVIDERS DIRECT ADDRESSES
,[email or web address],DirectAddress_Unknown ,[email or web address],DirectAddress_Unknown

## 2024-01-01 NOTE — DISCHARGE NOTE PROVIDER - HOSPITAL COURSE
HPI:  89-year-old male with past medical history of hypertension, hyperlipidemia, prostate cancer status postradiation and prostatectomy, radiation cystitis who presents emergency department complaining of chest pain, shortness of breath.  Patient states over the last week he has been more fatigued than usual.  States he is normally able to ambulate without assistance but recently he has been unable to due to being tired.  Patient denies any fevers or chills.  Patient was diagnosed with COVID 3 weeks ago after being discharged from the hospital.  Patient denies any blood in his urine.  Patient has a Garrett in place (28 Dec 2023 19:37)    Hospital Course: presented with CP  and hypoxia , CTA negative for PE , ECHO normal , Hypoxia resolved , on going hematuria due to  radiation cystitis , Flush the catheter prn as per Urology reccs       Important Medication Changes and Reason: None     Active or Pending Issues Requiring Follow-up: Urology follow up ,   2.4 Cm LLL  nodular opacity on CTA of chest , needs  follow up CT in 1-2 months     Advanced Directives:   [ x] Full code  [ ] DNR  [ ] Hospice    Discharge Diagnoses:  Hypoxia post  covid infection , now resolved   Atypical chest pain  Radiation cystitis                HPI:  89-year-old male with past medical history of hypertension, hyperlipidemia, prostate cancer status postradiation and prostatectomy, radiation cystitis who presents emergency department complaining of chest pain, shortness of breath.  Patient states over the last week he has been more fatigued than usual.  States he is normally able to ambulate without assistance but recently he has been unable to due to being tired.  Patient denies any fevers or chills.  Patient was diagnosed with COVID 3 weeks ago after being discharged from the hospital.  Patient denies any blood in his urine.  Patient has a Garrett in place (28 Dec 2023 19:37)    Hospital Course: presented with CP  and hypoxia , CTA negative for PE , ECHO normal , Hypoxia resolved , on going hematuria due to  radiation cystitis , Flush the catheter prn as per Urology reccs   Urine  c/s Klebsiella  ID eval observe off of abx       Important Medication Changes and Reason: None     Active or Pending Issues Requiring Follow-up: Urology follow up ,   2.4 Cm LLL  nodular opacity on CTA of chest , needs  follow up CT in 1-2 months     Advanced Directives:   [ x] Full code  [ ] DNR  [ ] Hospice    Discharge Diagnoses:  Hypoxia post  covid infection , now resolved   Atypical chest pain  Radiation cystitis                HPI:  89-year-old male with past medical history of hypertension, hyperlipidemia, prostate cancer status postradiation and prostatectomy, radiation cystitis who presents emergency department complaining of chest pain, shortness of breath.  Patient states over the last week he has been more fatigued than usual.  States he is normally able to ambulate without assistance but recently he has been unable to due to being tired.  Patient denies any fevers or chills.  Patient was diagnosed with COVID 3 weeks ago after being discharged from the hospital.  Patient denies any blood in his urine.  Patient has a Chinchilla in place (28 Dec 2023 19:37)    Hospital Course: presented with CP  and hypoxia , CTA negative for PE , ECHO normal , Hypoxia resolved , on going hematuria due to  radiation cystitis , If chinchilla obstructed/not draining or if experiencing bloody urine May flush the chinchilla with 60cc of sterile saline very gently and slowly and pull pack 60 cc very gently.  per Urology reccs   Urine  c/s Klebsiella  ID eval observe off of abx       Important Medication Changes and Reason: None     Active or Pending Issues Requiring Follow-up: Urology follow up ,   2.4 Cm LLL  nodular opacity on CTA of chest , needs  follow up CT in 1-2 months     Advanced Directives:   [ x] Full code  [ ] DNR  [ ] Hospice    Discharge Diagnoses:  Hypoxia post  covid infection , now resolved   Atypical chest pain  Radiation cystitis

## 2024-01-02 VITALS
TEMPERATURE: 98 F | SYSTOLIC BLOOD PRESSURE: 131 MMHG | HEART RATE: 71 BPM | RESPIRATION RATE: 18 BRPM | DIASTOLIC BLOOD PRESSURE: 60 MMHG | OXYGEN SATURATION: 95 %

## 2024-01-02 LAB
-  AMOXICILLIN/CLAVULANIC ACID: SIGNIFICANT CHANGE UP
-  AMOXICILLIN/CLAVULANIC ACID: SIGNIFICANT CHANGE UP
-  AMPICILLIN/SULBACTAM: SIGNIFICANT CHANGE UP
-  AMPICILLIN/SULBACTAM: SIGNIFICANT CHANGE UP
-  AMPICILLIN: SIGNIFICANT CHANGE UP
-  AMPICILLIN: SIGNIFICANT CHANGE UP
-  AZTREONAM: SIGNIFICANT CHANGE UP
-  AZTREONAM: SIGNIFICANT CHANGE UP
-  CEFAZOLIN: SIGNIFICANT CHANGE UP
-  CEFAZOLIN: SIGNIFICANT CHANGE UP
-  CEFEPIME: SIGNIFICANT CHANGE UP
-  CEFEPIME: SIGNIFICANT CHANGE UP
-  CEFOXITIN: SIGNIFICANT CHANGE UP
-  CEFOXITIN: SIGNIFICANT CHANGE UP
-  CEFTRIAXONE: SIGNIFICANT CHANGE UP
-  CEFTRIAXONE: SIGNIFICANT CHANGE UP
-  CEFUROXIME: SIGNIFICANT CHANGE UP
-  CEFUROXIME: SIGNIFICANT CHANGE UP
-  CIPROFLOXACIN: SIGNIFICANT CHANGE UP
-  CIPROFLOXACIN: SIGNIFICANT CHANGE UP
-  ERTAPENEM: SIGNIFICANT CHANGE UP
-  ERTAPENEM: SIGNIFICANT CHANGE UP
-  GENTAMICIN: SIGNIFICANT CHANGE UP
-  GENTAMICIN: SIGNIFICANT CHANGE UP
-  IMIPENEM: SIGNIFICANT CHANGE UP
-  IMIPENEM: SIGNIFICANT CHANGE UP
-  LEVOFLOXACIN: SIGNIFICANT CHANGE UP
-  LEVOFLOXACIN: SIGNIFICANT CHANGE UP
-  MEROPENEM: SIGNIFICANT CHANGE UP
-  MEROPENEM: SIGNIFICANT CHANGE UP
-  NITROFURANTOIN: SIGNIFICANT CHANGE UP
-  NITROFURANTOIN: SIGNIFICANT CHANGE UP
-  PIPERACILLIN/TAZOBACTAM: SIGNIFICANT CHANGE UP
-  PIPERACILLIN/TAZOBACTAM: SIGNIFICANT CHANGE UP
-  TOBRAMYCIN: SIGNIFICANT CHANGE UP
-  TOBRAMYCIN: SIGNIFICANT CHANGE UP
-  TRIMETHOPRIM/SULFAMETHOXAZOLE: SIGNIFICANT CHANGE UP
-  TRIMETHOPRIM/SULFAMETHOXAZOLE: SIGNIFICANT CHANGE UP
ANION GAP SERPL CALC-SCNC: 13 MMOL/L — SIGNIFICANT CHANGE UP (ref 5–17)
ANION GAP SERPL CALC-SCNC: 13 MMOL/L — SIGNIFICANT CHANGE UP (ref 5–17)
BUN SERPL-MCNC: 19 MG/DL — SIGNIFICANT CHANGE UP (ref 7–23)
BUN SERPL-MCNC: 19 MG/DL — SIGNIFICANT CHANGE UP (ref 7–23)
CALCIUM SERPL-MCNC: 8.9 MG/DL — SIGNIFICANT CHANGE UP (ref 8.4–10.5)
CALCIUM SERPL-MCNC: 8.9 MG/DL — SIGNIFICANT CHANGE UP (ref 8.4–10.5)
CHLORIDE SERPL-SCNC: 97 MMOL/L — SIGNIFICANT CHANGE UP (ref 96–108)
CHLORIDE SERPL-SCNC: 97 MMOL/L — SIGNIFICANT CHANGE UP (ref 96–108)
CO2 SERPL-SCNC: 23 MMOL/L — SIGNIFICANT CHANGE UP (ref 22–31)
CO2 SERPL-SCNC: 23 MMOL/L — SIGNIFICANT CHANGE UP (ref 22–31)
CREAT SERPL-MCNC: 0.96 MG/DL — SIGNIFICANT CHANGE UP (ref 0.5–1.3)
CREAT SERPL-MCNC: 0.96 MG/DL — SIGNIFICANT CHANGE UP (ref 0.5–1.3)
CULTURE RESULTS: ABNORMAL
CULTURE RESULTS: ABNORMAL
EGFR: 75 ML/MIN/1.73M2 — SIGNIFICANT CHANGE UP
EGFR: 75 ML/MIN/1.73M2 — SIGNIFICANT CHANGE UP
GLUCOSE SERPL-MCNC: 117 MG/DL — HIGH (ref 70–99)
GLUCOSE SERPL-MCNC: 117 MG/DL — HIGH (ref 70–99)
METHOD TYPE: SIGNIFICANT CHANGE UP
METHOD TYPE: SIGNIFICANT CHANGE UP
ORGANISM # SPEC MICROSCOPIC CNT: ABNORMAL
POTASSIUM SERPL-MCNC: 3.7 MMOL/L — SIGNIFICANT CHANGE UP (ref 3.5–5.3)
POTASSIUM SERPL-MCNC: 3.7 MMOL/L — SIGNIFICANT CHANGE UP (ref 3.5–5.3)
POTASSIUM SERPL-SCNC: 3.7 MMOL/L — SIGNIFICANT CHANGE UP (ref 3.5–5.3)
POTASSIUM SERPL-SCNC: 3.7 MMOL/L — SIGNIFICANT CHANGE UP (ref 3.5–5.3)
SODIUM SERPL-SCNC: 133 MMOL/L — LOW (ref 135–145)
SODIUM SERPL-SCNC: 133 MMOL/L — LOW (ref 135–145)
SPECIMEN SOURCE: SIGNIFICANT CHANGE UP
SPECIMEN SOURCE: SIGNIFICANT CHANGE UP

## 2024-01-02 PROCEDURE — 85014 HEMATOCRIT: CPT

## 2024-01-02 PROCEDURE — 85610 PROTHROMBIN TIME: CPT

## 2024-01-02 PROCEDURE — 80048 BASIC METABOLIC PNL TOTAL CA: CPT

## 2024-01-02 PROCEDURE — 86900 BLOOD TYPING SEROLOGIC ABO: CPT

## 2024-01-02 PROCEDURE — 85730 THROMBOPLASTIN TIME PARTIAL: CPT

## 2024-01-02 PROCEDURE — 86850 RBC ANTIBODY SCREEN: CPT

## 2024-01-02 PROCEDURE — 74177 CT ABD & PELVIS W/CONTRAST: CPT | Mod: MA

## 2024-01-02 PROCEDURE — 84295 ASSAY OF SERUM SODIUM: CPT

## 2024-01-02 PROCEDURE — 0225U NFCT DS DNA&RNA 21 SARSCOV2: CPT

## 2024-01-02 PROCEDURE — 85379 FIBRIN DEGRADATION QUANT: CPT

## 2024-01-02 PROCEDURE — 99285 EMERGENCY DEPT VISIT HI MDM: CPT

## 2024-01-02 PROCEDURE — C8924: CPT

## 2024-01-02 PROCEDURE — 81001 URINALYSIS AUTO W/SCOPE: CPT

## 2024-01-02 PROCEDURE — 86901 BLOOD TYPING SEROLOGIC RH(D): CPT

## 2024-01-02 PROCEDURE — 83605 ASSAY OF LACTIC ACID: CPT

## 2024-01-02 PROCEDURE — 83880 ASSAY OF NATRIURETIC PEPTIDE: CPT

## 2024-01-02 PROCEDURE — 85027 COMPLETE CBC AUTOMATED: CPT

## 2024-01-02 PROCEDURE — 97161 PT EVAL LOW COMPLEX 20 MIN: CPT

## 2024-01-02 PROCEDURE — 82330 ASSAY OF CALCIUM: CPT

## 2024-01-02 PROCEDURE — 71275 CT ANGIOGRAPHY CHEST: CPT | Mod: MA

## 2024-01-02 PROCEDURE — 87637 SARSCOV2&INF A&B&RSV AMP PRB: CPT

## 2024-01-02 PROCEDURE — 82947 ASSAY GLUCOSE BLOOD QUANT: CPT

## 2024-01-02 PROCEDURE — 93321 DOPPLER ECHO F-UP/LMTD STD: CPT

## 2024-01-02 PROCEDURE — 93308 TTE F-UP OR LMTD: CPT

## 2024-01-02 PROCEDURE — 84484 ASSAY OF TROPONIN QUANT: CPT

## 2024-01-02 PROCEDURE — 82435 ASSAY OF BLOOD CHLORIDE: CPT

## 2024-01-02 PROCEDURE — 87086 URINE CULTURE/COLONY COUNT: CPT

## 2024-01-02 PROCEDURE — 85025 COMPLETE CBC W/AUTO DIFF WBC: CPT

## 2024-01-02 PROCEDURE — 80053 COMPREHEN METABOLIC PANEL: CPT

## 2024-01-02 PROCEDURE — 71045 X-RAY EXAM CHEST 1 VIEW: CPT

## 2024-01-02 PROCEDURE — 85018 HEMOGLOBIN: CPT

## 2024-01-02 PROCEDURE — 82803 BLOOD GASES ANY COMBINATION: CPT

## 2024-01-02 PROCEDURE — 87077 CULTURE AEROBIC IDENTIFY: CPT

## 2024-01-02 PROCEDURE — 84132 ASSAY OF SERUM POTASSIUM: CPT

## 2024-01-02 PROCEDURE — 87186 SC STD MICRODIL/AGAR DIL: CPT

## 2024-01-02 RX ADMIN — Medication 1 TABLET(S): at 12:30

## 2024-01-02 RX ADMIN — Medication 650 MILLIGRAM(S): at 01:05

## 2024-01-02 RX ADMIN — Medication 1000 UNIT(S): at 12:30

## 2024-01-02 RX ADMIN — SERTRALINE 25 MILLIGRAM(S): 25 TABLET, FILM COATED ORAL at 12:30

## 2024-01-02 RX ADMIN — Medication 650 MILLIGRAM(S): at 00:01

## 2024-01-02 RX ADMIN — Medication 50 MICROGRAM(S): at 06:13

## 2024-01-02 RX ADMIN — LISINOPRIL 40 MILLIGRAM(S): 2.5 TABLET ORAL at 06:12

## 2024-01-02 RX ADMIN — ENOXAPARIN SODIUM 40 MILLIGRAM(S): 100 INJECTION SUBCUTANEOUS at 06:13

## 2024-01-02 RX ADMIN — Medication 25 MILLIGRAM(S): at 06:13

## 2024-01-02 NOTE — CHART NOTE - NSCHARTNOTEFT_GEN_A_CORE
Called to evaluate the patient's chinchilla catheter for hematuria noticed today.  Upon arrival, urine appeared dark maroon, likely old blood.  Irrigated the chinchilla catheter with pink -tinged fluid upon pullback without clots.  Patient may be discharged with the chinchilla catheter to leg bag.  He was encouraged to hydrate well.

## 2024-01-02 NOTE — PROGRESS NOTE ADULT - PROBLEM SELECTOR PLAN 2
-CT chest with 2.4 cm LLL nodular opacity. Per pts daughter - pt followed with Dr. Figueredo in the past, s/p biopsy of LLL nodule - s/p adeno. 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   -Pt reportedly has close f/u with Dr. Lovett as an outpatient with recent f/u CT.   -F/u with Dr. Lovett and Dr. Figueredo as an outpatient.
-CT chest with 2.4 cm LLL nodular opacity. Per pts daughter - pt followed with Dr. Figueredo in the past, s/p biopsy of LLL nodule - s/p adeno. 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   -Pt reportedly has close f/u with Dr. Lovett as an outpatient with recent f/u CT.   -F/u with Dr. Lovett and Dr. Figueredo as an outpatient.

## 2024-01-02 NOTE — PROGRESS NOTE ADULT - SUBJECTIVE AND OBJECTIVE BOX
Cardiovascular Disease Progress Note  Date of service: 01-02-24 @ 11:50    Overnight events: No acute events overnight.  Patient is in no distress.   Otherwise review of systems negative    Objective Findings:  T(C): 36.9 (01-02-24 @ 04:35), Max: 37.1 (01-01-24 @ 21:49)  HR: 75 (01-02-24 @ 04:35) (70 - 75)  BP: 122/67 (01-02-24 @ 04:35) (122/67 - 123/69)  RR: 18 (01-02-24 @ 04:35) (18 - 18)  SpO2: 97% (01-02-24 @ 04:35) (97% - 97%)  Wt(kg): --  Daily     Daily       Physical Exam:  Gen: NAD; Patient resting comfortably  HEENT: EOMI, Normocephalic/ atraumatic  CV: RRR, normal S1 + S2, no m/r/g  Lungs:  Normal respiratory effort; clear to auscultation bilaterally  Abd: soft, non-tender; bowel sounds present  Ext: No edema; warm and well perfused    Telemetry:  Sinus     Laboratory Data:    01-02    133<L>  |  97  |  19  ----------------------------<  117<H>  3.7   |  23  |  0.96    Ca    8.9      02 Jan 2024 07:24                Inpatient Medications:  MEDICATIONS  (STANDING):  atorvastatin 10 milliGRAM(s) Oral at bedtime  cholecalciferol 1000 Unit(s) Oral daily  enoxaparin Injectable 40 milliGRAM(s) SubCutaneous every 24 hours  levothyroxine 50 MICROGram(s) Oral daily  lisinopril 40 milliGRAM(s) Oral daily  metoprolol succinate ER 25 milliGRAM(s) Oral daily  multivitamin 1 Tablet(s) Oral daily  sertraline 25 milliGRAM(s) Oral daily      Assessment:  89-year-old male with past medical history of hypertension, hyperlipidemia, prostate cancer status postradiation and prostatectomy, radiation cystitis who presents emergency department complaining of chest pain    Plan of Care:    #Chest pain  - ACS ruled out  - EKG on admission shows sinus rhythm with PVCs  - CTA chest limited but shows no PE in the mainstem, there is a 2.4 cm left lower lobe nodular opacity concerning for malignancy.   - Pain appears to be non-cardiac in nature  - Echo 12/2023 shows normal LV systolic function   - No further cardiac work up planned at this time  - Defer to primary team for CT findings    #HTN  - BP acceptable  - Continue Lisinopril    #HLD  - Statin therapy    #Hypothyroidism  - Synthroid    #Hematuria  - Urology following. Plan to discharge patient with chinchilla in place.  - Outpatient follow up.         #ACP (advance care planning)-  Advanced care planning was discussed.  Risks, benefits and alternatives of medical treatment and procedures were discussed in detail and all questions were answered. 30 additional minutes spent addressing advance care plans.          Over 25 minutes spent on total encounter; more than 50% of the visit was spent counseling and/or coordinating care by the attending physician.      Kevin Zelaya DO [facility]  Cardiovascular Disease  [phone] Cardiovascular Disease Progress Note  Date of service: 01-02-24 @ 11:50    Overnight events: No acute events overnight.  Patient is in no distress.   Otherwise review of systems negative    Objective Findings:  T(C): 36.9 (01-02-24 @ 04:35), Max: 37.1 (01-01-24 @ 21:49)  HR: 75 (01-02-24 @ 04:35) (70 - 75)  BP: 122/67 (01-02-24 @ 04:35) (122/67 - 123/69)  RR: 18 (01-02-24 @ 04:35) (18 - 18)  SpO2: 97% (01-02-24 @ 04:35) (97% - 97%)  Wt(kg): --  Daily     Daily       Physical Exam:  Gen: NAD; Patient resting comfortably  HEENT: EOMI, Normocephalic/ atraumatic  CV: RRR, normal S1 + S2, no m/r/g  Lungs:  Normal respiratory effort; clear to auscultation bilaterally  Abd: soft, non-tender; bowel sounds present  Ext: No edema; warm and well perfused    Telemetry:  Sinus     Laboratory Data:    01-02    133<L>  |  97  |  19  ----------------------------<  117<H>  3.7   |  23  |  0.96    Ca    8.9      02 Jan 2024 07:24                Inpatient Medications:  MEDICATIONS  (STANDING):  atorvastatin 10 milliGRAM(s) Oral at bedtime  cholecalciferol 1000 Unit(s) Oral daily  enoxaparin Injectable 40 milliGRAM(s) SubCutaneous every 24 hours  levothyroxine 50 MICROGram(s) Oral daily  lisinopril 40 milliGRAM(s) Oral daily  metoprolol succinate ER 25 milliGRAM(s) Oral daily  multivitamin 1 Tablet(s) Oral daily  sertraline 25 milliGRAM(s) Oral daily      Assessment:  89-year-old male with past medical history of hypertension, hyperlipidemia, prostate cancer status postradiation and prostatectomy, radiation cystitis who presents emergency department complaining of chest pain    Plan of Care:    #Chest pain  - ACS ruled out  - EKG on admission shows sinus rhythm with PVCs  - CTA chest limited but shows no PE in the mainstem, there is a 2.4 cm left lower lobe nodular opacity concerning for malignancy.   - Pain appears to be non-cardiac in nature  - Echo 12/2023 shows normal LV systolic function   - No further cardiac work up planned at this time  - Defer to primary team for CT findings    #HTN  - BP acceptable  - Continue Lisinopril    #HLD  - Statin therapy    #Hypothyroidism  - Synthroid    #Hematuria  - Urology following. Plan to discharge patient with chinchilla in place.  - Outpatient follow up.         #ACP (advance care planning)-  Advanced care planning was discussed.  Risks, benefits and alternatives of medical treatment and procedures were discussed in detail and all questions were answered. 30 additional minutes spent addressing advance care plans.          Over 25 minutes spent on total encounter; more than 50% of the visit was spent counseling and/or coordinating care by the attending physician.      Kevin Zelaya DO [facility]  Cardiovascular Disease  [phone]

## 2024-01-02 NOTE — PROGRESS NOTE ADULT - PROBLEM SELECTOR PLAN 1
unclear cause  -CTA chest neg PE, no PNA  -+Emphysema  -Resolved at this time. No c/o SOB.  -Normoxic at rest and with ambulation   -Keep sats >88% with o2 PRN  -Duoneb q6h PRN.
unclear cause  -CTA chest neg PE, no PNA  -+Emphysema  -Resolved at this time. No c/o SOB.  -Has been tolerating room air, but seen today on 3LNC. Placed back on room air, sats remain mid 90s  -Please check O2 sats on room air with ambulation & document in flowsheet. D/w RN and ACP  -Keep sats >88% with o2 PRN  -Duoneb q6h PRN.

## 2024-01-02 NOTE — PROGRESS NOTE ADULT - SUBJECTIVE AND OBJECTIVE BOX
Follow-up Pulm Progress Note    No new respiratory events overnight.  Denies SOB/CP.     Medications:  MEDICATIONS  (STANDING):  atorvastatin 10 milliGRAM(s) Oral at bedtime  cholecalciferol 1000 Unit(s) Oral daily  enoxaparin Injectable 40 milliGRAM(s) SubCutaneous every 24 hours  levothyroxine 50 MICROGram(s) Oral daily  lisinopril 40 milliGRAM(s) Oral daily  metoprolol succinate ER 25 milliGRAM(s) Oral daily  multivitamin 1 Tablet(s) Oral daily  sertraline 25 milliGRAM(s) Oral daily    MEDICATIONS  (PRN):  acetaminophen     Tablet .. 650 milliGRAM(s) Oral every 6 hours PRN Temp greater or equal to 38C (100.4F), Mild Pain (1 - 3)  polyethylene glycol 3350 17 Gram(s) Oral daily PRN Constipation          Vital Signs Last 24 Hrs  T(C): 36.9 (02 Jan 2024 11:59), Max: 37.1 (01 Jan 2024 21:49)  T(F): 98.4 (02 Jan 2024 11:59), Max: 98.8 (01 Jan 2024 21:49)  HR: 71 (02 Jan 2024 11:59) (70 - 75)  BP: 131/60 (02 Jan 2024 11:59) (122/67 - 131/60)  BP(mean): --  RR: 18 (02 Jan 2024 11:59) (18 - 18)  SpO2: 95% (02 Jan 2024 11:59) (95% - 97%)    Parameters below as of 02 Jan 2024 11:59  Patient On (Oxygen Delivery Method): room air              01-01 @ 07:01  -  01-02 @ 07:00  --------------------------------------------------------  IN: 0 mL / OUT: 700 mL / NET: -700 mL          LABS:    01-02    133<L>  |  97  |  19  ----------------------------<  117<H>  3.7   |  23  |  0.96    Ca    8.9      02 Jan 2024 07:24            CAPILLARY BLOOD GLUCOSE          Urinalysis Basic - ( 02 Jan 2024 07:24 )    Color: x / Appearance: x / SG: x / pH: x  Gluc: 117 mg/dL / Ketone: x  / Bili: x / Urobili: x   Blood: x / Protein: x / Nitrite: x   Leuk Esterase: x / RBC: x / WBC x   Sq Epi: x / Non Sq Epi: x / Bacteria: x                  CULTURES:       Culture - Urine (collected 12-29-23 @ 00:19)  Source: Catheterized Catheterized  Final Report (01-02-24 @ 06:36):    >100,000 CFU/ml Klebsiella pneumoniae  Organism: Klebsiella pneumoniae (01-02-24 @ 06:36)  Organism: Klebsiella pneumoniae (01-02-24 @ 06:36)      Method Type: RAIZA      -  Amoxicillin/Clavulanic Acid: S <=8/4 Consider reserving for cystitis when ampicillin/sulbactam is resistant      -  Ampicillin: R >16 These ampicillin results predict results for amoxicillin      -  Ampicillin/Sulbactam: R >16/8      -  Aztreonam: S <=4      -  Cefazolin: S 4 For uncomplicated UTI with K. pneumoniae, E. coli, or P. mirablis: RAIZA <=16 is sensitive and RAIZA >=32 is resistant. This also predicts results for oral agents cefaclor, cefdinir, cefpodoxime, cefprozil, cefuroxime axetil, cephalexin and locarbef for uncomplicated UTI. Note that some isolates may be susceptible to these agents while testing resistant to cefazolin.      -  Cefepime: S <=2      -  Cefoxitin: S <=8      -  Ceftriaxone: S <=1      -  Cefuroxime: S <=4      -  Ciprofloxacin: S <=0.25      -  Ertapenem: S <=0.5      -  Gentamicin: S <=2      -  Imipenem: S <=1      -  Levofloxacin: S <=0.5      -  Meropenem: S <=1      -  Nitrofurantoin: S <=32 Should not be used to treat pyelonephritis      -  Piperacillin/Tazobactam: R >64      -  Tobramycin: S <=2      -  Trimethoprim/Sulfamethoxazole: S <=0.5/9.5              Physical Examination:  PULM: Clear to auscultation bilaterally  CVS: S1, S2 heard    RADIOLOGY REVIEWED    CT chest:    < from: CT Angio Chest PE Protocol w/ IV Cont (12.28.23 @ 15:54) >  FINDINGS:  CHEST:  LUNGS AND LARGE AIRWAYS: 2.4 cm left lower lobe nodular opacity. Left   lower lobe surgical clip. Emphysema with peripheral reticulation. Apical   blebs. Patent central airways.  PLEURA: No pleural effusion.  VESSELS: Limited evaluation for segmental and subsegmental pulmonary   embolism. No main, left right, or lobar pulmonary embolism. . Aortic   calcifications. Calcifications of the aortic arch, descending aorta, and   its proximal branches.  HEART: Heart is mildly enlarged. No pericardial effusion. Aortic valve   calcification.  MEDIASTINUM AND REILLY: No lymphadenopathy.  CHEST WALL AND LOWER NECK: Within normal limits.    ABDOMEN AND PELVIS:  LIVER: Mild nodular contour. Stable left hepatic lobe cyst.  BILE DUCTS: Normal caliber.  GALLBLADDER: Within normal limits.  SPLEEN: Within normal limits.  PANCREAS: Within normal limits.  ADRENALS: Within normal limits.  KIDNEYS/URETERS: Bilateral renal cysts measuring up to 6.5 cm in the   lower left renal pole. Additional subcentimeter hypodensities too small   to characterize.    BLADDER: Collapsed around Garrett catheter. Question density around Garrett   catheter. Evaluated on prior study  REPRODUCTIVE ORGANS: Prostatectomy.    BOWEL: No bowel obstruction. Appendix is normal.  PERITONEUM: No ascites.  VESSELS: Atherosclerotic changes.  RETROPERITONEUM/LYMPH NODES: No lymphadenopathy.  ABDOMINAL WALL: Small fat-containing umbilical hernia.  BONES: Degenerative changes of the spine.    IMPRESSION:  Limited evaluation for segmental and subsegmental pulmonary embolism. No   main, leftright, or lobar pulmonary embolism.    Indeterminate 2.4 cm left lower lobe nodular opacity. Malignancy should   be considered.    No acute intra-abdominal pathology.    < end of copied text >  < from: TTE W or WO Ultrasound Enhancing Agent (12.29.23 @ 11:05) >  _______________________________________________________________________________________     CONCLUSIONS:      1. Left ventricular systolic function is normal with an ejection fraction of 62 % by Lind's method of disks.   2. Mildly enlarged right ventricular cavity size and systolic function.   3.No significant valvular disease.   4. No prior echocardiogram is available for comparison.    ________________________________________________________________________________________  FINDINGS:     Left Ventricle:  Left ventricular systolic function is normal with a calculated ejection fraction of 62 % by the Lind's biplane method of disks. Unable to assess left ventricular diastolic function due to insufficient data. Left ventricular endocardium is not well visualized; however, the left ventricular systolic function appears grossly normal. There is no evidence of a left ventricular thrombus.     Right Ventricle:  The right ventricular cavity is mildly enlarged in size and normal systolic function. Tricuspid annular plane systolic excursion (TAPSE) is 2.4 cm (normal >=1.7 cm).     Aortic Valve:  The aortic valve is tricuspid with normal leaflet excursion with normal systolic excursion. There is calcification of the aortic valve leaflets.     Mitral Valve:  Structurally normal mitral valve with normal leaflet excursion. There is normal leaflet mobility of the mitral valve. There is calcification of the mitral valve annulus.     Pericardium:  No pericardial effusion seen.  ____________________________________________________________________  QUANTITATIVE DATA:  Left Ventricle Measurements: (Indexed to BSA)     IVSd (2D):   0.9 cm  LVPWd (2D):  0.9 cm  LVIDd (2D):  5.4 cm  LVIDs (2D):  3.6 cm  LV Mass:     178 g  93.4 g/m²  BiPlane LV EF%: 62 %    Aorta Measurements: (normal range) (Indexed to BSA)     Sinuses of Valsalva: 3.90 cm (3.1 - 3.7 cm)       Right Ventricle Measurements:     TAPSE: 2.4 cm       LVOT / RVOT/ Qp/Qs Data: (Indexed to BSA)  LVOT Vmax: 0.92 m/s  LVOT VTI:  19.80 cm    ________________________________________________________________________________________  Electronically signed on 12/29/2023 at 2:08:49 PM by Mason Sandhu M.D.         *** Final ***    < end of copied text >

## 2024-01-02 NOTE — PROGRESS NOTE ADULT - PROBLEM SELECTOR PLAN 3
-Resolved  -TTE with normal LV function, mildly enlarged RV   -Troponin normal  -CTA chest neg PE.
-Resolved  -TTE with normal LV function, mildly enlarged RV   -Troponin normal  -CTA chest neg PE.

## 2024-01-02 NOTE — PROGRESS NOTE ADULT - ASSESSMENT
89-year-old male with past medical history of hypertension, hyperlipidemia, prostate cancer status postradiation and prostatectomy, radiation cystitis who presents emergency department complaining of chest pain, shortness of breath.  Patient states over the last week he has been more fatigued than usual.  States he is normally able to ambulate without assistance but recently he has been unable to due to being tired.  Patient denies any fevers or chills.  Patient was diagnosed with COVID 3 weeks ago after being discharged from the hospital.  Patient denies any blood in his urine.  Patient has a Garrett in place       Problem/Plan - 1:  ·  Problem: Acute respiratory failure with hypoxia.   ·  Plan: Resolved.   Exact cause unknown. ?Post COVID .     < from: TTE W or WO Ultrasound Enhancing Agent (12.29.23 @ 11:05) >  CONCLUSIONS:      1. Left ventricular systolic function is normal with an ejection fraction of 62 % by Lind's method of disks.   2. Mildly enlarged right ventricular cavity size and systolic function.   3.No significant valvular disease.   4. No prior echocardiogram is available for comparison.    < end of copied text >  Pulmonary and cardiology help appreciated.   < from: CT Angio Chest PE Protocol w/ IV Cont (12.28.23 @ 15:54) >    IMPRESSION:  Limited evaluation for segmental and subsegmental pulmonary embolism. No   main, leftright, or lobar pulmonary embolism.    Indeterminate 2.4 cm left lower lobe nodular opacity. Malignancy should   be considered.    No acute intra-abdominal pathology.     Problem/Plan - 2:  ·  Problem: HTN (hypertension).   ·  Plan: BP meds with hold parameters.     Problem/Plan - 3:  ·  Problem: HLD (hyperlipidemia).   ·  Plan: Statin.     Problem/Plan - 4:  ·  Problem: Prostatic cancer.   ·  Plan: S/P Surgery and Radiation .  ON Chemotherapy.     Problem/Plan - 5:  ·  Problem: 2019 novel coronavirus disease (COVID-19).   ·  Plan: Had COVID >2 weeks ago.     Problem/Plan - 6:  ·  Problem: UTI (urinary tract infection).   ·  Plan: Urine Culture K Pneumonia .  Will call ID .      Problem/Plan - 7:  ·  Problem: Hematuria .   ·  Plan: S/P Garrett .  Urology help appreciated.      Problem/Plan - 8:  ·  Problem: Anemia .   ·  Plan: Trending CBC. PRBC for Hgb <7.5 G      Problem/Plan - 7:  ·  Problem: Hyponatremia .   ·  Plan: Rpt BMP noted .     Dispo : DC planning home  and outpt Urology follow up.

## 2024-01-02 NOTE — PROGRESS NOTE ADULT - ASSESSMENT
89 y/o M with PMH of HTN, HLD, prostate CA s/p postradiation and prostatectomy, radiation cystitis. Presents to the ED with c/o CP, SOB, fatigue.  Patient states over the last week he has been more fatigued than usual.  Patient was diagnosed with COVID 3 weeks ago after being discharged from the hospital. CTA chest neg PE, but found to have 2.4 cm LLL nodular opacity. Per pts daughter - pt followed with Dr. Figueredo in the past, s/p biopsy of LLL nodule - s/p adeno. 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 Pt now off o2, with no c/o SOB, CP.

## 2024-01-02 NOTE — PROGRESS NOTE ADULT - PROVIDER SPECIALTY LIST ADULT
Internal Medicine
Cardiology
Internal Medicine
Pulmonology
Cardiology
Pulmonology
Cardiology
Cardiology
Internal Medicine

## 2024-01-02 NOTE — PROGRESS NOTE ADULT - REASON FOR ADMISSION
Chest pain ,SOB

## 2024-01-02 NOTE — PROGRESS NOTE ADULT - SUBJECTIVE AND OBJECTIVE BOX
Date of Service  : 01-02-24     INTERVAL HPI/OVERNIGHT EVENTS: Seen and examined earlier . I am going home.   Vital Signs Last 24 Hrs  T(C): 36.9 (02 Jan 2024 04:35), Max: 37.1 (01 Jan 2024 21:49)  T(F): 98.5 (02 Jan 2024 04:35), Max: 98.8 (01 Jan 2024 21:49)  HR: 75 (02 Jan 2024 04:35) (70 - 92)  BP: 122/67 (02 Jan 2024 04:35) (106/66 - 123/69)  BP(mean): --  RR: 18 (02 Jan 2024 04:35) (18 - 18)  SpO2: 97% (02 Jan 2024 04:35) (97% - 97%)    Parameters below as of 02 Jan 2024 04:35  Patient On (Oxygen Delivery Method): room air      I&O's Summary    01 Jan 2024 07:01  -  02 Jan 2024 07:00  --------------------------------------------------------  IN: 0 mL / OUT: 700 mL / NET: -700 mL    02 Jan 2024 07:01  -  02 Jan 2024 10:42  --------------------------------------------------------  IN: 0 mL / OUT: 800 mL / NET: -800 mL      MEDICATIONS  (STANDING):  atorvastatin 10 milliGRAM(s) Oral at bedtime  cholecalciferol 1000 Unit(s) Oral daily  enoxaparin Injectable 40 milliGRAM(s) SubCutaneous every 24 hours  levothyroxine 50 MICROGram(s) Oral daily  lisinopril 40 milliGRAM(s) Oral daily  metoprolol succinate ER 25 milliGRAM(s) Oral daily  multivitamin 1 Tablet(s) Oral daily  sertraline 25 milliGRAM(s) Oral daily    MEDICATIONS  (PRN):  acetaminophen     Tablet .. 650 milliGRAM(s) Oral every 6 hours PRN Temp greater or equal to 38C (100.4F), Mild Pain (1 - 3)  polyethylene glycol 3350 17 Gram(s) Oral daily PRN Constipation    LABS:    01-02    133<L>  |  97  |  19  ----------------------------<  117<H>  3.7   |  23  |  0.96    Ca    8.9      02 Jan 2024 07:24        Urinalysis Basic - ( 02 Jan 2024 07:24 )    Color: x / Appearance: x / SG: x / pH: x  Gluc: 117 mg/dL / Ketone: x  / Bili: x / Urobili: x   Blood: x / Protein: x / Nitrite: x   Leuk Esterase: x / RBC: x / WBC x   Sq Epi: x / Non Sq Epi: x / Bacteria: x      CAPILLARY BLOOD GLUCOSE            Urinalysis Basic - ( 02 Jan 2024 07:24 )    Color: x / Appearance: x / SG: x / pH: x  Gluc: 117 mg/dL / Ketone: x  / Bili: x / Urobili: x   Blood: x / Protein: x / Nitrite: x   Leuk Esterase: x / RBC: x / WBC x   Sq Epi: x / Non Sq Epi: x / Bacteria: x      REVIEW OF SYSTEMS:  CONSTITUTIONAL: No fever, weight loss, or fatigue  EYES: No eye pain, visual disturbances, or discharge  ENMT:  No difficulty hearing, tinnitus, vertigo; No sinus or throat pain  NECK: No pain or stiffness  RESPIRATORY: No cough, wheezing, chills or hemoptysis; No shortness of breath  CARDIOVASCULAR: No chest pain, palpitations, dizziness, or leg swelling  GASTROINTESTINAL: No abdominal or epigastric pain. No nausea, vomiting, or hematemesis; No diarrhea or constipation. No melena or hematochezia.  GENITOURINARY: No dysuria, frequency, hematuria, or incontinence  NEUROLOGICAL: No headaches, memory loss, loss of strength, numbness, or tremors      RADIOLOGY & ADDITIONAL TESTS:    Consultant(s) Notes Reviewed:  [x ] YES  [ ] NO    PHYSICAL EXAM:  GENERAL: NAD, well-groomed, well-developed,not in any distress ,  HEAD:  Atraumatic, Normocephalic  EYES: EOMI, PERRLA, conjunctiva and sclera clear  ENMT: No tonsillar erythema, exudates, or enlargement; Moist mucous membranes, Good dentition, No lesions  NECK: Supple, No JVD, Normal thyroid  NERVOUS SYSTEM:  Alert & Oriented X3, No focal deficit   CHEST/LUNG: Good air entry bilateral with no  rales, rhonchi, wheezing, or rubs  HEART: Regular rate and rhythm; No murmurs, rubs, or gallops  ABDOMEN: Soft, Nontender, Nondistended; Bowel sounds present  EXTREMITIES:  2+ Peripheral Pulses, No clubbing, cyanosis, or edema  Garrett +    Care Discussed with Consultants/Other Providers [ x] YES  [ ] NO

## 2024-01-03 RX ORDER — MOLNUPIRAVIR 200 MG/1
200 CAPSULE ORAL TWICE DAILY
Qty: 1 | Refills: 0 | Status: DISCONTINUED | COMMUNITY
Start: 2023-12-14 | End: 2024-01-03

## 2024-01-04 ENCOUNTER — APPOINTMENT (OUTPATIENT)
Dept: UROLOGY | Facility: CLINIC | Age: 89
End: 2024-01-04

## 2024-01-16 ENCOUNTER — TRANSCRIPTION ENCOUNTER (OUTPATIENT)
Age: 89
End: 2024-01-16

## 2024-01-17 ENCOUNTER — TRANSCRIPTION ENCOUNTER (OUTPATIENT)
Age: 89
End: 2024-01-17

## 2024-01-22 ENCOUNTER — NON-APPOINTMENT (OUTPATIENT)
Age: 89
End: 2024-01-22

## 2024-01-22 ENCOUNTER — APPOINTMENT (OUTPATIENT)
Dept: INTERNAL MEDICINE | Facility: CLINIC | Age: 89
End: 2024-01-22
Payer: MEDICARE

## 2024-01-22 PROCEDURE — G2211 COMPLEX E/M VISIT ADD ON: CPT | Mod: NC,1L

## 2024-01-22 PROCEDURE — 99442: CPT

## 2024-01-22 NOTE — ASSESSMENT
[FreeTextEntry1] :  HCm: - PCV13 and PSV23 reported as received - Shingrix reported as received  RTC 3 months for follow-up.

## 2024-01-22 NOTE — HISTORY OF PRESENT ILLNESS
[de-identified] : Henry Stewart is a 89M with PMHx of metastatic prostate ca s/p prostatectomy/XRT/lung wedge on lupron, chronic Garrett, HTN, and anxiety/depression who presents to establish care. Accompanied by daughter Jessee who also translates  Multiple hospitalizations since last visit: Hospitalized for hematuria from Garrett Got COVID, initially had mild sx and deferred tx Developed hypoxia after COVID and was admitted again. Returned home 1/2/24. Has PT at home. Still very fatigued and cold. Appetite is poor and has lost about 4-6lbs since our last visit. 1/17/24 developed a wet cough and purulent nasal discharge. Underwent RN monitoring, but everything sounded clear. Did flonase with improvement, now nasal discharge is white/clear and dry is cough. No breathing issues, wheezing, fever.

## 2024-01-30 ENCOUNTER — NON-APPOINTMENT (OUTPATIENT)
Age: 89
End: 2024-01-30

## 2024-01-30 ENCOUNTER — APPOINTMENT (OUTPATIENT)
Dept: UROLOGY | Facility: CLINIC | Age: 89
End: 2024-01-30
Payer: MEDICARE

## 2024-01-30 ENCOUNTER — EMERGENCY (EMERGENCY)
Facility: HOSPITAL | Age: 89
LOS: 1 days | Discharge: HOME | End: 2024-01-30
Attending: EMERGENCY MEDICINE
Payer: MEDICARE

## 2024-01-30 VITALS
DIASTOLIC BLOOD PRESSURE: 63 MMHG | SYSTOLIC BLOOD PRESSURE: 108 MMHG | RESPIRATION RATE: 14 BRPM | OXYGEN SATURATION: 100 % | TEMPERATURE: 97.3 F | HEART RATE: 71 BPM

## 2024-01-30 VITALS
SYSTOLIC BLOOD PRESSURE: 99 MMHG | HEART RATE: 100 BPM | DIASTOLIC BLOOD PRESSURE: 64 MMHG | HEIGHT: 67 IN | WEIGHT: 179.9 LBS | TEMPERATURE: 97 F | OXYGEN SATURATION: 99 % | RESPIRATION RATE: 24 BRPM

## 2024-01-30 DIAGNOSIS — Z98.89 OTHER SPECIFIED POSTPROCEDURAL STATES: Chronic | ICD-10-CM

## 2024-01-30 LAB
APTT BLD: 35.5 SEC — SIGNIFICANT CHANGE UP (ref 24.5–35.6)
BASOPHILS # BLD AUTO: 0.02 K/UL — SIGNIFICANT CHANGE UP (ref 0–0.2)
BASOPHILS NFR BLD AUTO: 0.2 % — SIGNIFICANT CHANGE UP (ref 0–2)
BLD GP AB SCN SERPL QL: NEGATIVE — SIGNIFICANT CHANGE UP
EOSINOPHIL # BLD AUTO: 0.02 K/UL — SIGNIFICANT CHANGE UP (ref 0–0.5)
EOSINOPHIL NFR BLD AUTO: 0.2 % — SIGNIFICANT CHANGE UP (ref 0–6)
HCT VFR BLD CALC: 28.7 % — LOW (ref 39–50)
HGB BLD-MCNC: 9.7 G/DL — LOW (ref 13–17)
IMM GRANULOCYTES NFR BLD AUTO: 0.3 % — SIGNIFICANT CHANGE UP (ref 0–0.9)
INR BLD: 1.23 RATIO — HIGH (ref 0.85–1.18)
LYMPHOCYTES # BLD AUTO: 2.69 K/UL — SIGNIFICANT CHANGE UP (ref 1–3.3)
LYMPHOCYTES # BLD AUTO: 27.2 % — SIGNIFICANT CHANGE UP (ref 13–44)
MCHC RBC-ENTMCNC: 31.5 PG — SIGNIFICANT CHANGE UP (ref 27–34)
MCHC RBC-ENTMCNC: 33.8 GM/DL — SIGNIFICANT CHANGE UP (ref 32–36)
MCV RBC AUTO: 93.2 FL — SIGNIFICANT CHANGE UP (ref 80–100)
MONOCYTES # BLD AUTO: 0.79 K/UL — SIGNIFICANT CHANGE UP (ref 0–0.9)
MONOCYTES NFR BLD AUTO: 8 % — SIGNIFICANT CHANGE UP (ref 2–14)
NEUTROPHILS # BLD AUTO: 6.34 K/UL — SIGNIFICANT CHANGE UP (ref 1.8–7.4)
NEUTROPHILS NFR BLD AUTO: 64.1 % — SIGNIFICANT CHANGE UP (ref 43–77)
NRBC # BLD: 0 /100 WBCS — SIGNIFICANT CHANGE UP (ref 0–0)
PLATELET # BLD AUTO: 263 K/UL — SIGNIFICANT CHANGE UP (ref 150–400)
PROTHROM AB SERPL-ACNC: 12.8 SEC — SIGNIFICANT CHANGE UP (ref 9.5–13)
RBC # BLD: 3.08 M/UL — LOW (ref 4.2–5.8)
RBC # FLD: 14 % — SIGNIFICANT CHANGE UP (ref 10.3–14.5)
RH IG SCN BLD-IMP: POSITIVE — SIGNIFICANT CHANGE UP
WBC # BLD: 9.89 K/UL — SIGNIFICANT CHANGE UP (ref 3.8–10.5)
WBC # FLD AUTO: 9.89 K/UL — SIGNIFICANT CHANGE UP (ref 3.8–10.5)

## 2024-01-30 PROCEDURE — 99285 EMERGENCY DEPT VISIT HI MDM: CPT

## 2024-01-30 PROCEDURE — 99214 OFFICE O/P EST MOD 30 MIN: CPT | Mod: 25

## 2024-01-30 PROCEDURE — A4216: CPT | Mod: NC

## 2024-01-30 PROCEDURE — 51700 IRRIGATION OF BLADDER: CPT

## 2024-01-30 RX ORDER — OXYBUTYNIN CHLORIDE 5 MG/1
5 TABLET ORAL TWICE DAILY
Qty: 180 | Refills: 0 | Status: DISCONTINUED | COMMUNITY
Start: 2023-12-01 | End: 2024-01-30

## 2024-01-30 NOTE — ED PROVIDER NOTE - ATTENDING CONTRIBUTION TO CARE
MD Hayward:  patient seen and evaluated personally.   I agree with the History & Physical,  Impression & Plan other than what was detailed in my note.  MD Hayward  89-year-old male past medical history of hypertension, hyperlipidemia, prostate CA, presenting to the emergency department with chief complaint of hematuria.  Patient had Garrett changed outpatient and had some bleeding at the site.  He had persistent bleeding so came to the emergency department.  Of note the patient's daughter states that he has been having generalized fatigue over the past few months.  It may have seen a little bit worse recently.  He is not having any fevers or chills, no productive sputum.  No vomiting.  He has been taking fluids and regular.  He has otherwise seemed himself.  He may be a little more pale than usual.  He is afebrile is mildly hypertensive initially however his vitals are improved at the bedside.  He is nontoxic, well-appearing, appears stated age, is pleasant.  Lungs are clear to auscultation bilaterally with no wheezing rales rhonchi, heart sounds are normal no murmurs rubs or gallops, his abdomen is soft nontender.  Patient was seen by urology, Garrett was flushed and urine coming from the bag is clear.  Plan to follow-up neurology recommendations check CBC to ensure not significant amount of blood loss.  The fatigue and shortness of breath that are described more likely acute on chronic after discussing with daughter however will get chest x-ray to screen for any pneumonia or any other pulmonary pathology.  Will check electrolytes reevaluate.

## 2024-01-30 NOTE — ED PROVIDER NOTE - PHYSICAL EXAMINATION
General: NAD. Pale appearing.   HEENT: NCAT.  Cardiac: RRR, 2+ radial pulses  Chest: CTA  Abdomen: soft, non-distended, no ttp, no rebound or guarding. chinchilla with dark red blood.   Extremities: no peripheral edema, calf tenderness, or leg size discrepancies  Skin: no rashes  Neuro: AAOx3, motor and sensory grossly intact.   Psych: mood and affect appropriate

## 2024-01-30 NOTE — ED PROVIDER NOTE - PATIENT PORTAL LINK FT
You can access the FollowMyHealth Patient Portal offered by [facility] by registering at the following website: [email or web address]. By joining [facility]’s FollowMyHealth portal, you will also be able to view your health information using other applications (apps) compatible with our system.

## 2024-01-30 NOTE — ED ADULT NURSE NOTE - OBJECTIVE STATEMENT
89y Male presents to the ED c/o Hematuria. PMH HTN, HLD, prostate cancer s/p postradiation prostatectomy, radiation cystitis presenting today with shortness of breath, generalized weakness associated with hematuria.   This is a 89 year old male with pmh of HTN, HLD, prostate cancer s/p postradiation prostatectomy, radiation cystitis presenting today with shortness of breath, generalized weakness associated with hematuria. Reports he was in urology office today in Dr. Jones's office with a covering doc, was switched to 24Fr. had CBI in the office, became pink tinged urine. Was sent home. Since then still had dark red blood from the chinchilla associated with symptoms therefore came to the Er. on evaluation, patient is pale appearing, bp to 90s/60s. Chinchilla with dark blood in the leg bag. No abdominal ttp. Will obtain cbc, cmp, pt/inr and type and screen. Could be symptomatic anemia. May need CBI. No anticoagulation. Will consult uro 89y Male presents to the ED c/o Hematuria. PMH HTN, HLD, prostate cancer s/p postradiation prostatectomy, radiation cystitis presenting today with shortness of breath, and generalized weakness associated with hematuria. Pt endorses being seen in urologist office today and his Garrett catheter was switched to a 24 [place]. Pt had CBI in the office today, Urine became pink tinged. Pt was sent home from the office, Pts urine became bloody and came to the ED for further evaluation. Pt has bloody urine in Leg bag. 20 gauge IV placed in LAC. Pt is A&Ox4. Respirations spontaneous, unlabored, and equal bilaterally. Patient safety maintained, bed is in lowest position, wheels locked, and side rails raised. Patient oriented to call bell, and call bell is within reach.

## 2024-01-30 NOTE — ED PROVIDER NOTE - NSFOLLOWUPINSTRUCTIONS_ED_ALL_ED_FT
YOU WERE SEEN FOR hematuria    YOU HAD labs done.   you were evaluated by urologist in the emergency room.   your urine cleared with irrigation.     FOLLOW UP WITH YOUR urologist    RETURN TO THE EMERGENCY DEPARTMENT FOR worsening pain, vomiting, fever, or any new concerning symptoms YOU WERE SEEN FOR hematuria    YOU HAD labs done.   you were evaluated by urologist in the emergency room.   your urine cleared with irrigation.   Please take the antibiotics as prescribed. Cefpodoxime 200mg every 12 hours for 10 days.   Please take the oxybutynin as prescribed for bladder spasm.     FOLLOW UP WITH YOUR urologist    RETURN TO THE EMERGENCY DEPARTMENT FOR worsening pain, vomiting, fever, or any new concerning symptoms

## 2024-01-30 NOTE — ED PROVIDER NOTE - CLINICAL SUMMARY MEDICAL DECISION MAKING FREE TEXT BOX
Lebron Abreu, PGY2 -   This is a 89 year old male with pmh of HTN, HLD, prostate cancer s/p postradiation prostatectomy, radiation cystitis presenting today with shortness of breath, generalized weakness associated with hematuria. Reports he was in urology office today in Dr. Jones's office with a covering doc, was switched to 24Fr. had CBI in the office, became pink tinged urine. Was sent home. Since then still had dark red blood from the chinchilla associated with symptoms therefore came to the Er. on evaluation, patient is pale appearing, bp to 90s/60s. Chinchilla with dark blood in the leg bag. No abdominal ttp. Will obtain cbc, cmp, pt/inr and type and screen. Could be symptomatic anemia. May need CBI. No anticoagulation. Will consult uro.

## 2024-01-30 NOTE — ED ADULT NURSE NOTE - CHIEF COMPLAINT QUOTE
chinchilla changed by Urologist today. Was bloody in office. Hematuria resolved after irrigation per daughter

## 2024-01-30 NOTE — ED ADULT TRIAGE NOTE - CHIEF COMPLAINT QUOTE
chinchilla changed by Urologist today. Was bloody in office. Hematuria resolved after irrigation per daughter Abdominal pain x several months.  Pt started pantoprazole 1 month ago.  Dr. Starr is his GI doctor.

## 2024-01-31 VITALS
TEMPERATURE: 98 F | RESPIRATION RATE: 18 BRPM | HEART RATE: 83 BPM | DIASTOLIC BLOOD PRESSURE: 62 MMHG | OXYGEN SATURATION: 98 % | SYSTOLIC BLOOD PRESSURE: 114 MMHG

## 2024-01-31 LAB
ALBUMIN SERPL ELPH-MCNC: 3.3 G/DL — SIGNIFICANT CHANGE UP (ref 3.3–5)
ALP SERPL-CCNC: 63 U/L — SIGNIFICANT CHANGE UP (ref 40–120)
ALT FLD-CCNC: 16 U/L — SIGNIFICANT CHANGE UP (ref 10–45)
ANION GAP SERPL CALC-SCNC: 15 MMOL/L — SIGNIFICANT CHANGE UP (ref 5–17)
APPEARANCE UR: ABNORMAL
AST SERPL-CCNC: 39 U/L — SIGNIFICANT CHANGE UP (ref 10–40)
BACTERIA # UR AUTO: ABNORMAL /HPF
BILIRUB SERPL-MCNC: 0.3 MG/DL — SIGNIFICANT CHANGE UP (ref 0.2–1.2)
BILIRUB UR-MCNC: ABNORMAL
BUN SERPL-MCNC: 24 MG/DL — HIGH (ref 7–23)
CALCIUM SERPL-MCNC: 8.7 MG/DL — SIGNIFICANT CHANGE UP (ref 8.4–10.5)
CAST: 11 /LPF — HIGH (ref 0–4)
CHLORIDE SERPL-SCNC: 91 MMOL/L — LOW (ref 96–108)
CO2 SERPL-SCNC: 18 MMOL/L — LOW (ref 22–31)
COLOR SPEC: ABNORMAL
CREAT SERPL-MCNC: 1.26 MG/DL — SIGNIFICANT CHANGE UP (ref 0.5–1.3)
DIFF PNL FLD: ABNORMAL
EGFR: 54 ML/MIN/1.73M2 — LOW
GLUCOSE SERPL-MCNC: 118 MG/DL — HIGH (ref 70–99)
GLUCOSE UR QL: NEGATIVE MG/DL — SIGNIFICANT CHANGE UP
HYALINE CASTS # UR AUTO: PRESENT
KETONES UR-MCNC: NEGATIVE MG/DL — SIGNIFICANT CHANGE UP
LEUKOCYTE ESTERASE UR-ACNC: ABNORMAL
NITRITE UR-MCNC: NEGATIVE — SIGNIFICANT CHANGE UP
PH UR: 5 — SIGNIFICANT CHANGE UP (ref 5–8)
POTASSIUM SERPL-MCNC: 4.6 MMOL/L — SIGNIFICANT CHANGE UP (ref 3.5–5.3)
POTASSIUM SERPL-SCNC: 4.6 MMOL/L — SIGNIFICANT CHANGE UP (ref 3.5–5.3)
PROT SERPL-MCNC: 6.7 G/DL — SIGNIFICANT CHANGE UP (ref 6–8.3)
PROT UR-MCNC: 300 MG/DL
RBC CASTS # UR COMP ASSIST: 639 /HPF — HIGH (ref 0–4)
REVIEW: SIGNIFICANT CHANGE UP
SODIUM SERPL-SCNC: 124 MMOL/L — LOW (ref 135–145)
SP GR SPEC: 1.01 — SIGNIFICANT CHANGE UP (ref 1–1.03)
SQUAMOUS # UR AUTO: 1 /HPF — SIGNIFICANT CHANGE UP (ref 0–5)
TROPONIN T, HIGH SENSITIVITY RESULT: 28 NG/L — SIGNIFICANT CHANGE UP (ref 0–51)
TROPONIN T, HIGH SENSITIVITY RESULT: 30 NG/L — SIGNIFICANT CHANGE UP (ref 0–51)
UROBILINOGEN FLD QL: 0.2 MG/DL — SIGNIFICANT CHANGE UP (ref 0.2–1)
WBC UR QL: 182 /HPF — HIGH (ref 0–5)
YEAST-LIKE CELLS: PRESENT

## 2024-01-31 PROCEDURE — 87077 CULTURE AEROBIC IDENTIFY: CPT

## 2024-01-31 PROCEDURE — 87086 URINE CULTURE/COLONY COUNT: CPT

## 2024-01-31 PROCEDURE — 84484 ASSAY OF TROPONIN QUANT: CPT

## 2024-01-31 PROCEDURE — 86901 BLOOD TYPING SEROLOGIC RH(D): CPT

## 2024-01-31 PROCEDURE — 81001 URINALYSIS AUTO W/SCOPE: CPT

## 2024-01-31 PROCEDURE — 99285 EMERGENCY DEPT VISIT HI MDM: CPT | Mod: 25

## 2024-01-31 PROCEDURE — 85730 THROMBOPLASTIN TIME PARTIAL: CPT

## 2024-01-31 PROCEDURE — 85025 COMPLETE CBC W/AUTO DIFF WBC: CPT

## 2024-01-31 PROCEDURE — 71045 X-RAY EXAM CHEST 1 VIEW: CPT

## 2024-01-31 PROCEDURE — 87186 SC STD MICRODIL/AGAR DIL: CPT

## 2024-01-31 PROCEDURE — 36415 COLL VENOUS BLD VENIPUNCTURE: CPT

## 2024-01-31 PROCEDURE — 85610 PROTHROMBIN TIME: CPT

## 2024-01-31 PROCEDURE — 86850 RBC ANTIBODY SCREEN: CPT

## 2024-01-31 PROCEDURE — 71045 X-RAY EXAM CHEST 1 VIEW: CPT | Mod: 26

## 2024-01-31 PROCEDURE — 80053 COMPREHEN METABOLIC PANEL: CPT

## 2024-01-31 PROCEDURE — 86900 BLOOD TYPING SEROLOGIC ABO: CPT

## 2024-01-31 RX ORDER — CEFPODOXIME PROXETIL 100 MG
200 TABLET ORAL ONCE
Refills: 0 | Status: COMPLETED | OUTPATIENT
Start: 2024-01-31 | End: 2024-01-31

## 2024-01-31 RX ORDER — OXYBUTYNIN CHLORIDE 5 MG
1 TABLET ORAL
Qty: 7 | Refills: 0
Start: 2024-01-31 | End: 2024-02-06

## 2024-01-31 RX ORDER — CEFPODOXIME PROXETIL 100 MG
1 TABLET ORAL
Qty: 20 | Refills: 0
Start: 2024-01-31 | End: 2024-02-09

## 2024-01-31 RX ADMIN — Medication 200 MILLIGRAM(S): at 03:11

## 2024-01-31 NOTE — CONSULT NOTE ADULT - SUBJECTIVE AND OBJECTIVE BOX
HPI:  Patient is a 89y Male past medical history hyperlipidemia, hypertension, prostate Cancer status post radiation and prostatectomy, prior urethral strictures, radiation cystitis presents to ED complaining of gross hematuria x 1-2 days. He reports associated increased fatigue and SOB. He denies fevers, chills, nausea, vomiting, abdominal pain, dysuria, flank pain. He was seen  in an outpatient urology office--upsized to a 24f 3 way chinchilla. Per daughter, was irrigated out, evacuated a moderate amount of clot, and stayed in the office on CBI for a little while. His urine colored cleared up, so he was told to hydrate and was sent home. He was given ED precautions if his urine color got worse, so he came to [facility].  In the ED, AVSS. Urine in leg bag initially opaque red, however with irrigation, it quickly and easily cleared to light peach. ~10cc of clot evacuated. Bedside ultrasound confirmed no clot burden visualized in bladder.  Labs showed h/h 9.7/28.7, Cr 1.26.    PAST MEDICAL & SURGICAL HISTORY:  HTN (hypertension)      HLD (hyperlipidemia)      Prostate cancer      S/P prostatectomy        FAMILY HISTORY:    SOCIAL HISTORY:   Tobacco hx:  MEDICATIONS  (STANDING):    MEDICATIONS  (PRN):    Allergies    No Known Allergies    Intolerances        REVIEW OF SYSTEMS: Pertinent positives and negatives as stated in HPI, otherwise negative    Vital signs  T(C): 36.7 (24 @ 01:24), Max: 36.7 (24 @ 01:24)  HR: 94 (24 @ 01:24)  BP: 110/64 (24 @ 01:24)  SpO2: 97% (24 @ 01:24)  Wt(kg): --    Output      Physical Exam  Gen: NAD  Pulm: No respiratory distress, no subcostal retractions  Abd: Soft, NT, ND  : Urine in leg bag initially opaque red, however with irrigation, it quickly and easily cleared to light peach. ~10cc of clot evacuated.      LABS:         @ 23:21    WBC 9.89  / Hct 28.7  / SCr 1.26         124<L>  |  91<L>  |  24<H>  ----------------------------<  118<H>  4.6   |  18<L>  |  1.26    Ca    8.7      2024 23:21    TPro  6.7  /  Alb  3.3  /  TBili  0.3  /  DBili  x   /  AST  39  /  ALT  16  /  AlkPhos  63  01-30    PT/INR - ( 2024 23:21 )   PT: 12.8 sec;   INR: 1.23 ratio         PTT - ( 2024 23:21 )  PTT:35.5 sec  Urinalysis Basic - ( 2024 00:59 )    Color: Red / Appearance: Cloudy / S.011 / pH: x  Gluc: x / Ketone: Negative mg/dL  / Bili: Small / Urobili: 0.2 mg/dL   Blood: x / Protein: 300 mg/dL / Nitrite: Negative   Leuk Esterase: Large / RBC: 639 /HPF /  /HPF   Sq Epi: x / Non Sq Epi: 1 /HPF / Bacteria: Occasional /HPF        Urine Cx: pending

## 2024-01-31 NOTE — CONSULT NOTE ADULT - ASSESSMENT
89y Male past medical history hyperlipidemia, hypertension, prostate Cancer status post radiation and prostatectomy, prior urethral strictures, radiation cystitis presents to ED complaining of gross hematuria x 1-2 days. In the ED, AVSS. Urine in leg bag initially opaque red, however with irrigation, it quickly and easily cleared to light peach. ~10cc of clot evacuated. Bedside ultrasound confirmed no clot burden visualized in bladder. Labs showed h/h 9.7/28.7, Cr 1.26.    Recs:  - given now light peach color and minimal clots evacuated, no indication for CBI at this time  - continue with chinchilla catheter  - continue to monitor urine color  - recommend a dose of prophylactic abx given extensive irrigation today outpatient and inpatient  - encourage hydration  - ditropan for bladder spasms q8h prn  - hematuria is currently improved; f/u as an outpatient    Please call urology if color worsens, passing clots in urine or any further questions.      [facility] Pager #[phone]    [facility]  [address] [place] [address], [address] [address] [phone]

## 2024-02-02 LAB
-  AMOXICILLIN/CLAVULANIC ACID: SIGNIFICANT CHANGE UP
-  AMPICILLIN/SULBACTAM: SIGNIFICANT CHANGE UP
-  AMPICILLIN: SIGNIFICANT CHANGE UP
-  AZTREONAM: SIGNIFICANT CHANGE UP
-  CEFAZOLIN: SIGNIFICANT CHANGE UP
-  CEFEPIME: SIGNIFICANT CHANGE UP
-  CEFOXITIN: SIGNIFICANT CHANGE UP
-  CEFTRIAXONE: SIGNIFICANT CHANGE UP
-  CEFUROXIME: SIGNIFICANT CHANGE UP
-  CIPROFLOXACIN: SIGNIFICANT CHANGE UP
-  ERTAPENEM: SIGNIFICANT CHANGE UP
-  GENTAMICIN: SIGNIFICANT CHANGE UP
-  IMIPENEM: SIGNIFICANT CHANGE UP
-  LEVOFLOXACIN: SIGNIFICANT CHANGE UP
-  MEROPENEM: SIGNIFICANT CHANGE UP
-  NITROFURANTOIN: SIGNIFICANT CHANGE UP
-  PIPERACILLIN/TAZOBACTAM: SIGNIFICANT CHANGE UP
-  TOBRAMYCIN: SIGNIFICANT CHANGE UP
-  TRIMETHOPRIM/SULFAMETHOXAZOLE: SIGNIFICANT CHANGE UP
CULTURE RESULTS: ABNORMAL
METHOD TYPE: SIGNIFICANT CHANGE UP
ORGANISM # SPEC MICROSCOPIC CNT: ABNORMAL
ORGANISM # SPEC MICROSCOPIC CNT: ABNORMAL
SPECIMEN SOURCE: SIGNIFICANT CHANGE UP

## 2024-02-12 ENCOUNTER — NON-APPOINTMENT (OUTPATIENT)
Age: 89
End: 2024-02-12

## 2024-02-12 ENCOUNTER — APPOINTMENT (OUTPATIENT)
Dept: UROLOGY | Facility: CLINIC | Age: 89
End: 2024-02-12
Payer: MEDICARE

## 2024-02-12 ENCOUNTER — OUTPATIENT (OUTPATIENT)
Dept: OUTPATIENT SERVICES | Facility: HOSPITAL | Age: 89
LOS: 1 days | End: 2024-02-12
Payer: MEDICARE

## 2024-02-12 DIAGNOSIS — Z98.89 OTHER SPECIFIED POSTPROCEDURAL STATES: Chronic | ICD-10-CM

## 2024-02-12 PROCEDURE — 51700 IRRIGATION OF BLADDER: CPT

## 2024-02-12 PROCEDURE — 99214 OFFICE O/P EST MOD 30 MIN: CPT | Mod: 25

## 2024-02-12 NOTE — LETTER BODY
[FreeTextEntry1] : Kristal Rush  [facility] [address] [place] [address] Phone: [phone]  Reason for visit: Gross hematuria.  This is a 89 year old man with prostate cancer status post radiation therapy. He has history of recurrent gross hematuria secondary to radiation cystitis. Patient returns for followup. Patient was last seen 2 weeks ago. He required bladder irrigation with saline. Patient had hematuria last evening. Ultrasound with persistent gross hematuria. Patient denies any interval complaints or difficulties. He denies any dysuria or gross hematuria. Patient denies any changes in health. The past medical history and family history and social history are unchanged. All other review of systems are negative. Patient denies any changes in medications. Medication list was reconciled.  On examination, the patient is in no acute distress. He is alert and oriented follows commands. He has normal mood and affect. He is normocephalic. Oral no thrush. Neck is supple. Respirations are unlabored. His abdomen is soft and nontender. Liver is nonpalpable. Bladder is nonpalpable. No CVA tenderness. Neurologically he is grossly intact. No peripheral edema. Skin without gross abnormality. He has normal male external genitalia.  On examination his urine is grossly clear.  His Garrett catheter is draining clear urine.  The bladder was irrigated with 100 cc of saline. There were no clots.  Assessment: Gross hematuria.. Radiation cystitis.  I counseled the patient. On examination, he has a three-way catheter draining clear urine. There is no evidence of gross hematuria. The bladder was irrigated with 100  cc of saline. There were no clots. I reassured the patient. I encouraged him to follow-up with hyperbaric oxygen therapy. Patient also followed Dr. Chase as directed. Risks and alternatives were discussed. I answered the patient questions. The patient will follow-up as directed and will contact me with any questions or concerns. Thank you for the opportunity to participate in the care of this patient. I'll keep you updated on his progress.  Plan: Consider hyperbaric oxygen therapy. Follow-up as directed.  I spent 30-minutes time today on all issues related to this patient on today date of service including non face to face time.

## 2024-02-12 NOTE — HISTORY OF PRESENT ILLNESS
[FreeTextEntry1] : Follow-up radiation cystitis after prostate cancer salvage radiation therapy.  Patient was last seen 2 weeks ago.  He required bladder irrigation with saline.  Patient was hematuria last evening.  Ultrasound with persistent gross hematuria.  Examination he has a three-way catheter draining clear urine.  There is no evidence of gross hematuria.  The bladder was irrigated with 50 cc of saline.  There were no clots.  I reassured the patient.  Encouraged him to follow-up with hyperbaric oxygen therapy.  Patient also followed Dr. Chase as directed.  I spent 30-minutes time today on all issues related to this patient on today date of service including non face to face time.   Please refer to URO Consult note

## 2024-02-13 DIAGNOSIS — R97.20 ELEVATED PROSTATE SPECIFIC ANTIGEN [PSA]: ICD-10-CM

## 2024-02-13 DIAGNOSIS — Z97.8 PRESENCE OF OTHER SPECIFIED DEVICES: ICD-10-CM

## 2024-02-19 ENCOUNTER — INPATIENT (INPATIENT)
Facility: HOSPITAL | Age: 89
LOS: 7 days | Discharge: HOME HEALTH | DRG: 698 | End: 2024-02-27
Attending: STUDENT IN AN ORGANIZED HEALTH CARE EDUCATION/TRAINING PROGRAM | Admitting: STUDENT IN AN ORGANIZED HEALTH CARE EDUCATION/TRAINING PROGRAM
Payer: MEDICARE

## 2024-02-19 VITALS
RESPIRATION RATE: 18 BRPM | HEIGHT: 67 IN | SYSTOLIC BLOOD PRESSURE: 128 MMHG | DIASTOLIC BLOOD PRESSURE: 50 MMHG | HEART RATE: 110 BPM | OXYGEN SATURATION: 98 % | TEMPERATURE: 102 F | WEIGHT: 175.05 LBS

## 2024-02-19 DIAGNOSIS — A41.89 OTHER SPECIFIED SEPSIS: ICD-10-CM

## 2024-02-19 DIAGNOSIS — Z98.89 OTHER SPECIFIED POSTPROCEDURAL STATES: Chronic | ICD-10-CM

## 2024-02-19 LAB
ALBUMIN SERPL ELPH-MCNC: 3.5 G/DL — SIGNIFICANT CHANGE UP (ref 3.3–5)
ALP SERPL-CCNC: 51 U/L — SIGNIFICANT CHANGE UP (ref 40–120)
ALT FLD-CCNC: 11 U/L — SIGNIFICANT CHANGE UP (ref 10–45)
ANION GAP SERPL CALC-SCNC: 5 MMOL/L — SIGNIFICANT CHANGE UP (ref 5–17)
APPEARANCE UR: ABNORMAL
APTT BLD: 34.3 SEC — SIGNIFICANT CHANGE UP (ref 24.5–35.6)
AST SERPL-CCNC: 41 U/L — HIGH (ref 10–40)
BACTERIA # UR AUTO: ABNORMAL /HPF
BASE EXCESS BLDV CALC-SCNC: 0.2 MMOL/L — SIGNIFICANT CHANGE UP (ref -2–3)
BASOPHILS # BLD AUTO: 0.02 K/UL — SIGNIFICANT CHANGE UP (ref 0–0.2)
BASOPHILS NFR BLD AUTO: 0.2 % — SIGNIFICANT CHANGE UP (ref 0–2)
BILIRUB SERPL-MCNC: 0.5 MG/DL — SIGNIFICANT CHANGE UP (ref 0.2–1.2)
BILIRUB UR-MCNC: NEGATIVE — SIGNIFICANT CHANGE UP
BUN SERPL-MCNC: 21 MG/DL — SIGNIFICANT CHANGE UP (ref 7–23)
CA-I SERPL-SCNC: 1.16 MMOL/L — SIGNIFICANT CHANGE UP (ref 1.15–1.33)
CALCIUM SERPL-MCNC: 8.9 MG/DL — SIGNIFICANT CHANGE UP (ref 8.4–10.5)
CAST: 3 /LPF — SIGNIFICANT CHANGE UP (ref 0–4)
CHLORIDE BLDV-SCNC: 98 MMOL/L — SIGNIFICANT CHANGE UP (ref 96–108)
CHLORIDE SERPL-SCNC: 99 MMOL/L — SIGNIFICANT CHANGE UP (ref 96–108)
CO2 BLDV-SCNC: 26 MMOL/L — SIGNIFICANT CHANGE UP (ref 22–26)
CO2 SERPL-SCNC: 22 MMOL/L — SIGNIFICANT CHANGE UP (ref 22–31)
COLOR SPEC: YELLOW — SIGNIFICANT CHANGE UP
CREAT SERPL-MCNC: 1.21 MG/DL — SIGNIFICANT CHANGE UP (ref 0.5–1.3)
DIFF PNL FLD: ABNORMAL
EGFR: 57 ML/MIN/1.73M2 — LOW
EOSINOPHIL # BLD AUTO: 0 K/UL — SIGNIFICANT CHANGE UP (ref 0–0.5)
EOSINOPHIL NFR BLD AUTO: 0 % — SIGNIFICANT CHANGE UP (ref 0–6)
FLUAV AG NPH QL: SIGNIFICANT CHANGE UP
FLUBV AG NPH QL: SIGNIFICANT CHANGE UP
GAS PNL BLDV: 129 MMOL/L — LOW (ref 136–145)
GAS PNL BLDV: SIGNIFICANT CHANGE UP
GLUCOSE BLDV-MCNC: 126 MG/DL — HIGH (ref 70–99)
GLUCOSE SERPL-MCNC: 133 MG/DL — HIGH (ref 70–99)
GLUCOSE UR QL: NEGATIVE MG/DL — SIGNIFICANT CHANGE UP
HCO3 BLDV-SCNC: 25 MMOL/L — SIGNIFICANT CHANGE UP (ref 22–29)
HCT VFR BLD CALC: 29.4 % — LOW (ref 39–50)
HCT VFR BLDA CALC: 30 % — LOW (ref 39–51)
HGB BLD CALC-MCNC: 10 G/DL — LOW (ref 12.6–17.4)
HGB BLD-MCNC: 9.8 G/DL — LOW (ref 13–17)
IMM GRANULOCYTES NFR BLD AUTO: 0.6 % — SIGNIFICANT CHANGE UP (ref 0–0.9)
INR BLD: 1.18 RATIO — SIGNIFICANT CHANGE UP (ref 0.85–1.18)
KETONES UR-MCNC: NEGATIVE MG/DL — SIGNIFICANT CHANGE UP
LACTATE BLDV-MCNC: 2 MMOL/L — SIGNIFICANT CHANGE UP (ref 0.5–2)
LEUKOCYTE ESTERASE UR-ACNC: ABNORMAL
LYMPHOCYTES # BLD AUTO: 1.44 K/UL — SIGNIFICANT CHANGE UP (ref 1–3.3)
LYMPHOCYTES # BLD AUTO: 13.6 % — SIGNIFICANT CHANGE UP (ref 13–44)
MCHC RBC-ENTMCNC: 32.3 PG — SIGNIFICANT CHANGE UP (ref 27–34)
MCHC RBC-ENTMCNC: 33.3 GM/DL — SIGNIFICANT CHANGE UP (ref 32–36)
MCV RBC AUTO: 97 FL — SIGNIFICANT CHANGE UP (ref 80–100)
MONOCYTES # BLD AUTO: 0.88 K/UL — SIGNIFICANT CHANGE UP (ref 0–0.9)
MONOCYTES NFR BLD AUTO: 8.3 % — SIGNIFICANT CHANGE UP (ref 2–14)
NEUTROPHILS # BLD AUTO: 8.21 K/UL — HIGH (ref 1.8–7.4)
NEUTROPHILS NFR BLD AUTO: 77.3 % — HIGH (ref 43–77)
NITRITE UR-MCNC: POSITIVE
NRBC # BLD: 0 /100 WBCS — SIGNIFICANT CHANGE UP (ref 0–0)
PCO2 BLDV: 38 MMHG — LOW (ref 42–55)
PH BLDV: 7.42 — SIGNIFICANT CHANGE UP (ref 7.32–7.43)
PH UR: 5 — SIGNIFICANT CHANGE UP (ref 5–8)
PLATELET # BLD AUTO: 219 K/UL — SIGNIFICANT CHANGE UP (ref 150–400)
PO2 BLDV: 32 MMHG — SIGNIFICANT CHANGE UP (ref 25–45)
POTASSIUM BLDV-SCNC: 3.7 MMOL/L — SIGNIFICANT CHANGE UP (ref 3.5–5.1)
POTASSIUM SERPL-MCNC: 3.7 MMOL/L — SIGNIFICANT CHANGE UP (ref 3.5–5.3)
POTASSIUM SERPL-SCNC: 3.7 MMOL/L — SIGNIFICANT CHANGE UP (ref 3.5–5.3)
PROT SERPL-MCNC: 6.7 G/DL — SIGNIFICANT CHANGE UP (ref 6–8.3)
PROT UR-MCNC: 30 MG/DL
PROTHROM AB SERPL-ACNC: 12.9 SEC — SIGNIFICANT CHANGE UP (ref 9.5–13)
RBC # BLD: 3.03 M/UL — LOW (ref 4.2–5.8)
RBC # FLD: 14.4 % — SIGNIFICANT CHANGE UP (ref 10.3–14.5)
RBC CASTS # UR COMP ASSIST: 76 /HPF — HIGH (ref 0–4)
RSV RNA NPH QL NAA+NON-PROBE: SIGNIFICANT CHANGE UP
SAO2 % BLDV: 53.5 % — LOW (ref 67–88)
SARS-COV-2 RNA SPEC QL NAA+PROBE: SIGNIFICANT CHANGE UP
SODIUM SERPL-SCNC: 126 MMOL/L — LOW (ref 135–145)
SP GR SPEC: 1.02 — SIGNIFICANT CHANGE UP (ref 1–1.03)
SQUAMOUS # UR AUTO: 2 /HPF — SIGNIFICANT CHANGE UP (ref 0–5)
UROBILINOGEN FLD QL: 0.2 MG/DL — SIGNIFICANT CHANGE UP (ref 0.2–1)
WBC # BLD: 10.61 K/UL — HIGH (ref 3.8–10.5)
WBC # FLD AUTO: 10.61 K/UL — HIGH (ref 3.8–10.5)
WBC UR QL: 584 /HPF — HIGH (ref 0–5)

## 2024-02-19 PROCEDURE — 71045 X-RAY EXAM CHEST 1 VIEW: CPT | Mod: 26

## 2024-02-19 PROCEDURE — 99223 1ST HOSP IP/OBS HIGH 75: CPT | Mod: GC

## 2024-02-19 PROCEDURE — 99285 EMERGENCY DEPT VISIT HI MDM: CPT

## 2024-02-19 RX ORDER — CEFEPIME 1 G/1
2000 INJECTION, POWDER, FOR SOLUTION INTRAMUSCULAR; INTRAVENOUS ONCE
Refills: 0 | Status: COMPLETED | OUTPATIENT
Start: 2024-02-19 | End: 2024-02-19

## 2024-02-19 RX ORDER — NOREPINEPHRINE BITARTRATE/D5W 8 MG/250ML
0.05 PLASTIC BAG, INJECTION (ML) INTRAVENOUS
Qty: 8 | Refills: 0 | Status: DISCONTINUED | OUTPATIENT
Start: 2024-02-19 | End: 2024-02-19

## 2024-02-19 RX ORDER — SODIUM CHLORIDE 9 MG/ML
1000 INJECTION, SOLUTION INTRAVENOUS ONCE
Refills: 0 | Status: COMPLETED | OUTPATIENT
Start: 2024-02-19 | End: 2024-02-19

## 2024-02-19 RX ORDER — MIDODRINE HYDROCHLORIDE 2.5 MG/1
10 TABLET ORAL THREE TIMES A DAY
Refills: 0 | Status: DISCONTINUED | OUTPATIENT
Start: 2024-02-19 | End: 2024-02-25

## 2024-02-19 RX ORDER — SODIUM CHLORIDE 9 MG/ML
1000 INJECTION INTRAMUSCULAR; INTRAVENOUS; SUBCUTANEOUS ONCE
Refills: 0 | Status: COMPLETED | OUTPATIENT
Start: 2024-02-19 | End: 2024-02-19

## 2024-02-19 RX ORDER — PIPERACILLIN AND TAZOBACTAM 4; .5 G/20ML; G/20ML
3.38 INJECTION, POWDER, LYOPHILIZED, FOR SOLUTION INTRAVENOUS ONCE
Refills: 0 | Status: COMPLETED | OUTPATIENT
Start: 2024-02-19 | End: 2024-02-19

## 2024-02-19 RX ORDER — ACETAMINOPHEN 500 MG
1000 TABLET ORAL ONCE
Refills: 0 | Status: COMPLETED | OUTPATIENT
Start: 2024-02-19 | End: 2024-02-19

## 2024-02-19 RX ORDER — AZITHROMYCIN 500 MG/1
500 TABLET, FILM COATED ORAL ONCE
Refills: 0 | Status: COMPLETED | OUTPATIENT
Start: 2024-02-19 | End: 2024-02-19

## 2024-02-19 RX ORDER — VANCOMYCIN HCL 1 G
1000 VIAL (EA) INTRAVENOUS ONCE
Refills: 0 | Status: DISCONTINUED | OUTPATIENT
Start: 2024-02-19 | End: 2024-02-19

## 2024-02-19 RX ORDER — SODIUM CHLORIDE 9 MG/ML
1500 INJECTION INTRAMUSCULAR; INTRAVENOUS; SUBCUTANEOUS ONCE
Refills: 0 | Status: COMPLETED | OUTPATIENT
Start: 2024-02-19 | End: 2024-02-19

## 2024-02-19 RX ORDER — ONDANSETRON 8 MG/1
4 TABLET, FILM COATED ORAL ONCE
Refills: 0 | Status: COMPLETED | OUTPATIENT
Start: 2024-02-19 | End: 2024-02-19

## 2024-02-19 RX ADMIN — SODIUM CHLORIDE 1500 MILLILITER(S): 9 INJECTION INTRAMUSCULAR; INTRAVENOUS; SUBCUTANEOUS at 19:48

## 2024-02-19 RX ADMIN — SODIUM CHLORIDE 1000 MILLILITER(S): 9 INJECTION, SOLUTION INTRAVENOUS at 19:29

## 2024-02-19 RX ADMIN — SODIUM CHLORIDE 1000 MILLILITER(S): 9 INJECTION INTRAMUSCULAR; INTRAVENOUS; SUBCUTANEOUS at 14:54

## 2024-02-19 RX ADMIN — ONDANSETRON 4 MILLIGRAM(S): 8 TABLET, FILM COATED ORAL at 14:54

## 2024-02-19 RX ADMIN — AZITHROMYCIN 255 MILLIGRAM(S): 500 TABLET, FILM COATED ORAL at 16:17

## 2024-02-19 RX ADMIN — Medication 1000 MILLIGRAM(S): at 19:47

## 2024-02-19 RX ADMIN — CEFEPIME 100 MILLIGRAM(S): 1 INJECTION, POWDER, FOR SOLUTION INTRAMUSCULAR; INTRAVENOUS at 19:29

## 2024-02-19 RX ADMIN — SODIUM CHLORIDE 1500 MILLILITER(S): 9 INJECTION INTRAMUSCULAR; INTRAVENOUS; SUBCUTANEOUS at 16:17

## 2024-02-19 RX ADMIN — SODIUM CHLORIDE 1000 MILLILITER(S): 9 INJECTION INTRAMUSCULAR; INTRAVENOUS; SUBCUTANEOUS at 19:48

## 2024-02-19 RX ADMIN — PIPERACILLIN AND TAZOBACTAM 200 GRAM(S): 4; .5 INJECTION, POWDER, LYOPHILIZED, FOR SOLUTION INTRAVENOUS at 14:54

## 2024-02-19 RX ADMIN — SODIUM CHLORIDE 1000 MILLILITER(S): 9 INJECTION, SOLUTION INTRAVENOUS at 23:20

## 2024-02-19 RX ADMIN — AZITHROMYCIN 500 MILLIGRAM(S): 500 TABLET, FILM COATED ORAL at 19:48

## 2024-02-19 RX ADMIN — MIDODRINE HYDROCHLORIDE 10 MILLIGRAM(S): 2.5 TABLET ORAL at 18:10

## 2024-02-19 RX ADMIN — Medication 400 MILLIGRAM(S): at 14:54

## 2024-02-19 RX ADMIN — CEFEPIME 2000 MILLIGRAM(S): 1 INJECTION, POWDER, FOR SOLUTION INTRAMUSCULAR; INTRAVENOUS at 20:40

## 2024-02-19 RX ADMIN — Medication 400 MILLIGRAM(S): at 22:13

## 2024-02-19 RX ADMIN — PIPERACILLIN AND TAZOBACTAM 3.38 GRAM(S): 4; .5 INJECTION, POWDER, LYOPHILIZED, FOR SOLUTION INTRAVENOUS at 19:48

## 2024-02-19 NOTE — ED PROVIDER NOTE - PROGRESS NOTE DETAILS
Carmen Juarez PGY2: Informed by RN that pt increasingly hypotensive s/p 2.5 L IVF (30cc/kg). Lowest BP 80s/40s. Pt reassessed and sleepy appearing but responding to verbal stimuli. Repeat BP 90s/50s. Will give dose of midodrine and reassess. Low threshold to start pressors as pt has gotten adequate fluid resuscitation. Will consult MICU. Nelly Holcomb MD, PGY2:  signed out to me pending MICU assessment, MICU evaluated pt, states not a MICU candidate at this time, will broaden abx coverage, start pt on midrodrine, and admit to medicine for urosepsis

## 2024-02-19 NOTE — CONSULT NOTE ADULT - ATTENDING COMMENTS
89 yo man with indwelling Garrett presents with severe sepsis and borderline septic shock.   s/p additional volume with improved SBP not in need of ICU at this time.  abx changed to cefepime to cover past zosyn resistant bugs, 1L LR,  Reconsulted later for further decompensation (seee accept note)

## 2024-02-19 NOTE — ED PROVIDER NOTE - CLINICAL SUMMARY MEDICAL DECISION MAKING FREE TEXT BOX
89-year-old male past medical history HTN, HLD, prostate CA s/p prostatectomy, chronic indwelling Garrett presents emergency department for 2 days of fevers, chills, generalized weakness. Pt nontoxic appearing. No focal neuro deficits. + crackles in b/l lung bases. Differentials include but not limited to PNA, viral illness, UTI. Plan for labs, blood cultures, CXR, UA/UC. Will give IVF, IV abx. Dispo likely admission due to gen weakness.

## 2024-02-19 NOTE — CONSULT NOTE ADULT - SUBJECTIVE AND OBJECTIVE BOX
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 Daughter also reports patient has been more lethargic that usual. Patient has chronic chinchilla that was last exchanged 2-3 weeks ago. Daughter states he was recently treated for a UTI and completed a course of cefpoxodime about 2 weeks ago.    In the ED, patient febrile to 103.1, hypotensive to 80s/30s, and tachycardic. Patient was given 2.5 L IVF, vancomycin, Zosyn, and azithromycin in ED. Labs remarkable for leukocytosis, stable anemia, hyponatremia (Na 126). UA grossly positive.      PAST MEDICAL & SURGICAL HISTORY:  HTN (hypertension)      HLD (hyperlipidemia)      Prostate cancer      S/P prostatectomy          FAMILY HISTORY:      SOCIAL HISTORY:  Smoking: __ packs x ___ years  EtOH Use:  Marital Status:  Occupation:  Recent Travel:  Country of Birth:  Advance Directives:    Allergies    No Known Allergies    Intolerances        HOME MEDICATIONS:    ROS:   As stated in HPI.    OBJECTIVE:  ICU Vital Signs Last 24 Hrs  T(C): 37.1 (19 Feb 2024 19:20), Max: 39.5 (19 Feb 2024 14:26)  T(F): 98.7 (19 Feb 2024 19:20), Max: 103.1 (19 Feb 2024 14:26)  HR: 82 (19 Feb 2024 19:20) (80 - 110)  BP: 102/38 (19 Feb 2024 19:20) (88/39 - 128/50)  BP(mean): 58 (19 Feb 2024 19:20) (55 - 58)  ABP: --  ABP(mean): --  RR: 18 (19 Feb 2024 19:20) (18 - 18)  SpO2: 99% (19 Feb 2024 19:20) (96% - 99%)    O2 Parameters below as of 19 Feb 2024 19:20  Patient On (Oxygen Delivery Method): room air              CAPILLARY BLOOD GLUCOSE          PHYSICAL EXAM:  Constitutional: lethargic  HEENT: DMM  Neck: supple  Chest: normal WOB at rest, CTAB  Heart: tachycardic, RRR, 2+ radial pulses, no LE edema  Abd: nondistended, normoactive bowel sounds, soft, nontender, chinchilla in place   Extremities: no clubbing, warm hands and feet, no edema  Neuro: lethargic, easily arousable to voice, no focal deficits  MSK: spontaneous movement of all 4 extremities,   Skin: decreased skin turgor    HOSPITAL MEDICATIONS:  MEDICATIONS  (STANDING):  midodrine. 10 milliGRAM(s) Oral three times a day    MEDICATIONS  (PRN):      LABS:                        9.8    10.61 )-----------( 219      ( 19 Feb 2024 14:59 )             29.4     02-19    126<L>  |  99  |  21  ----------------------------<  133<H>  3.7   |  22  |  1.21    Ca    8.9      19 Feb 2024 14:59    TPro  6.7  /  Alb  3.5  /  TBili  0.5  /  DBili  x   /  AST  41<H>  /  ALT  11  /  AlkPhos  51  02-19    PT/INR - ( 19 Feb 2024 14:59 )   PT: 12.9 sec;   INR: 1.18 ratio         PTT - ( 19 Feb 2024 14:59 )  PTT:34.3 sec  Urinalysis Basic - ( 19 Feb 2024 14:59 )    Color: x / Appearance: x / SG: x / pH: x  Gluc: 133 mg/dL / Ketone: x  / Bili: x / Urobili: x   Blood: x / Protein: x / Nitrite: x   Leuk Esterase: x / RBC: x / WBC x   Sq Epi: x / Non Sq Epi: x / Bacteria: x        Venous Blood Gas:  02-19 @ 14:59  7.42/38/32/25/53.5  VBG Lactate: 2.0      MICROBIOLOGY:     RADIOLOGY:  [ ] Reviewed and interpreted by me    EKG:

## 2024-02-19 NOTE — ED ADULT NURSE REASSESSMENT NOTE - NS ED NURSE REASSESS COMMENT FT1
Report received from WILIAM Ness. PT is resting comfortably in bed, breathing unlabored on room air, and speaking in complete sentences. Medication administered per MD orders. Updated PT on plan of care. PT to be admitted. Garrett catheter noted. Safety and comfort maintained. Call bell within reach. Family at the bedside.

## 2024-02-19 NOTE — ED ADULT NURSE NOTE - OBJECTIVE STATEMENT
89yo M aaox4 h/o urinary retention ( with chinchilla changed 2 weeks ago) , presents to Ed from home via EMS, c/o fever, generalized weakness, n/v, testicular pain 89yo M aaox4 h/o urinary retention ( with chinchilla changed 2 weeks ago) chronic chinchilla, HTN, HLD, prostate Ca  , presents to ED from home via EMS, c/o fever, generalized weakness, n/v, testicular pain x 2 days,, Pt denies CP, SOB, HA, vision changes, abdominal pain Safety and comfort measures initiated- bed placed in lowest position and side rails raised. Pt oriented to call bell system.

## 2024-02-19 NOTE — ED PROVIDER NOTE - OBJECTIVE STATEMENT
89-year-old male past medical history HTN, HLD, prostate CA s/p prostatectomy, chronic indwelling Garrett presents emergency department for 2 days of fevers, chills, generalized weakness.  Patient also endorsing nausea and vomiting today.  Patient denies headaches, vision changes, chest pain, trouble breathing, abdominal pain, diarrhea/constipation, hematuria.

## 2024-02-19 NOTE — ED PROVIDER NOTE - ATTENDING CONTRIBUTION TO CARE
89M hx htn, hld pw fever x2 days  on exam, bl lungfield crackles  chinchilla in place  sepsis  fluids, abx  chinchilla change   admit 89M hx htn, hld pw fever x2 days  on exam, bl lungfield crackles  As interpreted by ED physician, ECG is NSR with normal intervals/axis, no changes in QRS, no ST/T changes.  chinchilla in place  sepsis  fluids, abx  chinchilla change   admit

## 2024-02-19 NOTE — ED PROVIDER NOTE - CONVERSATION DETAILS
Spoke with daughter and wife at bedside regarding GOC. They state that they have never talked about what he would want if he were unable to make decisions. Patient remains full code at this time.

## 2024-02-19 NOTE — ED ADULT NURSE NOTE - CAS EDN DISCHARGE INTERVENTIONS
CT PE study:  1.   No evidence of pulmonary embolism to the proximal segmental pulmonary  artery level, evaluation of more distal vessels is suboptimal.   Arm band on

## 2024-02-19 NOTE — ED PROVIDER NOTE - PHYSICAL EXAMINATION
Constitutional: VS reviewed. Alert and orientedx3, well appearing, no apparent distress  HEENT: Atraumatic, EOMI, PERRL   CV: Tachycardic  Lungs: Crackles in b/l lung bases.   Abdomen: Soft, nondistended, nontender  MSK: No deformities  Skin: Warm and dry. As visualized no rashes, lesions, bruising or erythema  Neuro: Strength and sensation intact.   Lymph: No pitting edema in extremities.

## 2024-02-19 NOTE — ED ADULT NURSE REASSESSMENT NOTE - NS ED NURSE REASSESS COMMENT FT1
PT is resting comfortably in bed, breathing unlabored on room air, and speaking in complete sentences. Updated PT on plan of care. PT admitted to medicine, awaiting a bed. Safety and comfort maintained. Call bell within reach. Family at the bedside.

## 2024-02-19 NOTE — ED ADULT NURSE NOTE - NSFALLUNIVINTERV_ED_ALL_ED
Bed/Stretcher in lowest position, wheels locked, appropriate side rails in place/Call bell, personal items and telephone in reach/Instruct patient to call for assistance before getting out of bed/chair/stretcher/Non-slip footwear applied when patient is off stretcher/[place] to call system/Physically safe environment - no spills, clutter or unnecessary equipment/Purposeful proactive rounding/Room/bathroom lighting operational, light cord in reach

## 2024-02-19 NOTE — ED ADULT NURSE NOTE - ED STAT RN HANDOFF DETAILS
1900 Report given to receiving change of shift ABRAHAM Velez patient is in no acute distress. Patient vital signs stable, plan of care explained.

## 2024-02-19 NOTE — CONSULT NOTE ADULT - ASSESSMENT
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 Found to be hypotensive, febrile, and tachycardic in ED. MICU consulted for hypotension    Impression: severe sepsis 2/2 UTI    Recommendations:   -Pt appears dry on exam and is likely still volume depleted in setting of poor appetite and vomiting. Would give another 1 L bolus of LR over one hour  -prior urine cultures reviewed. Would give cefepime 2g stat as prior urine culture demonstrates Zosyn resistance  -consult urology for chinchilla exchange give h/o urethral strictures    Plan discussed with attending    Patient is not a MICU candidate at this time. Please feel free to reach out with questions.    Lucie Shaw MD  Internal Medicine PGY3

## 2024-02-20 DIAGNOSIS — E87.1 HYPO-OSMOLALITY AND HYPONATREMIA: ICD-10-CM

## 2024-02-20 DIAGNOSIS — A41.9 SEPSIS, UNSPECIFIED ORGANISM: ICD-10-CM

## 2024-02-20 DIAGNOSIS — I10 ESSENTIAL (PRIMARY) HYPERTENSION: ICD-10-CM

## 2024-02-20 DIAGNOSIS — Z29.9 ENCOUNTER FOR PROPHYLACTIC MEASURES, UNSPECIFIED: ICD-10-CM

## 2024-02-20 DIAGNOSIS — C61 MALIGNANT NEOPLASM OF PROSTATE: ICD-10-CM

## 2024-02-20 LAB
-  K. PNEUMONIAE GROUP: SIGNIFICANT CHANGE UP
ADD ON TEST-SPECIMEN IN LAB: SIGNIFICANT CHANGE UP
ALBUMIN SERPL ELPH-MCNC: 2.7 G/DL — LOW (ref 3.3–5)
ALP SERPL-CCNC: 38 U/L — LOW (ref 40–120)
ALT FLD-CCNC: 12 U/L — SIGNIFICANT CHANGE UP (ref 10–45)
ANION GAP SERPL CALC-SCNC: 11 MMOL/L — SIGNIFICANT CHANGE UP (ref 5–17)
ANION GAP SERPL CALC-SCNC: 11 MMOL/L — SIGNIFICANT CHANGE UP (ref 5–17)
AST SERPL-CCNC: 44 U/L — HIGH (ref 10–40)
BASE EXCESS BLDV CALC-SCNC: -4.2 MMOL/L — LOW (ref -2–3)
BASOPHILS # BLD AUTO: 0.03 K/UL — SIGNIFICANT CHANGE UP (ref 0–0.2)
BASOPHILS NFR BLD AUTO: 0.2 % — SIGNIFICANT CHANGE UP (ref 0–2)
BILIRUB SERPL-MCNC: 0.4 MG/DL — SIGNIFICANT CHANGE UP (ref 0.2–1.2)
BUN SERPL-MCNC: 19 MG/DL — SIGNIFICANT CHANGE UP (ref 7–23)
BUN SERPL-MCNC: 19 MG/DL — SIGNIFICANT CHANGE UP (ref 7–23)
CALCIUM SERPL-MCNC: 8 MG/DL — LOW (ref 8.4–10.5)
CALCIUM SERPL-MCNC: 8.4 MG/DL — SIGNIFICANT CHANGE UP (ref 8.4–10.5)
CHLORIDE SERPL-SCNC: 101 MMOL/L — SIGNIFICANT CHANGE UP (ref 96–108)
CHLORIDE SERPL-SCNC: 97 MMOL/L — SIGNIFICANT CHANGE UP (ref 96–108)
CO2 BLDV-SCNC: 22 MMOL/L — SIGNIFICANT CHANGE UP (ref 22–26)
CO2 SERPL-SCNC: 19 MMOL/L — LOW (ref 22–31)
CO2 SERPL-SCNC: 19 MMOL/L — LOW (ref 22–31)
CREAT SERPL-MCNC: 1 MG/DL — SIGNIFICANT CHANGE UP (ref 0.5–1.3)
CREAT SERPL-MCNC: 1.2 MG/DL — SIGNIFICANT CHANGE UP (ref 0.5–1.3)
EGFR: 57 ML/MIN/1.73M2 — LOW
EGFR: 72 ML/MIN/1.73M2 — SIGNIFICANT CHANGE UP
EOSINOPHIL # BLD AUTO: 0 K/UL — SIGNIFICANT CHANGE UP (ref 0–0.5)
EOSINOPHIL NFR BLD AUTO: 0 % — SIGNIFICANT CHANGE UP (ref 0–6)
GLUCOSE BLDC GLUCOMTR-MCNC: 123 MG/DL — HIGH (ref 70–99)
GLUCOSE SERPL-MCNC: 117 MG/DL — HIGH (ref 70–99)
GLUCOSE SERPL-MCNC: 96 MG/DL — SIGNIFICANT CHANGE UP (ref 70–99)
GRAM STN FLD: ABNORMAL
HCO3 BLDV-SCNC: 21 MMOL/L — LOW (ref 22–29)
HCT VFR BLD CALC: 24.9 % — LOW (ref 39–50)
HGB BLD-MCNC: 8.2 G/DL — LOW (ref 13–17)
IMM GRANULOCYTES NFR BLD AUTO: 0.8 % — SIGNIFICANT CHANGE UP (ref 0–0.9)
LEGIONELLA AG UR QL: NEGATIVE — SIGNIFICANT CHANGE UP
LYMPHOCYTES # BLD AUTO: 18.4 % — SIGNIFICANT CHANGE UP (ref 13–44)
LYMPHOCYTES # BLD AUTO: 2.3 K/UL — SIGNIFICANT CHANGE UP (ref 1–3.3)
MAGNESIUM SERPL-MCNC: 1.6 MG/DL — SIGNIFICANT CHANGE UP (ref 1.6–2.6)
MAGNESIUM SERPL-MCNC: 1.8 MG/DL — SIGNIFICANT CHANGE UP (ref 1.6–2.6)
MCHC RBC-ENTMCNC: 32.4 PG — SIGNIFICANT CHANGE UP (ref 27–34)
MCHC RBC-ENTMCNC: 32.9 GM/DL — SIGNIFICANT CHANGE UP (ref 32–36)
MCV RBC AUTO: 98.4 FL — SIGNIFICANT CHANGE UP (ref 80–100)
METHOD TYPE: SIGNIFICANT CHANGE UP
MONOCYTES # BLD AUTO: 1.28 K/UL — HIGH (ref 0–0.9)
MONOCYTES NFR BLD AUTO: 10.2 % — SIGNIFICANT CHANGE UP (ref 2–14)
MRSA PCR RESULT.: SIGNIFICANT CHANGE UP
NEUTROPHILS # BLD AUTO: 8.81 K/UL — HIGH (ref 1.8–7.4)
NEUTROPHILS NFR BLD AUTO: 70.4 % — SIGNIFICANT CHANGE UP (ref 43–77)
NRBC # BLD: 0 /100 WBCS — SIGNIFICANT CHANGE UP (ref 0–0)
OSMOLALITY UR: 171 MOS/KG — LOW (ref 300–900)
PCO2 BLDV: 38 MMHG — LOW (ref 42–55)
PH BLDV: 7.35 — SIGNIFICANT CHANGE UP (ref 7.32–7.43)
PHOSPHATE SERPL-MCNC: 2.7 MG/DL — SIGNIFICANT CHANGE UP (ref 2.5–4.5)
PHOSPHATE SERPL-MCNC: 3.3 MG/DL — SIGNIFICANT CHANGE UP (ref 2.5–4.5)
PLATELET # BLD AUTO: 177 K/UL — SIGNIFICANT CHANGE UP (ref 150–400)
PO2 BLDV: 55 MMHG — HIGH (ref 25–45)
POTASSIUM SERPL-MCNC: 3.6 MMOL/L — SIGNIFICANT CHANGE UP (ref 3.5–5.3)
POTASSIUM SERPL-MCNC: 4.8 MMOL/L — SIGNIFICANT CHANGE UP (ref 3.5–5.3)
POTASSIUM SERPL-SCNC: 3.6 MMOL/L — SIGNIFICANT CHANGE UP (ref 3.5–5.3)
POTASSIUM SERPL-SCNC: 4.8 MMOL/L — SIGNIFICANT CHANGE UP (ref 3.5–5.3)
PROT SERPL-MCNC: 5.5 G/DL — LOW (ref 6–8.3)
RBC # BLD: 2.53 M/UL — LOW (ref 4.2–5.8)
RBC # FLD: 14.4 % — SIGNIFICANT CHANGE UP (ref 10.3–14.5)
S AUREUS DNA NOSE QL NAA+PROBE: SIGNIFICANT CHANGE UP
S PNEUM AG UR QL: NEGATIVE — SIGNIFICANT CHANGE UP
SAO2 % BLDV: 87 % — SIGNIFICANT CHANGE UP (ref 67–88)
SODIUM SERPL-SCNC: 127 MMOL/L — LOW (ref 135–145)
SODIUM SERPL-SCNC: 131 MMOL/L — LOW (ref 135–145)
SODIUM UR-SCNC: 27 MMOL/L — SIGNIFICANT CHANGE UP
SPECIMEN SOURCE: SIGNIFICANT CHANGE UP
UUN UR-MCNC: 200 MG/DL — SIGNIFICANT CHANGE UP
WBC # BLD: 12.52 K/UL — HIGH (ref 3.8–10.5)
WBC # FLD AUTO: 12.52 K/UL — HIGH (ref 3.8–10.5)

## 2024-02-20 PROCEDURE — 99291 CRITICAL CARE FIRST HOUR: CPT

## 2024-02-20 PROCEDURE — 76604 US EXAM CHEST: CPT | Mod: 26,GC

## 2024-02-20 PROCEDURE — 93308 TTE F-UP OR LMTD: CPT | Mod: 26,GC

## 2024-02-20 RX ORDER — OXYBUTYNIN CHLORIDE 5 MG
5 TABLET ORAL DAILY
Refills: 0 | Status: DISCONTINUED | OUTPATIENT
Start: 2024-02-20 | End: 2024-02-27

## 2024-02-20 RX ORDER — VANCOMYCIN HCL 1 G
1000 VIAL (EA) INTRAVENOUS ONCE
Refills: 0 | Status: COMPLETED | OUTPATIENT
Start: 2024-02-20 | End: 2024-02-20

## 2024-02-20 RX ORDER — NOREPINEPHRINE BITARTRATE/D5W 8 MG/250ML
0.05 PLASTIC BAG, INJECTION (ML) INTRAVENOUS
Qty: 8 | Refills: 0 | Status: DISCONTINUED | OUTPATIENT
Start: 2024-02-20 | End: 2024-02-20

## 2024-02-20 RX ORDER — LEVOTHYROXINE SODIUM 125 MCG
50 TABLET ORAL DAILY
Refills: 0 | Status: DISCONTINUED | OUTPATIENT
Start: 2024-02-20 | End: 2024-02-27

## 2024-02-20 RX ORDER — HEPARIN SODIUM 5000 [USP'U]/ML
5000 INJECTION INTRAVENOUS; SUBCUTANEOUS EVERY 8 HOURS
Refills: 0 | Status: DISCONTINUED | OUTPATIENT
Start: 2024-02-20 | End: 2024-02-23

## 2024-02-20 RX ORDER — CEFEPIME 1 G/1
2000 INJECTION, POWDER, FOR SOLUTION INTRAMUSCULAR; INTRAVENOUS EVERY 12 HOURS
Refills: 0 | Status: DISCONTINUED | OUTPATIENT
Start: 2024-02-20 | End: 2024-02-27

## 2024-02-20 RX ORDER — LEVOTHYROXINE SODIUM 125 MCG
1 TABLET ORAL
Refills: 0 | DISCHARGE

## 2024-02-20 RX ORDER — MIDODRINE HYDROCHLORIDE 2.5 MG/1
10 TABLET ORAL ONCE
Refills: 0 | Status: COMPLETED | OUTPATIENT
Start: 2024-02-20 | End: 2024-02-20

## 2024-02-20 RX ORDER — ATORVASTATIN CALCIUM 80 MG/1
40 TABLET, FILM COATED ORAL AT BEDTIME
Refills: 0 | Status: DISCONTINUED | OUTPATIENT
Start: 2024-02-20 | End: 2024-02-27

## 2024-02-20 RX ORDER — AZITHROMYCIN 500 MG/1
500 TABLET, FILM COATED ORAL EVERY 24 HOURS
Refills: 0 | Status: DISCONTINUED | OUTPATIENT
Start: 2024-02-20 | End: 2024-02-20

## 2024-02-20 RX ORDER — ACETAMINOPHEN 500 MG
650 TABLET ORAL ONCE
Refills: 0 | Status: COMPLETED | OUTPATIENT
Start: 2024-02-20 | End: 2024-02-20

## 2024-02-20 RX ORDER — SERTRALINE 25 MG/1
25 TABLET, FILM COATED ORAL DAILY
Refills: 0 | Status: DISCONTINUED | OUTPATIENT
Start: 2024-02-20 | End: 2024-02-27

## 2024-02-20 RX ORDER — LOVASTATIN 20 MG
1 TABLET ORAL
Refills: 0 | DISCHARGE

## 2024-02-20 RX ORDER — LANOLIN ALCOHOL/MO/W.PET/CERES
3 CREAM (GRAM) TOPICAL AT BEDTIME
Refills: 0 | Status: DISCONTINUED | OUTPATIENT
Start: 2024-02-20 | End: 2024-02-27

## 2024-02-20 RX ORDER — CEFEPIME 1 G/1
2000 INJECTION, POWDER, FOR SOLUTION INTRAMUSCULAR; INTRAVENOUS EVERY 8 HOURS
Refills: 0 | Status: DISCONTINUED | OUTPATIENT
Start: 2024-02-20 | End: 2024-02-20

## 2024-02-20 RX ORDER — ACETAMINOPHEN 500 MG
650 TABLET ORAL EVERY 6 HOURS
Refills: 0 | Status: DISCONTINUED | OUTPATIENT
Start: 2024-02-20 | End: 2024-02-27

## 2024-02-20 RX ORDER — SODIUM CHLORIDE 9 MG/ML
500 INJECTION, SOLUTION INTRAVENOUS ONCE
Refills: 0 | Status: COMPLETED | OUTPATIENT
Start: 2024-02-20 | End: 2024-02-20

## 2024-02-20 RX ORDER — CHOLECALCIFEROL (VITAMIN D3) 125 MCG
1 CAPSULE ORAL
Refills: 0 | DISCHARGE

## 2024-02-20 RX ORDER — CEFEPIME 1 G/1
INJECTION, POWDER, FOR SOLUTION INTRAMUSCULAR; INTRAVENOUS
Refills: 0 | Status: DISCONTINUED | OUTPATIENT
Start: 2024-02-20 | End: 2024-02-20

## 2024-02-20 RX ORDER — LEVOTHYROXINE SODIUM 125 MCG
37 TABLET ORAL ONCE
Refills: 0 | Status: COMPLETED | OUTPATIENT
Start: 2024-02-20 | End: 2024-02-20

## 2024-02-20 RX ORDER — CEFEPIME 1 G/1
2000 INJECTION, POWDER, FOR SOLUTION INTRAMUSCULAR; INTRAVENOUS ONCE
Refills: 0 | Status: DISCONTINUED | OUTPATIENT
Start: 2024-02-20 | End: 2024-02-20

## 2024-02-20 RX ADMIN — MIDODRINE HYDROCHLORIDE 10 MILLIGRAM(S): 2.5 TABLET ORAL at 01:22

## 2024-02-20 RX ADMIN — Medication 5 MILLIGRAM(S): at 12:42

## 2024-02-20 RX ADMIN — MIDODRINE HYDROCHLORIDE 10 MILLIGRAM(S): 2.5 TABLET ORAL at 09:00

## 2024-02-20 RX ADMIN — Medication 7.44 MICROGRAM(S)/KG/MIN: at 03:41

## 2024-02-20 RX ADMIN — CEFEPIME 100 MILLIGRAM(S): 1 INJECTION, POWDER, FOR SOLUTION INTRAMUSCULAR; INTRAVENOUS at 05:17

## 2024-02-20 RX ADMIN — HEPARIN SODIUM 5000 UNIT(S): 5000 INJECTION INTRAVENOUS; SUBCUTANEOUS at 14:30

## 2024-02-20 RX ADMIN — Medication 250 MILLIGRAM(S): at 03:41

## 2024-02-20 RX ADMIN — SODIUM CHLORIDE 500 MILLILITER(S): 9 INJECTION, SOLUTION INTRAVENOUS at 01:27

## 2024-02-20 RX ADMIN — ATORVASTATIN CALCIUM 40 MILLIGRAM(S): 80 TABLET, FILM COATED ORAL at 21:00

## 2024-02-20 RX ADMIN — Medication 650 MILLIGRAM(S): at 03:15

## 2024-02-20 RX ADMIN — Medication 37 MICROGRAM(S): at 06:13

## 2024-02-20 RX ADMIN — MIDODRINE HYDROCHLORIDE 10 MILLIGRAM(S): 2.5 TABLET ORAL at 17:29

## 2024-02-20 RX ADMIN — MIDODRINE HYDROCHLORIDE 10 MILLIGRAM(S): 2.5 TABLET ORAL at 14:30

## 2024-02-20 RX ADMIN — HEPARIN SODIUM 5000 UNIT(S): 5000 INJECTION INTRAVENOUS; SUBCUTANEOUS at 21:00

## 2024-02-20 RX ADMIN — SERTRALINE 25 MILLIGRAM(S): 25 TABLET, FILM COATED ORAL at 12:42

## 2024-02-20 RX ADMIN — HEPARIN SODIUM 5000 UNIT(S): 5000 INJECTION INTRAVENOUS; SUBCUTANEOUS at 05:17

## 2024-02-20 RX ADMIN — CEFEPIME 100 MILLIGRAM(S): 1 INJECTION, POWDER, FOR SOLUTION INTRAMUSCULAR; INTRAVENOUS at 17:29

## 2024-02-20 RX ADMIN — Medication 650 MILLIGRAM(S): at 02:47

## 2024-02-20 NOTE — PROGRESS NOTE ADULT - CRITICAL CARE ATTENDING COMMENT
This patient is critically ill and required frequent bedside visits with therapy change. I have personally provided 30 minutes of critical care time concurrently with the resident/fellow. This time excludes time spent on separate procedures and time spent teaching. I have reviewed the resident/fellow’s documentation and I agree with the assessment and plan of care.

## 2024-02-20 NOTE — CHART NOTE - NSCHARTNOTEFT_GEN_A_CORE
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 Chase (Urology), radiation cystitis, urethral strictures, recent admission (12/28-1/1/24) for hypoxia post covid infection, atypical chest pain and radiation cystitis presents for 2 days of malaise, poor appetite, rigors, and 1 episode of vomiting this morning. Daughter also reports patient has been more lethargic that usual. Patient has chronic chinchilla that was last exchanged 2-3 weeks ago. Daughter stated he was recently treated for a UTI outpatient and completed a course of cefpoxodime about 1 week ago.    In the ED, patient febrile to 103.1, hypotensive to 80s/30s, and tachycardic. Patient was given 4 L IVF, Zosyn, and azithromycin in ED. Labs remarkable for leukocytosis, stable anemia, hyponatremia (Na 126). UA grossly positive.    PAST MEDICAL & SURGICAL HISTORY:  HTN (hypertension)      HLD (hyperlipidemia)      Prostate cancer      S/P prostatectomy        FAMILY HISTORY:      SOCIAL HISTORY:  Lives at home with wife. Denies alcohol, recreational drug use. 200 pack-year (not a typo) smoking history, quit 30 years ago.    HOME MEDICATIONS:    Allergies    No Known Allergies    Intolerances        REVIEW OF SYSTEMS:  Constitutional: No fevers, chills, weight loss, weight gain  HEENT: No vision problems, eye pain, nasal congestion, rhinorrhea, sore throat, dysphagia  CV: No chest pain, orthopnea, palpitations  Resp: No cough, dyspnea, wheezing, hemoptysis  GI: No nausea, vomiting, diarrhea, constipation, abdominal pain  : [ ] dysuria [ ] nocturia [ ] hematuria [ ] increased urinary frequency  Musculoskeletal: [ ] back pain [ ] myalgias [ ] arthralgias [ ] fracture  Skin: [ ] rash [ ] itch  Neurological: [ ] headache [ ] dizziness [ ] syncope [ ] weakness [ ] numbness  Psychiatric: [ ] anxiety [ ] depression  Endocrine: [ ] diabetes [ ] thyroid problem  Hematologic/Lymphatic: [ ] anemia [ ] bleeding problem  Allergic/Immunologic: [ ] itchy eyes [ ] nasal discharge [ ] hives [ ] angioedema  [ ] All other systems negative  [ ] Unable to assess ROS because *****    OBJECTIVE:  ICU Vital Signs Last 24 Hrs  T(C): 37.2 (20 Feb 2024 01:15), Max: 39.6 (19 Feb 2024 22:05)  T(F): 99 (20 Feb 2024 01:15), Max: 103.2 (19 Feb 2024 22:05)  HR: 72 (20 Feb 2024 02:04) (68 - 110)  BP: 128/59 (20 Feb 2024 02:04) (70/43 - 144/55)  BP(mean): 85 (20 Feb 2024 02:04) (53 - 108)  ABP: --  ABP(mean): --  RR: 19 (20 Feb 2024 02:04) (18 - 30)  SpO2: 98% (20 Feb 2024 02:04) (93% - 99%)    O2 Parameters below as of 20 Feb 2024 02:04  Patient On (Oxygen Delivery Method): room air        PHYSICAL EXAM:  General: Alert. Following commands  HEENT: PERRL. No scleral icterus or conjunctival pallor.  Neck: No JVD. Supple. No bruising or ecchymosis.  Chest/Lungs: CTAB, no wheezes, rhonchi, or rales.    Heart: Regular rate and regular rhythm. No murmurs appreciated.    Abdomen: Soft, non tender to palpation. No distension.   Extremities/skin: No significant extremity edema.    Neuro: Following commands. Moving extremities spontaneously.  Psych: appropriate mood    LINES:   - Location, insertion date, redness, indication?  - Chinchilla insertion date and last change?    HOSPITAL MEDICATIONS:  MEDICATIONS  (STANDING):  atorvastatin 40 milliGRAM(s) Oral at bedtime  cefepime   IVPB      cefepime   IVPB 2000 milliGRAM(s) IV Intermittent every 8 hours  cefepime   IVPB 2000 milliGRAM(s) IV Intermittent once  heparin   Injectable 5000 Unit(s) SubCutaneous every 8 hours  levothyroxine 50 MICROGram(s) Oral daily  midodrine. 10 milliGRAM(s) Oral three times a day  norepinephrine Infusion 0.05 MICROgram(s)/kG/Min (7.44 mL/Hr) IV Continuous <Continuous>  oxybutynin 5 milliGRAM(s) Oral daily  sertraline 25 milliGRAM(s) Oral daily    MEDICATIONS  (PRN):      Tube Feed rate and goal:   Stress ulcer prophylaxis:   Last BM:   GI motility meications:     LABS:                        8.2    12.52 )-----------( 177      ( 20 Feb 2024 01:50 )             24.9     Hgb Trend: 8.2<--, 9.8<--  02-19    126<L>  |  99  |  21  ----------------------------<  133<H>  3.7   |  22  |  1.21    Ca    8.9      19 Feb 2024 14:59    TPro  6.7  /  Alb  3.5  /  TBili  0.5  /  DBili  x   /  AST  41<H>  /  ALT  11  /  AlkPhos  51  02-19    Creatinine Trend: 1.21<--, 1.26<--  PT/INR - ( 19 Feb 2024 14:59 )   PT: 12.9 sec;   INR: 1.18 ratio         PTT - ( 19 Feb 2024 14:59 )  PTT:34.3 sec  Urinalysis Basic - ( 19 Feb 2024 14:59 )    Color: x / Appearance: x / SG: x / pH: x  Gluc: 133 mg/dL / Ketone: x  / Bili: x / Urobili: x   Blood: x / Protein: x / Nitrite: x   Leuk Esterase: x / RBC: x / WBC x   Sq Epi: x / Non Sq Epi: x / Bacteria: x        Venous Blood Gas:  02-20 @ 01:00  7.35/38/55/21/87.0  VBG Lactate: --  Venous Blood Gas:  02-19 @ 14:59  7.42/38/32/25/53.5  VBG Lactate: 2.0      MICROBIOLOGY:     RADIOLOGY & ADDITIONAL TESTS:    < from: Xray Chest 1 View- PORTABLE-Urgent (Xray Chest 1 View- PORTABLE-Urgent .) (02.19.24 @ 16:56) >    ******PRELIMINARY REPORT******      ******PRELIMINARY REPORT******           INTERPRETATION:  Low lung volumes. Hazy left basilar opacity may reflect   atelectasis.        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 Chase (Urology), radiation cystitis, urethral strictures, radiation cystitis, recent admission (12/28-1/1/24) for hypoxia post covid infection, atypical chest pain and radiation cystitis presents for 2 days of malaise, poor appetite, rigors, and 1 episode of vomiting this morning, admitted now for septic shock likely 2/2 UTI, requiring pressor support.     NEUROLOGY  - A&Ox3    #Depression  -c/w home sertraline    CARDIOVASCULAR  #Shock 2/2 sepsis, source UTI, and hypovolemia  -pt febrile to 103.1, hypotensive to 80s/30s, and tachycardic. Patient was given 4 L IVF  -echo 12/23: LV systolic function normal w/ EF of 62 %    -POCUS grossly normal LV systolic fxn, enlarged RV    -consider additional 1 L  -started on levo  -c/w midodrine 10 tid  -U/A grossly positive for UTI  -Hx of Pseudomonas and Klebsiella resistant to Zosyn  -consider broadening to meropenem if pt remains febrile  -Blood/Ucx  -urology consulted for chinchilla exchange given hx of strictures, done in ICU    #Hx of HTN  -hold home lisinopril and metoprolol iso shock    #Hx of HLD  -c/w home statin    RESPIRATORY  #Former smoker, likely COPD  Former hx of heavy smoking    #Prostate Cancer metastatic to lung s/p partial pneumonectomy  -on erleada    GI/NUTRITION  -Regular diet iso hypotension    #Hx of recent vomiting   Likely due to UTI  -monitor for loose stools, can send gi pcr/stool cx  -c/w IVF repletion    /RENAL  #Hyponatremia  Na of 126 on admission  Likely iso hypovolemia  -trend Na, s/p fluids w/ improvement  -f/u serum osm, urine Na, osms    #Radiation Cystitis  Hx of recurrent gross hematuria secondary to radiation cystitis  -trend Hgb  -transfuse Hgb<7  -monitor for hematuria    #Chronic Indwelling Chinchilla   Due to urethral strictures 2/2 prostate ca s/p radiation   -urology exchanged chinchilla on 2/20 2 am  -monitor I&O's    SKIN  - no active issues    INFECTIOUS DISEASE  #Septic Shock  Source: likely UTI iso chronic chinchilla, hx of recurrent UTIs vs. PNA  -WBC trending up 10.61-> 12.52  -started on cefepime (2/19-  -+UA  -f/u blood and urine cx  -MRSA  -CXR w/ hazy left basilar opacity may reflect atelectasis  -consider CT chest to r/o PNA iso sepsis  -urine legionella/ strep    ENDOCRINE  #Hypothyroidism  -c/w home synthroid    HEMATOLOGIC/DVT PPX  #Anemia  Hgb of 8.2, MCV 98.4  Baseline 8-10  -trend hgb  -transfuse <7  -iron, B12, folate    #Prostate Cancer  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 MICU Accept Note    CHIEF COMPLAINT: hypotension    HPI / INTERVAL HISTORY:    89 yo [place]uran speaking M w/ PMHx of HTN, HLD, 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 Chase (Urology), radiation cystitis, urethral strictures, recent admission (12/28-1/1/24) for hypoxia post covid infection, atypical chest pain and radiation cystitis presents for 2 days of malaise, poor appetite, rigors, and 1 episode of vomiting this morning. Daughter also reports patient has been more lethargic that usual. Patient has chronic chinchilla that was last exchanged 2-3 weeks ago. Daughter stated he was recently treated for a UTI outpatient and completed a course of cefpoxodime about 1 week ago.    In the ED, patient febrile to 103.1, hypotensive to 80s/30s, and tachycardic. Patient was given 4 L IVF, Zosyn, and azithromycin in ED. Labs remarkable for leukocytosis, stable anemia, hyponatremia (Na 126). UA grossly positive.    PAST MEDICAL & SURGICAL HISTORY:  HTN (hypertension)      HLD (hyperlipidemia)      Prostate cancer      S/P prostatectomy        FAMILY HISTORY:      SOCIAL HISTORY:  Lives at home with wife. Denies alcohol, recreational drug use. 200 pack-year (not a typo) smoking history, quit 30 years ago.    HOME MEDICATIONS:    Allergies    No Known Allergies    Intolerances        REVIEW OF SYSTEMS:  Constitutional: No fevers, chills, weight loss, weight gain  HEENT: No vision problems, eye pain, nasal congestion, rhinorrhea, sore throat, dysphagia  CV: No chest pain, orthopnea, palpitations  Resp: No cough, dyspnea, wheezing, hemoptysis  GI: No nausea, vomiting, diarrhea, constipation, abdominal pain  : [ ] dysuria [ ] nocturia [ ] hematuria [ ] increased urinary frequency  Musculoskeletal: [ ] back pain [ ] myalgias [ ] arthralgias [ ] fracture  Skin: [ ] rash [ ] itch  Neurological: [ ] headache [ ] dizziness [ ] syncope [ ] weakness [ ] numbness  Psychiatric: [ ] anxiety [ ] depression  Endocrine: [ ] diabetes [ ] thyroid problem  Hematologic/Lymphatic: [ ] anemia [ ] bleeding problem  Allergic/Immunologic: [ ] itchy eyes [ ] nasal discharge [ ] hives [ ] angioedema  [ ] All other systems negative  [ ] Unable to assess ROS because *****    OBJECTIVE:  ICU Vital Signs Last 24 Hrs  T(C): 37.2 (20 Feb 2024 01:15), Max: 39.6 (19 Feb 2024 22:05)  T(F): 99 (20 Feb 2024 01:15), Max: 103.2 (19 Feb 2024 22:05)  HR: 72 (20 Feb 2024 02:04) (68 - 110)  BP: 128/59 (20 Feb 2024 02:04) (70/43 - 144/55)  BP(mean): 85 (20 Feb 2024 02:04) (53 - 108)  ABP: --  ABP(mean): --  RR: 19 (20 Feb 2024 02:04) (18 - 30)  SpO2: 98% (20 Feb 2024 02:04) (93% - 99%)    O2 Parameters below as of 20 Feb 2024 02:04  Patient On (Oxygen Delivery Method): room air        PHYSICAL EXAM:  General: Alert. Following commands  HEENT: PERRL. No scleral icterus or conjunctival pallor.  Neck: No JVD. Supple. No bruising or ecchymosis.  Chest/Lungs: CTAB, no wheezes, rhonchi, or rales.    Heart: Regular rate and regular rhythm. No murmurs appreciated.    Abdomen: Soft, non tender to palpation. No distension.   Extremities/skin: No significant extremity edema.    Neuro: Following commands. Moving extremities spontaneously.  Psych: appropriate mood    LINES:   - Location, insertion date, redness, indication?  - Chinchilla insertion date and last change?    HOSPITAL MEDICATIONS:  MEDICATIONS  (STANDING):  atorvastatin 40 milliGRAM(s) Oral at bedtime  cefepime   IVPB      cefepime   IVPB 2000 milliGRAM(s) IV Intermittent every 8 hours  cefepime   IVPB 2000 milliGRAM(s) IV Intermittent once  heparin   Injectable 5000 Unit(s) SubCutaneous every 8 hours  levothyroxine 50 MICROGram(s) Oral daily  midodrine. 10 milliGRAM(s) Oral three times a day  norepinephrine Infusion 0.05 MICROgram(s)/kG/Min (7.44 mL/Hr) IV Continuous <Continuous>  oxybutynin 5 milliGRAM(s) Oral daily  sertraline 25 milliGRAM(s) Oral daily    MEDICATIONS  (PRN):      Tube Feed rate and goal:   Stress ulcer prophylaxis:   Last BM:   GI motility meications:     LABS:                        8.2    12.52 )-----------( 177      ( 20 Feb 2024 01:50 )             24.9     Hgb Trend: 8.2<--, 9.8<--  02-19    126<L>  |  99  |  21  ----------------------------<  133<H>  3.7   |  22  |  1.21    Ca    8.9      19 Feb 2024 14:59    TPro  6.7  /  Alb  3.5  /  TBili  0.5  /  DBili  x   /  AST  41<H>  /  ALT  11  /  AlkPhos  51  02-19    Creatinine Trend: 1.21<--, 1.26<--  PT/INR - ( 19 Feb 2024 14:59 )   PT: 12.9 sec;   INR: 1.18 ratio         PTT - ( 19 Feb 2024 14:59 )  PTT:34.3 sec  Urinalysis Basic - ( 19 Feb 2024 14:59 )    Color: x / Appearance: x / SG: x / pH: x  Gluc: 133 mg/dL / Ketone: x  / Bili: x / Urobili: x   Blood: x / Protein: x / Nitrite: x   Leuk Esterase: x / RBC: x / WBC x   Sq Epi: x / Non Sq Epi: x / Bacteria: x        Venous Blood Gas:  02-20 @ 01:00  7.35/38/55/21/87.0  VBG Lactate: --  Venous Blood Gas:  02-19 @ 14:59  7.42/38/32/25/53.5  VBG Lactate: 2.0      MICROBIOLOGY:     RADIOLOGY & ADDITIONAL TESTS:    < from: Xray Chest 1 View- PORTABLE-Urgent (Xray Chest 1 View- PORTABLE-Urgent .) (02.19.24 @ 16:56) >    ******PRELIMINARY REPORT******      ******PRELIMINARY REPORT******           INTERPRETATION:  Low lung volumes. Hazy left basilar opacity may reflect   atelectasis.        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 Chase (Urology), radiation cystitis, urethral strictures, radiation cystitis, recent admission (12/28-1/1/24) for hypoxia post covid infection, atypical chest pain and radiation cystitis presents for 2 days of malaise, poor appetite, rigors, and 1 episode of vomiting this morning, admitted now for septic shock likely 2/2 UTI, requiring pressor support.     NEUROLOGY  - A&Ox3    #Depression  -c/w home sertraline    CARDIOVASCULAR  #Shock 2/2 sepsis and hypovolemia  -pt febrile to 103.1, hypotensive to 80s/30s, and tachycardic. Patient was given 4 L IVF  -echo 12/23: LV systolic function normal w/ EF of 62 %    -POCUS grossly normal LV systolic fxn, enlarged RV  -consider additional 1 L  -started on levo  -c/w midodrine 10 tid  -U/A grossly positive for UTI  -Hx of Pseudomonas and Klebsiella resistant to Zosyn  -c/w cefepime  -consider broadening to meropenem if pt remains febrile  -Blood/Ucx  -urology consulted for chinchilla exchange given hx of strictures, done in ICU    #Hx of HTN  -hold home lisinopril and metoprolol iso shock    #Hx of HLD  -c/w home statin    RESPIRATORY  #Hazy left basilar opacity on CXR  -likely atelectasis, per read  -asx at this time  -urine legionella, strep  -c/w azithromycin for now    #Former smoker, likely COPD  Former hx of heavy smoking    #Prostate Cancer metastatic to lung s/p partial pneumonectomy  -on erleada    GI/NUTRITION  -Regular diet iso hypotension    #Hx of recent vomiting   Likely due to UTI  -monitor for loose stools, can send gi pcr/stool cx  -c/w IVF repletion    /RENAL  #UTI  Recently treated for a UTI outpatient and completed a course of cefpoxodime about 1 week ago.  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 Last exchanged 2-3 weeks ago.  -urology exchanged chinchilla on 2/20/24 2 am  -monitor I&O's    SKIN  - no active issues    INFECTIOUS DISEASE  #Septic Shock  Source: likely UTI iso chronic chinchilla, hx of recurrent UTIs vs. PNA  -s/p vanc, zosyn, cefepime  -WBC trending up 10.61-> 12.52  -trend temp  -hx of zosyn resistant UTI, klebsiella and pseudomonas species  -started on cefepime (2/19-  -+UA  -f/u blood and urine cx  -urology consulted for chinchilla exchange given hx of strictures, done in ICU  -MRSA  -CXR w/ hazy left basilar opacity may reflect atelectasis  -consider CT chest to r/o PNA iso sepsis  -urine legionella/ strep    ENDOCRINE  #Hypothyroidism  -c/w home synthroid    HEMATOLOGIC/DVT PPX  #Anemia  Hgb of 8.2, MCV 98.4  Baseline 8-10  -trend hgb  -transfuse <7  -iron, B12, folate    #Prostate Cancer  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 MICU Accept Note    CHIEF COMPLAINT: hypotension    HPI / INTERVAL HISTORY:    89 yo Sri Lankan speaking M w/ PMHx of HTN, HLD, 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 Chase (Urology), radiation cystitis, urethral strictures, recent admission (12/28-1/1/24) for hypoxia post covid infection, atypical chest pain and radiation cystitis presents for 2 days of malaise, poor appetite, rigors, and 1 episode of vomiting this morning. Daughter also reports patient has been more lethargic that usual. Patient has chronic chinchilla that was last exchanged 2-3 weeks ago. Daughter stated he was recently treated for a UTI outpatient and completed a course of cefpoxodime about 1 week ago.    In the ED, patient febrile to 103.1, hypotensive to 80s/30s, and tachycardic. Patient was given 4 L IVF, Zosyn, and azithromycin in ED. Labs remarkable for leukocytosis, stable anemia, hyponatremia (Na 126). UA grossly positive.    PAST MEDICAL & SURGICAL HISTORY:  HTN (hypertension)      HLD (hyperlipidemia)      Prostate cancer      S/P prostatectomy        FAMILY HISTORY:      SOCIAL HISTORY:  Lives at home with wife. Denies alcohol, recreational drug use. 200 pack-year (not a typo) smoking history, quit 30 years ago.    HOME MEDICATIONS:    Allergies    No Known Allergies    Intolerances        REVIEW OF SYSTEMS:  Constitutional: No fevers, chills, weight loss, weight gain  HEENT: No vision problems, eye pain, nasal congestion, rhinorrhea, sore throat, dysphagia  CV: No chest pain, orthopnea, palpitations  Resp: No cough, dyspnea, wheezing, hemoptysis  GI: No diarrhea, constipation, abdominal pain    [x ] All other systems negative      OBJECTIVE:  ICU Vital Signs Last 24 Hrs  T(C): 37.2 (20 Feb 2024 01:15), Max: 39.6 (19 Feb 2024 22:05)  T(F): 99 (20 Feb 2024 01:15), Max: 103.2 (19 Feb 2024 22:05)  HR: 72 (20 Feb 2024 02:04) (68 - 110)  BP: 128/59 (20 Feb 2024 02:04) (70/43 - 144/55)  BP(mean): 85 (20 Feb 2024 02:04) (53 - 108)  ABP: --  ABP(mean): --  RR: 19 (20 Feb 2024 02:04) (18 - 30)  SpO2: 98% (20 Feb 2024 02:04) (93% - 99%)    O2 Parameters below as of 20 Feb 2024 02:04  Patient On (Oxygen Delivery Method): room air        PHYSICAL EXAM:  General: Alert. Following commands  HEENT: PERRL. No scleral icterus or conjunctival pallor.  Neck: No JVD. Supple. No bruising or ecchymosis.  Chest/Lungs: Barrel chested. CTAB, no wheezes, rhonchi, or rales.    Heart: Regular rate and regular rhythm. No murmurs appreciated.    Abdomen: Soft, non tender to palpation. No distension.   Extremities/skin: No significant extremity edema.    Neuro: Following commands. Moving extremities spontaneously.  Psych: appropriate mood      HOSPITAL MEDICATIONS:  MEDICATIONS  (STANDING):  atorvastatin 40 milliGRAM(s) Oral at bedtime  cefepime   IVPB      cefepime   IVPB 2000 milliGRAM(s) IV Intermittent every 8 hours  cefepime   IVPB 2000 milliGRAM(s) IV Intermittent once  heparin   Injectable 5000 Unit(s) SubCutaneous every 8 hours  levothyroxine 50 MICROGram(s) Oral daily  midodrine. 10 milliGRAM(s) Oral three times a day  norepinephrine Infusion 0.05 MICROgram(s)/kG/Min (7.44 mL/Hr) IV Continuous <Continuous>  oxybutynin 5 milliGRAM(s) Oral daily  sertraline 25 milliGRAM(s) Oral daily    MEDICATIONS  (PRN):      Tube Feed rate and goal:   Stress ulcer prophylaxis:   Last BM:   GI motility meications:     LABS:                        8.2    12.52 )-----------( 177      ( 20 Feb 2024 01:50 )             24.9     Hgb Trend: 8.2<--, 9.8<--  02-19    126<L>  |  99  |  21  ----------------------------<  133<H>  3.7   |  22  |  1.21    Ca    8.9      19 Feb 2024 14:59    TPro  6.7  /  Alb  3.5  /  TBili  0.5  /  DBili  x   /  AST  41<H>  /  ALT  11  /  AlkPhos  51  02-19    Creatinine Trend: 1.21<--, 1.26<--  PT/INR - ( 19 Feb 2024 14:59 )   PT: 12.9 sec;   INR: 1.18 ratio         PTT - ( 19 Feb 2024 14:59 )  PTT:34.3 sec  Urinalysis Basic - ( 19 Feb 2024 14:59 )    Color: x / Appearance: x / SG: x / pH: x  Gluc: 133 mg/dL / Ketone: x  / Bili: x / Urobili: x   Blood: x / Protein: x / Nitrite: x   Leuk Esterase: x / RBC: x / WBC x   Sq Epi: x / Non Sq Epi: x / Bacteria: x        Venous Blood Gas:  02-20 @ 01:00  7.35/38/55/21/87.0  VBG Lactate: --  Venous Blood Gas:  02-19 @ 14:59  7.42/38/32/25/53.5  VBG Lactate: 2.0      MICROBIOLOGY:     RADIOLOGY & ADDITIONAL TESTS:    < from: Xray Chest 1 View- PORTABLE-Urgent (Xray Chest 1 View- PORTABLE-Urgent .) (02.19.24 @ 16:56) >    ******PRELIMINARY REPORT******      ******PRELIMINARY REPORT******           INTERPRETATION:  Low lung volumes. Hazy left basilar opacity may reflect   atelectasis.        ******PRELIMINARY REPORT******      ******PRELIMINARY REPORT******     < end of copied text >        ASSESSMENT AND PLAN:    89 yo [place] Lankan speaking M w/ PMHx of HTN, HLD, 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 Chase (Urology), radiation cystitis, urethral strictures, radiation cystitis, recent admission (12/28-1/1/24) for hypoxia post covid infection, atypical chest pain and radiation cystitis presents for 2 days of malaise, poor appetite, rigors, and 1 episode of vomiting this morning, admitted now for septic shock likely 2/2 UTI, requiring pressor support.     NEUROLOGY  - A&Ox3    #Depression  -c/w home sertraline    CARDIOVASCULAR  #Shock 2/2 sepsis and hypovolemia  -pt febrile to 103.1, hypotensive to 80s/30s, and tachycardic. Patient was given 4 L IVF  -echo 12/23: LV systolic function normal w/ EF of 62 %    -POCUS grossly normal LV systolic fxn, enlarged RV  -consider additional 1 L  -started on levo  -c/w midodrine 10 tid  -U/A grossly positive for UTI  -Hx of Pseudomonas and Klebsiella resistant to Zosyn  -c/w cefepime  -consider broadening to meropenem if pt remains febrile  -Blood/Ucx  -urology consulted for chinchilla exchange given hx of strictures, done in ICU    #Hx of HTN  -hold home lisinopril and metoprolol iso shock    #Hx of HLD  -c/w home statin    RESPIRATORY  #Hazy left basilar opacity on CXR  -s/p azithromycin  -likely atelectasis, per read  -asx at this time, hold off on abx  -urine legionella, strep     #Former smoker, likely COPD  Former hx of heavy smoking    #Prostate Cancer metastatic to lung s/p partial pneumonectomy  -on erleada    GI/NUTRITION  -Regular diet iso hypotension    #Hx of recent vomiting   Likely due to UTI  -monitor for loose stools, can send gi pcr/stool cx  -c/w IVF repletion    /RENAL  #UTI  Recently treated for a UTI outpatient and completed a course of cefpoxodime about 1 week ago.  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 Last exchanged 2-3 weeks ago.  -urology exchanged chinchilla on 2/20/24 2 am  -monitor I&O's    SKIN  - no active issues    INFECTIOUS DISEASE  #Septic Shock  Source: likely UTI iso chronic chinchilla, hx of recurrent UTIs vs. PNA  -s/p vanc, zosyn, cefepime, azithromycin  -WBC trending up 10.61-> 12.52  -trend temp  -hx of zosyn resistant UTI, klebsiella and pseudomonas species  -started on cefepime (2/19-  -+UA  -f/u blood and urine cx  -urology consulted for chinchilla exchange given hx of strictures, done in ICU  -MRSA  -CXR w/ hazy left basilar opacity may reflect atelectasis  -consider CT chest to r/o PNA iso sepsis  -urine legionella/ strep    ENDOCRINE  #Hypothyroidism  -c/w home synthroid  -TSH    HEMATOLOGIC/DVT PPX  #Anemia  Hgb of 8.2, MCV 98.4  Baseline 8-10  -trend hgb  -transfuse <7  -iron, B12, folate    #Prostate Cancer  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

## 2024-02-20 NOTE — RAPID RESPONSE TEAM SUMMARY - NSSITUATIONBACKGROUNDRRT_GEN_ALL_CORE
89-year-old Uzbek-speaking male past medical history HTN, HLD, 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 Rapid called for hypotension.

## 2024-02-20 NOTE — PROGRESS NOTE ADULT - ATTENDING COMMENTS
89-year-old male with HTN, HLD, metastatic prostate CA to liver, lung s/p prostatectomy, radiation, and left partial pneumonectomy, chronic indwelling chinchilla radiation cystitis, urethral strictures, radiation cystitis admitted for septic shock due to UTI c/b bacteremia.     Neuro   - AAOx3  - Patient has no neuro deficitis   Cardiovascular  - Septic shock due to UTI and bacteremia  - Currently on levephed   - Maintain a MAP goal <60  - Bedside POCUS showed normal LV and RV function. Small IVC   - c/w Midodrine 10mg TID to help wean of IV vasopressors  - Can consider additional IV fluids  Pulmonary  - Saturating well on Room Air   GI  - PO Diet   Renal   UTI  - Due to chronic indwelling chinchilla   - UA grossly positive  - c/w Cefepime for abx treatment   - chinchilla replaced   Hyponatremia  - Patient with Na of 126, now corrected to 132  - Check BMP at 2pm to assess for over-correction  ID  - UTI on UA  - Klebsiella bacteremia on blood cx from today   - c/w cefepime   - f/u sensitiveies  - repeat Blood Cx in the AM   Endo  - check A1C and TSH  Heme   - VTE PPX with Hep SQ  Ethics   Full code  Dispo  - wean off vasopressors and d/c to floors

## 2024-02-20 NOTE — CHART NOTE - NSCHARTNOTEFT_GEN_A_CORE
MICU DOWN GRADE NOTE    Dr. David Schmidt      89 yo Indonesian speaking M w/ PMHx of HTN, HLD, 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 Chase (Urology), radiation cystitis, urethral strictures, recent admission (12/28-1/1/24) for hypoxia post covid infection, atypical chest pain and radiation cystitis presents for 2 days of malaise, poor appetite, rigors, and 1 episode of vomiting this morning. Daughter also reports patient has been more lethargic that usual. Patient has chronic chinchilla that was last exchanged 2-3 weeks ago. Daughter stated he was recently treated for a UTI outpatient and completed a course of cefpoxodime about 1 week ago.    In the ED, patient febrile to 103.1, hypotensive to 80s/30s, and tachycardic. Patient was given 4 L IVF, Zosyn, and azithromycin in ED. Labs remarkable for leukocytosis, stable anemia, hyponatremia (Na 126). UA grossly positive. He grew pansenstive cultures, started on cefepime. Required levophed on admission to micu, currently on midodrine.     []f/u am blood cultures      HPI:    INTERVAL HPI/OVERNIGHT EVENTS:        REVIEW OF SYSTEMS:  CONSTITUTIONAL: No fever, chills  HEENT:  No blurry vision No sinus or throat pain  NECK: No pain or stiffness  RESPIRATORY: No cough, wheezing, chills or hemoptysis; No shortness of breath  CARDIOVASCULAR: No chest pain, palpitations  GASTROINTESTINAL: No abdominal pain. No nausea, vomiting, or diarrhea  GENITOURINARY: No dysuria  NEUROLOGICAL: No HA, No focal weakness  SKIN: No itching, burning, rashes, or lesions   MUSCULOSKELETAL: No joint pain or swelling; No muscle, back, or extremity pain    MEDICATIONS:  atorvastatin 40 milliGRAM(s) Oral at bedtime  cefepime   IVPB 2000 milliGRAM(s) IV Intermittent every 12 hours  heparin   Injectable 5000 Unit(s) SubCutaneous every 8 hours  levothyroxine 50 MICROGram(s) Oral daily  midodrine. 10 milliGRAM(s) Oral three times a day  oxybutynin 5 milliGRAM(s) Oral daily  sertraline 25 milliGRAM(s) Oral daily      T(C): 36.8 (02-20-24 @ 15:00), Max: 39.6 (02-19-24 @ 22:05)  HR: 92 (02-20-24 @ 16:00) (60 - 108)  BP: 102/52 (02-20-24 @ 16:00) (70/43 - 149/63)  RR: 27 (02-20-24 @ 16:00) (16 - 42)  SpO2: 93% (02-20-24 @ 16:00) (75% - 100%)  Wt(kg): --Vital Signs Last 24 Hrs  T(C): 36.8 (20 Feb 2024 15:00), Max: 39.6 (19 Feb 2024 22:05)  T(F): 98.3 (20 Feb 2024 15:00), Max: 103.2 (19 Feb 2024 22:05)  HR: 92 (20 Feb 2024 16:00) (60 - 108)  BP: 102/52 (20 Feb 2024 16:00) (70/43 - 149/63)  BP(mean): 74 (20 Feb 2024 16:00) (53 - 108)  RR: 27 (20 Feb 2024 16:00) (16 - 42)  SpO2: 93% (20 Feb 2024 16:00) (75% - 100%)    Parameters below as of 20 Feb 2024 02:04  Patient On (Oxygen Delivery Method): room air        PHYSICAL EXAM:  GENERAL: NAD, well-groomed, well-developed  HEAD:  Atraumatic, Normocephalic  EYES: EOMI, PERRLA, conjunctiva and sclera clear  ENMT:  Moist mucous membranes  NECK: Supple, No JVD,  CHEST/LUNG: Clear to auscultation bilaterally; No rales, rhonchi, wheezing, or rubs  HEART: Regular rate and rhythm; No murmurs, rubs, or gallops  ABDOMEN: Soft, Nontender, Nondistended; Bowel sounds present  NEURO: Alert & Oriented X3  EXTREMITIES: No LE edema, no calf tenderness  LYMPH: No lymphadenopathy noted  SKIN: No rashes or lesions    Consultant(s) Notes Reviewed:  [x ] YES  [ ] NO  Care Discussed with Consultants/Other Providers [ x] YES  [ ] NO    LABS:                        8.2    12.52 )-----------( 177      ( 20 Feb 2024 01:50 )             24.9     02-20    127<L>  |  97  |  19  ----------------------------<  96  4.8   |  19<L>  |  1.00    Ca    8.4      20 Feb 2024 14:16  Phos  2.7     02-20  Mg     1.8     02-20    TPro  5.5<L>  /  Alb  2.7<L>  /  TBili  0.4  /  DBili  x   /  AST  44<H>  /  ALT  12  /  AlkPhos  38<L>  02-20    PT/INR - ( 19 Feb 2024 14:59 )   PT: 12.9 sec;   INR: 1.18 ratio         PTT - ( 19 Feb 2024 14:59 )  PTT:34.3 sec  Urinalysis Basic - ( 20 Feb 2024 14:16 )    Color: x / Appearance: x / SG: x / pH: x  Gluc: 96 mg/dL / Ketone: x  / Bili: x / Urobili: x   Blood: x / Protein: x / Nitrite: x   Leuk Esterase: x / RBC: x / WBC x   Sq Epi: x / Non Sq Epi: x / Bacteria: x      CAPILLARY BLOOD GLUCOSE      POCT Blood Glucose.: 123 mg/dL (20 Feb 2024 01:38)        Urinalysis Basic - ( 20 Feb 2024 14:16 )    Color: x / Appearance: x / SG: x / pH: x  Gluc: 96 mg/dL / Ketone: x  / Bili: x / Urobili: x   Blood: x / Protein: x / Nitrite: x   Leuk Esterase: x / RBC: x / WBC x   Sq Epi: x / Non Sq Epi: x / Bacteria: x        RADIOLOGY & ADDITIONAL TESTS:    Imaging Personally Reviewed:  [x ] YES  [ ] NO ICU Transfer Note    Transfer from: ICU    Transfer to: ( x ) Medicine    (  ) Telemetry     (   ) RCU        (    ) Palliative         (   ) Stroke Unit          (   ) __________________    Accepting Physician: Dr. David Schmidt   Signout given to:     89 yo Filipino speaking M w/ PMHx of HTN, HLD, 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 Chase (Urology), radiation cystitis, urethral strictures, recent admission (12/28-1/1/24) for hypoxia post covid infection, atypical chest pain and radiation cystitis presents for 2 days of malaise, poor appetite, rigors, and 1 episode of vomiting this morning. Daughter also reports patient has been more lethargic that usual. Patient has chronic chinchilla that was last exchanged 2-3 weeks ago. Daughter stated he was recently treated for a UTI outpatient and completed a course of cefpoxodime about 1 week ago.    In the ED, patient febrile to 103.1, hypotensive to 80s/30s, and tachycardic. Patient was given 4 L IVF, Zosyn, and azithromycin in ED. Labs remarkable for leukocytosis, stable anemia, hyponatremia (Na 126). UA grossly positive. He grew pansenstive cultures, started on cefepime. Required levophed on admission to micu, currently on midodrine.     Assessment and Plan:  89-year-old Belarusian-speaking man with PMHx HTN, HLD, 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 Chase [facility], radiation cystitis, urethral strictures, radiation cystitis, presents for 2 days of malaise, poor appetite, rigors, and vomiting, admitted to MICU for hypotension requiring pressors, found to have septic shock 2/2 urosepsis with klebsiella bacteremia     Neuro:  - AAOx3  - Patient has no neuro deficitis     Cardiovascular  - Septic shock due to UTI and bacteremia  - Currently on levephed   - Maintain a MAP goal <60  - Bedside POCUS showed normal LV and RV function. Small IVC   - c/w Midodrine 10mg TID to help wean of IV vasopressors  - Can consider additional IV fluids    Pulmonary  - Saturating well on Room Air     GI  - PO Diet     Renal   UTI  - Due to chronic indwelling chinchilla   - UA grossly positive  - c/w Cefepime for abx treatment   - chinchilla replaced     Hyponatremia  - Patient with Na of 126, now corrected to 132  - Check BMP at 2pm to assess for over-correction  ID  - UTI on UA  - Klebsiella bacteremia on blood cx from today   - c/w cefepime   - f/u sensitiveies  - repeat Blood Cx in the AM     Endo  - check A1C and TSH    Heme   - VTE PPX with Hep SQ    Ethics   Full code  Dispo  - wean off vasopressors and d/c to floors .     []f/u am blood cultures ICU Transfer Note    Transfer from: ICU    Transfer to: ( x ) Medicine    (  ) Telemetry     (   ) RCU        (    ) Palliative         (   ) Stroke Unit          (   ) __________________    Accepting Physician: Dr. David Schmidt   Signout given to:     89 yo Afghan speaking M w/ PMHx of HTN, HLD, 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 Chase (Urology), radiation cystitis, urethral strictures, recent admission (12/28-1/1/24) for hypoxia post covid infection, atypical chest pain and radiation cystitis presents for 2 days of malaise, poor appetite, rigors, and 1 episode of vomiting this morning. Daughter also reports patient has been more lethargic that usual. Patient has chronic chinchilla that was last exchanged 2-3 weeks ago. Daughter stated he was recently treated for a UTI outpatient and completed a course of cefpoxodime about 1 week ago.    In the ED, patient febrile to 103.1, hypotensive to 80s/30s, and tachycardic. Patient was given 4 L IVF, Zosyn, and azithromycin in ED. Labs remarkable for leukocytosis, stable anemia, hyponatremia (Na 126). UA grossly positive. He grew pansenstive cultures, started on cefepime. Required levophed on admission to micu, currently on midodrine.     Assessment and Plan:  89-year-old Brazilian-speaking man with PMHx HTN, HLD, 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 Chase [facility], radiation cystitis, urethral strictures, radiation cystitis, presents for 2 days of malaise, poor appetite, rigors, and vomiting, admitted to MICU for hypotension requiring pressors, found to have septic shock 2/2 urosepsis with klebsiella bacteremia     Neuro:  - AAOx3  - Patient has no neuro deficitis     Cardiovascular  - Septic shock due to UTI and bacteremia  - Currently on levephed   - Maintain a MAP goal <60  - Bedside POCUS showed normal LV and RV function. Small IVC   - c/w Midodrine 10mg TID to help wean of IV vasopressors  - Can consider additional IV fluids    Pulmonary  - Saturating well on Room Air     GI  - PO Diet     Renal   UTI  - Due to chronic indwelling chinchilla   - UA grossly positive  - c/w Cefepime for abx treatment   - chinchilla replaced     Hyponatremia  - Patient with Na of 126, now corrected to 132  - Check BMP at 2pm to assess for over-correction  ID  - UTI on UA  - Klebsiella bacteremia on blood cx from today   - c/w cefepime   - f/u sensitiveies  - repeat Blood Cx in the AM     Endo  - check A1C and TSH    Heme   - VTE PPX with Hep SQ    Ethics   Full code  Dispo  - wean off vasopressors and d/c to floors .     [ ] f/u am blood cultures  [ ] Na trend, f/u urine studies ICU Transfer Note    Transfer from: ICU    Transfer to: ( x ) Medicine    (  ) Telemetry     (   ) RCU        (    ) Palliative         (   ) Stroke Unit          (   ) __________________    Accepting Physician: Dr. David Schmidt   Signout given to: Ml Coombs    89 yo Australian speaking M w/ PMHx of HTN, HLD, 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 Chase (Urology), radiation cystitis, urethral strictures, recent admission (12/28-1/1/24) for hypoxia post covid infection, atypical chest pain and radiation cystitis presents for 2 days of malaise, poor appetite, rigors, and 1 episode of vomiting this morning. Daughter also reports patient has been more lethargic that usual. Patient has chronic chinchilla that was last exchanged 2-3 weeks ago. Daughter stated he was recently treated for a UTI outpatient and completed a course of cefpoxodime about 1 week ago.    In the ED, patient febrile to 103.1, hypotensive to 80s/30s, and tachycardic. Patient was given 4 L IVF, Zosyn, and azithromycin in ED. Labs remarkable for leukocytosis, stable anemia, hyponatremia (Na 126). UA grossly positive. He grew pansenstive cultures, started on cefepime. Required levophed on admission to micu, currently on midodrine.     Assessment and Plan:  89-year-old Syrian-speaking man with PMHx HTN, HLD, 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 Chase [facility], radiation cystitis, urethral strictures, radiation cystitis, presents for 2 days of malaise, poor appetite, rigors, and vomiting, admitted to MICU for hypotension requiring pressors, found to have septic shock 2/2 urosepsis with klebsiella bacteremia     Neuro:  - AAOx3  - Patient has no neuro deficitis     Cardiovascular  - Septic shock due to UTI and bacteremia  - Currently on levephed   - Maintain a MAP goal <60  - Bedside POCUS showed normal LV and RV function. Small IVC   - c/w Midodrine 10mg TID to help wean of IV vasopressors  - Can consider additional IV fluids    Pulmonary  - Saturating well on Room Air     GI  - PO Diet     Renal   UTI  - Due to chronic indwelling chinchilla   - UA grossly positive  - c/w Cefepime for abx treatment   - chinchilla replaced     Hyponatremia  - Patient with Na of 126, now corrected to 132  - Check BMP at 2pm to assess for over-correction  ID  - UTI on UA  - Klebsiella bacteremia on blood cx from today   - c/w cefepime   - f/u sensitiveies  - repeat Blood Cx in the AM     Endo  - check A1C and TSH    Heme   - VTE PPX with Hep SQ    Ethics   Full code  Dispo      To Do's:   [ ] wean off vasopressors   [ ] f/u am blood cultures  [ ] Na trend, f/u urine studies

## 2024-02-20 NOTE — H&P ADULT - ASSESSMENT
89-year-old Iranian-speaking male past medical history HTN, HLD, 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 S/p 4L of fluids and Midodrine in ED. Found to have Urosepsis.

## 2024-02-20 NOTE — PROGRESS NOTE ADULT - ASSESSMENT
89-year-old Indian-speaking male past medical history HTN, HLD, 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 S/p 4L of fluids and Midodrine in ED. Found to have Urosepsis.  89-year-old Sudanese-speaking male past medical history HTN, HLD, 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 S/p 4L of fluids and Midodrine in ED. Found to have Urosepsis.

## 2024-02-20 NOTE — H&P ADULT - HISTORY OF PRESENT ILLNESS
89-year-old Divehi-speaking male past medical history HTN, HLD, 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 Daughter also reports patient has been more lethargic that usual. Patient has chronic chinchilla that was last exchanged 2-3 weeks ago. Daughter states he was recently treated for a UTI outpatient and completed a course of cefpoxodime about 1 week ago.    In the ED, patient febrile to 103.1, hypotensive to 80s/30s, and tachycardic. Patient was given 4 L IVF, vancomycin, Zosyn, and azithromycin in ED. Labs remarkable for leukocytosis, stable anemia, hyponatremia (Na 126). UA grossly positive.

## 2024-02-20 NOTE — H&P ADULT - NSHPPHYSICALEXAM_GEN_ALL_CORE
VITALS:   T(C): 37.2 (02-20-24 @ 01:15), Max: 39.6 (02-19-24 @ 22:05)  HR: 82 (02-20-24 @ 01:15) (80 - 110)  BP: 70/43 (02-20-24 @ 01:15) (70/43 - 144/55)  RR: 28 (02-20-24 @ 01:15) (18 - 30)  SpO2: 97% (02-20-24 @ 01:15) (93% - 99%)    GENERAL: NAD, lying in bed comfortably  HEAD:  Atraumatic, Normocephalic  EYES: EOMI, PERRLA, conjunctiva and sclera clear  ENT: dry mucous membranes  CHEST/LUNG: Clear to auscultation bilaterally; No rales, rhonchi, wheezing, or rubs. Unlabored respirations  HEART: Regular rate and rhythm; No murmurs, rubs, or gallops  ABDOMEN: BSx4; Soft, nontender, nondistended  EXTREMITIES:  2+ Peripheral Pulses, brisk capillary refill. No clubbing, cyanosis, or edema, extremities warm  NERVOUS SYSTEM:  A&Ox3, no focal deficits   SKIN: No rashes or lesions  psych: normal behavior, normal affect

## 2024-02-20 NOTE — RAPID RESPONSE TEAM SUMMARY - NSADDTLFINDINGSRRT_GEN_ALL_CORE
Pt already received 4L IVF and 20 midodrine before rapid and BP in 70s/30s. Decided to start levo. MICU called and accepted the patient. Pt transferred to MICU.

## 2024-02-20 NOTE — PROCEDURE NOTE - ADDITIONAL PROCEDURE DETAILS
Called by primary team for difficult chinchilla exchange in pt with hx of urethral stricture. As per patient, always use 24f cathete. Patient was seen and examined at bedside and was prepped and draped with sterile technique. Lidocaine jelly 2% inserted for local anesthetic. 24f (catheter) inserted through urethral meatus with immediate return of cloudy urine. Balloon inflated with 10cc sterile water and chinchilla was secured to leg. Patient tolerated procedure well. Chinchilla management per primary team. Called by primary team for difficult chinchilla exchange in pt with hx of urethral stricture. As per patient, always use 24f 3 way cathete. Patient was seen and examined at bedside and was prepped and draped with sterile technique. Lidocaine jelly 2% inserted for local anesthetic. 24f (catheter) inserted through urethral meatus with immediate return of cloudy urine. Balloon inflated with 30cc sterile water and chinchilla was secured to leg. Patient tolerated procedure well. Chinchilla management per primary team.

## 2024-02-20 NOTE — H&P ADULT - NSHPREVIEWOFSYSTEMS_GEN_ALL_CORE
REVIEW OF SYSTEMS:    CONSTITUTIONAL: +f/c  EYES/ENT: No visual changes;  No vertigo or throat pain   NECK: No pain or stiffness  RESPIRATORY: No cough, wheezing, hemoptysis; No shortness of breath  CARDIOVASCULAR: No chest pain or palpitations  GASTROINTESTINAL: No abdominal or epigastric pain. No nausea, vomiting, or hematemesis; No diarrhea or constipation. No melena or hematochezia.  GENITOURINARY: No dysuria, frequency or hematuria  NEUROLOGICAL: No numbness or weakness  SKIN: No itching, burning, rashes, or lesions

## 2024-02-20 NOTE — H&P ADULT - PROBLEM SELECTOR PLAN 2
hx of prostate cancer metastatic to liver, lung s/p partial pneumonectomy  On Erleado at home  urology follow up

## 2024-02-20 NOTE — H&P ADULT - NSHPLABSRESULTS_GEN_ALL_CORE
.  LABS:                         9.8    10.61 )-----------( 219      ( 19 Feb 2024 14:59 )             29.4     02-19    126<L>  |  99  |  21  ----------------------------<  133<H>  3.7   |  22  |  1.21    Ca    8.9      19 Feb 2024 14:59    TPro  6.7  /  Alb  3.5  /  TBili  0.5  /  DBili  x   /  AST  41<H>  /  ALT  11  /  AlkPhos  51  02-19    PT/INR - ( 19 Feb 2024 14:59 )   PT: 12.9 sec;   INR: 1.18 ratio         PTT - ( 19 Feb 2024 14:59 )  PTT:34.3 sec  Urinalysis Basic - ( 19 Feb 2024 14:59 )    Color: x / Appearance: x / SG: x / pH: x  Gluc: 133 mg/dL / Ketone: x  / Bili: x / Urobili: x   Blood: x / Protein: x / Nitrite: x   Leuk Esterase: x / RBC: x / WBC x   Sq Epi: x / Non Sq Epi: x / Bacteria: x            RADIOLOGY, EKG & ADDITIONAL TESTS: Reviewed.

## 2024-02-20 NOTE — CHART NOTE - NSCHARTNOTEFT_GEN_A_CORE
Called by Medicine for chinchilla catheter exchange. Saw patient in the ED and RRT was called due to low BP. Discussed with RRT, they said patient will transfer to MICU and suggested exchange the catheter in MICU once patient is more stable. Will back to MICU to exchange the catheter once patient arrives there.

## 2024-02-20 NOTE — H&P ADULT - NSHPSOCIALHISTORY_GEN_ALL_CORE
Lives at home with wife. Denies alcohol, recreational drug use. 200 pack-year (not a typo) smoking history, quit 30 years ago.

## 2024-02-20 NOTE — ED ADULT NURSE REASSESSMENT NOTE - NS ED NURSE REASSESS COMMENT FT1
RRT called at 01:36 AM for hypotension. PT started on norepinephrine and transported to MICU at this time with EDT, MICU RN Kayy, and RRT MD's on zoll monitor. See Rapid Response Team Record in chart.

## 2024-02-20 NOTE — H&P ADULT - PROBLEM SELECTOR PLAN 1
In the ED, patient febrile to 103.1, hypotensive to 80s/30s, and tachycardic. Patient was given 4 L IVF  U/A grossly positive for UTI  Likely septic shock 2/2 UTI  Hx of Pseudomonas and Kle In the ED, patient febrile to 103.1, hypotensive to 80s/30s, and tachycardic. Patient was given 4 L IVF  U/A grossly positive for UTI  Likely septic shock 2/2 UTI  Hx of Pseudomonas and Klebsiella resistant to Zosyn    Plan:   Blood/Ucx  Cefepime bacteremia dosing   Source control with urology chinchilla exchange given hx of strictures, said they will do tonight after BP stabilizes   Levophed and transfer to MICU

## 2024-02-20 NOTE — H&P ADULT - CONVERSATION DETAILS
Discussed with patient and daughter Jessee ([phone]). Wants to be full code with all measures needed taken.

## 2024-02-21 DIAGNOSIS — D64.9 ANEMIA, UNSPECIFIED: ICD-10-CM

## 2024-02-21 DIAGNOSIS — N35.919 UNSPECIFIED URETHRAL STRICTURE, MALE, UNSPECIFIED SITE: ICD-10-CM

## 2024-02-21 LAB
-  AMPICILLIN/SULBACTAM: SIGNIFICANT CHANGE UP
-  AMPICILLIN: SIGNIFICANT CHANGE UP
-  AZTREONAM: SIGNIFICANT CHANGE UP
-  CEFAZOLIN: SIGNIFICANT CHANGE UP
-  CEFEPIME: SIGNIFICANT CHANGE UP
-  CEFOXITIN: SIGNIFICANT CHANGE UP
-  CEFTRIAXONE: SIGNIFICANT CHANGE UP
-  CIPROFLOXACIN: SIGNIFICANT CHANGE UP
-  ERTAPENEM: SIGNIFICANT CHANGE UP
-  GENTAMICIN: SIGNIFICANT CHANGE UP
-  IMIPENEM: SIGNIFICANT CHANGE UP
-  LEVOFLOXACIN: SIGNIFICANT CHANGE UP
-  MEROPENEM: SIGNIFICANT CHANGE UP
-  PIPERACILLIN/TAZOBACTAM: SIGNIFICANT CHANGE UP
-  TOBRAMYCIN: SIGNIFICANT CHANGE UP
-  TRIMETHOPRIM/SULFAMETHOXAZOLE: SIGNIFICANT CHANGE UP
ANION GAP SERPL CALC-SCNC: 8 MMOL/L — SIGNIFICANT CHANGE UP (ref 5–17)
ANION GAP SERPL CALC-SCNC: 8 MMOL/L — SIGNIFICANT CHANGE UP (ref 5–17)
APPEARANCE UR: ABNORMAL
BACTERIA # UR AUTO: NEGATIVE /HPF — SIGNIFICANT CHANGE UP
BASOPHILS # BLD AUTO: 0 K/UL — SIGNIFICANT CHANGE UP (ref 0–0.2)
BASOPHILS NFR BLD AUTO: 0 % — SIGNIFICANT CHANGE UP (ref 0–2)
BILIRUB UR-MCNC: NEGATIVE — SIGNIFICANT CHANGE UP
BUN SERPL-MCNC: 19 MG/DL — SIGNIFICANT CHANGE UP (ref 7–23)
BUN SERPL-MCNC: 20 MG/DL — SIGNIFICANT CHANGE UP (ref 7–23)
CALCIUM SERPL-MCNC: 8.1 MG/DL — LOW (ref 8.4–10.5)
CALCIUM SERPL-MCNC: 8.3 MG/DL — LOW (ref 8.4–10.5)
CAST: 13 /LPF — HIGH (ref 0–4)
CHLORIDE SERPL-SCNC: 95 MMOL/L — LOW (ref 96–108)
CHLORIDE SERPL-SCNC: 97 MMOL/L — SIGNIFICANT CHANGE UP (ref 96–108)
CO2 SERPL-SCNC: 22 MMOL/L — SIGNIFICANT CHANGE UP (ref 22–31)
CO2 SERPL-SCNC: 23 MMOL/L — SIGNIFICANT CHANGE UP (ref 22–31)
COLOR SPEC: YELLOW — SIGNIFICANT CHANGE UP
CREAT SERPL-MCNC: 0.99 MG/DL — SIGNIFICANT CHANGE UP (ref 0.5–1.3)
CREAT SERPL-MCNC: 1.02 MG/DL — SIGNIFICANT CHANGE UP (ref 0.5–1.3)
CULTURE RESULTS: ABNORMAL
CULTURE RESULTS: SIGNIFICANT CHANGE UP
DIFF PNL FLD: ABNORMAL
EGFR: 70 ML/MIN/1.73M2 — SIGNIFICANT CHANGE UP
EGFR: 72 ML/MIN/1.73M2 — SIGNIFICANT CHANGE UP
EOSINOPHIL # BLD AUTO: 0 K/UL — SIGNIFICANT CHANGE UP (ref 0–0.5)
EOSINOPHIL NFR BLD AUTO: 0 % — SIGNIFICANT CHANGE UP (ref 0–6)
FERRITIN SERPL-MCNC: 162 NG/ML — SIGNIFICANT CHANGE UP (ref 30–400)
FOLATE SERPL-MCNC: >20 NG/ML — SIGNIFICANT CHANGE UP
GLUCOSE SERPL-MCNC: 103 MG/DL — HIGH (ref 70–99)
GLUCOSE SERPL-MCNC: 107 MG/DL — HIGH (ref 70–99)
GLUCOSE UR QL: NEGATIVE MG/DL — SIGNIFICANT CHANGE UP
HCT VFR BLD CALC: 23.6 % — LOW (ref 39–50)
HGB BLD-MCNC: 7.8 G/DL — LOW (ref 13–17)
KETONES UR-MCNC: NEGATIVE MG/DL — SIGNIFICANT CHANGE UP
LEUKOCYTE ESTERASE UR-ACNC: ABNORMAL
LYMPHOCYTES # BLD AUTO: 1.25 K/UL — SIGNIFICANT CHANGE UP (ref 1–3.3)
LYMPHOCYTES # BLD AUTO: 13.9 % — SIGNIFICANT CHANGE UP (ref 13–44)
MANUAL SMEAR VERIFICATION: SIGNIFICANT CHANGE UP
MCHC RBC-ENTMCNC: 32 PG — SIGNIFICANT CHANGE UP (ref 27–34)
MCHC RBC-ENTMCNC: 33.1 GM/DL — SIGNIFICANT CHANGE UP (ref 32–36)
MCV RBC AUTO: 96.7 FL — SIGNIFICANT CHANGE UP (ref 80–100)
METHOD TYPE: SIGNIFICANT CHANGE UP
MONOCYTES # BLD AUTO: 0.86 K/UL — SIGNIFICANT CHANGE UP (ref 0–0.9)
MONOCYTES NFR BLD AUTO: 9.6 % — SIGNIFICANT CHANGE UP (ref 2–14)
NEUTROPHILS # BLD AUTO: 6.87 K/UL — SIGNIFICANT CHANGE UP (ref 1.8–7.4)
NEUTROPHILS NFR BLD AUTO: 74.8 % — SIGNIFICANT CHANGE UP (ref 43–77)
NEUTS BAND # BLD: 1.7 % — SIGNIFICANT CHANGE UP (ref 0–8)
NITRITE UR-MCNC: NEGATIVE — SIGNIFICANT CHANGE UP
ORGANISM # SPEC MICROSCOPIC CNT: ABNORMAL
PH UR: 5.5 — SIGNIFICANT CHANGE UP (ref 5–8)
PLAT MORPH BLD: NORMAL — SIGNIFICANT CHANGE UP
PLATELET # BLD AUTO: 164 K/UL — SIGNIFICANT CHANGE UP (ref 150–400)
POTASSIUM SERPL-MCNC: 3.7 MMOL/L — SIGNIFICANT CHANGE UP (ref 3.5–5.3)
POTASSIUM SERPL-MCNC: 3.8 MMOL/L — SIGNIFICANT CHANGE UP (ref 3.5–5.3)
POTASSIUM SERPL-SCNC: 3.7 MMOL/L — SIGNIFICANT CHANGE UP (ref 3.5–5.3)
POTASSIUM SERPL-SCNC: 3.8 MMOL/L — SIGNIFICANT CHANGE UP (ref 3.5–5.3)
PROT UR-MCNC: 30 MG/DL
RBC # BLD: 2.44 M/UL — LOW (ref 4.2–5.8)
RBC # FLD: 13.8 % — SIGNIFICANT CHANGE UP (ref 10.3–14.5)
RBC BLD AUTO: SIGNIFICANT CHANGE UP
RBC CASTS # UR COMP ASSIST: 182 /HPF — HIGH (ref 0–4)
REVIEW: SIGNIFICANT CHANGE UP
SODIUM SERPL-SCNC: 125 MMOL/L — LOW (ref 135–145)
SODIUM SERPL-SCNC: 128 MMOL/L — LOW (ref 135–145)
SP GR SPEC: 1.01 — SIGNIFICANT CHANGE UP (ref 1–1.03)
SPECIMEN SOURCE: SIGNIFICANT CHANGE UP
SPECIMEN SOURCE: SIGNIFICANT CHANGE UP
SQUAMOUS # UR AUTO: 1 /HPF — SIGNIFICANT CHANGE UP (ref 0–5)
T4 AB SER-ACNC: 5.5 UG/DL — SIGNIFICANT CHANGE UP (ref 4.6–12)
TSH SERPL-MCNC: 5.66 UIU/ML — HIGH (ref 0.27–4.2)
UROBILINOGEN FLD QL: 0.2 MG/DL — SIGNIFICANT CHANGE UP (ref 0.2–1)
VIT B12 SERPL-MCNC: 716 PG/ML — SIGNIFICANT CHANGE UP (ref 232–1245)
WBC # BLD: 8.98 K/UL — SIGNIFICANT CHANGE UP (ref 3.8–10.5)
WBC # FLD AUTO: 8.98 K/UL — SIGNIFICANT CHANGE UP (ref 3.8–10.5)
WBC UR QL: 89 /HPF — HIGH (ref 0–5)

## 2024-02-21 PROCEDURE — 99233 SBSQ HOSP IP/OBS HIGH 50: CPT

## 2024-02-21 RX ORDER — SODIUM CHLORIDE 9 MG/ML
1000 INJECTION INTRAMUSCULAR; INTRAVENOUS; SUBCUTANEOUS
Refills: 0 | Status: DISCONTINUED | OUTPATIENT
Start: 2024-02-21 | End: 2024-02-27

## 2024-02-21 RX ORDER — LIDOCAINE HCL 20 MG/ML
1 VIAL (ML) INJECTION ONCE
Refills: 0 | Status: COMPLETED | OUTPATIENT
Start: 2024-02-21 | End: 2024-02-21

## 2024-02-21 RX ORDER — NYSTATIN CREAM 100000 [USP'U]/G
1 CREAM TOPICAL
Refills: 0 | Status: DISCONTINUED | OUTPATIENT
Start: 2024-02-21 | End: 2024-02-27

## 2024-02-21 RX ADMIN — SERTRALINE 25 MILLIGRAM(S): 25 TABLET, FILM COATED ORAL at 11:40

## 2024-02-21 RX ADMIN — CEFEPIME 100 MILLIGRAM(S): 1 INJECTION, POWDER, FOR SOLUTION INTRAMUSCULAR; INTRAVENOUS at 05:48

## 2024-02-21 RX ADMIN — Medication 1 MILLILITER(S): at 04:02

## 2024-02-21 RX ADMIN — MIDODRINE HYDROCHLORIDE 10 MILLIGRAM(S): 2.5 TABLET ORAL at 11:40

## 2024-02-21 RX ADMIN — NYSTATIN CREAM 1 APPLICATION(S): 100000 CREAM TOPICAL at 05:49

## 2024-02-21 RX ADMIN — HEPARIN SODIUM 5000 UNIT(S): 5000 INJECTION INTRAVENOUS; SUBCUTANEOUS at 21:19

## 2024-02-21 RX ADMIN — Medication 650 MILLIGRAM(S): at 18:12

## 2024-02-21 RX ADMIN — NYSTATIN CREAM 1 APPLICATION(S): 100000 CREAM TOPICAL at 17:22

## 2024-02-21 RX ADMIN — Medication 50 MICROGRAM(S): at 05:48

## 2024-02-21 RX ADMIN — CEFEPIME 100 MILLIGRAM(S): 1 INJECTION, POWDER, FOR SOLUTION INTRAMUSCULAR; INTRAVENOUS at 17:21

## 2024-02-21 RX ADMIN — Medication 5 MILLIGRAM(S): at 11:40

## 2024-02-21 RX ADMIN — SODIUM CHLORIDE 100 MILLILITER(S): 9 INJECTION INTRAMUSCULAR; INTRAVENOUS; SUBCUTANEOUS at 21:19

## 2024-02-21 RX ADMIN — Medication 650 MILLIGRAM(S): at 16:59

## 2024-02-21 RX ADMIN — HEPARIN SODIUM 5000 UNIT(S): 5000 INJECTION INTRAVENOUS; SUBCUTANEOUS at 14:19

## 2024-02-21 RX ADMIN — ATORVASTATIN CALCIUM 40 MILLIGRAM(S): 80 TABLET, FILM COATED ORAL at 21:18

## 2024-02-21 RX ADMIN — MIDODRINE HYDROCHLORIDE 10 MILLIGRAM(S): 2.5 TABLET ORAL at 05:48

## 2024-02-21 RX ADMIN — MIDODRINE HYDROCHLORIDE 10 MILLIGRAM(S): 2.5 TABLET ORAL at 16:59

## 2024-02-21 RX ADMIN — SODIUM CHLORIDE 100 MILLILITER(S): 9 INJECTION INTRAMUSCULAR; INTRAVENOUS; SUBCUTANEOUS at 14:39

## 2024-02-21 RX ADMIN — HEPARIN SODIUM 5000 UNIT(S): 5000 INJECTION INTRAVENOUS; SUBCUTANEOUS at 05:48

## 2024-02-21 NOTE — PHYSICAL THERAPY INITIAL EVALUATION ADULT - NSPTDISCHREC_GEN_A_CORE
TBD after functional eval completed if pt is to go home, will require assistance with mobility and ADL"s. would recommend a RW and home PT/Sub-acute Rehab

## 2024-02-21 NOTE — PROGRESS NOTE ADULT - ASSESSMENT
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 S/p 4L of fluids and Midodrine in ED. Found to have Urosepsis.

## 2024-02-21 NOTE — CHART NOTE - NSCHARTNOTEFT_GEN_A_CORE
called by medical attending to see patient with non draining chinchilla catheter and hematuria.  on my arrival patient no acute distress  bladder non palpable  dark red urine in tubing  24F 3way chinchilla in place- irrigated with NS ~10cc clot evacuated  urine clear afterwards.    pt was observed and then reevaluated by me ~1 hour later  at this time the chinchilla is draining clear yellow urine    keep chinchilla in place  please recall if hematuria with clots appear

## 2024-02-21 NOTE — PHYSICAL THERAPY INITIAL EVALUATION ADULT - ADDITIONAL COMMENTS
Pt lives with spouse in an apartment  with 4 steps to enter, + handrail and 15 steps to living area , + handrail.

## 2024-02-21 NOTE — PHYSICAL THERAPY INITIAL EVALUATION ADULT - PLANNED THERAPY INTERVENTIONS, PT EVAL
Stairs Goal: Pt will go up/down 10 steps independently with handrail in 2 weeks./balance training/bed mobility training/gait training/strengthening/transfer training

## 2024-02-21 NOTE — PHYSICAL THERAPY INITIAL EVALUATION ADULT - IMPAIRMENTS FOUND, PT EVAL
gait, locomotion, and balance/muscle strength

## 2024-02-21 NOTE — PHYSICAL THERAPY INITIAL EVALUATION ADULT - PERTINENT HX OF CURRENT PROBLEM, REHAB EVAL
89 y/o M with PMH: HTN, HLD, prostate CA s/p prostatectomy, chronic indwelling Chinchilla prostate cancer status post radiation and prostatectomy, prior urethral strictures, radiation cystitis, chronic indwelling chinchilla who presents for 2 days of malaise, poor appetite, rigors, and 1 episode of vomiting yesterday. Daughter also reports patient has been more lethargic that usual. Pt has chronic chinchilla that was last exchanged 2-3 weeks ago. Daughter states he was recently treated for a UTI and completed a course of cefpoxodime about 2 weeks ago. In the ED, patient febrile to 103.1, hypotensive to 80s/30s, and tachycardic. Patient was given 2.5 L IVF, vancomycin, Zosyn, and azithromycin in ED. Labs remarkable for leukocytosis, stable anemia, hyponatremia (Na 126). UA grossly positive. MICU consulted. 89 y/o M with PMH: HTN, HLD, prostate CA s/p prostatectomy, chronic indwelling Chinchilla prostate cancer status post radiation and prostatectomy, prior urethral strictures, radiation cystitis, chronic indwelling chinchilla who presents for 2 days of malaise, poor appetite, rigors, and 1 episode of vomiting yesterday. Daughter also reports patient has been more lethargic that usual. Pt has chronic chinchilla that was last exchanged 2-3 weeks ago. Daughter states he was recently treated for a UTI and completed a course of cefpoxodime about 2 weeks ago. In the ED, patient febrile to 103.1, hypotensive to 80s/30s, and tachycardic. Patient was given 2.5 L IVF, vancomycin, Zosyn, and azithromycin in ED. Labs remarkable for leukocytosis, stable anemia, hyponatremia (Na 126). UA grossly positive. MICU consulted and declined. S/p 4L of fluids and Midodrine in ED. Found to have Urosepsis. RRT in ED for hypotension and MICU reconsulted. CXR w/ hazy left basilar opacity may reflect atelectasis. Urology consulted for chinchilla exchange given hx of strictures, done in ICU. Pt admitted to MICU for hypotension requiring pressors, found to have septic shock 2/2 urosepsis with klebsiella bacteremia.  Bedside POCUS showed normal LV and RV function. Small IVC.  CTA from 12/28 showing 2.4cm LLL nodular opacity.

## 2024-02-22 LAB
ANION GAP SERPL CALC-SCNC: 10 MMOL/L — SIGNIFICANT CHANGE UP (ref 5–17)
BLD GP AB SCN SERPL QL: NEGATIVE — SIGNIFICANT CHANGE UP
BUN SERPL-MCNC: 23 MG/DL — SIGNIFICANT CHANGE UP (ref 7–23)
CALCIUM SERPL-MCNC: 8.2 MG/DL — LOW (ref 8.4–10.5)
CHLORIDE SERPL-SCNC: 97 MMOL/L — SIGNIFICANT CHANGE UP (ref 96–108)
CO2 SERPL-SCNC: 21 MMOL/L — LOW (ref 22–31)
CREAT SERPL-MCNC: 0.91 MG/DL — SIGNIFICANT CHANGE UP (ref 0.5–1.3)
EGFR: 80 ML/MIN/1.73M2 — SIGNIFICANT CHANGE UP
GLUCOSE SERPL-MCNC: 81 MG/DL — SIGNIFICANT CHANGE UP (ref 70–99)
HCT VFR BLD CALC: 21.7 % — LOW (ref 39–50)
HGB BLD-MCNC: 7.2 G/DL — LOW (ref 13–17)
MCHC RBC-ENTMCNC: 32 PG — SIGNIFICANT CHANGE UP (ref 27–34)
MCHC RBC-ENTMCNC: 33.2 GM/DL — SIGNIFICANT CHANGE UP (ref 32–36)
MCV RBC AUTO: 96.4 FL — SIGNIFICANT CHANGE UP (ref 80–100)
NRBC # BLD: 0 /100 WBCS — SIGNIFICANT CHANGE UP (ref 0–0)
PLATELET # BLD AUTO: 163 K/UL — SIGNIFICANT CHANGE UP (ref 150–400)
POTASSIUM SERPL-MCNC: 3.8 MMOL/L — SIGNIFICANT CHANGE UP (ref 3.5–5.3)
POTASSIUM SERPL-SCNC: 3.8 MMOL/L — SIGNIFICANT CHANGE UP (ref 3.5–5.3)
RBC # BLD: 2.25 M/UL — LOW (ref 4.2–5.8)
RBC # FLD: 13.9 % — SIGNIFICANT CHANGE UP (ref 10.3–14.5)
RH IG SCN BLD-IMP: POSITIVE — SIGNIFICANT CHANGE UP
SODIUM SERPL-SCNC: 128 MMOL/L — LOW (ref 135–145)
WBC # BLD: 6.12 K/UL — SIGNIFICANT CHANGE UP (ref 3.8–10.5)
WBC # FLD AUTO: 6.12 K/UL — SIGNIFICANT CHANGE UP (ref 3.8–10.5)

## 2024-02-22 PROCEDURE — 99233 SBSQ HOSP IP/OBS HIGH 50: CPT

## 2024-02-22 RX ORDER — SODIUM CHLORIDE 0.65 %
1 AEROSOL, SPRAY (ML) NASAL
Refills: 0 | Status: DISCONTINUED | OUTPATIENT
Start: 2024-02-22 | End: 2024-02-27

## 2024-02-22 RX ADMIN — ATORVASTATIN CALCIUM 40 MILLIGRAM(S): 80 TABLET, FILM COATED ORAL at 21:23

## 2024-02-22 RX ADMIN — NYSTATIN CREAM 1 APPLICATION(S): 100000 CREAM TOPICAL at 05:36

## 2024-02-22 RX ADMIN — CEFEPIME 100 MILLIGRAM(S): 1 INJECTION, POWDER, FOR SOLUTION INTRAMUSCULAR; INTRAVENOUS at 05:35

## 2024-02-22 RX ADMIN — Medication 5 MILLIGRAM(S): at 12:22

## 2024-02-22 RX ADMIN — Medication 50 MICROGRAM(S): at 05:35

## 2024-02-22 RX ADMIN — NYSTATIN CREAM 1 APPLICATION(S): 100000 CREAM TOPICAL at 17:59

## 2024-02-22 RX ADMIN — CEFEPIME 100 MILLIGRAM(S): 1 INJECTION, POWDER, FOR SOLUTION INTRAMUSCULAR; INTRAVENOUS at 17:59

## 2024-02-22 RX ADMIN — MIDODRINE HYDROCHLORIDE 10 MILLIGRAM(S): 2.5 TABLET ORAL at 05:36

## 2024-02-22 RX ADMIN — HEPARIN SODIUM 5000 UNIT(S): 5000 INJECTION INTRAVENOUS; SUBCUTANEOUS at 14:05

## 2024-02-22 RX ADMIN — MIDODRINE HYDROCHLORIDE 10 MILLIGRAM(S): 2.5 TABLET ORAL at 12:21

## 2024-02-22 RX ADMIN — SERTRALINE 25 MILLIGRAM(S): 25 TABLET, FILM COATED ORAL at 12:22

## 2024-02-22 RX ADMIN — HEPARIN SODIUM 5000 UNIT(S): 5000 INJECTION INTRAVENOUS; SUBCUTANEOUS at 21:23

## 2024-02-22 RX ADMIN — HEPARIN SODIUM 5000 UNIT(S): 5000 INJECTION INTRAVENOUS; SUBCUTANEOUS at 05:36

## 2024-02-22 RX ADMIN — MIDODRINE HYDROCHLORIDE 10 MILLIGRAM(S): 2.5 TABLET ORAL at 18:01

## 2024-02-22 NOTE — PROGRESS NOTE ADULT - ASSESSMENT
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 Chase [facility], radiation cystitis, urethral strictures, radiation cystitis, presented for septic shock requiring MICU now downgraded to floors for further management.

## 2024-02-23 LAB
ANION GAP SERPL CALC-SCNC: 11 MMOL/L — SIGNIFICANT CHANGE UP (ref 5–17)
BASOPHILS # BLD AUTO: 0.01 K/UL — SIGNIFICANT CHANGE UP (ref 0–0.2)
BASOPHILS NFR BLD AUTO: 0.2 % — SIGNIFICANT CHANGE UP (ref 0–2)
BUN SERPL-MCNC: 20 MG/DL — SIGNIFICANT CHANGE UP (ref 7–23)
CALCIUM SERPL-MCNC: 8.1 MG/DL — LOW (ref 8.4–10.5)
CHLORIDE SERPL-SCNC: 97 MMOL/L — SIGNIFICANT CHANGE UP (ref 96–108)
CO2 SERPL-SCNC: 21 MMOL/L — LOW (ref 22–31)
CREAT SERPL-MCNC: 0.95 MG/DL — SIGNIFICANT CHANGE UP (ref 0.5–1.3)
CULTURE RESULTS: SIGNIFICANT CHANGE UP
EGFR: 76 ML/MIN/1.73M2 — SIGNIFICANT CHANGE UP
EOSINOPHIL # BLD AUTO: 0.03 K/UL — SIGNIFICANT CHANGE UP (ref 0–0.5)
EOSINOPHIL NFR BLD AUTO: 0.6 % — SIGNIFICANT CHANGE UP (ref 0–6)
GLUCOSE SERPL-MCNC: 92 MG/DL — SIGNIFICANT CHANGE UP (ref 70–99)
HCT VFR BLD CALC: 21.5 % — LOW (ref 39–50)
HCT VFR BLD CALC: 26.4 % — LOW (ref 39–50)
HGB BLD-MCNC: 7.1 G/DL — LOW (ref 13–17)
HGB BLD-MCNC: 9 G/DL — LOW (ref 13–17)
IMM GRANULOCYTES NFR BLD AUTO: 0.4 % — SIGNIFICANT CHANGE UP (ref 0–0.9)
IRON SATN MFR SERPL: 11 UG/DL — LOW (ref 45–165)
IRON SATN MFR SERPL: 5 % — LOW (ref 16–55)
LYMPHOCYTES # BLD AUTO: 1.54 K/UL — SIGNIFICANT CHANGE UP (ref 1–3.3)
LYMPHOCYTES # BLD AUTO: 29.5 % — SIGNIFICANT CHANGE UP (ref 13–44)
MAGNESIUM SERPL-MCNC: 1.8 MG/DL — SIGNIFICANT CHANGE UP (ref 1.6–2.6)
MCHC RBC-ENTMCNC: 31.6 PG — SIGNIFICANT CHANGE UP (ref 27–34)
MCHC RBC-ENTMCNC: 31.7 PG — SIGNIFICANT CHANGE UP (ref 27–34)
MCHC RBC-ENTMCNC: 33 GM/DL — SIGNIFICANT CHANGE UP (ref 32–36)
MCHC RBC-ENTMCNC: 34.1 GM/DL — SIGNIFICANT CHANGE UP (ref 32–36)
MCV RBC AUTO: 92.6 FL — SIGNIFICANT CHANGE UP (ref 80–100)
MCV RBC AUTO: 96 FL — SIGNIFICANT CHANGE UP (ref 80–100)
MONOCYTES # BLD AUTO: 0.46 K/UL — SIGNIFICANT CHANGE UP (ref 0–0.9)
MONOCYTES NFR BLD AUTO: 8.8 % — SIGNIFICANT CHANGE UP (ref 2–14)
NEUTROPHILS # BLD AUTO: 3.16 K/UL — SIGNIFICANT CHANGE UP (ref 1.8–7.4)
NEUTROPHILS NFR BLD AUTO: 60.5 % — SIGNIFICANT CHANGE UP (ref 43–77)
NRBC # BLD: 0 /100 WBCS — SIGNIFICANT CHANGE UP (ref 0–0)
NRBC # BLD: 0 /100 WBCS — SIGNIFICANT CHANGE UP (ref 0–0)
PHOSPHATE SERPL-MCNC: 2.1 MG/DL — LOW (ref 2.5–4.5)
PLATELET # BLD AUTO: 134 K/UL — LOW (ref 150–400)
PLATELET # BLD AUTO: 153 K/UL — SIGNIFICANT CHANGE UP (ref 150–400)
POTASSIUM SERPL-MCNC: 3.5 MMOL/L — SIGNIFICANT CHANGE UP (ref 3.5–5.3)
POTASSIUM SERPL-SCNC: 3.5 MMOL/L — SIGNIFICANT CHANGE UP (ref 3.5–5.3)
RBC # BLD: 2.24 M/UL — LOW (ref 4.2–5.8)
RBC # BLD: 2.85 M/UL — LOW (ref 4.2–5.8)
RBC # FLD: 14 % — SIGNIFICANT CHANGE UP (ref 10.3–14.5)
RBC # FLD: 14.8 % — HIGH (ref 10.3–14.5)
SODIUM SERPL-SCNC: 129 MMOL/L — LOW (ref 135–145)
SPECIMEN SOURCE: SIGNIFICANT CHANGE UP
TIBC SERPL-MCNC: 207 UG/DL — LOW (ref 220–430)
UIBC SERPL-MCNC: 196 UG/DL — SIGNIFICANT CHANGE UP (ref 110–370)
WBC # BLD: 5.22 K/UL — SIGNIFICANT CHANGE UP (ref 3.8–10.5)
WBC # BLD: 5.82 K/UL — SIGNIFICANT CHANGE UP (ref 3.8–10.5)
WBC # FLD AUTO: 5.22 K/UL — SIGNIFICANT CHANGE UP (ref 3.8–10.5)
WBC # FLD AUTO: 5.82 K/UL — SIGNIFICANT CHANGE UP (ref 3.8–10.5)

## 2024-02-23 PROCEDURE — 74177 CT ABD & PELVIS W/CONTRAST: CPT | Mod: 26

## 2024-02-23 PROCEDURE — 99233 SBSQ HOSP IP/OBS HIGH 50: CPT

## 2024-02-23 RX ADMIN — Medication 5 MILLIGRAM(S): at 12:03

## 2024-02-23 RX ADMIN — HEPARIN SODIUM 5000 UNIT(S): 5000 INJECTION INTRAVENOUS; SUBCUTANEOUS at 12:03

## 2024-02-23 RX ADMIN — MIDODRINE HYDROCHLORIDE 10 MILLIGRAM(S): 2.5 TABLET ORAL at 05:31

## 2024-02-23 RX ADMIN — MIDODRINE HYDROCHLORIDE 10 MILLIGRAM(S): 2.5 TABLET ORAL at 17:40

## 2024-02-23 RX ADMIN — NYSTATIN CREAM 1 APPLICATION(S): 100000 CREAM TOPICAL at 17:34

## 2024-02-23 RX ADMIN — HEPARIN SODIUM 5000 UNIT(S): 5000 INJECTION INTRAVENOUS; SUBCUTANEOUS at 05:31

## 2024-02-23 RX ADMIN — Medication 50 MICROGRAM(S): at 05:31

## 2024-02-23 RX ADMIN — MIDODRINE HYDROCHLORIDE 10 MILLIGRAM(S): 2.5 TABLET ORAL at 12:03

## 2024-02-23 RX ADMIN — ATORVASTATIN CALCIUM 40 MILLIGRAM(S): 80 TABLET, FILM COATED ORAL at 21:45

## 2024-02-23 RX ADMIN — CEFEPIME 100 MILLIGRAM(S): 1 INJECTION, POWDER, FOR SOLUTION INTRAMUSCULAR; INTRAVENOUS at 05:29

## 2024-02-23 RX ADMIN — CEFEPIME 100 MILLIGRAM(S): 1 INJECTION, POWDER, FOR SOLUTION INTRAMUSCULAR; INTRAVENOUS at 17:34

## 2024-02-23 RX ADMIN — SERTRALINE 25 MILLIGRAM(S): 25 TABLET, FILM COATED ORAL at 12:03

## 2024-02-23 RX ADMIN — Medication 63.75 MILLIMOLE(S): at 21:45

## 2024-02-23 RX ADMIN — NYSTATIN CREAM 1 APPLICATION(S): 100000 CREAM TOPICAL at 05:31

## 2024-02-23 NOTE — PROGRESS NOTE ADULT - ASSESSMENT
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 Chase [facility], radiation cystitis, urethral strictures, radiation cystitis, presented for septic shock requiring MICU now downgraded to floors for further management. Course c/b acute anemia requiring blood transfusion

## 2024-02-24 LAB
ALBUMIN SERPL ELPH-MCNC: 2.6 G/DL — LOW (ref 3.3–5)
ALP SERPL-CCNC: 45 U/L — SIGNIFICANT CHANGE UP (ref 40–120)
ALT FLD-CCNC: 12 U/L — SIGNIFICANT CHANGE UP (ref 10–45)
ANION GAP SERPL CALC-SCNC: 11 MMOL/L — SIGNIFICANT CHANGE UP (ref 5–17)
AST SERPL-CCNC: 37 U/L — SIGNIFICANT CHANGE UP (ref 10–40)
BASOPHILS # BLD AUTO: 0.01 K/UL — SIGNIFICANT CHANGE UP (ref 0–0.2)
BASOPHILS NFR BLD AUTO: 0.2 % — SIGNIFICANT CHANGE UP (ref 0–2)
BILIRUB SERPL-MCNC: 0.3 MG/DL — SIGNIFICANT CHANGE UP (ref 0.2–1.2)
BUN SERPL-MCNC: 18 MG/DL — SIGNIFICANT CHANGE UP (ref 7–23)
CALCIUM SERPL-MCNC: 8.2 MG/DL — LOW (ref 8.4–10.5)
CHLORIDE SERPL-SCNC: 99 MMOL/L — SIGNIFICANT CHANGE UP (ref 96–108)
CO2 SERPL-SCNC: 22 MMOL/L — SIGNIFICANT CHANGE UP (ref 22–31)
CREAT SERPL-MCNC: 0.8 MG/DL — SIGNIFICANT CHANGE UP (ref 0.5–1.3)
CULTURE RESULTS: SIGNIFICANT CHANGE UP
EGFR: 84 ML/MIN/1.73M2 — SIGNIFICANT CHANGE UP
EOSINOPHIL # BLD AUTO: 0.05 K/UL — SIGNIFICANT CHANGE UP (ref 0–0.5)
EOSINOPHIL NFR BLD AUTO: 0.8 % — SIGNIFICANT CHANGE UP (ref 0–6)
GLUCOSE SERPL-MCNC: 103 MG/DL — HIGH (ref 70–99)
HCT VFR BLD CALC: 25.1 % — LOW (ref 39–50)
HGB BLD-MCNC: 8.5 G/DL — LOW (ref 13–17)
IMM GRANULOCYTES NFR BLD AUTO: 0.3 % — SIGNIFICANT CHANGE UP (ref 0–0.9)
LYMPHOCYTES # BLD AUTO: 1.61 K/UL — SIGNIFICANT CHANGE UP (ref 1–3.3)
LYMPHOCYTES # BLD AUTO: 25 % — SIGNIFICANT CHANGE UP (ref 13–44)
MAGNESIUM SERPL-MCNC: 1.9 MG/DL — SIGNIFICANT CHANGE UP (ref 1.6–2.6)
MCHC RBC-ENTMCNC: 31.1 PG — SIGNIFICANT CHANGE UP (ref 27–34)
MCHC RBC-ENTMCNC: 33.9 GM/DL — SIGNIFICANT CHANGE UP (ref 32–36)
MCV RBC AUTO: 91.9 FL — SIGNIFICANT CHANGE UP (ref 80–100)
MONOCYTES # BLD AUTO: 0.53 K/UL — SIGNIFICANT CHANGE UP (ref 0–0.9)
MONOCYTES NFR BLD AUTO: 8.2 % — SIGNIFICANT CHANGE UP (ref 2–14)
NEUTROPHILS # BLD AUTO: 4.23 K/UL — SIGNIFICANT CHANGE UP (ref 1.8–7.4)
NEUTROPHILS NFR BLD AUTO: 65.5 % — SIGNIFICANT CHANGE UP (ref 43–77)
NRBC # BLD: 0 /100 WBCS — SIGNIFICANT CHANGE UP (ref 0–0)
PHOSPHATE SERPL-MCNC: 3.4 MG/DL — SIGNIFICANT CHANGE UP (ref 2.5–4.5)
PLATELET # BLD AUTO: 153 K/UL — SIGNIFICANT CHANGE UP (ref 150–400)
POTASSIUM SERPL-MCNC: 3.5 MMOL/L — SIGNIFICANT CHANGE UP (ref 3.5–5.3)
POTASSIUM SERPL-SCNC: 3.5 MMOL/L — SIGNIFICANT CHANGE UP (ref 3.5–5.3)
PROT SERPL-MCNC: 5.5 G/DL — LOW (ref 6–8.3)
RBC # BLD: 2.73 M/UL — LOW (ref 4.2–5.8)
RBC # FLD: 15.4 % — HIGH (ref 10.3–14.5)
SODIUM SERPL-SCNC: 132 MMOL/L — LOW (ref 135–145)
SPECIMEN SOURCE: SIGNIFICANT CHANGE UP
WBC # BLD: 6.45 K/UL — SIGNIFICANT CHANGE UP (ref 3.8–10.5)
WBC # FLD AUTO: 6.45 K/UL — SIGNIFICANT CHANGE UP (ref 3.8–10.5)

## 2024-02-24 PROCEDURE — 99233 SBSQ HOSP IP/OBS HIGH 50: CPT

## 2024-02-24 RX ORDER — PANTOPRAZOLE SODIUM 20 MG/1
40 TABLET, DELAYED RELEASE ORAL
Refills: 0 | Status: DISCONTINUED | OUTPATIENT
Start: 2024-02-24 | End: 2024-02-27

## 2024-02-24 RX ADMIN — PANTOPRAZOLE SODIUM 40 MILLIGRAM(S): 20 TABLET, DELAYED RELEASE ORAL at 21:16

## 2024-02-24 RX ADMIN — NYSTATIN CREAM 1 APPLICATION(S): 100000 CREAM TOPICAL at 06:32

## 2024-02-24 RX ADMIN — SERTRALINE 25 MILLIGRAM(S): 25 TABLET, FILM COATED ORAL at 11:21

## 2024-02-24 RX ADMIN — MIDODRINE HYDROCHLORIDE 10 MILLIGRAM(S): 2.5 TABLET ORAL at 18:17

## 2024-02-24 RX ADMIN — NYSTATIN CREAM 1 APPLICATION(S): 100000 CREAM TOPICAL at 18:17

## 2024-02-24 RX ADMIN — ATORVASTATIN CALCIUM 40 MILLIGRAM(S): 80 TABLET, FILM COATED ORAL at 21:14

## 2024-02-24 RX ADMIN — CEFEPIME 100 MILLIGRAM(S): 1 INJECTION, POWDER, FOR SOLUTION INTRAMUSCULAR; INTRAVENOUS at 06:14

## 2024-02-24 RX ADMIN — CEFEPIME 100 MILLIGRAM(S): 1 INJECTION, POWDER, FOR SOLUTION INTRAMUSCULAR; INTRAVENOUS at 18:14

## 2024-02-24 RX ADMIN — Medication 5 MILLIGRAM(S): at 11:21

## 2024-02-24 RX ADMIN — MIDODRINE HYDROCHLORIDE 10 MILLIGRAM(S): 2.5 TABLET ORAL at 06:32

## 2024-02-24 RX ADMIN — MIDODRINE HYDROCHLORIDE 10 MILLIGRAM(S): 2.5 TABLET ORAL at 11:21

## 2024-02-24 RX ADMIN — Medication 50 MICROGRAM(S): at 06:32

## 2024-02-24 NOTE — PROGRESS NOTE ADULT - ASSESSMENT
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 Chase [facility], radiation cystitis, urethral strictures, radiation cystitis, presented for septic shock requiring MICU now downgraded to floors for further management. Course c/b acute anemia requiring blood transfusion

## 2024-02-25 LAB
ANION GAP SERPL CALC-SCNC: 11 MMOL/L — SIGNIFICANT CHANGE UP (ref 5–17)
BASOPHILS # BLD AUTO: 0.02 K/UL — SIGNIFICANT CHANGE UP (ref 0–0.2)
BASOPHILS NFR BLD AUTO: 0.4 % — SIGNIFICANT CHANGE UP (ref 0–2)
BUN SERPL-MCNC: 16 MG/DL — SIGNIFICANT CHANGE UP (ref 7–23)
CALCIUM SERPL-MCNC: 8.9 MG/DL — SIGNIFICANT CHANGE UP (ref 8.4–10.5)
CHLORIDE SERPL-SCNC: 101 MMOL/L — SIGNIFICANT CHANGE UP (ref 96–108)
CO2 SERPL-SCNC: 24 MMOL/L — SIGNIFICANT CHANGE UP (ref 22–31)
CREAT SERPL-MCNC: 0.79 MG/DL — SIGNIFICANT CHANGE UP (ref 0.5–1.3)
EGFR: 84 ML/MIN/1.73M2 — SIGNIFICANT CHANGE UP
EOSINOPHIL # BLD AUTO: 0.08 K/UL — SIGNIFICANT CHANGE UP (ref 0–0.5)
EOSINOPHIL NFR BLD AUTO: 1.5 % — SIGNIFICANT CHANGE UP (ref 0–6)
GLUCOSE SERPL-MCNC: 88 MG/DL — SIGNIFICANT CHANGE UP (ref 70–99)
HCT VFR BLD CALC: 25.4 % — LOW (ref 39–50)
HGB BLD-MCNC: 8.6 G/DL — LOW (ref 13–17)
IMM GRANULOCYTES NFR BLD AUTO: 0.4 % — SIGNIFICANT CHANGE UP (ref 0–0.9)
LYMPHOCYTES # BLD AUTO: 1.86 K/UL — SIGNIFICANT CHANGE UP (ref 1–3.3)
LYMPHOCYTES # BLD AUTO: 35.4 % — SIGNIFICANT CHANGE UP (ref 13–44)
MAGNESIUM SERPL-MCNC: 2 MG/DL — SIGNIFICANT CHANGE UP (ref 1.6–2.6)
MCHC RBC-ENTMCNC: 31.5 PG — SIGNIFICANT CHANGE UP (ref 27–34)
MCHC RBC-ENTMCNC: 33.9 GM/DL — SIGNIFICANT CHANGE UP (ref 32–36)
MCV RBC AUTO: 93 FL — SIGNIFICANT CHANGE UP (ref 80–100)
MONOCYTES # BLD AUTO: 0.42 K/UL — SIGNIFICANT CHANGE UP (ref 0–0.9)
MONOCYTES NFR BLD AUTO: 8 % — SIGNIFICANT CHANGE UP (ref 2–14)
NEUTROPHILS # BLD AUTO: 2.86 K/UL — SIGNIFICANT CHANGE UP (ref 1.8–7.4)
NEUTROPHILS NFR BLD AUTO: 54.3 % — SIGNIFICANT CHANGE UP (ref 43–77)
NRBC # BLD: 0 /100 WBCS — SIGNIFICANT CHANGE UP (ref 0–0)
PHOSPHATE SERPL-MCNC: 2.7 MG/DL — SIGNIFICANT CHANGE UP (ref 2.5–4.5)
PLATELET # BLD AUTO: 152 K/UL — SIGNIFICANT CHANGE UP (ref 150–400)
POTASSIUM SERPL-MCNC: 3.8 MMOL/L — SIGNIFICANT CHANGE UP (ref 3.5–5.3)
POTASSIUM SERPL-SCNC: 3.8 MMOL/L — SIGNIFICANT CHANGE UP (ref 3.5–5.3)
RBC # BLD: 2.73 M/UL — LOW (ref 4.2–5.8)
RBC # FLD: 15.2 % — HIGH (ref 10.3–14.5)
SODIUM SERPL-SCNC: 136 MMOL/L — SIGNIFICANT CHANGE UP (ref 135–145)
WBC # BLD: 5.26 K/UL — SIGNIFICANT CHANGE UP (ref 3.8–10.5)
WBC # FLD AUTO: 5.26 K/UL — SIGNIFICANT CHANGE UP (ref 3.8–10.5)

## 2024-02-25 PROCEDURE — 99233 SBSQ HOSP IP/OBS HIGH 50: CPT

## 2024-02-25 RX ORDER — SODIUM CHLORIDE 0.65 %
1 AEROSOL, SPRAY (ML) NASAL ONCE
Refills: 0 | Status: DISCONTINUED | OUTPATIENT
Start: 2024-02-25 | End: 2024-02-27

## 2024-02-25 RX ORDER — MIDODRINE HYDROCHLORIDE 2.5 MG/1
5 TABLET ORAL THREE TIMES A DAY
Refills: 0 | Status: DISCONTINUED | OUTPATIENT
Start: 2024-02-25 | End: 2024-02-26

## 2024-02-25 RX ORDER — MIDODRINE HYDROCHLORIDE 2.5 MG/1
5 TABLET ORAL THREE TIMES A DAY
Refills: 0 | Status: DISCONTINUED | OUTPATIENT
Start: 2024-02-25 | End: 2024-02-25

## 2024-02-25 RX ADMIN — NYSTATIN CREAM 1 APPLICATION(S): 100000 CREAM TOPICAL at 05:43

## 2024-02-25 RX ADMIN — SERTRALINE 25 MILLIGRAM(S): 25 TABLET, FILM COATED ORAL at 12:25

## 2024-02-25 RX ADMIN — MIDODRINE HYDROCHLORIDE 5 MILLIGRAM(S): 2.5 TABLET ORAL at 12:26

## 2024-02-25 RX ADMIN — ATORVASTATIN CALCIUM 40 MILLIGRAM(S): 80 TABLET, FILM COATED ORAL at 22:07

## 2024-02-25 RX ADMIN — MIDODRINE HYDROCHLORIDE 5 MILLIGRAM(S): 2.5 TABLET ORAL at 17:24

## 2024-02-25 RX ADMIN — NYSTATIN CREAM 1 APPLICATION(S): 100000 CREAM TOPICAL at 17:23

## 2024-02-25 RX ADMIN — PANTOPRAZOLE SODIUM 40 MILLIGRAM(S): 20 TABLET, DELAYED RELEASE ORAL at 05:44

## 2024-02-25 RX ADMIN — Medication 50 MICROGRAM(S): at 05:43

## 2024-02-25 RX ADMIN — CEFEPIME 100 MILLIGRAM(S): 1 INJECTION, POWDER, FOR SOLUTION INTRAMUSCULAR; INTRAVENOUS at 17:23

## 2024-02-25 RX ADMIN — MIDODRINE HYDROCHLORIDE 10 MILLIGRAM(S): 2.5 TABLET ORAL at 05:43

## 2024-02-25 RX ADMIN — Medication 5 MILLIGRAM(S): at 12:26

## 2024-02-25 RX ADMIN — CEFEPIME 100 MILLIGRAM(S): 1 INJECTION, POWDER, FOR SOLUTION INTRAMUSCULAR; INTRAVENOUS at 05:43

## 2024-02-25 NOTE — PROGRESS NOTE ADULT - ASSESSMENT
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 Chase/Urology), radiation cystitis, urethral strictures, radiation cystitis, presented for septic shock requiring MICU now downgraded to floors for further management. Course c/b acute anemia requiring blood transfusion.

## 2024-02-26 LAB
ALBUMIN SERPL ELPH-MCNC: 2.7 G/DL — LOW (ref 3.3–5)
ALP SERPL-CCNC: 47 U/L — SIGNIFICANT CHANGE UP (ref 40–120)
ALT FLD-CCNC: 13 U/L — SIGNIFICANT CHANGE UP (ref 10–45)
ANION GAP SERPL CALC-SCNC: 12 MMOL/L — SIGNIFICANT CHANGE UP (ref 5–17)
AST SERPL-CCNC: 37 U/L — SIGNIFICANT CHANGE UP (ref 10–40)
BASOPHILS # BLD AUTO: 0.02 K/UL — SIGNIFICANT CHANGE UP (ref 0–0.2)
BASOPHILS NFR BLD AUTO: 0.4 % — SIGNIFICANT CHANGE UP (ref 0–2)
BILIRUB SERPL-MCNC: 0.2 MG/DL — SIGNIFICANT CHANGE UP (ref 0.2–1.2)
BUN SERPL-MCNC: 16 MG/DL — SIGNIFICANT CHANGE UP (ref 7–23)
CALCIUM SERPL-MCNC: 9 MG/DL — SIGNIFICANT CHANGE UP (ref 8.4–10.5)
CHLORIDE SERPL-SCNC: 104 MMOL/L — SIGNIFICANT CHANGE UP (ref 96–108)
CO2 SERPL-SCNC: 22 MMOL/L — SIGNIFICANT CHANGE UP (ref 22–31)
CREAT SERPL-MCNC: 0.76 MG/DL — SIGNIFICANT CHANGE UP (ref 0.5–1.3)
CULTURE RESULTS: SIGNIFICANT CHANGE UP
EGFR: 85 ML/MIN/1.73M2 — SIGNIFICANT CHANGE UP
EOSINOPHIL # BLD AUTO: 0.09 K/UL — SIGNIFICANT CHANGE UP (ref 0–0.5)
EOSINOPHIL NFR BLD AUTO: 1.8 % — SIGNIFICANT CHANGE UP (ref 0–6)
GLUCOSE SERPL-MCNC: 96 MG/DL — SIGNIFICANT CHANGE UP (ref 70–99)
HCT VFR BLD CALC: 27 % — LOW (ref 39–50)
HGB BLD-MCNC: 8.6 G/DL — LOW (ref 13–17)
IMM GRANULOCYTES NFR BLD AUTO: 0.4 % — SIGNIFICANT CHANGE UP (ref 0–0.9)
LYMPHOCYTES # BLD AUTO: 1.89 K/UL — SIGNIFICANT CHANGE UP (ref 1–3.3)
LYMPHOCYTES # BLD AUTO: 38.7 % — SIGNIFICANT CHANGE UP (ref 13–44)
MAGNESIUM SERPL-MCNC: 2.1 MG/DL — SIGNIFICANT CHANGE UP (ref 1.6–2.6)
MCHC RBC-ENTMCNC: 30.4 PG — SIGNIFICANT CHANGE UP (ref 27–34)
MCHC RBC-ENTMCNC: 31.9 GM/DL — LOW (ref 32–36)
MCV RBC AUTO: 95.4 FL — SIGNIFICANT CHANGE UP (ref 80–100)
MONOCYTES # BLD AUTO: 0.41 K/UL — SIGNIFICANT CHANGE UP (ref 0–0.9)
MONOCYTES NFR BLD AUTO: 8.4 % — SIGNIFICANT CHANGE UP (ref 2–14)
NEUTROPHILS # BLD AUTO: 2.45 K/UL — SIGNIFICANT CHANGE UP (ref 1.8–7.4)
NEUTROPHILS NFR BLD AUTO: 50.3 % — SIGNIFICANT CHANGE UP (ref 43–77)
NRBC # BLD: 0 /100 WBCS — SIGNIFICANT CHANGE UP (ref 0–0)
PHOSPHATE SERPL-MCNC: 2.4 MG/DL — LOW (ref 2.5–4.5)
PLATELET # BLD AUTO: 151 K/UL — SIGNIFICANT CHANGE UP (ref 150–400)
POTASSIUM SERPL-MCNC: 3.9 MMOL/L — SIGNIFICANT CHANGE UP (ref 3.5–5.3)
POTASSIUM SERPL-SCNC: 3.9 MMOL/L — SIGNIFICANT CHANGE UP (ref 3.5–5.3)
PROT SERPL-MCNC: 6.1 G/DL — SIGNIFICANT CHANGE UP (ref 6–8.3)
RBC # BLD: 2.83 M/UL — LOW (ref 4.2–5.8)
RBC # FLD: 14.8 % — HIGH (ref 10.3–14.5)
SODIUM SERPL-SCNC: 138 MMOL/L — SIGNIFICANT CHANGE UP (ref 135–145)
SPECIMEN SOURCE: SIGNIFICANT CHANGE UP
WBC # BLD: 4.88 K/UL — SIGNIFICANT CHANGE UP (ref 3.8–10.5)
WBC # FLD AUTO: 4.88 K/UL — SIGNIFICANT CHANGE UP (ref 3.8–10.5)

## 2024-02-26 PROCEDURE — 99233 SBSQ HOSP IP/OBS HIGH 50: CPT

## 2024-02-26 RX ADMIN — ATORVASTATIN CALCIUM 40 MILLIGRAM(S): 80 TABLET, FILM COATED ORAL at 22:12

## 2024-02-26 RX ADMIN — CEFEPIME 100 MILLIGRAM(S): 1 INJECTION, POWDER, FOR SOLUTION INTRAMUSCULAR; INTRAVENOUS at 17:08

## 2024-02-26 RX ADMIN — Medication 50 MICROGRAM(S): at 06:05

## 2024-02-26 RX ADMIN — PANTOPRAZOLE SODIUM 40 MILLIGRAM(S): 20 TABLET, DELAYED RELEASE ORAL at 06:06

## 2024-02-26 RX ADMIN — NYSTATIN CREAM 1 APPLICATION(S): 100000 CREAM TOPICAL at 06:06

## 2024-02-26 RX ADMIN — SERTRALINE 25 MILLIGRAM(S): 25 TABLET, FILM COATED ORAL at 11:48

## 2024-02-26 RX ADMIN — CEFEPIME 100 MILLIGRAM(S): 1 INJECTION, POWDER, FOR SOLUTION INTRAMUSCULAR; INTRAVENOUS at 06:06

## 2024-02-26 RX ADMIN — Medication 5 MILLIGRAM(S): at 11:48

## 2024-02-26 RX ADMIN — NYSTATIN CREAM 1 APPLICATION(S): 100000 CREAM TOPICAL at 17:10

## 2024-02-26 NOTE — CHART NOTE - NSCHARTNOTEFT_GEN_A_CORE
NP Note - Patient will reauire a rolling walker at home due to their dx of prostate cancer c 61 to help complete their mradls.  ARMEN Samuel NP

## 2024-02-26 NOTE — PROGRESS NOTE ADULT - TIME BILLING
time spent reviewing prior charts, meds, discussing plan with patient, family, consultants= 51 minutes
time spent reviewing prior charts, meds, discussing plan with patient, family= 55 minutes
time spent reviewing prior charts, meds, discussing plan with patient, family= 51 minutes
time spent reviewing prior charts, meds, discussing plan with patient, family= 55 minutes
- Ordering, reviewing, and interpreting labs, testing, and imaging.  - Independently obtaining a review of systems and performing a physical exam  - Reviewing consultant documentation/recommendations in addition to discussing plan of care with consultants.  - Counselling and educating patient and family regarding interpretation of aforementioned items and plan of care.

## 2024-02-26 NOTE — PROGRESS NOTE ADULT - ASSESSMENT
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 Chase/Urology), radiation cystitis, urethral strictures, radiation cystitis, presented for septic shock requiring MICU now downgraded to floors for further management. Course c/b acute anemia requiring blood transfusion.

## 2024-02-26 NOTE — CHART NOTE - NSCHARTNOTESELECT_GEN_ALL_CORE
Event Note
MICU Accept Note/Event Note
Urology/Event Note
downgrade/Event Note
Event Note
POCUS Note/Event Note

## 2024-02-27 ENCOUNTER — TRANSCRIPTION ENCOUNTER (OUTPATIENT)
Age: 89
End: 2024-02-27

## 2024-02-27 VITALS
HEART RATE: 77 BPM | TEMPERATURE: 98 F | DIASTOLIC BLOOD PRESSURE: 75 MMHG | OXYGEN SATURATION: 96 % | RESPIRATION RATE: 18 BRPM | SYSTOLIC BLOOD PRESSURE: 156 MMHG

## 2024-02-27 LAB
ANION GAP SERPL CALC-SCNC: 12 MMOL/L — SIGNIFICANT CHANGE UP (ref 5–17)
BUN SERPL-MCNC: 15 MG/DL — SIGNIFICANT CHANGE UP (ref 7–23)
CALCIUM SERPL-MCNC: 8.7 MG/DL — SIGNIFICANT CHANGE UP (ref 8.4–10.5)
CHLORIDE SERPL-SCNC: 103 MMOL/L — SIGNIFICANT CHANGE UP (ref 96–108)
CO2 SERPL-SCNC: 24 MMOL/L — SIGNIFICANT CHANGE UP (ref 22–31)
CREAT SERPL-MCNC: 0.75 MG/DL — SIGNIFICANT CHANGE UP (ref 0.5–1.3)
EGFR: 86 ML/MIN/1.73M2 — SIGNIFICANT CHANGE UP
GLUCOSE SERPL-MCNC: 87 MG/DL — SIGNIFICANT CHANGE UP (ref 70–99)
HCT VFR BLD CALC: 25.5 % — LOW (ref 39–50)
HGB BLD-MCNC: 8.5 G/DL — LOW (ref 13–17)
MAGNESIUM SERPL-MCNC: 2 MG/DL — SIGNIFICANT CHANGE UP (ref 1.6–2.6)
MCHC RBC-ENTMCNC: 31 PG — SIGNIFICANT CHANGE UP (ref 27–34)
MCHC RBC-ENTMCNC: 33.3 GM/DL — SIGNIFICANT CHANGE UP (ref 32–36)
MCV RBC AUTO: 93.1 FL — SIGNIFICANT CHANGE UP (ref 80–100)
NRBC # BLD: 0 /100 WBCS — SIGNIFICANT CHANGE UP (ref 0–0)
PHOSPHATE SERPL-MCNC: 2.4 MG/DL — LOW (ref 2.5–4.5)
PLATELET # BLD AUTO: 162 K/UL — SIGNIFICANT CHANGE UP (ref 150–400)
POTASSIUM SERPL-MCNC: 3.9 MMOL/L — SIGNIFICANT CHANGE UP (ref 3.5–5.3)
POTASSIUM SERPL-SCNC: 3.9 MMOL/L — SIGNIFICANT CHANGE UP (ref 3.5–5.3)
RBC # BLD: 2.74 M/UL — LOW (ref 4.2–5.8)
RBC # FLD: 14.7 % — HIGH (ref 10.3–14.5)
SODIUM SERPL-SCNC: 139 MMOL/L — SIGNIFICANT CHANGE UP (ref 135–145)
WBC # BLD: 4.62 K/UL — SIGNIFICANT CHANGE UP (ref 3.8–10.5)
WBC # FLD AUTO: 4.62 K/UL — SIGNIFICANT CHANGE UP (ref 3.8–10.5)

## 2024-02-27 PROCEDURE — 82728 ASSAY OF FERRITIN: CPT

## 2024-02-27 PROCEDURE — 85025 COMPLETE CBC W/AUTO DIFF WBC: CPT

## 2024-02-27 PROCEDURE — 84100 ASSAY OF PHOSPHORUS: CPT

## 2024-02-27 PROCEDURE — 97116 GAIT TRAINING THERAPY: CPT

## 2024-02-27 PROCEDURE — 82607 VITAMIN B-12: CPT

## 2024-02-27 PROCEDURE — 85027 COMPLETE CBC AUTOMATED: CPT

## 2024-02-27 PROCEDURE — 82746 ASSAY OF FOLIC ACID SERUM: CPT

## 2024-02-27 PROCEDURE — 80053 COMPREHEN METABOLIC PANEL: CPT

## 2024-02-27 PROCEDURE — 96376 TX/PRO/DX INJ SAME DRUG ADON: CPT

## 2024-02-27 PROCEDURE — 83540 ASSAY OF IRON: CPT

## 2024-02-27 PROCEDURE — 87449 NOS EACH ORGANISM AG IA: CPT

## 2024-02-27 PROCEDURE — 84443 ASSAY THYROID STIM HORMONE: CPT

## 2024-02-27 PROCEDURE — 86900 BLOOD TYPING SEROLOGIC ABO: CPT

## 2024-02-27 PROCEDURE — 83550 IRON BINDING TEST: CPT

## 2024-02-27 PROCEDURE — 87150 DNA/RNA AMPLIFIED PROBE: CPT

## 2024-02-27 PROCEDURE — 84436 ASSAY OF TOTAL THYROXINE: CPT

## 2024-02-27 PROCEDURE — 83735 ASSAY OF MAGNESIUM: CPT

## 2024-02-27 PROCEDURE — 84540 ASSAY OF URINE/UREA-N: CPT

## 2024-02-27 PROCEDURE — 87040 BLOOD CULTURE FOR BACTERIA: CPT

## 2024-02-27 PROCEDURE — 87086 URINE CULTURE/COLONY COUNT: CPT

## 2024-02-27 PROCEDURE — 82803 BLOOD GASES ANY COMBINATION: CPT

## 2024-02-27 PROCEDURE — 99239 HOSP IP/OBS DSCHRG MGMT >30: CPT

## 2024-02-27 PROCEDURE — 87640 STAPH A DNA AMP PROBE: CPT

## 2024-02-27 PROCEDURE — 84295 ASSAY OF SERUM SODIUM: CPT

## 2024-02-27 PROCEDURE — 97530 THERAPEUTIC ACTIVITIES: CPT

## 2024-02-27 PROCEDURE — 82947 ASSAY GLUCOSE BLOOD QUANT: CPT

## 2024-02-27 PROCEDURE — 96365 THER/PROPH/DIAG IV INF INIT: CPT

## 2024-02-27 PROCEDURE — 84132 ASSAY OF SERUM POTASSIUM: CPT

## 2024-02-27 PROCEDURE — 36430 TRANSFUSION BLD/BLD COMPNT: CPT

## 2024-02-27 PROCEDURE — 82330 ASSAY OF CALCIUM: CPT

## 2024-02-27 PROCEDURE — 71045 X-RAY EXAM CHEST 1 VIEW: CPT

## 2024-02-27 PROCEDURE — 83935 ASSAY OF URINE OSMOLALITY: CPT

## 2024-02-27 PROCEDURE — 85014 HEMATOCRIT: CPT

## 2024-02-27 PROCEDURE — 87641 MR-STAPH DNA AMP PROBE: CPT

## 2024-02-27 PROCEDURE — 83605 ASSAY OF LACTIC ACID: CPT

## 2024-02-27 PROCEDURE — 96366 THER/PROPH/DIAG IV INF ADDON: CPT

## 2024-02-27 PROCEDURE — 85610 PROTHROMBIN TIME: CPT

## 2024-02-27 PROCEDURE — 85730 THROMBOPLASTIN TIME PARTIAL: CPT

## 2024-02-27 PROCEDURE — 99285 EMERGENCY DEPT VISIT HI MDM: CPT

## 2024-02-27 PROCEDURE — 96375 TX/PRO/DX INJ NEW DRUG ADDON: CPT

## 2024-02-27 PROCEDURE — 86901 BLOOD TYPING SEROLOGIC RH(D): CPT

## 2024-02-27 PROCEDURE — 87899 AGENT NOS ASSAY W/OPTIC: CPT

## 2024-02-27 PROCEDURE — 74177 CT ABD & PELVIS W/CONTRAST: CPT | Mod: MC

## 2024-02-27 PROCEDURE — 97162 PT EVAL MOD COMPLEX 30 MIN: CPT

## 2024-02-27 PROCEDURE — 36415 COLL VENOUS BLD VENIPUNCTURE: CPT

## 2024-02-27 PROCEDURE — 87637 SARSCOV2&INF A&B&RSV AMP PRB: CPT

## 2024-02-27 PROCEDURE — 82962 GLUCOSE BLOOD TEST: CPT

## 2024-02-27 PROCEDURE — 87077 CULTURE AEROBIC IDENTIFY: CPT

## 2024-02-27 PROCEDURE — 81001 URINALYSIS AUTO W/SCOPE: CPT

## 2024-02-27 PROCEDURE — 84300 ASSAY OF URINE SODIUM: CPT

## 2024-02-27 PROCEDURE — 87186 SC STD MICRODIL/AGAR DIL: CPT

## 2024-02-27 PROCEDURE — 96361 HYDRATE IV INFUSION ADD-ON: CPT

## 2024-02-27 PROCEDURE — 86850 RBC ANTIBODY SCREEN: CPT

## 2024-02-27 PROCEDURE — 80048 BASIC METABOLIC PNL TOTAL CA: CPT

## 2024-02-27 PROCEDURE — 86923 COMPATIBILITY TEST ELECTRIC: CPT

## 2024-02-27 PROCEDURE — 82435 ASSAY OF BLOOD CHLORIDE: CPT

## 2024-02-27 PROCEDURE — P9040: CPT

## 2024-02-27 PROCEDURE — 85018 HEMOGLOBIN: CPT

## 2024-02-27 PROCEDURE — 96367 TX/PROPH/DG ADDL SEQ IV INF: CPT

## 2024-02-27 RX ORDER — CIPROFLOXACIN LACTATE 400MG/40ML
1 VIAL (ML) INTRAVENOUS
Qty: 2 | Refills: 0
Start: 2024-02-27 | End: 2024-02-27

## 2024-02-27 RX ORDER — METOPROLOL TARTRATE 50 MG
25 TABLET ORAL DAILY
Refills: 0 | Status: DISCONTINUED | OUTPATIENT
Start: 2024-02-27 | End: 2024-02-27

## 2024-02-27 RX ORDER — ACETAMINOPHEN 500 MG
2 TABLET ORAL
Qty: 0 | Refills: 0 | DISCHARGE
Start: 2024-02-27

## 2024-02-27 RX ORDER — HEPARIN SODIUM 5000 [USP'U]/ML
5000 INJECTION INTRAVENOUS; SUBCUTANEOUS EVERY 8 HOURS
Refills: 0 | Status: DISCONTINUED | OUTPATIENT
Start: 2024-02-27 | End: 2024-02-27

## 2024-02-27 RX ORDER — APALUTAMIDE 240 MG/1
4 TABLET, FILM COATED ORAL
Refills: 0 | DISCHARGE

## 2024-02-27 RX ORDER — PANTOPRAZOLE SODIUM 20 MG/1
1 TABLET, DELAYED RELEASE ORAL
Qty: 30 | Refills: 0
Start: 2024-02-27 | End: 2024-03-27

## 2024-02-27 RX ORDER — PANTOPRAZOLE SODIUM 20 MG/1
1 TABLET, DELAYED RELEASE ORAL
Qty: 0 | Refills: 0 | DISCHARGE
Start: 2024-02-27

## 2024-02-27 RX ADMIN — Medication 50 MICROGRAM(S): at 06:04

## 2024-02-27 RX ADMIN — SERTRALINE 25 MILLIGRAM(S): 25 TABLET, FILM COATED ORAL at 11:54

## 2024-02-27 RX ADMIN — HEPARIN SODIUM 5000 UNIT(S): 5000 INJECTION INTRAVENOUS; SUBCUTANEOUS at 13:17

## 2024-02-27 RX ADMIN — PANTOPRAZOLE SODIUM 40 MILLIGRAM(S): 20 TABLET, DELAYED RELEASE ORAL at 06:04

## 2024-02-27 RX ADMIN — Medication 5 MILLIGRAM(S): at 11:54

## 2024-02-27 RX ADMIN — NYSTATIN CREAM 1 APPLICATION(S): 100000 CREAM TOPICAL at 06:04

## 2024-02-27 RX ADMIN — Medication 25 MILLIGRAM(S): at 11:54

## 2024-02-27 RX ADMIN — CEFEPIME 100 MILLIGRAM(S): 1 INJECTION, POWDER, FOR SOLUTION INTRAMUSCULAR; INTRAVENOUS at 06:04

## 2024-02-27 NOTE — DISCHARGE NOTE PROVIDER - NSDCMRMEDTOKEN_GEN_ALL_CORE_FT
acetaminophen 325 mg oral tablet: 2 tab(s) orally every 6 hours As needed Temp greater or equal to 38C (100.4F), Mild Pain (1 - 3)  aluminum hydroxide-magnesium hydroxide 200 mg-200 mg/5 mL oral suspension: 30 milliliter(s) orally every 4 hours As needed Dyspepsia  cholecalciferol 25 mcg (1000 intl units) oral tablet: 1 tab(s) orally once a day  Cipro 500 mg oral tablet: 1 tab(s) orally 2 times a day  levothyroxine 50 mcg (0.05 mg) oral tablet: 1 tab(s) orally once a day  lovastatin 40 mg oral tablet: 1 tab(s) orally once a day  metoprolol succinate 25 mg oral tablet, extended release: 1 tab(s) orally once a day  Multiple Vitamins oral tablet: 1 tab(s) orally once a day  oxyBUTYnin 5 mg oral tablet: 1 tab(s) orally once a day  pantoprazole 40 mg oral delayed release tablet: 1 tab(s) orally once a day (before a meal)  sertraline 25 mg oral tablet: 1 tab(s) orally once a day

## 2024-02-27 NOTE — PROGRESS NOTE ADULT - PROBLEM SELECTOR PLAN 4
- Noted Hgb downtrending possibly 2/2 to hematuria +/- dilutional given aggressive IVF resuscitation on admit  - Hgb stable in the 8 range (josé luis 7.1 2/23 with symptoms s/p 1U pRBC with improvement)  - CT A/P with no signs of bleeding though noted possible ulceration, started on PPI daily on 2/24   - Iron studies reviewed, pt will need iron supplementation after abx completed   - Trend CBC, transfuse for Hgb > 7  - Maintain active type and screen
h/h downtrending . possible sec to hematuria +/- dilutional given aggressive IVF resuscitation on admit.   monitor urine , h/h  -worsening H/H today, pt appeared pale and symptomatic, will transfuse 1 U pRBC, follow up post transfusion CBC  -obtain CT A/P to look for bleed given no melena or blood in Garrett  -iron studies reviewed, pt will need iron supplementation once infection has been cleared  -maintain active T&S
- Noted Hgb downtrending possibly 2/2 to hematuria +/- dilutional given aggressive IVF resuscitation on admit  - Hgb stable in the 8 range (josé luis 7.1 2/23 with symptoms s/p 1U pRBC with improvement)  - CT A/P with no signs of bleeding though noted possible ulceration, started on PPI daily on 2/24   - Iron studies reviewed, pt will need iron supplementation after abx completed   - Trend CBC, transfuse for Hgb > 7  - Maintain active type and screen
- Noted Hgb downtrending possibly 2/2 to hematuria +/- dilutional given aggressive IVF resuscitation on admit  - Hgb stable in the 8 range (josé luis 7.1 2/23 with symptoms s/p 1U pRBC with improvement)  - CT A/P with no signs of bleeding though noted possible ulceration, started on PPI daily on 2/24   - Iron studies reviewed, pt will need iron supplementation after abx completed   - Trend CBC, transfuse for Hgb > 7  - Maintain active type and screen  - can start iron supplementation on 2/29 after completion of abx, discussed with patient's daughter
Hyponatremia likely hypovolemic  replete with fluids  if doesn't improve, order urine studies
h/h downtrending . possible sec to hematuria +/- dilutional given aggressive IVF resuscitation on admit.   monitor urine , h/h  -iron studies ordered for AM given anemia
h/h downtrending . possible sec to hematuria +/- dilutional given aggressive IVF resuscitation on admit.   monitor urine , h/h  -Hgb 7.1 2/23 with symptoms s/p 1U pRBC with improvement to 8.5 this AM.   -CT A/P with no signs of bleeding though noted possible ulceration, started on PPI daily on 2/24   -iron studies reviewed, pt will need iron supplementation after abx completed   -maintain active T&S
h/h downtrending . possible sec to hematuria +/- dilutional given aggressive IVF resuscitation on admit.   monitor urine , h/h

## 2024-02-27 NOTE — PROGRESS NOTE ADULT - PROBLEM SELECTOR PROBLEM 2
Urethral stricture
Prostate cancer
Urethral stricture

## 2024-02-27 NOTE — PROGRESS NOTE ADULT - PROBLEM SELECTOR PLAN 2
hx of prostate cancer metastatic to liver, lung s/p partial pneumonectomy  On Erleado at home  urology follow up
- Chronic chinchilla with recurrent infection necessitating hospitalization (follows with Dr. Chase outpatient)  - S/p Chinchilla exchange by urology team in MICU, evaluated by Urology again on 2/21 with clot noted in Chinchilla since evacuated  - Urine clear 2/25 - no hematuria or clots  - Monitor Chinchilla output for further episodes of bleeding  - dc with Chinchilla and outpt Uro follow up
chronic chinchilla with recurrent infection and hosp stay. Follows with Dr. Cyr as outpt  S/p chinchilla exchange by urology team in MICU, evaluated by Urology daniela on 2/21 with clot noted in Chinchilla since evacuated   Monitor Chinchilla output for further episodes of bleeding
chronic chinchilla with recurrent infection and hosp stay. Follows with Dr. Cyr as outpt  S/p chinchilla exchange by urology team yesterday in MICU.   This am with dark red urine in the chinchilla bag none in catheter,   Overnight issues noted by nursing with urine outpt though able to flush .   D/w urology team this am . will follow up for eval today.  monitor h/h given hematuria noted. ?need for CBI if retention
- Chronic chinchilla with recurrent infection necessitating hospitalization (follows with Dr. Chase outpatient)  - S/p Chinchilla exchange by urology team in MICU, evaluated by Urology again on 2/21 with clot noted in Chinchilla since evacuated  - Urine clear 2/25 - no hematuria or clots  - Monitor Chinchilla output for further episodes of bleeding
chronic chinchilla with recurrent infection and hosp stay. Follows with Dr. Cyr as outpt  S/p chinchilla exchange by urology team in MICU, evaluated by Urology daniela on 2/21 with clot noted in Chinchilla since evacuated   Monitor Chinchilla output for further episodes of bleeding
- Chronic chinchilla with recurrent infection necessitating hospitalization (follows with Dr. Chase outpatient)  - S/p Chinchilla exchange by urology team in MICU, evaluated by Urology again on 2/21 with clot noted in Chinchilla since evacuated  - Urine clear 2/25 - no hematuria or clots  - Monitor Chinchilla output for further episodes of bleeding  - dc with Chinchilla and outpt Uro follow up
chronic chinchilla with recurrent infection and hosp stay. Follows with Dr. Cyr as outpt  S/p chinchilla exchange by urology team in MICU, evaluated by Urology daniela on 2/21 with clot noted in Chinchilla since evacuated   Monitor Chinchilla output for further episodes of bleeding

## 2024-02-27 NOTE — DISCHARGE NOTE PROVIDER - CARE PROVIDERS DIRECT ADDRESSES
[email or web address]

## 2024-02-27 NOTE — PROGRESS NOTE ADULT - PROBLEM SELECTOR PLAN 6
- Hx of HTN on BP meds (Toprol 25mg and lisinopril 40mg)   - Continue to hold antihypertensives in setting of resolving septic shock  - Can add antihypertensives if more hypertensive after septic shock resolved
hx of HTN on BP meds(toprol 25mg and lisinopril 40mg)   Hold d/t septic shock
hx of HTN on BP meds(toprol 25mg and lisinopril 40mg)   Hold d/t septic shock
- Hx of HTN on BP meds (Toprol 25mg and lisinopril 40mg)   - toprol added back 2/27, discussed with patient's daughter to check BP in few days and if elevated can resume lisinopril as well
hx of HTN on BP meds(toprol 25mg and lisinopril 40mg)   Hold d/t septic shock  can likely restart toprol 2/25 if BP can tolerate
- Hx of HTN on BP meds (Toprol 25mg and lisinopril 40mg)   - Continue to hold antihypertensives in setting of resolving septic shock  - Continue to wean midodrine (today weaned to 5 TID)  - Can add antihypertensives if more hypertensive after septic shock resolved
hx of HTN on BP meds(toprol 25mg and lisinopril 40mg)   Hold septic shock

## 2024-02-27 NOTE — DISCHARGE NOTE PROVIDER - NSDCFUADDAPPT_GEN_ALL_CORE_FT
APPTS ARE READY TO BE MADE: [x] YES    Best Family or Patient Contact (if needed):    Additional Information about above appointments (if needed):    1: Dr. Rush Friday or Monday for b/p check  2:   3:     Other comments or requests:    APPTS ARE READY TO BE MADE: [x] YES    Best Family or Patient Contact (if needed):    Additional Information about above appointments (if needed):    1: Dr. Rush Friday or Monday for b/p check  2:   3:     Other comments or requests:     Prior to outreaching the patient, it was visible that the patient has secured a follow up appointment which was not scheduled by our team. Patient is scheduled with Dr. Chase 3/13/24 8:30am [address] [place] [address]

## 2024-02-27 NOTE — PROGRESS NOTE ADULT - PROBLEM SELECTOR PLAN 3
Hyponatremia likely hypovolemic s/p aggressive fluid resuscitation prior to MICU admit.   urine studies indicate hypovolemic picture.   Will repeat labs this am if persistent will give IVF challenge  monitor urine outpt. urology eval this am
Hyponatremia likely hypovolemic s/p aggressive fluid resuscitation prior to MICU admit.   urine studies indicate hypovolemic picture.   Sodium stable despite IVF challenge  Poor PO intake over past 4 days, encourage intake, family to bring in food and ensure to be added to diet   Trend sodium daily, if worsening may require nephrology input
hx of HTN on BP meds  Hold iso septic shock
- RESOLVED  - Likely hypovolemic s/p aggressive fluid resuscitation prior to MICU admit  - Urine studies indicate hypovolemic picture  - Na 136 2/25  - Encourage PO intake  - Trend BMP
Hyponatremia likely hypovolemic s/p aggressive fluid resuscitation prior to MICU admit.   urine studies indicate hypovolemic picture.   Sodium gradually improving   Poor PO intake over past 4 days, encourage intake, family to bring in food and ensure to be added to diet   Trend sodium daily, if worsening may require nephrology input
Hyponatremia likely hypovolemic s/p aggressive fluid resuscitation prior to MICU admit.   urine studies indicate hypovolemic picture.   Sodium gradually improving, 132 this AM   Poor PO intake over past 4 days, encourage intake, family to bring in food and ensure to be added to diet   Trend sodium daily
- RESOLVED  - Likely hypovolemic s/p aggressive fluid resuscitation prior to MICU admit  - Urine studies indicate hypovolemic picture  - Na 136 2/25  - Encourage PO intake  - Trend BMP
- RESOLVED  - Likely hypovolemic s/p aggressive fluid resuscitation prior to MICU admit  - Urine studies indicate hypovolemic picture  - Na 136 2/25  - Encourage PO intake  - Trend BMP

## 2024-02-27 NOTE — PROGRESS NOTE ADULT - PROBLEM SELECTOR PLAN 1
In the ED, patient febrile to 103.1, hypotensive to 80s/30s, and tachycardic. Patient was given 4 L IVF s/p pressor in MICU . U/A grossly positive for UTI - Likely septic shock 2/2 UTI  Hx of Pseudomonas and Klebsiella resistant to Zosyn. more recent UCx with Kleb only with variable resistance   UCx >3 org on admission . BlCx with 1/4 bottle Klebsiella species.   BCx growing Klebsiella, repeat cultures pending  c/w cefepime based on sensitivities
- In the ED, patient febrile to 103.1, hypotensive to 80s/30s, and tachycardic. Patient was given 4 L IVF s/p pressor in MICU. U/A grossly positive for UTI - likely septic shock 2/2 UTI  - Hx of Pseudomonas and Klebsiella resistant to Zosyn. more recent UCx with Kleb only with variable resistance   UCx >3 org on admission . BCx with 1/4 bottle Klebsiella species.   - BCx growing Klebsiella, repeat cultures NGTD  - CT A/P done 2/23 shows evidence of pyelo, will need 10-14 day abx course   - C/w cefepime based on sensitivities, can likely transition to orals on discharge to complete course  - Monitor blood pressure
- In the ED, patient febrile to 103.1, hypotensive to 80s/30s, and tachycardic. Patient was given 4 L IVF s/p pressor in MICU. U/A grossly positive for UTI - likely septic shock 2/2 UTI  - Hx of Pseudomonas and Klebsiella resistant to Zosyn. more recent UCx with Kleb only with variable resistance   UCx >3 org on admission . BCx with 1/4 bottle Klebsiella species.   - BCx growing Klebsiella, repeat cultures NGTD  - CT A/P done 2/23 shows evidence of pyelo, will need 10-14 day abx course   - C/w cefepime based on sensitivities, dc on cipro to complete course   - Monitor blood pressure
- In the ED, patient febrile to 103.1, hypotensive to 80s/30s, and tachycardic. Patient was given 4 L IVF s/p pressor in MICU. U/A grossly positive for UTI - likely septic shock 2/2 UTI  - Hx of Pseudomonas and Klebsiella resistant to Zosyn. more recent UCx with Kleb only with variable resistance   UCx >3 org on admission . BCx with 1/4 bottle Klebsiella species.   - BCx growing Klebsiella, repeat cultures NGTD  - CT A/P done 2/23 shows evidence of pyelo, will need 10-14 day abx course   - C/w cefepime based on sensitivities, can likely transition to orals on discharge to complete course  - Wean midodrine from 10 TID to 5 TID  - Monitor blood pressure
In the ED, patient febrile to 103.1, hypotensive to 80s/30s, and tachycardic. Patient was given 4 L IVF  U/A grossly positive for UTI  Likely septic shock 2/2 UTI  Hx of Pseudomonas and Klebsiella resistant to Zosyn    Plan:   Blood/Ucx  Cefepime bacteremia dosing   Source control with urology chinchilla exchange given hx of strictures, said they will do tonight after BP stabilizes   Levophed and transfer to MICU
In the ED, patient febrile to 103.1, hypotensive to 80s/30s, and tachycardic. Patient was given 4 L IVF s/p pressor in MICU . U/A grossly positive for UTI - Likely septic shock 2/2 UTI  Hx of Pseudomonas and Klebsiella resistant to Zosyn. more recent UCx with Kleb only with variable resistance   UCx >3 org on admission . BlCx with 1/4 bottle Klebsiella species.   BCx growing Klebsiella, repeat cultures NGTD  c/w cefepime based on sensitivities, can likely transition to orals on discharge to complete course
In the ED, patient febrile to 103.1, hypotensive to 80s/30s, and tachycardic. Patient was given 4 L IVF s/p pressor in MICU . U/A grossly positive for UTI - Likely septic shock 2/2 UTI  Hx of Pseudomonas and Klebsiella resistant to Zosyn. more recent UCx with Kleb only with variable resistance   UCx >3 org on admission . BlCx with 1/4 bottle Klebsiella species.   Will follow up final BlCx result suspect resistant Kleb base on previous Cx and infection.   Repeat UCx and BlCx.   s/p chinchilla exchange by urology in MICU yesterday.
In the ED, patient febrile to 103.1, hypotensive to 80s/30s, and tachycardic. Patient was given 4 L IVF s/p pressor in MICU . U/A grossly positive for UTI - Likely septic shock 2/2 UTI  Hx of Pseudomonas and Klebsiella resistant to Zosyn. more recent UCx with Kleb only with variable resistance   UCx >3 org on admission . BlCx with 1/4 bottle Klebsiella species.   BCx growing Klebsiella, repeat cultures NGTD  CT A/P done 2/23 shows evidence of pyelo, will need 10-14 day abx course   c/w cefepime based on sensitivities, can likely transition to orals on discharge to complete course

## 2024-02-27 NOTE — PROGRESS NOTE ADULT - ASSESSMENT
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 Chase/Urology), radiation cystitis, urethral strictures, radiation cystitis, presented for septic shock requiring MICU now downgraded to floors for further management. Course c/b acute anemia requiring blood transfusion.

## 2024-02-27 NOTE — DISCHARGE NOTE PROVIDER - PROVIDER TOKENS
PROVIDER:[TOKEN:[[id number]],SCHEDULEDAPPT:[02/28/2024]],PROVIDER:[TOKEN:[[id number]],SCHEDULEDAPPT:[03/01/2024]]

## 2024-02-27 NOTE — PROGRESS NOTE ADULT - PROVIDER SPECIALTY LIST ADULT
Internal Medicine
Hospitalist
Internal Medicine
MICU
Hospitalist

## 2024-02-27 NOTE — DISCHARGE NOTE PROVIDER - CARE PROVIDER_API CALL
Chloe Chase  Urology  [address], [address]  [place] [address]  Phone: [phone]  Fax: [phone]  Scheduled Appointment: 02/28/2024    Kristal Rush  Internal Medicine  [address] [address]  Phone: [phone]  Fax: [phone]  Scheduled Appointment: 03/01/2024

## 2024-02-27 NOTE — DISCHARGE NOTE PROVIDER - NSDCCPCAREPLAN_GEN_ALL_CORE_FT
PRINCIPAL DISCHARGE DIAGNOSIS  Diagnosis: Other specified sepsis  Assessment and Plan of Treatment: Take all antibiotics as ordered.  Call you Health care provider upon arrival home to make a one week follow up appointment.  If you develop fever, chills, malaise, or change in mental status call your Health Care Provider or go to the Emergency Department.  Nutrition is important, eat small frequent meals to help ensure you get adequate calories.  Do not stay in bed all day!  Increase your activity daily as tolerated.        SECONDARY DISCHARGE DIAGNOSES  Diagnosis: Hypertension  Assessment and Plan of Treatment: f/u with PMD Friday or Monday for a blood pressure check to decide whether or not to restart lisinopril    Diagnosis: Urethral stricture  Assessment and Plan of Treatment: follow up with Dr. Chase for further management of the chinchilla

## 2024-02-27 NOTE — DISCHARGE NOTE NURSING/CASE MANAGEMENT/SOCIAL WORK - NSDCFUADDAPPT_GEN_ALL_CORE_FT
APPTS ARE READY TO BE MADE: [x] YES    Best Family or Patient Contact (if needed):    Additional Information about above appointments (if needed):    1: Dr. Rush Friday or Monday for b/p check  2:   3:     Other comments or requests:

## 2024-02-27 NOTE — PROGRESS NOTE ADULT - PROBLEM SELECTOR PLAN 5
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   Suspect sec to pt's known h/o prostate CA with lung extension.   pt will need cont follow up with Dr. Chase as outpt
Diet: DASH  DVT: Lovenox  Dispo: MICU transfer
- Hx of prostate cancer metastatic to liver, lung s/p partial pneumonectomy  - On Erleado at home; per Dr. Chirinos on 2/24, continue to hold until outpatient follow up with Dr. Allen/primary onc  - CXR with LLL hazy opacity unchanged from prior; CTA from 12/28 showing 2.4cm LLL nodular opacity - Suspect 2/2 pt's known h/o prostate CA with lung extension  - F/u with Dr. Chase  outpatient
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 Chirinos on 2/24, continue to hold until outpatient follow up with Dr. Allen  CXR with LLL hazy opacity unchanged from prior   CTA from 12/28 showing 2.4cm LLL nodular opacity.   Suspect sec to pt's known h/o prostate CA with lung extension.   pt will need cont follow up with Dr. Chase as outpt
- Hx of prostate cancer metastatic to liver, lung s/p partial pneumonectomy  - On Erleado at home; per Dr. Chirinos on 2/24, continue to hold until outpatient follow up with Dr. Allen  - CXR with LLL hazy opacity unchanged from prior; CTA from 12/28 showing 2.4cm LLL nodular opacity - Suspect 2/2 pt's known h/o prostate CA with lung extension  - F/u with Dr. Chase  outpatient
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   Suspect sec to pt's known h/o prostate CA with lung extension.   pt will need cont follow up with Dr. Chase as outpt
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   Suspect sec to pt's known h/o prostate CA with lung extension.   pt will need cont follow up with Dr. Chase as outpt
- Hx of prostate cancer metastatic to liver, lung s/p partial pneumonectomy  - On Erleado at home; per Dr. Chirinos on 2/24, continue to hold until outpatient follow up with Dr. Allen  - CXR with LLL hazy opacity unchanged from prior; CTA from 12/28 showing 2.4cm LLL nodular opacity - Suspect 2/2 pt's known h/o prostate CA with lung extension  - F/u with Dr. Chase  outpatient

## 2024-02-27 NOTE — DISCHARGE NOTE PROVIDER - HOSPITAL COURSE
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 Daughter also reports patient has been more lethargic that usual. Patient has chronic chinchilla that was last exchanged 2-3 weeks ago. Daughter states he was recently treated for a UTI outpatient and completed a course of cefpoxodime about 1 week ago.    In the ED, patient febrile to 103.1, hypotensive to 80s/30s, and tachycardic. Patient was given 4 L IVF, vancomycin, Zosyn, and azithromycin in ED. Labs remarkable for leukocytosis, stable anemia, hyponatremia (Na 126). UA grossly positive. (20 Feb 2024 01:22)    Hospital Course:  Septic shock.   ·  Plan: - In the ED, patient febrile to 103.1, hypotensive to 80s/30s, and tachycardic. Patient was given 4 L IVF s/p pressor in MICU. U/A grossly positive for UTI - likely septic shock 2/2 UTI  - Hx of Pseudomonas and Klebsiella resistant to Zosyn. more recent UCx with Kleb only with variable resistance   UCx >3 org on admission . BCx with 1/4 bottle Klebsiella species.   - BCx growing Klebsiella, repeat cultures NGTD  - CT A/P done 2/23 shows evidence of pyelo, will need 10-14 day abx course    Urethral stricture.   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 Chase outpatient)  - S/p Chinchilla exchange by urology team in MICU, evaluated by Urology again on 2/21 with clot noted in Chinchilla since evacuated  - Urine clear 2/25 - no hematuria or clots    Important Medication Changes and Reason:    Active or Pending Issues Requiring Follow-up: chinchilla care, B/P monitor Friday or monday     Advanced Directives:   [ ] Full code  [ ] DNR  [ ] Hospice    Discharge Diagnoses: Sepsis         HPI:  89-year-old Kiswahili-speaking male past medical history HTN, HLD, 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 Daughter also reports patient has been more lethargic that usual. Patient has chronic chinchilla that was last exchanged 2-3 weeks ago. Daughter states he was recently treated for a UTI outpatient and completed a course of cefpoxodime about 1 week ago.    In the ED, patient febrile to 103.1, hypotensive to 80s/30s, and tachycardic. Patient was given 4 L IVF, vancomycin, Zosyn, and azithromycin in ED. Labs remarkable for leukocytosis, stable anemia, hyponatremia (Na 126). UA grossly positive. (20 Feb 2024 01:22)    Hospital Course:  Septic shock.   ·  Plan: - In the ED, patient febrile to 103.1, hypotensive to 80s/30s, and tachycardic. Patient was given 4 L IVF s/p pressor in MICU. U/A grossly positive for UTI - likely septic shock 2/2 UTI  - Hx of Pseudomonas and Klebsiella resistant to Zosyn. more recent UCx with Kleb only with variable resistance   UCx >3 org on admission . BCx with 1/4 bottle Klebsiella species.   - BCx growing Klebsiella, repeat cultures NGTD  - CT A/P done 2/23 shows evidence of pyelo, will need 10-14 day abx course  - weaned off midodrine, toprol restarted prior to dc, discussed with daughter restarting lisinopril if BP elevated in 3 days     Urethral stricture.   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 Chase outpatient)  - S/p Chinchilla exchange by urology team in MICU, evaluated by Urology again on 2/21 with clot noted in Chinchilla since evacuated  - Urine clear 2/25 - no hematuria or clots    Anemia  -required transfusion x 1 for symptomatic anemia with improvement in symptoms  -discussed starting iron supplements once completed abx course    Important Medication Changes and Reason:  Holding lisinopril until BP check  Added cipro to complete course    Active or Pending Issues Requiring Follow-up: chinchilla care, B/P monitor Friday or monday     Advanced Directives:   [ ] Full code  [ ] DNR  [ ] Hospice    Discharge Diagnoses: Sepsis  Anemia  Bacteremia

## 2024-02-27 NOTE — PROGRESS NOTE ADULT - PROBLEM SELECTOR PROBLEM 3
Hyponatremia
Hypertension
Hyponatremia

## 2024-02-27 NOTE — DISCHARGE NOTE PROVIDER - NSDCFUSCHEDAPPT_GEN_ALL_CORE_FT
[facility]  UROLOGY [address] [place] [address] R  Scheduled Appointment: 02/28/2024    Chloe Chase  [facility]  UROLOGY [address] [place] [address] R  Scheduled Appointment: 02/28/2024    Chloe Chase  [facility]  [facility]-Urology  Scheduled Appointment: 02/28/2024    [facility]  UROLOGY [address] [place] [address] R  Scheduled Appointment: 03/13/2024    Chloe Chase  [facility]  UROLOGY [address] [place] [address] R  Scheduled Appointment: 03/13/2024    Kristal Williamson  [facility]  INT[facility] OP [address] [place] [address]   Scheduled Appointment: 05/08/2024     [facility]  UROLOGY [address]  Scheduled Appointment: 03/13/2024    Chloe Chase  [facility]  UROLOGY [address]  Scheduled Appointment: 03/13/2024    Kristal Williamson  [facility]  INTMED  [facility]  Scheduled Appointment: 03/18/2024    Kristal Williamson  [facility]  INTMED  [facility]   Scheduled Appointment: 05/08/2024

## 2024-02-27 NOTE — PROGRESS NOTE ADULT - NSPROGADDITIONALINFOA_GEN_ALL_CORE
Plan d/w ACP Maxi Samuel, daughter Jessee updated over the phone. Total time dc planning 42 minutes
Plan d/w ACP Lola, daughter Jessee updated over the phone, in agreement with plan
Plan d/w ACP Maxi Samuel, daughter Jessee updated over the phone, she will discuss with patient home vs [facility] and let team know
d/w ACP  d/w urology team.
Plan d/w ACP [facility] On[facility], daughter Jessee updated over the phone
Plan d/w ACP Debra, daughter Jessee updated over the phone, all questions addressed.

## 2024-02-27 NOTE — PROGRESS NOTE ADULT - PROBLEM SELECTOR PLAN 7
Diet: Regular with ensure   DVT: sqh  Dispo: PT eval
Diet: DASH  DVT: Lovenox  Dispo: PT eval
Diet: Regular with ensure   DVT: holding chem dvt ppx in setting of anemia requiring transfusion  Dispo: PT eval
DVT: Holding chemical DVT ppx in setting of anemia requiring transfusion  Diet: Regular with Ensure   Dispo: PT recommends [facility]
DVT: sqh  Diet: Regular with Ensure   Dispo: home with hpt
Diet: Regular with ensure   DVT: holding chem dvt ppx in setting of anemia requiring transfusion  Dispo: PT eval
DVT: Holding chemical DVT ppx in setting of anemia requiring transfusion  Diet: Regular with Ensure   Dispo: PT recommends [facility]

## 2024-02-27 NOTE — DISCHARGE NOTE NURSING/CASE MANAGEMENT/SOCIAL WORK - PATIENT PORTAL LINK FT
You can access the FollowMyHealth Patient Portal offered by [facility] by registering at the following website: [email or web address]. By joining [facility]’s FollowMyHealth portal, you will also be able to view your health information using other applications (apps) compatible with our system.

## 2024-02-27 NOTE — PROGRESS NOTE ADULT - SUBJECTIVE AND OBJECTIVE BOX
Thania Stinson MD  Division of Hospital Medicine  Reachable on MS Teams    PROGRESS NOTE:     Patient is a 89y old  Male who presents with a chief complaint of Urosepsis (23 Feb 2024 13:54)      SUBJECTIVE / OVERNIGHT EVENTS: No acute events overnight, seen this AM. Feels well, more energy compared to yesterday, appetite slowly improving, no complaints.     ADDITIONAL REVIEW OF SYSTEMS:    MEDICATIONS  (STANDING):  atorvastatin 40 milliGRAM(s) Oral at bedtime  cefepime   IVPB 2000 milliGRAM(s) IV Intermittent every 12 hours  levothyroxine 50 MICROGram(s) Oral daily  midodrine. 10 milliGRAM(s) Oral three times a day  nystatin Powder 1 Application(s) Topical two times a day  oxybutynin 5 milliGRAM(s) Oral daily  pantoprazole    Tablet 40 milliGRAM(s) Oral before breakfast  sertraline 25 milliGRAM(s) Oral daily  sodium chloride 0.9%. 1000 milliLiter(s) (100 mL/Hr) IV Continuous <Continuous>    MEDICATIONS  (PRN):  acetaminophen     Tablet .. 650 milliGRAM(s) Oral every 6 hours PRN Temp greater or equal to 38C (100.4F), Mild Pain (1 - 3)  aluminum hydroxide/magnesium hydroxide/simethicone Suspension 30 milliLiter(s) Oral every 4 hours PRN Dyspepsia  melatonin 3 milliGRAM(s) Oral at bedtime PRN Insomnia  sodium chloride 0.65% Nasal 1 Spray(s) Both Nostrils four times a day PRN Nasal Congestion      CAPILLARY BLOOD GLUCOSE        I&O's Summary    23 Feb 2024 07:01  -  24 Feb 2024 07:00  --------------------------------------------------------  IN: 0 mL / OUT: 1000 mL / NET: -1000 mL        PHYSICAL EXAM:  Vital Signs Last 24 Hrs  T(C): 36.8 (24 Feb 2024 12:38), Max: 36.9 (23 Feb 2024 21:32)  T(F): 98.2 (24 Feb 2024 12:38), Max: 98.5 (23 Feb 2024 21:32)  HR: 72 (24 Feb 2024 12:38) (68 - 73)  BP: 115/46 (24 Feb 2024 12:38) (115/46 - 146/61)  BP(mean): --  RR: 18 (24 Feb 2024 12:38) (18 - 18)  SpO2: 98% (24 Feb 2024 12:38) (95% - 98%)    Parameters below as of 24 Feb 2024 12:38  Patient On (Oxygen Delivery Method): room air        CONSTITUTIONAL: NAD, well-developed  RESPIRATORY: Normal respiratory effort; lungs are clear to auscultation bilaterally  CARDIOVASCULAR: Regular rate and rhythm, normal S1 and S2, no murmur/rub/gallop; No lower extremity edema; Peripheral pulses are 2+ bilaterally  ABDOMEN: Nontender to palpation, normoactive bowel sounds, no rebound/guarding; No hepatosplenomegaly  MUSCLOSKELETAL: no clubbing or cyanosis of digits; no joint swelling or tenderness to palpation  PSYCH: A+O to person, place, and time; affect appropriate  Garrett in place and draining, no blood/clots noted     LABS:                        8.5    6.45  )-----------( 153      ( 24 Feb 2024 07:31 )             25.1     02-24    132<L>  |  99  |  18  ----------------------------<  103<H>  3.5   |  22  |  0.80    Ca    8.2<L>      24 Feb 2024 07:30  Phos  3.4     02-24  Mg     1.9     02-24    TPro  5.5<L>  /  Alb  2.6<L>  /  TBili  0.3  /  DBili  x   /  AST  37  /  ALT  12  /  AlkPhos  45  02-24          Urinalysis Basic - ( 24 Feb 2024 07:30 )    Color: x / Appearance: x / SG: x / pH: x  Gluc: 103 mg/dL / Ketone: x  / Bili: x / Urobili: x   Blood: x / Protein: x / Nitrite: x   Leuk Esterase: x / RBC: x / WBC x   Sq Epi: x / Non Sq Epi: x / Bacteria: x        Culture - Urine (collected 21 Feb 2024 20:06)  Source: Catheterized Catheterized  Final Report (23 Feb 2024 10:05):    <10,000 CFU/mL Normal Urogenital Annalisa        RADIOLOGY & ADDITIONAL TESTS:  Results Reviewed:   Imaging Personally Reviewed:  Electrocardiogram Personally Reviewed:    COORDINATION OF CARE:  Care Discussed with Consultants/Other Providers [Y/N]:  Prior or Outpatient Records Reviewed [Y/N]:  
Thania Stinson MD  Division of Hospital Medicine  Reachable on MS Teams    PROGRESS NOTE:     Patient is a 89y old  Male who presents with a chief complaint of Urosepsis (21 Feb 2024 10:34)      SUBJECTIVE / OVERNIGHT EVENTS: No acute events overnight, seen and examined this AM. Resting in bed, stated he has not eaten in 4 days, encouraged PO intake. Otherwise no complaints.     ADDITIONAL REVIEW OF SYSTEMS:    MEDICATIONS  (STANDING):  atorvastatin 40 milliGRAM(s) Oral at bedtime  cefepime   IVPB 2000 milliGRAM(s) IV Intermittent every 12 hours  heparin   Injectable 5000 Unit(s) SubCutaneous every 8 hours  levothyroxine 50 MICROGram(s) Oral daily  midodrine. 10 milliGRAM(s) Oral three times a day  nystatin Powder 1 Application(s) Topical two times a day  oxybutynin 5 milliGRAM(s) Oral daily  sertraline 25 milliGRAM(s) Oral daily  sodium chloride 0.9%. 1000 milliLiter(s) (100 mL/Hr) IV Continuous <Continuous>    MEDICATIONS  (PRN):  acetaminophen     Tablet .. 650 milliGRAM(s) Oral every 6 hours PRN Temp greater or equal to 38C (100.4F), Mild Pain (1 - 3)  aluminum hydroxide/magnesium hydroxide/simethicone Suspension 30 milliLiter(s) Oral every 4 hours PRN Dyspepsia  melatonin 3 milliGRAM(s) Oral at bedtime PRN Insomnia  sodium chloride 0.65% Nasal 1 Spray(s) Both Nostrils four times a day PRN Nasal Congestion      CAPILLARY BLOOD GLUCOSE        I&O's Summary    21 Feb 2024 07:01  -  22 Feb 2024 07:00  --------------------------------------------------------  IN: 1240 mL / OUT: 2400 mL / NET: -1160 mL        PHYSICAL EXAM:  Vital Signs Last 24 Hrs  T(C): 37.2 (22 Feb 2024 12:37), Max: 38.8 (21 Feb 2024 16:52)  T(F): 98.9 (22 Feb 2024 12:37), Max: 101.9 (21 Feb 2024 16:52)  HR: 84 (22 Feb 2024 12:37) (64 - 84)  BP: 124/56 (22 Feb 2024 12:37) (119/64 - 125/49)  BP(mean): --  RR: 18 (22 Feb 2024 12:37) (18 - 18)  SpO2: 95% (22 Feb 2024 12:37) (95% - 97%)    Parameters below as of 22 Feb 2024 12:37  Patient On (Oxygen Delivery Method): room air        CONSTITUTIONAL: NAD, well-developed  RESPIRATORY: Normal respiratory effort; lungs are clear to auscultation bilaterally  CARDIOVASCULAR: Regular rate and rhythm, normal S1 and S2, no murmur/rub/gallop; No lower extremity edema; Peripheral pulses are 2+ bilaterally  ABDOMEN: Nontender to palpation, normoactive bowel sounds, no rebound/guarding; No hepatosplenomegaly  MUSCLOSKELETAL: no clubbing or cyanosis of digits; no joint swelling or tenderness to palpation  PSYCH: A+O to person, place, and time; affect appropriate  Garrett in place, no blood in bag    LABS:                        7.2    6.12  )-----------( 163      ( 22 Feb 2024 07:44 )             21.7     02-22    128<L>  |  97  |  23  ----------------------------<  81  3.8   |  21<L>  |  0.91    Ca    8.2<L>      22 Feb 2024 07:44            Urinalysis Basic - ( 22 Feb 2024 07:44 )    Color: x / Appearance: x / SG: x / pH: x  Gluc: 81 mg/dL / Ketone: x  / Bili: x / Urobili: x   Blood: x / Protein: x / Nitrite: x   Leuk Esterase: x / RBC: x / WBC x   Sq Epi: x / Non Sq Epi: x / Bacteria: x        Culture - Blood (collected 19 Feb 2024 15:02)  Source: .Blood Blood-Peripheral  Gram Stain (20 Feb 2024 08:17):    Growth in aerobic bottle: Gram Negative Rods  Final Report (21 Feb 2024 17:58):    Growth in aerobic bottle: Klebsiella pneumoniae    Direct identification is available within approximately 3-5    hours either by Blood Panel Multiplexed PCR or Direct    MALDI-TOF. Details: [email or web address]  Organism: Blood Culture PCR  Klebsiella pneumoniae (21 Feb 2024 17:58)  Organism: Klebsiella pneumoniae (21 Feb 2024 17:58)  Organism: Blood Culture PCR (21 Feb 2024 17:58)    Culture - Blood (collected 19 Feb 2024 15:01)  Source: .Blood Blood-Peripheral  Preliminary Report (21 Feb 2024 18:02):    No growth at 48 Hours        RADIOLOGY & ADDITIONAL TESTS:  Results Reviewed:   Imaging Personally Reviewed:  Electrocardiogram Personally Reviewed:    COORDINATION OF CARE:  Care Discussed with Consultants/Other Providers [Y/N]:  Prior or Outpatient Records Reviewed [Y/N]:  
[facility] Division of Hospital Medicine  Harmony Walter MD  Pager (M-F, 4I-9W): [phone]  Other Times:  [phone]    Patient is a 89y old  Male who presents with a chief complaint of Urosepsis (20 Feb 2024 07:08)      SUBJECTIVE / OVERNIGHT EVENTS:  alert and easily arousable. c/o urinary cath "this cath is too small" . overnight issues with chinchilla not draining well.    per nursing was able to flush last night. some mild discomfort around inserrtion site. chinchilla bag with dark red urine . no urine in the catheter.     ADDITIONAL REVIEW OF SYSTEMS: otherwise neg    MEDICATIONS  (STANDING):  atorvastatin 40 milliGRAM(s) Oral at bedtime  cefepime   IVPB 2000 milliGRAM(s) IV Intermittent every 12 hours  heparin   Injectable 5000 Unit(s) SubCutaneous every 8 hours  levothyroxine 50 MICROGram(s) Oral daily  midodrine. 10 milliGRAM(s) Oral three times a day  nystatin Powder 1 Application(s) Topical two times a day  oxybutynin 5 milliGRAM(s) Oral daily  sertraline 25 milliGRAM(s) Oral daily    MEDICATIONS  (PRN):  acetaminophen     Tablet .. 650 milliGRAM(s) Oral every 6 hours PRN Temp greater or equal to 38C (100.4F), Mild Pain (1 - 3)  aluminum hydroxide/magnesium hydroxide/simethicone Suspension 30 milliLiter(s) Oral every 4 hours PRN Dyspepsia  melatonin 3 milliGRAM(s) Oral at bedtime PRN Insomnia      CAPILLARY BLOOD GLUCOSE        I&O's Summary    20 Feb 2024 07:01  -  21 Feb 2024 07:00  --------------------------------------------------------  IN: 1423.8 mL / OUT: 885 mL / NET: 538.8 mL        PHYSICAL EXAM:  Vital Signs Last 24 Hrs  T(C): 36.3 (21 Feb 2024 05:26), Max: 37.5 (20 Feb 2024 20:00)  T(F): 97.4 (21 Feb 2024 05:26), Max: 99.5 (20 Feb 2024 20:00)  HR: 78 (21 Feb 2024 05:26) (76 - 106)  BP: 118/43 (21 Feb 2024 05:26) (89/53 - 141/56)  BP(mean): 72 (20 Feb 2024 21:00) (68 - 93)  RR: 18 (21 Feb 2024 05:26) (18 - 45)  SpO2: 94% (21 Feb 2024 05:26) (75% - 100%)    Parameters below as of 21 Feb 2024 05:26  Patient On (Oxygen Delivery Method): room air        CONSTITUTIONAL: NAD, well-groomed  EYES:  conjunctiva and sclera clear  ENMT: Moist oral mucosa  NECK: Supple, no palpable masses; no JVD  RESPIRATORY: Normal respiratory effort; lungs are clear to auscultation bilaterally  CARDIOVASCULAR: Regular rate and rhythm, normal S1 and S2, no murmur/rub/gallop; No lower extremity edema  ABDOMEN: Nontender to palpation, normoactive bowel sounds, no rebound/guarding  MUSCULOSKELETAL:  no clubbing or cyanosis of digits; no joint swelling or tenderness to palpation  : chinchilla in place, scant blood around meatus. bag with dark red urine . no urine noted in the catheter  PSYCH: A+O to person, place, and time; affect appropriate  SKIN: No rashes; no palpable lesions    LABS:                        8.2    12.52 )-----------( 177      ( 20 Feb 2024 01:50 )             24.9     02-20    127<L>  |  97  |  19  ----------------------------<  96  4.8   |  19<L>  |  1.00    Ca    8.4      20 Feb 2024 14:16  Phos  2.7     02-20  Mg     1.8     02-20    TPro  5.5<L>  /  Alb  2.7<L>  /  TBili  0.4  /  DBili  x   /  AST  44<H>  /  ALT  12  /  AlkPhos  38<L>  02-20    PT/INR - ( 19 Feb 2024 14:59 )   PT: 12.9 sec;   INR: 1.18 ratio         PTT - ( 19 Feb 2024 14:59 )  PTT:34.3 sec      Urinalysis Basic - ( 20 Feb 2024 14:16 )    Color: x / Appearance: x / SG: x / pH: x  Gluc: 96 mg/dL / Ketone: x  / Bili: x / Urobili: x   Blood: x / Protein: x / Nitrite: x   Leuk Esterase: x / RBC: x / WBC x   Sq Epi: x / Non Sq Epi: x / Bacteria: x        Culture - Blood (collected 19 Feb 2024 15:02)  Source: .Blood Blood-Peripheral  Gram Stain (20 Feb 2024 08:17):    Growth in aerobic bottle: Gram Negative Rods  Preliminary Report (20 Feb 2024 22:54):    Growth in aerobic bottle: Klebsiella pneumoniae    Direct identification is available within approximately 3-5    hours either by Blood Panel Multiplexed PCR or Direct    MALDI-TOF. Details: [email or web address]  Organism: Blood Culture PCR (20 Feb 2024 10:07)  Organism: Blood Culture PCR (20 Feb 2024 10:07)    Culture - Blood (collected 19 Feb 2024 15:01)  Source: .Blood Blood-Peripheral  Preliminary Report (20 Feb 2024 18:02):    No growth at 24 hours    Culture - Urine (collected 19 Feb 2024 14:40)  Source: Catheterized Catheterized  Final Report (21 Feb 2024 00:02):    >=3 organisms. Probable collection contamination.        RADIOLOGY & ADDITIONAL TESTS:  Results Reviewed:   Imaging Personally Reviewed:  Electrocardiogram Personally Reviewed:    COORDINATION OF CARE:  Care Discussed with Consultants/Other Providers [Y/N]:  Prior or Outpatient Records Reviewed [Y/N]:  
  INTERVAL HPI/OVERNIGHT EVENTS:    SUBJECTIVE: Patient seen and examined at bedside.     ROS: All negative except as listed above.    VITAL SIGNS:  ICU Vital Signs Last 24 Hrs  T(C): 37 (20 Feb 2024 04:00), Max: 39.6 (19 Feb 2024 22:05)  T(F): 98.6 (20 Feb 2024 04:00), Max: 103.2 (19 Feb 2024 22:05)  HR: 60 (20 Feb 2024 06:45) (60 - 110)  BP: 115/55 (20 Feb 2024 06:45) (70/43 - 149/63)  BP(mean): 79 (20 Feb 2024 06:45) (53 - 108)  ABP: --  ABP(mean): --  RR: 20 (20 Feb 2024 06:45) (16 - 30)  SpO2: 98% (20 Feb 2024 06:45) (93% - 100%)    O2 Parameters below as of 20 Feb 2024 02:04  Patient On (Oxygen Delivery Method): room air            Plateau pressure:   P/F ratio:     02-19 @ 07:01  -  02-20 @ 07:00  --------------------------------------------------------  IN: 365.7 mL / OUT: 1285 mL / NET: -919.3 mL      CAPILLARY BLOOD GLUCOSE      POCT Blood Glucose.: 123 mg/dL (20 Feb 2024 01:38)      ECG: reviewed.    PHYSICAL EXAM:    GENERAL: NAD, lying in bed comfortably  HEAD:  Atraumatic, normocephalic  EYES: EOMI, PERRLA, conjunctiva and sclera clear  NECK: Supple, trachea midline, no JVD  HEART: Regular rate and rhythm, no murmurs, rubs, or gallops  LUNGS: Unlabored respirations.  Clear to auscultation bilaterally, no crackles, wheezing, or rhonchi  ABDOMEN: Soft, nontender, nondistended, +BS  EXTREMITIES: 2+ peripheral pulses bilaterally, cap refill<2 secs. No clubbing, cyanosis, or edema  NERVOUS SYSTEM:  A&Ox3, following commands, moving all extremities, no focal deficits   SKIN: No rashes or lesions    MEDICATIONS:  MEDICATIONS  (STANDING):  atorvastatin 40 milliGRAM(s) Oral at bedtime  cefepime   IVPB 2000 milliGRAM(s) IV Intermittent every 12 hours  heparin   Injectable 5000 Unit(s) SubCutaneous every 8 hours  levothyroxine 50 MICROGram(s) Oral daily  midodrine. 10 milliGRAM(s) Oral three times a day  norepinephrine Infusion 0.05 MICROgram(s)/kG/Min (7.44 mL/Hr) IV Continuous <Continuous>  oxybutynin 5 milliGRAM(s) Oral daily  sertraline 25 milliGRAM(s) Oral daily    MEDICATIONS  (PRN):      ALLERGIES:  Allergies    No Known Allergies    Intolerances        LABS:                        8.2    12.52 )-----------( 177      ( 20 Feb 2024 01:50 )             24.9     02-20    131<L>  |  101  |  19  ----------------------------<  117<H>  3.6   |  19<L>  |  1.20    Ca    8.0<L>      20 Feb 2024 01:50  Phos  3.3     02-20  Mg     1.6     02-20    TPro  5.5<L>  /  Alb  2.7<L>  /  TBili  0.4  /  DBili  x   /  AST  44<H>  /  ALT  12  /  AlkPhos  38<L>  02-20    PT/INR - ( 19 Feb 2024 14:59 )   PT: 12.9 sec;   INR: 1.18 ratio         PTT - ( 19 Feb 2024 14:59 )  PTT:34.3 sec  Urinalysis Basic - ( 20 Feb 2024 01:50 )    Color: x / Appearance: x / SG: x / pH: x  Gluc: 117 mg/dL / Ketone: x  / Bili: x / Urobili: x   Blood: x / Protein: x / Nitrite: x   Leuk Esterase: x / RBC: x / WBC x   Sq Epi: x / Non Sq Epi: x / Bacteria: x      ABG:      vBG:  pH, Venous: 7.35 (02-20-24 @ 01:00)  pCO2, Venous: 38 mmHg (02-20-24 @ 01:00)  pO2, Venous: 55 mmHg (02-20-24 @ 01:00)  HCO3, Venous: 21 mmol/L (02-20-24 @ 01:00)  pH, Venous: 7.42 (02-19-24 @ 14:59)  pCO2, Venous: 38 mmHg (02-19-24 @ 14:59)  pO2, Venous: 32 mmHg (02-19-24 @ 14:59)  HCO3, Venous: 25 mmol/L (02-19-24 @ 14:59)    Micro:        RADIOLOGY & ADDITIONAL TESTS: Reviewed.
Thania Stinson MD  Division of Hospital Medicine  Reachable on MS Teams    PROGRESS NOTE:     Patient is a 89y old  Male who presents with a chief complaint of Urosepsis (25 Feb 2024 13:41)      SUBJECTIVE / OVERNIGHT EVENTS: No acute events overnight, seen this AM. Feels well, no complaints. Was amenable to rehab on my discussion    ADDITIONAL REVIEW OF SYSTEMS:    MEDICATIONS  (STANDING):  atorvastatin 40 milliGRAM(s) Oral at bedtime  cefepime   IVPB 2000 milliGRAM(s) IV Intermittent every 12 hours  levothyroxine 50 MICROGram(s) Oral daily  nystatin Powder 1 Application(s) Topical two times a day  oxybutynin 5 milliGRAM(s) Oral daily  pantoprazole    Tablet 40 milliGRAM(s) Oral before breakfast  sertraline 25 milliGRAM(s) Oral daily  sodium chloride 0.9%. 1000 milliLiter(s) (100 mL/Hr) IV Continuous <Continuous>    MEDICATIONS  (PRN):  acetaminophen     Tablet .. 650 milliGRAM(s) Oral every 6 hours PRN Temp greater or equal to 38C (100.4F), Mild Pain (1 - 3)  aluminum hydroxide/magnesium hydroxide/simethicone Suspension 30 milliLiter(s) Oral every 4 hours PRN Dyspepsia  melatonin 3 milliGRAM(s) Oral at bedtime PRN Insomnia  sodium chloride 0.65% Nasal 1 Spray(s) Both Nostrils four times a day PRN Nasal Congestion  sodium chloride 0.65% Nasal 1 Spray(s) Both Nostrils once PRN Nasal Congestion      CAPILLARY BLOOD GLUCOSE        I&O's Summary    25 Feb 2024 07:01  -  26 Feb 2024 07:00  --------------------------------------------------------  IN: 370 mL / OUT: 1870 mL / NET: -1500 mL        PHYSICAL EXAM:  Vital Signs Last 24 Hrs  T(C): 36.6 (26 Feb 2024 11:51), Max: 36.8 (25 Feb 2024 21:16)  T(F): 97.9 (26 Feb 2024 11:51), Max: 98.3 (25 Feb 2024 21:16)  HR: 76 (26 Feb 2024 11:51) (71 - 78)  BP: 136/52 (26 Feb 2024 11:51) (131/66 - 138/58)  BP(mean): --  RR: 18 (26 Feb 2024 11:51) (18 - 18)  SpO2: 99% (26 Feb 2024 11:51) (96% - 99%)    Parameters below as of 26 Feb 2024 11:51  Patient On (Oxygen Delivery Method): room air        CONSTITUTIONAL: No acute distress. Awake and alert.  RESPIRATORY: CTAB. No wheezes, rales, or rhonchi. No accessory muscle use. No apparent respiratory distress.  CARDIOVASCULAR: +S1/S2. No audible S3/S4. Regular rate and rhythm. No murmurs, rubs, or gallops. No LE swelling or edema.  GASTROINTESTINAL: Soft, nontender, nondistended. +BS. No rebound or guarding.   GENITOURINARY: Garrett catheter.  MUSCULOSKELETAL: Spontaneous movement in all extremities.  NEUROLOGICAL: CN 2-12 grossly intact. No focal deficits. Sensation intact x 4EXT.   PSYCHIATRIC: Appropriate affect. A&Ox3 (oriented to person, place, and time).      LABS:                        8.6    4.88  )-----------( 151      ( 26 Feb 2024 07:32 )             27.0     02-26    138  |  104  |  16  ----------------------------<  96  3.9   |  22  |  0.76    Ca    9.0      26 Feb 2024 07:31  Phos  2.4     02-26  Mg     2.1     02-26    TPro  6.1  /  Alb  2.7<L>  /  TBili  0.2  /  DBili  x   /  AST  37  /  ALT  13  /  AlkPhos  47  02-26          Urinalysis Basic - ( 26 Feb 2024 07:31 )    Color: x / Appearance: x / SG: x / pH: x  Gluc: 96 mg/dL / Ketone: x  / Bili: x / Urobili: x   Blood: x / Protein: x / Nitrite: x   Leuk Esterase: x / RBC: x / WBC x   Sq Epi: x / Non Sq Epi: x / Bacteria: x          RADIOLOGY & ADDITIONAL TESTS:  Results Reviewed:   Imaging Personally Reviewed:  Electrocardiogram Personally Reviewed:    COORDINATION OF CARE:  Care Discussed with Consultants/Other Providers [Y/N]:  Prior or Outpatient Records Reviewed [Y/N]:  
Contact Information:  Sung Lewis II, MD, MPH  Internal Medicine    KEL CHOUDHURY, MRN-[id number]    Patient is a 89y old  Male who presents with a chief complaint of Urosepsis (24 Feb 2024 14:30)      OVERNIGHT EVENTS/INTERVAL/SUBJECTIVE: Divehi  [id number]. Patient evaluated at bedside, states that he has some mild burning when urine passes through the Garrett (he states it is much improved compared to prior and has been present since the insertion of the urinary catheter). Otherwise, patient states that he feels well and has no other complaints. He denies ABD pain, N/V, CP, lightheadedness, dizziness, SOB, numbness, tingling.    CONSTITUTIONAL: No weakness. No fatigue. No fever.  HEAD: No head trauma.   EYES: No vision changes.  ENT: No hearing changes or tinnitus. No ear pain. No changes in smell. No nasal congestion or discharge. No sore throat. No voice hoarseness.   NECK: No neck pain or stiffness. No lumps.  RESPIRATORY: No cough. No SOB. No wheezing. No hemoptysis.   CARDIOVASCULAR: No chest pain. No palpitations.   GASTROINTESTINAL: No dysphagia. No ABD pain. No distension. No constipation. No diarrhea. No pain with defecation. No hematemesis. No hematochezia or melena.  BACK: No back pain.  GENITOURINARY: +Burning. No dysuria. No frequency or urgency. No hesitancy. No incontinence. No urinary retention. No suprapubic pain. No hematuria.  EXTREMITY: No swelling.  MUSCULOSKELETAL: No joint pain or swelling. No fractures. No stiffness.    SKIN: No rashes. No itching. No skin, hair, or nail changes.  NEUROLOGICAL: No weakness or paralysis. No lightheadedness or dizziness. No HA. No numbness or tingling.   PSYCHIATRIC: No depression.       OBJECTIVE:  Vital Signs Last 24 Hrs  T(C): 36.3 (25 Feb 2024 05:00), Max: 36.7 (24 Feb 2024 21:02)  T(F): 97.4 (25 Feb 2024 05:00), Max: 98 (24 Feb 2024 21:02)  HR: 77 (25 Feb 2024 05:00) (67 - 80)  BP: 135/65 (25 Feb 2024 05:00) (125/57 - 135/65)  BP(mean): --  RR: 18 (25 Feb 2024 05:00) (18 - 18)  SpO2: 96% (25 Feb 2024 05:00) (96% - 97%)    Parameters below as of 25 Feb 2024 05:00  Patient On (Oxygen Delivery Method): room air      I&O's Summary    24 Feb 2024 07:01  -  25 Feb 2024 07:00  --------------------------------------------------------  IN: 450 mL / OUT: 600 mL / NET: -150 mL        MEDICATIONS  (STANDING):  atorvastatin 40 milliGRAM(s) Oral at bedtime  cefepime   IVPB 2000 milliGRAM(s) IV Intermittent every 12 hours  levothyroxine 50 MICROGram(s) Oral daily  midodrine. 5 milliGRAM(s) Oral three times a day  nystatin Powder 1 Application(s) Topical two times a day  oxybutynin 5 milliGRAM(s) Oral daily  pantoprazole    Tablet 40 milliGRAM(s) Oral before breakfast  sertraline 25 milliGRAM(s) Oral daily  sodium chloride 0.9%. 1000 milliLiter(s) (100 mL/Hr) IV Continuous <Continuous>    MEDICATIONS  (PRN):  acetaminophen     Tablet .. 650 milliGRAM(s) Oral every 6 hours PRN Temp greater or equal to 38C (100.4F), Mild Pain (1 - 3)  aluminum hydroxide/magnesium hydroxide/simethicone Suspension 30 milliLiter(s) Oral every 4 hours PRN Dyspepsia  melatonin 3 milliGRAM(s) Oral at bedtime PRN Insomnia  sodium chloride 0.65% Nasal 1 Spray(s) Both Nostrils four times a day PRN Nasal Congestion  sodium chloride 0.65% Nasal 1 Spray(s) Both Nostrils once PRN Nasal Congestion    Allergies    No Known Allergies    Intolerances        CONSTITUTIONAL: No acute distress. Awake and alert.  RESPIRATORY: CTAB. No wheezes, rales, or rhonchi. No accessory muscle use. No apparent respiratory distress.  CARDIOVASCULAR: +S1/S2. No audible S3/S4. Regular rate and rhythm. No murmurs, rubs, or gallops. No LE swelling or edema.  GASTROINTESTINAL: Soft, nontender, nondistended. +BS. No rebound or guarding.   GENITOURINARY: Garrett catheter.  MUSCULOSKELETAL: Spontaneous movement in all extremities.  NEUROLOGICAL: CN 2-12 grossly intact. No focal deficits. Sensation intact x 4EXT.   PSYCHIATRIC: Appropriate affect. A&Ox3 (oriented to person, place, and time).                              8.6    5.26  )-----------( 152      ( 25 Feb 2024 07:41 )             25.4       02-25    136  |  101  |  16  ----------------------------<  88  3.8   |  24  |  0.79    Ca    8.9      25 Feb 2024 07:38  Phos  2.7     02-25  Mg     2.0     02-25    TPro  5.5<L>  /  Alb  2.6<L>  /  TBili  0.3  /  DBili  x   /  AST  37  /  ALT  12  /  AlkPhos  45  02-24    CAPILLARY BLOOD GLUCOSE        LIVER FUNCTIONS - ( 24 Feb 2024 07:30 )  Alb: 2.6 g/dL / Pro: 5.5 g/dL / ALK PHOS: 45 U/L / ALT: 12 U/L / AST: 37 U/L / GGT: x               Urinalysis Basic - ( 25 Feb 2024 07:38 )    Color: x / Appearance: x / SG: x / pH: x  Gluc: 88 mg/dL / Ketone: x  / Bili: x / Urobili: x   Blood: x / Protein: x / Nitrite: x   Leuk Esterase: x / RBC: x / WBC x   Sq Epi: x / Non Sq Epi: x / Bacteria: x            RADIOLOGY AND ADDITIONAL TESTS:    CONSULTANT NOTES REVIEWED:    CARE DISCUSSED WITH THE FOLLOWING CONSULTANTS/PROVIDERS:
Thania Stinson MD  Division of Hospital Medicine  Reachable on MS Teams    PROGRESS NOTE:     Patient is a 89y old  Male who presents with a chief complaint of Urosepsis (22 Feb 2024 14:43)      SUBJECTIVE / OVERNIGHT EVENTS: No acute events overnight, seen this AM. Patient appears pale, weak, endorses some shortness of breath while talking, limited appetite but stated that he was able to eat the food his family brought in. Denies chest pain, lightheadedness.     ADDITIONAL REVIEW OF SYSTEMS:    MEDICATIONS  (STANDING):  atorvastatin 40 milliGRAM(s) Oral at bedtime  cefepime   IVPB 2000 milliGRAM(s) IV Intermittent every 12 hours  levothyroxine 50 MICROGram(s) Oral daily  midodrine. 10 milliGRAM(s) Oral three times a day  nystatin Powder 1 Application(s) Topical two times a day  oxybutynin 5 milliGRAM(s) Oral daily  sertraline 25 milliGRAM(s) Oral daily  sodium chloride 0.9%. 1000 milliLiter(s) (100 mL/Hr) IV Continuous <Continuous>    MEDICATIONS  (PRN):  acetaminophen     Tablet .. 650 milliGRAM(s) Oral every 6 hours PRN Temp greater or equal to 38C (100.4F), Mild Pain (1 - 3)  aluminum hydroxide/magnesium hydroxide/simethicone Suspension 30 milliLiter(s) Oral every 4 hours PRN Dyspepsia  melatonin 3 milliGRAM(s) Oral at bedtime PRN Insomnia  sodium chloride 0.65% Nasal 1 Spray(s) Both Nostrils four times a day PRN Nasal Congestion      CAPILLARY BLOOD GLUCOSE        I&O's Summary    22 Feb 2024 07:01  -  23 Feb 2024 07:00  --------------------------------------------------------  IN: 350 mL / OUT: 1750 mL / NET: -1400 mL        PHYSICAL EXAM:  Vital Signs Last 24 Hrs  T(C): 36.7 (23 Feb 2024 12:31), Max: 37.1 (22 Feb 2024 18:00)  T(F): 98.1 (23 Feb 2024 12:31), Max: 98.8 (22 Feb 2024 21:10)  HR: 71 (23 Feb 2024 12:31) (71 - 87)  BP: 123/56 (23 Feb 2024 12:31) (110/68 - 133/66)  BP(mean): --  RR: 18 (23 Feb 2024 12:31) (18 - 18)  SpO2: 97% (23 Feb 2024 12:31) (94% - 97%)    Parameters below as of 23 Feb 2024 12:31  Patient On (Oxygen Delivery Method): room air        CONSTITUTIONAL: NAD, pale  RESPIRATORY: shortness of  breath; lungs are clear to auscultation bilaterally  CARDIOVASCULAR: Regular rate and rhythm, normal S1 and S2, no murmur/rub/gallop; No lower extremity edema; Peripheral pulses are 2+ bilaterally  ABDOMEN: Nontender to palpation, normoactive bowel sounds, no rebound/guarding; No hepatosplenomegaly  MUSCLOSKELETAL: no clubbing or cyanosis of digits; no joint swelling or tenderness to palpation  PSYCH: A+O to person, place, and time; affect appropriate    LABS:                        7.1    5.22  )-----------( 153      ( 23 Feb 2024 07:06 )             21.5     02-23    129<L>  |  97  |  20  ----------------------------<  92  3.5   |  21<L>  |  0.95    Ca    8.1<L>      23 Feb 2024 07:06  Phos  2.1     02-23  Mg     1.8     02-23            Urinalysis Basic - ( 23 Feb 2024 07:06 )    Color: x / Appearance: x / SG: x / pH: x  Gluc: 92 mg/dL / Ketone: x  / Bili: x / Urobili: x   Blood: x / Protein: x / Nitrite: x   Leuk Esterase: x / RBC: x / WBC x   Sq Epi: x / Non Sq Epi: x / Bacteria: x        Culture - Urine (collected 21 Feb 2024 20:06)  Source: Catheterized Catheterized  Final Report (23 Feb 2024 10:05):    <10,000 CFU/mL Normal Urogenital Annalisa    Culture - Blood (collected 21 Feb 2024 10:30)  Source: .Blood Blood-Peripheral  Preliminary Report (22 Feb 2024 18:02):    No growth at 24 hours        RADIOLOGY & ADDITIONAL TESTS:  Results Reviewed:   Imaging Personally Reviewed:  Electrocardiogram Personally Reviewed:    COORDINATION OF CARE:  Care Discussed with Consultants/Other Providers [Y/N]:  Prior or Outpatient Records Reviewed [Y/N]:  
Thania Stinson MD  Division of Hospital Medicine  Reachable on MS Teams    PROGRESS NOTE:     Patient is a 89y old  Male who presents with a chief complaint of Urosepsis (27 Feb 2024 12:11)      SUBJECTIVE / OVERNIGHT EVENTS: No acute events overnight, seen this AM. Feels well, ready for discharge home, dc instructions reviewd with patient's daughter and all questions answered    ADDITIONAL REVIEW OF SYSTEMS:    MEDICATIONS  (STANDING):  atorvastatin 40 milliGRAM(s) Oral at bedtime  cefepime   IVPB 2000 milliGRAM(s) IV Intermittent every 12 hours  heparin   Injectable 5000 Unit(s) SubCutaneous every 8 hours  levothyroxine 50 MICROGram(s) Oral daily  metoprolol succinate ER 25 milliGRAM(s) Oral daily  nystatin Powder 1 Application(s) Topical two times a day  oxybutynin 5 milliGRAM(s) Oral daily  pantoprazole    Tablet 40 milliGRAM(s) Oral before breakfast  sertraline 25 milliGRAM(s) Oral daily  sodium chloride 0.9%. 1000 milliLiter(s) (100 mL/Hr) IV Continuous <Continuous>    MEDICATIONS  (PRN):  acetaminophen     Tablet .. 650 milliGRAM(s) Oral every 6 hours PRN Temp greater or equal to 38C (100.4F), Mild Pain (1 - 3)  aluminum hydroxide/magnesium hydroxide/simethicone Suspension 30 milliLiter(s) Oral every 4 hours PRN Dyspepsia  melatonin 3 milliGRAM(s) Oral at bedtime PRN Insomnia  sodium chloride 0.65% Nasal 1 Spray(s) Both Nostrils four times a day PRN Nasal Congestion  sodium chloride 0.65% Nasal 1 Spray(s) Both Nostrils once PRN Nasal Congestion      CAPILLARY BLOOD GLUCOSE        I&O's Summary    26 Feb 2024 07:01  -  27 Feb 2024 07:00  --------------------------------------------------------  IN: 530 mL / OUT: 2050 mL / NET: -1520 mL        PHYSICAL EXAM:  Vital Signs Last 24 Hrs  T(C): 36.4 (27 Feb 2024 11:59), Max: 36.9 (27 Feb 2024 06:00)  T(F): 97.6 (27 Feb 2024 11:59), Max: 98.4 (27 Feb 2024 06:00)  HR: 77 (27 Feb 2024 11:59) (68 - 78)  BP: 156/75 (27 Feb 2024 11:59) (126/58 - 156/75)  BP(mean): --  RR: 18 (27 Feb 2024 11:59) (18 - 18)  SpO2: 96% (27 Feb 2024 11:59) (96% - 96%)    Parameters below as of 27 Feb 2024 11:59  Patient On (Oxygen Delivery Method): room air        CONSTITUTIONAL: No acute distress. Awake and alert.  RESPIRATORY: CTAB. No wheezes, rales, or rhonchi. No accessory muscle use. No apparent respiratory distress.  CARDIOVASCULAR: +S1/S2. No audible S3/S4. Regular rate and rhythm. No murmurs, rubs, or gallops. No LE swelling or edema.  GASTROINTESTINAL: Soft, nontender, nondistended. +BS. No rebound or guarding.   GENITOURINARY: Garrett catheter.  MUSCULOSKELETAL: Spontaneous movement in all extremities.  NEUROLOGICAL: CN 2-12 grossly intact. No focal deficits. Sensation intact x 4EXT.   PSYCHIATRIC: Appropriate affect. A&Ox3 (oriented to person, place, and time).    LABS:                        8.5    4.62  )-----------( 162      ( 27 Feb 2024 07:33 )             25.5     02-27    139  |  103  |  15  ----------------------------<  87  3.9   |  24  |  0.75    Ca    8.7      27 Feb 2024 07:30  Phos  2.4     02-27  Mg     2.0     02-27    TPro  6.1  /  Alb  2.7<L>  /  TBili  0.2  /  DBili  x   /  AST  37  /  ALT  13  /  AlkPhos  47  02-26          Urinalysis Basic - ( 27 Feb 2024 07:30 )    Color: x / Appearance: x / SG: x / pH: x  Gluc: 87 mg/dL / Ketone: x  / Bili: x / Urobili: x   Blood: x / Protein: x / Nitrite: x   Leuk Esterase: x / RBC: x / WBC x   Sq Epi: x / Non Sq Epi: x / Bacteria: x          RADIOLOGY & ADDITIONAL TESTS:  Results Reviewed:   Imaging Personally Reviewed:  Electrocardiogram Personally Reviewed:    COORDINATION OF CARE:  Care Discussed with Consultants/Other Providers [Y/N]:  Prior or Outpatient Records Reviewed [Y/N]:

## 2024-02-27 NOTE — PROGRESS NOTE ADULT - PROBLEM SELECTOR PROBLEM 5
Prophylactic measure
Prostate cancer

## 2024-02-28 ENCOUNTER — APPOINTMENT (OUTPATIENT)
Dept: UROLOGY | Facility: CLINIC | Age: 89
End: 2024-02-28
Payer: MEDICARE

## 2024-02-28 ENCOUNTER — OUTPATIENT (OUTPATIENT)
Dept: OUTPATIENT SERVICES | Facility: HOSPITAL | Age: 89
LOS: 1 days | End: 2024-02-28
Payer: MEDICARE

## 2024-02-28 DIAGNOSIS — R35.0 FREQUENCY OF MICTURITION: ICD-10-CM

## 2024-02-28 PROCEDURE — 99214 OFFICE O/P EST MOD 30 MIN: CPT | Mod: 57

## 2024-02-28 PROCEDURE — G2211 COMPLEX E/M VISIT ADD ON: CPT

## 2024-02-28 PROCEDURE — 51703 INSERT BLADDER CATH COMPLEX: CPT

## 2024-02-28 RX ORDER — LEUPROLIDE ACETATE 30 MG/.5ML
30 INJECTION, SUSPENSION, EXTENDED RELEASE SUBCUTANEOUS
Refills: 0 | Status: COMPLETED | OUTPATIENT
Start: 2024-02-28

## 2024-02-28 RX ORDER — METHENAMINE HIPPURATE 1 G/1
1 TABLET ORAL
Qty: 180 | Refills: 3 | Status: ACTIVE | COMMUNITY
Start: 2024-02-28 | End: 1900-01-01

## 2024-02-28 RX ORDER — LEUPROLIDE ACETATE 30 MG/.5ML
30 INJECTION, SUSPENSION, EXTENDED RELEASE SUBCUTANEOUS DAILY
Qty: 1 | Refills: 0 | Status: COMPLETED | OUTPATIENT
Start: 2024-02-27 | End: 2024-02-28

## 2024-02-28 RX ADMIN — LEUPROLIDE ACETATE 0 MG: KIT SUBCUTANEOUS at 00:00

## 2024-02-28 NOTE — PHYSICAL EXAM
[Normal Appearance] : normal appearance [Well Groomed] : well groomed [General Appearance - In No Acute Distress] : no acute distress [Edema] : no peripheral edema [Respiration, Rhythm And Depth] : normal respiratory rhythm and effort [Exaggerated Use Of Accessory Muscles For Inspiration] : no accessory muscle use [Abdomen Soft] : soft [Abdomen Tenderness] : non-tender [Costovertebral Angle Tenderness] : no ~M costovertebral angle tenderness [Urinary Bladder Findings] : the bladder was normal on palpation [Normal Station and Gait] : the gait and station were normal for the patient's age [No Focal Deficits] : no focal deficits [] : no rash [Mood] : the mood was normal [Oriented To Time, Place, And Person] : oriented to person, place, and time [Affect] : the affect was normal [No Palpable Adenopathy] : no palpable adenopathy

## 2024-03-02 DIAGNOSIS — C61 MALIGNANT NEOPLASM OF PROSTATE: ICD-10-CM

## 2024-03-05 DIAGNOSIS — K59.00 CONSTIPATION, UNSPECIFIED: ICD-10-CM

## 2024-03-05 RX ORDER — LORATADINE 10 MG
17 TABLET,DISINTEGRATING ORAL
Qty: 1 | Refills: 2 | Status: ACTIVE | COMMUNITY
Start: 2024-03-05 | End: 1900-01-01

## 2024-03-12 RX ORDER — CHLORHEXIDINE GLUCONATE 4 %
325 (65 FE) LIQUID (ML) TOPICAL
Qty: 90 | Refills: 1 | Status: DISCONTINUED | COMMUNITY
Start: 2024-02-28 | End: 2024-03-12

## 2024-03-13 ENCOUNTER — OUTPATIENT (OUTPATIENT)
Dept: OUTPATIENT SERVICES | Facility: HOSPITAL | Age: 89
LOS: 1 days | End: 2024-03-13
Payer: MEDICARE

## 2024-03-13 ENCOUNTER — APPOINTMENT (OUTPATIENT)
Dept: UROLOGY | Facility: CLINIC | Age: 89
End: 2024-03-13
Payer: MEDICARE

## 2024-03-13 VITALS — DIASTOLIC BLOOD PRESSURE: 63 MMHG | SYSTOLIC BLOOD PRESSURE: 112 MMHG | RESPIRATION RATE: 16 BRPM | HEART RATE: 89 BPM

## 2024-03-13 DIAGNOSIS — Z98.89 OTHER SPECIFIED POSTPROCEDURAL STATES: Chronic | ICD-10-CM

## 2024-03-13 DIAGNOSIS — R35.0 FREQUENCY OF MICTURITION: ICD-10-CM

## 2024-03-13 LAB
APPEARANCE: ABNORMAL
BACTERIA: ABNORMAL /HPF
BILIRUBIN URINE: NORMAL
BLOOD URINE: NORMAL
COLOR: ABNORMAL
GLUCOSE QUALITATIVE U: NORMAL
HYALINE CASTS: PRESENT
KETONES URINE: NORMAL
LEUKOCYTE ESTERASE URINE: NORMAL
MICROSCOPIC-UA: NORMAL
NITRITE URINE: NORMAL
PH URINE: NORMAL
PROTEIN URINE: NORMAL
RED BLOOD CELLS URINE: 1000 /HPF
SPECIFIC GRAVITY URINE: NORMAL
SQUAMOUS EPITHELIAL CELLS: PRESENT
UROBILINOGEN URINE: NORMAL
WHITE BLOOD CELLS URINE: 15 /HPF

## 2024-03-13 PROCEDURE — ZZZZZ: CPT

## 2024-03-13 PROCEDURE — 51703 INSERT BLADDER CATH COMPLEX: CPT

## 2024-03-13 NOTE — PHYSICAL EXAM
[Normal Appearance] : normal appearance [Well Groomed] : well groomed [General Appearance - In No Acute Distress] : no acute distress [Edema] : no peripheral edema [Respiration, Rhythm And Depth] : normal respiratory rhythm and effort [Exaggerated Use Of Accessory Muscles For Inspiration] : no accessory muscle use [Abdomen Soft] : soft [Abdomen Tenderness] : non-tender [Costovertebral Angle Tenderness] : no ~M costovertebral angle tenderness [Normal Station and Gait] : the gait and station were normal for the patient's age [Urinary Bladder Findings] : the bladder was normal on palpation [] : no rash [Oriented To Time, Place, And Person] : oriented to person, place, and time [No Focal Deficits] : no focal deficits [Affect] : the affect was normal [Mood] : the mood was normal [No Palpable Adenopathy] : no palpable adenopathy

## 2024-03-14 DIAGNOSIS — C61 MALIGNANT NEOPLASM OF PROSTATE: ICD-10-CM

## 2024-03-16 LAB — BACTERIA UR CULT: ABNORMAL

## 2024-03-18 ENCOUNTER — LABORATORY RESULT (OUTPATIENT)
Age: 89
End: 2024-03-18

## 2024-03-18 ENCOUNTER — APPOINTMENT (OUTPATIENT)
Dept: INTERNAL MEDICINE | Facility: CLINIC | Age: 89
End: 2024-03-18
Payer: MEDICARE

## 2024-03-18 ENCOUNTER — OUTPATIENT (OUTPATIENT)
Dept: OUTPATIENT SERVICES | Facility: HOSPITAL | Age: 89
LOS: 1 days | End: 2024-03-18
Payer: MEDICARE

## 2024-03-18 VITALS
OXYGEN SATURATION: 98 % | DIASTOLIC BLOOD PRESSURE: 50 MMHG | HEART RATE: 96 BPM | SYSTOLIC BLOOD PRESSURE: 130 MMHG | HEIGHT: 67 IN | WEIGHT: 161 LBS | BODY MASS INDEX: 25.27 KG/M2

## 2024-03-18 DIAGNOSIS — I10 ESSENTIAL (PRIMARY) HYPERTENSION: ICD-10-CM

## 2024-03-18 DIAGNOSIS — Z98.89 OTHER SPECIFIED POSTPROCEDURAL STATES: Chronic | ICD-10-CM

## 2024-03-18 DIAGNOSIS — E66.9 OBESITY, UNSPECIFIED: ICD-10-CM

## 2024-03-18 DIAGNOSIS — E03.8 OTHER SPECIFIED HYPOTHYROIDISM: ICD-10-CM

## 2024-03-18 DIAGNOSIS — U07.1 COVID-19: ICD-10-CM

## 2024-03-18 DIAGNOSIS — D64.9 ANEMIA, UNSPECIFIED: ICD-10-CM

## 2024-03-18 DIAGNOSIS — R63.4 ABNORMAL WEIGHT LOSS: ICD-10-CM

## 2024-03-18 DIAGNOSIS — Z20.1 CONTACT WITH AND (SUSPECTED) EXPOSURE TO TUBERCULOSIS: ICD-10-CM

## 2024-03-18 PROCEDURE — G0463: CPT

## 2024-03-18 PROCEDURE — G2211 COMPLEX E/M VISIT ADD ON: CPT

## 2024-03-18 PROCEDURE — 99214 OFFICE O/P EST MOD 30 MIN: CPT

## 2024-03-18 RX ORDER — CIPROFLOXACIN HYDROCHLORIDE 500 MG/1
500 TABLET, FILM COATED ORAL DAILY
Qty: 1 | Refills: 0 | Status: DISCONTINUED | COMMUNITY
Start: 2024-01-30 | End: 2024-03-18

## 2024-03-18 NOTE — HISTORY OF PRESENT ILLNESS
[FreeTextEntry2] : Henry Stewart is a 89M with PMHx of metastatic prostate ca s/p prostatectomy/XRT/lung wedge on lupron, chronic Garrett, HTN, and anxiety/depression who presents for hospital follow-up. Accompanied by daughter Jessee who also translates  Admitted 2/19-2/27 for sepsis 2/2 UTI. Lisinopril discontinued in the setting of shock. Course complicated by symptomatic anemia requiring transfusion. CT also noted suggestion of duodenal inflammations. Was discharged on the wrong dose of levothyroxine, switched back to 75mcg late Feb.   Since discharge, still feels very fatigued. Denies SOB, fever, abd pain, cough. Still having hematuria on and off. Attempted to take iron supplements but caused stomach upset so stopped. Held lisinopril on d/c but then home RN noted BPs up to 150 SBP so it was restarted.  Losing weight. Daughter reports PO intake is variable. He is a picky eater. Will have an Ensure occasionally but this not consistent  Breakfast: 2 eggs and banana Lunch: 1 cup pasta and 5 small meatballs Snack: Mandarins and small amount cantaloupe Dinner: Bread, 3 small meatball, and eggplant  Plans for Hem/Onc visit later this week, reports he will have a CT tomorrow

## 2024-03-18 NOTE — ASSESSMENT
[FreeTextEntry1] :  HCM: - PCV13 and PSV23 reported as received - Shingrix reported as received  RTC 1 month for follow-up

## 2024-03-18 NOTE — PHYSICAL EXAM
[No Acute Distress] : no acute distress [Normal Sclera/Conjunctiva] : normal sclera/conjunctiva [Normal Outer Ear/Nose] : the outer ears and nose were normal in appearance [No Accessory Muscle Use] : no accessory muscle use [No Respiratory Distress] : no respiratory distress  [Normal Rate] : normal rate  [Regular Rhythm] : with a regular rhythm [Normal S1, S2] : normal S1 and S2 [No Murmur] : no murmur heard [de-identified] : Pale and frail appearance [de-identified] : Rales in the left lower base

## 2024-03-19 LAB — TSH SERPL-ACNC: 19.3 UIU/ML

## 2024-03-27 ENCOUNTER — NON-APPOINTMENT (OUTPATIENT)
Age: 89
End: 2024-03-27

## 2024-03-28 ENCOUNTER — APPOINTMENT (OUTPATIENT)
Dept: UROLOGY | Facility: CLINIC | Age: 89
End: 2024-03-28
Payer: MEDICARE

## 2024-03-28 ENCOUNTER — NON-APPOINTMENT (OUTPATIENT)
Age: 89
End: 2024-03-28

## 2024-03-28 ENCOUNTER — OUTPATIENT (OUTPATIENT)
Dept: OUTPATIENT SERVICES | Facility: HOSPITAL | Age: 89
LOS: 1 days | End: 2024-03-28
Payer: MEDICARE

## 2024-03-28 DIAGNOSIS — Z98.89 OTHER SPECIFIED POSTPROCEDURAL STATES: Chronic | ICD-10-CM

## 2024-03-28 DIAGNOSIS — R35.0 FREQUENCY OF MICTURITION: ICD-10-CM

## 2024-03-28 PROCEDURE — ZZZZZ: CPT

## 2024-03-28 PROCEDURE — 52000 CYSTOURETHROSCOPY: CPT

## 2024-03-28 RX ORDER — CIPROFLOXACIN HYDROCHLORIDE 500 MG/1
500 TABLET, FILM COATED ORAL
Refills: 0 | Status: COMPLETED | OUTPATIENT
Start: 2024-03-28

## 2024-03-29 DIAGNOSIS — C61 MALIGNANT NEOPLASM OF PROSTATE: ICD-10-CM

## 2024-03-29 LAB
APPEARANCE: CLEAR
BACTERIA: ABNORMAL /HPF
BILIRUBIN URINE: NEGATIVE
BLOOD URINE: ABNORMAL
CAST: NORMAL /LPF
COLOR: YELLOW
EPITHELIAL CELLS: 1 /HPF
GLUCOSE QUALITATIVE U: NEGATIVE MG/DL
KETONES URINE: NEGATIVE MG/DL
LEUKOCYTE ESTERASE URINE: ABNORMAL
MICROSCOPIC-UA: NORMAL
NITRITE URINE: NEGATIVE
PH URINE: 6.5
PROTEIN URINE: NEGATIVE MG/DL
RED BLOOD CELLS URINE: 1 /HPF
REVIEW: NORMAL
SPECIFIC GRAVITY URINE: <1.005
UROBILINOGEN URINE: 0.2 MG/DL
WHITE BLOOD CELLS URINE: 86 /HPF

## 2024-03-31 LAB — BACTERIA UR CULT: NORMAL

## 2024-04-02 LAB — URINE CYTOLOGY: NORMAL

## 2024-04-25 ENCOUNTER — APPOINTMENT (OUTPATIENT)
Dept: UROLOGY | Facility: CLINIC | Age: 89
End: 2024-04-25
Payer: MEDICARE

## 2024-04-25 ENCOUNTER — OUTPATIENT (OUTPATIENT)
Dept: OUTPATIENT SERVICES | Facility: HOSPITAL | Age: 89
LOS: 1 days | End: 2024-04-25
Payer: MEDICARE

## 2024-04-25 VITALS
OXYGEN SATURATION: 99 % | RESPIRATION RATE: 16 BRPM | DIASTOLIC BLOOD PRESSURE: 61 MMHG | SYSTOLIC BLOOD PRESSURE: 148 MMHG | HEART RATE: 84 BPM

## 2024-04-25 DIAGNOSIS — R35.0 FREQUENCY OF MICTURITION: ICD-10-CM

## 2024-04-25 DIAGNOSIS — Z98.89 OTHER SPECIFIED POSTPROCEDURAL STATES: Chronic | ICD-10-CM

## 2024-04-25 PROCEDURE — ZZZZZ: CPT

## 2024-04-25 PROCEDURE — 51703 INSERT BLADDER CATH COMPLEX: CPT

## 2024-04-26 DIAGNOSIS — C61 MALIGNANT NEOPLASM OF PROSTATE: ICD-10-CM

## 2024-04-26 LAB
APPEARANCE: CLEAR
BACTERIA: NEGATIVE /HPF
BILIRUBIN URINE: NEGATIVE
BLOOD URINE: ABNORMAL
COLOR: ABNORMAL
EPITHELIAL CELLS: 5 /HPF
GLUCOSE QUALITATIVE U: NEGATIVE MG/DL
KETONES URINE: NEGATIVE MG/DL
LEUKOCYTE ESTERASE URINE: NEGATIVE
NITRITE URINE: NEGATIVE
PH URINE: 7.5
PROTEIN URINE: >=1000 MG/DL
RED BLOOD CELLS URINE: 760 /HPF
SPECIFIC GRAVITY URINE: 1.02
UROBILINOGEN URINE: 1 MG/DL
WHITE BLOOD CELLS URINE: 2 /HPF

## 2024-04-27 LAB — BACTERIA UR CULT: NORMAL

## 2024-04-28 NOTE — DISCHARGE NOTE NURSING/CASE MANAGEMENT/SOCIAL WORK - NSSCTYPOFSERV_GEN_ALL_CORE
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 DISPLAY PLAN FREE TEXT

## 2024-04-29 NOTE — DISCHARGE NOTE PROVIDER - NPI NUMBER (FOR SYSADMIN USE ONLY) :
Is This A New Presentation, Or A Follow-Up?: Rash
Additional History: Years, gotten worse on the past few months, last saw derm a couple years ago, works but using more than should (everyday, or e/o day) also uses other shampoo, now starting to involve ears, 8/10 itch, \\n\\nAcne spots often SA spot treatment, texture on arms, back, and face, gotten worse in the past 6 months, no change in products
[[id number]]

## 2024-04-30 LAB — URINE CYTOLOGY: NORMAL

## 2024-05-08 ENCOUNTER — APPOINTMENT (OUTPATIENT)
Dept: INTERNAL MEDICINE | Facility: CLINIC | Age: 89
End: 2024-05-08
Payer: MEDICARE

## 2024-05-08 ENCOUNTER — OUTPATIENT (OUTPATIENT)
Dept: OUTPATIENT SERVICES | Facility: HOSPITAL | Age: 89
LOS: 1 days | End: 2024-05-08
Payer: MEDICARE

## 2024-05-08 ENCOUNTER — LABORATORY RESULT (OUTPATIENT)
Age: 89
End: 2024-05-08

## 2024-05-08 VITALS
OXYGEN SATURATION: 96 % | HEIGHT: 67 IN | BODY MASS INDEX: 25.74 KG/M2 | WEIGHT: 164 LBS | HEART RATE: 84 BPM | DIASTOLIC BLOOD PRESSURE: 70 MMHG | SYSTOLIC BLOOD PRESSURE: 120 MMHG

## 2024-05-08 DIAGNOSIS — Z98.89 OTHER SPECIFIED POSTPROCEDURAL STATES: Chronic | ICD-10-CM

## 2024-05-08 DIAGNOSIS — E03.8 OTHER SPECIFIED HYPOTHYROIDISM: ICD-10-CM

## 2024-05-08 DIAGNOSIS — R63.4 ABNORMAL WEIGHT LOSS: ICD-10-CM

## 2024-05-08 DIAGNOSIS — I10 ESSENTIAL (PRIMARY) HYPERTENSION: ICD-10-CM

## 2024-05-08 PROCEDURE — G2211 COMPLEX E/M VISIT ADD ON: CPT

## 2024-05-08 PROCEDURE — G0463: CPT

## 2024-05-08 PROCEDURE — 99213 OFFICE O/P EST LOW 20 MIN: CPT

## 2024-05-08 RX ORDER — CIPROFLOXACIN HYDROCHLORIDE 500 MG/1
500 TABLET, FILM COATED ORAL
Qty: 2 | Refills: 0 | Status: DISCONTINUED | COMMUNITY
Start: 2024-04-25 | End: 2024-05-08

## 2024-05-08 RX ORDER — LISINOPRIL 40 MG/1
40 TABLET ORAL
Qty: 90 | Refills: 3 | Status: ACTIVE | COMMUNITY
Start: 2023-04-24 | End: 1900-01-01

## 2024-05-08 RX ORDER — SERTRALINE 25 MG/1
25 TABLET, FILM COATED ORAL
Qty: 90 | Refills: 3 | Status: ACTIVE | COMMUNITY
Start: 2021-10-12 | End: 1900-01-01

## 2024-05-10 PROBLEM — E03.8 SUBCLINICAL HYPOTHYROIDISM: Status: ACTIVE | Noted: 2023-11-30

## 2024-05-10 LAB
ALBUMIN SERPL ELPH-MCNC: 4 G/DL
ALP BLD-CCNC: 55 U/L
ALT SERPL-CCNC: 9 U/L
ANION GAP SERPL CALC-SCNC: 11 MMOL/L
AST SERPL-CCNC: 38 U/L
BILIRUB SERPL-MCNC: 0.2 MG/DL
BUN SERPL-MCNC: 21 MG/DL
CALCIUM SERPL-MCNC: 9.5 MG/DL
CHLORIDE SERPL-SCNC: 100 MMOL/L
CO2 SERPL-SCNC: 26 MMOL/L
CREAT SERPL-MCNC: 0.94 MG/DL
EGFR: 77 ML/MIN/1.73M2
GLUCOSE SERPL-MCNC: 90 MG/DL
POTASSIUM SERPL-SCNC: 5.2 MMOL/L
PROT SERPL-MCNC: 6.9 G/DL
SODIUM SERPL-SCNC: 136 MMOL/L
TSH SERPL-ACNC: 7.4 UIU/ML

## 2024-05-10 RX ORDER — LEVOTHYROXINE SODIUM 0.1 MG/1
100 TABLET ORAL
Qty: 90 | Refills: 3 | Status: ACTIVE | COMMUNITY
Start: 2023-11-30 | End: 1900-01-01

## 2024-05-10 NOTE — PHYSICAL EXAM
[No Acute Distress] : no acute distress [Normal Sclera/Conjunctiva] : normal sclera/conjunctiva [Normal Outer Ear/Nose] : the outer ears and nose were normal in appearance [No Respiratory Distress] : no respiratory distress  [No Accessory Muscle Use] : no accessory muscle use [Clear to Auscultation] : lungs were clear to auscultation bilaterally [Normal Rate] : normal rate  [Regular Rhythm] : with a regular rhythm [Normal S1, S2] : normal S1 and S2 [No Murmur] : no murmur heard [No Edema] : there was no peripheral edema [No Rash] : no rash [de-identified] : Answering questions appropriately. Gets on exam table and ambulates without assistance [de-identified] : Frail

## 2024-05-10 NOTE — HISTORY OF PRESENT ILLNESS
[de-identified] : Henry Stewart is a 89M with PMHx of metastatic prostate ca s/p prostatectomy/XRT/lung wedge on lupron, hypothyroidism, chronic Garrett, HTN, and anxiety/depression who presents for follow-up. Accompanied by daughter Jessee who also translates.  He is s/p several IV iron infusions and is feeling much better. Appetite is much improved although daughter notes he still has to be conscientious about eating. 
detailed exam

## 2024-05-14 DIAGNOSIS — R63.4 ABNORMAL WEIGHT LOSS: ICD-10-CM

## 2024-05-14 DIAGNOSIS — E03.8 OTHER SPECIFIED HYPOTHYROIDISM: ICD-10-CM

## 2024-05-22 ENCOUNTER — APPOINTMENT (OUTPATIENT)
Dept: UROLOGY | Facility: CLINIC | Age: 89
End: 2024-05-22
Payer: MEDICARE

## 2024-05-22 ENCOUNTER — OUTPATIENT (OUTPATIENT)
Dept: OUTPATIENT SERVICES | Facility: HOSPITAL | Age: 89
LOS: 1 days | End: 2024-05-22
Payer: MEDICARE

## 2024-05-22 VITALS — SYSTOLIC BLOOD PRESSURE: 122 MMHG | DIASTOLIC BLOOD PRESSURE: 74 MMHG | TEMPERATURE: 92 F

## 2024-05-22 DIAGNOSIS — Z98.89 OTHER SPECIFIED POSTPROCEDURAL STATES: Chronic | ICD-10-CM

## 2024-05-22 DIAGNOSIS — R35.0 FREQUENCY OF MICTURITION: ICD-10-CM

## 2024-05-22 PROCEDURE — 51703 INSERT BLADDER CATH COMPLEX: CPT

## 2024-05-22 PROCEDURE — ZZZZZ: CPT

## 2024-05-22 RX ORDER — FERROUS GLUCONATE 324(37.5)
324 (37.5 FE) TABLET ORAL
Qty: 45 | Refills: 1 | Status: DISCONTINUED | COMMUNITY
Start: 2024-03-12 | End: 2024-05-22

## 2024-05-23 DIAGNOSIS — C61 MALIGNANT NEOPLASM OF PROSTATE: ICD-10-CM

## 2024-05-23 LAB
APPEARANCE: ABNORMAL
BACTERIA: ABNORMAL /HPF
BILIRUBIN URINE: NEGATIVE
BLOOD URINE: ABNORMAL
CAST: 4 /LPF
COLOR: YELLOW
EPITHELIAL CELLS: 1 /HPF
GLUCOSE QUALITATIVE U: NEGATIVE MG/DL
KETONES URINE: NEGATIVE MG/DL
LEUKOCYTE ESTERASE URINE: ABNORMAL
MICROSCOPIC-UA: NORMAL
NITRITE URINE: NEGATIVE
PH URINE: 6.5
PROTEIN URINE: 300 MG/DL
RED BLOOD CELLS URINE: 193 /HPF
REVIEW: NORMAL
SPECIFIC GRAVITY URINE: 1.02
UROBILINOGEN URINE: 0.2 MG/DL
WHITE BLOOD CELLS URINE: 649 /HPF
YEAST-LIKE CELLS: PRESENT

## 2024-05-24 LAB
BACTERIA UR CULT: NORMAL
URINE CYTOLOGY: NORMAL

## 2024-06-04 ENCOUNTER — APPOINTMENT (OUTPATIENT)
Dept: UROLOGY | Facility: CLINIC | Age: 89
End: 2024-06-04
Payer: MEDICARE

## 2024-06-04 ENCOUNTER — RX RENEWAL (OUTPATIENT)
Age: 89
End: 2024-06-04

## 2024-06-04 VITALS
OXYGEN SATURATION: 99 % | SYSTOLIC BLOOD PRESSURE: 159 MMHG | TEMPERATURE: 97.9 F | DIASTOLIC BLOOD PRESSURE: 64 MMHG | HEART RATE: 87 BPM

## 2024-06-04 PROCEDURE — A4216: CPT | Mod: NC

## 2024-06-04 PROCEDURE — 51700 IRRIGATION OF BLADDER: CPT

## 2024-06-04 RX ORDER — OXYBUTYNIN CHLORIDE 5 MG/1
5 TABLET ORAL TWICE DAILY
Qty: 180 | Refills: 0 | Status: ACTIVE | COMMUNITY
Start: 2024-06-04 | End: 1900-01-01

## 2024-06-05 NOTE — ED ADULT NURSE NOTE - PAIN RATING/NUMBER SCALE (0-10): REST
You will be notified of any abnormalities with the urine culture that indicates the need to change treatment plan.   0

## 2024-06-07 ENCOUNTER — APPOINTMENT (OUTPATIENT)
Dept: UROLOGY | Facility: CLINIC | Age: 89
End: 2024-06-07
Payer: MEDICARE

## 2024-06-07 ENCOUNTER — NON-APPOINTMENT (OUTPATIENT)
Age: 89
End: 2024-06-07

## 2024-06-07 VITALS
SYSTOLIC BLOOD PRESSURE: 125 MMHG | DIASTOLIC BLOOD PRESSURE: 68 MMHG | WEIGHT: 161 LBS | BODY MASS INDEX: 25.22 KG/M2 | TEMPERATURE: 97.3 F | HEART RATE: 93 BPM | RESPIRATION RATE: 16 BRPM | OXYGEN SATURATION: 99 %

## 2024-06-07 PROCEDURE — 51702 INSERT TEMP BLADDER CATH: CPT

## 2024-06-12 ENCOUNTER — APPOINTMENT (OUTPATIENT)
Dept: UROLOGY | Facility: CLINIC | Age: 89
End: 2024-06-12
Payer: MEDICARE

## 2024-06-12 ENCOUNTER — NON-APPOINTMENT (OUTPATIENT)
Age: 89
End: 2024-06-12

## 2024-06-12 ENCOUNTER — OUTPATIENT (OUTPATIENT)
Dept: OUTPATIENT SERVICES | Facility: HOSPITAL | Age: 89
LOS: 1 days | End: 2024-06-12
Payer: MEDICARE

## 2024-06-12 DIAGNOSIS — Z98.89 OTHER SPECIFIED POSTPROCEDURAL STATES: Chronic | ICD-10-CM

## 2024-06-12 DIAGNOSIS — R35.0 FREQUENCY OF MICTURITION: ICD-10-CM

## 2024-06-12 PROCEDURE — 51700 IRRIGATION OF BLADDER: CPT

## 2024-06-12 PROCEDURE — ZZZZZ: CPT

## 2024-06-13 DIAGNOSIS — C61 MALIGNANT NEOPLASM OF PROSTATE: ICD-10-CM

## 2024-06-13 LAB
ANION GAP SERPL CALC-SCNC: 12 MMOL/L
APPEARANCE: ABNORMAL
BACTERIA: ABNORMAL /HPF
BILIRUBIN URINE: NEGATIVE
BLOOD URINE: ABNORMAL
BUN SERPL-MCNC: 23 MG/DL
CALCIUM SERPL-MCNC: 9.2 MG/DL
CAST: 0 /LPF
CHLORIDE SERPL-SCNC: 97 MMOL/L
CO2 SERPL-SCNC: 24 MMOL/L
COLOR: ABNORMAL
CREAT SERPL-MCNC: 0.98 MG/DL
EGFR: 73 ML/MIN/1.73M2
EPITHELIAL CELLS: 0 /HPF
GLUCOSE QUALITATIVE U: NEGATIVE MG/DL
GLUCOSE SERPL-MCNC: 100 MG/DL
HCT VFR BLD CALC: 31.5 %
HGB BLD-MCNC: 10 G/DL
KETONES URINE: NEGATIVE MG/DL
LEUKOCYTE ESTERASE URINE: ABNORMAL
MCHC RBC-ENTMCNC: 31.4 PG
MCHC RBC-ENTMCNC: 31.7 GM/DL
MCV RBC AUTO: 99.1 FL
MICROSCOPIC-UA: NORMAL
NITRITE URINE: NEGATIVE
PH URINE: 6.5
PLATELET # BLD AUTO: 257 K/UL
POTASSIUM SERPL-SCNC: 5.2 MMOL/L
PROTEIN URINE: 100 MG/DL
RBC # BLD: 3.18 M/UL
RBC # FLD: 14.1 %
RED BLOOD CELLS URINE: 11 /HPF
REVIEW: NORMAL
SODIUM SERPL-SCNC: 133 MMOL/L
SPECIFIC GRAVITY URINE: <1.005
UROBILINOGEN URINE: 0.2 MG/DL
WBC # FLD AUTO: 6.38 K/UL
WBC CLUMPS: PRESENT
WHITE BLOOD CELLS URINE: 88 /HPF

## 2024-06-16 LAB — BACTERIA UR CULT: ABNORMAL

## 2024-06-20 NOTE — PHYSICAL EXAM
[General Appearance - Well Developed] : well developed [General Appearance - Well Nourished] : well nourished [Normal Appearance] : normal appearance [Well Groomed] : well groomed [General Appearance - In No Acute Distress] : no acute distress [Abdomen Soft] : soft [Abdomen Tenderness] : non-tender [Costovertebral Angle Tenderness] : no ~M costovertebral angle tenderness [Urethral Meatus] : meatus normal [Urinary Bladder Findings] : the bladder was normal on palpation [Scrotum] : the scrotum was normal [Testes Mass (___cm)] : there were no testicular masses [No Prostate Nodules] : no prostate nodules [Edema] : no peripheral edema [] : no respiratory distress [Respiration, Rhythm And Depth] : normal respiratory rhythm and effort [Exaggerated Use Of Accessory Muscles For Inspiration] : no accessory muscle use 97.5 [Oriented To Time, Place, And Person] : oriented to person, place, and time [Affect] : the affect was normal [Mood] : the mood was normal [Not Anxious] : not anxious [Normal Station and Gait] : the gait and station were normal for the patient's age [No Focal Deficits] : no focal deficits [No Palpable Adenopathy] : no palpable adenopathy

## 2024-06-26 ENCOUNTER — OUTPATIENT (OUTPATIENT)
Dept: OUTPATIENT SERVICES | Facility: HOSPITAL | Age: 89
LOS: 1 days | End: 2024-06-26
Payer: MEDICARE

## 2024-06-26 ENCOUNTER — APPOINTMENT (OUTPATIENT)
Dept: UROLOGY | Facility: CLINIC | Age: 89
End: 2024-06-26
Payer: MEDICARE

## 2024-06-26 VITALS
OXYGEN SATURATION: 96 % | BODY MASS INDEX: 25.27 KG/M2 | RESPIRATION RATE: 16 BRPM | DIASTOLIC BLOOD PRESSURE: 69 MMHG | SYSTOLIC BLOOD PRESSURE: 156 MMHG | HEIGHT: 67 IN | WEIGHT: 161 LBS | HEART RATE: 76 BPM

## 2024-06-26 DIAGNOSIS — C61 MALIGNANT NEOPLASM OF PROSTATE: ICD-10-CM

## 2024-06-26 DIAGNOSIS — R35.0 FREQUENCY OF MICTURITION: ICD-10-CM

## 2024-06-26 DIAGNOSIS — Z98.89 OTHER SPECIFIED POSTPROCEDURAL STATES: Chronic | ICD-10-CM

## 2024-06-26 PROCEDURE — G2211 COMPLEX E/M VISIT ADD ON: CPT

## 2024-06-26 PROCEDURE — 96402U: CUSTOM | Mod: NC

## 2024-06-26 PROCEDURE — 96402 CHEMO HORMON ANTINEOPL SQ/IM: CPT

## 2024-06-26 PROCEDURE — 99214 OFFICE O/P EST MOD 30 MIN: CPT | Mod: 57

## 2024-06-26 RX ORDER — CIPROFLOXACIN HYDROCHLORIDE 500 MG/1
500 TABLET, FILM COATED ORAL
Qty: 2 | Refills: 0 | Status: ACTIVE | COMMUNITY
Start: 2024-05-22

## 2024-06-26 RX ORDER — LEUPROLIDE ACETATE 30 MG/.5ML
30 INJECTION, SUSPENSION, EXTENDED RELEASE SUBCUTANEOUS DAILY
Qty: 1 | Refills: 0 | Status: COMPLETED | OUTPATIENT
Start: 2024-06-25 | End: 2024-06-26

## 2024-06-26 RX ORDER — LEUPROLIDE ACETATE 30 MG/.5ML
30 INJECTION, SUSPENSION, EXTENDED RELEASE SUBCUTANEOUS
Refills: 0 | Status: COMPLETED | OUTPATIENT
Start: 2024-06-26

## 2024-06-26 RX ADMIN — LEUPROLIDE ACETATE 0 MG: 30 INJECTION, SUSPENSION, EXTENDED RELEASE SUBCUTANEOUS at 00:00

## 2024-06-27 DIAGNOSIS — C61 MALIGNANT NEOPLASM OF PROSTATE: ICD-10-CM

## 2024-07-10 ENCOUNTER — OUTPATIENT (OUTPATIENT)
Dept: OUTPATIENT SERVICES | Facility: HOSPITAL | Age: 89
LOS: 1 days | End: 2024-07-10
Payer: MEDICARE

## 2024-07-10 ENCOUNTER — APPOINTMENT (OUTPATIENT)
Dept: INTERNAL MEDICINE | Facility: CLINIC | Age: 89
End: 2024-07-10
Payer: MEDICARE

## 2024-07-10 DIAGNOSIS — I10 ESSENTIAL (PRIMARY) HYPERTENSION: ICD-10-CM

## 2024-07-10 DIAGNOSIS — E03.8 OTHER SPECIFIED HYPOTHYROIDISM: ICD-10-CM

## 2024-07-10 DIAGNOSIS — Z98.89 UNDEFINED: Chronic | ICD-10-CM

## 2024-07-10 DIAGNOSIS — C61 MALIGNANT NEOPLASM OF PROSTATE: ICD-10-CM

## 2024-07-10 PROCEDURE — G0463: CPT

## 2024-07-10 PROCEDURE — 99442: CPT

## 2024-07-16 DIAGNOSIS — E03.8 OTHER SPECIFIED HYPOTHYROIDISM: ICD-10-CM

## 2024-07-16 DIAGNOSIS — C61 MALIGNANT NEOPLASM OF PROSTATE: ICD-10-CM

## 2024-07-23 ENCOUNTER — TRANSCRIPTION ENCOUNTER (OUTPATIENT)
Age: 89
End: 2024-07-23

## 2024-07-23 ENCOUNTER — NON-APPOINTMENT (OUTPATIENT)
Age: 89
End: 2024-07-23

## 2024-07-24 ENCOUNTER — APPOINTMENT (OUTPATIENT)
Dept: UROLOGY | Facility: CLINIC | Age: 89
End: 2024-07-24

## 2024-08-07 NOTE — PATIENT PROFILE ADULT - NSPROGENSOURCEINFO_GEN_A_NUR
Render Note In Bullet Format When Appropriate: No Medical Necessity Clause: This procedure was medically necessary because the lesions that were treated were: Consent: The patient's verbal consent was obtained including but not limited to risks of crusting, scabbing, blistering, scarring, darker or lighter pigmentary change, recurrence, incomplete removal and infection. Medical Necessity Information: It is in your best interest to select a reason for this procedure from the list below. All of these items fulfill various CMS LCD requirements except the new and changing color options. Detail Level: Detailed Treatment Number (Will Not Render If 0): 2 Post-Care Instructions: I reviewed with the patient in detail post-care instructions. Patient is to wear sunprotection, and avoid picking at any of the treated lesions. Pt may apply Vaseline to crusted or scabbing areas. Anesthesia Volume In Cc: 0 Render Post-Care Instructions In Note?: yes patient

## 2024-10-18 ENCOUNTER — APPOINTMENT (OUTPATIENT)
Dept: INTERNAL MEDICINE | Facility: CLINIC | Age: 89
End: 2024-10-18

## 2024-10-30 ENCOUNTER — APPOINTMENT (OUTPATIENT)
Dept: UROLOGY | Facility: CLINIC | Age: 89
End: 2024-10-30

## 2024-10-30 ENCOUNTER — OUTPATIENT (OUTPATIENT)
Dept: OUTPATIENT SERVICES | Facility: HOSPITAL | Age: 89
LOS: 1 days | End: 2024-10-30
Payer: MEDICARE

## 2024-10-30 VITALS
HEART RATE: 65 BPM | SYSTOLIC BLOOD PRESSURE: 156 MMHG | TEMPERATURE: 98 F | DIASTOLIC BLOOD PRESSURE: 66 MMHG | OXYGEN SATURATION: 100 %

## 2024-10-30 DIAGNOSIS — Z98.89 OTHER SPECIFIED POSTPROCEDURAL STATES: Chronic | ICD-10-CM

## 2024-10-30 DIAGNOSIS — R35.0 FREQUENCY OF MICTURITION: ICD-10-CM

## 2024-10-30 DIAGNOSIS — C61 MALIGNANT NEOPLASM OF PROSTATE: ICD-10-CM

## 2024-10-30 PROCEDURE — 96402U: CUSTOM | Mod: NC

## 2024-10-30 PROCEDURE — 96402 CHEMO HORMON ANTINEOPL SQ/IM: CPT

## 2024-10-30 RX ORDER — LEUPROLIDE ACETATE 30 MG/.5ML
30 INJECTION, SUSPENSION, EXTENDED RELEASE SUBCUTANEOUS
Refills: 0 | Status: COMPLETED | OUTPATIENT
Start: 2024-10-30

## 2024-10-30 RX ORDER — LEUPROLIDE ACETATE 30 MG/.5ML
30 INJECTION, SUSPENSION, EXTENDED RELEASE SUBCUTANEOUS DAILY
Qty: 1 | Refills: 0 | Status: COMPLETED | OUTPATIENT
Start: 2024-10-29 | End: 2024-10-30

## 2024-10-30 RX ADMIN — LEUPROLIDE ACETATE 0 MG: 30 INJECTION, SUSPENSION, EXTENDED RELEASE SUBCUTANEOUS at 00:00

## 2024-11-01 DIAGNOSIS — C61 MALIGNANT NEOPLASM OF PROSTATE: ICD-10-CM

## 2024-12-02 ENCOUNTER — APPOINTMENT (OUTPATIENT)
Dept: INTERNAL MEDICINE | Facility: CLINIC | Age: 88
End: 2024-12-02

## 2024-12-03 NOTE — ED ADULT NURSE NOTE - TEMPLATE LIST FOR HEAD TO TOE ASSESSMENT
Current patient location: [address] [place] [address]    Is the patient currently in the state of [place]? YES    Visit mode:VIDEO    If the visit is dropped, the patient can be reconnected by:VIDEO VISIT: Text to cell phone:   Telephone Information:   Mobile [phone]   Mobile [phone]       Will anyone else be joining the visit? NO  (If patient encounters technical issues they should call [phone]    Are changes needed to the allergy or medication list? No    Are refills needed on medications prescribed by this physician? NO    Rooming Documentation:  Unable to complete questionnaire(s) due to time    Reason for visit: RECHECK    Paz OSEIF     VIEW ALL

## 2024-12-09 ENCOUNTER — OUTPATIENT (OUTPATIENT)
Dept: OUTPATIENT SERVICES | Facility: HOSPITAL | Age: 88
LOS: 1 days | End: 2024-12-09
Payer: MEDICARE

## 2024-12-09 ENCOUNTER — APPOINTMENT (OUTPATIENT)
Dept: INTERNAL MEDICINE | Facility: CLINIC | Age: 88
End: 2024-12-09
Payer: MEDICARE

## 2024-12-09 VITALS
HEIGHT: 66 IN | OXYGEN SATURATION: 96 % | WEIGHT: 163 LBS | HEART RATE: 87 BPM | SYSTOLIC BLOOD PRESSURE: 130 MMHG | BODY MASS INDEX: 26.2 KG/M2 | DIASTOLIC BLOOD PRESSURE: 70 MMHG

## 2024-12-09 DIAGNOSIS — E03.9 HYPOTHYROIDISM, UNSPECIFIED: ICD-10-CM

## 2024-12-09 DIAGNOSIS — I10 ESSENTIAL (PRIMARY) HYPERTENSION: ICD-10-CM

## 2024-12-09 DIAGNOSIS — E03.8 OTHER SPECIFIED HYPOTHYROIDISM: ICD-10-CM

## 2024-12-09 DIAGNOSIS — Z98.89 OTHER SPECIFIED POSTPROCEDURAL STATES: Chronic | ICD-10-CM

## 2024-12-09 PROCEDURE — G0463: CPT

## 2024-12-09 PROCEDURE — 99214 OFFICE O/P EST MOD 30 MIN: CPT

## 2024-12-09 PROCEDURE — G2211 COMPLEX E/M VISIT ADD ON: CPT

## 2024-12-15 PROBLEM — E03.9 HYPOTHYROIDISM: Status: ACTIVE | Noted: 2024-12-15

## 2024-12-15 PROBLEM — E03.8 SUBCLINICAL HYPOTHYROIDISM: Status: RESOLVED | Noted: 2023-11-30 | Resolved: 2024-12-15

## 2024-12-16 ENCOUNTER — LABORATORY RESULT (OUTPATIENT)
Age: 88
End: 2024-12-16

## 2024-12-16 ENCOUNTER — NON-APPOINTMENT (OUTPATIENT)
Age: 88
End: 2024-12-16

## 2024-12-17 ENCOUNTER — LABORATORY RESULT (OUTPATIENT)
Age: 88
End: 2024-12-17

## 2024-12-23 DIAGNOSIS — E03.9 HYPOTHYROIDISM, UNSPECIFIED: ICD-10-CM

## 2025-01-10 ENCOUNTER — NON-APPOINTMENT (OUTPATIENT)
Age: 89
End: 2025-01-10

## 2025-01-10 ENCOUNTER — OUTPATIENT (OUTPATIENT)
Dept: OUTPATIENT SERVICES | Facility: HOSPITAL | Age: 89
LOS: 1 days | End: 2025-01-10
Payer: MEDICARE

## 2025-01-10 ENCOUNTER — APPOINTMENT (OUTPATIENT)
Dept: INTERNAL MEDICINE | Facility: CLINIC | Age: 89
End: 2025-01-10

## 2025-01-10 VITALS
OXYGEN SATURATION: 97 % | SYSTOLIC BLOOD PRESSURE: 130 MMHG | WEIGHT: 161 LBS | HEART RATE: 64 BPM | HEIGHT: 66 IN | BODY MASS INDEX: 25.88 KG/M2 | DIASTOLIC BLOOD PRESSURE: 60 MMHG

## 2025-01-10 DIAGNOSIS — I10 ESSENTIAL (PRIMARY) HYPERTENSION: ICD-10-CM

## 2025-01-10 DIAGNOSIS — D75.89 OTHER SPECIFIED DISEASES OF BLOOD AND BLOOD-FORMING ORGANS: ICD-10-CM

## 2025-01-10 DIAGNOSIS — Z98.89 OTHER SPECIFIED POSTPROCEDURAL STATES: Chronic | ICD-10-CM

## 2025-01-10 DIAGNOSIS — Z97.8 PRESENCE OF OTHER SPECIFIED DEVICES: ICD-10-CM

## 2025-01-10 DIAGNOSIS — Z01.818 ENCOUNTER FOR OTHER PREPROCEDURAL EXAMINATION: ICD-10-CM

## 2025-01-10 PROCEDURE — 93010 ELECTROCARDIOGRAM REPORT: CPT

## 2025-01-10 PROCEDURE — G0463: CPT

## 2025-01-10 PROCEDURE — 99213 OFFICE O/P EST LOW 20 MIN: CPT | Mod: GC

## 2025-01-10 RX ORDER — LEUPROLIDE ACETATE 7.5 MG
KIT INTRAMUSCULAR
Refills: 0 | Status: ACTIVE | COMMUNITY

## 2025-01-10 RX ORDER — ENZALUTAMIDE 40 MG/1
40 TABLET ORAL
Refills: 0 | Status: ACTIVE | COMMUNITY

## 2025-01-12 PROBLEM — Z97.8 FOLEY CATHETER PRESENT: Status: RESOLVED | Noted: 2023-11-14 | Resolved: 2025-01-12

## 2025-01-12 PROBLEM — Z01.818 PREOPERATIVE EXAMINATION: Status: ACTIVE | Noted: 2022-12-08

## 2025-01-16 LAB
ANION GAP SERPL CALC-SCNC: 14 MMOL/L
BASOPHILS # BLD AUTO: 0.04 K/UL
BASOPHILS NFR BLD AUTO: 0.7 %
BUN SERPL-MCNC: 19 MG/DL
CALCIUM SERPL-MCNC: 10 MG/DL
CHLORIDE SERPL-SCNC: 101 MMOL/L
CO2 SERPL-SCNC: 25 MMOL/L
CREAT SERPL-MCNC: 1.1 MG/DL
EGFR: 63 ML/MIN/1.73M2
EOSINOPHIL # BLD AUTO: 0.15 K/UL
EOSINOPHIL NFR BLD AUTO: 2.5 %
FOLATE SERPL-MCNC: 11.5 NG/ML
GLUCOSE SERPL-MCNC: 93 MG/DL
HCT VFR BLD CALC: 41.5 %
HGB BLD-MCNC: 13.4 G/DL
IMM GRANULOCYTES NFR BLD AUTO: 0.3 %
LYMPHOCYTES # BLD AUTO: 2.26 K/UL
LYMPHOCYTES NFR BLD AUTO: 37.7 %
MAN DIFF?: NORMAL
MCHC RBC-ENTMCNC: 32.3 G/DL
MCHC RBC-ENTMCNC: 33.2 PG
MCV RBC AUTO: 102.7 FL
MONOCYTES # BLD AUTO: 0.57 K/UL
MONOCYTES NFR BLD AUTO: 9.5 %
NEUTROPHILS # BLD AUTO: 2.95 K/UL
NEUTROPHILS NFR BLD AUTO: 49.3 %
PLATELET # BLD AUTO: 185 K/UL
POTASSIUM SERPL-SCNC: 5.4 MMOL/L
RBC # BLD: 4.04 M/UL
RBC # FLD: 13 %
SODIUM SERPL-SCNC: 139 MMOL/L
VIT B12 SERPL-MCNC: >2000 PG/ML
WBC # FLD AUTO: 5.99 K/UL

## 2025-01-22 DIAGNOSIS — D75.89 OTHER SPECIFIED DISEASES OF BLOOD AND BLOOD-FORMING ORGANS: ICD-10-CM

## 2025-01-22 DIAGNOSIS — Z01.818 ENCOUNTER FOR OTHER PREPROCEDURAL EXAMINATION: ICD-10-CM

## 2025-03-04 RX ORDER — LEUPROLIDE ACETATE 30 MG/.5ML
30 INJECTION, SUSPENSION, EXTENDED RELEASE SUBCUTANEOUS DAILY
Qty: 1 | Refills: 0 | Status: ACTIVE | OUTPATIENT
Start: 2025-03-04

## 2025-03-05 ENCOUNTER — OUTPATIENT (OUTPATIENT)
Dept: OUTPATIENT SERVICES | Facility: HOSPITAL | Age: 89
LOS: 1 days | End: 2025-03-05
Payer: MEDICARE

## 2025-03-05 ENCOUNTER — APPOINTMENT (OUTPATIENT)
Dept: UROLOGY | Facility: CLINIC | Age: 89
End: 2025-03-05
Payer: MEDICARE

## 2025-03-05 DIAGNOSIS — R35.0 FREQUENCY OF MICTURITION: ICD-10-CM

## 2025-03-05 DIAGNOSIS — C61 MALIGNANT NEOPLASM OF PROSTATE: ICD-10-CM

## 2025-03-05 DIAGNOSIS — Z98.89 OTHER SPECIFIED POSTPROCEDURAL STATES: Chronic | ICD-10-CM

## 2025-03-05 PROCEDURE — 99214 OFFICE O/P EST MOD 30 MIN: CPT | Mod: 25

## 2025-03-05 PROCEDURE — 96402 CHEMO HORMON ANTINEOPL SQ/IM: CPT

## 2025-03-05 PROCEDURE — 96402U: CUSTOM | Mod: NC

## 2025-03-05 RX ORDER — ENZALUTAMIDE 40 MG/1
40 TABLET ORAL
Refills: 0 | Status: ACTIVE | COMMUNITY

## 2025-03-05 RX ORDER — LEUPROLIDE ACETATE 30 MG/.5ML
30 INJECTION, SUSPENSION, EXTENDED RELEASE SUBCUTANEOUS
Refills: 0 | Status: COMPLETED | OUTPATIENT
Start: 2025-03-05

## 2025-03-05 RX ADMIN — LEUPROLIDE ACETATE 0 MG: 30 INJECTION, SUSPENSION, EXTENDED RELEASE SUBCUTANEOUS at 00:00

## 2025-03-06 DIAGNOSIS — C61 MALIGNANT NEOPLASM OF PROSTATE: ICD-10-CM

## 2025-03-06 LAB
PSA FREE FLD-MCNC: 33 %
PSA FREE SERPL-MCNC: 23 NG/ML
PSA SERPL-MCNC: 69.1 NG/ML

## 2025-04-26 ENCOUNTER — INPATIENT (INPATIENT)
Facility: HOSPITAL | Age: 89
LOS: 2 days | Discharge: HOME HEALTH | DRG: 872 | End: 2025-04-29
Attending: HOSPITALIST | Admitting: STUDENT IN AN ORGANIZED HEALTH CARE EDUCATION/TRAINING PROGRAM
Payer: MEDICARE

## 2025-04-26 VITALS
DIASTOLIC BLOOD PRESSURE: 63 MMHG | OXYGEN SATURATION: 96 % | HEIGHT: 66 IN | WEIGHT: 156.53 LBS | RESPIRATION RATE: 20 BRPM | SYSTOLIC BLOOD PRESSURE: 97 MMHG | TEMPERATURE: 98 F | HEART RATE: 111 BPM

## 2025-04-26 DIAGNOSIS — C61 MALIGNANT NEOPLASM OF PROSTATE: ICD-10-CM

## 2025-04-26 DIAGNOSIS — K62.89 OTHER SPECIFIED DISEASES OF ANUS AND RECTUM: ICD-10-CM

## 2025-04-26 DIAGNOSIS — K52.9 NONINFECTIVE GASTROENTERITIS AND COLITIS, UNSPECIFIED: ICD-10-CM

## 2025-04-26 DIAGNOSIS — A41.9 SEPSIS, UNSPECIFIED ORGANISM: ICD-10-CM

## 2025-04-26 DIAGNOSIS — B19.10 UNSPECIFIED VIRAL HEPATITIS B WITHOUT HEPATIC COMA: ICD-10-CM

## 2025-04-26 DIAGNOSIS — E03.9 HYPOTHYROIDISM, UNSPECIFIED: ICD-10-CM

## 2025-04-26 DIAGNOSIS — F41.9 ANXIETY DISORDER, UNSPECIFIED: ICD-10-CM

## 2025-04-26 DIAGNOSIS — I82.890 ACUTE EMBOLISM AND THROMBOSIS OF OTHER SPECIFIED VEINS: ICD-10-CM

## 2025-04-26 DIAGNOSIS — Z98.89 OTHER SPECIFIED POSTPROCEDURAL STATES: Chronic | ICD-10-CM

## 2025-04-26 DIAGNOSIS — Z29.9 ENCOUNTER FOR PROPHYLACTIC MEASURES, UNSPECIFIED: ICD-10-CM

## 2025-04-26 DIAGNOSIS — I10 ESSENTIAL (PRIMARY) HYPERTENSION: ICD-10-CM

## 2025-04-26 LAB
ADD ON TEST-SPECIMEN IN LAB: SIGNIFICANT CHANGE UP
ADD ON TEST-SPECIMEN IN LAB: SIGNIFICANT CHANGE UP
ALBUMIN SERPL ELPH-MCNC: 3.1 G/DL — LOW (ref 3.3–5)
ALP SERPL-CCNC: 115 U/L — SIGNIFICANT CHANGE UP (ref 40–120)
ALT FLD-CCNC: 24 U/L — SIGNIFICANT CHANGE UP (ref 10–45)
ANION GAP SERPL CALC-SCNC: 15 MMOL/L — SIGNIFICANT CHANGE UP (ref 5–17)
APPEARANCE UR: CLEAR — SIGNIFICANT CHANGE UP
APTT BLD: 29.8 SEC — SIGNIFICANT CHANGE UP (ref 26.1–36.8)
AST SERPL-CCNC: 67 U/L — HIGH (ref 10–40)
BACTERIA # UR AUTO: NEGATIVE /HPF — SIGNIFICANT CHANGE UP
BASOPHILS # BLD AUTO: 0.02 K/UL — SIGNIFICANT CHANGE UP (ref 0–0.2)
BASOPHILS NFR BLD AUTO: 0.9 % — SIGNIFICANT CHANGE UP (ref 0–2)
BILIRUB SERPL-MCNC: 0.9 MG/DL — SIGNIFICANT CHANGE UP (ref 0.2–1.2)
BILIRUB UR-MCNC: ABNORMAL
BUN SERPL-MCNC: 21 MG/DL — SIGNIFICANT CHANGE UP (ref 7–23)
CALCIUM SERPL-MCNC: 8.6 MG/DL — SIGNIFICANT CHANGE UP (ref 8.4–10.5)
CAST: 8 /LPF — HIGH (ref 0–4)
CHLORIDE SERPL-SCNC: 98 MMOL/L — SIGNIFICANT CHANGE UP (ref 96–108)
CO2 SERPL-SCNC: 19 MMOL/L — LOW (ref 22–31)
COLOR SPEC: SIGNIFICANT CHANGE UP
CREAT SERPL-MCNC: 0.99 MG/DL — SIGNIFICANT CHANGE UP (ref 0.5–1.3)
DIFF PNL FLD: ABNORMAL
EGFR: 72 ML/MIN/1.73M2 — SIGNIFICANT CHANGE UP
EGFR: 72 ML/MIN/1.73M2 — SIGNIFICANT CHANGE UP
EOSINOPHIL # BLD AUTO: 0 K/UL — SIGNIFICANT CHANGE UP (ref 0–0.5)
EOSINOPHIL NFR BLD AUTO: 0 % — SIGNIFICANT CHANGE UP (ref 0–6)
FLUAV AG NPH QL: SIGNIFICANT CHANGE UP
FLUBV AG NPH QL: SIGNIFICANT CHANGE UP
GAS PNL BLDV: SIGNIFICANT CHANGE UP
GAS PNL BLDV: SIGNIFICANT CHANGE UP
GLUCOSE SERPL-MCNC: 88 MG/DL — SIGNIFICANT CHANGE UP (ref 70–99)
GLUCOSE UR QL: NEGATIVE MG/DL — SIGNIFICANT CHANGE UP
HCT VFR BLD CALC: 32.4 % — LOW (ref 39–50)
HGB BLD-MCNC: 10.9 G/DL — LOW (ref 13–17)
INR BLD: 1.08 RATIO — SIGNIFICANT CHANGE UP (ref 0.85–1.16)
KETONES UR-MCNC: ABNORMAL MG/DL
LACTATE SERPL-SCNC: 3.9 MMOL/L — HIGH (ref 0.5–2)
LEUKOCYTE ESTERASE UR-ACNC: NEGATIVE — SIGNIFICANT CHANGE UP
LYMPHOCYTES # BLD AUTO: 1.41 K/UL — SIGNIFICANT CHANGE UP (ref 1–3.3)
LYMPHOCYTES # BLD AUTO: 60.9 % — HIGH (ref 13–44)
MANUAL SMEAR VERIFICATION: SIGNIFICANT CHANGE UP
MCHC RBC-ENTMCNC: 32.3 PG — SIGNIFICANT CHANGE UP (ref 27–34)
MCHC RBC-ENTMCNC: 33.6 G/DL — SIGNIFICANT CHANGE UP (ref 32–36)
MCV RBC AUTO: 96.1 FL — SIGNIFICANT CHANGE UP (ref 80–100)
METAMYELOCYTES # FLD: 4.3 % — HIGH (ref 0–0)
METAMYELOCYTES NFR BLD: 4.3 % — HIGH (ref 0–0)
MONOCYTES # BLD AUTO: 0.54 K/UL — SIGNIFICANT CHANGE UP (ref 0–0.9)
MONOCYTES NFR BLD AUTO: 23.5 % — HIGH (ref 2–14)
MYELOCYTES NFR BLD: 2.6 % — HIGH (ref 0–0)
NEUTROPHILS # BLD AUTO: 0.18 K/UL — LOW (ref 1.8–7.4)
NEUTROPHILS NFR BLD AUTO: 6.1 % — LOW (ref 43–77)
NEUTS BAND # BLD: 1.7 % — SIGNIFICANT CHANGE UP (ref 0–8)
NEUTS BAND NFR BLD: 1.7 % — SIGNIFICANT CHANGE UP (ref 0–8)
NITRITE UR-MCNC: NEGATIVE — SIGNIFICANT CHANGE UP
PH UR: 5.5 — SIGNIFICANT CHANGE UP (ref 5–8)
PLAT MORPH BLD: NORMAL — SIGNIFICANT CHANGE UP
PLATELET # BLD AUTO: 147 K/UL — LOW (ref 150–400)
POLYCHROMASIA BLD QL SMEAR: SIGNIFICANT CHANGE UP
POTASSIUM SERPL-MCNC: 4 MMOL/L — SIGNIFICANT CHANGE UP (ref 3.5–5.3)
POTASSIUM SERPL-SCNC: 4 MMOL/L — SIGNIFICANT CHANGE UP (ref 3.5–5.3)
PROT SERPL-MCNC: 6.1 G/DL — SIGNIFICANT CHANGE UP (ref 6–8.3)
PROT UR-MCNC: 100 MG/DL
PROTHROM AB SERPL-ACNC: 12.3 SEC — SIGNIFICANT CHANGE UP (ref 9.9–13.4)
RBC # BLD: 3.37 M/UL — LOW (ref 4.2–5.8)
RBC # FLD: 13.9 % — SIGNIFICANT CHANGE UP (ref 10.3–14.5)
RBC BLD AUTO: ABNORMAL
RBC CASTS # UR COMP ASSIST: 2 /HPF — SIGNIFICANT CHANGE UP (ref 0–4)
REVIEW: SIGNIFICANT CHANGE UP
RSV RNA NPH QL NAA+NON-PROBE: SIGNIFICANT CHANGE UP
SARS-COV-2 RNA SPEC QL NAA+PROBE: SIGNIFICANT CHANGE UP
SODIUM SERPL-SCNC: 132 MMOL/L — LOW (ref 135–145)
SOURCE RESPIRATORY: SIGNIFICANT CHANGE UP
SP GR SPEC: 1.02 — SIGNIFICANT CHANGE UP (ref 1–1.03)
SQUAMOUS # UR AUTO: 5 /HPF — SIGNIFICANT CHANGE UP (ref 0–5)
TROPONIN T, HIGH SENSITIVITY RESULT: 31 NG/L — SIGNIFICANT CHANGE UP (ref 0–51)
UROBILINOGEN FLD QL: 1 MG/DL — SIGNIFICANT CHANGE UP (ref 0.2–1)
WBC # BLD: 2.31 K/UL — LOW (ref 3.8–10.5)
WBC # FLD AUTO: 2.31 K/UL — LOW (ref 3.8–10.5)
WBC UR QL: 2 /HPF — SIGNIFICANT CHANGE UP (ref 0–5)

## 2025-04-26 PROCEDURE — 93010 ELECTROCARDIOGRAM REPORT: CPT

## 2025-04-26 PROCEDURE — 71275 CT ANGIOGRAPHY CHEST: CPT | Mod: 26

## 2025-04-26 PROCEDURE — 93308 TTE F-UP OR LMTD: CPT | Mod: 26

## 2025-04-26 PROCEDURE — 71045 X-RAY EXAM CHEST 1 VIEW: CPT | Mod: 26

## 2025-04-26 PROCEDURE — 99223 1ST HOSP IP/OBS HIGH 75: CPT | Mod: GC

## 2025-04-26 PROCEDURE — 99222 1ST HOSP IP/OBS MODERATE 55: CPT | Mod: GC

## 2025-04-26 PROCEDURE — 99285 EMERGENCY DEPT VISIT HI MDM: CPT

## 2025-04-26 PROCEDURE — 74177 CT ABD & PELVIS W/CONTRAST: CPT | Mod: 26

## 2025-04-26 RX ORDER — ENTECAVIR 0.5 MG/1
0.5 TABLET ORAL
Refills: 0 | Status: DISCONTINUED | OUTPATIENT
Start: 2025-04-26 | End: 2025-04-29

## 2025-04-26 RX ORDER — PREDNISONE 20 MG/1
5 TABLET ORAL
Refills: 0 | Status: DISCONTINUED | OUTPATIENT
Start: 2025-04-26 | End: 2025-04-29

## 2025-04-26 RX ORDER — ONDANSETRON HCL/PF 4 MG/2 ML
4 VIAL (ML) INJECTION EVERY 8 HOURS
Refills: 0 | Status: DISCONTINUED | OUTPATIENT
Start: 2025-04-26 | End: 2025-04-29

## 2025-04-26 RX ORDER — LEVOTHYROXINE SODIUM 300 MCG
100 TABLET ORAL DAILY
Refills: 0 | Status: DISCONTINUED | OUTPATIENT
Start: 2025-04-26 | End: 2025-04-29

## 2025-04-26 RX ORDER — SODIUM CHLORIDE 9 G/1000ML
1000 INJECTION, SOLUTION INTRAVENOUS
Refills: 0 | Status: DISCONTINUED | OUTPATIENT
Start: 2025-04-26 | End: 2025-04-27

## 2025-04-26 RX ORDER — VANCOMYCIN HCL IN 5 % DEXTROSE 1.5G/250ML
1000 PLASTIC BAG, INJECTION (ML) INTRAVENOUS EVERY 12 HOURS
Refills: 0 | Status: DISCONTINUED | OUTPATIENT
Start: 2025-04-26 | End: 2025-04-28

## 2025-04-26 RX ORDER — MELATONIN 5 MG
3 TABLET ORAL AT BEDTIME
Refills: 0 | Status: DISCONTINUED | OUTPATIENT
Start: 2025-04-26 | End: 2025-04-29

## 2025-04-26 RX ORDER — B1/B2/B3/B5/B6/B12/VIT C/FOLIC 500-0.5 MG
1 TABLET ORAL DAILY
Refills: 0 | Status: DISCONTINUED | OUTPATIENT
Start: 2025-04-26 | End: 2025-04-29

## 2025-04-26 RX ORDER — METRONIDAZOLE 250 MG
500 TABLET ORAL EVERY 12 HOURS
Refills: 0 | Status: DISCONTINUED | OUTPATIENT
Start: 2025-04-27 | End: 2025-04-28

## 2025-04-26 RX ORDER — ACETAMINOPHEN 500 MG/5ML
1000 LIQUID (ML) ORAL ONCE
Refills: 0 | Status: COMPLETED | OUTPATIENT
Start: 2025-04-26 | End: 2025-04-26

## 2025-04-26 RX ORDER — SERTRALINE 100 MG/1
25 TABLET, FILM COATED ORAL DAILY
Refills: 0 | Status: DISCONTINUED | OUTPATIENT
Start: 2025-04-26 | End: 2025-04-29

## 2025-04-26 RX ORDER — CEFEPIME 2 G/20ML
2000 INJECTION, POWDER, FOR SOLUTION INTRAVENOUS EVERY 12 HOURS
Refills: 0 | Status: DISCONTINUED | OUTPATIENT
Start: 2025-04-26 | End: 2025-04-26

## 2025-04-26 RX ORDER — CEFEPIME 2 G/20ML
2000 INJECTION, POWDER, FOR SOLUTION INTRAVENOUS EVERY 12 HOURS
Refills: 0 | Status: DISCONTINUED | OUTPATIENT
Start: 2025-04-26 | End: 2025-04-29

## 2025-04-26 RX ORDER — METRONIDAZOLE 250 MG
500 TABLET ORAL ONCE
Refills: 0 | Status: COMPLETED | OUTPATIENT
Start: 2025-04-26 | End: 2025-04-26

## 2025-04-26 RX ORDER — FILGRASTIM 300 UG/.5ML
300 INJECTION, SOLUTION INTRAVENOUS; SUBCUTANEOUS DAILY
Refills: 0 | Status: DISCONTINUED | OUTPATIENT
Start: 2025-04-26 | End: 2025-04-27

## 2025-04-26 RX ORDER — CEFEPIME 2 G/20ML
2000 INJECTION, POWDER, FOR SOLUTION INTRAVENOUS ONCE
Refills: 0 | Status: COMPLETED | OUTPATIENT
Start: 2025-04-26 | End: 2025-04-26

## 2025-04-26 RX ORDER — LISINOPRIL 5 MG/1
1 TABLET ORAL
Refills: 0 | DISCHARGE

## 2025-04-26 RX ORDER — METRONIDAZOLE 250 MG
TABLET ORAL
Refills: 0 | Status: DISCONTINUED | OUTPATIENT
Start: 2025-04-26 | End: 2025-04-28

## 2025-04-26 RX ORDER — ENOXAPARIN SODIUM 100 MG/ML
40 INJECTION SUBCUTANEOUS EVERY 24 HOURS
Refills: 0 | Status: DISCONTINUED | OUTPATIENT
Start: 2025-04-26 | End: 2025-04-29

## 2025-04-26 RX ORDER — VANCOMYCIN HCL IN 5 % DEXTROSE 1.5G/250ML
1000 PLASTIC BAG, INJECTION (ML) INTRAVENOUS ONCE
Refills: 0 | Status: COMPLETED | OUTPATIENT
Start: 2025-04-26 | End: 2025-04-26

## 2025-04-26 RX ORDER — ENTECAVIR 0.5 MG/1
1 TABLET ORAL
Refills: 0 | DISCHARGE

## 2025-04-26 RX ORDER — ACETAMINOPHEN 500 MG/5ML
650 LIQUID (ML) ORAL EVERY 6 HOURS
Refills: 0 | Status: DISCONTINUED | OUTPATIENT
Start: 2025-04-26 | End: 2025-04-28

## 2025-04-26 RX ADMIN — Medication 1000 MILLILITER(S): at 13:17

## 2025-04-26 RX ADMIN — CEFEPIME 100 MILLIGRAM(S): 2 INJECTION, POWDER, FOR SOLUTION INTRAVENOUS at 11:54

## 2025-04-26 RX ADMIN — Medication 1000 MILLIGRAM(S): at 12:37

## 2025-04-26 RX ADMIN — SODIUM CHLORIDE 75 MILLILITER(S): 9 INJECTION, SOLUTION INTRAVENOUS at 23:12

## 2025-04-26 RX ADMIN — CEFEPIME 100 MILLIGRAM(S): 2 INJECTION, POWDER, FOR SOLUTION INTRAVENOUS at 23:05

## 2025-04-26 RX ADMIN — Medication 250 MILLIGRAM(S): at 15:23

## 2025-04-26 RX ADMIN — Medication 100 MILLIGRAM(S): at 20:23

## 2025-04-26 RX ADMIN — Medication 1000 MILLILITER(S): at 12:11

## 2025-04-26 RX ADMIN — Medication 400 MILLIGRAM(S): at 11:53

## 2025-04-26 NOTE — ED ADULT TRIAGE NOTE - CHIEF COMPLAINT QUOTE
receiving chemo for prostate CA; hypotension and fever since yesterday. Oncologist referred pt to ED.

## 2025-04-26 NOTE — H&P ADULT - PROBLEM SELECTOR PLAN 4
Questionable splenic vein thrombosis on CT non-con. Vascular consulted and suspecting artifact given no abd pain    - US doppler study (will need to be somewhat NPO prior to scan to reduce bowel gas) per radiology  - MRI if US unsuccessful  - Vascular surgery following, appreciate recs

## 2025-04-26 NOTE — ED ADULT NURSE NOTE - NSFALLHARMRISKINTERV_ED_ALL_ED

## 2025-04-26 NOTE — CONSULT NOTE ADULT - SUBJECTIVE AND OBJECTIVE BOX
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 Patient was in his usual state of health on chemotherapy for metastatic prostate cancer for last few months and was given a first dose of Neupogen this week. The next day the patient developed exclusively watery diarrhea during bowel movements. He reported feeling weaker and somewhat short of breath. After fluid resuscitation today patient denies any SOB, URI symptoms, abd pain, dysuria. Patient endorses urinary incontinence which is chronic. Denies any recent Abx or hx of prolonged Abx courses in the past. Does take 5mg prednisone BID for prostate cancer.      ED Course:   VS notable for T101.2 rectal, , BP 97/63. CBC notable for WBC 2.31, hgb 10.9, plt 147 Myelocytes and metamyelocytes present. D-dimer 771. Lactate 3.9. . UA negative for infection. CTA negative for PE and no focal consolidation. CT A/p concerning for colitis at the splenic flexure with possible splenic vein thrombosis. POCUS TTE wnl. Was given 2L IVF bolus, cefepime, and 1g vancomycin. (26 Apr 2025 17:32)      Past Medical History  PAST MEDICAL & SURGICAL HISTORY:  HTN (hypertension)      HLD (hyperlipidemia)      Prostate cancer      S/P prostatectomy          Current Meds  MEDICATIONS  (STANDING):  cefepime   IVPB 2000 milliGRAM(s) IV Intermittent every 12 hours  cholecalciferol 1000 Unit(s) Oral daily  enoxaparin Injectable 40 milliGRAM(s) SubCutaneous every 24 hours  entecavir 0.5 milliGRAM(s) Oral every 48 hours  lactated ringers. 1000 milliLiter(s) (75 mL/Hr) IV Continuous <Continuous>  levothyroxine 100 MICROGram(s) Oral daily  metroNIDAZOLE  IVPB      multivitamin 1 Tablet(s) Oral daily  predniSONE   Tablet 5 milliGRAM(s) Oral two times a day  sertraline 25 milliGRAM(s) Oral daily  vancomycin  IVPB 1000 milliGRAM(s) IV Intermittent every 12 hours    MEDICATIONS  (PRN):  acetaminophen     Tablet .. 650 milliGRAM(s) Oral every 6 hours PRN Temp greater or equal to 38C (100.4F), Mild Pain (1 - 3)  melatonin 3 milliGRAM(s) Oral at bedtime PRN Insomnia  ondansetron Injectable 4 milliGRAM(s) IV Push every 8 hours PRN Nausea and/or Vomiting      Allergies  Allergies    No Known Allergies    Intolerances        Social History  , Retired. No tob.  No etoh    Family History  FAMILY HISTORY:      Review of System  REVIEW OF SYSTEMS      General:	Denies fatigue, fevers, chills, sweats, decreased appetite.    Skin/Breast: denies pruritis, rash  	  Ophthalmologic: no change in vision or blurring  	  HEENT	Denies dry mouth, oral sores, dysphagia,  change in hearing.    Respiratory and Thorax:  cough, sob, wheeze, hemoptysis  	  Cardiovascular:	no cp , palp, orthopnea    Gastrointestinal:	no n/v/d constipation    Genitourinary:	no dysuria of frequency, no hematuria, no flank pain    Musculoskeletal:	no bone or joint pain. no muscle aches.     Neurological:	no change in sensory or motor function. no headache. no weakness.     Psychiatric:	no depression, no anxiety, insomnia.     Hematology/Lymphatics:	no bleeding or bruising        Vitals  Vital Signs Last 24 Hrs  T(C): 36.9 (26 Apr 2025 19:11), Max: 38.4 (26 Apr 2025 12:10)  T(F): 98.4 (26 Apr 2025 19:11), Max: 101.2 (26 Apr 2025 12:10)  HR: 77 (26 Apr 2025 19:11) (77 - 111)  BP: 117/70 (26 Apr 2025 19:11) (97/63 - 123/60)  BP(mean): 86 (26 Apr 2025 17:25) (73 - 86)  RR: 20 (26 Apr 2025 19:11) (18 - 22)  SpO2: 98% (26 Apr 2025 19:11) (95% - 100%)    Parameters below as of 26 Apr 2025 19:11  Patient On (Oxygen Delivery Method): room air        Physical Exam  PHYSICAL EXAM:      Constitutional: NAD    Eyes: PERRLA EOMI, anicteric sclera    Heent :No oral sores, no pharyngeal injection. moist mucosa.    Neck: supple, no jvd, no LAD    Respiratory: CTA b/l     Cardiovascular: s1s2, no m/g/r    Gastrointestinal: soft, nt, nd, + BS    Extremities: no c/c/e    Neurological:A&O x 3 moves all ext.    Skin: no rash on exposed skin    Lymph Nodes: no lymphadenopathy.              Lab  CBC Full  -  ( 26 Apr 2025 12:21 )  WBC Count : 2.31 K/uL  RBC Count : 3.37 M/uL  Hemoglobin : 10.9 g/dL  Hematocrit : 32.4 %  Platelet Count - Automated : 147 K/uL  Mean Cell Volume : 96.1 fl  Mean Cell Hemoglobin : 32.3 pg  Mean Cell Hemoglobin Concentration : 33.6 g/dL  Auto Neutrophil # : x  Auto Lymphocyte # : x  Auto Monocyte # : x  Auto Eosinophil # : x  Auto Basophil # : x  Auto Neutrophil % : x  Auto Lymphocyte % : x  Auto Monocyte % : x  Auto Eosinophil % : x  Auto Basophil % : x    04-26    132[L]  |  98  |  21  ----------------------------<  88  4.0   |  19[L]  |  0.99    Ca    8.6      26 Apr 2025 12:21  Mg     1.7     04-26    TPro  6.1  /  Alb  3.1[L]  /  TBili  0.9  /  DBili  x   /  AST  67[H]  /  ALT  24  /  AlkPhos  115  04-26    PT/INR - ( 26 Apr 2025 12:21 )   PT: 12.3 sec;   INR: 1.08 ratio         PTT - ( 26 Apr 2025 12:21 )  PTT:29.8 sec    Rad:    Assessment/Plan         KEL CHOUDHURY  MRN-[id number]    Patient is a 89y old  Male who presents with a chief complaint of Sepsis + Colitis (26 Apr 2025 17:32)      HPI:  89M w hx of prostate cancer (s/p prostatectomy now metastatic to liver and lung) on chemo, pulmonary wedge resection, 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 Patient was in his usual state of health on chemotherapy for metastatic prostate cancer for last few months and was given a first dose of Neupogen this week. The next day the patient developed exclusively watery diarrhea during bowel movements. He reported feeling weaker and somewhat short of breath. After fluid resuscitation ,  patient denies any SOB, URI symptoms, abd pain, dysuria. Patient endorses urinary incontinence which is chronic. Denies any recent Abx or hx of prolonged Abx courses in the past. Does take 5mg prednisone BID for prostate cancer.      ED Course:   VS notable for T101.2 rectal, , BP 97/63. CBC notable for WBC 2.31, hgb 10.9, plt 147 Myelocytes and metamyelocytes present. D-dimer 771. Lactate 3.9. . UA negative for infection. CTA negative for PE and no focal consolidation. CT A/p concerning for colitis at the splenic flexure with possible splenic vein thrombosis. POCUS TTE wnl. Was given 2L IVF bolus, cefepime, and 1g vancomycin. (26 Apr 2025 17:32)    PAST MEDICAL & SURGICAL HISTORY:  HTN (hypertension)    HLD (hyperlipidemia)    Prostate cancer    S/P prostatectomy    Current Meds  MEDICATIONS  (STANDING):  cefepime   IVPB 2000 milliGRAM(s) IV Intermittent every 12 hours  cholecalciferol 1000 Unit(s) Oral daily  enoxaparin Injectable 40 milliGRAM(s) SubCutaneous every 24 hours  entecavir 0.5 milliGRAM(s) Oral every 48 hours  lactated ringers. 1000 milliLiter(s) (75 mL/Hr) IV Continuous <Continuous>  levothyroxine 100 MICROGram(s) Oral daily  metroNIDAZOLE  IVPB      multivitamin 1 Tablet(s) Oral daily  predniSONE   Tablet 5 milliGRAM(s) Oral two times a day  sertraline 25 milliGRAM(s) Oral daily  vancomycin  IVPB 1000 milliGRAM(s) IV Intermittent every 12 hours    MEDICATIONS  (PRN):  acetaminophen     Tablet .. 650 milliGRAM(s) Oral every 6 hours PRN Temp greater or equal to 38C (100.4F), Mild Pain (1 - 3)  melatonin 3 milliGRAM(s) Oral at bedtime PRN Insomnia  ondansetron Injectable 4 milliGRAM(s) IV Push every 8 hours PRN Nausea and/or Vomiting    Allergies    No Known Allergies    Intolerances    Social History  , Retired. No tob.  No etoh    FAMILY HISTORY: non-contrib    REVIEW OF SYSTEMS    General:	Denies fatigue, fevers, chills, sweats, decreased appetite.    Skin/Breast: denies pruritis, rash  	  Ophthalmologic: no change in vision or blurring  	  HEENT	Denies dry mouth, oral sores, dysphagia,  change in hearing.    Respiratory and Thorax: no  cough, sob, wheeze, hemoptysis  	  Cardiovascular:	no cp , palp, orthopnea    Gastrointestinal:	as above    Genitourinary:	no dysuria of frequency, no hematuria, no flank pain    Musculoskeletal:	no bone or joint pain. no muscle aches.     Neurological:	no change in sensory or motor function. no headache. no weakness.     Psychiatric:	no depression, no anxiety, insomnia.     Hematology/Lymphatics:	no bleeding or bruising        Vital Signs Last 24 Hrs  T(C): 36.9 (26 Apr 2025 19:11), Max: 38.4 (26 Apr 2025 12:10)  T(F): 98.4 (26 Apr 2025 19:11), Max: 101.2 (26 Apr 2025 12:10)  HR: 77 (26 Apr 2025 19:11) (77 - 111)  BP: 117/70 (26 Apr 2025 19:11) (97/63 - 123/60)  BP(mean): 86 (26 Apr 2025 17:25) (73 - 86)  RR: 20 (26 Apr 2025 19:11) (18 - 22)  SpO2: 98% (26 Apr 2025 19:11) (95% - 100%)    Parameters below as of 26 Apr 2025 19:11  Patient On (Oxygen Delivery Method): room air      PHYSICAL EXAM:    Constitutional: NAD    Eyes: PERRLA EOMI, anicteric sclera    Heent :No oral sores, no pharyngeal injection. moist mucosa.    Neck: supple, no jvd, no LAD    Respiratory: CTA b/l     Cardiovascular: s1s2, no m/g/r    Gastrointestinal: soft, nt, nd, + BS    Extremities: no c/c/e    Neurological:A&O x 3 moves all ext.    Skin: no rash on exposed skin    Lymph Nodes: no lymphadenopathy.      Lab  CBC Full  -  ( 26 Apr 2025 12:21 )  WBC Count : 2.31 K/uL  RBC Count : 3.37 M/uL  Hemoglobin : 10.9 g/dL  Hematocrit : 32.4 %  Platelet Count - Automated : 147 K/uL  Mean Cell Volume : 96.1 fl  Mean Cell Hemoglobin : 32.3 pg  Mean Cell Hemoglobin Concentration : 33.6 g/dL  Auto Neutrophil # : x  Auto Lymphocyte # : x  Auto Monocyte # : x  Auto Eosinophil # : x  Auto Basophil # : x  Auto Neutrophil % : x  Auto Lymphocyte % : x  Auto Monocyte % : x  Auto Eosinophil % : x  Auto Basophil % : x    04-26    132[L]  |  98  |  21  ----------------------------<  88  4.0   |  19[L]  |  0.99    Ca    8.6      26 Apr 2025 12:21  Mg     1.7     04-26    TPro  6.1  /  Alb  3.1[L]  /  TBili  0.9  /  DBili  x   /  AST  67[H]  /  ALT  24  /  AlkPhos  115  04-26    PT/INR - ( 26 Apr 2025 12:21 )   PT: 12.3 sec;   INR: 1.08 ratio         PTT - ( 26 Apr 2025 12:21 )  PTT:29.8 sec    Rad:    Assessment/Plan

## 2025-04-26 NOTE — H&P ADULT - TIME BILLING
Time-based billing (NON-critical care).     The necessity of the time spent during the encounter on this date of service was due to:     - Ordering, reviewing, and interpreting labs, testing, and imaging.  - Independently obtaining a review of systems and performing a physical exam  - Reviewing prior hospitalization and where necessary, outpatient records.  - Counselling and educating patient and family regarding interpretation of aforementioned items and plan of care.\  -I have spent 79Neutropenic sepsis [A41.9]    [place] and separately reported services.

## 2025-04-26 NOTE — H&P ADULT - PROBLEM SELECTOR PLAN 6
- Hold home metoprolol succinate 25mg ISO sepsis  - Clarify indication for Toprol XL (with outpatient when possible)  - hold home lisinopril 40mg ISO sepsis  - Goal -180 inpatient

## 2025-04-26 NOTE — H&P ADULT - HISTORY OF PRESENT ILLNESS
ED Course:   VS notable for T101.2 rectal, , BP 97/63. CBC notable for WBC 2.31, hgb 10.9, plt 147 Myelocytes and metamyelocytes present. D-dimer 771. Lactate 3.9. . UA negative for infection. CTA negative for PE and no focal consolidation. CT A/p concerning for colitis at the splenic flexure with possible splenic vein thrombosis. POCUS TTE wnl.  ED Course:   VS notable for T101.2 rectal, , BP 97/63. CBC notable for WBC 2.31, hgb 10.9, plt 147 Myelocytes and metamyelocytes present. D-dimer 771. Lactate 3.9. . UA negative for infection. CTA negative for PE and no focal consolidation. CT A/p concerning for colitis at the splenic flexure with possible splenic vein thrombosis. POCUS TTE wnl. Was given 2L IVF bolus, cefepime, and 1g vancomycin. 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 Patient was in his usual state of health on chemotherapy for metastatic prostate cancer for last few months and was given a first dose of Neupogen this week. The next day the patient developed exclusively watery diarrhea during bowel movements. He reported feeling weaker and somewhat short of breath. After fluid resuscitation today patient denies any SOB, URI symptoms, abd pain, dysuria. Patient endorses urinary incontinence which is chronic. Denies any recent Abx or hx of prolonged Abx courses in the past. Does take 5mg prednisone BID for prostate cancer.      ED Course:   VS notable for T101.2 rectal, , BP 97/63. CBC notable for WBC 2.31, hgb 10.9, plt 147 Myelocytes and metamyelocytes present. D-dimer 771. Lactate 3.9. . UA negative for infection. CTA negative for PE and no focal consolidation. CT A/p concerning for colitis at the splenic flexure with possible splenic vein thrombosis. POCUS TTE wnl. Was given 2L IVF bolus, cefepime, and 1g vancomycin.

## 2025-04-26 NOTE — ED PROVIDER NOTE - CLINICAL SUMMARY MEDICAL DECISION MAKING FREE TEXT BOX
This is a 89-year-old M patient with history of metastatic prostate cancer, s/p surgical resection, mets to liver and lung, HTN, HLD, presenting to the ED for evaluation of generalized weakness and fever.  Patient reports diarrhea very watery for the past 2 days, today felt very fatigued, short of breath, and had fever 100.5 at home today.  Patient went for Neupogen infusion yesterday, was found to be hypotensive and given IV fluids and sent home.  Patient gets chemo every 2 weeks and Neupogen infusions on the off week, last chemo was a week ago.  Patient follows with urology Dr. Chase, oncology [facility] Dr. Lovett.  Patient takes prednisone 2 mg twice daily.  Denies chest pain, headaches, numbness, tingling, LOC, dysuria, hematuria, urinary frequency, urinary retention, abdominal pain.    Patient well-appearing in the ER, initially hypotensive to 90s over 60s in triage, tachycardic to heart rate 110, blood pressure improving to 120s over 70s during my initial evaluation of the patient at the bedside.  Patient with crackles LL lobe, otherwise benign exam, will check rectal temperature in the ED, and do septic work up given hx cancer on chemo with fever, CXR to eval PNA, viral swab, UA, CT abdo pelvis, IV fluids for rehydration, empiric abx, most recent BCx and UCx from 2/2024 positive with klebsiella pneumonia and susceptible to cefepime, follow up results and reassess. This is a 89-year-old M patient with history of metastatic prostate cancer, s/p surgical resection, mets to liver and lung, HTN, HLD, presenting to the ED for evaluation of generalized weakness and fever.  Patient reports diarrhea very watery for the past 2 days, today felt very fatigued, short of breath, and had fever 100.5 at home today.  Patient went for Neupogen infusion yesterday, was found to be hypotensive and given IV fluids and sent home.  Patient gets chemo every 2 weeks and Neupogen infusions on the off week, last chemo was a week ago.  Patient follows with urology Dr. Chase, oncology [facility] Dr. Lovett.  Patient takes prednisone 2 mg twice daily.  Denies chest pain, headaches, numbness, tingling, LOC, dysuria, hematuria, urinary frequency, urinary retention, abdominal pain.    Patient well-appearing in the ER, initially hypotensive to 90s over 60s in triage, tachycardic to heart rate 110, blood pressure improving to 120s over 70s during my initial evaluation of the patient at the bedside.  Patient with crackles LL lobe, otherwise benign exam, will check rectal temperature in the ED, and do septic work up given hx cancer on chemo with fever, CXR to eval PNA, viral swab, UA, CT abdo pelvis, IV fluids for rehydration, empiric abx, most recent BCx and UCx from 2/2024 positive with klebsiella pneumonia and susceptible to cefepime, follow up results and reassess.  Attending Triny Gregorio: 89-year-old male with history of metastatic prostate cancer with prior resection with known metastatic component, high blood pressure, hyperlipidemia presenting with generalized weakness and fever.  Patient states has had urinary tract infections in the past.  Did have Neupogen infusion the other day for concern for low white counts.  Upon arrival patient is awake and alert following commands.  Nonfocal neurologic exam and patient moving all extremities.  Patient did endorse having some mild episodes of diarrhea.  No reports of any blood in the stool.  Abdomen is soft on exam.  Patient is on chronic steroids after cancer diagnosis.  Initial blood pressure in triage was low however patient in the emergency room in the room BPs were stable.  Abdomen is soft and nontender however patient is on chronic steroids therefore CT scan of the abdomen pelvis ordered to further evaluate for possible source of infection.  Patient with history of prior UTIs.  Prior cultures reviewed and patient started on broad-spectrum antibiotics for concern for fevers at home and immunosuppression.  Will obtain CT scan as well as CT of the chest to further evaluate for source of infection, panculture and give IV fluids.  Antipyretic as needed.  Patient is on chronic steroids with low threshold to give stress dose steroids if any changes in hemodynamics or electrolytes are abnormal.

## 2025-04-26 NOTE — H&P ADULT - NSHPLABSRESULTS_GEN_ALL_CORE
10.9   2.31  )-----------( 147      ( 2025 12:21 )             32.4           132[L]  |  98  |  21  ----------------------------<  88  4.0   |  19[L]  |  0.99    Ca    8.6      2025 12:21  Mg     1.7         TPro  6.1  /  Alb  3.1[L]  /  TBili  0.9  /  DBili  x   /  AST  67[H]  /  ALT  24  /  AlkPhos  115        PT/INR - ( 2025 12:21 )   PT: 12.3 sec;   INR: 1.08 ratio    PTT - ( 2025 12:21 )  PTT:29.8 sec    Urinalysis Basic - ( 2025 13:23 )  Color: Dark Yellow / Appearance: Clear / S.023 / pH: x  Gluc: x / Ketone: Trace mg/dL  / Bili: Small / Urobili: 1.0 mg/dL   Blood: x / Protein: 100 mg/dL / Nitrite: Negative   Leuk Esterase: Negative / RBC: 2 /HPF / WBC 2 /HPF   Sq Epi: x / Non Sq Epi: 5 /HPF / Bacteria: Negative /HPF    Lactate Trend   @ 12:21 Lactate:3.9       CAPILLARY BLOOD GLUCOSE  POCT Blood Glucose.: 113 mg/dL (2025 11:27)      Personal interpretation EKG:      IMAGING:     < from: CT Angio Chest PE Protocol w/ IV Cont (25 @ 16:50) >    FINDINGS:    HEART/VASCULATURE: Normal heart size. No pericardial effusion. Coronary   artery calcifications. Calcified aortic valve. No pulmonary embolus.    LUNGS/AIRWAYS/PLEURA: Patent trachea and bronchi. Emphysema. Unchanged   lower lobe predominant bronchiolectasis. No pleural effusion    LYMPH NODES/MEDIASTINUM: No lymphadenopathy.    UPPER ABDOMEN: Calcified granulomas in the liver. Unchanged hepatic and   bilateral renal cysts. Excreted contrast within the kidneys. Fluid within   the colon.    BONES/SOFT TISSUES: Schmorl's nodes and associated loss of height of   multiple vertebral bodies.      IMPRESSION:    No pulmonary embolus.    < end of copied text >    < from: CT Abdomen and Pelvis w/ IV Cont (25 @ 13:50) >    IMPRESSION:  Proctitis.    Fluid throughout the more proximal colon with possible mild thickening of   the splenic flexure.    Droplet of air within the bladder. Please correlate for recent   instrumentation.    Ill-defined hypodensity in the right hepatic lobe may be compatible with   history of hepatic metastases. Appearance is different than on the prior   examination. Recommend further evaluation with contrast-enhanced MRI   abdomen.    Question flow artifact versus nonocclusive thrombus splenic vein. This   may also be evaluated at time of contrast enhanced MRI abdomen.    New mild loss of height of the superior endplate of L4 and inferior   endplate of L2.    < end of copied text >

## 2025-04-26 NOTE — H&P ADULT - ATTENDING COMMENTS
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 Found to be in neutropenic sepsis with colitis seen on CT. Covering with vanc, cefepime, metronidazole for now given neutropenia while pending stool studies.    #Neutropenic Sepsis  Continue Vancomycin, Cefepime, Flagyl  Followup Cultures  Followup MRSA swab     #Colitis  Followup C diff  Followup GI PCR  IV Abx as above      #Metastatic Prostate Cancer  Continue Prednisone  Followup oncology outpatient     #Splenic Vein Thrombosis  Questionable thrombosis on CT scan  Vascular consulted: Recommend US vs MRI. Will opt for US    Rest as above

## 2025-04-26 NOTE — H&P ADULT - PROBLEM SELECTOR PLAN 3
S/p prostatectomy in his 50's. Recurred with metastatic disease to liver and lung. S/p chest radiation and wedge resection. Has been on chem for last couple months. Follows with Dr. Chase (urology) and oncology. Had first Neupogen dose on 4/23.    - Continue home prednisone 5mg BID (clarify indication with outpatient team when possible)  - Monitor CBC diff for neutropenia  - defer oncology input to outpatient team given no acute concerns

## 2025-04-26 NOTE — CONSULT NOTE ADULT - ASSESSMENT
prostate cancer to liver    febrile neturopenia.    Splenic vein thrombosis    Colitis.    mild anemia and thrombocytopenia 89 y/o man with h/o metastatic prostate cancer to liver. who has been on treatment with taxotere. Recent difficulty with poor po intake, seen in office. BP on low side. Given hydration and neupogen.  Sent to Er after contacting Dr. Lovett for low grade temp.    1) onc- met prostate cacner- on taxotere. further rx as outpt, Dr. Lovett.     2)febrile neturopenia. Anemia-  hgb in office was 9.6, usually in 11-12 range   on abx.  zarxio x 2 days ordered.  monitor cbc  will check iron studies, b12, folate, ldh, hapto.     2) Splenic vein possible partial occlusion'  on lovenox ppx dose.  vascular consulted    3)- possible Colitis, proctitis.  consider gi eval.  on abx

## 2025-04-26 NOTE — H&P ADULT - ASSESSMENT
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 Found to be in neutropenic sepsis with colitis seen on CT. Covering with vanc, cefepime, metronidazole for now given neutropenia while pending stool studies.

## 2025-04-26 NOTE — CONSULT NOTE ADULT - SUBJECTIVE AND OBJECTIVE BOX
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 Vitals signs were notable for febrile to 101.2, tachycardic to 110s, BP 90/60s . Vascular Surgery consulted for question flow artifact versus nonocclusive thrombus splenic vein. Patient tolerating PO. Denies abdominal pain.       PAST MEDICAL & SURGICAL HISTORY:  HTN (hypertension)      HLD (hyperlipidemia)      Prostate cancer      S/P prostatectomy        Allergies    No Known Allergies    Intolerances      Home Medications:  acetaminophen 325 mg oral tablet: 2 tab(s) orally every 6 hours As needed Temp greater or equal to 38C (100.4F), Mild Pain (1 - 3) (2024 11:56)  aluminum hydroxide-magnesium hydroxide 200 mg-200 mg/5 mL oral suspension: 30 milliliter(s) orally every 4 hours As needed Dyspepsia (2024 11:56)  cholecalciferol 25 mcg (1000 intl units) oral tablet: 1 tab(s) orally once a day (2024 02:32)  levothyroxine 50 mcg (0.05 mg) oral tablet: 1 tab(s) orally once a day (2024 02:32)  lovastatin 40 mg oral tablet: 1 tab(s) orally once a day (2024 02:32)  metoprolol succinate 25 mg oral tablet, extended release: 1 tab(s) orally once a day (2024 02:32)  Multiple Vitamins oral tablet: 1 tab(s) orally once a day (2024 02:32)  sertraline 25 mg oral tablet: 1 tab(s) orally once a day (2024 02:32)    MEDICATIONS  (STANDING):      SOCIAL HISTORY:  FAMILY HISTORY:      ___________________________________________  OBJECTIVE:  Vital Signs Last 24 Hrs  T(C): 36.9 (2025 17:25), Max: 38.4 (2025 12:10)  T(F): 98.5 (2025 17:25), Max: 101.2 (2025 12:10)  HR: 82 (2025 17:25) (82 - 111)  BP: 123/60 (2025 17:25) (97/63 - 123/60)  BP(mean): 86 (2025 17:25) (73 - 86)  RR: 22 (2025 17:25) (18 - 22)  SpO2: 96% (2025 17:25) (95% - 100%)    Parameters below as of 2025 17:25  Patient On (Oxygen Delivery Method): room air    CAPILLARY BLOOD GLUCOSE      POCT Blood Glucose.: 113 mg/dL (2025 11:27)    I&O's Detail    General: Well developed, well nourished, NAD  Neuro: Alert and oriented, no focal deficits, moves all extremities spontaneously  HEENT: NCAT, EOMI, anicteric, mucosa moist  Respiratory: Airway patent, respirations unlabored  CVS: Regular rate and rhythm  Abdomen: Soft, nontender, nondistended  Extremities: No edema, sensation and movement grossly intact  Skin: Warm, dry, appropriate color  ____________________________________________  LABS:  CBC Full  -  ( 2025 12:21 )  WBC Count : 2.31 K/uL  RBC Count : 3.37 M/uL  Hemoglobin : 10.9 g/dL  Hematocrit : 32.4 %  Platelet Count - Automated : 147 K/uL  Mean Cell Volume : 96.1 fl  Mean Cell Hemoglobin : 32.3 pg  Mean Cell Hemoglobin Concentration : 33.6 g/dL  Auto Neutrophil # : x  Auto Lymphocyte # : x  Auto Monocyte # : x  Auto Eosinophil # : x  Auto Basophil # : x  Auto Neutrophil % : x  Auto Lymphocyte % : x  Auto Monocyte % : x  Auto Eosinophil % : x  Auto Basophil % : x    -    132[L]  |  98  |  21  ----------------------------<  88  4.0   |  19[L]  |  0.99    Ca    8.6      2025 12:21  Mg     1.7         TPro  6.1  /  Alb  3.1[L]  /  TBili  0.9  /  DBili  x   /  AST  67[H]  /  ALT  24  /  AlkPhos  115      LIVER FUNCTIONS - ( 2025 12:21 )  Alb: 3.1 g/dL / Pro: 6.1 g/dL / ALK PHOS: 115 U/L / ALT: 24 U/L / AST: 67 U/L / GGT: x           PT/INR - ( 2025 12:21 )   PT: 12.3 sec;   INR: 1.08 ratio         PTT - ( 2025 12:21 )  PTT:29.8 sec  Urinalysis Basic - ( 2025 13:23 )    Color: Dark Yellow / Appearance: Clear / S.023 / pH: x  Gluc: x / Ketone: Trace mg/dL  / Bili: Small / Urobili: 1.0 mg/dL   Blood: x / Protein: 100 mg/dL / Nitrite: Negative   Leuk Esterase: Negative / RBC: 2 /HPF / WBC 2 /HPF   Sq Epi: x / Non Sq Epi: 5 /HPF / Bacteria: Negative /HPF    ____________________________________________  RADIOLOGY:  < from: CT Abdomen and Pelvis w/ IV Cont (25 @ 13:50) >  IMPRESSION:  Proctitis.    Fluid throughout the more proximal colon with possible mild thickening of   the splenic flexure.    Droplet of air within the bladder. Please correlate for recent   instrumentation.    Ill-defined hypodensity in the right hepatic lobe may be compatible with   history of hepatic metastases. Appearance is different than on the prior   examination. Recommend further evaluation with contrast-enhanced MRI   abdomen.    Question flow artifact versus nonocclusive thrombus splenic vein. This   may also be evaluated at time of contrast enhanced MRI abdomen.    New mild loss of height of the superior endplate of L4 and inferior   endplate of L2.    --- End of Report ---    < end of copied text >

## 2025-04-26 NOTE — H&P ADULT - NSHPREVIEWOFSYSTEMS_GEN_ALL_CORE
Review of Systems:     CONSTITUTIONAL: No fever  ENMT:  No difficulty hearing; No sinus or throat pain, +chronic rhinorrhea  RESPIRATORY: No SOB. No cough, wheezing, chills  CARDIOVASCULAR: No chest pain, palpitations, dizziness, or leg swelling  GASTROINTESTINAL: No abdominal or epigastric pain. No nausea, vomiting; +acute diarrhea, +chronic constipation.   GENITOURINARY: No dysuria, frequency, hematuria, +incontinence (uses diapers)  NEUROLOGICAL: No headaches, loss of strength, numbness, or tremors  SKIN: No rashes, bruises across arms  MUSCULOSKELETAL: No joint pain or swelling; No muscle pain

## 2025-04-26 NOTE — H&P ADULT - PROBLEM SELECTOR PLAN 5
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 On treatment 2/2 immunosuppression from chemo. Low suspicion for diarrhea 2/2 antiviral med.    - Continue home entecavir 0.5mg q48hr for now

## 2025-04-26 NOTE — H&P ADULT - CONVERSATION DETAILS
Reviewed code status decision with patient and family at bedside. They tell me that patient wants all aggressive maneuvers to be performed including chest compressions and intubation to save his life. The patient made this decision at his last hospitalization and maintains this decision.

## 2025-04-26 NOTE — ED ADULT NURSE NOTE - OBJECTIVE STATEMENT
89Y M AXO2 (oriented to person and place) primarily Slovak speaking daughter at bedside to translate PMH of HTN, DM, and Prostate CA on chemo last chemo last week presented to the ED from home c/o fevers and low blood pressure since yesterday. Pt also endorses multiple episodes of diarrhea on Thursday. Upon arrival to the ED, the pt is well appearing, has bilateral even and unlabored chest rise, and airway is patent. Upon assessment, pt has even and bilateral peripheral pulses, ROM, PERRLA, gross neuro intact, and soft, non-tender, non-distended abdomen. Non-blanchable sacral redness noted. Pt denies chest pain, SOB, n/v, headache, dizziness, urinary symptoms, numbness or tingling of extremities, and black or bloody stools. Comfort and safety provided, bed in lowest position, side rails up.

## 2025-04-26 NOTE — H&P ADULT - NSHPPHYSICALEXAM_GEN_ALL_CORE
VITALS:   T(C): 36.9 (04-26-25 @ 17:25), Max: 38.4 (04-26-25 @ 12:10)  HR: 82 (04-26-25 @ 17:25) (82 - 111)  BP: 123/60 (04-26-25 @ 17:25) (97/63 - 123/60)  RR: 22 (04-26-25 @ 17:25) (18 - 22)  SpO2: 96% (04-26-25 @ 17:25) (95% - 100%)    GENERAL: NAD, lying in bed comfortably  HEAD:  Atraumatic, Normocephalic  EYES: EOMI, conjunctiva and sclera clear  ENT: Moist mucous membranes  CHEST/LUNG: Clear to auscultation bilaterally; No rales, rhonchi, wheezing, or rubs. Unlabored respirations  HEART: Regular rate and rhythm; No murmurs, rubs, or gallops  ABDOMEN: BSx4; Soft, nontender, nondistended  EXTREMITIES:  No clubbing, cyanosis, or edema, Ecchymoses across the forearms bilaterally  NERVOUS SYSTEM:  A&Ox3, no focal deficits   SKIN: No rashes or lesions

## 2025-04-26 NOTE — H&P ADULT - NSHPSOCIALHISTORY_GEN_ALL_CORE
Per chart review: "Lives at home with wife. Denies alcohol, recreational drug use. 200 pack-year (not a typo) smoking history, quit 30 years ago."

## 2025-04-26 NOTE — PATIENT PROFILE ADULT - PATIENT'S PREFERRED PRONOUN
Colon and Rectal Surgery Procedure History & Physical    Date of Procedure:  11/20/2021   Referring Provider: Darrick Cao MD      PROCEDURE(S):  Colonoscopy      REASON FOR PROCEDURE(S)  Average Risk Screening Colonoscopy    HPI:    This is a 66 year old female for scheduled GI Procedure(s) and indications as stated above.    MEDICATIONS:  Current Outpatient Medications   Medication Sig   • amLODIPine (NORVASC) 5 MG tablet Take 1 tablet by mouth daily.   • sertraline (ZOLOFT) 25 MG tablet TAKE 1 TABLET BY MOUTH DAILY   • atorvastatin (LIPITOR) 20 MG tablet Take 1 tablet by mouth daily.   • fluticasone (FLONASE) 50 MCG/ACT nasal spray SHAKE LIQUID AND USE 2 SPRAYS IN EACH NOSTRIL DAILY   • amitriptyline (ELAVIL) 25 MG tablet Take 1 tablet by mouth at bedtime.   • zolpidem (Ambien) 5 MG tablet Take 1 tablet by mouth nightly as needed for Sleep.   • omeprazole (PRILOSEC) 40 MG capsule Take 1 capsule by mouth daily.   • electrolyte/PEG 3350 (NULYTELY) 420 g solution    • ALPRAZolam (XANAX) 0.25 MG tablet Take 1 tablet by mouth 2 times daily as needed for Sleep or Anxiety.   • HYDROcodone-acetaminophen (NORCO) 5-325 MG per tablet Take 1 tablet by mouth every 6 hours as needed for Pain.   • meloxicam (MOBIC) 15 MG tablet TAKE 1 TABLET BY MOUTH DAILY AS NEEDED FOR PAIN     Current Facility-Administered Medications   Medication   • LACTATED RINGERS IV SOLN Pyxis Override   • lactated ringers infusion   • sodium chloride (PF) 0.9 % injection 2 mL   • sodium chloride 0.9 % flush bag 25 mL   • fentaNYL (SUBLIMAZE) injection 25 mcg   • HYDROmorphone (DILAUDID) injection 0.2 mg   • morphine injection 2 mg   • HYDROmorphone (DILAUDID) injection 0.4 mg   • fentaNYL (SUBLIMAZE) injection 50 mcg   • morphine injection 4 mg   • ondansetron (ZOFRAN) injection 4 mg   • labetalol (NORMODYNE) injection 5 mg   • hydrALAZINE (APRESOLINE) injection 5 mg     Facility-Administered Medications Ordered in Other Encounters   Medication   •  lactated ringers infusion   • lidocaine 1 % injection   • propofol (DIPRIVAN) IV bolus   • propofol (DIPRIVAN) infusion     ALLERGIES:   Allergen Reactions   • Codeine NAUSEA        PAST MEDICAL HISTORY:    Depressive disorder                                           Essential (primary) hypertension                              Nasal congestion with rhinorrhea                7/10/2015     Use of proton pump inhibitor therapy            10/20/2017    Myalgia, unspecified site                       6/5/2015      Lumbar stenosis                                 11/1/2019     Hearing problem                                 11/1/2019       Comment: Russell-RN, [place]:left ear      BACK SURGERY                                                  ORAL SURGERY PROCEDURE                                        COLONOSCOPY                                                   TONSILLECTOMY                                                 FAMILY HISTORY:  Family History   Problem Relation Age of Onset   • Heart disease Mother    • Cancer, Lung Father    • Cancer Father         brain   • Hypertension Father    • Diabetes Brother        SOCIAL HISTORY:  Social History     Socioeconomic History   • Marital status: /Civil Union     Spouse name: Not on file   • Number of children: 2   • Years of education: Not on file   • Highest education level: Not on file   Occupational History   • Occupation: on disabilty due to her back   Tobacco Use   • Smoking status: Current Every Day Smoker     Packs/day: 0.50     Years: 30.00     Pack years: 15.00     Types: Cigarettes   • Smokeless tobacco: Never Used   • Tobacco comment: 8cig/day   Vaping Use   • Vaping Use: never used   Substance and Sexual Activity   • Alcohol use: Not Currently   • Drug use: Never   • Sexual activity: Not on file   Other Topics Concern   • Not on file   Social History Narrative    Well balanced diet      Social Determinants of Health     Financial Resource Strain:    •  Social Determinants: Financial Resource Strain: Not on file   Food Insecurity:    • Social Determinants: Food Insecurity: Not on file   Transportation Needs:    • Lack of Transportation (Medical): Not on file   • Lack of Transportation (Non-Medical): Not on file   Physical Activity:    • Days of Exercise per Week: Not on file   • Minutes of Exercise per Session: Not on file   Stress:    • Social Determinants: Stress: Not on file   Social Connections:    • Social Determinants: Social Connections: Not on file   Intimate Partner Violence: Not At Risk   • Social Determinants: Intimate Partner Violence Past Fear: No   • Social Determinants: Intimate Partner Violence Current Fear: No          REVIEW OF SYSTEMS:  All systems reviewed and negative except as mentioned in HPI      PHYSICAL EXAM:  Vitals:    Visit Vitals  /72   Pulse 85   Temp 96.8 °F (36 °C) (Temporal)   Resp 17   Ht 5' 4\" (1.626 m)   Wt 54.9 kg (121 lb)   SpO2 95%   BMI 20.77 kg/m²    (see nurses note for current vitals)  Constitutional:  General appearance without acute distress  Skin:  No jaundice on inspection.  Skin is warm and dry on palpation  Eyes:  Normal conjunctiva, anicteric  Oral:  Lips, teeth and gums normal on inspection  Neck: trachea midline, no cervical nodes /masses  Pulmonary:  Clear to auscultation bilaterally good respiratory effort  Cardiovascular:  Regular rate on auscultation.  No lower extremity edema  Abdomen:  Positive bowel sound, soft, non-tender, no distention.  No hepatosplenomegaly  Extremities: No pedal edema   Neuro: No gross motor deficit, movements coordinated  Psych:  Mood and affect were appropriate.  Judgement and insight appear appropriate    LABS AND IMAGING:  Reviewed in Epic      ASSESSMENT:  Average risk colorectal cancer screening      PLAN:   Colonoscopy with gen anesth.    Risks, benefits, alternatives, expectations, and preparations were described in detail.  Patient understands and agrees.              Him/He
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
## 2025-04-26 NOTE — ED PROVIDER NOTE - PROGRESS NOTE DETAILS
Marky DIAZ), PGY-2: pt reassessed at the bedside, reports improvement in symptoms, no episodes of diarrhea since initial evaluation, ED labs and UA with no acute findings, CT abdo performed but results pending.  /60 at this time, HR 80s. Marky DIAZ), PGY-2: CT abdo pelvis with questionable splenic vein thrombosis, spoke with vascular surgery consult resident for consult, they will see pt in the ED. Marky DIAZ), PGY-2: pt to be admitted to medicine Dr. Garcia for proctitis with initial hypotension and fevers.

## 2025-04-26 NOTE — CONSULT NOTE ADULT - ASSESSMENT
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 Vitals signs were notable for febrile to 101.2, tachycardic to 110s, BP 90/60s . Vascular Surgery consulted for question flow artifact versus nonocclusive thrombus splenic vein. Patient tolerating PO. Denies abdominal pain.     Plan:   - Can obtain CTA to verify questionable thrombus splenic vein   - Recommend anticoagulation if safe, if confirmed on CTA    Vascular Surgery

## 2025-04-26 NOTE — ED ADULT NURSE REASSESSMENT NOTE - NS ED NURSE REASSESS COMMENT FT1
Pt is awake, alert, and speaking in full coherent sentences. VS stable. NAD noted. Pt resting comfortably in stretcher, side rails up, bed in lowest position, call bell within reach, pt's son at bedside. Comfort and safety provided. Pt is admitted and awaiting bed.

## 2025-04-26 NOTE — H&P ADULT - PROBLEM SELECTOR PLAN 2
Diarrhea x2 days. Had Neupogen 1 day prior to onset but not a known side effect. At baseline is constipated. No recent ABx or history of prolonged ABx in the past. Takes chronic prednisone and is leukopenic so need to r/o c-diff.    - Stool studies: GI PCR, stool culture, C diff  - consider starting PO vancomycin after C diff collected  - regular diet as tolerated for now per patient preference Diarrhea x2 days. Had Neupogen 1 day prior to onset but not a known side effect. At baseline is constipated. CT showing colitis at splenic flexure and proctitis. No recent ABx or history of prolonged ABx in the past. Takes chronic prednisone and is leukopenic so need to r/o c-diff.    - Stool studies: GI PCR, stool culture, C diff  - consider starting PO vancomycin after C diff collected  - regular diet as tolerated for now per patient preference

## 2025-04-26 NOTE — ED ADULT TRIAGE NOTE - AVIAN FLU EXPOSURE
Treatment note      Attending Provider:  Paris Fonseca MD    Evaluating PT:  Kirit Chon PT    Room #:  3647/3903-C  Diagnosis:  LT hip Fx  Pertinent PMHx/PSHx:  See PMH  Procedure/Surgery:  S/p Left IT fx TFNA  Precautions:  WBAT LTLE, General, falls  Equipment Needs:  [employer]    SUBJECTIVE:    Pt lives with Hsb in a 2 story home with 3-4 stairs and 2 reachable rails to enter. Pt ambulated with no AD and on rare occasions SC for balances/pain PTA. OBJECTIVE:   Initial Evaluation  Date: 5/23/21 Treatment  5/24/21 Short Term/ Long Term   Goals   Was pt agreeable to Eval/treatment? Yes yes    Does pt have pain? Severe LT hip, crying whole time in room Tolerable L hip pain    Bed Mobility  Rolling: Indep  Supine to sit: Indep  Sit to supine: Indep  Scooting: Indep Rolling: NT  Supine to sit: Independent  Sit to supine; Independent      Transfers Sit to stand: Indep/S  Stand to sit:  Indep/s  Stand pivot: NA Sit <> stand SBA Indep all surfaces   Ambulation   75 feet with [employer] S/Indpe 210 feet and 90 feet x 1 each using [employer] for support  feet with [employer] negotiation: ascended and descended  TBA 4 stairs with B rails, 4 stairs with rail/standarde cane for support SBA, step to pattern used 4 steps with 1-2 rail Indep   ROM See below  LT hip 80*+, knee flex 100*+   MMT LT hip 3-/5, knee 5/5  LT hip 4/5   AM-PAC 6 Clicks 81/33 65/13        Pt is alert and able to follow instruction  Balance: fair dynamic using WW for support    Pt performed therapeutic exercise of the following: supine B ankle pumps, quad/glut sets AROM x 20    Patient education  Pt was educated on exercise promoting circulation and strengthening, UE usage to assist with transfers, gait promoting posture and pushing WW instead of lifting, stair negotiation promoting sequence, car transfer to back into the front passenger seat    Patient response to education:   Pt verbalized understanding Pt demonstrated skill Pt requires further education in this area   yes yes yes     ASSESSMENT:   Comments: Pt found in a bedside chair, agreed to rx. Gait to the hallway, nathan slow and consistent, step through reciprocal pattern achieved, no loss of balance noted, Pt slightly short of breath, 02 saturation 98% after activity. Stairs performed with minimal difficulty. Once back to the room, bed mobility reviewed, exercise performed, car transfer discussed. Pt states has no further questions about home safety. Treatment: Pt practiced and was instructed in the following treatment: therapeutic exercise, transfer safety, gait/WW mechanics, stairs, car transfer    Pt was left in bed with call light in reach    Time in 0840   Time out 0908   Total Treatment Time 28 minutes   CPT codes:     Therapeutic activities 21189 24 minutes   Therapeutic exercises 93527 4 minutes       Pt is making good progress toward established Physical Therapy goals as per increased functional mobility performed and exercise participation. Pt displays functional mobility needed to go home with family assistance. Continue with physical therapy current plan of care.     Ruben Sosa PTA   License Number: [id number]

## 2025-04-26 NOTE — ED PROVIDER NOTE - PHYSICAL EXAMINATION
Const: Awake, alert, no acute distress.  Well appearing.  Moving comfortably on stretcher.  HEENT: NC/AT.  Moist mucous membranes.  No pharyngeal erythema, no exudates.  Eyes: Extraocular movements intact b/l.  Pupils equal, round, and reactive to light b/l.  Conjunctiva pink.  No scleral icterus.  Neck: Neck supple, full ROM without pain.  Cardiac: Regular rate and regular rhythm. S1 S2 present.  Peripheral pulses 2+ and symmetric.  No LE edema.  No chest wall tenderness, no overlying skin changes.  Resp: Speaking in full sentences. No evidence of respiratory distress.  Good air entry b/l, (+) coarse crackles L lower lung.  Abd: Non-distended, no overlying skin changes.  Soft, non-tender, no guarding, no rigidity, no rebound tenderness.  No palpable masses.  MSK: Spine midline and non-tender, no paraspinal tenderness, no palpable deformities or overlying skin changes or open wounds. No CVAT.  Skin: Normal coloration.  No rashes, abrasions or lacerations.  Neuro: Awake, alert & oriented x 3.  Moves all extremities spontaneously and symmetrically.  No focal deficits.

## 2025-04-26 NOTE — H&P ADULT - PROBLEM SELECTOR PLAN 1
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 UA and CT negative for infectious concerns. RVP Negative w/o URI symptoms. 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 Lactate 3.9 --> 1.7 s/p 2L IVF. Has history of psuedomonal and Klebsiella urosepsis resistant to Zosyn in the past.    - F/u BCx  - F/u random cortisol level  - MRSA swab  - cont Vancomycin 1000mg q12hr (4/26 - )  - cont Cefepime 2g q12hr renal dosing (4/26 - )   - Start Metronidazole 500mg q12hr (4/26 - )  - Remaining Plan per Colitis section

## 2025-04-26 NOTE — PATIENT PROFILE ADULT - FALL HARM RISK - HARM RISK INTERVENTIONS

## 2025-04-26 NOTE — ED ADULT NURSE REASSESSMENT NOTE - NS ED NURSE REASSESS COMMENT FT1
Bladder scan performed as per MD Gregorio, 236 cc noted MD made aware states no interventions at this time.

## 2025-04-26 NOTE — CONSULT NOTE ADULT - ATTENDING COMMENTS
89 year old man with possible splenic vein thrombosis  recommend venous duplex ultrasound to evaluate  serial abdominal exams  trend labs  therapeutic anticoagulation at discretion of primary team  will follow

## 2025-04-27 LAB
ALBUMIN SERPL ELPH-MCNC: 2.8 G/DL — LOW (ref 3.3–5)
ALP SERPL-CCNC: 109 U/L — SIGNIFICANT CHANGE UP (ref 40–120)
ALT FLD-CCNC: 17 U/L — SIGNIFICANT CHANGE UP (ref 10–45)
ANION GAP SERPL CALC-SCNC: 13 MMOL/L — SIGNIFICANT CHANGE UP (ref 5–17)
APPEARANCE UR: CLEAR — SIGNIFICANT CHANGE UP
AST SERPL-CCNC: 57 U/L — HIGH (ref 10–40)
BACTERIA # UR AUTO: NEGATIVE /HPF — SIGNIFICANT CHANGE UP
BASOPHILS # BLD AUTO: 0.05 K/UL — SIGNIFICANT CHANGE UP (ref 0–0.2)
BASOPHILS NFR BLD AUTO: 2 % — SIGNIFICANT CHANGE UP (ref 0–2)
BILIRUB SERPL-MCNC: 0.8 MG/DL — SIGNIFICANT CHANGE UP (ref 0.2–1.2)
BILIRUB UR-MCNC: NEGATIVE — SIGNIFICANT CHANGE UP
BUN SERPL-MCNC: 14 MG/DL — SIGNIFICANT CHANGE UP (ref 7–23)
C DIFF GDH STL QL: NEGATIVE — SIGNIFICANT CHANGE UP
C DIFF GDH STL QL: SIGNIFICANT CHANGE UP
CALCIUM SERPL-MCNC: 8.1 MG/DL — LOW (ref 8.4–10.5)
CAST: 2 /LPF — SIGNIFICANT CHANGE UP (ref 0–4)
CHLORIDE SERPL-SCNC: 102 MMOL/L — SIGNIFICANT CHANGE UP (ref 96–108)
CO2 SERPL-SCNC: 18 MMOL/L — LOW (ref 22–31)
COLOR SPEC: YELLOW — SIGNIFICANT CHANGE UP
CREAT SERPL-MCNC: 0.81 MG/DL — SIGNIFICANT CHANGE UP (ref 0.5–1.3)
DIFF PNL FLD: ABNORMAL
EGFR: 83 ML/MIN/1.73M2 — SIGNIFICANT CHANGE UP
EGFR: 83 ML/MIN/1.73M2 — SIGNIFICANT CHANGE UP
EOSINOPHIL # BLD AUTO: 0 K/UL — SIGNIFICANT CHANGE UP (ref 0–0.5)
EOSINOPHIL NFR BLD AUTO: 0 % — SIGNIFICANT CHANGE UP (ref 0–6)
GAS PNL BLDV: SIGNIFICANT CHANGE UP
GAS PNL BLDV: SIGNIFICANT CHANGE UP
GI PCR PANEL: SIGNIFICANT CHANGE UP
GLUCOSE SERPL-MCNC: 75 MG/DL — SIGNIFICANT CHANGE UP (ref 70–99)
GLUCOSE UR QL: NEGATIVE MG/DL — SIGNIFICANT CHANGE UP
HCT VFR BLD CALC: 30.7 % — LOW (ref 39–50)
HGB BLD-MCNC: 10.1 G/DL — LOW (ref 13–17)
IMM GRANULOCYTES NFR BLD AUTO: 0.8 % — SIGNIFICANT CHANGE UP (ref 0–0.9)
KETONES UR-MCNC: NEGATIVE MG/DL — SIGNIFICANT CHANGE UP
LEUKOCYTE ESTERASE UR-ACNC: NEGATIVE — SIGNIFICANT CHANGE UP
LYMPHOCYTES # BLD AUTO: 1.07 K/UL — SIGNIFICANT CHANGE UP (ref 1–3.3)
LYMPHOCYTES # BLD AUTO: 43.5 % — SIGNIFICANT CHANGE UP (ref 13–44)
MAGNESIUM SERPL-MCNC: 1.6 MG/DL — SIGNIFICANT CHANGE UP (ref 1.6–2.6)
MCHC RBC-ENTMCNC: 31.4 PG — SIGNIFICANT CHANGE UP (ref 27–34)
MCHC RBC-ENTMCNC: 32.9 G/DL — SIGNIFICANT CHANGE UP (ref 32–36)
MCV RBC AUTO: 95.3 FL — SIGNIFICANT CHANGE UP (ref 80–100)
MONOCYTES # BLD AUTO: 0.72 K/UL — SIGNIFICANT CHANGE UP (ref 0–0.9)
MONOCYTES NFR BLD AUTO: 29.3 % — HIGH (ref 2–14)
MRSA PCR RESULT.: SIGNIFICANT CHANGE UP
NEUTROPHILS # BLD AUTO: 0.6 K/UL — LOW (ref 1.8–7.4)
NEUTROPHILS NFR BLD AUTO: 24.4 % — LOW (ref 43–77)
NITRITE UR-MCNC: NEGATIVE — SIGNIFICANT CHANGE UP
NRBC BLD AUTO-RTO: 0 /100 WBCS — SIGNIFICANT CHANGE UP (ref 0–0)
PH UR: 5 — SIGNIFICANT CHANGE UP (ref 5–8)
PHOSPHATE SERPL-MCNC: 2.4 MG/DL — LOW (ref 2.5–4.5)
PLATELET # BLD AUTO: 134 K/UL — LOW (ref 150–400)
POTASSIUM SERPL-MCNC: 3.6 MMOL/L — SIGNIFICANT CHANGE UP (ref 3.5–5.3)
POTASSIUM SERPL-SCNC: 3.6 MMOL/L — SIGNIFICANT CHANGE UP (ref 3.5–5.3)
PROT SERPL-MCNC: 5.5 G/DL — LOW (ref 6–8.3)
PROT UR-MCNC: 30 MG/DL
RBC # BLD: 3.22 M/UL — LOW (ref 4.2–5.8)
RBC # FLD: 13.8 % — SIGNIFICANT CHANGE UP (ref 10.3–14.5)
RBC CASTS # UR COMP ASSIST: 134 /HPF — HIGH (ref 0–4)
S AUREUS DNA NOSE QL NAA+PROBE: SIGNIFICANT CHANGE UP
SODIUM SERPL-SCNC: 133 MMOL/L — LOW (ref 135–145)
SP GR SPEC: >1.03 — HIGH (ref 1–1.03)
SQUAMOUS # UR AUTO: 1 /HPF — SIGNIFICANT CHANGE UP (ref 0–5)
T4 FREE+ TSH PNL SERPL: 1.38 UIU/ML — SIGNIFICANT CHANGE UP (ref 0.27–4.2)
UROBILINOGEN FLD QL: 0.2 MG/DL — SIGNIFICANT CHANGE UP (ref 0.2–1)
WBC # BLD: 2.46 K/UL — LOW (ref 3.8–10.5)
WBC # FLD AUTO: 2.46 K/UL — LOW (ref 3.8–10.5)
WBC UR QL: 1 /HPF — SIGNIFICANT CHANGE UP (ref 0–5)

## 2025-04-27 PROCEDURE — 99232 SBSQ HOSP IP/OBS MODERATE 35: CPT

## 2025-04-27 RX ORDER — FILGRASTIM 300 UG/.5ML
300 INJECTION, SOLUTION INTRAVENOUS; SUBCUTANEOUS DAILY
Refills: 0 | Status: COMPLETED | OUTPATIENT
Start: 2025-04-27 | End: 2025-04-28

## 2025-04-27 RX ORDER — SIMETHICONE 80 MG
80 TABLET,CHEWABLE ORAL ONCE
Refills: 0 | Status: COMPLETED | OUTPATIENT
Start: 2025-04-27 | End: 2025-04-27

## 2025-04-27 RX ORDER — SODIUM CHLORIDE 9 G/1000ML
1000 INJECTION, SOLUTION INTRAVENOUS ONCE
Refills: 0 | Status: DISCONTINUED | OUTPATIENT
Start: 2025-04-27 | End: 2025-04-27

## 2025-04-27 RX ORDER — SODIUM CHLORIDE 9 G/1000ML
1000 INJECTION, SOLUTION INTRAVENOUS
Refills: 0 | Status: DISCONTINUED | OUTPATIENT
Start: 2025-04-27 | End: 2025-04-28

## 2025-04-27 RX ORDER — SODIUM CHLORIDE 9 G/1000ML
500 INJECTION, SOLUTION INTRAVENOUS ONCE
Refills: 0 | Status: COMPLETED | OUTPATIENT
Start: 2025-04-27 | End: 2025-04-27

## 2025-04-27 RX ORDER — FILGRASTIM 300 UG/.5ML
300 INJECTION, SOLUTION INTRAVENOUS; SUBCUTANEOUS DAILY
Refills: 0 | Status: DISCONTINUED | OUTPATIENT
Start: 2025-04-27 | End: 2025-04-27

## 2025-04-27 RX ADMIN — Medication 80 MILLIGRAM(S): at 11:46

## 2025-04-27 RX ADMIN — Medication 100 MILLIGRAM(S): at 05:07

## 2025-04-27 RX ADMIN — ENTECAVIR 0.5 MILLIGRAM(S): 0.5 TABLET ORAL at 11:40

## 2025-04-27 RX ADMIN — Medication 250 MILLIGRAM(S): at 18:27

## 2025-04-27 RX ADMIN — CEFEPIME 100 MILLIGRAM(S): 2 INJECTION, POWDER, FOR SOLUTION INTRAVENOUS at 11:40

## 2025-04-27 RX ADMIN — PREDNISONE 5 MILLIGRAM(S): 20 TABLET ORAL at 17:07

## 2025-04-27 RX ADMIN — CEFEPIME 100 MILLIGRAM(S): 2 INJECTION, POWDER, FOR SOLUTION INTRAVENOUS at 22:44

## 2025-04-27 RX ADMIN — SERTRALINE 25 MILLIGRAM(S): 100 TABLET, FILM COATED ORAL at 11:40

## 2025-04-27 RX ADMIN — Medication 1 TABLET(S): at 11:40

## 2025-04-27 RX ADMIN — SODIUM CHLORIDE 500 MILLILITER(S): 9 INJECTION, SOLUTION INTRAVENOUS at 06:54

## 2025-04-27 RX ADMIN — Medication 100 MILLIGRAM(S): at 17:07

## 2025-04-27 RX ADMIN — Medication 1000 UNIT(S): at 11:40

## 2025-04-27 RX ADMIN — SODIUM CHLORIDE 75 MILLILITER(S): 9 INJECTION, SOLUTION INTRAVENOUS at 11:42

## 2025-04-27 RX ADMIN — Medication 250 MILLIGRAM(S): at 05:09

## 2025-04-27 RX ADMIN — PREDNISONE 5 MILLIGRAM(S): 20 TABLET ORAL at 05:07

## 2025-04-27 RX ADMIN — SODIUM CHLORIDE 500 MILLILITER(S): 9 INJECTION, SOLUTION INTRAVENOUS at 08:11

## 2025-04-27 RX ADMIN — ENOXAPARIN SODIUM 40 MILLIGRAM(S): 100 INJECTION SUBCUTANEOUS at 05:07

## 2025-04-27 RX ADMIN — FILGRASTIM 300 MICROGRAM(S): 300 INJECTION, SOLUTION INTRAVENOUS; SUBCUTANEOUS at 00:55

## 2025-04-27 NOTE — DISCHARGE NOTE PROVIDER - HOSPITAL COURSE
For full details, please see H&P, progress notes, consult notes and ancillary notes.    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 Found to be in neutropenic sepsis with colitis seen on CT. Initially covered with vanc, cefepime, metronidazole. Ultimately narrowed to *****.  GI PCR and C diff negative *******. Started on filgastrim per heme/onc. ******    # Severe sepsis 2/2 *******  # Colitis  - Splenic flexure colitis and     # Splenic vein thrombosis?????????  - incidentally noted on CT noncon, US abdomen w/dopplar ********      On day of discharge, patient is clinically stable with no new exam findings or acute symptoms compared to prior. The patient was seen by the attending physician on the date of discharge and deemed stable and acceptable for discharge. The patient's chronic medical conditions were treated accordingly per the patient's home medication regimen. The patient's medication reconciliation (with changes made to chronic medications), follow up appointments, discharge orders, instructions, and significant lab and diagnostic studies are as noted.     Discharge follow up action items:     1. Follow up with: PCP, oncology, Urology  2. Follow up tasks:   3. Medication changes:   4. On hold medications:  5. Incidental findings:     Patient's ordered code status:   Patient disposition:     Patient will be discharged to ..... with close follow up. For full details, please see H&P, progress notes, consult notes and ancillary notes.    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 Found to be in neutropenic sepsis with colitis seen on CT. Initially covered with vanc, cefepime, metronidazole. Ultimately narrowed to cefepime and metronidazole.  GI PCR and C diff negative for infectious etiology. Started on filgastrim per heme/onc. for neutropenia 2/2 administration of Neupogen. Pt's WBC responded appropriately with return to normal limits. PT clinically improved with formed stools, good PO intake and improved strength. Pt will complete course of abx for colitis outpatient. Pt had incidental findings of splenic thrombus on CT scan. However, abdominal US showed no findings consistent with splenic thrombus.     # Severe sepsis 2/2 colitis   # Colitis  - Splenic flexure colitis   - Inpatient cefepime/vanc/metro  - Dc on metro/cipro     #Neutropenia   - Likely 2/2 Neupogen   - Resolved with administration of Zarxio    # Splenic vein thrombosis  - incidentally noted on CT noncon, US abdomen w/dopplar shows no thrombus       On day of discharge, patient is clinically stable with no new exam findings or acute symptoms compared to prior. The patient was seen by the attending physician on the date of discharge and deemed stable and acceptable for discharge. The patient's chronic medical conditions were treated accordingly per the patient's home medication regimen. The patient's medication reconciliation (with changes made to chronic medications), follow up appointments, discharge orders, instructions, and significant lab and diagnostic studies are as noted.     Discharge follow up action items:     1. Follow up with: PCP, oncology, Urology  2. Follow up tasks: completion of antibiotics, heme/onc for medication management   3. Medication changes: start metronidazole 500 mg BID, cipro 500 mg BID both through 5/2  4. On hold medications: none   5. Incidental findings: potential splenic vein thrombus, proven to be negative via US w/ doppler     Patient's ordered code status: FULL  Patient disposition: home     Patient will be discharged to home with close follow up with PCP, oncology and urology.    For full details, please see H&P, progress notes, consult notes and ancillary notes.    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 Found to be in neutropenic sepsis with colitis seen on CT. Initially covered with vanc, cefepime, metronidazole. Ultimately narrowed to cefepime and metronidazole.  GI PCR and C diff negative for infectious etiology. Started on filgastrim per heme/onc. for neutropenia 2/2 administration of Neupogen. Pt's WBC responded appropriately with return to normal limits. PT clinically improved with formed stools, good PO intake and improved strength. Pt will complete course of abx for colitis outpatient. Pt had incidental findings of splenic thrombus on CT scan. However, abdominal US showed no findings consistent with splenic thrombus.     # Severe sepsis 2/2 colitis   # Colitis  - Splenic flexure colitis   - Inpatient cefepime/vanc/metro  - Dc on metro/cipro     #Neutropenia   - Resolved with administration of Zarxio    # Splenic vein thrombosis  - incidentally noted on CT noncon, US abdomen w/doppler shows no thrombus       On day of discharge, patient is clinically stable with no new exam findings or acute symptoms compared to prior. The patient was seen by the attending physician on the date of discharge and deemed stable and acceptable for discharge. The patient's chronic medical conditions were treated accordingly per the patient's home medication regimen. The patient's medication reconciliation (with changes made to chronic medications), follow up appointments, discharge orders, instructions, and significant lab and diagnostic studies are as noted.     Discharge follow up action items:     1. Follow up with: PCP, oncology, Urology  2. Follow up tasks: completion of antibiotics, heme/onc for medication management   3. Medication changes: start metronidazole 500 mg BID, cipro 500 mg BID both through 5/2  4. On hold medications: none   5. Incidental findings: potential splenic vein thrombus, proven to be negative via US w/ doppler     Patient's ordered code status: FULL  Patient disposition: home     Patient will be discharged to home with close follow up with PCP, oncology and urology.

## 2025-04-27 NOTE — PROGRESS NOTE ADULT - ASSESSMENT
89 y/o man with h/o metastatic prostate cancer to liver. who has been on treatment with taxotere. Recent difficulty with poor po intake, seen in office. BP on low side. Given hydration and neupogen.  Sent to Er after contacting Dr. Lovett for low grade temp.    1) onc- met prostate cacner- on taxotere. further rx as outpt, Dr. Lovett.     2)febrile neturopenia. Anemia-  hgb in office was 9.6, usually in 11-12 range   on abx.  zarxio x 2 days ordered. d2 today  monitor cbc  will check iron studies, b12, folate, ldh, hapto.     2) Splenic vein possible partial occlusion'  on lovenox ppx dose.  vascular consulted    3)- possible Colitis, proctitis.  consider gi eval.  on abx  mgmt as per med

## 2025-04-27 NOTE — DISCHARGE NOTE PROVIDER - CARE PROVIDER_API CALL
Paul Diez  Internal Medicine  [address] [address]  Phone: [phone]  Fax: [phone]  Established Patient  Follow Up Time: 2 weeks    Chloe Chase  Urology  [address] [place] [address], [address] [address]  Phone: [phone]  Fax: [phone]  Established Patient  Follow Up Time: 2 months   Paul Diez  Internal Medicine  [address] [facility] [address]  [place] [address]  Phone: [phone]  Fax: [phone]  Established Patient  Follow Up Time: 2 weeks    Chloe Chase  Urology  [address] [place] [address], [address]  [place] [address]  Phone: [phone]  Fax: [phone]  Established Patient  Follow Up Time: 1 month    Jermaine Lovett  Medical Oncology  [address] [place] [address], [address]  [place] [address]  Phone: [phone]  Fax: [phone]  Established Patient  Follow Up Time: 1 week

## 2025-04-27 NOTE — DISCHARGE NOTE PROVIDER - CARE PROVIDERS DIRECT ADDRESSES
[email or web address]

## 2025-04-27 NOTE — DISCHARGE NOTE PROVIDER - ATTENDING DISCHARGE PHYSICAL EXAMINATION:
T(C): 36.7 (04-29 @ 16:48), Max: 36.7 (04-29 @ 16:48)   HR: 91   BP: 151/62   RR: 17   SpO2: 92%    PHYSICAL EXAM:    CONSTITUTIONAL: NAD, well-developed, well-groomed  EYES: PERRLA; conjunctiva and sclera clear  ENMT: Moist oral mucosa, no pharyngeal injection or exudates; normal dentition  RESPIRATORY: Normal respiratory effort; lungs are clear to auscultation bilaterally  CARDIOVASCULAR: Regular rate and rhythm, normal S1 and S2, no murmur/rub/gallop; No lower extremity edema; Peripheral pulses are 2+ bilaterally  ABDOMEN: Nontender to palpation, normoactive bowel sounds, no rebound/guarding; No hepatosplenomegaly  MUSCULOSKELETAL:  no clubbing or cyanosis of digits; no joint swelling or tenderness to palpation  PSYCH: A+O to person, place, and time; affect appropriate  NEUROLOGY: CN 2-12 are intact and symmetric; no gross sensory deficits

## 2025-04-27 NOTE — PROVIDER CONTACT NOTE (OTHER) - ACTION/TREATMENT ORDERED:
MD Radha Lemon aware. Repeat Ua sent per MD. No further interventions at this time per MD. Plan of care ongoing.

## 2025-04-27 NOTE — PROVIDER CONTACT NOTE (OTHER) - ASSESSMENT
Pt is A&O x 4. VSS as per flowsheets. UA done in ED negative. Ucx sent in ED pending results. No s/s of distress noted at this time. Urine is clear yellow no blood noted in urine.

## 2025-04-27 NOTE — PHYSICAL THERAPY INITIAL EVALUATION ADULT - ADDITIONAL COMMENTS
Pt lives in a two family house with 4 MICHAEL and 13 steps to the bedroom.  PTA pt independent with ambulation and ADLs, uses rolling walker as needed.  Pt reports being very active, exercising every day at home, and going out food shopping. Pt lives in a two family house with 4 MICHAEL and 13 steps to the bedroom.  PTA pt independent with ambulation and ADLs, uses rolling walker as needed.  Pt reports being very active, exercising every day at home, and going out food shopping.  Pt lives with his spouse who is currently hospitalized at [facility].

## 2025-04-27 NOTE — DISCHARGE NOTE PROVIDER - NSDCFUSCHEDAPPT_GEN_ALL_CORE_FT
[facility]  INTMED  [address]  Scheduled Appointment: 06/09/2025    [facility]  UROLOGY [address] [place] [address]  Scheduled Appointment: 07/09/2025    Chloe Chase  [facility]  UROLOGY [address] [place] [address] R  Scheduled Appointment: 07/09/2025

## 2025-04-27 NOTE — DISCHARGE NOTE PROVIDER - NSDCMRMEDTOKEN_GEN_ALL_CORE_FT
Baraclude 0.5 mg oral tablet: 1 tab(s) orally every 48 hours  cholecalciferol 25 mcg (1000 intl units) oral tablet: 1 tab(s) orally once a day  levothyroxine 50 mcg (0.05 mg) oral tablet: 2 tab(s) orally once a day  lisinopril 40 mg oral tablet: 1 tab(s) orally once a day  metoprolol succinate 25 mg oral tablet, extended release: 1 tab(s) orally once a day  Multiple Vitamins oral tablet: 1 tab(s) orally once a day  predniSONE 5 mg oral tablet: 1 tab(s) orally 2 times a day  sertraline 25 mg oral tablet: 1 tab(s) orally once a day   Baraclude 0.5 mg oral tablet: 1 tab(s) orally every 48 hours  cholecalciferol 25 mcg (1000 intl units) oral tablet: 1 tab(s) orally once a day  ciprofloxacin 500 mg oral tablet: 1 tab(s) orally every 12 hours  levothyroxine 50 mcg (0.05 mg) oral tablet: 2 tab(s) orally once a day  lisinopril 40 mg oral tablet: 1 tab(s) orally once a day  metoprolol succinate 25 mg oral tablet, extended release: 1 tab(s) orally once a day  metroNIDAZOLE 500 mg oral tablet: 1 tab(s) orally every 12 hours  Multiple Vitamins oral tablet: 1 tab(s) orally once a day  predniSONE 5 mg oral tablet: 1 tab(s) orally 2 times a day  sertraline 25 mg oral tablet: 1 tab(s) orally once a day

## 2025-04-27 NOTE — PROGRESS NOTE ADULT - PROBLEM SELECTOR PLAN 2
Diarrhea x2 days. Had Neupogen 1 day prior to onset but not a known side effect. At baseline is constipated. CT showing colitis at splenic flexure and proctitis. No recent ABx or history of prolonged ABx in the past. Takes chronic prednisone so concern for C diff although negative + negative Gi PCR.    - Stool studies pending: stool culture, O&P  - regular diet as tolerated for now per patient preference (after abd US completed)

## 2025-04-27 NOTE — PHYSICAL THERAPY INITIAL EVALUATION ADULT - PLANNED THERAPY INTERVENTIONS, PT EVAL
Stairs: GOAL: Pt will ascend/descend 5 stairs independently using unilateral rail in order to return home safety in 2 weeks./balance training/gait training/strengthening

## 2025-04-27 NOTE — DISCHARGE NOTE PROVIDER - PROVIDER TOKENS
PROVIDER:[TOKEN:[[id number]],FOLLOWUP:[2 weeks],ESTABLISHEDPATIENT:[T]],PROVIDER:[TOKEN:[[id number]],FOLLOWUP:[2 months],ESTABLISHEDPATIENT:[T]] PROVIDER:[TOKEN:[[id number]],FOLLOWUP:[2 weeks],ESTABLISHEDPATIENT:[T]],PROVIDER:[TOKEN:[[id number]],FOLLOWUP:[1 month],ESTABLISHEDPATIENT:[T]],PROVIDER:[TOKEN:[[id number]],FOLLOWUP:[1 week],ESTABLISHEDPATIENT:[T]]

## 2025-04-27 NOTE — DISCHARGE NOTE PROVIDER - NSDCCPCAREPLAN_GEN_ALL_CORE_FT
PRINCIPAL DISCHARGE DIAGNOSIS  Diagnosis: Colitis  Assessment and Plan of Treatment:       SECONDARY DISCHARGE DIAGNOSES  Diagnosis: Lumbar compression fracture  Assessment and Plan of Treatment:      PRINCIPAL DISCHARGE DIAGNOSIS  Diagnosis: Colitis  Assessment and Plan of Treatment: You were admitted to the hospital because you were having diarrhea and not feeling well. You were found to have inflammation in your bowels on a CT scan. You recieved antibiotics to help decrease and treat the infection. Since the inflammation can be caused by an infection in the stool, we checked your stool for infections. However, we did not find any infections in your stool. You were continued on antibiotics in the hospital and you started to feel better, without diarrhea, eating well and no fevers. You will be kept on antibiotics till the end of 5/2 (friday) to prevent an infection. Please complete the entire course of antibiotics.   Please follow up with your oncologist, your PCP and your urologist once you leave the hospital for management of your multiple medical conditions.

## 2025-04-27 NOTE — PROGRESS NOTE ADULT - SUBJECTIVE AND OBJECTIVE BOX
KEL CHOUDHURY  MRN-[id number]    Patient is a 89y old  Male who presents with a chief complaint of Sepsis + Colitis (27 Apr 2025 12:41)      Review of System  REVIEW OF SYSTEMS      General:	Denies fatigue, fevers, chills, sweats, decreased appetite.    Skin/Breast: denies pruritis, rash  	  Ophthalmologic: no change in vision or blurring  	  HEENT	Denies dry mouth, oral sores, dysphagia,  change in hearing.    Respiratory and Thorax:  cough, sob, wheeze, hemoptysis  	  Cardiovascular:	no cp , palp, orthopnea    Gastrointestinal:	no n/v/d constipation    Genitourinary:	no dysuria of frequency, no hematuria, no flank pain    Musculoskeletal:	no bone or joint pain. no muscle aches.     Neurological:	no change in sensory or motor function. no headache. no weakness.     Psychiatric:	no depression, no anxiety, insomnia.     Hematology/Lymphatics:	no bleeding or bruising        Current Meds  MEDICATIONS  (STANDING):  cefepime   IVPB 2000 milliGRAM(s) IV Intermittent every 12 hours  cholecalciferol 1000 Unit(s) Oral daily  enoxaparin Injectable 40 milliGRAM(s) SubCutaneous every 24 hours  entecavir 0.5 milliGRAM(s) Oral every 48 hours  filgrastim-sndz (ZARXIO) Injectable 300 MICROGram(s) SubCutaneous daily  lactated ringers. 1000 milliLiter(s) (75 mL/Hr) IV Continuous <Continuous>  levothyroxine 100 MICROGram(s) Oral daily  metroNIDAZOLE  IVPB      metroNIDAZOLE  IVPB 500 milliGRAM(s) IV Intermittent every 12 hours  multivitamin 1 Tablet(s) Oral daily  predniSONE   Tablet 5 milliGRAM(s) Oral two times a day  sertraline 25 milliGRAM(s) Oral daily  vancomycin  IVPB 1000 milliGRAM(s) IV Intermittent every 12 hours    MEDICATIONS  (PRN):  acetaminophen     Tablet .. 650 milliGRAM(s) Oral every 6 hours PRN Temp greater or equal to 38C (100.4F), Mild Pain (1 - 3)  melatonin 3 milliGRAM(s) Oral at bedtime PRN Insomnia  ondansetron Injectable 4 milliGRAM(s) IV Push every 8 hours PRN Nausea and/or Vomiting      Vitals  Vital Signs Last 24 Hrs  T(C): 37.1 (27 Apr 2025 11:05), Max: 37.1 (27 Apr 2025 11:05)  T(F): 98.7 (27 Apr 2025 11:05), Max: 98.7 (27 Apr 2025 11:05)  HR: 105 (27 Apr 2025 11:05) (77 - 110)  BP: 117/59 (27 Apr 2025 11:05) (117/59 - 134/62)  BP(mean): 86 (26 Apr 2025 17:25) (86 - 86)  RR: 18 (27 Apr 2025 11:05) (18 - 22)  SpO2: 95% (27 Apr 2025 11:05) (94% - 98%)    Parameters below as of 27 Apr 2025 11:05  Patient On (Oxygen Delivery Method): room air        Physical Exam  PHYSICAL EXAM:      Constitutional: NAD    Eyes: PERRLA EOMI, anicteric sclera    Heent :No oral sores, no pharyngeal injection. moist mucosa.    Neck: supple, no jvd, no LAD    Respiratory: CTA b/l     Cardiovascular: s1s2, no m/g/r    Gastrointestinal: soft, nt, nd, + BS    Extremities: no c/c/e    Neurological:A&O x 3 moves all ext.    Skin: no rash on exposed skin    Lymph Nodes: no lymphadenopathy.              Lab  CBC Full  -  ( 27 Apr 2025 06:10 )  WBC Count : 2.46 K/uL  RBC Count : 3.22 M/uL  Hemoglobin : 10.1 g/dL  Hematocrit : 30.7 %  Platelet Count - Automated : 134 K/uL  Mean Cell Volume : 95.3 fl  Mean Cell Hemoglobin : 31.4 pg  Mean Cell Hemoglobin Concentration : 32.9 g/dL  Auto Neutrophil # : x  Auto Lymphocyte # : x  Auto Monocyte # : x  Auto Eosinophil # : x  Auto Basophil # : x  Auto Neutrophil % : x  Auto Lymphocyte % : x  Auto Monocyte % : x  Auto Eosinophil % : x  Auto Basophil % : x    04-27    133[L]  |  102  |  14  ----------------------------<  75  3.6   |  18[L]  |  0.81    Ca    8.1[L]      27 Apr 2025 06:10  Phos  2.4     04-27  Mg     1.6     04-27    TPro  5.5[L]  /  Alb  2.8[L]  /  TBili  0.8  /  DBili  x   /  AST  57[H]  /  ALT  17  /  AlkPhos  109  04-27    PT/INR - ( 26 Apr 2025 12:21 )   PT: 12.3 sec;   INR: 1.08 ratio         PTT - ( 26 Apr 2025 12:21 )  PTT:29.8 sec    Rad:    Assessment/Plan   KEL CHOUDHURY  MRN-[id number]    Patient is a 89y old  Male who presents with a chief complaint of Sepsis + Colitis (27 Apr 2025 12:41)      Review of System    seen with family at bedside..  Still with some loose bm's.  Discomfort with urinary catheter.     General:	Denies fevers, chills, sweats, decreased appetite.    Skin/Breast: denies pruritis, rash  	  Ophthalmologic: no change in vision or blurring  	  HEENT	Denies dry mouth, oral sores, dysphagia,  change in hearing.    Respiratory and Thorax: no cough, sob, wheeze, hemoptysis  	  Cardiovascular:	no cp , palp, orthopnea    Gastrointestinal:	no n/v    :  no hematuria, no flank pain    Musculoskeletal:	no bone or joint pain. no muscle aches.     Neurological:	no change in sensory or motor function. no headache. no weakness.     Psychiatric:	no depression, no anxiety, insomnia.     Hematology/Lymphatics:	no bleeding or bruising    Current Meds  MEDICATIONS  (STANDING):  cefepime   IVPB 2000 milliGRAM(s) IV Intermittent every 12 hours  cholecalciferol 1000 Unit(s) Oral daily  enoxaparin Injectable 40 milliGRAM(s) SubCutaneous every 24 hours  entecavir 0.5 milliGRAM(s) Oral every 48 hours  filgrastim-sndz (ZARXIO) Injectable 300 MICROGram(s) SubCutaneous daily  lactated ringers. 1000 milliLiter(s) (75 mL/Hr) IV Continuous <Continuous>  levothyroxine 100 MICROGram(s) Oral daily  metroNIDAZOLE  IVPB      metroNIDAZOLE  IVPB 500 milliGRAM(s) IV Intermittent every 12 hours  multivitamin 1 Tablet(s) Oral daily  predniSONE   Tablet 5 milliGRAM(s) Oral two times a day  sertraline 25 milliGRAM(s) Oral daily  vancomycin  IVPB 1000 milliGRAM(s) IV Intermittent every 12 hours    MEDICATIONS  (PRN):  acetaminophen     Tablet .. 650 milliGRAM(s) Oral every 6 hours PRN Temp greater or equal to 38C (100.4F), Mild Pain (1 - 3)  melatonin 3 milliGRAM(s) Oral at bedtime PRN Insomnia  ondansetron Injectable 4 milliGRAM(s) IV Push every 8 hours PRN Nausea and/or Vomiting    Vital Signs Last 24 Hrs  T(C): 37.1 (27 Apr 2025 11:05), Max: 37.1 (27 Apr 2025 11:05)  T(F): 98.7 (27 Apr 2025 11:05), Max: 98.7 (27 Apr 2025 11:05)  HR: 105 (27 Apr 2025 11:05) (77 - 110)  BP: 117/59 (27 Apr 2025 11:05) (117/59 - 134/62)  BP(mean): 86 (26 Apr 2025 17:25) (86 - 86)  RR: 18 (27 Apr 2025 11:05) (18 - 22)  SpO2: 95% (27 Apr 2025 11:05) (94% - 98%)    Parameters below as of 27 Apr 2025 11:05  Patient On (Oxygen Delivery Method): room air      PHYSICAL EXAM:    Constitutional: NAD    Eyes: PERRLA EOMI, anicteric sclera    Heent :No oral sores, no pharyngeal injection. moist mucosa.    Neck: supple, no jvd, no LAD    Respiratory: CTA b/l     Cardiovascular: s1s2, no m/g/r    Gastrointestinal: soft, nt, nd, + BS    Extremities: no c/c/e    Neurological:A&O x 3 moves all ext.    Skin: no rash on exposed skin    Lymph Nodes: no lymphadenopathy.      Lab  CBC Full  -  ( 27 Apr 2025 06:10 )  WBC Count : 2.46 K/uL  RBC Count : 3.22 M/uL  Hemoglobin : 10.1 g/dL  Hematocrit : 30.7 %  Platelet Count - Automated : 134 K/uL  Mean Cell Volume : 95.3 fl  Mean Cell Hemoglobin : 31.4 pg  Mean Cell Hemoglobin Concentration : 32.9 g/dL  Auto Neutrophil # : x  Auto Lymphocyte # : x  Auto Monocyte # : x  Auto Eosinophil # : x  Auto Basophil # : x  Auto Neutrophil % : x  Auto Lymphocyte % : x  Auto Monocyte % : x  Auto Eosinophil % : x  Auto Basophil % : x    04-27    133[L]  |  102  |  14  ----------------------------<  75  3.6   |  18[L]  |  0.81    Ca    8.1[L]      27 Apr 2025 06:10  Phos  2.4     04-27  Mg     1.6     04-27    TPro  5.5[L]  /  Alb  2.8[L]  /  TBili  0.8  /  DBili  x   /  AST  57[H]  /  ALT  17  /  AlkPhos  109  04-27    PT/INR - ( 26 Apr 2025 12:21 )   PT: 12.3 sec;   INR: 1.08 ratio         PTT - ( 26 Apr 2025 12:21 )  PTT:29.8 sec    Rad:    Assessment/Plan

## 2025-04-27 NOTE — PROGRESS NOTE ADULT - SUBJECTIVE AND OBJECTIVE BOX
PROGRESS NOTE  Patient: KEL CHOUDHURY   Authored by Kj Watkins, PGY-1 (Internal Medicine)    INTERVAL HX:  - Overnight having penile burning, repeat UA ordered and negative.  - lactate 4 this AM. Given 1L IVF bolus. Made NPO for abdomen XR    Review of Systems:  Unchanged from prior unless noted above.    Allergies:  No Known Allergies    Physical Exam:  GENERAL: NAD, lying in bed comfortably  HEAD:  Atraumatic, Normocephalic  EYES: EOMI, conjunctiva and sclera clear  ENT: Moist mucous membranes  CHEST/LUNG: Clear to auscultation bilaterally; No rales, rhonchi, wheezing, or rubs. Unlabored respirations  HEART: Regular rate and rhythm; No murmurs, rubs, or gallops  ABDOMEN: BSx4; Soft, nontender, nondistended  EXTREMITIES:  No clubbing, cyanosis, or edema, Ecchymoses across the forearms bilaterally  NERVOUS SYSTEM:  A&Ox3, no focal deficits   SKIN: No rashes or lesions      Medications:  acetaminophen     Tablet .. 650 milliGRAM(s) Oral every 6 hours PRN  cefepime   IVPB 2000 milliGRAM(s) IV Intermittent every 12 hours  cholecalciferol 1000 Unit(s) Oral daily  enoxaparin Injectable 40 milliGRAM(s) SubCutaneous every 24 hours  entecavir 0.5 milliGRAM(s) Oral every 48 hours  filgrastim-sndz (ZARXIO) Injectable 300 MICROGram(s) SubCutaneous daily  lactated ringers. 1000 milliLiter(s) IV Continuous <Continuous>  levothyroxine 100 MICROGram(s) Oral daily  melatonin 3 milliGRAM(s) Oral at bedtime PRN  metroNIDAZOLE  IVPB      metroNIDAZOLE  IVPB 500 milliGRAM(s) IV Intermittent every 12 hours  multivitamin 1 Tablet(s) Oral daily  ondansetron Injectable 4 milliGRAM(s) IV Push every 8 hours PRN  predniSONE   Tablet 5 milliGRAM(s) Oral two times a day  sertraline 25 milliGRAM(s) Oral daily  vancomycin  IVPB 1000 milliGRAM(s) IV Intermittent every 12 hours    Vitals:  T(C): 37.1 (04-27-25 @ 11:05), Max: 38.4 (04-26-25 @ 12:10)  HR: 105 (04-27-25 @ 11:05) (77 - 110)  BP: 117/59 (04-27-25 @ 11:05) (99/51 - 134/62)  RR: 18 (04-27-25 @ 11:05) (18 - 22)  SpO2: 95% (04-27-25 @ 11:05) (94% - 100%)  I/O's:      Labs:                        10.1   2.46  )-----------( 134      ( 27 Apr 2025 06:10 )             30.7     04-27    133[L]  |  102  |  14  ----------------------------<  75  3.6   |  18[L]  |  0.81    Ca    8.1[L]      27 Apr 2025 06:10  Phos  2.4     04-27  Mg     1.6     04-27    TPro  5.5[L]  /  Alb  2.8[L]  /  TBili  0.8  /  DBili  x   /  AST  57[H]  /  ALT  17  /  AlkPhos  109  04-27    PT/INR - ( 26 Apr 2025 12:21 )   PT: 12.3 sec;   INR: 1.08 ratio         PTT - ( 26 Apr 2025 12:21 )  PTT:29.8 sec    Urinalysis with Rflx Culture (collected 04-27-25 @ 06:47)    Urinalysis with Rflx Culture (collected 04-26-25 @ 13:23)        Radiology/Procedures: Reviewed.

## 2025-04-27 NOTE — PROGRESS NOTE ADULT - PROBLEM SELECTOR PLAN 1
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 UA and CT negative for infectious concerns. RVP Negative w/o URI symptoms. 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 Lactate 3.9 --> 1.7 s/p 2L IVF. 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 Penile burning overnight 4/26-27 but repeat UA negative.    - F/u BCx  - F/u random cortisol level  - MRSA swab  - cont Vancomycin 1000mg q12hr (4/26 - ), dc if MRSA negative  - cont Cefepime 2g q12hr renal dosing (4/26 - )   - Start Metronidazole 500mg q12hr (4/26 - )  - Started on filgrastrim per heme-onc  - Remaining Plan per Colitis section

## 2025-04-27 NOTE — PROVIDER CONTACT NOTE (CRITICAL VALUE NOTIFICATION) - ACTION/TREATMENT ORDERED:
ACP Radha Lemon aware. Pt on IV abx. Infectious workup and stool samples sent per orders. 500 ml bolus ordered and administered as per MD. No further interventions at this time per MD. Plan of care ongoing.

## 2025-04-27 NOTE — PHYSICAL THERAPY INITIAL EVALUATION ADULT - GENERAL OBSERVATIONS, REHAB EVAL
Pt rec'd semisupine in bed, +telemetry, +IVL, +bed alarm, in NAD.  Pt cleared for PT session by WILIAM Padilla. Pt rec'd semisupine in bed, +telemetry, +IVL, +condom cath, +bed alarm, in NAD.  Pt cleared for PT session by WILIAM Padilla.

## 2025-04-27 NOTE — DISCHARGE NOTE PROVIDER - NSDCFUADDAPPT_GEN_ALL_CORE_FT
APPTS ARE READY TO BE MADE: [ ] YES    Best Family or Patient Contact (if needed):  Jessee Tijerina (Daughter): [phone]    Additional Information about above appointments (if needed):    1: PCP  2: Urology  3: Oncology (Dr. Vang)    Other comments or requests:    APPTS ARE READY TO BE MADE: [X ] YES    Best Family or Patient Contact (if needed):  Jessee Tijerina (Daughter): [phone]    Additional Information about above appointments (if needed):    1: PCP  2: Urology  3: Oncology (Dr. Vang)    Other comments or requests:    APPTS ARE READY TO BE MADE: [X ] YES    Best Family or Patient Contact (if needed):  Jessee Tijerina (Daughter): [phone]    Additional Information about above appointments (if needed):    1: PCP  2: Urology  3: Oncology (Dr. Vang)    Other comments or requests:   PCP - Prior to outreaching the patient, it was visible that the patient has secured a follow up appointment which was not scheduled by our team. Patient was scheduled on 6/9 at 4:40P at [address] [place] [address] with Dr. Diez   APPTS ARE READY TO BE MADE: [X ] YES    Best Family or Patient Contact (if needed):  Jessee Tijerina (Daughter): [phone]    Additional Information about above appointments (if needed):    1: PCP  2: Urology  3: Oncology (Dr. Vang)    Other comments or requests:   PCP - Prior to outreaching the patient, it was visible that the patient has secured a follow up appointment which was not scheduled by our team. Patient was scheduled on 6/9 at 4:40P at [address] [place] [address] with Dr. Diez    UROLOGY / ONCOLOGY - Patient was outreached but did not answer. A voicemail was left for the patient to return our call.   APPTS ARE READY TO BE MADE: [X ] YES    Best Family or Patient Contact (if needed):  Jessee Tijerina (Daughter): [phone]    Additional Information about above appointments (if needed):    1: PCP  2: Urology  3: Oncology (Dr. Vang)    Other comments or requests:   PCP - Prior to outreaching the patient, it was visible that the patient has secured a follow up appointment which was not scheduled by our team. Patient was scheduled on 6/9 at 4:40P at [address] [place] [address] with Dr. Diez    UROLOGY- Prior to outreaching the patient, it was visible that the patient has secured a follow up appointment which was not scheduled by our team. As per daughter no need to move appt sooner leave as scheduled for   07/09/2025 08:40 AM     ONCOLOGY-  Patient informed us they already have secured a follow up appointment which was not scheduled by our team.  AS per patients daughter, sees oncologist regulary. Has seen Dr. Miranda for HFU . No scheduling assistance needed.

## 2025-04-27 NOTE — DISCHARGE NOTE PROVIDER - NSDCCPTREATMENT_GEN_ALL_CORE_FT
PRINCIPAL PROCEDURE  Procedure: CT abdomen  Findings and Treatment:   < end of copied text >  IMPRESSION:  Proctitis.  Fluid throughout the more proximal colon with possible mild thickening of   the splenic flexure.  Droplet of air within the bladder. Please correlate for recent   instrumentation.  Ill-defined hypodensity in the right hepatic lobe may be compatible with   history of hepatic metastases. Appearance is different than on the prior   examination. Recommend further evaluation with contrast-enhanced MRI   abdomen.  Question flow artifact versus nonocclusive thrombus splenic vein. This   may also be evaluated at time of contrast enhanced MRI abdomen.  New mild loss of height of the superior endplate of L4 and inferior   endplate of L2.< from: CT Abdomen and Pelvis w/ IV Cont (04.26.25 @ 13:50) >

## 2025-04-27 NOTE — PHYSICAL THERAPY INITIAL EVALUATION ADULT - NUMBER OF STAIRS, REHAB EVAL
6 Tetracycline Counseling: Patient counseled regarding possible photosensitivity and increased risk for sunburn.  Patient instructed to avoid sunlight, if possible.  When exposed to sunlight, patients should wear protective clothing, sunglasses, and sunscreen.  The patient was instructed to call the office immediately if the following severe adverse effects occur:  hearing changes, easy bruising/bleeding, severe headache, or vision changes.  The patient verbalized understanding of the proper use and possible adverse effects of tetracycline.  All of the patient's questions and concerns were addressed. Patient understands to avoid pregnancy while on therapy due to potential birth defects.

## 2025-04-27 NOTE — PHYSICAL THERAPY INITIAL EVALUATION ADULT - PERTINENT HX OF CURRENT PROBLEM, REHAB EVAL
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 Found to be in neutropenic sepsis with colitis seen on CT. Covering with vanc, cefepime, metronidazole for now given neutropenia while pending stool studies. 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 Found to be in neutropenic sepsis with colitis seen on CT. Covering with vanc, cefepime, metronidazole for now given neutropenia while pending stool studies.  CXR(4/26): No focal consolidation  CT A&P(4/26): Proctitis. Fluid throughout the more proximal colon with possible mild thickening of the splenic flexure. Droplet of air within the bladder. Please correlate for recent instrumentation. Ill-defined hypodensity in the right hepatic lobe may be compatible with history of hepatic metastases. Appearance is different than on the prior examination. Recommend further evaluation with contrast-enhanced MRI abdomen. Question flow artifact versus nonocclusive thrombus splenic vein. This may also be evaluated at time of contrast enhanced MRI abdomen. New mild loss of height of the superior endplate of L4 and inferior endplate of L2.  CT Angio Chest(4/26): No pulmonary embolus.  US Spleen(4/28): Patent splenic vein. 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 Found to be in neutropenic sepsis with colitis seen on CT. Covering with vanc, cefepime, metronidazole for now given neutropenia while pending stool studies, Cdiff negative.  CXR(4/26): No focal consolidation  CT A&P(4/26): Proctitis. Fluid throughout the more proximal colon with possible mild thickening of the splenic flexure. Droplet of air within the bladder. Please correlate for recent instrumentation. Ill-defined hypodensity in the right hepatic lobe may be compatible with history of hepatic metastases. Appearance is different than on the prior examination. Recommend further evaluation with contrast-enhanced MRI abdomen. Question flow artifact versus nonocclusive thrombus splenic vein. This may also be evaluated at time of contrast enhanced MRI abdomen. New mild loss of height of the superior endplate of L4 and inferior endplate of L2.  CT Angio Chest(4/26): No pulmonary embolus.  US Spleen(4/28): Patent splenic vein.

## 2025-04-27 NOTE — PROVIDER CONTACT NOTE (CRITICAL VALUE NOTIFICATION) - ASSESSMENT
pt is A&O x 4. VSS as per flowsheets. Pt denies headache, dizziness, cp, sob, palpitations. No s/s of distress noted at this time. Pt is afebrile.

## 2025-04-27 NOTE — PROGRESS NOTE ADULT - ASSESSMENT
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 Found to be in neutropenic sepsis with colitis seen on CT. Covering with vanc, cefepime, metronidazole for now given neutropenia. Started on filgastrim per heme/onc. GI PCR and C diff negative.

## 2025-04-28 LAB
ALBUMIN SERPL ELPH-MCNC: 2.4 G/DL — LOW (ref 3.3–5)
ALP SERPL-CCNC: 118 U/L — SIGNIFICANT CHANGE UP (ref 40–120)
ALT FLD-CCNC: 15 U/L — SIGNIFICANT CHANGE UP (ref 10–45)
ANION GAP SERPL CALC-SCNC: 11 MMOL/L — SIGNIFICANT CHANGE UP (ref 5–17)
AST SERPL-CCNC: 57 U/L — HIGH (ref 10–40)
BASOPHILS # BLD AUTO: 0.02 K/UL — SIGNIFICANT CHANGE UP (ref 0–0.2)
BASOPHILS NFR BLD AUTO: 0.2 % — SIGNIFICANT CHANGE UP (ref 0–2)
BILIRUB SERPL-MCNC: 0.5 MG/DL — SIGNIFICANT CHANGE UP (ref 0.2–1.2)
BUN SERPL-MCNC: 12 MG/DL — SIGNIFICANT CHANGE UP (ref 7–23)
CALCIUM SERPL-MCNC: 8.4 MG/DL — SIGNIFICANT CHANGE UP (ref 8.4–10.5)
CHLORIDE SERPL-SCNC: 105 MMOL/L — SIGNIFICANT CHANGE UP (ref 96–108)
CO2 SERPL-SCNC: 19 MMOL/L — LOW (ref 22–31)
CREAT SERPL-MCNC: 0.9 MG/DL — SIGNIFICANT CHANGE UP (ref 0.5–1.3)
CULTURE RESULTS: SIGNIFICANT CHANGE UP
EGFR: 81 ML/MIN/1.73M2 — SIGNIFICANT CHANGE UP
EGFR: 81 ML/MIN/1.73M2 — SIGNIFICANT CHANGE UP
EOSINOPHIL # BLD AUTO: 0 K/UL — SIGNIFICANT CHANGE UP (ref 0–0.5)
EOSINOPHIL NFR BLD AUTO: 0 % — SIGNIFICANT CHANGE UP (ref 0–6)
GAS PNL BLDV: SIGNIFICANT CHANGE UP
GLUCOSE SERPL-MCNC: 98 MG/DL — SIGNIFICANT CHANGE UP (ref 70–99)
HCT VFR BLD CALC: 33.7 % — LOW (ref 39–50)
HGB BLD-MCNC: 11.1 G/DL — LOW (ref 13–17)
IMM GRANULOCYTES NFR BLD AUTO: 6.2 % — HIGH (ref 0–0.9)
LYMPHOCYTES # BLD AUTO: 1.78 K/UL — SIGNIFICANT CHANGE UP (ref 1–3.3)
LYMPHOCYTES # BLD AUTO: 21.3 % — SIGNIFICANT CHANGE UP (ref 13–44)
MAGNESIUM SERPL-MCNC: 1.8 MG/DL — SIGNIFICANT CHANGE UP (ref 1.6–2.6)
MCHC RBC-ENTMCNC: 31.1 PG — SIGNIFICANT CHANGE UP (ref 27–34)
MCHC RBC-ENTMCNC: 32.9 G/DL — SIGNIFICANT CHANGE UP (ref 32–36)
MCV RBC AUTO: 94.4 FL — SIGNIFICANT CHANGE UP (ref 80–100)
MONOCYTES # BLD AUTO: 1.37 K/UL — HIGH (ref 0–0.9)
MONOCYTES NFR BLD AUTO: 16.4 % — HIGH (ref 2–14)
NEUTROPHILS # BLD AUTO: 4.67 K/UL — SIGNIFICANT CHANGE UP (ref 1.8–7.4)
NEUTROPHILS NFR BLD AUTO: 55.9 % — SIGNIFICANT CHANGE UP (ref 43–77)
NRBC BLD AUTO-RTO: 0 /100 WBCS — SIGNIFICANT CHANGE UP (ref 0–0)
PHOSPHATE SERPL-MCNC: 2.7 MG/DL — SIGNIFICANT CHANGE UP (ref 2.5–4.5)
PLATELET # BLD AUTO: 151 K/UL — SIGNIFICANT CHANGE UP (ref 150–400)
POTASSIUM SERPL-MCNC: 3.6 MMOL/L — SIGNIFICANT CHANGE UP (ref 3.5–5.3)
POTASSIUM SERPL-SCNC: 3.6 MMOL/L — SIGNIFICANT CHANGE UP (ref 3.5–5.3)
PROT SERPL-MCNC: 5.2 G/DL — LOW (ref 6–8.3)
RBC # BLD: 3.57 M/UL — LOW (ref 4.2–5.8)
RBC # FLD: 13.8 % — SIGNIFICANT CHANGE UP (ref 10.3–14.5)
SODIUM SERPL-SCNC: 135 MMOL/L — SIGNIFICANT CHANGE UP (ref 135–145)
SPECIMEN SOURCE: SIGNIFICANT CHANGE UP
WBC # BLD: 8.36 K/UL — SIGNIFICANT CHANGE UP (ref 3.8–10.5)
WBC # FLD AUTO: 8.36 K/UL — SIGNIFICANT CHANGE UP (ref 3.8–10.5)

## 2025-04-28 PROCEDURE — 76705 ECHO EXAM OF ABDOMEN: CPT | Mod: 26

## 2025-04-28 PROCEDURE — 99232 SBSQ HOSP IP/OBS MODERATE 35: CPT | Mod: GC

## 2025-04-28 RX ORDER — ACETAMINOPHEN 500 MG/5ML
1000 LIQUID (ML) ORAL EVERY 6 HOURS
Refills: 0 | Status: DISCONTINUED | OUTPATIENT
Start: 2025-04-28 | End: 2025-04-29

## 2025-04-28 RX ORDER — METRONIDAZOLE 250 MG
500 TABLET ORAL EVERY 12 HOURS
Refills: 0 | Status: DISCONTINUED | OUTPATIENT
Start: 2025-04-28 | End: 2025-04-29

## 2025-04-28 RX ORDER — SIMETHICONE 80 MG
80 TABLET,CHEWABLE ORAL THREE TIMES A DAY
Refills: 0 | Status: DISCONTINUED | OUTPATIENT
Start: 2025-04-28 | End: 2025-04-29

## 2025-04-28 RX ADMIN — PREDNISONE 5 MILLIGRAM(S): 20 TABLET ORAL at 05:54

## 2025-04-28 RX ADMIN — ENOXAPARIN SODIUM 40 MILLIGRAM(S): 100 INJECTION SUBCUTANEOUS at 05:49

## 2025-04-28 RX ADMIN — Medication 1000 UNIT(S): at 12:20

## 2025-04-28 RX ADMIN — Medication 100 MICROGRAM(S): at 05:52

## 2025-04-28 RX ADMIN — Medication 100 MILLIGRAM(S): at 05:53

## 2025-04-28 RX ADMIN — Medication 1 TABLET(S): at 12:20

## 2025-04-28 RX ADMIN — SERTRALINE 25 MILLIGRAM(S): 100 TABLET, FILM COATED ORAL at 12:20

## 2025-04-28 RX ADMIN — CEFEPIME 100 MILLIGRAM(S): 2 INJECTION, POWDER, FOR SOLUTION INTRAVENOUS at 12:29

## 2025-04-28 RX ADMIN — CEFEPIME 100 MILLIGRAM(S): 2 INJECTION, POWDER, FOR SOLUTION INTRAVENOUS at 22:33

## 2025-04-28 RX ADMIN — Medication 250 MILLIGRAM(S): at 05:54

## 2025-04-28 RX ADMIN — Medication 80 MILLIGRAM(S): at 21:07

## 2025-04-28 RX ADMIN — PREDNISONE 5 MILLIGRAM(S): 20 TABLET ORAL at 17:47

## 2025-04-28 RX ADMIN — Medication 500 MILLIGRAM(S): at 17:47

## 2025-04-28 RX ADMIN — FILGRASTIM 300 MICROGRAM(S): 300 INJECTION, SOLUTION INTRAVENOUS; SUBCUTANEOUS at 12:38

## 2025-04-28 NOTE — PROGRESS NOTE ADULT - ASSESSMENT
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 Vitals signs were notable for febrile to 101.2, tachycardic to 110s, BP 90/60s . Vascular Surgery consulted for question flow artifact versus nonocclusive thrombus splenic vein. Patient tolerating PO. Denies abdominal pain.     Plan:   - US reviewed: patent splenic vein   - No acute vascular surgery intervention     Vascular Surgery

## 2025-04-28 NOTE — PROGRESS NOTE ADULT - SUBJECTIVE AND OBJECTIVE BOX
DATE OF SERVICE: 04-28-25 @ 07:19    Patient is a 89y old  Male who presents with a chief complaint of Sepsis + Colitis (27 Apr 2025 13:42)      SUBJECTIVE / OVERNIGHT EVENTS:  Overnight,  Pt seen and examined at bedside.    ROS negative except as above.    MEDICATIONS  (STANDING):  cefepime   IVPB 2000 milliGRAM(s) IV Intermittent every 12 hours  cholecalciferol 1000 Unit(s) Oral daily  enoxaparin Injectable 40 milliGRAM(s) SubCutaneous every 24 hours  entecavir 0.5 milliGRAM(s) Oral every 48 hours  filgrastim-sndz (ZARXIO) Injectable 300 MICROGram(s) SubCutaneous daily  lactated ringers. 1000 milliLiter(s) (75 mL/Hr) IV Continuous <Continuous>  levothyroxine 100 MICROGram(s) Oral daily  metroNIDAZOLE  IVPB      metroNIDAZOLE  IVPB 500 milliGRAM(s) IV Intermittent every 12 hours  multivitamin 1 Tablet(s) Oral daily  predniSONE   Tablet 5 milliGRAM(s) Oral two times a day  sertraline 25 milliGRAM(s) Oral daily  vancomycin  IVPB 1000 milliGRAM(s) IV Intermittent every 12 hours    MEDICATIONS  (PRN):  acetaminophen     Tablet .. 650 milliGRAM(s) Oral every 6 hours PRN Temp greater or equal to 38C (100.4F), Mild Pain (1 - 3)  melatonin 3 milliGRAM(s) Oral at bedtime PRN Insomnia  ondansetron Injectable 4 milliGRAM(s) IV Push every 8 hours PRN Nausea and/or Vomiting      Vital Signs Last 24 Hrs  T(C): 36.8 (28 Apr 2025 04:41), Max: 37.1 (27 Apr 2025 11:05)  T(F): 98.3 (28 Apr 2025 04:41), Max: 98.7 (27 Apr 2025 11:05)  HR: 96 (28 Apr 2025 04:41) (89 - 105)  BP: 136/61 (28 Apr 2025 04:41) (117/59 - 148/63)  BP(mean): --  RR: 18 (28 Apr 2025 04:41) (18 - 18)  SpO2: 96% (28 Apr 2025 04:41) (93% - 96%)    Parameters below as of 28 Apr 2025 04:41  Patient On (Oxygen Delivery Method): room air      CAPILLARY BLOOD GLUCOSE        I&O's Summary    27 Apr 2025 07:01  -  28 Apr 2025 07:00  --------------------------------------------------------  IN: 525 mL / OUT: 0 mL / NET: 525 mL        PHYSICAL EXAM:  GENERAL: NAD, well-developed  HEAD:  Atraumatic, Normocephalic  EYES: EOMI, conjunctiva and sclera clear  NECK: Supple, No JVD  CHEST/LUNG: Clear to auscultation bilaterally; No wheeze  HEART: Regular rate and rhythm; No murmurs, rubs, or gallops  ABDOMEN: Soft, Nontender, Nondistended; Bowel sounds present  EXTREMITIES:  2+ Peripheral Pulses, No clubbing, cyanosis, or edema  NEUROLOGY: AOx3, non-focal  SKIN: No rashes or lesions    LABS:                        11.1   8.36  )-----------( 151      ( 28 Apr 2025 06:08 )             33.7     04-28    135  |  105  |  12  ----------------------------<  98  3.6   |  19[L]  |  0.90    Ca    8.4      28 Apr 2025 06:08  Phos  2.7     04-28  Mg     1.8     04-28    TPro  5.2[L]  /  Alb  2.4[L]  /  TBili  0.5  /  DBili  x   /  AST  57[H]  /  ALT  15  /  AlkPhos  118  04-28    PT/INR - ( 26 Apr 2025 12:21 )   PT: 12.3 sec;   INR: 1.08 ratio         PTT - ( 26 Apr 2025 12:21 )  PTT:29.8 sec        MICRO:      RADIOLOGY & ADDITIONAL TESTS:           DATE OF SERVICE: 04-28-25 @ 07:19    Patient is a 89y old  Male who presents with a chief complaint of Sepsis + Colitis (27 Apr 2025 13:42)      SUBJECTIVE / OVERNIGHT EVENTS:  Overnight, no acute events.   Pt seen and examined at bedside. Reports he is feeling better. Imporvement in his diarrhea.     ROS negative except as above.    MEDICATIONS  (STANDING):  cefepime   IVPB 2000 milliGRAM(s) IV Intermittent every 12 hours  cholecalciferol 1000 Unit(s) Oral daily  enoxaparin Injectable 40 milliGRAM(s) SubCutaneous every 24 hours  entecavir 0.5 milliGRAM(s) Oral every 48 hours  filgrastim-sndz (ZARXIO) Injectable 300 MICROGram(s) SubCutaneous daily  lactated ringers. 1000 milliLiter(s) (75 mL/Hr) IV Continuous <Continuous>  levothyroxine 100 MICROGram(s) Oral daily  metroNIDAZOLE  IVPB      metroNIDAZOLE  IVPB 500 milliGRAM(s) IV Intermittent every 12 hours  multivitamin 1 Tablet(s) Oral daily  predniSONE   Tablet 5 milliGRAM(s) Oral two times a day  sertraline 25 milliGRAM(s) Oral daily  vancomycin  IVPB 1000 milliGRAM(s) IV Intermittent every 12 hours    MEDICATIONS  (PRN):  acetaminophen     Tablet .. 650 milliGRAM(s) Oral every 6 hours PRN Temp greater or equal to 38C (100.4F), Mild Pain (1 - 3)  melatonin 3 milliGRAM(s) Oral at bedtime PRN Insomnia  ondansetron Injectable 4 milliGRAM(s) IV Push every 8 hours PRN Nausea and/or Vomiting      Vital Signs Last 24 Hrs  T(C): 36.8 (28 Apr 2025 04:41), Max: 37.1 (27 Apr 2025 11:05)  T(F): 98.3 (28 Apr 2025 04:41), Max: 98.7 (27 Apr 2025 11:05)  HR: 96 (28 Apr 2025 04:41) (89 - 105)  BP: 136/61 (28 Apr 2025 04:41) (117/59 - 148/63)  BP(mean): --  RR: 18 (28 Apr 2025 04:41) (18 - 18)  SpO2: 96% (28 Apr 2025 04:41) (93% - 96%)    Parameters below as of 28 Apr 2025 04:41  Patient On (Oxygen Delivery Method): room air      CAPILLARY BLOOD GLUCOSE        I&O's Summary    27 Apr 2025 07:01  -  28 Apr 2025 07:00  --------------------------------------------------------  IN: 525 mL / OUT: 0 mL / NET: 525 mL        PHYSICAL EXAM:  GENERAL: NAD, well-developed  HEAD:  Atraumatic, Normocephalic  EYES: EOMI, conjunctiva and sclera clear  NECK: Supple, No JVD  CHEST/LUNG: Clear to auscultation bilaterally; No wheeze  HEART: Regular rate and rhythm; No murmurs, rubs, or gallops  ABDOMEN: Soft, Nontender, Nondistended; Bowel sounds present  EXTREMITIES:  2+ Peripheral Pulses, No clubbing, cyanosis, or edema  NEUROLOGY: AOx3, non-focal  SKIN: No rashes or lesions    LABS:                        11.1   8.36  )-----------( 151      ( 28 Apr 2025 06:08 )             33.7     04-28    135  |  105  |  12  ----------------------------<  98  3.6   |  19[L]  |  0.90    Ca    8.4      28 Apr 2025 06:08  Phos  2.7     04-28  Mg     1.8     04-28    TPro  5.2[L]  /  Alb  2.4[L]  /  TBili  0.5  /  DBili  x   /  AST  57[H]  /  ALT  15  /  AlkPhos  118  04-28    PT/INR - ( 26 Apr 2025 12:21 )   PT: 12.3 sec;   INR: 1.08 ratio         PTT - ( 26 Apr 2025 12:21 )  PTT:29.8 sec        MICRO:      RADIOLOGY & ADDITIONAL TESTS:

## 2025-04-28 NOTE — DIETITIAN INITIAL EVALUATION ADULT - OTHER INFO
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    Pertinent Medications:   - Antibiotics in use  - Cholecalciferol   - Multivitamin  - Zofran prn  - Steroid in use, may elevate blood glucose

## 2025-04-28 NOTE — DIETITIAN INITIAL EVALUATION ADULT - REASON
Patient off unit, unable to perform nutrition-focused physical exam. RD to perform at follow-up or as able.

## 2025-04-28 NOTE — DIETITIAN INITIAL EVALUATION ADULT - ADD RECOMMEND
1. When medically appropriate, resume regular diet   2. Upon p.o. diet advancement, recommend trial Ensure Plus High Protein once daily (350kcals, 20g protein) for p.o. optimization   3. Continue Multivitamin and recommend add Ascorbic Acid to promote pressure injury healing pending no medical contraindications   4. Continue Cholecalciferol to prevent micronutrient deficiencies per team discretion   5. Monitor routine weights, nutrition related labs, diet advancements, p.o. intake and tolerance, oral nutrition supplement compliance, and skin integrity

## 2025-04-28 NOTE — DIETITIAN INITIAL EVALUATION ADULT - PROBLEM SELECTOR PLAN 2
Diarrhea x2 days. Had Neupogen 1 day prior to onset but not a known side effect. At baseline is constipated. CT showing colitis at splenic flexure and proctitis. No recent ABx or history of prolonged ABx in the past. Takes chronic prednisone and is leukopenic so need to r/o c-diff.    - Stool studies: GI PCR, stool culture, C diff  - consider starting PO vancomycin after C diff collected  - regular diet as tolerated for now per patient preference

## 2025-04-28 NOTE — DIETITIAN INITIAL EVALUATION ADULT - REASON INDICATOR FOR ASSESSMENT
Dietitian consult received for: Pressure injury stage 2 or >  Chart reviewed, events noted   Information obtained from: electronic medical record, patient

## 2025-04-28 NOTE — PROGRESS NOTE ADULT - SUBJECTIVE AND OBJECTIVE BOX
TEAM [ [facility] ] Surgery Daily Progress Note  =====================================================    SUBJECTIVE: Patient seen and examined at bedside on AM rounds.     PMH:  PAST MEDICAL & SURGICAL HISTORY:  HTN (hypertension)      HLD (hyperlipidemia)      Prostate cancer      S/P prostatectomy          ALLERGIES:  No Known Allergies      --------------------------------------------------------------------------------------    MEDICATIONS:    Neurologic Medications  acetaminophen     Tablet .. 1000 milliGRAM(s) Oral every 6 hours PRN Temp greater or equal to 38C (100.4F), Mild Pain (1 - 3)  melatonin 3 milliGRAM(s) Oral at bedtime PRN Insomnia  ondansetron Injectable 4 milliGRAM(s) IV Push every 8 hours PRN Nausea and/or Vomiting  sertraline 25 milliGRAM(s) Oral daily    Respiratory Medications    Cardiovascular Medications    Gastrointestinal Medications  cholecalciferol 1000 Unit(s) Oral daily  multivitamin 1 Tablet(s) Oral daily  simethicone 80 milliGRAM(s) Chew three times a day    Genitourinary Medications    Hematologic/Oncologic Medications  enoxaparin Injectable 40 milliGRAM(s) SubCutaneous every 24 hours    Antimicrobial/Immunologic Medications  cefepime   IVPB 2000 milliGRAM(s) IV Intermittent every 12 hours  entecavir 0.5 milliGRAM(s) Oral every 48 hours  metroNIDAZOLE    Tablet 500 milliGRAM(s) Oral every 12 hours    Endocrine/Metabolic Medications  levothyroxine 100 MICROGram(s) Oral daily  predniSONE   Tablet 5 milliGRAM(s) Oral two times a day    Topical/Other Medications    --------------------------------------------------------------------------------------    VITAL SIGNS:  Vital Signs Last 24 Hrs  T(C): 36.6 (28 Apr 2025 16:52), Max: 36.8 (27 Apr 2025 20:42)  T(F): 97.9 (28 Apr 2025 16:52), Max: 98.3 (27 Apr 2025 20:42)  HR: 99 (28 Apr 2025 16:52) (82 - 99)  BP: 113/62 (28 Apr 2025 16:52) (113/62 - 148/63)  BP(mean): --  RR: 17 (28 Apr 2025 16:52) (17 - 18)  SpO2: 96% (28 Apr 2025 16:52) (95% - 97%)    Parameters below as of 28 Apr 2025 16:52  Patient On (Oxygen Delivery Method): room air      --------------------------------------------------------------------------------------    EXAM    General: NAD, resting in bed comfortably.  Cardiac: regular rate, warm and well perfused  Respiratory: Nonlabored respirations, normal cw expansion.  Abdomen: soft, nontender, nondistended.   Extremities: normal strength, FROM, no deformities    --------------------------------------------------------------------------------------    LABS                        11.1   8.36  )-----------( 151      ( 28 Apr 2025 06:08 )             33.7   04-28    135  |  105  |  12  ----------------------------<  98  3.6   |  19[L]  |  0.90    Ca    8.4      28 Apr 2025 06:08  Phos  2.7     04-28  Mg     1.8     04-28    TPro  5.2[L]  /  Alb  2.4[L]  /  TBili  0.5  /  DBili  x   /  AST  57[H]  /  ALT  15  /  AlkPhos  118  04-28      --------------------------------------------------------------------------------------    INS AND OUTS:  I&O's Detail    27 Apr 2025 07:01  -  28 Apr 2025 07:00  --------------------------------------------------------  IN:    Lactated Ringers: 525 mL  Total IN: 525 mL    OUT:  Total OUT: 0 mL    Total NET: 525 mL      28 Apr 2025 07:01  -  28 Apr 2025 17:31  --------------------------------------------------------  IN:  Total IN: 0 mL    OUT:    Oral Fluid: 0 mL    Voided (mL): 350 mL  Total OUT: 350 mL    Total NET: -350 mL        --------------------------------------------------------------------------------------

## 2025-04-28 NOTE — ADVANCED PRACTICE NURSE CONSULT - ASSESSMENT
Patient encountered on [address] [place]. When wound care RN arrived on unit, patient was found lying in a low air loss pressure redistribution support surface style bed. Patient Pat was alert and oriented and gave consent to skin consult. This patient is able to turn independently with some difficulty and staff assistance x 1 was provided. Patient is incontinent of urine and stool, skin clean at this time. Once turned, the wound care RN was able to visualize moist skin within the gluteal cleft and an area of persistent nonblanchable dark erythema over B/L buttocks/sacral skin, area measures approximately 4cm x 7cm x 0cm -  presentation is consistent with a deep tissue injury with incontinence involvement, present on admission. B/L heels with hyperpigmentation measuring approximately 2cm x 2cm x 0cm- presentation is consistent with a deep tissue injury present on admission. Right plantar foot callus noted, may follow up with podiatry as an outpatient at [address] [place], [place] [id number] [phone]. Once consult was complete, patient was educated regarding the need for routine turning and positioning to prevent pressure injuries and patient was assisted to a left side-lying position utilizing pillow positioner assistive devices.

## 2025-04-28 NOTE — DIETITIAN INITIAL EVALUATION ADULT - ORAL INTAKE PTA/DIET HISTORY
Attempted to visit patient. Patient off unit and family not present at bedside during visit. Unable to obtain nutrition hx at this time.

## 2025-04-28 NOTE — DIETITIAN INITIAL EVALUATION ADULT - LITERATURE/VIDEOS GIVEN
Patient off unit, unable to provide diet education at this time. RD remains available to provide diet education as indicated.

## 2025-04-28 NOTE — DIETITIAN INITIAL EVALUATION ADULT - PERTINENT LABORATORY DATA
04-28    135  |  105  |  12  ----------------------------<  98  3.6   |  19[L]  |  0.90    Ca    8.4      28 Apr 2025 06:08  Phos  2.7     04-28  Mg     1.8     04-28    TPro  5.2[L]  /  Alb  2.4[L]  /  TBili  0.5  /  DBili  x   /  AST  57[H]  /  ALT  15  /  AlkPhos  118  04-28

## 2025-04-28 NOTE — ADVANCED PRACTICE NURSE CONSULT - RECOMMEDATIONS
Impression:    B/L buttocks/sacral deep tissue injury present on admission  B/L heel hyperpigmentation, cannot rule out a deep tissue injury present on admission  right foot plantar callus  urinary incontinence  fecal incontinence  incontinence associated dermatitis    Recommendations:    1) turn and position q2 and PRN utilizing offloading assistive devices  2) routine pericare daily and PRN soiling  3) encourage optimal nutrition  4) waffle cushion or pillow on seat when oob to chair  5) B/L LE complete cair air fluidized boots or petra-lock pillow to offload heels/feet  6) triad protective barrier cream to B/L buttocks/sacrum daily and PRN soiling  7) incontinence management - consider external urinary catheter to divert urine from skin if incontinent  8) sween 24 moisturizer to dry skin daily   9) may follow up with podiatry for callus as an outpatient at [address] [place] [address] [phone]     Plan discussed with WILIAM Hendrix at bedside      For questions/comments regarding the recommendations in this consult, please contact [address] via [employer] Teams. Wound care will not actively follow. For new concerns, please enter new consult. Thank you!

## 2025-04-28 NOTE — DIETITIAN INITIAL EVALUATION ADULT - NS FNS DIET ORDER
Diet, NPO:   NPO for Procedure/Test  NPO Start Date: 28-Apr-2025,   NPO Start Time: 04:00  Except Medications  With Ice Chips/Sips of Water (04-27-25 @ 23:33)  Diet, Regular (04-27-25 @ 15:08)

## 2025-04-28 NOTE — ADVANCED PRACTICE NURSE CONSULT - REASON FOR CONSULT
Wound care consult initiated by RN to assess patient's skin for a possible buttocks/sacral deep tissue injury present on admission     Reason for Admission: Sepsis + Colitis  History of Present Illness:   89M w hx of prostate cancer (s/p prostatectomy now metastatic to liver and lung) on chemo, pulmonary wedge resection, 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 Patient was in his usual state of health on chemotherapy for metastatic prostate cancer for last few months and was given a first dose of Neupogen this week. The next day the patient developed exclusively watery diarrhea during bowel movements. He reported feeling weaker and somewhat short of breath. After fluid resuscitation today patient denies any SOB, URI symptoms, abd pain, dysuria. Patient endorses urinary incontinence which is chronic. Denies any recent Abx or hx of prolonged Abx courses in the past. Does take 5mg prednisone BID for prostate cancer.      ED Course:   VS notable for T101.2 rectal, , BP 97/63. CBC notable for WBC 2.31, hgb 10.9, plt 147 Myelocytes and metamyelocytes present. D-dimer 771. Lactate 3.9. . UA negative for infection. CTA negative for PE and no focal consolidation. CT A/p concerning for colitis at the splenic flexure with possible splenic vein thrombosis. POCUS TTE wnl. Was given 2L IVF bolus, cefepime, and 1g vancomycin.

## 2025-04-28 NOTE — PROGRESS NOTE ADULT - ASSESSMENT
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

## 2025-04-28 NOTE — DIETITIAN INITIAL EVALUATION ADULT - PERTINENT MEDS FT
MEDICATIONS  (STANDING):  cefepime   IVPB 2000 milliGRAM(s) IV Intermittent every 12 hours  cholecalciferol 1000 Unit(s) Oral daily  enoxaparin Injectable 40 milliGRAM(s) SubCutaneous every 24 hours  entecavir 0.5 milliGRAM(s) Oral every 48 hours  filgrastim-sndz (ZARXIO) Injectable 300 MICROGram(s) SubCutaneous daily  levothyroxine 100 MICROGram(s) Oral daily  metroNIDAZOLE    Tablet 500 milliGRAM(s) Oral every 12 hours  multivitamin 1 Tablet(s) Oral daily  predniSONE   Tablet 5 milliGRAM(s) Oral two times a day  sertraline 25 milliGRAM(s) Oral daily    MEDICATIONS  (PRN):  acetaminophen     Tablet .. 650 milliGRAM(s) Oral every 6 hours PRN Temp greater or equal to 38C (100.4F), Mild Pain (1 - 3)  melatonin 3 milliGRAM(s) Oral at bedtime PRN Insomnia  ondansetron Injectable 4 milliGRAM(s) IV Push every 8 hours PRN Nausea and/or Vomiting

## 2025-04-28 NOTE — PROGRESS NOTE ADULT - SUBJECTIVE AND OBJECTIVE BOX
Chief Complaint:  FU prostate cancer, febrile neutropenia    History of Present Illness:  no f/c, no cp, no dyspnea/cough, no n/v/abd pain, tolerating diet,  no loose BM today; no bleeding reported      MEDICATIONS  (STANDING):  cefepime   IVPB 2000 milliGRAM(s) IV Intermittent every 12 hours  cholecalciferol 1000 Unit(s) Oral daily  enoxaparin Injectable 40 milliGRAM(s) SubCutaneous every 24 hours  entecavir 0.5 milliGRAM(s) Oral every 48 hours  levothyroxine 100 MICROGram(s) Oral daily  metroNIDAZOLE    Tablet 500 milliGRAM(s) Oral every 12 hours  multivitamin 1 Tablet(s) Oral daily  predniSONE   Tablet 5 milliGRAM(s) Oral two times a day  sertraline 25 milliGRAM(s) Oral daily  simethicone 80 milliGRAM(s) Chew three times a day    MEDICATIONS  (PRN):  acetaminophen     Tablet .. 1000 milliGRAM(s) Oral every 6 hours PRN Temp greater or equal to 38C (100.4F), Mild Pain (1 - 3)  melatonin 3 milliGRAM(s) Oral at bedtime PRN Insomnia  ondansetron Injectable 4 milliGRAM(s) IV Push every 8 hours PRN Nausea and/or Vomiting      Allergies    No Known Allergies    Intolerances        Vital Signs Last 24 Hrs  T(C): 36.6 (28 Apr 2025 11:01), Max: 36.8 (27 Apr 2025 20:42)  T(F): 97.9 (28 Apr 2025 11:01), Max: 98.3 (27 Apr 2025 20:42)  HR: 82 (28 Apr 2025 11:01) (82 - 96)  BP: 141/58 (28 Apr 2025 11:01) (133/54 - 148/63)  BP(mean): --  RR: 18 (28 Apr 2025 11:01) (18 - 18)  SpO2: 97% (28 Apr 2025 11:01) (93% - 97%)    Parameters below as of 28 Apr 2025 11:01  Patient On (Oxygen Delivery Method): room air        PHYSICAL EXAM  General: adult in NAD  HEENT: clear oropharynx, anicteric sclera, pink conjunctiva  Neck: supple  CV: normal S1/S2   Lungs: clear to auscultation, no wheezes, no rales  Abdomen: soft non-tender non-distended, positive bowel sounds  Ext: no clubbing cyanosis or edema  Skin: no rashes and no petechiae  Lymph Nodes: No LAD in neck  Neuro: alert and oriented X 3    LABS:                          11.1   8.36  )-----------( 151      ( 28 Apr 2025 06:08 )             33.7         Mean Cell Volume : 94.4 fl  Mean Cell Hemoglobin : 31.1 pg  Mean Cell Hemoglobin Concentration : 32.9 g/dL  Auto Neutrophil # : x  Auto Lymphocyte # : x  Auto Monocyte # : x  Auto Eosinophil # : x  Auto Basophil # : x  Auto Neutrophil % : x  Auto Lymphocyte % : x  Auto Monocyte % : x  Auto Eosinophil % : x  Auto Basophil % : x      Serial CBC's  04-28 @ 06:08  Hct-33.7 / Hgb-11.1 / Plat-151 / RBC-3.57 / WBC-8.36  Serial CBC's  04-27 @ 06:10  Hct-30.7 / Hgb-10.1 / Plat-134 / RBC-3.22 / WBC-2.46  Serial CBC's  04-26 @ 12:21  Hct-32.4 / Hgb-10.9 / Plat-147 / RBC-3.37 / WBC-2.31      04-28    135  |  105  |  12  ----------------------------<  98  3.6   |  19[L]  |  0.90    Ca    8.4      28 Apr 2025 06:08  Phos  2.7     04-28  Mg     1.8     04-28    TPro  5.2[L]  /  Alb  2.4[L]  /  TBili  0.5  /  DBili  x   /  AST  57[H]  /  ALT  15  /  AlkPhos  118  04-28                      Radiology:      < from: US Spleen (04.28.25 @ 12:10) >  IMPRESSION: Patent splenic vein.    < end of copied text >

## 2025-04-28 NOTE — DIETITIAN INITIAL EVALUATION ADULT - PHYSCIAL ASSESSMENT
Per Nakul HIE: 164lbs (5/06/2024), 161lbs (1/10/2025), 156lbs (4/26/2025). 4.9% weight loss x 11 months indicated.

## 2025-04-29 ENCOUNTER — TRANSCRIPTION ENCOUNTER (OUTPATIENT)
Age: 89
End: 2025-04-29

## 2025-04-29 VITALS
SYSTOLIC BLOOD PRESSURE: 151 MMHG | TEMPERATURE: 98 F | HEART RATE: 91 BPM | OXYGEN SATURATION: 92 % | RESPIRATION RATE: 17 BRPM | DIASTOLIC BLOOD PRESSURE: 62 MMHG

## 2025-04-29 LAB
CULTURE RESULTS: SIGNIFICANT CHANGE UP
SPECIMEN SOURCE: SIGNIFICANT CHANGE UP

## 2025-04-29 PROCEDURE — 82962 GLUCOSE BLOOD TEST: CPT

## 2025-04-29 PROCEDURE — 85610 PROTHROMBIN TIME: CPT

## 2025-04-29 PROCEDURE — 85025 COMPLETE CBC W/AUTO DIFF WBC: CPT

## 2025-04-29 PROCEDURE — 97161 PT EVAL LOW COMPLEX 20 MIN: CPT

## 2025-04-29 PROCEDURE — 84295 ASSAY OF SERUM SODIUM: CPT

## 2025-04-29 PROCEDURE — 86901 BLOOD TYPING SEROLOGIC RH(D): CPT

## 2025-04-29 PROCEDURE — 87507 IADNA-DNA/RNA PROBE TQ 12-25: CPT

## 2025-04-29 PROCEDURE — 87449 NOS EACH ORGANISM AG IA: CPT

## 2025-04-29 PROCEDURE — 87324 CLOSTRIDIUM AG IA: CPT

## 2025-04-29 PROCEDURE — 83880 ASSAY OF NATRIURETIC PEPTIDE: CPT

## 2025-04-29 PROCEDURE — 71275 CT ANGIOGRAPHY CHEST: CPT | Mod: MC

## 2025-04-29 PROCEDURE — 82803 BLOOD GASES ANY COMBINATION: CPT

## 2025-04-29 PROCEDURE — 87040 BLOOD CULTURE FOR BACTERIA: CPT

## 2025-04-29 PROCEDURE — 84484 ASSAY OF TROPONIN QUANT: CPT

## 2025-04-29 PROCEDURE — 71045 X-RAY EXAM CHEST 1 VIEW: CPT

## 2025-04-29 PROCEDURE — 93005 ELECTROCARDIOGRAM TRACING: CPT

## 2025-04-29 PROCEDURE — 84449 ASSAY OF TRANSCORTIN: CPT

## 2025-04-29 PROCEDURE — 87637 SARSCOV2&INF A&B&RSV AMP PRB: CPT

## 2025-04-29 PROCEDURE — 99239 HOSP IP/OBS DSCHRG MGMT >30: CPT | Mod: GC

## 2025-04-29 PROCEDURE — 83735 ASSAY OF MAGNESIUM: CPT

## 2025-04-29 PROCEDURE — 84145 PROCALCITONIN (PCT): CPT

## 2025-04-29 PROCEDURE — 85730 THROMBOPLASTIN TIME PARTIAL: CPT

## 2025-04-29 PROCEDURE — 84132 ASSAY OF SERUM POTASSIUM: CPT

## 2025-04-29 PROCEDURE — 84443 ASSAY THYROID STIM HORMONE: CPT

## 2025-04-29 PROCEDURE — 76705 ECHO EXAM OF ABDOMEN: CPT

## 2025-04-29 PROCEDURE — 74177 CT ABD & PELVIS W/CONTRAST: CPT | Mod: MC

## 2025-04-29 PROCEDURE — 93308 TTE F-UP OR LMTD: CPT

## 2025-04-29 PROCEDURE — 83605 ASSAY OF LACTIC ACID: CPT

## 2025-04-29 PROCEDURE — 87045 FECES CULTURE AEROBIC BACT: CPT

## 2025-04-29 PROCEDURE — 87046 STOOL CULTR AEROBIC BACT EA: CPT

## 2025-04-29 PROCEDURE — 99285 EMERGENCY DEPT VISIT HI MDM: CPT | Mod: 25

## 2025-04-29 PROCEDURE — 82330 ASSAY OF CALCIUM: CPT

## 2025-04-29 PROCEDURE — 87177 OVA AND PARASITES SMEARS: CPT

## 2025-04-29 PROCEDURE — 82947 ASSAY GLUCOSE BLOOD QUANT: CPT

## 2025-04-29 PROCEDURE — 86900 BLOOD TYPING SEROLOGIC ABO: CPT

## 2025-04-29 PROCEDURE — 81001 URINALYSIS AUTO W/SCOPE: CPT

## 2025-04-29 PROCEDURE — 85018 HEMOGLOBIN: CPT

## 2025-04-29 PROCEDURE — 87640 STAPH A DNA AMP PROBE: CPT

## 2025-04-29 PROCEDURE — 96375 TX/PRO/DX INJ NEW DRUG ADDON: CPT

## 2025-04-29 PROCEDURE — 80053 COMPREHEN METABOLIC PANEL: CPT

## 2025-04-29 PROCEDURE — 96374 THER/PROPH/DIAG INJ IV PUSH: CPT

## 2025-04-29 PROCEDURE — 84100 ASSAY OF PHOSPHORUS: CPT

## 2025-04-29 PROCEDURE — 82435 ASSAY OF BLOOD CHLORIDE: CPT

## 2025-04-29 PROCEDURE — 85379 FIBRIN DEGRADATION QUANT: CPT

## 2025-04-29 PROCEDURE — 85014 HEMATOCRIT: CPT

## 2025-04-29 PROCEDURE — 87641 MR-STAPH DNA AMP PROBE: CPT

## 2025-04-29 PROCEDURE — 82533 TOTAL CORTISOL: CPT

## 2025-04-29 PROCEDURE — 86850 RBC ANTIBODY SCREEN: CPT

## 2025-04-29 RX ORDER — CIPROFLOXACIN HCL 250 MG
1 TABLET ORAL
Qty: 7 | Refills: 0
Start: 2025-04-29 | End: 2025-05-01

## 2025-04-29 RX ORDER — METRONIDAZOLE 250 MG
1 TABLET ORAL
Qty: 7 | Refills: 0
Start: 2025-04-29 | End: 2025-05-02

## 2025-04-29 RX ADMIN — SERTRALINE 25 MILLIGRAM(S): 100 TABLET, FILM COATED ORAL at 12:11

## 2025-04-29 RX ADMIN — Medication 500 MILLIGRAM(S): at 05:12

## 2025-04-29 RX ADMIN — Medication 1000 UNIT(S): at 12:12

## 2025-04-29 RX ADMIN — PREDNISONE 5 MILLIGRAM(S): 20 TABLET ORAL at 05:13

## 2025-04-29 RX ADMIN — Medication 1 TABLET(S): at 12:11

## 2025-04-29 RX ADMIN — ENOXAPARIN SODIUM 40 MILLIGRAM(S): 100 INJECTION SUBCUTANEOUS at 05:11

## 2025-04-29 RX ADMIN — ENTECAVIR 0.5 MILLIGRAM(S): 0.5 TABLET ORAL at 12:11

## 2025-04-29 RX ADMIN — PREDNISONE 5 MILLIGRAM(S): 20 TABLET ORAL at 17:29

## 2025-04-29 RX ADMIN — Medication 80 MILLIGRAM(S): at 05:13

## 2025-04-29 RX ADMIN — CEFEPIME 100 MILLIGRAM(S): 2 INJECTION, POWDER, FOR SOLUTION INTRAVENOUS at 12:11

## 2025-04-29 RX ADMIN — Medication 100 MICROGRAM(S): at 05:12

## 2025-04-29 RX ADMIN — Medication 500 MILLIGRAM(S): at 17:29

## 2025-04-29 RX ADMIN — Medication 80 MILLIGRAM(S): at 12:11

## 2025-04-29 NOTE — PROGRESS NOTE ADULT - PROBLEM SELECTOR PLAN 3
S/p prostatectomy in his 50's. Recurred with metastatic disease to liver and lung. S/p chest radiation and wedge resection. Has been on chem for last couple months. Follows with Dr. Chase (urology) and oncology. Had first Neupogen dose on 4/23.    - Continue home prednisone 5mg BID (clarify indication with outpatient team when possible)  - Monitor CBC diff for neutropenia  - Start filgastrim per heme/onc (4/26 - )  - private heme/onc following, appreciate recs

## 2025-04-29 NOTE — DISCHARGE NOTE NURSING/CASE MANAGEMENT/SOCIAL WORK - FINANCIAL ASSISTANCE
[facility] provides services at a reduced cost to those who are determined to be eligible through [facility]’s financial assistance program. Information regarding [facility]’s financial assistance program can be found by going to [email or web address] or by calling [phone].

## 2025-04-29 NOTE — PROGRESS NOTE ADULT - REASON FOR ADMISSION
Sepsis + Colitis
No
Sepsis + Colitis

## 2025-04-29 NOTE — PROGRESS NOTE ADULT - PROBLEM SELECTOR PLAN 9
DVT ppx: Lovenox 40mg daily  Diet: Regular (NPO prior to abd US)  Dispo: Pending PT  Code Status: FULL CODE

## 2025-04-29 NOTE — PROGRESS NOTE ADULT - PROBLEM SELECTOR PLAN 1
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 UA and CT negative for infectious concerns. RVP Negative w/o URI symptoms. 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 Lactate 3.9 --> 1.7 s/p 2L IVF. 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 Penile burning overnight 4/26-27 but repeat UA negative.    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 UA and CT negative for infectious concerns. RVP Negative w/o URI symptoms. 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 Lactate 3.9 --> 1.7 s/p 2L IVF. 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 Penile burning overnight 4/26-27 but repeat UA negative.    Plan:   - F/u random cortisol level  - cont Cefepime 2g q12hr renal dosing (4/26 - )   - C/w Metronidazole 500mg q12hr (4/26 - )  - Started on filgrastrim per heme-onc  - Remaining Plan per Colitis section

## 2025-04-29 NOTE — PROGRESS NOTE ADULT - SUBJECTIVE AND OBJECTIVE BOX
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 Patient was in his usual state of health on chemotherapy for metastatic prostate cancer for last few months and was given a first dose of Neupogen this week. The next day the patient developed exclusively watery diarrhea during bowel movements. CT A/p concerning for colitis at the splenic flexure with possible splenic vein thrombosis.     PAST MEDICAL & SURGICAL HISTORY:  HTN (hypertension)      HLD (hyperlipidemia)      Prostate cancer      S/P prostatectomy        Allergies    No Known Allergies    Intolerances      Social History:  Per chart review: "Lives at home with wife. Denies alcohol, recreational drug use. 200 pack-year (not a typo) smoking history, quit 30 years ago." (26 Apr 2025 17:32)    Medications:  acetaminophen     Tablet .. 1000 milliGRAM(s) Oral every 6 hours PRN Temp greater or equal to 38C (100.4F), Mild Pain (1 - 3)  cefepime   IVPB 2000 milliGRAM(s) IV Intermittent every 12 hours  cholecalciferol 1000 Unit(s) Oral daily  enoxaparin Injectable 40 milliGRAM(s) SubCutaneous every 24 hours  entecavir 0.5 milliGRAM(s) Oral every 48 hours  levothyroxine 100 MICROGram(s) Oral daily  melatonin 3 milliGRAM(s) Oral at bedtime PRN Insomnia  metroNIDAZOLE    Tablet 500 milliGRAM(s) Oral every 12 hours  multivitamin 1 Tablet(s) Oral daily  ondansetron Injectable 4 milliGRAM(s) IV Push every 8 hours PRN Nausea and/or Vomiting  predniSONE   Tablet 5 milliGRAM(s) Oral two times a day  sertraline 25 milliGRAM(s) Oral daily  simethicone 80 milliGRAM(s) Chew three times a day    Labs:  CBC Full  -  ( 28 Apr 2025 06:08 )  WBC Count : 8.36 K/uL  RBC Count : 3.57 M/uL  Hemoglobin : 11.1 g/dL  Hematocrit : 33.7 %  Platelet Count - Automated : 151 K/uL  Mean Cell Volume : 94.4 fl  Mean Cell Hemoglobin : 31.1 pg  Mean Cell Hemoglobin Concentration : 32.9 g/dL  Auto Neutrophil # : x  Auto Lymphocyte # : x  Auto Monocyte # : x  Auto Eosinophil # : x  Auto Basophil # : x  Auto Neutrophil % : x  Auto Lymphocyte % : x  Auto Monocyte % : x  Auto Eosinophil % : x  Auto Basophil % : x    04-28    135  |  105  |  12  ----------------------------<  98  3.6   |  19[L]  |  0.90    Ca    8.4      28 Apr 2025 06:08  Phos  2.7     04-28  Mg     1.8     04-28    TPro  5.2[L]  /  Alb  2.4[L]  /  TBili  0.5  /  DBili  x   /  AST  57[H]  /  ALT  15  /  AlkPhos  118  04-28      Radiology:             ROS:  Patient comfortable without distress  No SOB or chest pain  No palpitation  No abdominal pain, diarrhaea or constipation  No weakness of extremities  No skin changes or swelling of legs  Rest of the comprehensive ROS was negative  Vital Signs Last 24 Hrs  T(C): 36.6 (29 Apr 2025 10:54), Max: 36.6 (28 Apr 2025 16:52)  T(F): 97.9 (29 Apr 2025 10:54), Max: 97.9 (28 Apr 2025 16:52)  HR: 83 (29 Apr 2025 10:54) (83 - 99)  BP: 141/63 (29 Apr 2025 10:54) (113/62 - 141/63)  BP(mean): --  RR: 18 (29 Apr 2025 10:54) (17 - 18)  SpO2: 95% (29 Apr 2025 10:54) (93% - 96%)    Parameters below as of 29 Apr 2025 10:54  Patient On (Oxygen Delivery Method): room air        Physical exam:  Patient alert and oriented  No distress  CVS: S1, S2   Chest: bilateral breath sound without rales  Abdomen: soft, not tender, no organomegaly or masses  CNS: No focal neuro deficit  Musculoskeletal:  Normal range of motion  Skin: No rash    Assessment and Plan: HPI:  89M w hx of prostate cancer (s/p prostatectomy 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 Patient was in his usual state of health on chemotherapy for metastatic prostate cancer for last few months and was given a first dose of Neupogen this week. The next day the patient developed exclusively watery diarrhea during bowel movements. CT A/p concerning for colitis at the splenic flexure with possible splenic vein thrombosis.     PAST MEDICAL & SURGICAL HISTORY:  HTN (hypertension)      HLD (hyperlipidemia)      Prostate cancer      S/P prostatectomy        Allergies    No Known Allergies    Intolerances      Social History:  Per chart review: "Lives at home with wife. Denies alcohol, recreational drug use. 200 pack-year (not a typo) smoking history, quit 30 years ago." (26 Apr 2025 17:32)    Medications:  acetaminophen     Tablet .. 1000 milliGRAM(s) Oral every 6 hours PRN Temp greater or equal to 38C (100.4F), Mild Pain (1 - 3)  cefepime   IVPB 2000 milliGRAM(s) IV Intermittent every 12 hours  cholecalciferol 1000 Unit(s) Oral daily  enoxaparin Injectable 40 milliGRAM(s) SubCutaneous every 24 hours  entecavir 0.5 milliGRAM(s) Oral every 48 hours  levothyroxine 100 MICROGram(s) Oral daily  melatonin 3 milliGRAM(s) Oral at bedtime PRN Insomnia  metroNIDAZOLE    Tablet 500 milliGRAM(s) Oral every 12 hours  multivitamin 1 Tablet(s) Oral daily  ondansetron Injectable 4 milliGRAM(s) IV Push every 8 hours PRN Nausea and/or Vomiting  predniSONE   Tablet 5 milliGRAM(s) Oral two times a day  sertraline 25 milliGRAM(s) Oral daily  simethicone 80 milliGRAM(s) Chew three times a day    Labs:  CBC Full  -  ( 28 Apr 2025 06:08 )  WBC Count : 8.36 K/uL  RBC Count : 3.57 M/uL  Hemoglobin : 11.1 g/dL  Hematocrit : 33.7 %  Platelet Count - Automated : 151 K/uL  Mean Cell Volume : 94.4 fl  Mean Cell Hemoglobin : 31.1 pg  Mean Cell Hemoglobin Concentration : 32.9 g/dL  Auto Neutrophil # : x  Auto Lymphocyte # : x  Auto Monocyte # : x  Auto Eosinophil # : x  Auto Basophil # : x  Auto Neutrophil % : x  Auto Lymphocyte % : x  Auto Monocyte % : x  Auto Eosinophil % : x  Auto Basophil % : x    04-28    135  |  105  |  12  ----------------------------<  98  3.6   |  19[L]  |  0.90    Ca    8.4      28 Apr 2025 06:08  Phos  2.7     04-28  Mg     1.8     04-28    TPro  5.2[L]  /  Alb  2.4[L]  /  TBili  0.5  /  DBili  x   /  AST  57[H]  /  ALT  15  /  AlkPhos  118  04-28      Radiology:             ROS:  Patient comfortable without distress  No SOB or chest pain  No palpitation  No abdominal pain, diarrhaea or constipation  No weakness of extremities  No skin changes or swelling of legs  Rest of the comprehensive ROS was negative  Vital Signs Last 24 Hrs  T(C): 36.6 (29 Apr 2025 10:54), Max: 36.6 (28 Apr 2025 16:52)  T(F): 97.9 (29 Apr 2025 10:54), Max: 97.9 (28 Apr 2025 16:52)  HR: 83 (29 Apr 2025 10:54) (83 - 99)  BP: 141/63 (29 Apr 2025 10:54) (113/62 - 141/63)  BP(mean): --  RR: 18 (29 Apr 2025 10:54) (17 - 18)  SpO2: 95% (29 Apr 2025 10:54) (93% - 96%)    Parameters below as of 29 Apr 2025 10:54  Patient On (Oxygen Delivery Method): room air        Physical exam:  Patient alert and oriented  No distress  CVS: S1, S2   Chest: bilateral breath sound without rales  Abdomen: soft, not tender, no organomegaly or masses. Eating and tolerating  CNS: No focal neuro deficit  Musculoskeletal:  Normal range of motion  Skin: No rash    Assessment and Plan:

## 2025-04-29 NOTE — PROGRESS NOTE ADULT - PROBLEM SELECTOR PLAN 7
- Check TSH w/reflex FT4  - Continue home Synthroid 100mcg for now

## 2025-04-29 NOTE — PROGRESS NOTE ADULT - ASSESSMENT
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 WBC now normal. Receiving abx as per chart for proctitis , colitis and improving.   Anemia- likely secondary to chemo + acute illness; hg improved  Vascular note noted, no splenic vein thrombosis on prophylactic lovenox.   Will follow as outpatient with Dr. 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 WBC now normal. Receiving abx as per chart for proctitis , colitis and improving.   Anemia- likely secondary to chemo + acute illness; hg improved  Vascular note noted, no splenic vein thrombosis on prophylactic lovenox.   Will follow as outpatient with Dr. Lovett  Says is going home soon

## 2025-04-29 NOTE — DISCHARGE NOTE NURSING/CASE MANAGEMENT/SOCIAL WORK - PATIENT PORTAL LINK FT
You can access the FollowMyHealth Patient Portal offered by [facility] by registering at the following website: [email or web address]. By joining [facility]’s FollowMyHealth portal, you will also be able to view your health information using other applications (apps) compatible with our system.

## 2025-04-29 NOTE — PROGRESS NOTE ADULT - SUBJECTIVE AND OBJECTIVE BOX
DATE OF SERVICE: 04-29-25 @ 07:37    Patient is a 89y old  Male who presents with a chief complaint of Sepsis + Colitis (28 Apr 2025 17:29)      SUBJECTIVE / OVERNIGHT EVENTS:  Overnight,  Pt seen and examined at bedside.    ROS negative except as above.    MEDICATIONS  (STANDING):  cefepime   IVPB 2000 milliGRAM(s) IV Intermittent every 12 hours  cholecalciferol 1000 Unit(s) Oral daily  enoxaparin Injectable 40 milliGRAM(s) SubCutaneous every 24 hours  entecavir 0.5 milliGRAM(s) Oral every 48 hours  levothyroxine 100 MICROGram(s) Oral daily  metroNIDAZOLE    Tablet 500 milliGRAM(s) Oral every 12 hours  multivitamin 1 Tablet(s) Oral daily  predniSONE   Tablet 5 milliGRAM(s) Oral two times a day  sertraline 25 milliGRAM(s) Oral daily  simethicone 80 milliGRAM(s) Chew three times a day    MEDICATIONS  (PRN):  acetaminophen     Tablet .. 1000 milliGRAM(s) Oral every 6 hours PRN Temp greater or equal to 38C (100.4F), Mild Pain (1 - 3)  melatonin 3 milliGRAM(s) Oral at bedtime PRN Insomnia  ondansetron Injectable 4 milliGRAM(s) IV Push every 8 hours PRN Nausea and/or Vomiting      Vital Signs Last 24 Hrs  T(C): 36.4 (29 Apr 2025 04:48), Max: 36.6 (28 Apr 2025 11:01)  T(F): 97.5 (29 Apr 2025 04:48), Max: 97.9 (28 Apr 2025 11:01)  HR: 91 (29 Apr 2025 04:48) (82 - 99)  BP: 127/51 (29 Apr 2025 04:48) (113/62 - 141/58)  BP(mean): --  RR: 18 (29 Apr 2025 04:48) (17 - 18)  SpO2: 93% (29 Apr 2025 04:48) (93% - 97%)    Parameters below as of 29 Apr 2025 04:48  Patient On (Oxygen Delivery Method): room air      CAPILLARY BLOOD GLUCOSE        I&O's Summary    28 Apr 2025 07:01  -  29 Apr 2025 07:00  --------------------------------------------------------  IN: 0 mL / OUT: 825 mL / NET: -825 mL        PHYSICAL EXAM:  GENERAL: NAD, well-developed  HEAD:  Atraumatic, Normocephalic  EYES: EOMI, conjunctiva and sclera clear  NECK: Supple, No JVD  CHEST/LUNG: Clear to auscultation bilaterally; No wheeze  HEART: Regular rate and rhythm; No murmurs, rubs, or gallops  ABDOMEN: Soft, Nontender, Nondistended; Bowel sounds present  EXTREMITIES:  2+ Peripheral Pulses, No clubbing, cyanosis, or edema  NEUROLOGY: AOx3, non-focal  SKIN: No rashes or lesions    LABS:                        11.1   8.36  )-----------( 151      ( 28 Apr 2025 06:08 )             33.7     04-28    135  |  105  |  12  ----------------------------<  98  3.6   |  19[L]  |  0.90    Ca    8.4      28 Apr 2025 06:08  Phos  2.7     04-28  Mg     1.8     04-28    TPro  5.2[L]  /  Alb  2.4[L]  /  TBili  0.5  /  DBili  x   /  AST  57[H]  /  ALT  15  /  AlkPhos  118  04-28            MICRO:      RADIOLOGY & ADDITIONAL TESTS:           DATE OF SERVICE: 04-29-25 @ 07:37    Patient is a 89y old  Male who presents with a chief complaint of Sepsis + Colitis (28 Apr 2025 17:29)      SUBJECTIVE / OVERNIGHT EVENTS:  Overnight, no acute events.  Pt seen and examined at bedside. Pt reports he feels well this AM. Feels strong and like he can walk around. Pt ambulates independently at home. Eating well. No further episodes of diarrhea. No belly pain.     ROS negative except as above.    MEDICATIONS  (STANDING):  cefepime   IVPB 2000 milliGRAM(s) IV Intermittent every 12 hours  cholecalciferol 1000 Unit(s) Oral daily  enoxaparin Injectable 40 milliGRAM(s) SubCutaneous every 24 hours  entecavir 0.5 milliGRAM(s) Oral every 48 hours  levothyroxine 100 MICROGram(s) Oral daily  metroNIDAZOLE    Tablet 500 milliGRAM(s) Oral every 12 hours  multivitamin 1 Tablet(s) Oral daily  predniSONE   Tablet 5 milliGRAM(s) Oral two times a day  sertraline 25 milliGRAM(s) Oral daily  simethicone 80 milliGRAM(s) Chew three times a day    MEDICATIONS  (PRN):  acetaminophen     Tablet .. 1000 milliGRAM(s) Oral every 6 hours PRN Temp greater or equal to 38C (100.4F), Mild Pain (1 - 3)  melatonin 3 milliGRAM(s) Oral at bedtime PRN Insomnia  ondansetron Injectable 4 milliGRAM(s) IV Push every 8 hours PRN Nausea and/or Vomiting      Vital Signs Last 24 Hrs  T(C): 36.4 (29 Apr 2025 04:48), Max: 36.6 (28 Apr 2025 11:01)  T(F): 97.5 (29 Apr 2025 04:48), Max: 97.9 (28 Apr 2025 11:01)  HR: 91 (29 Apr 2025 04:48) (82 - 99)  BP: 127/51 (29 Apr 2025 04:48) (113/62 - 141/58)  BP(mean): --  RR: 18 (29 Apr 2025 04:48) (17 - 18)  SpO2: 93% (29 Apr 2025 04:48) (93% - 97%)    Parameters below as of 29 Apr 2025 04:48  Patient On (Oxygen Delivery Method): room air      CAPILLARY BLOOD GLUCOSE        I&O's Summary    28 Apr 2025 07:01  -  29 Apr 2025 07:00  --------------------------------------------------------  IN: 0 mL / OUT: 825 mL / NET: -825 mL        PHYSICAL EXAM:  GENERAL: NAD, well-developed  HEAD:  Atraumatic, Normocephalic  EYES: EOMI, conjunctiva and sclera clear  NECK: Supple, No JVD  CHEST/LUNG: Clear to auscultation bilaterally; No wheeze  HEART: Regular rate and rhythm; No murmurs, rubs, or gallops  ABDOMEN: Soft, Nontender, Nondistended; Bowel sounds present  EXTREMITIES:  2+ Peripheral Pulses, No clubbing, cyanosis, or edema  NEUROLOGY: AOx3, non-focal  SKIN: No rashes or lesions    LABS:                        11.1   8.36  )-----------( 151      ( 28 Apr 2025 06:08 )             33.7     04-28    135  |  105  |  12  ----------------------------<  98  3.6   |  19[L]  |  0.90    Ca    8.4      28 Apr 2025 06:08  Phos  2.7     04-28  Mg     1.8     04-28    TPro  5.2[L]  /  Alb  2.4[L]  /  TBili  0.5  /  DBili  x   /  AST  57[H]  /  ALT  15  /  AlkPhos  118  04-28            MICRO:      RADIOLOGY & ADDITIONAL TESTS:

## 2025-04-29 NOTE — PROGRESS NOTE ADULT - ATTENDING COMMENTS
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   Still with diarrhea this AM, but slightly less frequent   no abd pain, has some nausea after breakfast but no vomiting     - c diff, GI PCR negative   - c/w cefepime/flagyl, plan to d/c vanco if MRSA swab neg   - f/u pend culture data   - c/w zarxio per heme  - f/u US to eval for ?IVC thrombus     d/w HS
89-year-old male with a history of metastatic prostate cancer, undergoing chemotherapy, Hepatitis B, hypertension, and hyperlipidemia, presents with two days of diarrhea following Neupogen administration, leading to a diagnosis of neutropenic sepsis associated with colitis. His treatment includes broad-spectrum antibiotics (vancomycin, cefepime, metronidazole) due to his neutropenic state and a history of resistant infections. Filgrastim was been started to manage his neutropenia, which is now resolved and can be stopped. Despite negative initial infectious workups, he remains under close monitoring with continuing evaluation for potential sources of infection and colitis management. His prostate cancer treatment continues under the care of urology and oncology, with ongoing neutrophil support, and discharge planning is contingent on his recovery progress in the hospital.
89-year-old male with an extensive medical history including metastatic prostate cancer, undergoing chemotherapy, Hepatitis B, hypertension, hyperlipidemia, and recent neutropenic sepsis associated with colitis, is showing significant improvement. His neutropenic sepsis was effectively managed with broad-spectrum antibiotics including vancomycin, cefepime, and metronidazole, and his neutropenia was addressed with filgrastim, which has now resolved and can be discontinued.    Currently, the patient reports feeling well, with resolution of his initial symptom of diarrhea. He has actively participated in physical therapy, displaying significant mobility and strength, to the extent that no further home physical therapy is deemed necessary.    Given his stable condition and successful therapy sessions, the patient is cleared for discharge today. He will be sent home with a prescription to continue his antibiotics for a few additional days to complete a full seven-day course, ensuring thorough treatment of his colitis.    Continued follow-up care with his oncology and urology teams will be important to manage his underlying prostate cancer and monitor for any potential complications from his recent health challenges. Furthermore, regular monitoring and supportive care will be essential to maintain his overall well-being.    Discharge time spent: 36 min

## 2025-04-29 NOTE — PROGRESS NOTE ADULT - PROBLEM SELECTOR PROBLEM 3
Prostate cancer metastatic to multiple sites

## 2025-04-29 NOTE — PHARMACOTHERAPY INTERVENTION NOTE - COMMENTS
Counseled patient on the new medications sent to patient's pharmacy. Informed patients the indication, directions and side effects of the medications. Medication listed below.     ciprofloxacin 500 mg oral tablet: 1 tab(s) orally every 12 hours  metroNIDAZOLE 500 mg oral tablet: 1 tab(s) orally every 12 hours    Recommended patient to start tonight and continue for the remaining 7 doses. For Cipro - Do not take antacids, mineral supplements, dairy products, or multivitamins while on this medicine. Avoid prolonged exposure to the sun, use sunscreen and protective clothing while on this medicine.    And for Metronidazole - Do not drink alcohol or use any product that contains alcohol while you are taking this medicine and for at least 3 days after you stop.    Printed patient's medication list/schedule on [email or web address] And provided to patient at bedside which reviews indication, scheduled time, and possible side effects of the meds. Patient questions and concerns were answered and addressed. Patient demonstrated understanding.     Jonathan (Andrew Han) Scar - PharmD, BCPS  Transitions of Care Pharmacist  Available on [employer] Teams (Preferred)

## 2025-04-29 NOTE — DISCHARGE NOTE NURSING/CASE MANAGEMENT/SOCIAL WORK - NSDCFUADDAPPT_GEN_ALL_CORE_FT
APPTS ARE READY TO BE MADE: [X ] YES    Best Family or Patient Contact (if needed):  Jessee Tijerina (Daughter): [phone]    Additional Information about above appointments (if needed):    1: PCP  2: Urology  3: Oncology (Dr. Vang)    Other comments or requests:

## 2025-04-29 NOTE — PROGRESS NOTE ADULT - PROBLEM SELECTOR PLAN 2
Diarrhea x2 days. Had Neupogen 1 day prior to onset but not a known side effect. At baseline is constipated. CT showing colitis at splenic flexure and proctitis. No recent ABx or history of prolonged ABx in the past. Takes chronic prednisone so concern for C diff although negative + negative Gi PCR.    - Stool studies pending: stool culture, O&P  - regular diet as tolerated for now per patient preference (after abd US completed) Diarrhea x2 days. Had Neupogen 1 day prior to onset but not a known side effect. At baseline is constipated. CT showing colitis at splenic flexure and proctitis. No recent ABx or history of prolonged ABx in the past. Takes chronic prednisone so concern for C diff although negative + negative Gi PCR.    Plan:   - Oral cipro/flagyl on dc till 5/2   - regular diet as tolerated for now per patient preference (after abd US completed)

## 2025-04-29 NOTE — PROGRESS NOTE ADULT - PROBLEM SELECTOR PLAN 4
Questionable splenic vein thrombosis on CT non-con. Vascular consulted and suspecting artifact given no abd pain    - US doppler study (NPO at least 4 hours prior to scan to reduce bowel gas) per radiology  - MRI if US unsuccessful  - Vascular surgery following, appreciate recs

## 2025-05-01 LAB
CULTURE RESULTS: SIGNIFICANT CHANGE UP
CULTURE RESULTS: SIGNIFICANT CHANGE UP
SPECIMEN SOURCE: SIGNIFICANT CHANGE UP
SPECIMEN SOURCE: SIGNIFICANT CHANGE UP

## 2025-05-08 LAB
CORTICOSTEROID BINDING GLOBULIN RESULT: 1.7 MG/DL — SIGNIFICANT CHANGE UP
CORTIS F/TOTAL MFR SERPL: 32 % — SIGNIFICANT CHANGE UP
CORTIS SERPL-MCNC: 16 UG/DL — SIGNIFICANT CHANGE UP
CORTISOL, FREE RESULT: 5.1 UG/DL — HIGH

## 2025-05-24 ENCOUNTER — INPATIENT (INPATIENT)
Facility: HOSPITAL | Age: 89
LOS: 10 days | Discharge: NURSING, REHAB OR LONG-TERM CARE | DRG: 872 | End: 2025-06-04
Attending: STUDENT IN AN ORGANIZED HEALTH CARE EDUCATION/TRAINING PROGRAM | Admitting: STUDENT IN AN ORGANIZED HEALTH CARE EDUCATION/TRAINING PROGRAM
Payer: MEDICARE

## 2025-05-24 VITALS
OXYGEN SATURATION: 92 % | SYSTOLIC BLOOD PRESSURE: 110 MMHG | HEIGHT: 66 IN | WEIGHT: 139.99 LBS | TEMPERATURE: 100 F | RESPIRATION RATE: 20 BRPM | HEART RATE: 103 BPM | DIASTOLIC BLOOD PRESSURE: 66 MMHG

## 2025-05-24 DIAGNOSIS — Z98.89 OTHER SPECIFIED POSTPROCEDURAL STATES: Chronic | ICD-10-CM

## 2025-05-24 LAB
ALBUMIN SERPL ELPH-MCNC: 2.7 G/DL — LOW (ref 3.3–5)
ALP SERPL-CCNC: 144 U/L — HIGH (ref 40–120)
ALT FLD-CCNC: 20 U/L — SIGNIFICANT CHANGE UP (ref 10–45)
ANION GAP SERPL CALC-SCNC: 11 MMOL/L — SIGNIFICANT CHANGE UP (ref 5–17)
APTT BLD: 27.8 SEC — SIGNIFICANT CHANGE UP (ref 26.1–36.8)
AST SERPL-CCNC: 62 U/L — HIGH (ref 10–40)
BASOPHILS # BLD AUTO: 0 K/UL — SIGNIFICANT CHANGE UP (ref 0–0.2)
BASOPHILS NFR BLD AUTO: 0 % — SIGNIFICANT CHANGE UP (ref 0–2)
BILIRUB SERPL-MCNC: 0.9 MG/DL — SIGNIFICANT CHANGE UP (ref 0.2–1.2)
BUN SERPL-MCNC: 23 MG/DL — SIGNIFICANT CHANGE UP (ref 7–23)
CALCIUM SERPL-MCNC: 8.8 MG/DL — SIGNIFICANT CHANGE UP (ref 8.4–10.5)
CHLORIDE SERPL-SCNC: 94 MMOL/L — LOW (ref 96–108)
CO2 SERPL-SCNC: 24 MMOL/L — SIGNIFICANT CHANGE UP (ref 22–31)
CREAT SERPL-MCNC: 0.9 MG/DL — SIGNIFICANT CHANGE UP (ref 0.5–1.3)
EGFR: 81 ML/MIN/1.73M2 — SIGNIFICANT CHANGE UP
EGFR: 81 ML/MIN/1.73M2 — SIGNIFICANT CHANGE UP
EOSINOPHIL # BLD AUTO: 0.03 K/UL — SIGNIFICANT CHANGE UP (ref 0–0.5)
EOSINOPHIL NFR BLD AUTO: 2 % — SIGNIFICANT CHANGE UP (ref 0–6)
FLUAV AG NPH QL: SIGNIFICANT CHANGE UP
FLUBV AG NPH QL: SIGNIFICANT CHANGE UP
GAS PNL BLDV: SIGNIFICANT CHANGE UP
GLUCOSE SERPL-MCNC: 121 MG/DL — HIGH (ref 70–99)
HCT VFR BLD CALC: 31.6 % — LOW (ref 39–50)
HGB BLD-MCNC: 10.2 G/DL — LOW (ref 13–17)
INR BLD: 1.12 RATIO — SIGNIFICANT CHANGE UP (ref 0.85–1.16)
LYMPHOCYTES # BLD AUTO: 0.71 K/UL — LOW (ref 1–3.3)
LYMPHOCYTES # BLD AUTO: 52 % — HIGH (ref 13–44)
MANUAL SMEAR VERIFICATION: SIGNIFICANT CHANGE UP
MCHC RBC-ENTMCNC: 31.8 PG — SIGNIFICANT CHANGE UP (ref 27–34)
MCHC RBC-ENTMCNC: 32.3 G/DL — SIGNIFICANT CHANGE UP (ref 32–36)
MCV RBC AUTO: 98.4 FL — SIGNIFICANT CHANGE UP (ref 80–100)
MONOCYTES # BLD AUTO: 0.22 K/UL — SIGNIFICANT CHANGE UP (ref 0–0.9)
MONOCYTES NFR BLD AUTO: 16 % — HIGH (ref 2–14)
NEUTROPHILS # BLD AUTO: 0.41 K/UL — LOW (ref 1.8–7.4)
NEUTROPHILS NFR BLD AUTO: 30 % — LOW (ref 43–77)
NRBC # BLD: 0 /100 WBCS — SIGNIFICANT CHANGE UP (ref 0–0)
NRBC BLD-RTO: 0 /100 WBCS — SIGNIFICANT CHANGE UP (ref 0–0)
PLAT MORPH BLD: NORMAL — SIGNIFICANT CHANGE UP
PLATELET # BLD AUTO: 108 K/UL — LOW (ref 150–400)
POTASSIUM SERPL-MCNC: 4.3 MMOL/L — SIGNIFICANT CHANGE UP (ref 3.5–5.3)
POTASSIUM SERPL-SCNC: 4.3 MMOL/L — SIGNIFICANT CHANGE UP (ref 3.5–5.3)
PROT SERPL-MCNC: 5.7 G/DL — LOW (ref 6–8.3)
PROTHROM AB SERPL-ACNC: 12.8 SEC — SIGNIFICANT CHANGE UP (ref 9.9–13.4)
RBC # BLD: 3.21 M/UL — LOW (ref 4.2–5.8)
RBC # FLD: 16.5 % — HIGH (ref 10.3–14.5)
RBC BLD AUTO: SIGNIFICANT CHANGE UP
RSV RNA NPH QL NAA+NON-PROBE: SIGNIFICANT CHANGE UP
SARS-COV-2 RNA SPEC QL NAA+PROBE: SIGNIFICANT CHANGE UP
SODIUM SERPL-SCNC: 129 MMOL/L — LOW (ref 135–145)
SOURCE RESPIRATORY: SIGNIFICANT CHANGE UP
TROPONIN T, HIGH SENSITIVITY RESULT: 29 NG/L — SIGNIFICANT CHANGE UP (ref 0–51)
WBC # BLD: 1.37 K/UL — LOW (ref 3.8–10.5)
WBC # FLD AUTO: 1.37 K/UL — LOW (ref 3.8–10.5)

## 2025-05-24 PROCEDURE — 99285 EMERGENCY DEPT VISIT HI MDM: CPT

## 2025-05-24 PROCEDURE — 93010 ELECTROCARDIOGRAM REPORT: CPT

## 2025-05-24 PROCEDURE — 71045 X-RAY EXAM CHEST 1 VIEW: CPT | Mod: 26

## 2025-05-24 RX ORDER — CEFEPIME 2 G/20ML
1000 INJECTION, POWDER, FOR SOLUTION INTRAVENOUS ONCE
Refills: 0 | Status: COMPLETED | OUTPATIENT
Start: 2025-05-24 | End: 2025-05-24

## 2025-05-24 RX ORDER — ACETAMINOPHEN 500 MG/5ML
1000 LIQUID (ML) ORAL ONCE
Refills: 0 | Status: COMPLETED | OUTPATIENT
Start: 2025-05-24 | End: 2025-05-24

## 2025-05-24 RX ORDER — VANCOMYCIN HCL IN 5 % DEXTROSE 1.5G/250ML
1000 PLASTIC BAG, INJECTION (ML) INTRAVENOUS ONCE
Refills: 0 | Status: COMPLETED | OUTPATIENT
Start: 2025-05-24 | End: 2025-05-24

## 2025-05-24 RX ADMIN — CEFEPIME 1000 MILLIGRAM(S): 2 INJECTION, POWDER, FOR SOLUTION INTRAVENOUS at 21:50

## 2025-05-24 RX ADMIN — Medication 1000 MILLIGRAM(S): at 21:50

## 2025-05-24 RX ADMIN — Medication 400 MILLIGRAM(S): at 21:20

## 2025-05-24 RX ADMIN — Medication 250 MILLIGRAM(S): at 21:52

## 2025-05-24 RX ADMIN — CEFEPIME 100 MILLIGRAM(S): 2 INJECTION, POWDER, FOR SOLUTION INTRAVENOUS at 21:20

## 2025-05-24 RX ADMIN — Medication 1950 MILLILITER(S): at 21:19

## 2025-05-24 NOTE — ED ADULT NURSE NOTE - NSFALLRISKINTERV_ED_ALL_ED

## 2025-05-24 NOTE — ED PROVIDER NOTE - CLINICAL SUMMARY MEDICAL DECISION MAKING FREE TEXT BOX
89-year-old male past medical history of prostate cancer status post prostatectomy, now metastatic to liver and lung, on chemo, last received 1 week ago, pulmonary wedge resection, hepatitis B, hypertension hyperlipidemia presents to the ED for fever associated generalized weakness.6 days of generalized weakness, difficulty with ambulation, feels too weak to walk, had associated cough for the last week, today noticed fever Tmax 101.5 prompting visit to ED.  Patient was recently hospitalized for severe sepsis found to have colitis, was treated with cefepime and bank, now off antibiotics.  Denies chest pain shortness of breath abdominal pain dysuria hematuria back pain.    VS tachy to 100, febrile to 100.1 orally. Clinically stable. EKG wnl w no ST elevations or T wave inversions. PE, malaise appearing, no acute distress, AAOx3. NCAT, EOMI, normal conjunctiva, mucous membranes moist, focal crackles to L lung base, no increased WOB, hypoxic to 90% on RA placed on 2L, no w/r/c, no MRG, RRR, abd NDNT, no rebound tenderness or guarding, no CVA ttp, no focal neuro deficits, neurovascularly intact, no bruising, rashes, or erythema. Suspicion for pna vs URI vs low suspicion for acute intra abd inf vs PE but will assess w labs, CTA PE, antiemetics, antipyretics, empiric abx, TBA

## 2025-05-24 NOTE — ED PROVIDER NOTE - OBJECTIVE STATEMENT
89-year-old male past medical history of prostate cancer status post prostatectomy, now metastatic to liver and lung, on chemo, last received 1 week ago, pulmonary wedge resection, hepatitis B, hypertension hyperlipidemia presents to the ED for fever associated generalized weakness.6 days of generalized weakness, difficulty with ambulation, feels too weak to walk, had associated cough for the last week, today noticed fever Tmax 101.5 prompting visit to ED.  Patient was recently hospitalized for severe sepsis found to have colitis, was treated with cefepime and bank, now off antibiotics.  Denies chest pain shortness of breath abdominal pain dysuria hematuria back pain. 89-year-old male past medical history of prostate cancer status post prostatectomy, now metastatic to liver and lung, on chemo, last received 1 week ago, pulmonary wedge resection, hepatitis B, hypertension hyperlipidemia presents to the ED for fever associated generalized weakness.6 days of generalized weakness, difficulty with ambulation, feels too weak to walk, had associated cough for the last week, today noticed fever Tmax 101.5 prompting visit to ED.  Patient was recently hospitalized for severe sepsis found to have colitis, was treated with cefepime and vanc, now off antibiotics.  Denies chest pain shortness of breath abdominal pain dysuria hematuria back pain.

## 2025-05-24 NOTE — ED PROVIDER NOTE - ATTENDING CONTRIBUTION TO CARE
89-year-old male with past medical history of prostate cancer with known mets to the lung and liver on chemotherapy, hepatitis B, hypertension, hyperlipidemia presents with fever associated with weakness for the past couple of days.  Patient's daughter also reports he had intermittent cough as well.  She reports oral temperature of 101.5F prompting ER evaluation.      PE: well appearing, nontoxic, no respiratory distress.  REgular rate and rhythm, mild crackles to LLL base. Neuro nonfocal.  Skin intact. Psych normal mood.    MDM: Differential diagnosis includes but is not limited to neutropenic fever, pneumonia, PE, UTI, viral syndrome  Will assess with labs and CT chest  Ordered empirically for ivf, vanc and cefepime 89-year-old male with past medical history of prostate cancer with known mets to the lung and liver on chemotherapy, hepatitis B, hypertension, hyperlipidemia presents with fever associated with weakness for the past couple of days.  Patient's daughter also reports he had intermittent cough as well.  She reports oral temperature of 101.5F prompting ER evaluation.      PE: well appearing, nontoxic, no respiratory distress.  REgular rate and rhythm, mild crackles to LLL base. Neuro nonfocal.  Skin intact. Psych normal mood.    MDM: Differential diagnosis includes but is not limited to neutropenic fever, pneumonia, PE, UTI, viral syndrome  Will assess with labs and CT chest  Ordered empirically for ivf, vanc and cefepime  EKG unchanged from prior

## 2025-05-24 NOTE — ED ADULT NURSE NOTE - OBJECTIVE STATEMENT
89YOM hx HTN/HLD/Hepatitis B/Prostate Ca s/p prostatectomy/Liver and Lung mets BIBEMS from home c/o lethargy/weakness/fever/cough x6 days, reports today had oral fever 101.5, over past 6 days progressively more weak with worsening cough and chest congestion, was in hospital recently for sepsis d/t colitis and discharged, today reports 2 episodes of diarrhea nonbloody. AOx4, breathing unlabored, crackles to lung bases, pulses present and regular, abdomen soft nontender nondistended, PERRL, denies chest pain/nausea/vomiting/fever 89YOM hx HTN/HLD/Hepatitis B/Prostate Ca s/p prostatectomy/Liver and Lung mets BIBEMS from home c/o lethargy/weakness/fever/cough x6 days, reports today had oral fever 101.5 and chills, over past 6 days progressively more weak with worsening cough and chest congestion, was in hospital recently for sepsis d/t colitis and discharged, today reports 2 episodes of diarrhea nonbloody. AOx4, breathing unlabored, crackles to b/l lung bases, pulses present and regular, abdomen soft nontender nondistended, PERRL, denies chest pain/nausea/vomiting. 100.1 orally in triage. VSS.

## 2025-05-25 DIAGNOSIS — A41.9 SEPSIS, UNSPECIFIED ORGANISM: ICD-10-CM

## 2025-05-25 DIAGNOSIS — I10 ESSENTIAL (PRIMARY) HYPERTENSION: ICD-10-CM

## 2025-05-25 DIAGNOSIS — Z79.899 OTHER LONG TERM (CURRENT) DRUG THERAPY: ICD-10-CM

## 2025-05-25 DIAGNOSIS — J18.9 PNEUMONIA, UNSPECIFIED ORGANISM: ICD-10-CM

## 2025-05-25 DIAGNOSIS — Z29.9 ENCOUNTER FOR PROPHYLACTIC MEASURES, UNSPECIFIED: ICD-10-CM

## 2025-05-25 DIAGNOSIS — C61 MALIGNANT NEOPLASM OF PROSTATE: ICD-10-CM

## 2025-05-25 DIAGNOSIS — F41.9 ANXIETY DISORDER, UNSPECIFIED: ICD-10-CM

## 2025-05-25 DIAGNOSIS — R19.7 DIARRHEA, UNSPECIFIED: ICD-10-CM

## 2025-05-25 DIAGNOSIS — E03.9 HYPOTHYROIDISM, UNSPECIFIED: ICD-10-CM

## 2025-05-25 DIAGNOSIS — B19.10 UNSPECIFIED VIRAL HEPATITIS B WITHOUT HEPATIC COMA: ICD-10-CM

## 2025-05-25 LAB
ALBUMIN SERPL ELPH-MCNC: 2.8 G/DL — LOW (ref 3.3–5)
ALP SERPL-CCNC: 140 U/L — HIGH (ref 40–120)
ALT FLD-CCNC: 20 U/L — SIGNIFICANT CHANGE UP (ref 10–45)
ANION GAP SERPL CALC-SCNC: 13 MMOL/L — SIGNIFICANT CHANGE UP (ref 5–17)
APPEARANCE UR: CLEAR — SIGNIFICANT CHANGE UP
AST SERPL-CCNC: 60 U/L — HIGH (ref 10–40)
BASOPHILS # BLD AUTO: 0.02 K/UL — SIGNIFICANT CHANGE UP (ref 0–0.2)
BASOPHILS NFR BLD AUTO: 0.9 % — SIGNIFICANT CHANGE UP (ref 0–2)
BILIRUB SERPL-MCNC: 0.8 MG/DL — SIGNIFICANT CHANGE UP (ref 0.2–1.2)
BILIRUB UR-MCNC: NEGATIVE — SIGNIFICANT CHANGE UP
BUN SERPL-MCNC: 20 MG/DL — SIGNIFICANT CHANGE UP (ref 7–23)
CALCIUM SERPL-MCNC: 8.5 MG/DL — SIGNIFICANT CHANGE UP (ref 8.4–10.5)
CHLORIDE SERPL-SCNC: 99 MMOL/L — SIGNIFICANT CHANGE UP (ref 96–108)
CO2 SERPL-SCNC: 23 MMOL/L — SIGNIFICANT CHANGE UP (ref 22–31)
COLOR SPEC: YELLOW — SIGNIFICANT CHANGE UP
CREAT SERPL-MCNC: 0.88 MG/DL — SIGNIFICANT CHANGE UP (ref 0.5–1.3)
DIFF PNL FLD: NEGATIVE — SIGNIFICANT CHANGE UP
EGFR: 81 ML/MIN/1.73M2 — SIGNIFICANT CHANGE UP
EGFR: 81 ML/MIN/1.73M2 — SIGNIFICANT CHANGE UP
EOSINOPHIL # BLD AUTO: 0.02 K/UL — SIGNIFICANT CHANGE UP (ref 0–0.5)
EOSINOPHIL NFR BLD AUTO: 0.9 % — SIGNIFICANT CHANGE UP (ref 0–6)
GAS PNL BLDV: SIGNIFICANT CHANGE UP
GLUCOSE SERPL-MCNC: 83 MG/DL — SIGNIFICANT CHANGE UP (ref 70–99)
GLUCOSE UR QL: NEGATIVE MG/DL — SIGNIFICANT CHANGE UP
GRAM STN FLD: ABNORMAL
HCT VFR BLD CALC: 33.3 % — LOW (ref 39–50)
HGB BLD-MCNC: 10.9 G/DL — LOW (ref 13–17)
HYPOSEGMENTATION: PRESENT — SIGNIFICANT CHANGE UP
KETONES UR QL: NEGATIVE MG/DL — SIGNIFICANT CHANGE UP
LEGIONELLA AG UR QL: NEGATIVE — SIGNIFICANT CHANGE UP
LEUKOCYTE ESTERASE UR-ACNC: NEGATIVE — SIGNIFICANT CHANGE UP
LYMPHOCYTES # BLD AUTO: 0.69 K/UL — LOW (ref 1–3.3)
LYMPHOCYTES # BLD AUTO: 37.5 % — SIGNIFICANT CHANGE UP (ref 13–44)
MAGNESIUM SERPL-MCNC: 1.8 MG/DL — SIGNIFICANT CHANGE UP (ref 1.6–2.6)
MANUAL SMEAR VERIFICATION: SIGNIFICANT CHANGE UP
MCHC RBC-ENTMCNC: 32.7 G/DL — SIGNIFICANT CHANGE UP (ref 32–36)
MCHC RBC-ENTMCNC: 32.7 PG — SIGNIFICANT CHANGE UP (ref 27–34)
MCV RBC AUTO: 100 FL — SIGNIFICANT CHANGE UP (ref 80–100)
METAMYELOCYTES # FLD: 0.9 % — HIGH (ref 0–0)
METAMYELOCYTES NFR BLD: 0.9 % — HIGH (ref 0–0)
MONOCYTES # BLD AUTO: 0.52 K/UL — SIGNIFICANT CHANGE UP (ref 0–0.9)
MONOCYTES NFR BLD AUTO: 28.6 % — HIGH (ref 2–14)
MRSA PCR RESULT.: SIGNIFICANT CHANGE UP
NEUTROPHILS # BLD AUTO: 0.55 K/UL — LOW (ref 1.8–7.4)
NEUTROPHILS NFR BLD AUTO: 20.5 % — LOW (ref 43–77)
NEUTS BAND # BLD: 9.8 % — HIGH (ref 0–8)
NEUTS BAND NFR BLD: 9.8 % — HIGH (ref 0–8)
NITRITE UR-MCNC: NEGATIVE — SIGNIFICANT CHANGE UP
NRBC # BLD: 1 /100 WBCS — HIGH (ref 0–0)
NRBC BLD-RTO: 1 /100 WBCS — HIGH (ref 0–0)
PH UR: 6 — SIGNIFICANT CHANGE UP (ref 5–8)
PHOSPHATE SERPL-MCNC: 3.4 MG/DL — SIGNIFICANT CHANGE UP (ref 2.5–4.5)
PLAT MORPH BLD: NORMAL — SIGNIFICANT CHANGE UP
PLATELET # BLD AUTO: 103 K/UL — LOW (ref 150–400)
POTASSIUM SERPL-MCNC: 4.1 MMOL/L — SIGNIFICANT CHANGE UP (ref 3.5–5.3)
POTASSIUM SERPL-SCNC: 4.1 MMOL/L — SIGNIFICANT CHANGE UP (ref 3.5–5.3)
PROCALCITONIN SERPL-MCNC: 0.16 NG/ML — HIGH (ref 0.02–0.1)
PROMYELOCYTES # FLD: 0.9 % — HIGH (ref 0–0)
PROMYELOCYTES NFR BLD: 0.9 % — HIGH (ref 0–0)
PROT SERPL-MCNC: 5.8 G/DL — LOW (ref 6–8.3)
PROT UR-MCNC: NEGATIVE MG/DL — SIGNIFICANT CHANGE UP
RAPID RVP RESULT: SIGNIFICANT CHANGE UP
RBC # BLD: 3.33 M/UL — LOW (ref 4.2–5.8)
RBC # FLD: 16.7 % — HIGH (ref 10.3–14.5)
RBC BLD AUTO: SIGNIFICANT CHANGE UP
S AUREUS DNA NOSE QL NAA+PROBE: SIGNIFICANT CHANGE UP
S PNEUM AG UR QL: NEGATIVE — SIGNIFICANT CHANGE UP
SARS-COV-2 RNA SPEC QL NAA+PROBE: SIGNIFICANT CHANGE UP
SMUDGE CELLS # BLD: PRESENT — SIGNIFICANT CHANGE UP
SODIUM SERPL-SCNC: 135 MMOL/L — SIGNIFICANT CHANGE UP (ref 135–145)
SP GR SPEC: 1.01 — SIGNIFICANT CHANGE UP (ref 1–1.03)
SPECIMEN SOURCE: SIGNIFICANT CHANGE UP
UROBILINOGEN FLD QL: 0.2 MG/DL — SIGNIFICANT CHANGE UP (ref 0.2–1)
WBC # BLD: 1.83 K/UL — LOW (ref 3.8–10.5)
WBC # FLD AUTO: 1.83 K/UL — LOW (ref 3.8–10.5)

## 2025-05-25 PROCEDURE — 71275 CT ANGIOGRAPHY CHEST: CPT | Mod: 26

## 2025-05-25 PROCEDURE — 99223 1ST HOSP IP/OBS HIGH 75: CPT

## 2025-05-25 RX ORDER — HEPARIN SODIUM 1000 [USP'U]/ML
5000 INJECTION INTRAVENOUS; SUBCUTANEOUS EVERY 8 HOURS
Refills: 0 | Status: DISCONTINUED | OUTPATIENT
Start: 2025-05-25 | End: 2025-06-04

## 2025-05-25 RX ORDER — FILGRASTIM 300 UG/.5ML
300 INJECTION, SOLUTION INTRAVENOUS; SUBCUTANEOUS DAILY
Refills: 0 | Status: DISCONTINUED | OUTPATIENT
Start: 2025-05-25 | End: 2025-05-27

## 2025-05-25 RX ORDER — CEFEPIME 2 G/20ML
INJECTION, POWDER, FOR SOLUTION INTRAVENOUS
Refills: 0 | Status: DISCONTINUED | OUTPATIENT
Start: 2025-05-25 | End: 2025-05-28

## 2025-05-25 RX ORDER — DEXTROMETHORPHAN HBR, GUAIFENESIN 20; 200 MG/10ML; MG/10ML
10 SOLUTION ORAL EVERY 4 HOURS
Refills: 0 | Status: DISCONTINUED | OUTPATIENT
Start: 2025-05-25 | End: 2025-06-04

## 2025-05-25 RX ORDER — SERTRALINE 100 MG/1
25 TABLET, FILM COATED ORAL DAILY
Refills: 0 | Status: DISCONTINUED | OUTPATIENT
Start: 2025-05-25 | End: 2025-06-04

## 2025-05-25 RX ORDER — CEFEPIME 2 G/20ML
2000 INJECTION, POWDER, FOR SOLUTION INTRAVENOUS ONCE
Refills: 0 | Status: COMPLETED | OUTPATIENT
Start: 2025-05-25 | End: 2025-05-25

## 2025-05-25 RX ORDER — B1/B2/B3/B5/B6/B12/VIT C/FOLIC 500-0.5 MG
1 TABLET ORAL DAILY
Refills: 0 | Status: DISCONTINUED | OUTPATIENT
Start: 2025-05-25 | End: 2025-06-02

## 2025-05-25 RX ORDER — CEFEPIME 2 G/20ML
2000 INJECTION, POWDER, FOR SOLUTION INTRAVENOUS EVERY 8 HOURS
Refills: 0 | Status: DISCONTINUED | OUTPATIENT
Start: 2025-05-25 | End: 2025-05-28

## 2025-05-25 RX ORDER — AZITHROMYCIN 250 MG
500 CAPSULE ORAL ONCE
Refills: 0 | Status: COMPLETED | OUTPATIENT
Start: 2025-05-25 | End: 2025-05-25

## 2025-05-25 RX ORDER — BENZONATATE 100 MG
100 CAPSULE ORAL EVERY 8 HOURS
Refills: 0 | Status: DISCONTINUED | OUTPATIENT
Start: 2025-05-25 | End: 2025-06-04

## 2025-05-25 RX ORDER — LEVOTHYROXINE SODIUM 300 MCG
100 TABLET ORAL DAILY
Refills: 0 | Status: DISCONTINUED | OUTPATIENT
Start: 2025-05-25 | End: 2025-06-04

## 2025-05-25 RX ORDER — ENTECAVIR 0.5 MG/1
1 TABLET ORAL
Refills: 0 | DISCHARGE

## 2025-05-25 RX ORDER — CEFEPIME 2 G/20ML
INJECTION, POWDER, FOR SOLUTION INTRAVENOUS
Refills: 0 | Status: DISCONTINUED | OUTPATIENT
Start: 2025-05-25 | End: 2025-05-25

## 2025-05-25 RX ORDER — IPRATROPIUM BROMIDE AND ALBUTEROL SULFATE .5; 2.5 MG/3ML; MG/3ML
3 SOLUTION RESPIRATORY (INHALATION) EVERY 6 HOURS
Refills: 0 | Status: DISCONTINUED | OUTPATIENT
Start: 2025-05-25 | End: 2025-05-29

## 2025-05-25 RX ORDER — PREDNISONE 20 MG/1
5 TABLET ORAL
Refills: 0 | Status: DISCONTINUED | OUTPATIENT
Start: 2025-05-25 | End: 2025-06-04

## 2025-05-25 RX ORDER — PREDNISONE 20 MG/1
1 TABLET ORAL
Refills: 0 | DISCHARGE

## 2025-05-25 RX ORDER — ACETAMINOPHEN 500 MG/5ML
650 LIQUID (ML) ORAL EVERY 6 HOURS
Refills: 0 | Status: DISCONTINUED | OUTPATIENT
Start: 2025-05-25 | End: 2025-06-04

## 2025-05-25 RX ORDER — AZITHROMYCIN 250 MG
CAPSULE ORAL
Refills: 0 | Status: DISCONTINUED | OUTPATIENT
Start: 2025-05-25 | End: 2025-05-26

## 2025-05-25 RX ORDER — AZITHROMYCIN 250 MG
500 CAPSULE ORAL EVERY 24 HOURS
Refills: 0 | Status: DISCONTINUED | OUTPATIENT
Start: 2025-05-26 | End: 2025-05-26

## 2025-05-25 RX ORDER — LISINOPRIL 5 MG/1
1 TABLET ORAL
Refills: 0 | DISCHARGE

## 2025-05-25 RX ADMIN — IPRATROPIUM BROMIDE AND ALBUTEROL SULFATE 3 MILLILITER(S): .5; 2.5 SOLUTION RESPIRATORY (INHALATION) at 12:18

## 2025-05-25 RX ADMIN — Medication 100 MICROGRAM(S): at 06:07

## 2025-05-25 RX ADMIN — IPRATROPIUM BROMIDE AND ALBUTEROL SULFATE 3 MILLILITER(S): .5; 2.5 SOLUTION RESPIRATORY (INHALATION) at 17:10

## 2025-05-25 RX ADMIN — HEPARIN SODIUM 5000 UNIT(S): 1000 INJECTION INTRAVENOUS; SUBCUTANEOUS at 20:58

## 2025-05-25 RX ADMIN — Medication 4 MILLILITER(S): at 06:07

## 2025-05-25 RX ADMIN — Medication 650 MILLIGRAM(S): at 20:56

## 2025-05-25 RX ADMIN — Medication 1 TABLET(S): at 12:18

## 2025-05-25 RX ADMIN — CEFEPIME 100 MILLIGRAM(S): 2 INJECTION, POWDER, FOR SOLUTION INTRAVENOUS at 15:30

## 2025-05-25 RX ADMIN — Medication 250 MILLIGRAM(S): at 14:01

## 2025-05-25 RX ADMIN — Medication 4 MILLILITER(S): at 17:10

## 2025-05-25 RX ADMIN — HEPARIN SODIUM 5000 UNIT(S): 1000 INJECTION INTRAVENOUS; SUBCUTANEOUS at 06:07

## 2025-05-25 RX ADMIN — PREDNISONE 5 MILLIGRAM(S): 20 TABLET ORAL at 17:10

## 2025-05-25 RX ADMIN — FILGRASTIM 300 MICROGRAM(S): 300 INJECTION, SOLUTION INTRAVENOUS; SUBCUTANEOUS at 12:58

## 2025-05-25 RX ADMIN — IPRATROPIUM BROMIDE AND ALBUTEROL SULFATE 3 MILLILITER(S): .5; 2.5 SOLUTION RESPIRATORY (INHALATION) at 06:07

## 2025-05-25 RX ADMIN — SERTRALINE 25 MILLIGRAM(S): 100 TABLET, FILM COATED ORAL at 12:18

## 2025-05-25 RX ADMIN — CEFEPIME 100 MILLIGRAM(S): 2 INJECTION, POWDER, FOR SOLUTION INTRAVENOUS at 07:47

## 2025-05-25 RX ADMIN — Medication 650 MILLIGRAM(S): at 21:50

## 2025-05-25 RX ADMIN — HEPARIN SODIUM 5000 UNIT(S): 1000 INJECTION INTRAVENOUS; SUBCUTANEOUS at 13:29

## 2025-05-25 RX ADMIN — PREDNISONE 5 MILLIGRAM(S): 20 TABLET ORAL at 06:07

## 2025-05-25 NOTE — PROGRESS NOTE ADULT - PROBLEM SELECTOR PLAN 6
- Hold toprol and lisinopril ISO sepsis  - Clarify indication for Toprol  - Per outpatient notes seems pt may have been started on HCTZ  - Clarify with daughter but hold for now

## 2025-05-25 NOTE — PROGRESS NOTE ADULT - PROBLEM SELECTOR PLAN 2
CT showing upper lobe groundglass opacities  Productive cough  - F/u sputum Cx  - F/u legionella/strep  - F/u MRSA PCR  - Continue cefepime   - If MRSA PCR positive start vanc

## 2025-05-25 NOTE — H&P ADULT - PROBLEM SELECTOR PLAN 6
- Hold toprol and lisinopril ISO sepsis  - Clarify indication for Toprol - Hold toprol and lisinopril ISO sepsis  - Clarify indication for Toprol  - Per outpatient notes seems pt may have been started on HCTZ  - Clarify with daughter but hold for now

## 2025-05-25 NOTE — PATIENT PROFILE ADULT - DO YOU EVER NEED HELP READING HOSPITAL MATERIALS?
After Visit Summary   10/23/2018    Gillian Burk    MRN: [id number]           Patient Information     Date Of Birth          3/19/1927        Visit Information        Provider Department      10/23/2018 10:40 AM Augusto Meyer DO [facility]        Today's Diagnoses     Hypertensive urgency    -  1    Cerebrovascular accident (CVA), unspecified mechanism (H)        CKD (chronic kidney disease) stage 3, GFR 30-59 ml/min (H)        Coronary artery disease involving native coronary artery of native heart without angina pectoris        S/P TAVR (transcatheter aortic valve replacement)        Seropositive rheumatoid arthritis (H)           Follow-ups after your visit        Follow-up notes from your care team     Return in about 1 month (around 11/23/2018) for follow up.      Your next 10 appointments already scheduled     Nov 12, 2018 10:15 AM CST   Return Visit with Keith Mu[facility]oz MD   [facility]    [address] [place] [id number] [phone]              Who to contact     If you have questions or need follow up information about today's clinic visit or your schedule please contact [facility] directly at [phone].  Normal or non-critical lab and imaging results will be communicated to you by MyChart, letter or phone within 4 business days after the clinic has received the results. If you do not hear from us within 7 days, please contact the clinic through [employer] or phone. If you have a critical or abnormal lab result, we will notify you by phone as soon as possible.  Submit refill requests through [employer] or call your pharmacy and they will forward the refill request to us. Please allow 3 business days for your refill to be completed.          Additional Information About Your Visit        MyChart Information     [employer] gives you secure access to your electronic health record. If you see a  primary care provider, you can also send messages to your care team and make appointments. If you have questions, please call your primary care clinic.  If you do not have a primary care provider, please call [phone] and they will assist you.        Care EveryWhere ID     This is your Care EveryWhere ID. This could be used by other organizations to access your [facility] medical records  [id number]        Your Vitals Were     Pulse Temperature Respirations Pulse Oximetry BMI (Body Mass Index)       86 98.7  F (37.1  C) (Temporal) 16 96% 21.48 kg/m2        Blood Pressure from Last 3 Encounters:   10/25/18 144/74   10/23/18 134/68   10/12/18 (!) 166/94    Weight from Last 3 Encounters:   10/25/18 132 lb (59.9 kg)   10/23/18 133 lb 1.6 oz (60.4 kg)   10/12/18 136 lb 12.8 oz (62.1 kg)              Today, you had the following     No orders found for display         Today's Medication Changes          These changes are accurate as of 10/23/18 11:59 PM.  If you have any questions, ask your nurse or doctor.               These medicines have changed or have updated prescriptions.        Dose/Directions    nitroFURantoin (macrocrystal-monohydrate) 100 MG capsule   Commonly known as:  MACROBID   This may have changed:  when to take this   Used for:  Acute cystitis without hematuria        Dose:  100 mg   Take 1 capsule (100 mg) by mouth 2 times daily   Quantity:  14 capsule   Refills:  0                Primary Care Provider Office Phone # Fax #    Augusto Fish Meyer,  [phone]       [address] [place] [address]        Equal Access to Services     [facility]: Hadii veronica kim hadnewton Soshayy, waaxda luqadaha, qaybta kaalmada zack coelho. So [facility] [phone].    ATENCIÓN: Si habla español, tiene a granger disposición servicios gratuitos de asistencia lingüística. Llame al [phone].    We comply with applicable federal civil rights laws and  [place] laws. We do not discriminate on the basis of race, color, national origin, age, disability, sex, sexual orientation, or gender identity.            Thank you!     Thank you for choosing [facility]  for your care. Our goal is always to provide you with excellent care. Hearing back from our patients is one way we can continue to improve our services. Please take a few minutes to complete the written survey that you may receive in the mail after your visit with us. Thank you!             Your Updated Medication List - Protect others around you: Learn how to safely use, store and throw away your medicines at www.disposemymeds.org.          This list is accurate as of 10/23/18 11:59 PM.  Always use your most recent med list.                   Brand Name Dispense Instructions for use Diagnosis    ALPRAZolam 0.25 MG tablet    XANAX    28 tablet    TAKE ONE TABLET BY MOUTH TWICE DAILY AS NEEDED FOR ANXIETY    Anxiety       amLODIPine 5 MG tablet    NORVASC    90 tablet    Take 1 tablet (5 mg) by mouth daily    Chest pain, unspecified type       aspirin 81 MG EC tablet      Take 1 tablet (81 mg) by mouth daily    S/P TAVR (transcatheter aortic valve replacement)       atorvastatin 40 MG tablet    LIPITOR    90 tablet    TAKE ONE TABLET BY MOUTH ONCE DAILY    Hyperlipidemia with target LDL less than 130       calcium carbonate 500 MG chewable tablet    TUMS     Take 2-4 tablets (1,000-2,000 mg) by mouth as needed for heartburn        clopidogrel 75 MG tablet    PLAVIX    30 tablet    TAKE ONE TABLET BY MOUTH ONCE DAILY    History of CVA (cerebrovascular accident)       Cranberry 1000 MG Caps           docusate sodium 100 MG capsule    COLACE     Take 200 mg by mouth daily 2 capsules        famotidine 40 MG tablet    PEPCID    30 tablet    TAKE ONE TABLET BY MOUTH AT BEDTIME    Gastroesophageal reflux disease without esophagitis       FLORAJEN ACIDOPHILUS Caps      Take 1 capsule by mouth daily         folic acid 1 MG tablet    FOLVITE    90 tablet    TAKE 1 TABLET BY MOUTH ONCE DAILY    Seropositive rheumatoid arthritis (H)       isosorbide mononitrate 30 MG 24 hr tablet    IMDUR    360 tablet    Take 2 tablets (60 mg) by mouth daily        levETIRAcetam 500 MG tablet    KEPPRA    90 tablet    Take 1 tablet (500 mg) by mouth 2 times daily    Seizure disorder (H)       levothyroxine 50 MCG tablet    SYNTHROID/LEVOTHROID    30 tablet    TAKE 1 TABLET BY MOUTH ONCE DAILY    Hypothyroidism due to acquired atrophy of thyroid       losartan 25 MG tablet    COZAAR    30 tablet    Take 0.5 tablets (12.5 mg) by mouth daily    Hypertension goal BP (blood pressure) < 140/90       methotrexate 2.5 MG tablet CHEMO     52 tablet    Take 4 tablets (10 mg) by mouth once a week Wed    Seropositive rheumatoid arthritis (H)       metoprolol succinate 50 MG 24 hr tablet    TOPROL-XL    180 tablet    Take 1 tablet (50 mg) by mouth 2 times daily    Chest pain, unspecified type       mirtazapine 30 MG tablet    REMERON    90 tablet    TAKE ONE TABLET BY MOUTH AT BEDTIME    Sleep initiation disorder       Multi-vitamin Tabs tablet      Take 1 tablet by mouth daily        MYLANTA PO      2 tblsp every 4 hours as needed        nitroFURantoin (macrocrystal-monohydrate) 100 MG capsule    MACROBID    14 capsule    Take 1 capsule (100 mg) by mouth 2 times daily    Acute cystitis without hematuria       nitroGLYcerin 0.4 MG sublingual tablet    NITROSTAT    25 tablet    DISSOLVE ONE TABLET UNDER THE TONGUE EVERY 5 MINUTES AS NEEDED FOR CHEST PAIN.  DO NOT EXCEED A TOTAL OF 3 DOSES IN 15 MINUTES    Coronary artery disease involving native coronary artery of native heart without angina pectoris       predniSONE 5 MG tablet    DELTASONE    23 tablet    TAKE ONE TABLET BY MOUTH EVERY OTHER DAY ALTERNATING  WITH  1/2  TABLET  EVERY  OTHER  DAY    Seropositive rheumatoid arthritis (H)       SYNVISC 16 MG/2ML injection   Generic drug:  hylan     2 mL     16 mg by INTRA-ARTICULAR route once    Primary osteoarthritis of right knee       TYLENOL ARTHRITIS PAIN 650 MG CR tablet   Generic drug:  acetaminophen      Take 2 tablets (1,300 mg) by mouth every 8 hours as needed for mild pain           no

## 2025-05-25 NOTE — H&P ADULT - HISTORY OF PRESENT ILLNESS
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 Pt notes he's been experiencing 1 week of generalized weakness, difficulty ambulating. Also notes he's been experiencing a cough for the past week. Today pt noticed his Tmax 101.5 prompting ED visit.       In the ED, presenting vitals were T 100.1, /66, , satting well on RA. Labs were significant for WBC 1.37, Hgb 10.2, , Na 129, alk phos 144, AST 62, lactate 2.2. S/p cefepime, vancomycin, IVF,    89M w hx of prostate cancer (s/p prostatectomy now metastatic to liver and lung) on chemo last received 1 week ago, pulmonary wedge resection, HepB (on entecavir), HTN, and HLD recently admitted from 4/26-4/29 for neutropenic sepsis in setting of colitis s/p treatment with cefepime and metronidazole, presents for fever associated with generalized weakness. Pt notes he's been experiencing 1 week of generalized weakness, difficulty ambulating. Also notes he's been experiencing a cough for the past week. Today pt noticed his Tmax 101.5 prompting ED visit. Also endorsed loose stools.      In the ED, presenting vitals were T 100.1, /66, , satting well on RA. Labs were significant for WBC 1.37, Hgb 10.2, , Na 129, alk phos 144, AST 62, lactate 2.2. S/p cefepime, vancomycin, IVF,

## 2025-05-25 NOTE — PROGRESS NOTE ADULT - PROBLEM SELECTOR PLAN 1
WBC 1.37   Tmax 101.5 at home  Productive cough  CT showing upper lobe groundglass opacities  UA negative  - F/u BCx, sputum Cx, MRSA PCR, legionella/strep  - F/u GI PCR, c diff  - Consider ID consult WBC 1.37   Tmax 101.5 at home  Productive cough  CT showing upper lobe groundglass opacities  UA negative  - F/u BCx, sputum Cx, MRSA PCR, legionella/strep  - F/u GI PCR, c diff  - Consider ID consult    Per daughter he has had 2 shots of neupogen on thursday and friday prior to coming to the hospital, with the next dose planned for tuesday  - Oncologist Dr. Lovett, [facility], office notified patient is here on 5/25

## 2025-05-25 NOTE — H&P ADULT - PROBLEM SELECTOR PLAN 1
WBC 1.37   Tmax 101.5 at home  Productive cough  CT showing upper lobe groundglass opacities  UA negative  - F/u BCx, sputum Cx, MRSA PCR, legionella/strep  - F/u GI PCR, c diff WBC 1.37   Tmax 101.5 at home  Productive cough  CT showing upper lobe groundglass opacities  UA negative  - F/u BCx, sputum Cx, MRSA PCR, legionella/strep  - F/u GI PCR, c diff  - Consider ID consult

## 2025-05-25 NOTE — CONSULT NOTE ADULT - SUBJECTIVE AND OBJECTIVE BOX
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 Pt notes he's been experiencing 1 week of generalized weakness, difficulty ambulating. Also notes he's been experiencing a cough for the past week. Today pt noticed his Tmax 101.5 prompting ED visit. Also endorsed loose stools.      In the ED, presenting vitals were T 100.1, /66, , satting well on RA. Labs were significant for WBC 1.37, Hgb 10.2, , Na 129, alk phos 144, AST 62, lactate 2.2. S/p cefepime, vancomycin, IVF,    (25 May 2025 02:57)       prior hospital charts reviewed [X  ]  primary team notes reviewed [  X]  other consultant notes reviewed [  X]    PAST MEDICAL & SURGICAL HISTORY:  HTN (hypertension)      HLD (hyperlipidemia)      Prostate cancer      S/P prostatectomy          Allergies  No Known Allergies    ANTIMICROBIALS (past 90 days)  MEDICATIONS  (STANDING):    cefepime   IVPB   100 mL/Hr IV Intermittent (05-24-25 @ 21:20)    cefepime   IVPB   100 mL/Hr IV Intermittent (05-25-25 @ 07:47)    vancomycin  IVPB.   250 mL/Hr IV Intermittent (05-24-25 @ 21:52)        azithromycin  IVPB    cefepime   IVPB    cefepime   IVPB 2000 every 8 hours    MEDICATIONS  (STANDING):  albuterol/ipratropium for Nebulization 3 every 6 hours  benzonatate 100 every 8 hours PRN  filgrastim-sndz (ZARXIO) Injectable 300 daily  guaifenesin/dextromethorphan Oral Liquid 10 every 4 hours PRN  heparin   Injectable 5000 every 8 hours  levothyroxine 100 daily  predniSONE   Tablet 5 two times a day  sertraline 25 daily  sodium chloride 3%  Inhalation 4 every 12 hours    SOCIAL HISTORY:   Denies alcohol drug use and smoking     FAMILY HISTORY: no pertinent FH     REVIEW OF SYSTEMS  [  X] ROS unobtainable because:    [ X ] All other systems negative except as noted below:	    Constitutional:  [ ] fever [ ] chills  [ ] weight loss  [ X] weakness  Skin:  [ ] rash [ ] phlebitis	  Eyes: [ ] icterus [ ] pain  [ ] discharge	  ENMT: [ ] sore throat  [ ] thrush [ ] ulcers [ ] exudates  Respiratory: [ ] dyspnea [ ] hemoptysis [ X] cough [X sputum	  Cardiovascular:  [ ] chest pain [ ] palpitations [ ] edema	  Gastrointestinal:  [ ] nausea [ ] vomiting [ ] diarrhea [ ] constipation [ ] pain	  Genitourinary:  [ ] dysuria [ ] frequency [ ] hematuria [ ] discharge [ ] flank pain  [ ] incontinence  Musculoskeletal:  [ ] myalgias [ ] arthralgias [ ] arthritis  [ ] back pain  Neurological:  [ ] headache [ ] seizures  [ ] confusion/altered mental status  Psychiatric:  [ ] anxiety [ ] depression	  Hematology/Lymphatics:  [ ] lymphadenopathy  Endocrine:  [ ] adrenal [ ] thyroid  Allergic/Immunologic:	 [ ] transplant [ ] seasonal    Vital Signs Last 24 Hrs  T(F): 99.2 (05-25-25 @ 12:15), Max: 100.1 (05-24-25 @ 20:12)  Vital Signs Last 24 Hrs  HR: 93 (05-25-25 @ 12:15) (67 - 103)  BP: 100/61 (05-25-25 @ 12:15) (100/61 - 122/59)  RR: 18 (05-25-25 @ 12:15)  SpO2: 94% (05-25-25 @ 12:15) (92% - 97%)  Wt(kg): --    PHYSICAL EXAM  General: weak  HEENT: clear oropharynx, anicteric sclera, pink conjunctiva  Neck: supple  CV: normal S1/S2  Lungs: decreased BS bilateral  Abdomen: soft non-tender non-distended, positive bowel sounds  Ext: no edema  Skin: no rashes and no petechiae  Lymph Nodes: No LAD in neck  Neuro: alert and oriented X 3                 LABS:              10.9   1.83  )-----------( 103      ( 25 May 2025 07:19 )             33.3   05-25    135  |  99  |  20  ----------------------------<  83  4.1   |  23  |  0.88    Ca    8.5      25 May 2025 07:17  Phos  3.4     05-25  Mg     1.8     05-25    TPro  5.8[L]  /  Alb  2.8[L]  /  TBili  0.8  /  DBili  x   /  AST  60[H]  /  ALT  20  /  AlkPhos  140[H]  05-25      MICROBIOLOGY:  Culture - Sputum (collected 25 May 2025 07:16)  Source: Sputum Sputum  Gram Stain (25 May 2025 13:04):    Few polymorphonuclear leukocytes seen per oil power field    Few Gram Positive Cocci in Clusters seen per oil power field    Few Gram Positive Rods seen per oil power field    Urinalysis with Rflx Culture (collected 25 May 2025 00:21)              Rapid RVP Result: NotDetec (05-24 @ 21:16)      RADIOLOGY:  imaging below personally reviewed and agree with findings      ACC: [id number] EXAM:  CT ANGIO CHEST PULM ART [facility]   ORDERED BY:  JANIE GOMEZ     PROCEDURE DATE:  05/25/2025          INTERPRETATION:  CLINICAL INFORMATION: Left base crackles on auscultation   . Concern for pulmonary embolism and pneumonia.    COMPARISON: CTA chest 4/26/2025    CONTRAST/COMPLICATIONS:  IV Contrast: Omnipaque 350  70 cc administered   30 cc discarded  Oral Contrast: None    PROCEDURE:  CT Angiography of the Chest.  Sagittal and coronal reformats were performed as well as 3D (MIP)   reconstructions.    FINDINGS:    LUNGS AND LARGE AIRWAYS: Status post right middle lobectomy. Scattered   patchy groundglass opacities of the upper lobes. Redemonstrated lower   lobe predominantly bronchiolectasis. Emphysematous changes. Stable   posteromedial left lower lobe rounded atelectasis containing a punctate   metallic density. Trace secretion within the proximal right mainstem   bronchus.  PLEURA: No pleural effusion.  VESSELS: No pulmonary embolus. Stable mild pulmonary trunk dilatation.   Aortic calcifications. Coronary artery calcifications. Severe   calcification at the proximal SMA.  HEART: Heart size is normal. No pericardial effusion. Aortic valve   calcifications.  MEDIASTINUM AND REILLY: No lymphadenopathy.  CHEST WALL AND LOWER NECK: Left greater than right gynecomastia,   unchanged. Left thyroid calcifications, unchanged.  VISUALIZED UPPER ABDOMEN: Heterogenous appearance of the right hepatic   dome, grossly unchanged to mildly increased, may be compatible with   history of hepatic metastases; consider further evaluation   contrast-enhanced MRI abdomen. Redemonstrated hepatic and renal cysts and   calcified hepatic granulomas.  BONES: Degenerative changes. Chronic mild T12 superior endplate deformity.    IMPRESSION:  No pulmonary embolus.    Patchy bilateral upper lobe groundglass opacities, suggestive of   nonspecific inflammation/infection.    Additional findings as above.     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 Pt notes he's been experiencing 1 week of generalized weakness, difficulty ambulating. Also notes he's been experiencing a cough for the past week. Today pt noticed his Tmax 101.5 prompting ED visit. Also endorsed loose stools.      In the ED, presenting vitals were T 100.1, /66, , satting well on RA. Labs were significant for WBC 1.37, Hgb 10.2, , Na 129, alk phos 144, AST 62, lactate 2.2. S/p cefepime, vancomycin, IVF,    (25 May 2025 02:57)       prior hospital charts reviewed [X  ]  primary team notes reviewed [  X]  other consultant notes reviewed [  X]    PAST MEDICAL & SURGICAL HISTORY:  HTN (hypertension)      HLD (hyperlipidemia)      Prostate cancer      S/P prostatectomy          Allergies  No Known Allergies    ANTIMICROBIALS (past 90 days)  MEDICATIONS  (STANDING):    cefepime   IVPB   100 mL/Hr IV Intermittent (05-24-25 @ 21:20)    cefepime   IVPB   100 mL/Hr IV Intermittent (05-25-25 @ 07:47)    vancomycin  IVPB.   250 mL/Hr IV Intermittent (05-24-25 @ 21:52)        azithromycin  IVPB    cefepime   IVPB    cefepime   IVPB 2000 every 8 hours    MEDICATIONS  (STANDING):  albuterol/ipratropium for Nebulization 3 every 6 hours  benzonatate 100 every 8 hours PRN  filgrastim-sndz (ZARXIO) Injectable 300 daily  guaifenesin/dextromethorphan Oral Liquid 10 every 4 hours PRN  heparin   Injectable 5000 every 8 hours  levothyroxine 100 daily  predniSONE   Tablet 5 two times a day  sertraline 25 daily  sodium chloride 3%  Inhalation 4 every 12 hours    SOCIAL HISTORY:   Denies alcohol drug use and smoking   [place]   No recent travel     FAMILY HISTORY: no pertinent FH     REVIEW OF SYSTEMS  [  X] ROS unobtainable because:    [ X ] All other systems negative except as noted below:	    Constitutional:  [ ] fever [ ] chills  [ ] weight loss  [ X] weakness  Skin:  [ ] rash [ ] phlebitis	  Eyes: [ ] icterus [ ] pain  [ ] discharge	  ENMT: [ ] sore throat  [ ] thrush [ ] ulcers [ ] exudates  Respiratory: [ ] dyspnea [ ] hemoptysis [ X] cough [X sputum	  Cardiovascular:  [ ] chest pain [ ] palpitations [ ] edema	  Gastrointestinal:  [ ] nausea [ ] vomiting [ ] diarrhea [ ] constipation [ ] pain	  Genitourinary:  [ ] dysuria [ ] frequency [ ] hematuria [ ] discharge [ ] flank pain  [ ] incontinence  Musculoskeletal:  [ ] myalgias [ ] arthralgias [ ] arthritis  [ ] back pain  Neurological:  [ ] headache [ ] seizures  [ ] confusion/altered mental status  Psychiatric:  [ ] anxiety [ ] depression	  Hematology/Lymphatics:  [ ] lymphadenopathy  Endocrine:  [ ] adrenal [ ] thyroid  Allergic/Immunologic:	 [ ] transplant [ ] seasonal    Vital Signs Last 24 Hrs  T(F): 99.2 (05-25-25 @ 12:15), Max: 100.1 (05-24-25 @ 20:12)  Vital Signs Last 24 Hrs  HR: 93 (05-25-25 @ 12:15) (67 - 103)  BP: 100/61 (05-25-25 @ 12:15) (100/61 - 122/59)  RR: 18 (05-25-25 @ 12:15)  SpO2: 94% (05-25-25 @ 12:15) (92% - 97%)  Wt(kg): --    PHYSICAL EXAM  General: In NAD, on NC 2 L   HEENT: clear oropharynx, anicteric sclera, pink conjunctiva  Neck: supple  CV: normal S1/S2  Lungs: decreased BS bilateral  Abdomen: soft non-tender non-distended, positive bowel sounds  Ext: no edema  Skin: no rashes and no petechiae  Neuro: alert and oriented X 3                 LABS:              10.9   1.83  )-----------( 103      ( 25 May 2025 07:19 )             33.3   05-25    135  |  99  |  20  ----------------------------<  83  4.1   |  23  |  0.88    Ca    8.5      25 May 2025 07:17  Phos  3.4     05-25  Mg     1.8     05-25    TPro  5.8[L]  /  Alb  2.8[L]  /  TBili  0.8  /  DBili  x   /  AST  60[H]  /  ALT  20  /  AlkPhos  140[H]  05-25      MICROBIOLOGY:  Culture - Sputum (collected 25 May 2025 07:16)  Source: Sputum Sputum  Gram Stain (25 May 2025 13:04):    Few polymorphonuclear leukocytes seen per oil power field    Few Gram Positive Cocci in Clusters seen per oil power field    Few Gram Positive Rods seen per oil power field    Urinalysis with Rflx Culture (collected 25 May 2025 00:21)              Rapid RVP Result: NotDetec (05-24 @ 21:16)      RADIOLOGY:  imaging below personally reviewed and agree with findings      ACC: [id number] [facility]   ORDERED BY:  JANIE GOMEZ     PROCEDURE DATE:  05/25/2025          INTERPRETATION:  CLINICAL INFORMATION: Left base crackles on auscultation   . Concern for pulmonary embolism and pneumonia.    COMPARISON: CTA chest 4/26/2025    CONTRAST/COMPLICATIONS:  IV Contrast: Omnipaque 350  70 cc administered   30 cc discarded  Oral Contrast: None    PROCEDURE:  CT Angiography of the Chest.  Sagittal and coronal reformats were performed as well as 3D (MIP)   reconstructions.    FINDINGS:    LUNGS AND LARGE AIRWAYS: Status post right middle lobectomy. Scattered   patchy groundglass opacities of the upper lobes. Redemonstrated lower   lobe predominantly bronchiolectasis. Emphysematous changes. Stable   posteromedial left lower lobe rounded atelectasis containing a punctate   metallic density. Trace secretion within the proximal right mainstem   bronchus.  PLEURA: No pleural effusion.  VESSELS: No pulmonary embolus. Stable mild pulmonary trunk dilatation.   Aortic calcifications. Coronary artery calcifications. Severe   calcification at the proximal SMA.  HEART: Heart size is normal. No pericardial effusion. Aortic valve   calcifications.  MEDIASTINUM AND REILLY: No lymphadenopathy.  CHEST WALL AND LOWER NECK: Left greater than right gynecomastia,   unchanged. Left thyroid calcifications, unchanged.  VISUALIZED UPPER ABDOMEN: Heterogenous appearance of the right hepatic   dome, grossly unchanged to mildly increased, may be compatible with   history of hepatic metastases; consider further evaluation   contrast-enhanced MRI abdomen. Redemonstrated hepatic and renal cysts and   calcified hepatic granulomas.  BONES: Degenerative changes. Chronic mild T12 superior endplate deformity.    IMPRESSION:  No pulmonary embolus.    Patchy bilateral upper lobe groundglass opacities, suggestive of   nonspecific inflammation/infection.    Additional findings as above.

## 2025-05-25 NOTE — H&P ADULT - PROBLEM SELECTOR PLAN 10
DVT PPx: heparin subq  Diet: DASH  BM regimen: Miralax prn  Dispo: pending improvement  PCP:  Pharmacy:  Communication:

## 2025-05-25 NOTE — H&P ADULT - ATTENDING COMMENTS
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    Neutropenic, tachycardic, fever (at home) + lactic acidosis; meets severe sepsis criteria  in no respiratory distress with adequate spo2 on 2 LPM via NC while at rest  ct imaging shows patchy bl upper lobe ggo suggestive of inflammation/infection  Likely 2/2 rti  s/p ~2 L NS bolus + vancomycin and cefepime + ofirmev in ER  trend lactate q4-6h until wnl  fu full rvp, procal, blood and sputum cultures, atypical pna urine ag, mrsa screen; gi pcr w cdiff testing  Monitor for fever, changes in white count  Monitor SpO2, RR, for signs of respiratory distress; Goal SpO2 >88-92%  broad spec empirical w cefepime  duonebs + oxygen supplementation as needed   aggressive pulmonary toilet/hygiene with incentive spirometry, chest pt, nebusal  symptomatic relief with antitussives, mucolytics, antipyretics  ivf resusci + lytes as needed  id consult in am  medonc consult in am     Otherwise, concur w a+p as described by resident above

## 2025-05-25 NOTE — H&P ADULT - PROBLEM SELECTOR PLAN 4
S/p prostatectomy in his 50's. Recurred with metastatic disease to liver and lung. S/p chest radiation and wedge resection. Has been on chem for last couple months, last chemo 1 week ago. Follows with Dr. Chase (urology) and oncology.    - Continue home prednisone 5mg BID (clarify indication with outpatient team when possible)  - Monitor CBC  - Consider filgastrim  - private heme/onc following, appreciate recs. S/p prostatectomy in his 50's. Recurred with metastatic disease to liver and lung. S/p chest radiation and wedge resection. Has been on chem for last couple months, last chemo 1 week ago. Follows with Dr. Chase (urology) and oncology.    - Continue home prednisone 5mg BID (clarify indication with outpatient team when possible)  - Monitor CBC  - consult private heme/onc, appreciate recs

## 2025-05-25 NOTE — H&P ADULT - ASSESSMENT
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 CT chest with patchy bilateral upper lobe groundglass opacities.

## 2025-05-25 NOTE — PATIENT PROFILE ADULT - FALL HARM RISK - HARM RISK INTERVENTIONS

## 2025-05-25 NOTE — PATIENT PROFILE ADULT - NSTRANSFERBELONGINGSRESP_GEN_A_NUR
CLEAN CATCH/MIDSTREAM    Squamous EPI'S 1 to 5    RBC 1 to 3    WBC 6 to 10    BACTERIA NONE SEEN    Hyaline Casts NONE SEEN    MUCOUS PRESENT    CA OXALATE CRYSTALS PRESENT       ASSESSMENT/PLAN     See patient instructions and after visit summary.    Norma was seen today for gu symptoms.    Diagnoses and all orders for this visit:    Acute UTI  -     PREGNANCY TEST URINE  -     URINALYSIS WITH MICRO EXAM W/O C/S; Future  -     URINE CULTURE; Future  -     URINALYSIS WITH MICRO EXAM W/O C/S  -     URINE CULTURE  -     nitrofurantoin, macrocrystal-monohydrate, (MACROBID) 100 MG capsule; Take 1 capsule by mouth 2 times daily for 7 days.      Supervising physician is Dr. Nahum De Los Santos.     yes

## 2025-05-25 NOTE — CONSULT NOTE ADULT - ASSESSMENT
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     Plan:    1.    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   Neutropenic Fever  -    WBC 1.83 with    -    ?pneumonia on CT chest   -    would get ID consult  -    on cefepime IV  -    Will start zarxio daily till ANC closer to 5000    Will discuss with Dr Lovett primary oncologist

## 2025-05-25 NOTE — CONSULT NOTE ADULT - SUBJECTIVE AND OBJECTIVE BOX
Chief Complaint: "I had a fever".     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  Patient over the past one week has been very weak and having trouble walking.  He has a mild cough over the past week.  He had a fever 101.5 on 5-24-25 so came to the ER.  He has no chest pain. No pleuritic chest pain.  No abdominal pain. No nausea.  No vomiting.  He was having some loose stools.  No leg swelling. No calf pain.  No chills. Overall is feeling very weak.        In the ED, presenting vitals were T 100.1, /66, , satting well on RA. Labs were significant for WBC 1.37, Hgb 10.2, , Na 129, alk phos 144, AST 62, lactate 2.2. S/p cefepime, vancomycin, IVF,    (25 May 2025 02:57)         PAST MEDICAL & SURGICAL HISTORY:  HTN (hypertension)      HLD (hyperlipidemia)      Prostate cancer      S/P prostatectomy          SOCIAL HISTORY:  Smoking - Non smoker   Alcohol - Social  Drugs - No drug use    FAMILY HISTORY:      MEDICATIONS  (STANDING):  albuterol/ipratropium for Nebulization 3 milliLiter(s) Nebulizer every 6 hours  cefepime   IVPB      cefepime   IVPB 2000 milliGRAM(s) IV Intermittent every 8 hours  heparin   Injectable 5000 Unit(s) SubCutaneous every 8 hours  levothyroxine 100 MICROGram(s) Oral daily  multivitamin 1 Tablet(s) Oral daily  predniSONE   Tablet 5 milliGRAM(s) Oral two times a day  sertraline 25 milliGRAM(s) Oral daily  sodium chloride 3%  Inhalation 4 milliLiter(s) Inhalation every 12 hours    MEDICATIONS  (PRN):  benzonatate 100 milliGRAM(s) Oral every 8 hours PRN Cough  guaifenesin/dextromethorphan Oral Liquid 10 milliLiter(s) Oral every 4 hours PRN Cough      Allergies    No Known Allergies    Intolerances        Vital Signs Last 24 Hrs  T(C): 36.4 (25 May 2025 03:01), Max: 37.8 (24 May 2025 20:12)  T(F): 97.6 (25 May 2025 03:01), Max: 100.1 (24 May 2025 20:12)  HR: 67 (25 May 2025 03:01) (67 - 103)  BP: 119/69 (25 May 2025 03:01) (110/66 - 122/59)  BP(mean): --  RR: 18 (25 May 2025 03:01) (18 - 20)  SpO2: 97% (25 May 2025 03:01) (92% - 97%)    Parameters below as of 25 May 2025 03:01  Patient On (Oxygen Delivery Method): nasal cannula  O2 Flow (L/min): 2      PHYSICAL EXAM  General: weak  HEENT: clear oropharynx, anicteric sclera, pink conjunctiva  Neck: supple  CV: normal S1/S2  Lungs: decreased BS bilateral  Abdomen: soft non-tender non-distended, positive bowel sounds  Ext: no edema  Skin: no rashes and no petechiae  Lymph Nodes: No LAD in neck  Neuro: alert and oriented X 3    LABS:                          10.9   1.83  )-----------( 103      ( 25 May 2025 07:19 )             33.3         Mean Cell Volume : 100.0 fl  Mean Cell Hemoglobin : 32.7 pg  Mean Cell Hemoglobin Concentration : 32.7 g/dL  Auto Neutrophil # : x  Auto Lymphocyte # : x  Auto Monocyte # : x  Auto Eosinophil # : x  Auto Basophil # : x  Auto Neutrophil % : x  Auto Lymphocyte % : x  Auto Monocyte % : x  Auto Eosinophil % : x  Auto Basophil % : x      Serial CBC's  05-25 @ 07:19  Hct-33.3 / Hgb-10.9 / Plat-103 / RBC-3.33 / WBC-1.83  Serial CBC's  05-24 @ 21:15  Hct-31.6 / Hgb-10.2 / Plat-108 / RBC-3.21 / WBC-1.37      05-25    135  |  99  |  20  ----------------------------<  83  4.1   |  23  |  0.88    Ca    8.5      25 May 2025 07:17  Phos  3.4     05-25  Mg     1.8     05-25    TPro  5.8[L]  /  Alb  2.8[L]  /  TBili  0.8  /  DBili  x   /  AST  60[H]  /  ALT  20  /  AlkPhos  140[H]  05-25      PT/INR - ( 24 May 2025 21:15 )   PT: 12.8 sec;   INR: 1.12 ratio         PTT - ( 24 May 2025 21:15 )  PTT:27.8 sec

## 2025-05-25 NOTE — PROGRESS NOTE ADULT - SUBJECTIVE AND OBJECTIVE BOX
****[facility] PGY-1*****    Overnight Events: Admitted overnight  Interval Events:    OBJECTIVE:  ICU Vital Signs Last 24 Hrs  T(C): 36.4 (25 May 2025 03:01), Max: 37.8 (24 May 2025 20:12)  T(F): 97.6 (25 May 2025 03:01), Max: 100.1 (24 May 2025 20:12)  HR: 67 (25 May 2025 03:01) (67 - 103)  BP: 119/69 (25 May 2025 03:01) (110/66 - 122/59)  BP(mean): --  ABP: --  ABP(mean): --  RR: 18 (25 May 2025 03:01) (18 - 20)  SpO2: 97% (25 May 2025 03:01) (92% - 97%)    O2 Parameters below as of 25 May 2025 03:01  Patient On (Oxygen Delivery Method): nasal cannula  O2 Flow (L/min): 2            CAPILLARY BLOOD GLUCOSE          PHYSICAL EXAM  GENERAL: NAD, lying in bed comfortably  HEAD:  Atraumatic, Normocephalic  EYES: EOMI, PERRLA, conjunctiva and sclera clear  ENT: Moist mucous membranes  NECK: Supple, No JVD  CHEST/LUNG: Clear to auscultation bilaterally; No rales, rhonchi, wheezing, or rubs. Unlabored respirations  HEART: Regular rate and rhythm; No murmurs, rubs, or gallops  ABDOMEN: BSx4; Soft, nontender, nondistended  EXTREMITIES:  2+ Peripheral Pulses, brisk capillary refill. No clubbing, cyanosis, or edema  NERVOUS SYSTEM:  A&Ox3, no focal deficits   SKIN: No rashes or lesions  Etc:      HOSPITAL MEDICATIONS:  MEDICATIONS  (STANDING):  albuterol/ipratropium for Nebulization 3 milliLiter(s) Nebulizer every 6 hours  cefepime   IVPB 2000 milliGRAM(s) IV Intermittent once  cefepime   IVPB      cefepime   IVPB 2000 milliGRAM(s) IV Intermittent every 8 hours  heparin   Injectable 5000 Unit(s) SubCutaneous every 8 hours  levothyroxine 100 MICROGram(s) Oral daily  multivitamin 1 Tablet(s) Oral daily  predniSONE   Tablet 5 milliGRAM(s) Oral two times a day  sertraline 25 milliGRAM(s) Oral daily  sodium chloride 3%  Inhalation 4 milliLiter(s) Inhalation every 12 hours    MEDICATIONS  (PRN):  benzonatate 100 milliGRAM(s) Oral every 8 hours PRN Cough  guaifenesin/dextromethorphan Oral Liquid 10 milliLiter(s) Oral every 4 hours PRN Cough      LABS:                        10.2   1.37  )-----------( 108      ( 24 May 2025 21:15 )             31.6     Hgb Trend: 10.2<--  05-24    129[L]  |  94[L]  |  23  ----------------------------<  121[H]  4.3   |  24  |  0.90    Ca    8.8      24 May 2025 21:15    TPro  5.7[L]  /  Alb  2.7[L]  /  TBili  0.9  /  DBili  x   /  AST  62[H]  /  ALT  20  /  AlkPhos  144[H]      Creatinine Trend: 0.90<--, 0.90<--, 0.81<--, 0.99<--  PT/INR - ( 24 May 2025 21:15 )   PT: 12.8 sec;   INR: 1.12 ratio         PTT - ( 24 May 2025 21:15 )  PTT:27.8 sec  Urinalysis Basic - ( 25 May 2025 00:21 )    Color: Yellow / Appearance: Clear / S.011 / pH: x  Gluc: x / Ketone: x  / Bili: Negative / Urobili: 0.2 mg/dL   Blood: x / Protein: Negative mg/dL / Nitrite: Negative   Leuk Esterase: Negative / RBC: x / WBC x   Sq Epi: x / Non Sq Epi: x / Bacteria: x        Venous Blood Gas:   @ 21:15  7.46/42/55/30/89.2  VBG Lactate: 2.2      MICROBIOLOGY:     Urinalysis with Rflx Culture (collected 25 May 2025 00:21)        RADIOLOGY:  [ ] Reviewed by me   ****[facility] PGY-1*****    Overnight Events: Admitted overnight  Interval Events: doing well this morning, feels that his SOB is improved compared to when he first came in but not back to 100%. States he feels this is similar to the last time he was in the hospital. Per daughter on phone he has had 2 shots of neupogen on thursday and friday. Patient states he feels similar to the last time he was in the hospital.    OBJECTIVE:  ICU Vital Signs Last 24 Hrs  T(C): 36.4 (25 May 2025 03:01), Max: 37.8 (24 May 2025 20:12)  T(F): 97.6 (25 May 2025 03:01), Max: 100.1 (24 May 2025 20:12)  HR: 67 (25 May 2025 03:01) (67 - 103)  BP: 119/69 (25 May 2025 03:01) (110/66 - 122/59)  BP(mean): --  ABP: --  ABP(mean): --  RR: 18 (25 May 2025 03:01) (18 - 20)  SpO2: 97% (25 May 2025 03:01) (92% - 97%)    O2 Parameters below as of 25 May 2025 03:01  Patient On (Oxygen Delivery Method): nasal cannula  O2 Flow (L/min): 2            CAPILLARY BLOOD GLUCOSE          PHYSICAL EXAM  GENERAL: NAD, lying in bed comfortably  HEAD:  Atraumatic, Normocephalic  EYES: EOMI, PERRLA, conjunctiva and sclera clear  ENT: Moist mucous membranes  NECK: Supple, No JVD  CHEST/LUNG: Clear to auscultation bilaterally; No rales, rhonchi, wheezing, or rubs. Unlabored respirations  HEART: Regular rate and rhythm; No murmurs, rubs, or gallops  ABDOMEN: BSx4; Soft, nontender, nondistended  EXTREMITIES:  2+ Peripheral Pulses, brisk capillary refill. No clubbing, cyanosis, or edema  NERVOUS SYSTEM:  A&Ox3, no focal deficits   SKIN: No rashes or lesions  Etc:      HOSPITAL MEDICATIONS:  MEDICATIONS  (STANDING):  albuterol/ipratropium for Nebulization 3 milliLiter(s) Nebulizer every 6 hours  cefepime   IVPB 2000 milliGRAM(s) IV Intermittent once  cefepime   IVPB      cefepime   IVPB 2000 milliGRAM(s) IV Intermittent every 8 hours  heparin   Injectable 5000 Unit(s) SubCutaneous every 8 hours  levothyroxine 100 MICROGram(s) Oral daily  multivitamin 1 Tablet(s) Oral daily  predniSONE   Tablet 5 milliGRAM(s) Oral two times a day  sertraline 25 milliGRAM(s) Oral daily  sodium chloride 3%  Inhalation 4 milliLiter(s) Inhalation every 12 hours    MEDICATIONS  (PRN):  benzonatate 100 milliGRAM(s) Oral every 8 hours PRN Cough  guaifenesin/dextromethorphan Oral Liquid 10 milliLiter(s) Oral every 4 hours PRN Cough      LABS:                        10.2   1.37  )-----------( 108      ( 24 May 2025 21:15 )             31.6     Hgb Trend: 10.2<--  05-24    129[L]  |  94[L]  |  23  ----------------------------<  121[H]  4.3   |  24  |  0.90    Ca    8.8      24 May 2025 21:15    TPro  5.7[L]  /  Alb  2.7[L]  /  TBili  0.9  /  DBili  x   /  AST  62[H]  /  ALT  20  /  AlkPhos  144[H]  24    Creatinine Trend: 0.90<--, 0.90<--, 0.81<--, 0.99<--  PT/INR - ( 24 May 2025 21:15 )   PT: 12.8 sec;   INR: 1.12 ratio         PTT - ( 24 May 2025 21:15 )  PTT:27.8 sec  Urinalysis Basic - ( 25 May 2025 00:21 )    Color: Yellow / Appearance: Clear / S.011 / pH: x  Gluc: x / Ketone: x  / Bili: Negative / Urobili: 0.2 mg/dL   Blood: x / Protein: Negative mg/dL / Nitrite: Negative   Leuk Esterase: Negative / RBC: x / WBC x   Sq Epi: x / Non Sq Epi: x / Bacteria: x        Venous Blood Gas:   @ 21:15  7.46/42/55/30/89.2  VBG Lactate: 2.2      MICROBIOLOGY:     Urinalysis with Rflx Culture (collected 25 May 2025 00:21)        RADIOLOGY:  [ ] Reviewed by me

## 2025-05-25 NOTE — H&P ADULT - NSHPPHYSICALEXAM_GEN_ALL_CORE
VITALS:   T(C): 36.7 (05-25-25 @ 00:13), Max: 37.8 (05-24-25 @ 20:12)  HR: 75 (05-25-25 @ 00:13) (75 - 103)  BP: 122/59 (05-25-25 @ 00:13) (110/66 - 122/59)  RR: 20 (05-25-25 @ 00:13) (20 - 20)  SpO2: 97% (05-25-25 @ 00:13) (92% - 97%)    GENERAL: NAD, lying in bed comfortably  HEAD:  Atraumatic, Normocephalic  EYES: EOMI, PERRLA, conjunctiva and sclera clear  ENT: Moist mucous membranes  NECK: Supple, No JVD  CHEST/LUNG: Clear to auscultation bilaterally; No rales, rhonchi, wheezing, or rubs. Unlabored respirations  HEART: Regular rate and rhythm; No murmurs, rubs, or gallops  ABDOMEN: BSx4; Soft, nontender, nondistended  EXTREMITIES:  2+ Peripheral Pulses, brisk capillary refill. No clubbing, cyanosis, or edema  NERVOUS SYSTEM:  A&Ox3, no focal deficits   SKIN: No rashes or lesions

## 2025-05-25 NOTE — PROGRESS NOTE ADULT - PROBLEM SELECTOR PLAN 4
S/p prostatectomy in his 50's. Recurred with metastatic disease to liver and lung. S/p chest radiation and wedge resection. Has been on chem for last couple months, last chemo 1 week ago. Follows with Dr. Chase (urology) and oncology.    - Continue home prednisone 5mg BID (clarify indication with outpatient team when possible)  - Monitor CBC  - consult private heme/onc, appreciate recs S/p prostatectomy in his 50's. Recurred with metastatic disease to liver and lung. S/p chest radiation and wedge resection. Has been on chem for last couple months, last chemo 1 week ago. Follows with Dr. Chase (urology) and oncology.    - Continue home prednisone 5mg BID (clarify indication with outpatient team when possible)  - Monitor CBC  - consult heme/onc, [facility], appreciate recs

## 2025-05-25 NOTE — H&P ADULT - NSHPREVIEWOFSYSTEMS_GEN_ALL_CORE
Constitutional: [x ] fevers [ ] chills [ ] fatigue [x] weakness  HEENT: [ ] postnasal drip [ ] nasal congestion  CV: [ ] chest pain [ ] orthopnea [ ] LE edema  Resp: [x ] cough [ ] shortness of breath [ ] dyspnea   GI: [ ] nausea [ ] vomiting [x ] diarrhea [ ] constipation [ ] abd pain [ ] melena  : [ ] dysuria [ ] increased urinary frequency  Musculoskeletal: [ ] back pain [ ] myalgias [ ] arthralgias [ ] fracture  Skin: [ ] rash [ ] itch  Neurological: [ ] headache [ ] dizziness [ ] syncope  Endocrine: [ ] diabetes [ ] thyroid problem  Hematologic/Lymphatic: [ ] anemia [ ] bleeding problem

## 2025-05-25 NOTE — H&P ADULT - PROBLEM SELECTOR PLAN 2
CT showing upper lobe groundglass opacities  Productive cough  - F/u sputum Cx  - F/u legionella/strep  - F/u MRSA PCR  - Continue vanc/zosyn  - If MRSA PCR negative d/c vanc CT showing upper lobe groundglass opacities  Productive cough  - F/u sputum Cx  - F/u legionella/strep  - F/u MRSA PCR  - Continue cefepime   - If MRSA PCR positive start vanc

## 2025-05-25 NOTE — CONSULT NOTE ADULT - ASSESSMENT
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  Patient over the past one week has been very weak and having trouble walking.  He has a mild cough over the past week.  He had a fever 101.5 on 5-24-25 so came to the ER.  He has no chest pain. No pleuritic chest pain.  No abdominal pain. No nausea.  No vomiting.  He was having some loose stools.  No leg swelling. No calf pain.  No chills. Overall is feeling very weak.      In the ED, presenting vitals were T 100.1, /66, , satting well on RA. Labs were significant for WBC 1.37, ANC 0.41, bands 9,8%,  Hgb 10.2, , Na 129, alk phos 144, AST 62, lactate 2.2. CT angio PE showing Patchy bilateral upper lobe groundglass opacities, suggestive of nonspecific inflammation/infection. 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 - cycle of taxotere on 5-16-25 30 mg/m2 followed by zarxio on 5-22-25 and 5-23-25  # Fevers   # Neutropenia  # Abnormal CT chest   # Bandemia   # Pancytopenia   # Mildly elevated LFTs    MICRO:   05/25 UA negative  05/24 BCX- IP   05/25 MRSA PCR- IP   05/25 Sputum cx- Pending   05/24 Full RVP- negative   05/25 legionella/strep- negative     Recommendations:   - On cefepime 2 gm Q8H   - Follow up with Sputum cx  - F/U Bcx from admission and MRSA nares   - Diarrhea     In addition to reviewing history, imaging, documents, labs, microbiology, took into account antibiotic stewardship, local antibiogram and infection control strategies and potential transmission issues.    Discussed with the primary team.    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  Patient over the past one week has been very weak and having trouble walking.  He has a mild cough over the past week.  He had a fever 101.5 on 5-24-25 so came to the ER.  He has no chest pain. No pleuritic chest pain.  No abdominal pain. No nausea.  No vomiting.  He was having some loose stools.  No leg swelling. No calf pain.  No chills. Overall is feeling very weak.      In the ED, presenting vitals were T 100.1, /66, , satting well on RA. Labs were significant for WBC 1.37, ANC 0.41, bands 9,8%,  Hgb 10.2, , Na 129, alk phos 144, AST 62, lactate 2.2. CT angio PE showing Patchy bilateral upper lobe groundglass opacities, suggestive of nonspecific inflammation/infection. 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   # Neutropenia  # Abnormal CT chest   # Bandemia   # Pancytopenia   # Mildly elevated LFTs    MICRO:   05/25 UA negative  05/24 BCX- IP   05/25 MRSA PCR- IP   05/25 Sputum cx- Pending   05/24 Full RVP- negative   05/25 legionella/strep- negative     Recommendations:   - On cefepime 2 gm Q8H, can continue the same as patient has improvement in his symptoms.   - Would favor stopping Azithromycin as Urine legionella and full RVP negative.   - Follow up with Sputum cx to further tailor   - F/U Bcx from admission and MRSA nares   - Diarrhea - reports improvement, if diarrhea persists can send stool studies       In addition to reviewing history, imaging, documents, labs, microbiology, took into account antibiotic stewardship, local antibiogram and infection control strategies and potential transmission issues.    Discussed with the primary team.    Kathy Martinez MD  Attending Physician, Division of Infectious Diseases  Department of Medicine   [facility]    Contact on TEAMS messaging from 9am - 5pm  Office: [phone] (after 5 PM or weekend)

## 2025-05-26 LAB
BASOPHILS # BLD AUTO: 0.06 K/UL — SIGNIFICANT CHANGE UP (ref 0–0.2)
BASOPHILS NFR BLD AUTO: 1.3 % — SIGNIFICANT CHANGE UP (ref 0–2)
CULTURE RESULTS: ABNORMAL
EOSINOPHIL # BLD AUTO: 0.01 K/UL — SIGNIFICANT CHANGE UP (ref 0–0.5)
EOSINOPHIL NFR BLD AUTO: 0.2 % — SIGNIFICANT CHANGE UP (ref 0–6)
HCT VFR BLD CALC: 30.8 % — LOW (ref 39–50)
HGB BLD-MCNC: 9.7 G/DL — LOW (ref 13–17)
IMM GRANULOCYTES NFR BLD AUTO: 3.2 % — HIGH (ref 0–0.9)
LYMPHOCYTES # BLD AUTO: 1.16 K/UL — SIGNIFICANT CHANGE UP (ref 1–3.3)
LYMPHOCYTES # BLD AUTO: 24.8 % — SIGNIFICANT CHANGE UP (ref 13–44)
MCHC RBC-ENTMCNC: 31.1 PG — SIGNIFICANT CHANGE UP (ref 27–34)
MCHC RBC-ENTMCNC: 31.5 G/DL — LOW (ref 32–36)
MCV RBC AUTO: 98.7 FL — SIGNIFICANT CHANGE UP (ref 80–100)
MONOCYTES # BLD AUTO: 0.8 K/UL — SIGNIFICANT CHANGE UP (ref 0–0.9)
MONOCYTES NFR BLD AUTO: 17.1 % — HIGH (ref 2–14)
NEUTROPHILS # BLD AUTO: 2.49 K/UL — SIGNIFICANT CHANGE UP (ref 1.8–7.4)
NEUTROPHILS NFR BLD AUTO: 53.4 % — SIGNIFICANT CHANGE UP (ref 43–77)
NRBC BLD AUTO-RTO: 0 /100 WBCS — SIGNIFICANT CHANGE UP (ref 0–0)
PLATELET # BLD AUTO: 108 K/UL — LOW (ref 150–400)
RBC # BLD: 3.12 M/UL — LOW (ref 4.2–5.8)
RBC # FLD: 16.8 % — HIGH (ref 10.3–14.5)
SPECIMEN SOURCE: SIGNIFICANT CHANGE UP
WBC # BLD: 4.67 K/UL — SIGNIFICANT CHANGE UP (ref 3.8–10.5)
WBC # FLD AUTO: 4.67 K/UL — SIGNIFICANT CHANGE UP (ref 3.8–10.5)

## 2025-05-26 PROCEDURE — 99232 SBSQ HOSP IP/OBS MODERATE 35: CPT | Mod: GC

## 2025-05-26 PROCEDURE — 99233 SBSQ HOSP IP/OBS HIGH 50: CPT

## 2025-05-26 PROCEDURE — G0545: CPT

## 2025-05-26 RX ADMIN — Medication 100 MILLIGRAM(S): at 13:44

## 2025-05-26 RX ADMIN — IPRATROPIUM BROMIDE AND ALBUTEROL SULFATE 3 MILLILITER(S): .5; 2.5 SOLUTION RESPIRATORY (INHALATION) at 00:04

## 2025-05-26 RX ADMIN — IPRATROPIUM BROMIDE AND ALBUTEROL SULFATE 3 MILLILITER(S): .5; 2.5 SOLUTION RESPIRATORY (INHALATION) at 17:13

## 2025-05-26 RX ADMIN — SERTRALINE 25 MILLIGRAM(S): 100 TABLET, FILM COATED ORAL at 11:52

## 2025-05-26 RX ADMIN — HEPARIN SODIUM 5000 UNIT(S): 1000 INJECTION INTRAVENOUS; SUBCUTANEOUS at 13:42

## 2025-05-26 RX ADMIN — FILGRASTIM 300 MICROGRAM(S): 300 INJECTION, SOLUTION INTRAVENOUS; SUBCUTANEOUS at 11:53

## 2025-05-26 RX ADMIN — HEPARIN SODIUM 5000 UNIT(S): 1000 INJECTION INTRAVENOUS; SUBCUTANEOUS at 06:06

## 2025-05-26 RX ADMIN — IPRATROPIUM BROMIDE AND ALBUTEROL SULFATE 3 MILLILITER(S): .5; 2.5 SOLUTION RESPIRATORY (INHALATION) at 11:52

## 2025-05-26 RX ADMIN — CEFEPIME 100 MILLIGRAM(S): 2 INJECTION, POWDER, FOR SOLUTION INTRAVENOUS at 00:01

## 2025-05-26 RX ADMIN — IPRATROPIUM BROMIDE AND ALBUTEROL SULFATE 3 MILLILITER(S): .5; 2.5 SOLUTION RESPIRATORY (INHALATION) at 06:07

## 2025-05-26 RX ADMIN — PREDNISONE 5 MILLIGRAM(S): 20 TABLET ORAL at 17:13

## 2025-05-26 RX ADMIN — Medication 100 MICROGRAM(S): at 06:07

## 2025-05-26 RX ADMIN — CEFEPIME 100 MILLIGRAM(S): 2 INJECTION, POWDER, FOR SOLUTION INTRAVENOUS at 08:26

## 2025-05-26 RX ADMIN — Medication 4 MILLILITER(S): at 17:13

## 2025-05-26 RX ADMIN — HEPARIN SODIUM 5000 UNIT(S): 1000 INJECTION INTRAVENOUS; SUBCUTANEOUS at 21:33

## 2025-05-26 RX ADMIN — Medication 1 TABLET(S): at 11:52

## 2025-05-26 RX ADMIN — CEFEPIME 100 MILLIGRAM(S): 2 INJECTION, POWDER, FOR SOLUTION INTRAVENOUS at 16:10

## 2025-05-26 RX ADMIN — PREDNISONE 5 MILLIGRAM(S): 20 TABLET ORAL at 06:06

## 2025-05-26 RX ADMIN — Medication 4 MILLILITER(S): at 06:07

## 2025-05-26 NOTE — PROGRESS NOTE ADULT - PROBLEM SELECTOR PLAN 1
WBC 1.37   Tmax 101.5 at home  Productive cough  CT showing upper lobe groundglass opacities  UA negative  - F/u BCx, sputum Cx, MRSA PCR, legionella/strep  - F/u GI PCR, c diff  - Consider ID consult    Per daughter he has had 2 shots of neupogen on thursday and friday prior to coming to the hospital, with the next dose planned for tuesday  - Oncologist Dr. Lovett, [facility], office notified patient is here on 5/25 - WBC 1.37 Tmax 101.5 at home  - Productive cough  - CT showing upper lobe groundglass opacities  - UA negative  - F/u BC: NGTD  - sputum Cx: Commensal quinn,   - MRSA PCR, legionella/strep negative  - F/u GI PCR, c diff, patient has not had Bm    Plan  - C/w Cefepime as empric treatment  - DC Azithromycin  - Next dose EPO 5/27  - Oncologist Dr. Lovett, [facility], office notified patient is here on 5/25 - WBC 1.37 Tmax 101.5 at home  - Productive cough  - CT showing upper lobe groundglass opacities  - UA negative  - F/u BC: NGTD  - sputum Cx: Commensal quinn,   - MRSA PCR, legionella/strep negative  - F/u GI PCR, c diff, patient has not had Bm    Plan  - C/w Cefepime as empiric treatment  - DC Azithromycin  - Next dose EPO 5/27  - Oncologist Dr. Lovett, [facility], office notified patient is here on 5/25

## 2025-05-26 NOTE — PROGRESS NOTE ADULT - SUBJECTIVE AND OBJECTIVE BOX
Patient is a 89y old  Male who presents with a chief complaint of Likely PNA (26 May 2025 07:06)    Being followed by ID for neutropenic fevers     Interval history:  low grade fever 100.4 on 05/25 at 20:21.     ANC improved- also on Zarxio   Reports improvement in cough   Reports diarrhea  No abd pain and no tenderness on exam   PIV in tact     ROS:  No,SOB,CP  No N/V  No abd pain  No urinary complaints  No HA      Antimicrobials:  cefepime   IVPB      cefepime   IVPB 2000 milliGRAM(s) IV Intermittent every 8 hours      Vital Signs Last 24 Hrs  T(C): 36.6 (05-26-25 @ 05:09), Max: 38 (05-25-25 @ 20:21)  T(F): 97.9 (05-26-25 @ 05:09), Max: 100.4 (05-25-25 @ 20:21)  HR: 76 (05-26-25 @ 05:09) (76 - 100)  BP: 106/61 (05-26-25 @ 05:09) (100/61 - 118/63)  BP(mean): --  RR: 18 (05-26-25 @ 05:09) (18 - 19)  SpO2: 92% (05-26-25 @ 05:09) (92% - 97%)    PHYSICAL EXAM  General: In NAD, on NC 2 L   HEENT: clear oropharynx, anicteric sclera, pink conjunctiva  Neck: supple  CV: normal S1/S2  Lungs: decreased BS bilateral  Abdomen: soft non-tender non-distended, positive bowel sounds  Ext: no edema  Skin: no rashes and no petechiae  Neuro: alert and oriented X 3      Lab Data:                        9.7    4.67  )-----------( 108      ( 26 May 2025 06:44 )             30.8     05-25    135  |  99  |  20  ----------------------------<  83  4.1   |  23  |  0.88    Ca    8.5      25 May 2025 07:17  Phos  3.4     05-25  Mg     1.8     05-25    TPro  5.8[L]  /  Alb  2.8[L]  /  TBili  0.8  /  DBili  x   /  AST  60[H]  /  ALT  20  /  AlkPhos  140[H]  05-25      Sputum Sputum  05-25-25   Commensal quinn consistent with body site  --    Few polymorphonuclear leukocytes seen per oil power field  Few Gram Positive Cocci in Clusters seen per oil power field  Few Gram Positive Rods seen per oil power field      Blood Blood-Peripheral  05-24-25   No growth at 24 hours  --  --      Blood Blood-Peripheral  05-24-25   No growth at 24 hours  --  --                    RADIOLOGY:  imaging below personally reviewed and agree with findings      ACC: [id number] EXAM:  CT ANGIO CHEST PULM ART WAWIC   ORDERED BY:  JANIE GOMEZ     PROCEDURE DATE:  05/25/2025          INTERPRETATION:  CLINICAL INFORMATION: Left base crackles on auscultation   . Concern for pulmonary embolism and pneumonia.    COMPARISON: CTA chest 4/26/2025    CONTRAST/COMPLICATIONS:  IV Contrast: Omnipaque 350  70 cc administered   30 cc discarded  Oral Contrast: None    PROCEDURE:  CT Angiography of the Chest.  Sagittal and coronal reformats were performed as well as 3D (MIP)   reconstructions.    FINDINGS:    LUNGS AND LARGE AIRWAYS: Status post right middle lobectomy. Scattered   patchy groundglass opacities of the upper lobes. Redemonstrated lower   lobe predominantly bronchiolectasis. Emphysematous changes. Stable   posteromedial left lower lobe rounded atelectasis containing a punctate   metallic density. Trace secretion within the proximal right mainstem   bronchus.  PLEURA: No pleural effusion.  VESSELS: No pulmonary embolus. Stable mild pulmonary trunk dilatation.   Aortic calcifications. Coronary artery calcifications. Severe   calcification at the proximal SMA.  HEART: Heart size is normal. No pericardial effusion. Aortic valve   calcifications.  MEDIASTINUM AND REILLY: No lymphadenopathy.  CHEST WALL AND LOWER NECK: Left greater than right gynecomastia,   unchanged. Left thyroid calcifications, unchanged.  VISUALIZED UPPER ABDOMEN: Heterogenous appearance of the right hepatic   dome, grossly unchanged to mildly increased, may be compatible with   history of hepatic metastases; consider further evaluation   contrast-enhanced MRI abdomen. Redemonstrated hepatic and renal cysts and   calcified hepatic granulomas.  BONES: Degenerative changes. Chronic mild T12 superior endplate deformity.    IMPRESSION:  No pulmonary embolus.    Patchy bilateral upper lobe groundglass opacities, suggestive of   nonspecific inflammation/infection.    Additional findings as above.

## 2025-05-26 NOTE — PROGRESS NOTE ADULT - PROBLEM SELECTOR PLAN 6
- Hold toprol and lisinopril ISO sepsis  - Clarify indication for Toprol  - Per outpatient notes seems pt may have been started on HCTZ  - Clarify with daughter but hold for now - Hold toprol and lisinopril ISO sepsis  - Clarify indication for Toprol  - Per outpatient notes seems pt may have been started on HCTZ  - Clarify with daughter but hold for now    Plan  - Hold BP meds iso sepsis

## 2025-05-26 NOTE — PROGRESS NOTE ADULT - SUBJECTIVE AND OBJECTIVE BOX
Shorty Ross MD  Available on TEAMS    Patient is a 89y old  Male who presents with a chief complaint of Likely PNA (25 May 2025 13:11)      SUBJECTIVE / OVERNIGHT EVENTS: No acute events overnight. Patient was assessed at bedside.       REVIEW OF SYSTEMS:  ROS negative aside from above    MEDICATIONS  (STANDING):  albuterol/ipratropium for Nebulization 3 milliLiter(s) Nebulizer every 6 hours  azithromycin  IVPB      azithromycin  IVPB 500 milliGRAM(s) IV Intermittent every 24 hours  cefepime   IVPB      cefepime   IVPB 2000 milliGRAM(s) IV Intermittent every 8 hours  filgrastim-sndz (ZARXIO) Injectable 300 MICROGram(s) SubCutaneous daily  heparin   Injectable 5000 Unit(s) SubCutaneous every 8 hours  levothyroxine 100 MICROGram(s) Oral daily  multivitamin 1 Tablet(s) Oral daily  predniSONE   Tablet 5 milliGRAM(s) Oral two times a day  sertraline 25 milliGRAM(s) Oral daily  sodium chloride 3%  Inhalation 4 milliLiter(s) Inhalation every 12 hours    MEDICATIONS  (PRN):  acetaminophen     Tablet .. 650 milliGRAM(s) Oral every 6 hours PRN Temp greater or equal to 38C (100.4F), Mild Pain (1 - 3)  benzonatate 100 milliGRAM(s) Oral every 8 hours PRN Cough  guaifenesin/dextromethorphan Oral Liquid 10 milliLiter(s) Oral every 4 hours PRN Cough      CAPILLARY BLOOD GLUCOSE        I&O's Summary    25 May 2025 07:01  -  26 May 2025 07:00  --------------------------------------------------------  IN: 350 mL / OUT: 1000 mL / NET: -650 mL        Vital Signs Last 24 Hrs  T(C): 36.6 (26 May 2025 05:09), Max: 38 (25 May 2025 20:21)  T(F): 97.9 (26 May 2025 05:09), Max: 100.4 (25 May 2025 20:21)  HR: 76 (26 May 2025 05:09) (76 - 100)  BP: 106/61 (26 May 2025 05:09) (100/61 - 118/63)  BP(mean): --  RR: 18 (26 May 2025 05:09) (18 - 19)  SpO2: 92% (26 May 2025 05:09) (92% - 97%)    Parameters below as of 26 May 2025 05:09  Patient On (Oxygen Delivery Method): room air        PHYSICAL EXAM:  GENERAL: NAD, well-developed, well-nourished  HEAD: Atraumatic, Normocephalic  EYES: EOMI, PERRLA, conjunctiva and sclera clear  NECK: Supple, No JVD  CHEST/LUNG: Clear to auscultation bilaterally; No wheezes or crackles  HEART: Normal S1/S2; Regular rate and rhythm; No murmurs, rubs, or gallops  ABDOMEN: Soft, Nontender, Nondistended; Bowel sounds present  EXTREMITIES: 2+ Peripheral Pulses; No clubbing, cyanosis, or edema  PSYCH: A&Ox3  NEUROLOGY: no focal neurologic deficit  SKIN: No rashes or lesions    LABS:                        9.7    4.67  )-----------( 108      ( 26 May 2025 06:44 )             30.8      05-25    135  |  99  |  20  ----------------------------<  83  4.1   |  23  |  0.88    Ca    8.5      25 May 2025 07:17  Phos  3.4     05-25  Mg     1.8     05-25    TPro  5.8[L]  /  Alb  2.8[L]  /  TBili  0.8  /  DBili  x   /  AST  60[H]  /  ALT  20  /  AlkPhos  140[H]  05-25    PT/INR - ( 24 May 2025 21:15 )   PT: 12.8 sec;   INR: 1.12 ratio         PTT - ( 24 May 2025 21:15 )  PTT:27.8 sec      Urinalysis Basic - ( 25 May 2025 07:17 )    Color: x / Appearance: x / SG: x / pH: x  Gluc: 83 mg/dL / Ketone: x  / Bili: x / Urobili: x   Blood: x / Protein: x / Nitrite: x   Leuk Esterase: x / RBC: x / WBC x   Sq Epi: x / Non Sq Epi: x / Bacteria: x        RADIOLOGY & ADDITIONAL TESTS:    Imaging Personally Reviewed:    Consultant(s) Notes Reviewed:      Care Discussed with Consultants/Other Providers:   Shorty Ross MD  Available on TEAMS    Patient is a 89y old  Male who presents with a chief complaint of Likely PNA (25 May 2025 13:11)      SUBJECTIVE / OVERNIGHT EVENTS: No acute events overnight. Patient was assessed at bedside. Denies fevers, chills, abdominal pain, pain with urination      REVIEW OF SYSTEMS:  ROS negative aside from above    MEDICATIONS  (STANDING):  albuterol/ipratropium for Nebulization 3 milliLiter(s) Nebulizer every 6 hours  azithromycin  IVPB      azithromycin  IVPB 500 milliGRAM(s) IV Intermittent every 24 hours  cefepime   IVPB      cefepime   IVPB 2000 milliGRAM(s) IV Intermittent every 8 hours  filgrastim-sndz (ZARXIO) Injectable 300 MICROGram(s) SubCutaneous daily  heparin   Injectable 5000 Unit(s) SubCutaneous every 8 hours  levothyroxine 100 MICROGram(s) Oral daily  multivitamin 1 Tablet(s) Oral daily  predniSONE   Tablet 5 milliGRAM(s) Oral two times a day  sertraline 25 milliGRAM(s) Oral daily  sodium chloride 3%  Inhalation 4 milliLiter(s) Inhalation every 12 hours    MEDICATIONS  (PRN):  acetaminophen     Tablet .. 650 milliGRAM(s) Oral every 6 hours PRN Temp greater or equal to 38C (100.4F), Mild Pain (1 - 3)  benzonatate 100 milliGRAM(s) Oral every 8 hours PRN Cough  guaifenesin/dextromethorphan Oral Liquid 10 milliLiter(s) Oral every 4 hours PRN Cough      CAPILLARY BLOOD GLUCOSE        I&O's Summary    25 May 2025 07:01  -  26 May 2025 07:00  --------------------------------------------------------  IN: 350 mL / OUT: 1000 mL / NET: -650 mL        Vital Signs Last 24 Hrs  T(C): 36.6 (26 May 2025 05:09), Max: 38 (25 May 2025 20:21)  T(F): 97.9 (26 May 2025 05:09), Max: 100.4 (25 May 2025 20:21)  HR: 76 (26 May 2025 05:09) (76 - 100)  BP: 106/61 (26 May 2025 05:09) (100/61 - 118/63)  BP(mean): --  RR: 18 (26 May 2025 05:09) (18 - 19)  SpO2: 92% (26 May 2025 05:09) (92% - 97%)    Parameters below as of 26 May 2025 05:09  Patient On (Oxygen Delivery Method): room air        PHYSICAL EXAM:  GENERAL: NAD, well-developed, well-nourished  HEAD: Atraumatic, Normocephalic  EYES: EOMI, PERRLA, conjunctiva and sclera clear  NECK: Supple, No JVD  CHEST/LUNG: Clear to auscultation bilaterally; No wheezes or crackles  HEART: Normal S1/S2; Regular rate and rhythm; No murmurs, rubs, or gallops  ABDOMEN: Soft, Nontender, Nondistended; Bowel sounds present  EXTREMITIES: 2+ Peripheral Pulses; No clubbing, cyanosis, or edema  PSYCH: A&Ox3  NEUROLOGY: no focal neurologic deficit  SKIN: No rashes or lesions    LABS:                        9.7    4.67  )-----------( 108      ( 26 May 2025 06:44 )             30.8      05-25    135  |  99  |  20  ----------------------------<  83  4.1   |  23  |  0.88    Ca    8.5      25 May 2025 07:17  Phos  3.4     05-25  Mg     1.8     05-25    TPro  5.8[L]  /  Alb  2.8[L]  /  TBili  0.8  /  DBili  x   /  AST  60[H]  /  ALT  20  /  AlkPhos  140[H]  05-25    PT/INR - ( 24 May 2025 21:15 )   PT: 12.8 sec;   INR: 1.12 ratio         PTT - ( 24 May 2025 21:15 )  PTT:27.8 sec      Urinalysis Basic - ( 25 May 2025 07:17 )    Color: x / Appearance: x / SG: x / pH: x  Gluc: 83 mg/dL / Ketone: x  / Bili: x / Urobili: x   Blood: x / Protein: x / Nitrite: x   Leuk Esterase: x / RBC: x / WBC x   Sq Epi: x / Non Sq Epi: x / Bacteria: x        RADIOLOGY & ADDITIONAL TESTS:    Imaging Personally Reviewed:    Consultant(s) Notes Reviewed:      Care Discussed with Consultants/Other Providers:   Shorty Ross MD  Available on TEAMS    Patient is a 89y old  Male who presents with a chief complaint of Likely PNA (25 May 2025 13:11)      SUBJECTIVE / OVERNIGHT EVENTS: No acute events overnight. Patient was assessed at bedside. Denies fevers, chills, abdominal pain, pain with urination      REVIEW OF SYSTEMS:  ROS negative aside from above    MEDICATIONS  (STANDING):  albuterol/ipratropium for Nebulization 3 milliLiter(s) Nebulizer every 6 hours  azithromycin  IVPB      azithromycin  IVPB 500 milliGRAM(s) IV Intermittent every 24 hours  cefepime   IVPB      cefepime   IVPB 2000 milliGRAM(s) IV Intermittent every 8 hours  filgrastim-sndz (ZARXIO) Injectable 300 MICROGram(s) SubCutaneous daily  heparin   Injectable 5000 Unit(s) SubCutaneous every 8 hours  levothyroxine 100 MICROGram(s) Oral daily  multivitamin 1 Tablet(s) Oral daily  predniSONE   Tablet 5 milliGRAM(s) Oral two times a day  sertraline 25 milliGRAM(s) Oral daily  sodium chloride 3%  Inhalation 4 milliLiter(s) Inhalation every 12 hours    MEDICATIONS  (PRN):  acetaminophen     Tablet .. 650 milliGRAM(s) Oral every 6 hours PRN Temp greater or equal to 38C (100.4F), Mild Pain (1 - 3)  benzonatate 100 milliGRAM(s) Oral every 8 hours PRN Cough  guaifenesin/dextromethorphan Oral Liquid 10 milliLiter(s) Oral every 4 hours PRN Cough      CAPILLARY BLOOD GLUCOSE        I&O's Summary    25 May 2025 07:01  -  26 May 2025 07:00  --------------------------------------------------------  IN: 350 mL / OUT: 1000 mL / NET: -650 mL        Vital Signs Last 24 Hrs  T(C): 36.6 (26 May 2025 05:09), Max: 38 (25 May 2025 20:21)  T(F): 97.9 (26 May 2025 05:09), Max: 100.4 (25 May 2025 20:21)  HR: 76 (26 May 2025 05:09) (76 - 100)  BP: 106/61 (26 May 2025 05:09) (100/61 - 118/63)  BP(mean): --  RR: 18 (26 May 2025 05:09) (18 - 19)  SpO2: 92% (26 May 2025 05:09) (92% - 97%)    Parameters below as of 26 May 2025 05:09  Patient On (Oxygen Delivery Method): room air        PHYSICAL EXAM:  GENERAL: NAD, well-developed, well-nourished  EYES: EOMI, PERRLA, conjunctiva and sclera clear  NECK: Supple, No JVD  CHEST/LUNG: Clear to auscultation bilaterally; No wheezes or crackles  HEART: Normal S1/S2; Regular rate and rhythm; No murmurs, rubs, or gallops  ABDOMEN: Soft, Nontender, Nondistended; Bowel sounds present      LABS:                        9.7    4.67  )-----------( 108      ( 26 May 2025 06:44 )             30.8      05-25    135  |  99  |  20  ----------------------------<  83  4.1   |  23  |  0.88    Ca    8.5      25 May 2025 07:17  Phos  3.4     05-25  Mg     1.8     05-25    TPro  5.8[L]  /  Alb  2.8[L]  /  TBili  0.8  /  DBili  x   /  AST  60[H]  /  ALT  20  /  AlkPhos  140[H]  05-25    PT/INR - ( 24 May 2025 21:15 )   PT: 12.8 sec;   INR: 1.12 ratio         PTT - ( 24 May 2025 21:15 )  PTT:27.8 sec      Urinalysis Basic - ( 25 May 2025 07:17 )    Color: x / Appearance: x / SG: x / pH: x  Gluc: 83 mg/dL / Ketone: x  / Bili: x / Urobili: x   Blood: x / Protein: x / Nitrite: x   Leuk Esterase: x / RBC: x / WBC x   Sq Epi: x / Non Sq Epi: x / Bacteria: x        RADIOLOGY & ADDITIONAL TESTS:    Imaging Personally Reviewed:    Consultant(s) Notes Reviewed:      Care Discussed with Consultants/Other Providers:

## 2025-05-26 NOTE — PROGRESS NOTE ADULT - PROBLEM SELECTOR PLAN 4
S/p prostatectomy in his 50's. Recurred with metastatic disease to liver and lung. S/p chest radiation and wedge resection. Has been on chem for last couple months, last chemo 1 week ago. Follows with Dr. Chase (urology) and oncology.    - Continue home prednisone 5mg BID (clarify indication with outpatient team when possible)  - Monitor CBC  - consult heme/onc, [facility], appreciate recs

## 2025-05-26 NOTE — PROGRESS NOTE ADULT - PROBLEM SELECTOR PLAN 10
DVT PPx: heparin subq  Diet: DASH  BM regimen: Miralax prn  Dispo: pending improvement  PCP:  Pharmacy:  Communication: - Fluids: None  - Electrolytes: Will replete to maintain K>4, Phos>3, and Mag>2  - Nutrition: Regular diet,   - Access:  - Activity: Pending PT eval  - DVT Prophylaxis: Heparin Sub q  - Stress Ulcer/GI Prophylaxis: N/A  - Disposition: Admit to medicine

## 2025-05-26 NOTE — PROGRESS NOTE ADULT - PROBLEM SELECTOR PLAN 3
Noted diarrhea/loose stools at home  Recent hospitalization for colitis  - F/u GI PCR  - F/u c diff Noted diarrhea/loose stools at home  Recent hospitalization for colitis  - Pending GI PCR/ C Diff

## 2025-05-26 NOTE — PROGRESS NOTE ADULT - ASSESSMENT
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  Patient over the past one week has been very weak and having trouble walking.  He has a mild cough over the past week.  He had a fever 101.5 on 5-24-25 so came to the ER.  He has no chest pain. No pleuritic chest pain.  No abdominal pain. No nausea.  No vomiting.  He was having some loose stools.  No leg swelling. No calf pain.  No chills. Overall is feeling very weak.      In the ED, presenting vitals were T 100.1, /66, , satting well on RA. Labs were significant for WBC 1.37, ANC 0.41, bands 9,8%,  Hgb 10.2, , Na 129, alk phos 144, AST 62, lactate 2.2. CT angio PE showing Patchy bilateral upper lobe groundglass opacities, suggestive of nonspecific inflammation/infection. 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   # Neutropenia  # Abnormal CT chest   # Bandemia   # Pancytopenia   # Mildly elevated LFTs    MICRO:   05/25 UA negative  05/24 BCX- NGTD  05/25 MRSA PCR- Negative   05/25 Sputum cx- Commensal quinn   05/24 Full RVP- negative   05/25 legionella/strep- negative     ABX;   Cefepime 05/25-- current     Recommendations:   - On cefepime 2 gm Q8H, can continue the same as patient has improvement in his symptoms.   - Follow up with Sputum cx  - F/U Bcx from admission  - Diarrhea - Send GI PCR and C. diff if no improvement.       In addition to reviewing history, imaging, documents, labs, microbiology, took into account antibiotic stewardship, local antibiogram and infection control strategies and potential transmission issues.    Discussed with the primary team.    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  Patient over the past one week has been very weak and having trouble walking.  He has a mild cough over the past week.  He had a fever 101.5 on 5-24-25 so came to the ER.  He has no chest pain. No pleuritic chest pain.  No abdominal pain. No nausea.  No vomiting.  He was having some loose stools.  No leg swelling. No calf pain.  No chills. Overall is feeling very weak.      In the ED, presenting vitals were T 100.1, /66, , satting well on RA. Labs were significant for WBC 1.37, ANC 0.41, bands 9,8%,  Hgb 10.2, , Na 129, alk phos 144, AST 62, lactate 2.2. CT angio PE showing Patchy bilateral upper lobe groundglass opacities, suggestive of nonspecific inflammation/infection. 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   # Neutropenia  # Abnormal CT chest   # Bandemia   # Pancytopenia   # Mildly elevated LFTs    MICRO:   05/25 UA negative  05/24 BCX- NGTD  05/25 MRSA PCR- Negative   05/25 Sputum cx- Commensal quinn   05/24 Full RVP- negative   05/25 legionella/strep- negative     ABX;   Cefepime 05/25-- current     Recommendations:   - On cefepime 2 gm Q8H, can continue the same as patient has improvement in his symptoms.   - Follow up with Sputum cx  - F/U Bcx from admission  - Diarrhea - Send GI PCR and C. diff if no improvement.   - If fevers persist, would obtain a CTAP      In addition to reviewing history, imaging, documents, labs, microbiology, took into account antibiotic stewardship, local antibiogram and infection control strategies and potential transmission issues.    Discussed with the primary team.    Kathy Martinez MD  Attending Physician, Division of Infectious Diseases  Department of Medicine   [facility]    Contact on TEAMS messaging from 9am - 5pm  Office: [phone] (after 5 PM or weekend)

## 2025-05-27 LAB
ADD ON TEST-SPECIMEN IN LAB: SIGNIFICANT CHANGE UP
ALBUMIN SERPL ELPH-MCNC: 2.4 G/DL — LOW (ref 3.3–5)
ALP SERPL-CCNC: 143 U/L — HIGH (ref 40–120)
ALT FLD-CCNC: 18 U/L — SIGNIFICANT CHANGE UP (ref 10–45)
ANION GAP SERPL CALC-SCNC: 10 MMOL/L — SIGNIFICANT CHANGE UP (ref 5–17)
AST SERPL-CCNC: 62 U/L — HIGH (ref 10–40)
BASOPHILS # BLD AUTO: 0 K/UL — SIGNIFICANT CHANGE UP (ref 0–0.2)
BASOPHILS NFR BLD AUTO: 0 % — SIGNIFICANT CHANGE UP (ref 0–2)
BILIRUB SERPL-MCNC: 0.5 MG/DL — SIGNIFICANT CHANGE UP (ref 0.2–1.2)
BUN SERPL-MCNC: 16 MG/DL — SIGNIFICANT CHANGE UP (ref 7–23)
CALCIUM SERPL-MCNC: 8.3 MG/DL — LOW (ref 8.4–10.5)
CHLORIDE SERPL-SCNC: 97 MMOL/L — SIGNIFICANT CHANGE UP (ref 96–108)
CO2 SERPL-SCNC: 25 MMOL/L — SIGNIFICANT CHANGE UP (ref 22–31)
CREAT SERPL-MCNC: 0.73 MG/DL — SIGNIFICANT CHANGE UP (ref 0.5–1.3)
EGFR: 86 ML/MIN/1.73M2 — SIGNIFICANT CHANGE UP
EGFR: 86 ML/MIN/1.73M2 — SIGNIFICANT CHANGE UP
EOSINOPHIL # BLD AUTO: 0 K/UL — SIGNIFICANT CHANGE UP (ref 0–0.5)
EOSINOPHIL NFR BLD AUTO: 0 % — SIGNIFICANT CHANGE UP (ref 0–6)
GLUCOSE SERPL-MCNC: 112 MG/DL — HIGH (ref 70–99)
HCT VFR BLD CALC: 32 % — LOW (ref 39–50)
HGB BLD-MCNC: 10.2 G/DL — LOW (ref 13–17)
LYMPHOCYTES # BLD AUTO: 1.78 K/UL — SIGNIFICANT CHANGE UP (ref 1–3.3)
LYMPHOCYTES # BLD AUTO: 13 % — SIGNIFICANT CHANGE UP (ref 13–44)
MAGNESIUM SERPL-MCNC: 1.9 MG/DL — SIGNIFICANT CHANGE UP (ref 1.6–2.6)
MANUAL SMEAR VERIFICATION: SIGNIFICANT CHANGE UP
MCHC RBC-ENTMCNC: 31.1 PG — SIGNIFICANT CHANGE UP (ref 27–34)
MCHC RBC-ENTMCNC: 31.9 G/DL — LOW (ref 32–36)
MCV RBC AUTO: 97.6 FL — SIGNIFICANT CHANGE UP (ref 80–100)
METAMYELOCYTES # FLD: 7 % — HIGH (ref 0–0)
METAMYELOCYTES NFR BLD: 7 % — HIGH (ref 0–0)
MONOCYTES # BLD AUTO: 1.67 K/UL — HIGH (ref 0–0.9)
MONOCYTES NFR BLD AUTO: 12.2 % — SIGNIFICANT CHANGE UP (ref 2–14)
MYELOCYTES NFR BLD: 2.6 % — HIGH (ref 0–0)
NEUTROPHILS # BLD AUTO: 8.92 K/UL — HIGH (ref 1.8–7.4)
NEUTROPHILS NFR BLD AUTO: 48.7 % — SIGNIFICANT CHANGE UP (ref 43–77)
NEUTS BAND # BLD: 16.5 % — HIGH (ref 0–8)
NEUTS BAND NFR BLD: 16.5 % — HIGH (ref 0–8)
NRBC # BLD: 1 /100 WBCS — HIGH (ref 0–0)
NRBC BLD AUTO-RTO: 0 /100 WBCS — SIGNIFICANT CHANGE UP (ref 0–0)
NRBC BLD-RTO: 1 /100 WBCS — HIGH (ref 0–0)
PHOSPHATE SERPL-MCNC: 3.2 MG/DL — SIGNIFICANT CHANGE UP (ref 2.5–4.5)
PLAT MORPH BLD: NORMAL — SIGNIFICANT CHANGE UP
PLATELET # BLD AUTO: 113 K/UL — LOW (ref 150–400)
POTASSIUM SERPL-MCNC: 4 MMOL/L — SIGNIFICANT CHANGE UP (ref 3.5–5.3)
POTASSIUM SERPL-SCNC: 4 MMOL/L — SIGNIFICANT CHANGE UP (ref 3.5–5.3)
PROT SERPL-MCNC: 5.3 G/DL — LOW (ref 6–8.3)
RBC # BLD: 3.28 M/UL — LOW (ref 4.2–5.8)
RBC # FLD: 16.6 % — HIGH (ref 10.3–14.5)
RBC BLD AUTO: SIGNIFICANT CHANGE UP
SODIUM SERPL-SCNC: 132 MMOL/L — LOW (ref 135–145)
TOXIC GRANULES BLD QL SMEAR: PRESENT — SIGNIFICANT CHANGE UP
WBC # BLD: 13.68 K/UL — HIGH (ref 3.8–10.5)
WBC # FLD AUTO: 13.68 K/UL — HIGH (ref 3.8–10.5)

## 2025-05-27 PROCEDURE — 99232 SBSQ HOSP IP/OBS MODERATE 35: CPT

## 2025-05-27 PROCEDURE — G0545: CPT

## 2025-05-27 PROCEDURE — 99233 SBSQ HOSP IP/OBS HIGH 50: CPT | Mod: GC

## 2025-05-27 RX ORDER — SODIUM CHLORIDE 9 G/1000ML
1000 INJECTION, SOLUTION INTRAVENOUS
Refills: 0 | Status: COMPLETED | OUTPATIENT
Start: 2025-05-27 | End: 2025-05-27

## 2025-05-27 RX ADMIN — PREDNISONE 5 MILLIGRAM(S): 20 TABLET ORAL at 05:37

## 2025-05-27 RX ADMIN — HEPARIN SODIUM 5000 UNIT(S): 1000 INJECTION INTRAVENOUS; SUBCUTANEOUS at 13:01

## 2025-05-27 RX ADMIN — SODIUM CHLORIDE 100 MILLILITER(S): 9 INJECTION, SOLUTION INTRAVENOUS at 17:16

## 2025-05-27 RX ADMIN — IPRATROPIUM BROMIDE AND ALBUTEROL SULFATE 3 MILLILITER(S): .5; 2.5 SOLUTION RESPIRATORY (INHALATION) at 01:27

## 2025-05-27 RX ADMIN — CEFEPIME 100 MILLIGRAM(S): 2 INJECTION, POWDER, FOR SOLUTION INTRAVENOUS at 09:01

## 2025-05-27 RX ADMIN — IPRATROPIUM BROMIDE AND ALBUTEROL SULFATE 3 MILLILITER(S): .5; 2.5 SOLUTION RESPIRATORY (INHALATION) at 13:01

## 2025-05-27 RX ADMIN — FILGRASTIM 300 MICROGRAM(S): 300 INJECTION, SOLUTION INTRAVENOUS; SUBCUTANEOUS at 13:00

## 2025-05-27 RX ADMIN — IPRATROPIUM BROMIDE AND ALBUTEROL SULFATE 3 MILLILITER(S): .5; 2.5 SOLUTION RESPIRATORY (INHALATION) at 05:37

## 2025-05-27 RX ADMIN — PREDNISONE 5 MILLIGRAM(S): 20 TABLET ORAL at 17:17

## 2025-05-27 RX ADMIN — IPRATROPIUM BROMIDE AND ALBUTEROL SULFATE 3 MILLILITER(S): .5; 2.5 SOLUTION RESPIRATORY (INHALATION) at 17:17

## 2025-05-27 RX ADMIN — Medication 100 MICROGRAM(S): at 05:37

## 2025-05-27 RX ADMIN — CEFEPIME 100 MILLIGRAM(S): 2 INJECTION, POWDER, FOR SOLUTION INTRAVENOUS at 01:27

## 2025-05-27 RX ADMIN — Medication 4 MILLILITER(S): at 05:37

## 2025-05-27 RX ADMIN — SERTRALINE 25 MILLIGRAM(S): 100 TABLET, FILM COATED ORAL at 09:01

## 2025-05-27 RX ADMIN — Medication 1 TABLET(S): at 09:01

## 2025-05-27 RX ADMIN — CEFEPIME 100 MILLIGRAM(S): 2 INJECTION, POWDER, FOR SOLUTION INTRAVENOUS at 17:17

## 2025-05-27 RX ADMIN — IPRATROPIUM BROMIDE AND ALBUTEROL SULFATE 3 MILLILITER(S): .5; 2.5 SOLUTION RESPIRATORY (INHALATION) at 22:08

## 2025-05-27 RX ADMIN — Medication 4 MILLILITER(S): at 17:17

## 2025-05-27 RX ADMIN — HEPARIN SODIUM 5000 UNIT(S): 1000 INJECTION INTRAVENOUS; SUBCUTANEOUS at 05:37

## 2025-05-27 RX ADMIN — HEPARIN SODIUM 5000 UNIT(S): 1000 INJECTION INTRAVENOUS; SUBCUTANEOUS at 22:08

## 2025-05-27 NOTE — DIETITIAN INITIAL EVALUATION ADULT - PROBLEM SELECTOR PLAN 4
S/p prostatectomy in his 50's. Recurred with metastatic disease to liver and lung. S/p chest radiation and wedge resection. Has been on chem for last couple months, last chemo 1 week ago. Follows with Dr. Chase (urology) and oncology.    - Continue home prednisone 5mg BID (clarify indication with outpatient team when possible)  - Monitor CBC  - consult private heme/onc, appreciate recs

## 2025-05-27 NOTE — PROGRESS NOTE ADULT - PROBLEM SELECTOR PLAN 10
- Fluids: None  - Electrolytes: Will replete to maintain K>4, Phos>3, and Mag>2  - Nutrition: Regular diet,   - Access:  - Activity: Pending PT eval  - DVT Prophylaxis: Heparin Sub q  - Stress Ulcer/GI Prophylaxis: N/A  - Disposition: Admit to medicine

## 2025-05-27 NOTE — PROGRESS NOTE ADULT - SUBJECTIVE AND OBJECTIVE BOX
Shorty Ross MD  Available on TEAMS    Patient is a 89y old  Male who presents with a chief complaint of Likely PNA (26 May 2025 11:29)      SUBJECTIVE / OVERNIGHT EVENTS: No acute events overnight. Patient was assessed at bedside.       REVIEW OF SYSTEMS:  ROS negative aside from above    MEDICATIONS  (STANDING):  albuterol/ipratropium for Nebulization 3 milliLiter(s) Nebulizer every 6 hours  cefepime   IVPB      cefepime   IVPB 2000 milliGRAM(s) IV Intermittent every 8 hours  filgrastim-sndz (ZARXIO) Injectable 300 MICROGram(s) SubCutaneous daily  heparin   Injectable 5000 Unit(s) SubCutaneous every 8 hours  levothyroxine 100 MICROGram(s) Oral daily  multivitamin 1 Tablet(s) Oral daily  predniSONE   Tablet 5 milliGRAM(s) Oral two times a day  sertraline 25 milliGRAM(s) Oral daily  sodium chloride 3%  Inhalation 4 milliLiter(s) Inhalation every 12 hours    MEDICATIONS  (PRN):  acetaminophen     Tablet .. 650 milliGRAM(s) Oral every 6 hours PRN Temp greater or equal to 38C (100.4F), Mild Pain (1 - 3)  benzonatate 100 milliGRAM(s) Oral every 8 hours PRN Cough  guaifenesin/dextromethorphan Oral Liquid 10 milliLiter(s) Oral every 4 hours PRN Cough      CAPILLARY BLOOD GLUCOSE        I&O's Summary      Vital Signs Last 24 Hrs  T(C): 36.9 (27 May 2025 05:13), Max: 37.7 (26 May 2025 13:32)  T(F): 98.4 (27 May 2025 05:13), Max: 99.8 (26 May 2025 13:32)  HR: 103 (27 May 2025 05:13) (99 - 105)  BP: 129/61 (27 May 2025 05:13) (124/56 - 129/61)  BP(mean): --  RR: 18 (27 May 2025 05:13) (18 - 18)  SpO2: 92% (27 May 2025 05:13) (92% - 93%)    Parameters below as of 27 May 2025 05:13  Patient On (Oxygen Delivery Method): nasal cannula  O2 Flow (L/min): 2      PHYSICAL EXAM:  GENERAL: NAD, well-developed, well-nourished  HEAD: Atraumatic, Normocephalic  EYES: EOMI, PERRLA, conjunctiva and sclera clear  NECK: Supple, No JVD  CHEST/LUNG: Clear to auscultation bilaterally; No wheezes or crackles  HEART: Normal S1/S2; Regular rate and rhythm; No murmurs, rubs, or gallops  ABDOMEN: Soft, Nontender, Nondistended; Bowel sounds present  EXTREMITIES: 2+ Peripheral Pulses; No clubbing, cyanosis, or edema  PSYCH: A&Ox3  NEUROLOGY: no focal neurologic deficit  SKIN: No rashes or lesions    LABS:                        10.2   13.68 )-----------( 113      ( 27 May 2025 06:39 )             32.0      05-25    135  |  99  |  20  ----------------------------<  83  4.1   |  23  |  0.88    Ca    8.5      25 May 2025 07:17  Phos  3.4     05-25  Mg     1.8     05-25    TPro  5.8[L]  /  Alb  2.8[L]  /  TBili  0.8  /  DBili  x   /  AST  60[H]  /  ALT  20  /  AlkPhos  140[H]  05-25          Urinalysis Basic - ( 25 May 2025 07:17 )    Color: x / Appearance: x / SG: x / pH: x  Gluc: 83 mg/dL / Ketone: x  / Bili: x / Urobili: x   Blood: x / Protein: x / Nitrite: x   Leuk Esterase: x / RBC: x / WBC x   Sq Epi: x / Non Sq Epi: x / Bacteria: x        RADIOLOGY & ADDITIONAL TESTS:    Imaging Personally Reviewed:    Consultant(s) Notes Reviewed:      Care Discussed with Consultants/Other Providers:   Shorty Ross MD  Available on TEAMS    Patient is a 89y old  Male who presents with a chief complaint of Likely PNA (26 May 2025 11:29)      SUBJECTIVE / OVERNIGHT EVENTS: No acute events overnight. Patient was assessed at bedside. No acute concerns at bedside.      REVIEW OF SYSTEMS:  ROS negative aside from above    MEDICATIONS  (STANDING):  albuterol/ipratropium for Nebulization 3 milliLiter(s) Nebulizer every 6 hours  cefepime   IVPB      cefepime   IVPB 2000 milliGRAM(s) IV Intermittent every 8 hours  filgrastim-sndz (ZARXIO) Injectable 300 MICROGram(s) SubCutaneous daily  heparin   Injectable 5000 Unit(s) SubCutaneous every 8 hours  levothyroxine 100 MICROGram(s) Oral daily  multivitamin 1 Tablet(s) Oral daily  predniSONE   Tablet 5 milliGRAM(s) Oral two times a day  sertraline 25 milliGRAM(s) Oral daily  sodium chloride 3%  Inhalation 4 milliLiter(s) Inhalation every 12 hours    MEDICATIONS  (PRN):  acetaminophen     Tablet .. 650 milliGRAM(s) Oral every 6 hours PRN Temp greater or equal to 38C (100.4F), Mild Pain (1 - 3)  benzonatate 100 milliGRAM(s) Oral every 8 hours PRN Cough  guaifenesin/dextromethorphan Oral Liquid 10 milliLiter(s) Oral every 4 hours PRN Cough      CAPILLARY BLOOD GLUCOSE        I&O's Summary      Vital Signs Last 24 Hrs  T(C): 36.9 (27 May 2025 05:13), Max: 37.7 (26 May 2025 13:32)  T(F): 98.4 (27 May 2025 05:13), Max: 99.8 (26 May 2025 13:32)  HR: 103 (27 May 2025 05:13) (99 - 105)  BP: 129/61 (27 May 2025 05:13) (124/56 - 129/61)  BP(mean): --  RR: 18 (27 May 2025 05:13) (18 - 18)  SpO2: 92% (27 May 2025 05:13) (92% - 93%)    Parameters below as of 27 May 2025 05:13  Patient On (Oxygen Delivery Method): nasal cannula  O2 Flow (L/min): 2      PHYSICAL EXAM:  GENERAL: NAD, well-developed, well-nourished  HEAD: Atraumatic, Normocephalic  EYES: EOMI, PERRLA, conjunctiva and sclera clear  NECK: Supple, No JVD  CHEST/LUNG: Clear to auscultation bilaterally; No wheezes or crackles  HEART: Normal S1/S2; Regular rate and rhythm; No murmurs, rubs, or gallops  ABDOMEN: Soft, Nontender, Nondistended; Bowel sounds present  EXTREMITIES: 2+ Peripheral Pulses; No clubbing, cyanosis, or edema  PSYCH: A&Ox3  NEUROLOGY: no focal neurologic deficit  SKIN: No rashes or lesions    LABS:                        10.2   13.68 )-----------( 113      ( 27 May 2025 06:39 )             32.0      05-25    135  |  99  |  20  ----------------------------<  83  4.1   |  23  |  0.88    Ca    8.5      25 May 2025 07:17  Phos  3.4     05-25  Mg     1.8     05-25    TPro  5.8[L]  /  Alb  2.8[L]  /  TBili  0.8  /  DBili  x   /  AST  60[H]  /  ALT  20  /  AlkPhos  140[H]  05-25          Urinalysis Basic - ( 25 May 2025 07:17 )    Color: x / Appearance: x / SG: x / pH: x  Gluc: 83 mg/dL / Ketone: x  / Bili: x / Urobili: x   Blood: x / Protein: x / Nitrite: x   Leuk Esterase: x / RBC: x / WBC x   Sq Epi: x / Non Sq Epi: x / Bacteria: x        RADIOLOGY & ADDITIONAL TESTS:    Imaging Personally Reviewed:    Consultant(s) Notes Reviewed:      Care Discussed with Consultants/Other Providers:

## 2025-05-27 NOTE — PHYSICAL THERAPY INITIAL EVALUATION ADULT - HEALTH SCREEN CRITERIA
Medical Assistant's notes reviewed and appreciated.    HPI:  Mr. Moreno presents to the office today for a complete physical examination.      1. Complains of itchy armpits x 2 months. Was using a deodorant but stopped it.     Patient is also here for the followin. Recheck hypertension -- denies chest pain. Checks BPs at home and says that it fluctuates a lot. Not currently on BP meds.   2. Recheck hyperlipidemia -- denies myalgias.   3. 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 pylori) 2001    treated with PrevPac   • Iron deficiency anemia, unspecified    • Orthostatic hypotension 2016   • Other and unspecified hyperlipidemia    • Pain     Back and Hip   • Pain in joint, shoulder region     left   • Reflux esophagitis    • Senile cataract, unspecified 2/15/2011   • Unspecified hemorrhoids without mention of complication     Hemorrhoids, internal       PSH:    Past Surgical History:   Procedure Laterality Date   • Cardiac stress test complete      Cardiac Stress Test, neg but stress echo recommended in 1 year   • Colonoscopy diagnostic      Colonoscopy, small rectal polyp, small internal hemorrhoids, next due in    • Colonoscopy diagnostic  2008    Diverticulosis - next exam due in 7 years- Dr Guzman   • Echo heart stress      normal   • Eye surgery Left 2012    Cataract Extraction with IOL   • Eye surgery Right 2012    Cataract Extraction with IOL       MEDS:    Current Outpatient Medications   Medication Sig   • atorvastatin (LIPITOR) 20 MG tablet Take 1 tablet by mouth daily.  yes   • aspirin 81 MG EC tablet Take 81 mg by mouth daily.   • GINSENG PO Take 1 capsule by mouth daily. 525mg each    • Glucosamine 500 MG TABS Take 1 tablet by mouth daily.   • OMEGA-3 350 MG PO CAPS 1 capsule daily   • FLAXSEED (LINSEED) 1000 MG PO CAPS 1 capsule daily     No current facility-administered medications for this visit.         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64     Comment: self-employed   Tobacco Use   • Smoking status: Former Smoker     Packs/day: 1.00     Years: 10.00     Pack years: 10.00     Quit date: 1970     Years since quittin.8   • Smokeless tobacco: Never Used   Substance and Sexual Activity   • Alcohol use: Yes     Alcohol/week: 4.0 standard drinks     Types: 3 Cans of beer, 1 Shots of liquor per week   • Drug use: No   • Sexual activity: Yes     Partners: Female     Comment:    Other Topics Concern   •  Service Yes     Comment: [employer]   • Blood Transfusions No   • Caffeine Concern Not Asked   • Occupational Exposure Not Asked   • Hobby Hazards Not Asked   • Sleep Concern Not Asked   • Stress Concern Not Asked   • Weight Concern Yes     Comment: 20# loss,    • Special Diet No   • Back Care No   • Exercise Yes     Comment: Aerobics every morning   • Bike Helmet Not Asked   • Seat Belt Yes     Comment: wears seat belts   • Self-Exams Not Asked   Social History Narrative    Mar, w wife, no pets2020.     Social Determinants of Health     Financial Resource Strain:    • Social Determinants: Financial Resource Strain: Not on file   Food Insecurity:    • Social Determinants: Food Insecurity: Not on file   Transportation Needs:    • Lack of Transportation  (Medical): Not on file   • Lack of Transportation (Non-Medical): Not on file   Physical Activity:    • Days of Exercise per Week: Not on file   • Minutes of Exercise per Session: Not on file   Stress:    • Social Determinants: Stress: Not on file   Social Connections:    • Social Determinants: Social Connections: Not on file   Intimate Partner Violence:    • Social Determinants: Intimate Partner Violence Past Fear: Not on file   • Social Determinants: Intimate Partner Violence Current Fear: Not on file         FAMILY HISTORY:  Review of patient's family status indicates:    Mother                                       80's      Comment: CVA, \"Natural Causes\"    Father                                       late 50's      Comment: COPD, smoker.    Sister                                       79      Comment: x1, pneumonia, lung disease, blood                disorder (bruises easily)    Brother                        Alive                       Comment: x1, well    Brother                        Alive                       Comment: alcholic cirrhosis, Type 2 DM/CAD s/p                PTCA w/stent    Son                            Alive                     Son                                          54      Comment: x1, HIV, alcoholic cirrhosis    Son                            Alive                         Family History   Problem Relation Age of Onset   • Stroke Mother    • COPD Father         70's   • Lung Disease Sister          in 70's   • Patient is unaware of any medical problems Brother    • Cirrhosis Brother         alcohol related   • Coronary Artery Disease Brother    • Diabetes Brother         FH neg for CA, TB, Ca   • Patient is unaware of any medical problems Son    • HIV Son    • Cirrhosis Son         alcohol related   • Patient is unaware of any medical problems Son          REVIEW OF SYSTEMS:    A 10-point review of systems was obtained and is negative unless  mentioned in HPI.       Problem list reviewed and updated.     PHYSICAL EXAMINATION:  CONSTITUTIONAL:  The patient appears comfortable, nontoxic, and in no apparent distress. Healthy appearing white male.   Vitals:    10/12/21 0805 10/12/21 0843   BP: (!) 152/66 138/60   BP Location: RUE - Right upper extremity LUE - Left upper extremity   Cuff Size: Regular Regular   Pulse: (!) 48 (!) 50   SpO2: 97%    Weight: 70.8 kg (156 lb)    Height: 5' 7\" (1.702 m)      Patient Reported Vitals    There is no flowsheet data to display.       SKIN:  No rashes within bilateral axillae   HEENT:  The conjunctivae/sclerae are clear.  No lesions, rashes, or other abnormalities are appreciated on the eyelids.  Ears:  Wearing hearing aids bilaterally  NECK:  Supple and nontender without masses, lesions, or bruits.  The thyroid is grossly normal to palpation without masses, goiter, asymmetry, or tenderness.  The trachea is midline.  RESPIRATORY:  Lungs are clear to auscultation without rales, wheezes, or rhonchi.  The respiratory effort appears normal.  CARDIAC:  Regular rate and rhythm without S3, S4.    ABDOMEN:  Soft and nontender.  No hepatosplenomegaly or masses  LYMPHATIC:  No abnormal cervical lymph nodes are found on palpation.  MUSCULOSKELETAL:  Extremities are without clubbing, cyanosis, or edema.    NEUROLOGICAL:  Alert and oriented x3. Grossly intact and nonfocal.  Reflexes are symmetrical bilaterally, and sensation/power are grossly intact throughout.  :  Deferred  RECTAL:  Deferred.      ASSESSMENT/PLAN:  Complete examination.  Tests/consults/medications:  See order section.   1. Hypertension -- BP is under control w/o meds..    2. Hyperlipidemia-- recheck lipids. Continue atorvastatin    3.  PSVT, sinus node dysfunction, dysautonomia, orthostatic hypotension, RBBB -- continue to see EP.   4.  Interstitial lung disease, pulmonary hypertension, COPD, MAC infection -- has some dyspnea on exertion. Needs new  pulmonologist.  5.  Macular degeneration -- overdue for eye exam  6.  Chronic kidney disease stage 3b - has been relatively stable. Recheck labs.   7.  Unintentional weight loss - has been stable over the past year. Continue to monitir.   8.  Bilateral carotid disease - continue aspirin and atorvastatin. Due to see Dr. Li 7/2023  9. Pt is DNR  10.  gout with elevated uric acid 7/2021, involved left knee - this resolved. Since he has not had recurrent symptoms, will observe for now  11. Dermatitis bilateral axillary areas - may have mild fungal infection. Will try lotrisone cream     I will be in touch with the patient when all of the test results are available, and he should call if there are any questions or concerns.     Disposition as below.         MEDICARE WELLNESS VISIT NOTE    HPI:    Mayito Moreno presents for his Subsequent Annual Medicare Wellness Exam.   He has complaints or concerns -- see above.    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 pylori) 2001    treated with PrevPac   • Iron deficiency anemia, unspecified    • Orthostatic hypotension 2016   • Other and unspecified hyperlipidemia    • Pain     Back and Hip   • Pain in joint, shoulder region     left   • Reflux esophagitis    • Senile cataract, unspecified 2/15/2011   • Unspecified hemorrhoids without mention of complication     Hemorrhoids, internal       Past Surgical History:   Procedure Laterality Date   • Cardiac stress test complete      Cardiac Stress Test, neg but stress echo recommended in 1 year   • Colonoscopy diagnostic      Colonoscopy, small rectal polyp, small internal hemorrhoids, next due in    • Colonoscopy diagnostic  2008    Diverticulosis - next exam due in 7 years- Dr Guzman   • Echo heart stress      normal   • Eye surgery Left 2012    Cataract Extraction with IOL   • Eye surgery Right 2012    Cataract Extraction with IOL        Family History   Problem Relation Age of Onset   • Stroke Mother    • COPD Father         70's   • Lung Disease Sister          in 70's   • Patient is unaware of any medical problems Brother    • Cirrhosis Brother         alcohol related   • Coronary Artery Disease Brother    • Diabetes Brother         FH neg for CA, TB, Ca   • Patient is unaware of any medical problems Son    • HIV Son    • Cirrhosis Son         alcohol related   • Patient is unaware of any medical problems Son         Review of patient's family status indicates:    Mother                                        80's      Comment: CVA, \"Natural Causes\"    Father                                       late 50's      Comment: COPD, smoker.    Sister                                       79      Comment: x1, pneumonia, lung disease, blood                disorder (bruises easily)    Brother                        Alive                       Comment: x1, well    Brother                        Alive                       Comment: alcholic cirrhosis, Type 2 DM/CAD s/p                PTCA w/stent    Son                            Alive                     Son                                          54      Comment: x1, HIV, alcoholic cirrhosis    Son                            Alive                        Drug use:   Patient Active Problem List     Substance and Sexual Activity   Drug Use No       Current Outpatient Medications   Medication Sig   • atorvastatin (LIPITOR) 20 MG tablet Take 1 tablet by mouth daily.   • aspirin 81 MG EC tablet Take 81 mg by mouth daily.   • GINSENG PO Take 1 capsule by mouth daily. 525mg each    • Glucosamine 500 MG TABS Take 1 tablet by mouth daily.   • OMEGA-3 350 MG PO CAPS 1 capsule daily   • FLAXSEED (LINSEED) 1000 MG PO CAPS 1 capsule daily     No current facility-administered medications for this visit.          The following items on the Medicare Health Risk Assessment were found to be positive  6 a.) How many servings of Fruits and Vegetables do you have each day ( 1 serving = 1 piece of fruit, 1/2 cup fruits or vegetables): 1 per day     11h.) Problems with your hearing: Often     11i.) Problems using the telephone: Sometimes     14.) During the past 4 weeks, was someone available to help if you needed and wanted help?: Yes, a little     15.) How confident are you that you can control and manage most of your health problems?: Somewhat confident            Vision and Hearing screens:   Vision: not applicable   Hearing screen: not applicable     Advance Directive:    The patient has the following documents:  State Do Not Resuscitate (DNR)/POLST  Living Will    Cognitive Assessment: no evidence of cognitive dysfunction by direct observation    Opioid Review: Mayito is not taking opioid medications.    Recent PHQ 2/9 Score    PHQ 2:  Date Adult PHQ 2 Score Adult PHQ 2 Interpretation   10/12/2021 0 No further screening needed       PHQ 9:         DEPRESSION ASSESSMENT/PLAN:  Depression screening is negative no further plan needed.     Body mass index is 24.43 kg/m².     BMI ASSESSMENT/PLAN:  Patient BMI is within normal range.     Needed Screening/Treatment:   Glaucoma screen / eye exam  and Immunizations reviewed and patient needs: Influenza, Herpes Zoster and TDAP  Needed follow up:  None    See orders.   See Patient Instructions section.   Return in about 1 year (around 10/12/2022) for Medicare Wellness Visit.     Disposition/follow-up as below    The following diagnoses have been reviewed with the patient at today's visit and appropriate recommendations made:    Medicare annual wellness visit, subsequent  (primary encounter diagnosis)  Routine medical exam  Essential hypertension, benign  Hyperlipidemia, mixed  Exudative age-related macular degeneration, unspecified laterality, unspecified stage (CMS/HCC)  Chronic kidney disease, stage 3b (CMS/HCC)  Sinus node dysfunction (CMS/HCC)  PSVT (paroxysmal supraventricular tachycardia) (CMS/HCC)  Orthostatic hypotension  RBBB (right bundle branch block)  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

## 2025-05-27 NOTE — PROGRESS NOTE ADULT - PROBLEM SELECTOR PLAN 6
- Hold toprol and lisinopril ISO sepsis  - Clarify indication for Toprol  - Per outpatient notes seems pt may have been started on HCTZ  - Clarify with daughter but hold for now    Plan  - Hold BP meds iso sepsis

## 2025-05-27 NOTE — DIETITIAN INITIAL EVALUATION ADULT - REASON INDICATOR FOR ASSESSMENT
RD consult warranted for pressure injury stage 2 or greater   Source: Patient visited, RN, Electronic Medical Record  Chart reviewed, events noted.

## 2025-05-27 NOTE — DIETITIAN INITIAL EVALUATION ADULT - HEIGHT FOR BMI (FEET)
-Continue to increase activities as tolerated.  -Follow up as needed.  -Please contact our clinic with questions or concerns at [phone].     5

## 2025-05-27 NOTE — DIETITIAN INITIAL EVALUATION ADULT - OTHER INFO
37.1 low serum sodium noted  elevated liver function tests   MVI ordered  prednisone and oral synthroid noted     Weights:  - UBW (per patient): unable to assess   - Dosing Weight (per chart): 160 pounds (5/25)   -  pounds +/- 10%   - Per [facility] HIE: 156 pounds (4/26/25), 161 pounds (1/10/25), 163 pounds (12/9/24), 161 pounds (6/26/24)   RD to continue to monitor weights and trends as able.

## 2025-05-27 NOTE — PROGRESS NOTE ADULT - SUBJECTIVE AND OBJECTIVE BOX
Patient is a 89y old  Male who presents with a chief complaint of Likely PNA (27 May 2025 07:12)      Being followed by ID for neutropenic fevers     Interval history:  last fever 100.4 on 05/25  Leucocytosis - on Zarxio   Reports improvement in cough   Remains on NC  no diarrhea and no abd pain       ROS:  No,SOB,CP  No N/V  No abd pain  No urinary complaints  No HA    Antimicrobials:  cefepime   IVPB      cefepime   IVPB 2000 milliGRAM(s) IV Intermittent every 8 hours      Vital Signs Last 24 Hrs  T(C): 36.9 (05-27-25 @ 05:13), Max: 37.7 (05-26-25 @ 13:32)  T(F): 98.4 (05-27-25 @ 05:13), Max: 99.8 (05-26-25 @ 13:32)  HR: 103 (05-27-25 @ 05:13) (99 - 105)  BP: 129/61 (05-27-25 @ 05:13) (124/56 - 129/61)  BP(mean): --  RR: 18 (05-27-25 @ 05:13) (18 - 18)  SpO2: 92% (05-27-25 @ 05:13) (92% - 93%)      PHYSICAL EXAM  General: In NAD, on NC 2 L   HEENT: clear oropharynx, anicteric sclera, pink conjunctiva  Neck: supple  CV: normal S1/S2  Lungs: decreased BS bilateral  Abdomen: soft non-tender non-distended, positive bowel sounds  Ext: no edema  Skin: no rashes and no petechiae  Neuro: alert and oriented X 3    Lab Data:                        10.2   13.68 )-----------( 113      ( 27 May 2025 06:39 )             32.0     05-27    132[L]  |  97  |  16  ----------------------------<  112[H]  4.0   |  25  |  0.73    Ca    8.3[L]      27 May 2025 06:39  Phos  3.2     05-27  Mg     1.9     05-27    TPro  5.3[L]  /  Alb  2.4[L]  /  TBili  0.5  /  DBili  x   /  AST  62[H]  /  ALT  18  /  AlkPhos  143[H]  05-27      Sputum Sputum  05-25-25   Commensal quinn consistent with body site  --    Few polymorphonuclear leukocytes seen per oil power field  Few Gram Positive Cocci in Clusters seen per oil power field  Few Gram Positive Rods seen per oil power field      Blood Blood-Peripheral  05-24-25   No growth at 48 Hours  --  --      Blood Blood-Peripheral  05-24-25   No growth at 48 Hours  --  --                RADIOLOGY:  imaging below personally reviewed and agree with findings      ACC: [id number] EXAM:  CT ANGIO CHEST PULM ART WAWIC   ORDERED BY:  JANIE GOMEZ     PROCEDURE DATE:  05/25/2025          INTERPRETATION:  CLINICAL INFORMATION: Left base crackles on auscultation   . Concern for pulmonary embolism and pneumonia.    COMPARISON: CTA chest 4/26/2025    CONTRAST/COMPLICATIONS:  IV Contrast: Omnipaque 350  70 cc administered   30 cc discarded  Oral Contrast: None    PROCEDURE:  CT Angiography of the Chest.  Sagittal and coronal reformats were performed as well as 3D (MIP)   reconstructions.    FINDINGS:    LUNGS AND LARGE AIRWAYS: Status post right middle lobectomy. Scattered   patchy groundglass opacities of the upper lobes. Redemonstrated lower   lobe predominantly bronchiolectasis. Emphysematous changes. Stable   posteromedial left lower lobe rounded atelectasis containing a punctate   metallic density. Trace secretion within the proximal right mainstem   bronchus.  PLEURA: No pleural effusion.  VESSELS: No pulmonary embolus. Stable mild pulmonary trunk dilatation.   Aortic calcifications. Coronary artery calcifications. Severe   calcification at the proximal SMA.  HEART: Heart size is normal. No pericardial effusion. Aortic valve   calcifications.  MEDIASTINUM AND REILLY: No lymphadenopathy.  CHEST WALL AND LOWER NECK: Left greater than right gynecomastia,   unchanged. Left thyroid calcifications, unchanged.  VISUALIZED UPPER ABDOMEN: Heterogenous appearance of the right hepatic   dome, grossly unchanged to mildly increased, may be compatible with   history of hepatic metastases; consider further evaluation   contrast-enhanced MRI abdomen. Redemonstrated hepatic and renal cysts and   calcified hepatic granulomas.  BONES: Degenerative changes. Chronic mild T12 superior endplate deformity.    IMPRESSION:  No pulmonary embolus.    Patchy bilateral upper lobe groundglass opacities, suggestive of   nonspecific inflammation/infection.    Additional findings as above.

## 2025-05-27 NOTE — PHYSICAL THERAPY INITIAL EVALUATION ADULT - PERTINENT HX OF CURRENT PROBLEM, REHAB EVAL
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 Pt notes he's been experiencing 1 week of generalized weakness, difficulty ambulating. Also notes he's been experiencing a cough for the past week. Today pt noticed his Tmax 101.5 prompting ED visit. Also endorsed loose stools. Hospital stay: CXR 5/24: no focal consolidations. 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 Pt notes he's been experiencing 1 week of generalized weakness, difficulty ambulating. Also notes he's been experiencing a cough for the past week. Today pt noticed his Tmax 101.5 prompting ED visit. Also endorsed loose stools. Hospital stay: CXR 5/24: no focal consolidations. CT chest 5/25: Patchy bilateral upper lobe ground glass opacities, suggestive of   nonspecific inflammation/infection.

## 2025-05-27 NOTE — DIETITIAN INITIAL EVALUATION ADULT - NS FNS DIET ORDER
Diet, DASH/TLC:   Sodium & Cholesterol Restricted  Easy to Chew (EASYTOCHEW) (05-25-25 @ 07:46) [Active]

## 2025-05-27 NOTE — PROGRESS NOTE ADULT - ASSESSMENT
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     Plan:    1.    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 (follows with Dr Jermaine Lovett)  Had Taxotere on 4-17-25 at 35 mg/m2 then had neutropenic fever was admitted from 4-26-25 thru 4-29-25 for sepsis  in setting of colitis  Had another cycle of taxotere on 5-16-25 30 mg/m2 followed by zarxio on 5-22-25 and 5-23-25  Was admitted with Neutropenic Fever ?pneumonia on CT chest   On abx  Was given Zarxio  Neutrophil #: 2.49 K/uL (05.26.25 @ 06:44)   Neutrophil #: 0.55 K/uL (05.25.25 @ 07:19)   Neutrophil #: 0.41 K/uL (05.24.25 @ 21:15)   Neutrophil #: 4.67 K/uL (04.28.25 @ 06:08)     WBC Count: 13.68 K/uL (05.27.25 @ 06:39)   WBC Count: 4.67 K/uL (05.26.25 @ 06:44)   WBC Count: 1.83 K/uL (05.25.25 @ 07:19)   Can dc zarxio, Abx as per ID

## 2025-05-27 NOTE — PHYSICAL THERAPY INITIAL EVALUATION ADULT - ACTIVE RANGE OF MOTION EXAMINATION, REHAB EVAL
lila. upper extremity Active ROM was WNL (within normal limits)/bilateral lower extremity Active ROM was WNL (within normal limits)

## 2025-05-27 NOTE — ADVANCED PRACTICE NURSE CONSULT - ASSESSMENT
Patient encountered on 4 DSU. When wound care RN arrived on unit, patient was found lying in a low air loss pressure redistribution support surface style bed. Patient Pat speaks [place] and English. He was alert and oriented and gave consent to skin consult. He is able to turn independently and staff assistance x 1 was provided as needed. Once turned, the wound care RN was able to visualize an area of white macerated skin over B/L buttocks/sacral skin with areas of superficial partial thickness skin loss as well as persistent nonblanchable dark erythema in periphery, total area measures approximately 6cm x 6cm x 0.1cm - presentation is consistent with a deep tissue injury in evolution with incontinence/moisture involvement, present on admission. Patient incontinent of urine and stool, skin clean at this time. B/L feet with xerosis, B/L heels with hyperpigmentation measuring approximately 3cm x 3cm x 0cm - initial phases of a deep tissue injury cannot be ruled out at this time. Once consult was complete, patient was educated regarding the need for routine turning and positioning to prevent pressure injuries and patient was assisted to an upright position in order to eat breakfast.

## 2025-05-27 NOTE — ADVANCED PRACTICE NURSE CONSULT - RECOMMEDATIONS
Impression:    B/L buttocks/sacral deep tissue injury in evolution present on admission  B/L heel hyperpigmentation, cannot rule out a deep tissue injury present on admission  fecal incontinence   urinary incontinence  incontinence associated   moisture associated dermatitis   B/L feet xerosis     Recommendations:    1) turn and position q2 and PRN utilizing offloading assistive devices  2) routine pericare daily and PRN soiling  3) encourage optimal nutrition  4) waffle cushion or pillow on seat when oob to chair  5) B/L LE complete cair air fluidized boots or petra-lock pillow to offload heels/feet  6) triad protective barrier cream to B/L buttocks/sacrum daily and PRN soiling  7) incontinence management - consider external urinary catheter to divert urine from skin if incontinent  8) sween 24 moisturizer to dry skin daily     Plan discussed with WILIAM Tyler on unit      For questions/comments regarding the recommendations in this consult, please contact [address] via [employer] Teams. Wound care will not actively follow. For new concerns, please enter new consult. Thank you!

## 2025-05-27 NOTE — ADVANCED PRACTICE NURSE CONSULT - REASON FOR CONSULT
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 Pt notes he's been experiencing 1 week of generalized weakness, difficulty ambulating. Also notes he's been experiencing a cough for the past week. Today pt noticed his Tmax 101.5 prompting ED visit. Also endorsed loose stools.      In the ED, presenting vitals were T 100.1, /66, , satting well on RA. Labs were significant for WBC 1.37, Hgb 10.2, , Na 129, alk phos 144, AST 62, lactate 2.2. S/p cefepime, vancomycin, IVF,

## 2025-05-27 NOTE — DIETITIAN INITIAL EVALUATION ADULT - REASON FOR ADMISSION
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

## 2025-05-27 NOTE — DIETITIAN INITIAL EVALUATION ADULT - ENERGY INTAKE
RN reports Pt ate well this morning. Noted food from outside hospital and Ensures from outside hospital at bedside.

## 2025-05-27 NOTE — DIETITIAN INITIAL EVALUATION ADULT - ORAL INTAKE PTA/DIET HISTORY
Pt visited, Pt sleeping soundly at multiple visits, no family at bedside at visit. Spoke to RN. RD to follow up with subjective information as able. Unable to assess appetite and PO intake PTA, adherence to therapeutic diet restrictions.   No food allergies noted per EMR.   Noted MVI, vitamin D per home medication list

## 2025-05-27 NOTE — DIETITIAN INITIAL EVALUATION ADULT - PROBLEM SELECTOR PLAN 1
WBC 1.37   Tmax 101.5 at home  Productive cough  CT showing upper lobe groundglass opacities  UA negative  - F/u BCx, sputum Cx, MRSA PCR, legionella/strep  - F/u GI PCR, c diff  - Consider ID consult

## 2025-05-27 NOTE — DIETITIAN INITIAL EVALUATION ADULT - OTHER CALCULATIONS
defer fluid needs to medical team discretion  Estimated protein-energy needs calculated using IBW due to BMI > 25

## 2025-05-27 NOTE — DIETITIAN INITIAL EVALUATION ADULT - PERTINENT MEDS FT
MEDICATIONS  (STANDING):  albuterol/ipratropium for Nebulization 3 milliLiter(s) Nebulizer every 6 hours  cefepime   IVPB      cefepime   IVPB 2000 milliGRAM(s) IV Intermittent every 8 hours  filgrastim-sndz (ZARXIO) Injectable 300 MICROGram(s) SubCutaneous daily  heparin   Injectable 5000 Unit(s) SubCutaneous every 8 hours  levothyroxine 100 MICROGram(s) Oral daily  multivitamin 1 Tablet(s) Oral daily  predniSONE   Tablet 5 milliGRAM(s) Oral two times a day  sertraline 25 milliGRAM(s) Oral daily  sodium chloride 3%  Inhalation 4 milliLiter(s) Inhalation every 12 hours    MEDICATIONS  (PRN):  acetaminophen     Tablet .. 650 milliGRAM(s) Oral every 6 hours PRN Temp greater or equal to 38C (100.4F), Mild Pain (1 - 3)  benzonatate 100 milliGRAM(s) Oral every 8 hours PRN Cough  guaifenesin/dextromethorphan Oral Liquid 10 milliLiter(s) Oral every 4 hours PRN Cough

## 2025-05-27 NOTE — DIETITIAN INITIAL EVALUATION ADULT - NUTRITION CONSULT
Take Amoxicillin three times a day for 10 days.    Tessalon perles (Benzonatate) up to 3 times a day as needed for cough.    Continue allergy pill for now.    Ok to take probiotic while on antibiotic.   yes

## 2025-05-27 NOTE — DIETITIAN INITIAL EVALUATION ADULT - NSFNSPHYEXAMSKINFT_GEN_A_CORE
per flowsheets: stage 1 pressure injury to coccyx, suspected deep tissue injury to left heel  per wound care note 5/27: "B/L buttocks/sacral deep tissue injury in evolution present on admission  B/L heel hyperpigmentation, cannot rule out a deep tissue injury present on admission"

## 2025-05-27 NOTE — DIETITIAN INITIAL EVALUATION ADULT - ADD RECOMMEND
1. Recommend liberalize current therapeutic diet restriction from DASH to Low Sodium. Defer diet texture/consistency to team/SLP. Defer fluid restriction to medical team discretion   2. Recommend Ensure Plus High Protein once daily to provide 350 kcal, 20 grams protein per 8 oz   3. Continue Multivitamin pending no medical contraindications, to promote wound healing   4. Monitor PO intake, PO diet tolerance, skin, weight, nutrition related labs, GI function, goals of care

## 2025-05-27 NOTE — PROGRESS NOTE ADULT - PROBLEM SELECTOR PLAN 1
- WBC 1.37 Tmax 101.5 at home  - Productive cough  - CT showing upper lobe groundglass opacities  - UA negative  - F/u BC: NGTD  - sputum Cx: Commensal quinn,   - MRSA PCR, legionella/strep negative  - F/u GI PCR, c diff, patient has not had Bm    Plan  - C/w Cefepime as empiric treatment  - DC Azithromycin  - Next dose EPO 5/27  - Oncologist Dr. Lovett, [facility], office notified patient is here on 5/25 - WBC 1.37 Tmax 101.5 at home  - Productive cough  - CT showing upper lobe groundglass opacities  - UA negative  - F/u BC: NGTD  - sputum Cx: Commensal quinn,   - MRSA PCR, legionella/strep negative  - F/u GI PCR, c diff, patient has not had Bm    Plan  - C/w Cefepime   - Next dose EPO 5/27  - Oncologist Dr. Lovett, [facility], office notified patient is here on 5/25

## 2025-05-27 NOTE — PHYSICAL THERAPY INITIAL EVALUATION ADULT - ADDITIONAL COMMENTS
Pt lives with spouse in a coop with 4 steps to enter with B/L hand rails and 13 steps to bedroom and bathroom with B/L handrails. Pt was independent with al functional mobility with no DME.

## 2025-05-27 NOTE — PROGRESS NOTE ADULT - ASSESSMENT
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  Patient over the past one week has been very weak and having trouble walking.  He has a mild cough over the past week.  He had a fever 101.5 on 5-24-25 so came to the ER.  He has no chest pain. No pleuritic chest pain.  No abdominal pain. No nausea.  No vomiting.  He was having some loose stools.  No leg swelling. No calf pain.  No chills. Overall is feeling very weak.      In the ED, presenting vitals were T 100.1, /66, , satting well on RA. Labs were significant for WBC 1.37, ANC 0.41, bands 9,8%,  Hgb 10.2, , Na 129, alk phos 144, AST 62, lactate 2.2. CT angio PE showing Patchy bilateral upper lobe groundglass opacities, suggestive of nonspecific inflammation/infection. 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   # Neutropenia  # Abnormal CT chest   # Bandemia   # Pancytopenia   # Mildly elevated LFTs    MICRO:   05/25 UA negative  05/24 BCX- NGTD  05/25 MRSA PCR- Negative   05/25 Sputum cx- Commensal quinn   05/24 Full RVP- negative   05/25 legionella/strep- negative     ABX;   Cefepime 05/25-- current     Recommendations:   - On cefepime 2 gm Q8H, can continue the same as patient has improvement in his symptoms.   - Plan for 7 day course from 05/25, when planned for discharge can complete course with PO Augmentin.   - Sputum cx- commensal quinn  - F/U Bcx from admission- NGTD  - If worsening stool output - Send GI PCR and C. diff  - If fevers return, would obtain a CTAP        In addition to reviewing history, imaging, documents, labs, microbiology, took into account antibiotic stewardship, local antibiogram and infection control strategies and potential transmission issues.    Discussed with the primary team.   ID will sign off today. Thank you for the consultation. Please feel free to reach out with any questions or concerns.   Inpatient ID consult team will discontinue active follow-up for this patient.      Kathy Martinez MD  Attending Physician, Division of Infectious Diseases  Department of Medicine   [facility]    Contact on TEAMS messaging from 9am - 5pm  Office: [phone] (after 5 PM or weekend)

## 2025-05-27 NOTE — DIETITIAN INITIAL EVALUATION ADULT - PERTINENT LABORATORY DATA
05-27    132[L]  |  97  |  16  ----------------------------<  112[H]  4.0   |  25  |  0.73    Ca    8.3[L]      27 May 2025 06:39  Phos  3.2     05-27  Mg     1.9     05-27    TPro  5.3[L]  /  Alb  2.4[L]  /  TBili  0.5  /  DBili  x   /  AST  62[H]  /  ALT  18  /  AlkPhos  143[H]  05-27

## 2025-05-27 NOTE — PROGRESS NOTE ADULT - PROBLEM SELECTOR PLAN 2
CT showing upper lobe groundglass opacities  Productive cough  - sputum Cx: Commensal quinn,   - MRSA PCR, legionella/strep negative  - F/u GI PCR, c diff, patient has not had Bm    Plan  - C/w Cefepime

## 2025-05-27 NOTE — PROGRESS NOTE ADULT - SUBJECTIVE AND OBJECTIVE BOX
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 Pt notes he's been experiencing 1 week of generalized weakness, difficulty ambulating. Also notes he's been experiencing a cough for the past week. Today pt noticed his Tmax 101.5 prompting ED visit. Also endorsed loose stools.      In the ED, presenting vitals were T 100.1, /66, , satting well on RA. Labs were significant for WBC 1.37, Hgb 10.2, , Na 129, alk phos 144, AST 62, lactate 2.2. S/p cefepime, vancomycin, IVF,    (25 May 2025 02:57)    PAST MEDICAL & SURGICAL HISTORY:  HTN (hypertension)      HLD (hyperlipidemia)      Prostate cancer      S/P prostatectomy        Allergies    No Known Allergies    Intolerances      Social History:    Medications:  acetaminophen     Tablet .. 650 milliGRAM(s) Oral every 6 hours PRN Temp greater or equal to 38C (100.4F), Mild Pain (1 - 3)  albuterol/ipratropium for Nebulization 3 milliLiter(s) Nebulizer every 6 hours  benzonatate 100 milliGRAM(s) Oral every 8 hours PRN Cough  cefepime   IVPB      cefepime   IVPB 2000 milliGRAM(s) IV Intermittent every 8 hours  filgrastim-sndz (ZARXIO) Injectable 300 MICROGram(s) SubCutaneous daily  guaifenesin/dextromethorphan Oral Liquid 10 milliLiter(s) Oral every 4 hours PRN Cough  heparin   Injectable 5000 Unit(s) SubCutaneous every 8 hours  levothyroxine 100 MICROGram(s) Oral daily  multivitamin 1 Tablet(s) Oral daily  predniSONE   Tablet 5 milliGRAM(s) Oral two times a day  sertraline 25 milliGRAM(s) Oral daily  sodium chloride 3%  Inhalation 4 milliLiter(s) Inhalation every 12 hours    Labs:  CBC Full  -  ( 27 May 2025 06:39 )  WBC Count : 13.68 K/uL  RBC Count : 3.28 M/uL  Hemoglobin : 10.2 g/dL  Hematocrit : 32.0 %  Platelet Count - Automated : 113 K/uL  Mean Cell Volume : 97.6 fl  Mean Cell Hemoglobin : 31.1 pg  Mean Cell Hemoglobin Concentration : 31.9 g/dL  Auto Neutrophil # : x  Auto Lymphocyte # : x  Auto Monocyte # : x  Auto Eosinophil # : x  Auto Basophil # : x  Auto Neutrophil % : x  Auto Lymphocyte % : x  Auto Monocyte % : x  Auto Eosinophil % : x  Auto Basophil % : x    05-27    132[L]  |  97  |  16  ----------------------------<  112[H]  4.0   |  25  |  0.73    Ca    8.3[L]      27 May 2025 06:39  Phos  3.2     05-27  Mg     1.9     05-27    TPro  5.3[L]  /  Alb  2.4[L]  /  TBili  0.5  /  DBili  x   /  AST  62[H]  /  ALT  18  /  AlkPhos  143[H]  05-27      Radiology:             ROS:  Patient comfortable without distress  No SOB or chest pain  No palpitation  No abdominal pain, diarrhaea or constipation  No weakness of extremities  No skin changes or swelling of legs  Rest of the comprehensive ROS was negative  Vital Signs Last 24 Hrs  T(C): 36.9 (27 May 2025 05:13), Max: 37.7 (26 May 2025 13:32)  T(F): 98.4 (27 May 2025 05:13), Max: 99.8 (26 May 2025 13:32)  HR: 100 (27 May 2025 10:30) (99 - 105)  BP: 138/61 (27 May 2025 10:30) (124/56 - 138/61)  BP(mean): --  RR: 18 (27 May 2025 05:13) (18 - 18)  SpO2: 89% (27 May 2025 11:33) (89% - 95%)    Parameters below as of 27 May 2025 11:33  Patient On (Oxygen Delivery Method): room air        Physical exam:  Patient alert and oriented  No distress  CVS: S1, S2 regular or murmur  Chest: bilateral breath sound without rales  Abdomen: soft, not tender, no organomegaly or masses  CNS: No focal neuro deficit  Musculoskeletal:  Normal range of motion  Skin: No rash    Assessment and Plan: Followed for prostate cancer and neutropenic fever  PAST MEDICAL & SURGICAL HISTORY:  HTN (hypertension)      HLD (hyperlipidemia)      Prostate cancer      S/P prostatectomy        Allergies    No Known Allergies    Intolerances      Social History:    Medications:  acetaminophen     Tablet .. 650 milliGRAM(s) Oral every 6 hours PRN Temp greater or equal to 38C (100.4F), Mild Pain (1 - 3)  albuterol/ipratropium for Nebulization 3 milliLiter(s) Nebulizer every 6 hours  benzonatate 100 milliGRAM(s) Oral every 8 hours PRN Cough  cefepime   IVPB      cefepime   IVPB 2000 milliGRAM(s) IV Intermittent every 8 hours  filgrastim-sndz (ZARXIO) Injectable 300 MICROGram(s) SubCutaneous daily  guaifenesin/dextromethorphan Oral Liquid 10 milliLiter(s) Oral every 4 hours PRN Cough  heparin   Injectable 5000 Unit(s) SubCutaneous every 8 hours  levothyroxine 100 MICROGram(s) Oral daily  multivitamin 1 Tablet(s) Oral daily  predniSONE   Tablet 5 milliGRAM(s) Oral two times a day  sertraline 25 milliGRAM(s) Oral daily  sodium chloride 3%  Inhalation 4 milliLiter(s) Inhalation every 12 hours    Labs:  CBC Full  -  ( 27 May 2025 06:39 )  WBC Count : 13.68 K/uL  RBC Count : 3.28 M/uL  Hemoglobin : 10.2 g/dL  Hematocrit : 32.0 %  Platelet Count - Automated : 113 K/uL  Mean Cell Volume : 97.6 fl  Mean Cell Hemoglobin : 31.1 pg  Mean Cell Hemoglobin Concentration : 31.9 g/dL  Auto Neutrophil # : x  Auto Lymphocyte # : x  Auto Monocyte # : x  Auto Eosinophil # : x  Auto Basophil # : x  Auto Neutrophil % : x  Auto Lymphocyte % : x  Auto Monocyte % : x  Auto Eosinophil % : x  Auto Basophil % : x    05-27    132[L]  |  97  |  16  ----------------------------<  112[H]  4.0   |  25  |  0.73    Ca    8.3[L]      27 May 2025 06:39  Phos  3.2     05-27  Mg     1.9     05-27    TPro  5.3[L]  /  Alb  2.4[L]  /  TBili  0.5  /  DBili  x   /  AST  62[H]  /  ALT  18  /  AlkPhos  143[H]  05-27      Radiology:             ROS:  Patient comfortable without distress  No SOB or chest pain  No palpitation  No abdominal pain, diarrhaea or constipation  No weakness of extremities  No skin changes or swelling of legs  Rest of the comprehensive ROS was negative  Vital Signs Last 24 Hrs  T(C): 36.9 (27 May 2025 05:13), Max: 37.7 (26 May 2025 13:32)  T(F): 98.4 (27 May 2025 05:13), Max: 99.8 (26 May 2025 13:32)  HR: 100 (27 May 2025 10:30) (99 - 105)  BP: 138/61 (27 May 2025 10:30) (124/56 - 138/61)  BP(mean): --  RR: 18 (27 May 2025 05:13) (18 - 18)  SpO2: 89% (27 May 2025 11:33) (89% - 95%)    Parameters below as of 27 May 2025 11:33  Patient On (Oxygen Delivery Method): room air        Physical exam:  Patient alert and oriented  No distress  CVS: S1, S2   Chest: bilateral breath sound without rales  Abdomen: soft, not tender, no organomegaly or masses  CNS: No focal neuro deficit  Musculoskeletal:  Normal range of motion  Skin: No rash    Assessment and Plan:

## 2025-05-28 LAB
ADD ON TEST-SPECIMEN IN LAB: SIGNIFICANT CHANGE UP
ALBUMIN SERPL ELPH-MCNC: 2.3 G/DL — LOW (ref 3.3–5)
ALP SERPL-CCNC: 162 U/L — HIGH (ref 40–120)
ALT FLD-CCNC: 16 U/L — SIGNIFICANT CHANGE UP (ref 10–45)
ANION GAP SERPL CALC-SCNC: 11 MMOL/L — SIGNIFICANT CHANGE UP (ref 5–17)
AST SERPL-CCNC: 73 U/L — HIGH (ref 10–40)
BASOPHILS # BLD AUTO: 0 K/UL — SIGNIFICANT CHANGE UP (ref 0–0.2)
BASOPHILS NFR BLD AUTO: 0 % — SIGNIFICANT CHANGE UP (ref 0–2)
BILIRUB SERPL-MCNC: 0.5 MG/DL — SIGNIFICANT CHANGE UP (ref 0.2–1.2)
BUN SERPL-MCNC: 17 MG/DL — SIGNIFICANT CHANGE UP (ref 7–23)
CALCIUM SERPL-MCNC: 8.8 MG/DL — SIGNIFICANT CHANGE UP (ref 8.4–10.5)
CHLORIDE SERPL-SCNC: 93 MMOL/L — LOW (ref 96–108)
CO2 SERPL-SCNC: 26 MMOL/L — SIGNIFICANT CHANGE UP (ref 22–31)
CREAT SERPL-MCNC: 0.73 MG/DL — SIGNIFICANT CHANGE UP (ref 0.5–1.3)
EGFR: 86 ML/MIN/1.73M2 — SIGNIFICANT CHANGE UP
EGFR: 86 ML/MIN/1.73M2 — SIGNIFICANT CHANGE UP
EOSINOPHIL # BLD AUTO: 0 K/UL — SIGNIFICANT CHANGE UP (ref 0–0.5)
EOSINOPHIL NFR BLD AUTO: 0 % — SIGNIFICANT CHANGE UP (ref 0–6)
GLUCOSE SERPL-MCNC: 81 MG/DL — SIGNIFICANT CHANGE UP (ref 70–99)
HCT VFR BLD CALC: 31 % — LOW (ref 39–50)
HGB BLD-MCNC: 9.9 G/DL — LOW (ref 13–17)
LYMPHOCYTES # BLD AUTO: 2.12 K/UL — SIGNIFICANT CHANGE UP (ref 1–3.3)
LYMPHOCYTES # BLD AUTO: 8.5 % — LOW (ref 13–44)
MAGNESIUM SERPL-MCNC: 1.8 MG/DL — SIGNIFICANT CHANGE UP (ref 1.6–2.6)
MANUAL SMEAR VERIFICATION: SIGNIFICANT CHANGE UP
MCHC RBC-ENTMCNC: 31.2 PG — SIGNIFICANT CHANGE UP (ref 27–34)
MCHC RBC-ENTMCNC: 31.9 G/DL — LOW (ref 32–36)
MCV RBC AUTO: 97.8 FL — SIGNIFICANT CHANGE UP (ref 80–100)
METAMYELOCYTES # FLD: 0.9 % — HIGH (ref 0–0)
METAMYELOCYTES NFR BLD: 0.9 % — HIGH (ref 0–0)
MONOCYTES # BLD AUTO: 1.5 K/UL — HIGH (ref 0–0.9)
MONOCYTES NFR BLD AUTO: 6 % — SIGNIFICANT CHANGE UP (ref 2–14)
MYELOCYTES NFR BLD: 0.9 % — HIGH (ref 0–0)
NEUTROPHILS # BLD AUTO: 20.86 K/UL — HIGH (ref 1.8–7.4)
NEUTROPHILS NFR BLD AUTO: 69.2 % — SIGNIFICANT CHANGE UP (ref 43–77)
NEUTS BAND # BLD: 14.5 % — HIGH (ref 0–8)
NEUTS BAND NFR BLD: 14.5 % — HIGH (ref 0–8)
NRBC BLD AUTO-RTO: 0 /100 WBCS — SIGNIFICANT CHANGE UP (ref 0–0)
PHOSPHATE SERPL-MCNC: 2.8 MG/DL — SIGNIFICANT CHANGE UP (ref 2.5–4.5)
PLAT MORPH BLD: NORMAL — SIGNIFICANT CHANGE UP
PLATELET # BLD AUTO: 120 K/UL — LOW (ref 150–400)
POLYCHROMASIA BLD QL SMEAR: SLIGHT — SIGNIFICANT CHANGE UP
POTASSIUM SERPL-MCNC: 4.1 MMOL/L — SIGNIFICANT CHANGE UP (ref 3.5–5.3)
POTASSIUM SERPL-SCNC: 4.1 MMOL/L — SIGNIFICANT CHANGE UP (ref 3.5–5.3)
PROT SERPL-MCNC: 5.2 G/DL — LOW (ref 6–8.3)
RBC # BLD: 3.17 M/UL — LOW (ref 4.2–5.8)
RBC # FLD: 17.1 % — HIGH (ref 10.3–14.5)
RBC BLD AUTO: SIGNIFICANT CHANGE UP
SODIUM SERPL-SCNC: 130 MMOL/L — LOW (ref 135–145)
TOXIC GRANULES BLD QL SMEAR: PRESENT — SIGNIFICANT CHANGE UP
WBC # BLD: 24.92 K/UL — HIGH (ref 3.8–10.5)
WBC # FLD AUTO: 24.92 K/UL — HIGH (ref 3.8–10.5)

## 2025-05-28 PROCEDURE — 99233 SBSQ HOSP IP/OBS HIGH 50: CPT | Mod: GC

## 2025-05-28 RX ORDER — AMOXICILLIN AND CLAVULANATE POTASSIUM 500; 125 MG/1; MG/1
1 TABLET, FILM COATED ORAL
Refills: 0 | Status: COMPLETED | OUTPATIENT
Start: 2025-05-28 | End: 2025-05-31

## 2025-05-28 RX ORDER — SODIUM CHLORIDE 9 G/1000ML
1000 INJECTION, SOLUTION INTRAVENOUS
Refills: 0 | Status: COMPLETED | OUTPATIENT
Start: 2025-05-28 | End: 2025-05-28

## 2025-05-28 RX ADMIN — SERTRALINE 25 MILLIGRAM(S): 100 TABLET, FILM COATED ORAL at 11:54

## 2025-05-28 RX ADMIN — PREDNISONE 5 MILLIGRAM(S): 20 TABLET ORAL at 17:31

## 2025-05-28 RX ADMIN — CEFEPIME 100 MILLIGRAM(S): 2 INJECTION, POWDER, FOR SOLUTION INTRAVENOUS at 00:48

## 2025-05-28 RX ADMIN — HEPARIN SODIUM 5000 UNIT(S): 1000 INJECTION INTRAVENOUS; SUBCUTANEOUS at 21:48

## 2025-05-28 RX ADMIN — Medication 4 MILLILITER(S): at 17:31

## 2025-05-28 RX ADMIN — Medication 1 TABLET(S): at 11:54

## 2025-05-28 RX ADMIN — HEPARIN SODIUM 5000 UNIT(S): 1000 INJECTION INTRAVENOUS; SUBCUTANEOUS at 05:10

## 2025-05-28 RX ADMIN — Medication 4 MILLILITER(S): at 05:09

## 2025-05-28 RX ADMIN — SODIUM CHLORIDE 100 MILLILITER(S): 9 INJECTION, SOLUTION INTRAVENOUS at 13:01

## 2025-05-28 RX ADMIN — IPRATROPIUM BROMIDE AND ALBUTEROL SULFATE 3 MILLILITER(S): .5; 2.5 SOLUTION RESPIRATORY (INHALATION) at 05:09

## 2025-05-28 RX ADMIN — PREDNISONE 5 MILLIGRAM(S): 20 TABLET ORAL at 05:09

## 2025-05-28 RX ADMIN — HEPARIN SODIUM 5000 UNIT(S): 1000 INJECTION INTRAVENOUS; SUBCUTANEOUS at 13:03

## 2025-05-28 RX ADMIN — IPRATROPIUM BROMIDE AND ALBUTEROL SULFATE 3 MILLILITER(S): .5; 2.5 SOLUTION RESPIRATORY (INHALATION) at 17:31

## 2025-05-28 RX ADMIN — AMOXICILLIN AND CLAVULANATE POTASSIUM 1 TABLET(S): 500; 125 TABLET, FILM COATED ORAL at 17:30

## 2025-05-28 RX ADMIN — CEFEPIME 100 MILLIGRAM(S): 2 INJECTION, POWDER, FOR SOLUTION INTRAVENOUS at 09:26

## 2025-05-28 RX ADMIN — Medication 100 MICROGRAM(S): at 05:09

## 2025-05-28 RX ADMIN — IPRATROPIUM BROMIDE AND ALBUTEROL SULFATE 3 MILLILITER(S): .5; 2.5 SOLUTION RESPIRATORY (INHALATION) at 11:53

## 2025-05-28 NOTE — PROGRESS NOTE ADULT - PROBLEM SELECTOR PLAN 2
CT showing upper lobe groundglass opacities  Productive cough  - sputum Cx: Commensal quinn,   - MRSA PCR, legionella/strep negative  - F/u GI PCR, c diff, patient has not had Bm    Plan  - C/w Cefepime CT showing upper lobe groundglass opacities  Productive cough  - sputum Cx: Commensal quinn,   - MRSA PCR, legionella/strep negative

## 2025-05-28 NOTE — PROGRESS NOTE ADULT - PROBLEM SELECTOR PLAN 3
Noted diarrhea/loose stools at home  Recent hospitalization for colitis  - Pending GI PCR/ C Diff Noted diarrhea/loose stools at home  Recent hospitalization for colitis

## 2025-05-28 NOTE — PROGRESS NOTE ADULT - SUBJECTIVE AND OBJECTIVE BOX
Shorty Ross MD  Available on TEAMS    Patient is a 89y old  Male who presents with a chief complaint of Likely PNA    per H&P: "89M w hx of prostate cancer (s/p prostatectomy 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 Patient was assessed at bedside.       REVIEW OF SYSTEMS:  ROS negative aside from above    MEDICATIONS  (STANDING):  albuterol/ipratropium for Nebulization 3 milliLiter(s) Nebulizer every 6 hours  cefepime   IVPB      cefepime   IVPB 2000 milliGRAM(s) IV Intermittent every 8 hours  heparin   Injectable 5000 Unit(s) SubCutaneous every 8 hours  levothyroxine 100 MICROGram(s) Oral daily  multivitamin 1 Tablet(s) Oral daily  predniSONE   Tablet 5 milliGRAM(s) Oral two times a day  sertraline 25 milliGRAM(s) Oral daily  sodium chloride 3%  Inhalation 4 milliLiter(s) Inhalation every 12 hours    MEDICATIONS  (PRN):  acetaminophen     Tablet .. 650 milliGRAM(s) Oral every 6 hours PRN Temp greater or equal to 38C (100.4F), Mild Pain (1 - 3)  benzonatate 100 milliGRAM(s) Oral every 8 hours PRN Cough  guaifenesin/dextromethorphan Oral Liquid 10 milliLiter(s) Oral every 4 hours PRN Cough      CAPILLARY BLOOD GLUCOSE        I&O's Summary      Vital Signs Last 24 Hrs  T(C): 36.5 (28 May 2025 04:53), Max: 37.3 (27 May 2025 20:18)  T(F): 97.7 (28 May 2025 04:53), Max: 99.1 (27 May 2025 20:18)  HR: 99 (28 May 2025 06:00) (99 - 115)  BP: 123/61 (28 May 2025 04:53) (120/61 - 138/61)  BP(mean): --  RR: 19 (28 May 2025 06:00) (18 - 20)  SpO2: 90% (28 May 2025 06:00) (88% - 95%)    Parameters below as of 28 May 2025 06:00  Patient On (Oxygen Delivery Method): nasal cannula  O2 Flow (L/min): 3      PHYSICAL EXAM:  GENERAL: NAD, well-developed, well-nourished  HEAD: Atraumatic, Normocephalic  EYES: EOMI, PERRLA, conjunctiva and sclera clear  NECK: Supple, No JVD  CHEST/LUNG: Clear to auscultation bilaterally; No wheezes or crackles  HEART: Normal S1/S2; Regular rate and rhythm; No murmurs, rubs, or gallops  ABDOMEN: Soft, Nontender, Nondistended; Bowel sounds present  EXTREMITIES: 2+ Peripheral Pulses; No clubbing, cyanosis, or edema  PSYCH: A&Ox3  NEUROLOGY: no focal neurologic deficit  SKIN: No rashes or lesions    LABS:                        10.2   13.68 )-----------( 113      ( 27 May 2025 06:39 )             32.0      05-27    132[L]  |  97  |  16  ----------------------------<  112[H]  4.0   |  25  |  0.73    Ca    8.3[L]      27 May 2025 06:39  Phos  3.2     05-27  Mg     1.9     05-27    TPro  5.3[L]  /  Alb  2.4[L]  /  TBili  0.5  /  DBili  x   /  AST  62[H]  /  ALT  18  /  AlkPhos  143[H]  05-27          Urinalysis Basic - ( 27 May 2025 06:39 )    Color: x / Appearance: x / SG: x / pH: x  Gluc: 112 mg/dL / Ketone: x  / Bili: x / Urobili: x   Blood: x / Protein: x / Nitrite: x   Leuk Esterase: x / RBC: x / WBC x   Sq Epi: x / Non Sq Epi: x / Bacteria: x        RADIOLOGY & ADDITIONAL TESTS:    Imaging Personally Reviewed:    Consultant(s) Notes Reviewed:      Care Discussed with Consultants/Other Providers:   Shorty Ross MD  Available on TEAMS    Patient is a 89y old  Male who presents with a chief complaint of Likely PNA    per H&P: "89M w hx of prostate cancer (s/p prostatectomy 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 Patient was assessed at bedside.  No acute concerns at bedside      REVIEW OF SYSTEMS:  ROS negative aside from above    MEDICATIONS  (STANDING):  albuterol/ipratropium for Nebulization 3 milliLiter(s) Nebulizer every 6 hours  cefepime   IVPB      cefepime   IVPB 2000 milliGRAM(s) IV Intermittent every 8 hours  heparin   Injectable 5000 Unit(s) SubCutaneous every 8 hours  levothyroxine 100 MICROGram(s) Oral daily  multivitamin 1 Tablet(s) Oral daily  predniSONE   Tablet 5 milliGRAM(s) Oral two times a day  sertraline 25 milliGRAM(s) Oral daily  sodium chloride 3%  Inhalation 4 milliLiter(s) Inhalation every 12 hours    MEDICATIONS  (PRN):  acetaminophen     Tablet .. 650 milliGRAM(s) Oral every 6 hours PRN Temp greater or equal to 38C (100.4F), Mild Pain (1 - 3)  benzonatate 100 milliGRAM(s) Oral every 8 hours PRN Cough  guaifenesin/dextromethorphan Oral Liquid 10 milliLiter(s) Oral every 4 hours PRN Cough      CAPILLARY BLOOD GLUCOSE        I&O's Summary      Vital Signs Last 24 Hrs  T(C): 36.5 (28 May 2025 04:53), Max: 37.3 (27 May 2025 20:18)  T(F): 97.7 (28 May 2025 04:53), Max: 99.1 (27 May 2025 20:18)  HR: 99 (28 May 2025 06:00) (99 - 115)  BP: 123/61 (28 May 2025 04:53) (120/61 - 138/61)  BP(mean): --  RR: 19 (28 May 2025 06:00) (18 - 20)  SpO2: 90% (28 May 2025 06:00) (88% - 95%)    Parameters below as of 28 May 2025 06:00  Patient On (Oxygen Delivery Method): nasal cannula  O2 Flow (L/min): 3      PHYSICAL EXAM:  GENERAL: NAD, well-developed, well-nourished  NECK: Supple, No JVD  CHEST/LUNG: Clear to auscultation bilaterally; No wheezes or crackles  HEART: Normal S1/S2; Regular rate and rhythm; No murmurs, rubs, or gallops  ABDOMEN: Soft, Nontender, Nondistended; Bowel sounds present  PSYCH: A&Ox3      LABS:                        10.2   13.68 )-----------( 113      ( 27 May 2025 06:39 )             32.0      05-27    132[L]  |  97  |  16  ----------------------------<  112[H]  4.0   |  25  |  0.73    Ca    8.3[L]      27 May 2025 06:39  Phos  3.2     05-27  Mg     1.9     05-27    TPro  5.3[L]  /  Alb  2.4[L]  /  TBili  0.5  /  DBili  x   /  AST  62[H]  /  ALT  18  /  AlkPhos  143[H]  05-27          Urinalysis Basic - ( 27 May 2025 06:39 )    Color: x / Appearance: x / SG: x / pH: x  Gluc: 112 mg/dL / Ketone: x  / Bili: x / Urobili: x   Blood: x / Protein: x / Nitrite: x   Leuk Esterase: x / RBC: x / WBC x   Sq Epi: x / Non Sq Epi: x / Bacteria: x        RADIOLOGY & ADDITIONAL TESTS:    Imaging Personally Reviewed:    Consultant(s) Notes Reviewed:      Care Discussed with Consultants/Other Providers:

## 2025-05-28 NOTE — PROGRESS NOTE ADULT - PROBLEM SELECTOR PLAN 1
- WBC 1.37 Tmax 101.5 at home  - Productive cough  - CT showing upper lobe groundglass opacities  - UA negative  - F/u BC: NGTD  - sputum Cx: Commensal quinn,   - MRSA PCR, legionella/strep negative  - F/u GI PCR, c diff, patient has not had Bm    Plan  - C/w Cefepime   - Next dose EPO 5/27  - Oncologist Dr. Lovett, [facility], office notified patient is here on 5/25 - WBC 1.37 Tmax 101.5 at home  - Productive cough  - CT showing upper lobe groundglass opacities  - UA negative  - F/u BC: NGTD  - sputum Cx: Commensal quinn,   - MRSA PCR, legionella/strep negative  - F/u GI PCR, c diff, patient has not had Bm    Plan  - Will transition to Augmentin 875mg  - Next dose EPO 5/27  - Oncologist Dr. Lovett, [facility], office notified patient is here on 5/25 - WBC 1.37 Tmax 101.5 at home  - Productive cough  - CT showing upper lobe groundglass opacities  - UA negative  - F/u BC: NGTD  - sputum Cx: Commensal quinn,   - MRSA PCR, legionella/strep negative    Plan  - Will transition to Augmentin 875mg BID  - Will attempt ambulatory stats, will likely need home O2 set up  - Oncologist Dr. Lovett, [facility], office notified patient is here on 5/25

## 2025-05-29 ENCOUNTER — TRANSCRIPTION ENCOUNTER (OUTPATIENT)
Age: 89
End: 2025-05-29

## 2025-05-29 DIAGNOSIS — I95.1 ORTHOSTATIC HYPOTENSION: ICD-10-CM

## 2025-05-29 LAB
ALBUMIN SERPL ELPH-MCNC: 2.3 G/DL — LOW (ref 3.3–5)
ALP SERPL-CCNC: 154 U/L — HIGH (ref 40–120)
ALT FLD-CCNC: 15 U/L — SIGNIFICANT CHANGE UP (ref 10–45)
ANION GAP SERPL CALC-SCNC: 10 MMOL/L — SIGNIFICANT CHANGE UP (ref 5–17)
AST SERPL-CCNC: 73 U/L — HIGH (ref 10–40)
BILIRUB SERPL-MCNC: 0.5 MG/DL — SIGNIFICANT CHANGE UP (ref 0.2–1.2)
BUN SERPL-MCNC: 18 MG/DL — SIGNIFICANT CHANGE UP (ref 7–23)
CALCIUM SERPL-MCNC: 8.4 MG/DL — SIGNIFICANT CHANGE UP (ref 8.4–10.5)
CHLORIDE SERPL-SCNC: 97 MMOL/L — SIGNIFICANT CHANGE UP (ref 96–108)
CO2 SERPL-SCNC: 26 MMOL/L — SIGNIFICANT CHANGE UP (ref 22–31)
CREAT SERPL-MCNC: 0.64 MG/DL — SIGNIFICANT CHANGE UP (ref 0.5–1.3)
EGFR: 89 ML/MIN/1.73M2 — SIGNIFICANT CHANGE UP
EGFR: 89 ML/MIN/1.73M2 — SIGNIFICANT CHANGE UP
GLUCOSE SERPL-MCNC: 100 MG/DL — HIGH (ref 70–99)
HCT VFR BLD CALC: 31.2 % — LOW (ref 39–50)
HGB BLD-MCNC: 9.8 G/DL — LOW (ref 13–17)
MAGNESIUM SERPL-MCNC: 1.9 MG/DL — SIGNIFICANT CHANGE UP (ref 1.6–2.6)
MCHC RBC-ENTMCNC: 31.2 PG — SIGNIFICANT CHANGE UP (ref 27–34)
MCHC RBC-ENTMCNC: 31.4 G/DL — LOW (ref 32–36)
MCV RBC AUTO: 99.4 FL — SIGNIFICANT CHANGE UP (ref 80–100)
NRBC BLD AUTO-RTO: 0 /100 WBCS — SIGNIFICANT CHANGE UP (ref 0–0)
PHOSPHATE SERPL-MCNC: 3.1 MG/DL — SIGNIFICANT CHANGE UP (ref 2.5–4.5)
PLATELET # BLD AUTO: 108 K/UL — LOW (ref 150–400)
POTASSIUM SERPL-MCNC: 4.5 MMOL/L — SIGNIFICANT CHANGE UP (ref 3.5–5.3)
POTASSIUM SERPL-SCNC: 4.5 MMOL/L — SIGNIFICANT CHANGE UP (ref 3.5–5.3)
PROT SERPL-MCNC: 5.4 G/DL — LOW (ref 6–8.3)
RBC # BLD: 3.14 M/UL — LOW (ref 4.2–5.8)
RBC # FLD: 17.4 % — HIGH (ref 10.3–14.5)
SODIUM SERPL-SCNC: 133 MMOL/L — LOW (ref 135–145)
WBC # BLD: 12.44 K/UL — HIGH (ref 3.8–10.5)
WBC # FLD AUTO: 12.44 K/UL — HIGH (ref 3.8–10.5)

## 2025-05-29 PROCEDURE — 99232 SBSQ HOSP IP/OBS MODERATE 35: CPT

## 2025-05-29 PROCEDURE — G0545: CPT

## 2025-05-29 PROCEDURE — 99233 SBSQ HOSP IP/OBS HIGH 50: CPT | Mod: GC

## 2025-05-29 RX ORDER — IPRATROPIUM BROMIDE AND ALBUTEROL SULFATE .5; 2.5 MG/3ML; MG/3ML
3 SOLUTION RESPIRATORY (INHALATION) EVERY 4 HOURS
Refills: 0 | Status: DISCONTINUED | OUTPATIENT
Start: 2025-05-29 | End: 2025-06-02

## 2025-05-29 RX ORDER — SODIUM CHLORIDE 9 G/1000ML
1000 INJECTION, SOLUTION INTRAVENOUS
Refills: 0 | Status: COMPLETED | OUTPATIENT
Start: 2025-05-29 | End: 2025-05-29

## 2025-05-29 RX ORDER — MIDODRINE HYDROCHLORIDE 5 MG/1
10 TABLET ORAL THREE TIMES A DAY
Refills: 0 | Status: DISCONTINUED | OUTPATIENT
Start: 2025-05-29 | End: 2025-05-30

## 2025-05-29 RX ORDER — SENNA 187 MG
2 TABLET ORAL AT BEDTIME
Refills: 0 | Status: DISCONTINUED | OUTPATIENT
Start: 2025-05-29 | End: 2025-06-02

## 2025-05-29 RX ORDER — POLYETHYLENE GLYCOL 3350 17 G/17G
17 POWDER, FOR SOLUTION ORAL DAILY
Refills: 0 | Status: DISCONTINUED | OUTPATIENT
Start: 2025-05-29 | End: 2025-06-02

## 2025-05-29 RX ADMIN — IPRATROPIUM BROMIDE AND ALBUTEROL SULFATE 3 MILLILITER(S): .5; 2.5 SOLUTION RESPIRATORY (INHALATION) at 05:04

## 2025-05-29 RX ADMIN — IPRATROPIUM BROMIDE AND ALBUTEROL SULFATE 3 MILLILITER(S): .5; 2.5 SOLUTION RESPIRATORY (INHALATION) at 21:20

## 2025-05-29 RX ADMIN — PREDNISONE 5 MILLIGRAM(S): 20 TABLET ORAL at 17:35

## 2025-05-29 RX ADMIN — Medication 4 MILLILITER(S): at 17:35

## 2025-05-29 RX ADMIN — SODIUM CHLORIDE 100 MILLILITER(S): 9 INJECTION, SOLUTION INTRAVENOUS at 09:37

## 2025-05-29 RX ADMIN — MIDODRINE HYDROCHLORIDE 10 MILLIGRAM(S): 5 TABLET ORAL at 17:35

## 2025-05-29 RX ADMIN — Medication 100 MICROGRAM(S): at 05:04

## 2025-05-29 RX ADMIN — AMOXICILLIN AND CLAVULANATE POTASSIUM 1 TABLET(S): 500; 125 TABLET, FILM COATED ORAL at 05:04

## 2025-05-29 RX ADMIN — IPRATROPIUM BROMIDE AND ALBUTEROL SULFATE 3 MILLILITER(S): .5; 2.5 SOLUTION RESPIRATORY (INHALATION) at 13:44

## 2025-05-29 RX ADMIN — Medication 1 TABLET(S): at 13:44

## 2025-05-29 RX ADMIN — Medication 2 TABLET(S): at 21:20

## 2025-05-29 RX ADMIN — HEPARIN SODIUM 5000 UNIT(S): 1000 INJECTION INTRAVENOUS; SUBCUTANEOUS at 05:04

## 2025-05-29 RX ADMIN — PREDNISONE 5 MILLIGRAM(S): 20 TABLET ORAL at 05:03

## 2025-05-29 RX ADMIN — HEPARIN SODIUM 5000 UNIT(S): 1000 INJECTION INTRAVENOUS; SUBCUTANEOUS at 21:20

## 2025-05-29 RX ADMIN — AMOXICILLIN AND CLAVULANATE POTASSIUM 1 TABLET(S): 500; 125 TABLET, FILM COATED ORAL at 17:35

## 2025-05-29 RX ADMIN — IPRATROPIUM BROMIDE AND ALBUTEROL SULFATE 3 MILLILITER(S): .5; 2.5 SOLUTION RESPIRATORY (INHALATION) at 17:35

## 2025-05-29 RX ADMIN — SERTRALINE 25 MILLIGRAM(S): 100 TABLET, FILM COATED ORAL at 13:44

## 2025-05-29 RX ADMIN — Medication 4 MILLILITER(S): at 05:04

## 2025-05-29 RX ADMIN — IPRATROPIUM BROMIDE AND ALBUTEROL SULFATE 3 MILLILITER(S): .5; 2.5 SOLUTION RESPIRATORY (INHALATION) at 00:17

## 2025-05-29 RX ADMIN — HEPARIN SODIUM 5000 UNIT(S): 1000 INJECTION INTRAVENOUS; SUBCUTANEOUS at 13:44

## 2025-05-29 NOTE — PROGRESS NOTE ADULT - PROBLEM SELECTOR PLAN 5
Hx of Hep B   - C/w home entecavir S/p prostatectomy in his 50's. Recurred with metastatic disease to liver and lung. S/p chest radiation and wedge resection. Has been on chem for last couple months, last chemo 1 week ago. Follows with Dr. Chase (urology) and oncology.    - Continue home prednisone 5mg BID (clarify indication with outpatient team when possible)  - Monitor CBC  - consult heme/onc, [facility], appreciate recs

## 2025-05-29 NOTE — PROGRESS NOTE ADULT - SUBJECTIVE AND OBJECTIVE BOX
KEL CHOUDHURY  MRN-[id number]    Patient is a 89y old  Male who presents with a chief complaint of Likely PNA (28 May 2025 07:10)      Review of System  REVIEW OF SYSTEMS      General:	Denies fatigue, fevers, chills, sweats, decreased appetite.    Skin/Breast: denies pruritis, rash  	  Ophthalmologic: no change in vision or blurring  	  HEENT	Denies dry mouth, oral sores, dysphagia,  change in hearing.    Respiratory and Thorax:  cough, sob, wheeze, hemoptysis  	  Cardiovascular:	no cp , palp, orthopnea    Gastrointestinal:	no n/v/d constipation    Genitourinary:	no dysuria of frequency, no hematuria, no flank pain    Musculoskeletal:	no bone or joint pain. no muscle aches.     Neurological:	no change in sensory or motor function. no headache. no weakness.     Psychiatric:	no depression, no anxiety, insomnia.     Hematology/Lymphatics:	no bleeding or bruising        Current Meds  MEDICATIONS  (STANDING):  albuterol/ipratropium for Nebulization 3 milliLiter(s) Nebulizer every 6 hours  amoxicillin  875 milliGRAM(s)/clavulanate 1 Tablet(s) Oral two times a day  heparin   Injectable 5000 Unit(s) SubCutaneous every 8 hours  levothyroxine 100 MICROGram(s) Oral daily  multivitamin 1 Tablet(s) Oral daily  predniSONE   Tablet 5 milliGRAM(s) Oral two times a day  sertraline 25 milliGRAM(s) Oral daily  sodium chloride 3%  Inhalation 4 milliLiter(s) Inhalation every 12 hours    MEDICATIONS  (PRN):  acetaminophen     Tablet .. 650 milliGRAM(s) Oral every 6 hours PRN Temp greater or equal to 38C (100.4F), Mild Pain (1 - 3)  benzonatate 100 milliGRAM(s) Oral every 8 hours PRN Cough  guaifenesin/dextromethorphan Oral Liquid 10 milliLiter(s) Oral every 4 hours PRN Cough      Vitals  Vital Signs Last 24 Hrs  T(C): 37.3 (29 May 2025 01:40), Max: 37.3 (29 May 2025 01:40)  T(F): 99.1 (29 May 2025 01:40), Max: 99.1 (29 May 2025 01:40)  HR: 95 (28 May 2025 20:02) (95 - 103)  BP: 119/63 (28 May 2025 20:02) (108/52 - 123/61)  BP(mean): --  RR: 17 (28 May 2025 20:02) (17 - 19)  SpO2: 93% (28 May 2025 20:02) (89% - 94%)    Parameters below as of 28 May 2025 20:02  Patient On (Oxygen Delivery Method): nasal cannula  O2 Flow (L/min): 3      Physical Exam  PHYSICAL EXAM:      Constitutional: NAD    Eyes: PERRLA EOMI, anicteric sclera    Heent :No oral sores, no pharyngeal injection. moist mucosa.    Neck: supple, no jvd, no LAD    Respiratory: CTA b/l     Cardiovascular: s1s2, no m/g/r    Gastrointestinal: soft, nt, nd, + BS    Extremities: no c/c/e    Neurological:A&O x 3 moves all ext.    Skin: no rash on exposed skin    Lymph Nodes: no lymphadenopathy.              Lab  CBC Full  -  ( 28 May 2025 07:13 )  WBC Count : 24.92 K/uL  RBC Count : 3.17 M/uL  Hemoglobin : 9.9 g/dL  Hematocrit : 31.0 %  Platelet Count - Automated : 120 K/uL  Mean Cell Volume : 97.8 fl  Mean Cell Hemoglobin : 31.2 pg  Mean Cell Hemoglobin Concentration : 31.9 g/dL  Auto Neutrophil # : x  Auto Lymphocyte # : x  Auto Monocyte # : x  Auto Eosinophil # : x  Auto Basophil # : x  Auto Neutrophil % : x  Auto Lymphocyte % : x  Auto Monocyte % : x  Auto Eosinophil % : x  Auto Basophil % : x    05-28    130[L]  |  93[L]  |  17  ----------------------------<  81  4.1   |  26  |  0.73    Ca    8.8      28 May 2025 07:13  Phos  2.8     05-28  Mg     1.8     05-28    TPro  5.2[L]  /  Alb  2.3[L]  /  TBili  0.5  /  DBili  x   /  AST  73[H]  /  ALT  16  /  AlkPhos  162[H]  05-28        Rad:    Assessment/Plan   KEL CHOUDHURY  MRN-[id number]    Patient is a 89y old  Male who presents with a chief complaint of Likely PNA (28 May 2025 07:10)      Review of System    Comfortable overnight  Rn reports some improvement in cough, remains on o2 NC    General:	Denies fatigue, fevers, chills, sweats, decreased appetite.    Skin/Breast: denies pruritis, rash  	  Ophthalmologic: no change in vision or blurring  	  HEENT	Denies dry mouth, oral sores, dysphagia,  change in hearing.    Respiratory and Thorax:   +cough,   	  Cardiovascular:	no cp , palp, orthopnea    Gastrointestinal:	no n/v/d constipation    Genitourinary:	no dysuria of frequency, no hematuria, no flank pain    Musculoskeletal:	no bone or joint pain. no muscle aches.     Neurological:	no change in sensory or motor function. no headache. no weakness.     Psychiatric:	no depression, no anxiety, insomnia.     Hematology/Lymphatics:	no bleeding or bruising    MEDICATIONS  (STANDING):  albuterol/ipratropium for Nebulization 3 milliLiter(s) Nebulizer every 6 hours  amoxicillin  875 milliGRAM(s)/clavulanate 1 Tablet(s) Oral two times a day  heparin   Injectable 5000 Unit(s) SubCutaneous every 8 hours  levothyroxine 100 MICROGram(s) Oral daily  multivitamin 1 Tablet(s) Oral daily  predniSONE   Tablet 5 milliGRAM(s) Oral two times a day  sertraline 25 milliGRAM(s) Oral daily  sodium chloride 3%  Inhalation 4 milliLiter(s) Inhalation every 12 hours    MEDICATIONS  (PRN):  acetaminophen     Tablet .. 650 milliGRAM(s) Oral every 6 hours PRN Temp greater or equal to 38C (100.4F), Mild Pain (1 - 3)  benzonatate 100 milliGRAM(s) Oral every 8 hours PRN Cough  guaifenesin/dextromethorphan Oral Liquid 10 milliLiter(s) Oral every 4 hours PRN Cough    Vital Signs Last 24 Hrs  T(C): 37.3 (29 May 2025 01:40), Max: 37.3 (29 May 2025 01:40)  T(F): 99.1 (29 May 2025 01:40), Max: 99.1 (29 May 2025 01:40)  HR: 95 (28 May 2025 20:02) (95 - 103)  BP: 119/63 (28 May 2025 20:02) (108/52 - 123/61)  BP(mean): --  RR: 17 (28 May 2025 20:02) (17 - 19)  SpO2: 93% (28 May 2025 20:02) (89% - 94%)    Parameters below as of 28 May 2025 20:02  Patient On (Oxygen Delivery Method): nasal cannula  O2 Flow (L/min): 3    PHYSICAL EXAM:    Constitutional: NAD    Eyes: PERRLA EOMI, anicteric sclera    Heent :No oral sores, no pharyngeal injection. moist mucosa.    Neck: supple, no jvd, no LAD    Respiratory: CTA b/l     Cardiovascular: s1s2, no m/g/r    Gastrointestinal: soft, nt, nd, + BS    Extremities: no c/c/e    Skin: no rash on exposed skin    Lymph Nodes: no lymphadenopathy.      Lab  CBC Full  -  ( 28 May 2025 07:13 )  WBC Count : 24.92 K/uL  RBC Count : 3.17 M/uL  Hemoglobin : 9.9 g/dL  Hematocrit : 31.0 %  Platelet Count - Automated : 120 K/uL  Mean Cell Volume : 97.8 fl  Mean Cell Hemoglobin : 31.2 pg  Mean Cell Hemoglobin Concentration : 31.9 g/dL  Auto Neutrophil # : x  Auto Lymphocyte # : x  Auto Monocyte # : x  Auto Eosinophil # : x  Auto Basophil # : x  Auto Neutrophil % : x  Auto Lymphocyte % : x  Auto Monocyte % : x  Auto Eosinophil % : x  Auto Basophil % : x    05-28    130[L]  |  93[L]  |  17  ----------------------------<  81  4.1   |  26  |  0.73    Ca    8.8      28 May 2025 07:13  Phos  2.8     05-28  Mg     1.8     05-28    TPro  5.2[L]  /  Alb  2.3[L]  /  TBili  0.5  /  DBili  x   /  AST  73[H]  /  ALT  16  /  AlkPhos  162[H]  05-28        Rad:    Assessment/Plan

## 2025-05-29 NOTE — PROGRESS NOTE ADULT - PROBLEM SELECTOR PLAN 10
- Fluids: None  - Electrolytes: Will replete to maintain K>4, Phos>3, and Mag>2  - Nutrition: Regular diet,   - Access:  - Activity: Pending PT eval  - DVT Prophylaxis: Heparin Sub q  - Stress Ulcer/GI Prophylaxis: N/A  - Disposition: Admit to medicine Needs confirmation of med rec as pt did not know meds and noted daughter knows all his meds

## 2025-05-29 NOTE — PROGRESS NOTE ADULT - PROBLEM SELECTOR PLAN 6
- Hold toprol and lisinopril ISO sepsis  - Clarify indication for Toprol  - Per outpatient notes seems pt may have been started on HCTZ  - Clarify with daughter but hold for now    Plan  - Hold BP meds iso sepsis Hx of Hep B   - C/w home entecavir

## 2025-05-29 NOTE — PROGRESS NOTE ADULT - PROBLEM SELECTOR PLAN 9
Needs confirmation of med rec as pt did not know meds and noted daughter knows all his meds - C/w sertraline

## 2025-05-29 NOTE — PROGRESS NOTE ADULT - ASSESSMENT
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  Patient over the past one week has been very weak and having trouble walking.  He has a mild cough over the past week.  He had a fever 101.5 on 5-24-25 so came to the ER.  He has no chest pain. No pleuritic chest pain.  No abdominal pain. No nausea.  No vomiting.  He was having some loose stools.  No leg swelling. No calf pain.  No chills. Overall is feeling very weak.      In the ED, presenting vitals were T 100.1, /66, , satting well on RA. Labs were significant for WBC 1.37, ANC 0.41, bands 9,8%,  Hgb 10.2, , Na 129, alk phos 144, AST 62, lactate 2.2. CT angio PE showing Patchy bilateral upper lobe groundglass opacities, suggestive of nonspecific inflammation/infection. 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   # Neutropenia  # Abnormal CT chest   # Bandemia   # Pancytopenia   # Mildly elevated LFTs    MICRO:   05/25 UA negative  05/24 BCX- NGTD  05/25 MRSA PCR- Negative   05/25 Sputum cx- Commensal quinn   05/24 Full RVP- negative   05/25 legionella/strep- negative     ABX;   Cefepime 05/25-- 05/27   Augmentin 05/28-- current     Recommendations:   - S/P cefepime   - Plan for 7 day course from 05/25, when planned for discharge can complete course with PO Augmentin.   - Sputum cx- commensal quinn  - F/U Bcx from admission- NGTD  - If worsening stool output - Send GI PCR and C. diff  - If fevers return, would obtain a CTAP  - Wean off oxygen       In addition to reviewing history, imaging, documents, labs, microbiology, took into account antibiotic stewardship, local antibiogram and infection control strategies and potential transmission issues.    Discussed with the primary team.   ID will sign off today. Thank you for the consultation. Please feel free to reach out with any questions or concerns.   Inpatient ID consult team will discontinue active follow-up for this patient.      Kathy Martinez MD  Attending Physician, Division of Infectious Diseases  Department of Medicine   [facility]    Contact on TEAMS messaging from 9am - 5pm  Office: [phone] (after 5 PM or weekend)

## 2025-05-29 NOTE — PROGRESS NOTE ADULT - SUBJECTIVE AND OBJECTIVE BOX
Patient is a 89y old  Male who presents with a chief complaint of Likely PNA (29 May 2025 10:06)    Being followed by ID for neutropenic fevers     Interval history:  cough has improved   still on NC 3 L   afebrile now   Leucocytosis - S/P Zarxio   no diarrhea and no abd pain       ROS:  No,SOB,CP  No N/V  No abd pain  No urinary complaints  No HA    Antimicrobials:  amoxicillin  875 milliGRAM(s)/clavulanate 1 Tablet(s) Oral two times a day      Vital Signs Last 24 Hrs  T(C): 36.7 (05-29-25 @ 05:01), Max: 37.3 (05-29-25 @ 01:40)  T(F): 98 (05-29-25 @ 05:01), Max: 99.1 (05-29-25 @ 01:40)  HR: 101 (05-29-25 @ 05:01) (95 - 101)  BP: 120/63 (05-29-25 @ 05:01) (108/52 - 120/63)  BP(mean): --  RR: 18 (05-29-25 @ 05:01) (17 - 18)  SpO2: 92% (05-29-25 @ 05:01) (90% - 94%)    Physical Exam:  General: In NAD, on NC 2 L   HEENT: clear oropharynx, anicteric sclera, pink conjunctiva  Neck: supple  CV: normal S1/S2  Lungs: decreased BS bilateral  Abdomen: soft non-tender non-distended, positive bowel sounds  Ext: no edema  Skin: no rashes and no petechiae  Neuro: alert and oriented X 3      Lab Data:                        9.8    12.44 )-----------( 108      ( 29 May 2025 07:01 )             31.2     05-29    133[L]  |  97  |  18  ----------------------------<  100[H]  4.5   |  26  |  0.64    Ca    8.4      29 May 2025 07:01  Phos  3.1     05-29  Mg     1.9     05-29    TPro  5.4[L]  /  Alb  2.3[L]  /  TBili  0.5  /  DBili  x   /  AST  73[H]  /  ALT  15  /  AlkPhos  154[H]  05-29      Sputum Sputum  05-25-25   Commensal quinn consistent with body site  --    Few polymorphonuclear leukocytes seen per oil power field  Few Gram Positive Cocci in Clusters seen per oil power field  Few Gram Positive Rods seen per oil power field      Blood Blood-Peripheral  05-24-25   No growth at 4 days  --  --      Blood Blood-Peripheral  05-24-25   No growth at 4 days  --  --                                RADIOLOGY:  imaging below personally reviewed and agree with findings      ACC: [id number] EXAM:  CT ANGIO CHEST PULM ART WAWI   ORDERED BY:  JANIE GOMEZ     PROCEDURE DATE:  05/25/2025          INTERPRETATION:  CLINICAL INFORMATION: Left base crackles on auscultation   . Concern for pulmonary embolism and pneumonia.    COMPARISON: CTA chest 4/26/2025    CONTRAST/COMPLICATIONS:  IV Contrast: Omnipaque 350  70 cc administered   30 cc discarded  Oral Contrast: None    PROCEDURE:  CT Angiography of the Chest.  Sagittal and coronal reformats were performed as well as 3D (MIP)   reconstructions.    FINDINGS:    LUNGS AND LARGE AIRWAYS: Status post right middle lobectomy. Scattered   patchy groundglass opacities of the upper lobes. Redemonstrated lower   lobe predominantly bronchiolectasis. Emphysematous changes. Stable   posteromedial left lower lobe rounded atelectasis containing a punctate   metallic density. Trace secretion within the proximal right mainstem   bronchus.  PLEURA: No pleural effusion.  VESSELS: No pulmonary embolus. Stable mild pulmonary trunk dilatation.   Aortic calcifications. Coronary artery calcifications. Severe   calcification at the proximal SMA.  HEART: Heart size is normal. No pericardial effusion. Aortic valve   calcifications.  MEDIASTINUM AND REILLY: No lymphadenopathy.  CHEST WALL AND LOWER NECK: Left greater than right gynecomastia,   unchanged. Left thyroid calcifications, unchanged.  VISUALIZED UPPER ABDOMEN: Heterogenous appearance of the right hepatic   dome, grossly unchanged to mildly increased, may be compatible with   history of hepatic metastases; consider further evaluation   contrast-enhanced MRI abdomen. Redemonstrated hepatic and renal cysts and   calcified hepatic granulomas.  BONES: Degenerative changes. Chronic mild T12 superior endplate deformity.    IMPRESSION:  No pulmonary embolus.    Patchy bilateral upper lobe groundglass opacities, suggestive of   nonspecific inflammation/infection.    Additional findings as above.

## 2025-05-29 NOTE — PROGRESS NOTE ADULT - SUBJECTIVE AND OBJECTIVE BOX
Shorty Ross MD  Available on TEAMS    Patient is a 89y old  Male who presents with a chief complaint of Likely PNA    per H&P: "89M w hx of prostate cancer (s/p prostatectomy 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 Patient was assessed at bedside.  No acute concerns at bedside      REVIEW OF SYSTEMS:  ROS negative aside from above    MEDICATIONS  (STANDING):  albuterol/ipratropium for Nebulization 3 milliLiter(s) Nebulizer every 6 hours  cefepime   IVPB      cefepime   IVPB 2000 milliGRAM(s) IV Intermittent every 8 hours  heparin   Injectable 5000 Unit(s) SubCutaneous every 8 hours  levothyroxine 100 MICROGram(s) Oral daily  multivitamin 1 Tablet(s) Oral daily  predniSONE   Tablet 5 milliGRAM(s) Oral two times a day  sertraline 25 milliGRAM(s) Oral daily  sodium chloride 3%  Inhalation 4 milliLiter(s) Inhalation every 12 hours    MEDICATIONS  (PRN):  acetaminophen     Tablet .. 650 milliGRAM(s) Oral every 6 hours PRN Temp greater or equal to 38C (100.4F), Mild Pain (1 - 3)  benzonatate 100 milliGRAM(s) Oral every 8 hours PRN Cough  guaifenesin/dextromethorphan Oral Liquid 10 milliLiter(s) Oral every 4 hours PRN Cough      CAPILLARY BLOOD GLUCOSE        I&O's Summary      Vital Signs Last 24 Hrs  T(C): 36.5 (28 May 2025 04:53), Max: 37.3 (27 May 2025 20:18)  T(F): 97.7 (28 May 2025 04:53), Max: 99.1 (27 May 2025 20:18)  HR: 99 (28 May 2025 06:00) (99 - 115)  BP: 123/61 (28 May 2025 04:53) (120/61 - 138/61)  BP(mean): --  RR: 19 (28 May 2025 06:00) (18 - 20)  SpO2: 90% (28 May 2025 06:00) (88% - 95%)    Parameters below as of 28 May 2025 06:00  Patient On (Oxygen Delivery Method): nasal cannula  O2 Flow (L/min): 3      PHYSICAL EXAM:  GENERAL: NAD, well-developed, well-nourished  NECK: Supple, No JVD  CHEST/LUNG: Clear to auscultation bilaterally; No wheezes or crackles  HEART: Normal S1/S2; Regular rate and rhythm; No murmurs, rubs, or gallops  ABDOMEN: Soft, Nontender, Nondistended; Bowel sounds present  PSYCH: A&Ox3      LABS:                        10.2   13.68 )-----------( 113      ( 27 May 2025 06:39 )             32.0      05-27    132[L]  |  97  |  16  ----------------------------<  112[H]  4.0   |  25  |  0.73    Ca    8.3[L]      27 May 2025 06:39  Phos  3.2     05-27  Mg     1.9     05-27    TPro  5.3[L]  /  Alb  2.4[L]  /  TBili  0.5  /  DBili  x   /  AST  62[H]  /  ALT  18  /  AlkPhos  143[H]  05-27          Urinalysis Basic - ( 27 May 2025 06:39 )    Color: x / Appearance: x / SG: x / pH: x  Gluc: 112 mg/dL / Ketone: x  / Bili: x / Urobili: x   Blood: x / Protein: x / Nitrite: x   Leuk Esterase: x / RBC: x / WBC x   Sq Epi: x / Non Sq Epi: x / Bacteria: x        RADIOLOGY & ADDITIONAL TESTS:    Imaging Personally Reviewed:    Consultant(s) Notes Reviewed:      Care Discussed with Consultants/Other Providers:   Shorty Ross MD  Available on TEAMS        SUBJECTIVE / OVERNIGHT EVENTS: No acute events overnight. Patient was assessed at bedside.  No acute concerns at bedside      REVIEW OF SYSTEMS:  ROS negative aside from above    MEDICATIONS  (STANDING):  albuterol/ipratropium for Nebulization 3 milliLiter(s) Nebulizer every 6 hours  cefepime   IVPB      cefepime   IVPB 2000 milliGRAM(s) IV Intermittent every 8 hours  heparin   Injectable 5000 Unit(s) SubCutaneous every 8 hours  levothyroxine 100 MICROGram(s) Oral daily  multivitamin 1 Tablet(s) Oral daily  predniSONE   Tablet 5 milliGRAM(s) Oral two times a day  sertraline 25 milliGRAM(s) Oral daily  sodium chloride 3%  Inhalation 4 milliLiter(s) Inhalation every 12 hours    MEDICATIONS  (PRN):  acetaminophen     Tablet .. 650 milliGRAM(s) Oral every 6 hours PRN Temp greater or equal to 38C (100.4F), Mild Pain (1 - 3)  benzonatate 100 milliGRAM(s) Oral every 8 hours PRN Cough  guaifenesin/dextromethorphan Oral Liquid 10 milliLiter(s) Oral every 4 hours PRN Cough      CAPILLARY BLOOD GLUCOSE        I&O's Summary      Vital Signs Last 24 Hrs  T(C): 36.5 (28 May 2025 04:53), Max: 37.3 (27 May 2025 20:18)  T(F): 97.7 (28 May 2025 04:53), Max: 99.1 (27 May 2025 20:18)  HR: 99 (28 May 2025 06:00) (99 - 115)  BP: 123/61 (28 May 2025 04:53) (120/61 - 138/61)  BP(mean): --  RR: 19 (28 May 2025 06:00) (18 - 20)  SpO2: 90% (28 May 2025 06:00) (88% - 95%)    Parameters below as of 28 May 2025 06:00  Patient On (Oxygen Delivery Method): nasal cannula  O2 Flow (L/min): 3      PHYSICAL EXAM:  GENERAL: NAD, well-developed, well-nourished  NECK: Supple, No JVD  CHEST/LUNG: Clear to auscultation bilaterally; No wheezes or crackles  HEART: Normal S1/S2; Regular rate and rhythm; No murmurs, rubs, or gallops  ABDOMEN: Soft, Nontender, Nondistended; Bowel sounds present  PSYCH: A&Ox3      LABS:                        10.2   13.68 )-----------( 113      ( 27 May 2025 06:39 )             32.0      05-27    132[L]  |  97  |  16  ----------------------------<  112[H]  4.0   |  25  |  0.73    Ca    8.3[L]      27 May 2025 06:39  Phos  3.2     05-27  Mg     1.9     05-27    TPro  5.3[L]  /  Alb  2.4[L]  /  TBili  0.5  /  DBili  x   /  AST  62[H]  /  ALT  18  /  AlkPhos  143[H]  05-27          Urinalysis Basic - ( 27 May 2025 06:39 )    Color: x / Appearance: x / SG: x / pH: x  Gluc: 112 mg/dL / Ketone: x  / Bili: x / Urobili: x   Blood: x / Protein: x / Nitrite: x   Leuk Esterase: x / RBC: x / WBC x   Sq Epi: x / Non Sq Epi: x / Bacteria: x        RADIOLOGY & ADDITIONAL TESTS:    Imaging Personally Reviewed:    Consultant(s) Notes Reviewed:      Care Discussed with Consultants/Other Providers:

## 2025-05-29 NOTE — DISCHARGE NOTE PROVIDER - NSDCCPGOAL_GEN_ALL_CORE_FT
Is important to rest and not perform any potential activities that could aggravate your pain. We do encourage you to be active.   YOU SHOULD SEEK MEDICAL ATTENTION IMMEDIATELY, EITHER HERE OR AT THE NEAREST EMERGENCY DEPARTMENT, IF ANY OF THE FOLLOWING OCCURS:  -You have shortness of breath, sweating, chest pain (or pressure, heaviness, indigestion, etc).  -You have abdominal (belly) pain that goes through to your back.  -Your arms and legs tingle or get numb (lose feeling).  -Your arms or legs are weak.  -You have problems urinating (peeing).  -You have fevers (a temperature of more than 100.4°F or 38°C).  -Your pain gets worse.       To get better and follow your care plan as instructed.

## 2025-05-29 NOTE — PROGRESS NOTE ADULT - ASSESSMENT
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  Possible PNA      1.    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   Neutropenic Fever  -    possiblepneumonia on CT chest   -   now with leukocytosis- likely both reactive and due to growth factor.    3) Anemia and thrombocytopenia- in setting of infection, metastatic prostate cancer and recent chemotherapy.  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  Possible PNA      1.    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   Neutropenic Fever  -    possible pneumonia on CT chest   -   now with leukocytosis- likely both reactive and due to growth factor, and steroid effect.    3) Anemia and thrombocytopenia- in setting of infection, metastatic prostate cancer and recent chemotherapy.  - monitor and manage supportively

## 2025-05-29 NOTE — PROGRESS NOTE ADULT - PROBLEM SELECTOR PLAN 2
CT showing upper lobe groundglass opacities  Productive cough  - sputum Cx: Commensal quinn,   - MRSA PCR, legionella/strep negative - Patient has had multiple bouts of orthostatic hypotension  - S/p 2L LR    Plan  - Compression socks  - Abdominal binder  - Low threshold for initiation of midodrine

## 2025-05-29 NOTE — DISCHARGE NOTE PROVIDER - DISCHARGE DIET
Informed pt it may take a few weeks for steroid to kick in, may try OTC anti inflammatories for pain.  
Pt is calling states that the hip injection is not helping.  States she is having more pain now.  Would like to discuss what she can do.  Please call.  
Regular Diet - No restrictions/Soft and Bite-Sized Diet

## 2025-05-29 NOTE — PROGRESS NOTE ADULT - ASSESSMENT
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 CT chest with patchy bilateral upper lobe groundglass opacities.  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 CT chest with patchy bilateral upper lobe groundglass opacities. Maintained on Cefepime and transitioned to Augmentin to complete a 10 day course.

## 2025-05-29 NOTE — DISCHARGE NOTE PROVIDER - NSDCCPCAREPLAN_GEN_ALL_CORE_FT
PRINCIPAL DISCHARGE DIAGNOSIS  Diagnosis: Neutropenic sepsis  Assessment and Plan of Treatment: You were admitted because you had fever while your white blood cells were low. White blood cells are a part of the blood that help fight germs and other harmful foreign bodies. Neutropenia happens when the bone marrow cannot make enough white blood cells or these cells are destroyed. Your blood cultures have remained negative. Some causes of this are bone marrow disease, chemo, or bone marrow transplant.  Please report to the emergency department if you have fever >100.5. Please follow up with your oncologist within 1 week after discharge  TO DO:  1. Please complete your Augmentin course until 6/1  2. Please follow up with your PCP within 2 weeks of discharge     PRINCIPAL DISCHARGE DIAGNOSIS  Diagnosis: Neutropenic sepsis  Assessment and Plan of Treatment: You were admitted because you had fever while your white blood cells were low. White blood cells are a part of the blood that help fight germs and other harmful foreign bodies. Neutropenia happens when the bone marrow cannot make enough white blood cells or these cells are destroyed. Your blood cultures have remained negative. Some causes of this are bone marrow disease, chemo, or bone marrow transplant.  Please report to the emergency department if you have fever >100.5. Please follow up with your oncologist within 1 week after discharge  TO DO:  1. Please follow up with your PCP within 2 weeks of discharge

## 2025-05-29 NOTE — PROGRESS NOTE ADULT - PROBLEM SELECTOR PLAN 11
- Fluids: None  - Electrolytes: Will replete to maintain K>4, Phos>3, and Mag>2  - Nutrition: Regular diet  - Access:  - Activity: Pending PT eval, and home O2 set up  - DVT Prophylaxis: Heparin Sub q  - Stress Ulcer/GI Prophylaxis: N/A  - Disposition: Admit to medicine

## 2025-05-29 NOTE — PROGRESS NOTE ADULT - PROBLEM SELECTOR PLAN 7
- C/w home synthroid - Hold toprol and lisinopril ISO sepsis  - Clarify indication for Toprol  - Per outpatient notes seems pt may have been started on HCTZ  - Clarify with daughter but hold for now    Plan  - Hold BP meds iso sepsis

## 2025-05-29 NOTE — DISCHARGE NOTE PROVIDER - HOSPITAL COURSE
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 Pt notes he's been experiencing 1 week of generalized weakness, difficulty ambulating. Also notes he's been experiencing a cough for the past week. Today pt noticed his Tmax 101.5 prompting ED visit. Also endorsed loose stools.      In the ED, presenting vitals were T 100.1, /66, , satting well on RA. Labs were significant for WBC 1.37, Hgb 10.2, , Na 129, alk phos 144, AST 62, lactate 2.2. S/p cefepime, vancomycin, IVF,    (25 May 2025 02:57)    Hospital Course:  Patient admitted for Neutropenic Fever. Empirically started on Cefepime. Cultures remained negative for the duration of patient's stay. Downgraded to Augmentin on 5/27 with a completion date on 6/1. Patient was to complete  PT, however had continued positive orthostatics. Patient was started on midodrine 5mg TID for orthostatic hypotension. XXX    On day of discharge, patient is clinically stable with no new exam findings or acute symptoms compared to prior. The patient was seen by the attending physician on the date of discharge and deemed stable and acceptable for discharge. The patient's chronic medical conditions were treated accordingly per the patient's home medication regimen. The patient's medication reconciliation (with changes made to chronic medications), follow up appointments, discharge orders, instructions, and significant lab and diagnostic studies are as noted.    Important Medication Changes and Reason:    1. Please complete your Augmentin course until 6/1    Active or Pending Issues Requiring Follow-up:    2. 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 Pt notes he's been experiencing 1 week of generalized weakness, difficulty ambulating. Also notes he's been experiencing a cough for the past week. Today pt noticed his Tmax 101.5 prompting ED visit. Also endorsed loose stools.      In the ED, presenting vitals were T 100.1, /66, , satting well on RA. Labs were significant for WBC 1.37, Hgb 10.2, , Na 129, alk phos 144, AST 62, lactate 2.2. S/p cefepime, vancomycin, IVF,    (25 May 2025 02:57)    Hospital Course:  Patient admitted for Neutropenic Fever. Empirically started on Cefepime. Cultures remained negative for the duration of patient's stay. Downgraded to Augmentin on 5/27 with a completion date on 6/1. Patient was to complete  PT, however had continued positive orthostatics. Patient was started on midodrine 5mg TID for orthostatic hypotension.     On day of discharge, patient is clinically stable with no new exam findings or acute symptoms compared to prior. The patient was seen by the attending physician on the date of discharge and deemed stable and acceptable for discharge. The patient's chronic medical conditions were treated accordingly per the patient's home medication regimen. The patient's medication reconciliation (with changes made to chronic medications), follow up appointments, discharge orders, instructions, and significant lab and diagnostic studies are as noted.    Important Medication Changes and Reason:      Active or Pending Issues Requiring Follow-up:    2. 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 Pt notes he's been experiencing 1 week of generalized weakness, difficulty ambulating. Also notes he's been experiencing a cough for the past week. Today pt noticed his Tmax 101.5 prompting ED visit. Also endorsed loose stools.      In the ED, presenting vitals were T 100.1, /66, , satting well on RA. Labs were significant for WBC 1.37, Hgb 10.2, , Na 129, alk phos 144, AST 62, lactate 2.2. S/p cefepime, vancomycin, IVF,    (25 May 2025 02:57)    Hospital Course:  Patient admitted for Neutropenic Fever. Empirically started on Cefepime. Cultures remained negative for the duration of patient's stay. Downgraded to Augmentin on 5/27 with a completion date on 6/1. Patient was to complete  PT, however had continued positive orthostatics. Patient was started on midodrine 5mg TID for orthostatic hypotension.     On day of discharge, patient is clinically stable with no new exam findings or acute symptoms compared to prior. The patient was seen by the attending physician on the date of discharge and deemed stable and acceptable for discharge. The patient's chronic medical conditions were treated accordingly per the patient's home medication regimen. The patient's medication reconciliation (with changes made to chronic medications), follow up appointments, discharge orders, instructions, and significant lab and diagnostic studies are as noted.      Active or Pending Issues Requiring Follow-up:    2. 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 Pt notes he's been experiencing 1 week of generalized weakness, difficulty ambulating. Also notes he's been experiencing a cough for the past week. Today pt noticed his Tmax 101.5 prompting ED visit. Also endorsed loose stools.      In the ED, presenting vitals were T 100.1, /66, , satting well on RA. Labs were significant for WBC 1.37, Hgb 10.2, , Na 129, alk phos 144, AST 62, lactate 2.2. S/p cefepime, vancomycin, IVF,    (25 May 2025 02:57)    Hospital Course:  Patient admitted for Neutropenic Fever. Empirically started on Cefepime. Cultures remained negative for the duration of patient's stay. Downgraded to Augmentin on 5/27 with a completion date on 6/1. Patient was to complete  PT, however had continued positive orthostatics. Patient was started on midodrine 5mg TID for orthostatic hypotension.     On day of discharge, patient is clinically stable with no new exam findings or acute symptoms compared to prior. The patient was seen by the attending physician on the date of discharge and deemed stable and acceptable for discharge. The patient's chronic medical conditions were treated accordingly per the patient's home medication regimen. The patient's medication reconciliation (with changes made to chronic medications), follow up appointments, discharge orders, instructions, and significant lab and diagnostic studies are as noted.      Active or Pending Issues Requiring Follow-up:    2. Please follow up with your PCP within 2 weeks of discharge  Advanced Directives:   [X] Full code  [ ] DNR  [ ] Hospice    Discharge Diagnoses:  Neutropenic Fever  Metastatic prostate CA  Hepatitis B  HTN  HLD  Presumed gram negative PNA

## 2025-05-29 NOTE — PROGRESS NOTE ADULT - PROBLEM SELECTOR PLAN 3
Noted diarrhea/loose stools at home  Recent hospitalization for colitis CT showing upper lobe groundglass opacities  Productive cough  - sputum Cx: Commensal quinn,   - MRSA PCR, legionella/strep negative

## 2025-05-29 NOTE — DISCHARGE NOTE PROVIDER - CARE PROVIDER_API CALL
Paul Diez  Internal Medicine  [address] [address]  Phone: [phone]  Fax: [phone]  Established Patient  Follow Up Time: 2 weeks

## 2025-05-29 NOTE — PROGRESS NOTE ADULT - PROBLEM SELECTOR PLAN 4
S/p prostatectomy in his 50's. Recurred with metastatic disease to liver and lung. S/p chest radiation and wedge resection. Has been on chem for last couple months, last chemo 1 week ago. Follows with Dr. Chase (urology) and oncology.    - Continue home prednisone 5mg BID (clarify indication with outpatient team when possible)  - Monitor CBC  - consult heme/onc, [facility], appreciate recs Noted diarrhea/loose stools at home  Recent hospitalization for colitis

## 2025-05-29 NOTE — DISCHARGE NOTE PROVIDER - NSDCMRMEDTOKEN_GEN_ALL_CORE_FT
Baraclude 0.5 mg oral tablet: 1 tab(s) orally every 48 hours  cholecalciferol 25 mcg (1000 intl units) oral tablet: 1 tab(s) orally once a day  Home Physical Therapy: Patient requires home Physical Therapy  levothyroxine 50 mcg (0.05 mg) oral tablet: 2 tab(s) orally once a day  Multiple Vitamins oral tablet: 1 tab(s) orally once a day  predniSONE 5 mg oral tablet: 1 tab(s) orally 2 times a day  sertraline 25 mg oral tablet: 1 tab(s) orally once a day   Baraclude 0.5 mg oral tablet: 1 tab(s) orally every 48 hours  cholecalciferol 25 mcg (1000 intl units) oral tablet: 1 tab(s) orally once a day  Home Physical Therapy: Patient requires home Physical Therapy  levothyroxine 100 mcg (0.1 mg) oral tablet: 1 tab(s) orally once a day  levothyroxine 50 mcg (0.05 mg) oral tablet: 2 tab(s) orally once a day  midodrine 5 mg oral tablet: 1 tab(s) orally 3 times a day  Multiple Vitamins oral tablet: 1 tab(s) orally once a day  predniSONE 5 mg oral tablet: 1 tab(s) orally 2 times a day  sertraline 25 mg oral tablet: 1 tab(s) orally once a day   Baraclude 0.5 mg oral tablet: 1 tab(s) orally every 48 hours  cholecalciferol 25 mcg (1000 intl units) oral tablet: 1 tab(s) orally once a day  Home Physical Therapy: Patient requires home Physical Therapy  levothyroxine 100 mcg (0.1 mg) oral tablet: 1 tab(s) orally once a day  levothyroxine 50 mcg (0.05 mg) oral tablet: 2 tab(s) orally once a day  midodrine 5 mg oral tablet: 1 tab(s) orally 3 times a day  Multiple Vitamins oral tablet: 1 tab(s) orally once a day  pantoprazole 40 mg intravenous injection: 40 milligram(s) intravenous once a day  predniSONE 5 mg oral tablet: 1 tab(s) orally 2 times a day  sertraline 25 mg oral tablet: 1 tab(s) orally once a day

## 2025-05-29 NOTE — DISCHARGE NOTE PROVIDER - NSDCFUADDAPPT_GEN_ALL_CORE_FT
APPTS ARE READY TO BE MADE: [] YES    Best Family or Patient Contact (if needed):  Jessee Gallegos: [phone]  Additional Information about above appointments (if needed):    1: Please follow up with your PCP within 2 weeks of discharge  2:   3:     Other comments or requests:    APPTS ARE READY TO BE MADE: [X] YES    Best Family or Patient Contact (if needed):  Jessee Gallegos: [phone]  Additional Information about above appointments (if needed):    1: Please follow up with your PCP within 2 weeks of discharge  2:   3:     Other comments or requests:    APPTS ARE READY TO BE MADE: [X] YES    Best Family or Patient Contact (if needed):  Jessee Gallegos: [phone]  Additional Information about above appointments (if needed):    1: Please follow up with your PCP within 2 weeks of discharge  2:   3:     Other comments or requests:   Patient is being transferred. Caregiver will arrange follow up.

## 2025-05-29 NOTE — DISCHARGE NOTE PROVIDER - NSDCCPTREATMENT_GEN_ALL_CORE_FT
PRINCIPAL PROCEDURE  Procedure: CXR, 2 views, w/ fluoro  Findings and Treatment: No focal consolidations.        SECONDARY PROCEDURE  Procedure: CT angiogram chest w contrast  Findings and Treatment: No pulmonary embolus.  Patchy bilateral upper lobe groundglass opacities, suggestive of   nonspecific inflammation/infection.  Additional findings as above.      Procedure: CT angiogram chest w contrast  Findings and Treatment:      PRINCIPAL PROCEDURE  Procedure: CXR, 2 views, w/ fluoro  Findings and Treatment: No focal consolidations.        SECONDARY PROCEDURE  Procedure: CT angiogram chest w contrast  Findings and Treatment: No pulmonary embolus.  Patchy bilateral upper lobe groundglass opacities, suggestive of   nonspecific inflammation/infection.  Additional findings as above.

## 2025-05-29 NOTE — PROGRESS NOTE ADULT - PROBLEM SELECTOR PLAN 1
- WBC 1.37 Tmax 101.5 at home  - Productive cough  - CT showing upper lobe groundglass opacities  - UA negative  - F/u BC: NGTD  - sputum Cx: Commensal quinn,   - MRSA PCR, legionella/strep negative    Plan  - Will transition to Augmentin 875mg BID  - Will attempt ambulatory stats, will likely need home O2 set up  - Oncologist Dr. Lovett, [facility], office notified patient is here on 5/25 - WBC 1.37 Tmax 101.5 at home  - Productive cough  - CT showing upper lobe groundglass opacities  - UA negative  - F/u BC: NGTD  - sputum Cx: Commensal quinn,   - MRSA PCR, legionella/strep negative    Plan  - C/w Augmentin 875mg BID  - Will ask PT for re-evaluation, will need a final dispo for O2 set up  - Oncologist Dr. Lovett, [facility], office notified patient is here on 5/25

## 2025-05-30 LAB
ADD ON TEST-SPECIMEN IN LAB: SIGNIFICANT CHANGE UP
ALBUMIN SERPL ELPH-MCNC: 2.2 G/DL — LOW (ref 3.3–5)
ALP SERPL-CCNC: 144 U/L — HIGH (ref 40–120)
ALT FLD-CCNC: 16 U/L — SIGNIFICANT CHANGE UP (ref 10–45)
ANION GAP SERPL CALC-SCNC: 11 MMOL/L — SIGNIFICANT CHANGE UP (ref 5–17)
AST SERPL-CCNC: 76 U/L — HIGH (ref 10–40)
BASOPHILS # BLD AUTO: 0.03 K/UL — SIGNIFICANT CHANGE UP (ref 0–0.2)
BASOPHILS NFR BLD AUTO: 0.4 % — SIGNIFICANT CHANGE UP (ref 0–2)
BILIRUB SERPL-MCNC: 0.7 MG/DL — SIGNIFICANT CHANGE UP (ref 0.2–1.2)
BUN SERPL-MCNC: 18 MG/DL — SIGNIFICANT CHANGE UP (ref 7–23)
CALCIUM SERPL-MCNC: 8.7 MG/DL — SIGNIFICANT CHANGE UP (ref 8.4–10.5)
CHLORIDE SERPL-SCNC: 96 MMOL/L — SIGNIFICANT CHANGE UP (ref 96–108)
CO2 SERPL-SCNC: 27 MMOL/L — SIGNIFICANT CHANGE UP (ref 22–31)
CREAT SERPL-MCNC: 0.59 MG/DL — SIGNIFICANT CHANGE UP (ref 0.5–1.3)
CULTURE RESULTS: SIGNIFICANT CHANGE UP
CULTURE RESULTS: SIGNIFICANT CHANGE UP
EGFR: 92 ML/MIN/1.73M2 — SIGNIFICANT CHANGE UP
EGFR: 92 ML/MIN/1.73M2 — SIGNIFICANT CHANGE UP
EOSINOPHIL # BLD AUTO: 0 K/UL — SIGNIFICANT CHANGE UP (ref 0–0.5)
EOSINOPHIL NFR BLD AUTO: 0 % — SIGNIFICANT CHANGE UP (ref 0–6)
GLUCOSE SERPL-MCNC: 104 MG/DL — HIGH (ref 70–99)
HCT VFR BLD CALC: 30.4 % — LOW (ref 39–50)
HGB BLD-MCNC: 9.7 G/DL — LOW (ref 13–17)
IMM GRANULOCYTES NFR BLD AUTO: 0.8 % — SIGNIFICANT CHANGE UP (ref 0–0.9)
LYMPHOCYTES # BLD AUTO: 1.96 K/UL — SIGNIFICANT CHANGE UP (ref 1–3.3)
LYMPHOCYTES # BLD AUTO: 24.5 % — SIGNIFICANT CHANGE UP (ref 13–44)
MAGNESIUM SERPL-MCNC: 1.9 MG/DL — SIGNIFICANT CHANGE UP (ref 1.6–2.6)
MCHC RBC-ENTMCNC: 31.4 PG — SIGNIFICANT CHANGE UP (ref 27–34)
MCHC RBC-ENTMCNC: 31.9 G/DL — LOW (ref 32–36)
MCV RBC AUTO: 98.4 FL — SIGNIFICANT CHANGE UP (ref 80–100)
MONOCYTES # BLD AUTO: 0.63 K/UL — SIGNIFICANT CHANGE UP (ref 0–0.9)
MONOCYTES NFR BLD AUTO: 7.9 % — SIGNIFICANT CHANGE UP (ref 2–14)
NEUTROPHILS # BLD AUTO: 5.31 K/UL — SIGNIFICANT CHANGE UP (ref 1.8–7.4)
NEUTROPHILS NFR BLD AUTO: 66.4 % — SIGNIFICANT CHANGE UP (ref 43–77)
NRBC BLD AUTO-RTO: 0 /100 WBCS — SIGNIFICANT CHANGE UP (ref 0–0)
PHOSPHATE SERPL-MCNC: 3.2 MG/DL — SIGNIFICANT CHANGE UP (ref 2.5–4.5)
PLATELET # BLD AUTO: 110 K/UL — LOW (ref 150–400)
POTASSIUM SERPL-MCNC: 4.2 MMOL/L — SIGNIFICANT CHANGE UP (ref 3.5–5.3)
POTASSIUM SERPL-SCNC: 4.2 MMOL/L — SIGNIFICANT CHANGE UP (ref 3.5–5.3)
PROT SERPL-MCNC: 5.2 G/DL — LOW (ref 6–8.3)
RBC # BLD: 3.09 M/UL — LOW (ref 4.2–5.8)
RBC # FLD: 17.2 % — HIGH (ref 10.3–14.5)
SODIUM SERPL-SCNC: 134 MMOL/L — LOW (ref 135–145)
SPECIMEN SOURCE: SIGNIFICANT CHANGE UP
SPECIMEN SOURCE: SIGNIFICANT CHANGE UP
WBC # BLD: 8.12 K/UL — SIGNIFICANT CHANGE UP (ref 3.8–10.5)
WBC # FLD AUTO: 8.12 K/UL — SIGNIFICANT CHANGE UP (ref 3.8–10.5)

## 2025-05-30 PROCEDURE — 99232 SBSQ HOSP IP/OBS MODERATE 35: CPT

## 2025-05-30 PROCEDURE — G0545: CPT

## 2025-05-30 PROCEDURE — 99232 SBSQ HOSP IP/OBS MODERATE 35: CPT | Mod: GC

## 2025-05-30 RX ORDER — POLYETHYLENE GLYCOL 3350 17 G/17G
17 POWDER, FOR SOLUTION ORAL ONCE
Refills: 0 | Status: DISCONTINUED | OUTPATIENT
Start: 2025-05-30 | End: 2025-05-30

## 2025-05-30 RX ORDER — MIDODRINE HYDROCHLORIDE 5 MG/1
5 TABLET ORAL THREE TIMES A DAY
Refills: 0 | Status: DISCONTINUED | OUTPATIENT
Start: 2025-05-30 | End: 2025-06-04

## 2025-05-30 RX ADMIN — Medication 4 MILLILITER(S): at 16:59

## 2025-05-30 RX ADMIN — PREDNISONE 5 MILLIGRAM(S): 20 TABLET ORAL at 17:02

## 2025-05-30 RX ADMIN — HEPARIN SODIUM 5000 UNIT(S): 1000 INJECTION INTRAVENOUS; SUBCUTANEOUS at 05:12

## 2025-05-30 RX ADMIN — MIDODRINE HYDROCHLORIDE 10 MILLIGRAM(S): 5 TABLET ORAL at 05:12

## 2025-05-30 RX ADMIN — IPRATROPIUM BROMIDE AND ALBUTEROL SULFATE 3 MILLILITER(S): .5; 2.5 SOLUTION RESPIRATORY (INHALATION) at 14:06

## 2025-05-30 RX ADMIN — Medication 100 MICROGRAM(S): at 05:12

## 2025-05-30 RX ADMIN — SERTRALINE 25 MILLIGRAM(S): 100 TABLET, FILM COATED ORAL at 10:09

## 2025-05-30 RX ADMIN — AMOXICILLIN AND CLAVULANATE POTASSIUM 1 TABLET(S): 500; 125 TABLET, FILM COATED ORAL at 16:59

## 2025-05-30 RX ADMIN — Medication 1 TABLET(S): at 10:10

## 2025-05-30 RX ADMIN — IPRATROPIUM BROMIDE AND ALBUTEROL SULFATE 3 MILLILITER(S): .5; 2.5 SOLUTION RESPIRATORY (INHALATION) at 05:11

## 2025-05-30 RX ADMIN — IPRATROPIUM BROMIDE AND ALBUTEROL SULFATE 3 MILLILITER(S): .5; 2.5 SOLUTION RESPIRATORY (INHALATION) at 10:09

## 2025-05-30 RX ADMIN — IPRATROPIUM BROMIDE AND ALBUTEROL SULFATE 3 MILLILITER(S): .5; 2.5 SOLUTION RESPIRATORY (INHALATION) at 17:00

## 2025-05-30 RX ADMIN — MIDODRINE HYDROCHLORIDE 5 MILLIGRAM(S): 5 TABLET ORAL at 17:00

## 2025-05-30 RX ADMIN — Medication 2 TABLET(S): at 21:13

## 2025-05-30 RX ADMIN — AMOXICILLIN AND CLAVULANATE POTASSIUM 1 TABLET(S): 500; 125 TABLET, FILM COATED ORAL at 05:11

## 2025-05-30 RX ADMIN — HEPARIN SODIUM 5000 UNIT(S): 1000 INJECTION INTRAVENOUS; SUBCUTANEOUS at 14:06

## 2025-05-30 RX ADMIN — HEPARIN SODIUM 5000 UNIT(S): 1000 INJECTION INTRAVENOUS; SUBCUTANEOUS at 21:13

## 2025-05-30 RX ADMIN — MIDODRINE HYDROCHLORIDE 5 MILLIGRAM(S): 5 TABLET ORAL at 14:06

## 2025-05-30 RX ADMIN — IPRATROPIUM BROMIDE AND ALBUTEROL SULFATE 3 MILLILITER(S): .5; 2.5 SOLUTION RESPIRATORY (INHALATION) at 21:13

## 2025-05-30 RX ADMIN — POLYETHYLENE GLYCOL 3350 17 GRAM(S): 17 POWDER, FOR SOLUTION ORAL at 10:09

## 2025-05-30 RX ADMIN — PREDNISONE 5 MILLIGRAM(S): 20 TABLET ORAL at 05:12

## 2025-05-30 RX ADMIN — Medication 4 MILLILITER(S): at 05:11

## 2025-05-30 NOTE — PROGRESS NOTE ADULT - ASSESSMENT
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  Patient over the past one week has been very weak and having trouble walking.  He has a mild cough over the past week.  He had a fever 101.5 on 5-24-25 so came to the ER.  He has no chest pain. No pleuritic chest pain.  No abdominal pain. No nausea.  No vomiting.  He was having some loose stools.  No leg swelling. No calf pain.  No chills. Overall is feeling very weak.      In the ED, presenting vitals were T 100.1, /66, , satting well on RA. Labs were significant for WBC 1.37, ANC 0.41, bands 9,8%,  Hgb 10.2, , Na 129, alk phos 144, AST 62, lactate 2.2. CT angio PE showing Patchy bilateral upper lobe groundglass opacities, suggestive of nonspecific inflammation/infection. 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   # Neutropenia S/P Zarxio    # Abnormal CT chest   # Bandemia  # Pancytopenia   # Mildly elevated LFTs    MICRO:   05/25 UA negative  05/24 BCX- NGTD  05/25 MRSA PCR- Negative   05/25 Sputum cx- Commensal quinn   05/24 Full RVP- negative   05/25 legionella/strep- negative     ABX;   Cefepime 05/25-- 05/27   Augmentin 05/28-- current     Recommendations:   - S/P cefepime   - Plan for 7 day course from 05/25, when planned for discharge can complete course with PO Augmentin.   - Sputum cx- commensal quinn  - F/U Bcx from admission- NGTD  - If worsening stool output - Send GI PCR and C. diff  - If fevers return, would obtain a CTAP  - Wean off oxygen.         In addition to reviewing history, imaging, documents, labs, microbiology, took into account antibiotic stewardship, local antibiogram and infection control strategies and potential transmission issues.    Discussed with the primary team.   ID will sign off today. Thank you for the consultation. Please feel free to reach out with any questions or concerns.   Inpatient ID consult team will discontinue active follow-up for this patient.      Kathy Martinez MD  Attending Physician, Division of Infectious Diseases  Department of Medicine   [facility]    Contact on TEAMS messaging from 9am - 5pm  Office: [phone] (after 5 PM or weekend)

## 2025-05-30 NOTE — PROGRESS NOTE ADULT - PROBLEM SELECTOR PLAN 1
- WBC 1.37 Tmax 101.5 at home  - Productive cough  - CT showing upper lobe groundglass opacities  - UA negative  - F/u BC: NGTD  - sputum Cx: Commensal quinn,   - MRSA PCR, legionella/strep negative    Plan  - C/w Augmentin 875mg BID  - Will ask PT for re-evaluation, will need a final dispo for O2 set up  - Oncologist Dr. Lovett, [facility], office notified patient is here on 5/25

## 2025-05-30 NOTE — PROGRESS NOTE ADULT - ASSESSMENT
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 CT chest with patchy bilateral upper lobe groundglass opacities. Maintained on Cefepime and transitioned to Augmentin to complete a 10 day course. 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 CT chest with patchy bilateral upper lobe groundglass opacities. On Augmentin to complete a 10 day course. Pending dispo

## 2025-05-30 NOTE — PROGRESS NOTE ADULT - SUBJECTIVE AND OBJECTIVE BOX
Patient is a 89y old  Male who presents with a chief complaint of Likely PNA (30 May 2025 07:18)    Says that he is okay.    Medication:   acetaminophen     Tablet .. 650 milliGRAM(s) Oral every 6 hours PRN  albuterol/ipratropium for Nebulization 3 milliLiter(s) Nebulizer every 4 hours  amoxicillin  875 milliGRAM(s)/clavulanate 1 Tablet(s) Oral two times a day  benzonatate 100 milliGRAM(s) Oral every 8 hours PRN  guaifenesin/dextromethorphan Oral Liquid 10 milliLiter(s) Oral every 4 hours PRN  heparin   Injectable 5000 Unit(s) SubCutaneous every 8 hours  levothyroxine 100 MICROGram(s) Oral daily  midodrine. 10 milliGRAM(s) Oral three times a day  multivitamin 1 Tablet(s) Oral daily  polyethylene glycol 3350 17 Gram(s) Oral daily  predniSONE   Tablet 5 milliGRAM(s) Oral two times a day  senna 2 Tablet(s) Oral at bedtime  sertraline 25 milliGRAM(s) Oral daily  sodium chloride 3%  Inhalation 4 milliLiter(s) Inhalation every 12 hours      Physical exam    T(C): 36.4 (05-30-25 @ 05:11), Max: 37.4 (05-29-25 @ 20:10)  HR: 88 (05-30-25 @ 05:11) (88 - 120)  BP: 157/61 (05-30-25 @ 05:11) (86/41 - 157/61)  RR: 18 (05-30-25 @ 05:11) (16 - 20)  SpO2: 95% (05-30-25 @ 05:11) (87% - 95%)  Wt(kg): --    alert NAD  EOMI anicteric sclera  Cv s1 S2 RRR  Lungs clear B/L  abd soft NT ND +BS  Mild  LE edema no tenderness    Labs                       9.7    8.12  )-----------( 110      ( 30 May 2025 06:57 )             30.4       05-30    134[L]  |  96  |  18  ----------------------------<  104[H]  4.2   |  27  |  0.59    Ca    8.7      30 May 2025 06:57  Phos  3.2     05-30  Mg     1.9     05-30    TPro  5.2[L]  /  Alb  2.2[L]  /  TBili  0.7  /  DBili  x   /  AST  76[H]  /  ALT  16  /  AlkPhos  144[H]  05-30      LIVER FUNCTIONS - ( 30 May 2025 06:57 )  Alb: 2.2 g/dL / Pro: 5.2 g/dL / ALK PHOS: 144 U/L / ALT: 16 U/L / AST: 76 U/L / GGT: x             [id number]

## 2025-05-30 NOTE — PROGRESS NOTE ADULT - PROBLEM SELECTOR PLAN 2
- Patient has had multiple bouts of orthostatic hypotension  - S/p 2L LR    Plan  - Compression socks  - Abdominal binder  - Low threshold for initiation of midodrine - Patient has had multiple bouts of orthostatic hypotension  - S/p 2L LR    Plan  - Compression socks  - Abdominal binder  - Switch to midodrine 5mg TID

## 2025-05-30 NOTE — PROGRESS NOTE ADULT - SUBJECTIVE AND OBJECTIVE BOX
Shorty Ross MD  Available on TEAMS    Patient is a 89y old  Male who presents with a chief complaint of Likely PNA (29 May 2025 10:41)      SUBJECTIVE / OVERNIGHT EVENTS: No acute events overnight. Patient was assessed at bedside.       REVIEW OF SYSTEMS:  ROS negative aside from above    MEDICATIONS  (STANDING):  albuterol/ipratropium for Nebulization 3 milliLiter(s) Nebulizer every 4 hours  amoxicillin  875 milliGRAM(s)/clavulanate 1 Tablet(s) Oral two times a day  heparin   Injectable 5000 Unit(s) SubCutaneous every 8 hours  levothyroxine 100 MICROGram(s) Oral daily  midodrine. 10 milliGRAM(s) Oral three times a day  multivitamin 1 Tablet(s) Oral daily  polyethylene glycol 3350 17 Gram(s) Oral daily  predniSONE   Tablet 5 milliGRAM(s) Oral two times a day  senna 2 Tablet(s) Oral at bedtime  sertraline 25 milliGRAM(s) Oral daily  sodium chloride 3%  Inhalation 4 milliLiter(s) Inhalation every 12 hours    MEDICATIONS  (PRN):  acetaminophen     Tablet .. 650 milliGRAM(s) Oral every 6 hours PRN Temp greater or equal to 38C (100.4F), Mild Pain (1 - 3)  benzonatate 100 milliGRAM(s) Oral every 8 hours PRN Cough  guaifenesin/dextromethorphan Oral Liquid 10 milliLiter(s) Oral every 4 hours PRN Cough      CAPILLARY BLOOD GLUCOSE        I&O's Summary    29 May 2025 07:01  -  30 May 2025 07:00  --------------------------------------------------------  IN: 720 mL / OUT: 950 mL / NET: -230 mL        Vital Signs Last 24 Hrs  T(C): 36.4 (30 May 2025 05:11), Max: 37.4 (29 May 2025 20:10)  T(F): 97.6 (30 May 2025 05:11), Max: 99.4 (29 May 2025 20:10)  HR: 88 (30 May 2025 05:11) (88 - 120)  BP: 157/61 (30 May 2025 05:11) (86/41 - 157/61)  BP(mean): 88 (29 May 2025 17:45) (88 - 88)  RR: 18 (30 May 2025 05:11) (16 - 20)  SpO2: 95% (30 May 2025 05:11) (87% - 95%)    Parameters below as of 30 May 2025 05:11  Patient On (Oxygen Delivery Method): nasal cannula  O2 Flow (L/min): 3      PHYSICAL EXAM:  GENERAL: NAD, well-developed, well-nourished  HEAD: Atraumatic, Normocephalic  EYES: EOMI, PERRLA, conjunctiva and sclera clear  NECK: Supple, No JVD  CHEST/LUNG: Clear to auscultation bilaterally; No wheezes or crackles  HEART: Normal S1/S2; Regular rate and rhythm; No murmurs, rubs, or gallops  ABDOMEN: Soft, Nontender, Nondistended; Bowel sounds present  EXTREMITIES: 2+ Peripheral Pulses; No clubbing, cyanosis, or edema  PSYCH: A&Ox3  NEUROLOGY: no focal neurologic deficit  SKIN: No rashes or lesions    LABS:                        9.8    12.44 )-----------( 108      ( 29 May 2025 07:01 )             31.2      05-29    133[L]  |  97  |  18  ----------------------------<  100[H]  4.5   |  26  |  0.64    Ca    8.4      29 May 2025 07:01  Phos  3.1     05-29  Mg     1.9     05-29    TPro  5.4[L]  /  Alb  2.3[L]  /  TBili  0.5  /  DBili  x   /  AST  73[H]  /  ALT  15  /  AlkPhos  154[H]  05-29          Urinalysis Basic - ( 29 May 2025 07:01 )    Color: x / Appearance: x / SG: x / pH: x  Gluc: 100 mg/dL / Ketone: x  / Bili: x / Urobili: x   Blood: x / Protein: x / Nitrite: x   Leuk Esterase: x / RBC: x / WBC x   Sq Epi: x / Non Sq Epi: x / Bacteria: x        RADIOLOGY & ADDITIONAL TESTS:    Imaging Personally Reviewed:    Consultant(s) Notes Reviewed:      Care Discussed with Consultants/Other Providers:   Shorty Ross MD  Available on TEAMS    Patient is a 89y old  Male who presents with a chief complaint of Likely PNA (29 May 2025 10:41)      SUBJECTIVE / OVERNIGHT EVENTS: No acute events overnight. Patient was assessed at bedside. No acute concerns at bedside      REVIEW OF SYSTEMS:  ROS negative aside from above    MEDICATIONS  (STANDING):  albuterol/ipratropium for Nebulization 3 milliLiter(s) Nebulizer every 4 hours  amoxicillin  875 milliGRAM(s)/clavulanate 1 Tablet(s) Oral two times a day  heparin   Injectable 5000 Unit(s) SubCutaneous every 8 hours  levothyroxine 100 MICROGram(s) Oral daily  midodrine. 10 milliGRAM(s) Oral three times a day  multivitamin 1 Tablet(s) Oral daily  polyethylene glycol 3350 17 Gram(s) Oral daily  predniSONE   Tablet 5 milliGRAM(s) Oral two times a day  senna 2 Tablet(s) Oral at bedtime  sertraline 25 milliGRAM(s) Oral daily  sodium chloride 3%  Inhalation 4 milliLiter(s) Inhalation every 12 hours    MEDICATIONS  (PRN):  acetaminophen     Tablet .. 650 milliGRAM(s) Oral every 6 hours PRN Temp greater or equal to 38C (100.4F), Mild Pain (1 - 3)  benzonatate 100 milliGRAM(s) Oral every 8 hours PRN Cough  guaifenesin/dextromethorphan Oral Liquid 10 milliLiter(s) Oral every 4 hours PRN Cough      CAPILLARY BLOOD GLUCOSE        I&O's Summary    29 May 2025 07:01  -  30 May 2025 07:00  --------------------------------------------------------  IN: 720 mL / OUT: 950 mL / NET: -230 mL        Vital Signs Last 24 Hrs  T(C): 36.4 (30 May 2025 05:11), Max: 37.4 (29 May 2025 20:10)  T(F): 97.6 (30 May 2025 05:11), Max: 99.4 (29 May 2025 20:10)  HR: 88 (30 May 2025 05:11) (88 - 120)  BP: 157/61 (30 May 2025 05:11) (86/41 - 157/61)  BP(mean): 88 (29 May 2025 17:45) (88 - 88)  RR: 18 (30 May 2025 05:11) (16 - 20)  SpO2: 95% (30 May 2025 05:11) (87% - 95%)    Parameters below as of 30 May 2025 05:11  Patient On (Oxygen Delivery Method): nasal cannula  O2 Flow (L/min): 3      PHYSICAL EXAM:  GENERAL: NAD, well-developed, well-nourished  HEAD: Atraumatic, Normocephalic  EYES: EOMI, PERRLA, conjunctiva and sclera clear  NECK: Supple, No JVD  CHEST/LUNG: Clear to auscultation bilaterally; No wheezes or crackles  HEART: Normal S1/S2; Regular rate and rhythm; No murmurs, rubs, or gallops  ABDOMEN: Soft, Nontender, Nondistended; Bowel sounds present  EXTREMITIES: 2+ Peripheral Pulses; No clubbing, cyanosis, or edema  PSYCH: A&Ox3  NEUROLOGY: no focal neurologic deficit  SKIN: No rashes or lesions    LABS:                        9.8    12.44 )-----------( 108      ( 29 May 2025 07:01 )             31.2      05-29    133[L]  |  97  |  18  ----------------------------<  100[H]  4.5   |  26  |  0.64    Ca    8.4      29 May 2025 07:01  Phos  3.1     05-29  Mg     1.9     05-29    TPro  5.4[L]  /  Alb  2.3[L]  /  TBili  0.5  /  DBili  x   /  AST  73[H]  /  ALT  15  /  AlkPhos  154[H]  05-29          Urinalysis Basic - ( 29 May 2025 07:01 )    Color: x / Appearance: x / SG: x / pH: x  Gluc: 100 mg/dL / Ketone: x  / Bili: x / Urobili: x   Blood: x / Protein: x / Nitrite: x   Leuk Esterase: x / RBC: x / WBC x   Sq Epi: x / Non Sq Epi: x / Bacteria: x        RADIOLOGY & ADDITIONAL TESTS:    Imaging Personally Reviewed:    Consultant(s) Notes Reviewed:      Care Discussed with Consultants/Other Providers:

## 2025-05-30 NOTE — PROGRESS NOTE ADULT - PROBLEM SELECTOR PLAN 3
CT showing upper lobe groundglass opacities  Productive cough  - sputum Cx: Commensal quinn,   - MRSA PCR, legionella/strep negative

## 2025-05-30 NOTE — PROGRESS NOTE ADULT - SUBJECTIVE AND OBJECTIVE BOX
Patient is a 89y old  Male who presents with a chief complaint of Likely PNA (30 May 2025 08:10)    Being followed by ID for neutropenic fevers     Interval history:  patient working with PT this morning   still on NC 3L - weaning as per primary team   afebrile now   WBC stable now   no diarrhea and no abd pain       ROS:  No,SOB,CP  No N/V  No abd pain  No urinary complaints  No HA    Antimicrobials:  amoxicillin  875 milliGRAM(s)/clavulanate 1 Tablet(s) Oral two times a day      Vital Signs Last 24 Hrs  T(C): 36.4 (05-30-25 @ 05:11), Max: 37.4 (05-29-25 @ 20:10)  T(F): 97.6 (05-30-25 @ 05:11), Max: 99.4 (05-29-25 @ 20:10)  HR: 88 (05-30-25 @ 05:11) (88 - 120)  BP: 157/61 (05-30-25 @ 05:11) (86/41 - 157/61)  BP(mean): 88 (05-29-25 @ 17:45) (88 - 88)  RR: 18 (05-30-25 @ 05:11) (16 - 20)  SpO2: 95% (05-30-25 @ 05:11) (87% - 95%)    Physical Exam:  General: In NAD, on NC 3 L   HEENT: clear oropharynx, anicteric sclera, pink conjunctiva  Neck: supple  CV: normal S1/S2  Lungs: decreased BS bilateral  Abdomen: soft non-tender non-distended, positive bowel sounds  Ext: no edema  Skin: no rashes and no petechiae  Neuro: alert and oriented X 3      Lab Data:                        9.7    8.12  )-----------( 110      ( 30 May 2025 06:57 )             30.4     05-30    134[L]  |  96  |  18  ----------------------------<  104[H]  4.2   |  27  |  0.59    Ca    8.7      30 May 2025 06:57  Phos  3.2     05-30  Mg     1.9     05-30    TPro  5.2[L]  /  Alb  2.2[L]  /  TBili  0.7  /  DBili  x   /  AST  76[H]  /  ALT  16  /  AlkPhos  144[H]  05-30      Sputum Sputum  05-25-25   Commensal quinn consistent with body site  --    Few polymorphonuclear leukocytes seen per oil power field  Few Gram Positive Cocci in Clusters seen per oil power field  Few Gram Positive Rods seen per oil power field      Blood Blood-Peripheral  05-24-25   No growth at 5 days  --  --      Blood Blood-Peripheral  05-24-25   No growth at 5 days  --  --                  RADIOLOGY:  imaging below personally reviewed and agree with findings      ACC: [id number] EXAM:  CT ANGIO CHEST PULM ART [facility]   ORDERED BY:  JANIE GOMEZ     PROCEDURE DATE:  05/25/2025          INTERPRETATION:  CLINICAL INFORMATION: Left base crackles on auscultation   . Concern for pulmonary embolism and pneumonia.    COMPARISON: CTA chest 4/26/2025    CONTRAST/COMPLICATIONS:  IV Contrast: Omnipaque 350  70 cc administered   30 cc discarded  Oral Contrast: None    PROCEDURE:  CT Angiography of the Chest.  Sagittal and coronal reformats were performed as well as 3D (MIP)   reconstructions.    FINDINGS:    LUNGS AND LARGE AIRWAYS: Status post right middle lobectomy. Scattered   patchy groundglass opacities of the upper lobes. Redemonstrated lower   lobe predominantly bronchiolectasis. Emphysematous changes. Stable   posteromedial left lower lobe rounded atelectasis containing a punctate   metallic density. Trace secretion within the proximal right mainstem   bronchus.  PLEURA: No pleural effusion.  VESSELS: No pulmonary embolus. Stable mild pulmonary trunk dilatation.   Aortic calcifications. Coronary artery calcifications. Severe   calcification at the proximal SMA.  HEART: Heart size is normal. No pericardial effusion. Aortic valve   calcifications.  MEDIASTINUM AND REILLY: No lymphadenopathy.  CHEST WALL AND LOWER NECK: Left greater than right gynecomastia,   unchanged. Left thyroid calcifications, unchanged.  VISUALIZED UPPER ABDOMEN: Heterogenous appearance of the right hepatic   dome, grossly unchanged to mildly increased, may be compatible with   history of hepatic metastases; consider further evaluation   contrast-enhanced MRI abdomen. Redemonstrated hepatic and renal cysts and   calcified hepatic granulomas.  BONES: Degenerative changes. Chronic mild T12 superior endplate deformity.    IMPRESSION:  No pulmonary embolus.    Patchy bilateral upper lobe groundglass opacities, suggestive of   nonspecific inflammation/infection.    Additional findings as above.

## 2025-05-30 NOTE — PROGRESS NOTE ADULT - ASSESSMENT
89-year-old male with metastatic prostate cancer and being treated with Taxotere. Had prior neutropenic fever and got Zarxio and then developed a leucocytosis. Today, WBC is normal. No Zarxio at this time.    Anemia and thrombocytopenia in setting of malignancy, chemotherapy, and infection. Hgb and platelets not requiring a transfusion right now. Monitor.    Further out-pt oncology management with Dr. Lovett. Discussed with attending.

## 2025-05-30 NOTE — CHART NOTE - NSCHARTNOTEFT_GEN_A_CORE
Spoke with Dr. Lovett, patient's outpatient oncologist regarding patient's pending disposition to [facility]/Home PT. Regarding radiation/chemotherapy, per oncologist, does not need current therapy within the upcoming 2-3 weeks. Patient to resume treatment once discharged from [facility], likely in 3-4 weeks.

## 2025-05-31 LAB
ALBUMIN SERPL ELPH-MCNC: 2.5 G/DL — LOW (ref 3.3–5)
ALP SERPL-CCNC: 167 U/L — HIGH (ref 40–120)
ALT FLD-CCNC: 15 U/L — SIGNIFICANT CHANGE UP (ref 10–45)
ANION GAP SERPL CALC-SCNC: 13 MMOL/L — SIGNIFICANT CHANGE UP (ref 5–17)
AST SERPL-CCNC: 80 U/L — HIGH (ref 10–40)
BASOPHILS # BLD AUTO: 0.02 K/UL — SIGNIFICANT CHANGE UP (ref 0–0.2)
BASOPHILS NFR BLD AUTO: 0.3 % — SIGNIFICANT CHANGE UP (ref 0–2)
BILIRUB SERPL-MCNC: 0.8 MG/DL — SIGNIFICANT CHANGE UP (ref 0.2–1.2)
BUN SERPL-MCNC: 21 MG/DL — SIGNIFICANT CHANGE UP (ref 7–23)
CALCIUM SERPL-MCNC: 9 MG/DL — SIGNIFICANT CHANGE UP (ref 8.4–10.5)
CHLORIDE SERPL-SCNC: 95 MMOL/L — LOW (ref 96–108)
CO2 SERPL-SCNC: 28 MMOL/L — SIGNIFICANT CHANGE UP (ref 22–31)
CREAT SERPL-MCNC: 0.58 MG/DL — SIGNIFICANT CHANGE UP (ref 0.5–1.3)
EGFR: 92 ML/MIN/1.73M2 — SIGNIFICANT CHANGE UP
EGFR: 92 ML/MIN/1.73M2 — SIGNIFICANT CHANGE UP
EOSINOPHIL # BLD AUTO: 0 K/UL — SIGNIFICANT CHANGE UP (ref 0–0.5)
EOSINOPHIL NFR BLD AUTO: 0 % — SIGNIFICANT CHANGE UP (ref 0–6)
GLUCOSE SERPL-MCNC: 104 MG/DL — HIGH (ref 70–99)
HCT VFR BLD CALC: 32.6 % — LOW (ref 39–50)
HGB BLD-MCNC: 10.2 G/DL — LOW (ref 13–17)
IMM GRANULOCYTES NFR BLD AUTO: 0.9 % — SIGNIFICANT CHANGE UP (ref 0–0.9)
LYMPHOCYTES # BLD AUTO: 1.32 K/UL — SIGNIFICANT CHANGE UP (ref 1–3.3)
LYMPHOCYTES # BLD AUTO: 23 % — SIGNIFICANT CHANGE UP (ref 13–44)
MAGNESIUM SERPL-MCNC: 2 MG/DL — SIGNIFICANT CHANGE UP (ref 1.6–2.6)
MCHC RBC-ENTMCNC: 31.1 PG — SIGNIFICANT CHANGE UP (ref 27–34)
MCHC RBC-ENTMCNC: 31.3 G/DL — LOW (ref 32–36)
MCV RBC AUTO: 99.4 FL — SIGNIFICANT CHANGE UP (ref 80–100)
MONOCYTES # BLD AUTO: 0.53 K/UL — SIGNIFICANT CHANGE UP (ref 0–0.9)
MONOCYTES NFR BLD AUTO: 9.2 % — SIGNIFICANT CHANGE UP (ref 2–14)
NEUTROPHILS # BLD AUTO: 3.81 K/UL — SIGNIFICANT CHANGE UP (ref 1.8–7.4)
NEUTROPHILS NFR BLD AUTO: 66.6 % — SIGNIFICANT CHANGE UP (ref 43–77)
NRBC BLD AUTO-RTO: 0 /100 WBCS — SIGNIFICANT CHANGE UP (ref 0–0)
PHOSPHATE SERPL-MCNC: 3.4 MG/DL — SIGNIFICANT CHANGE UP (ref 2.5–4.5)
PLATELET # BLD AUTO: 143 K/UL — LOW (ref 150–400)
POTASSIUM SERPL-MCNC: 4.2 MMOL/L — SIGNIFICANT CHANGE UP (ref 3.5–5.3)
POTASSIUM SERPL-SCNC: 4.2 MMOL/L — SIGNIFICANT CHANGE UP (ref 3.5–5.3)
PROT SERPL-MCNC: 5.9 G/DL — LOW (ref 6–8.3)
RBC # BLD: 3.28 M/UL — LOW (ref 4.2–5.8)
RBC # FLD: 17.2 % — HIGH (ref 10.3–14.5)
SODIUM SERPL-SCNC: 136 MMOL/L — SIGNIFICANT CHANGE UP (ref 135–145)
WBC # BLD: 5.73 K/UL — SIGNIFICANT CHANGE UP (ref 3.8–10.5)
WBC # FLD AUTO: 5.73 K/UL — SIGNIFICANT CHANGE UP (ref 3.8–10.5)

## 2025-05-31 PROCEDURE — 99232 SBSQ HOSP IP/OBS MODERATE 35: CPT | Mod: GC

## 2025-05-31 RX ADMIN — PREDNISONE 5 MILLIGRAM(S): 20 TABLET ORAL at 05:27

## 2025-05-31 RX ADMIN — PREDNISONE 5 MILLIGRAM(S): 20 TABLET ORAL at 17:52

## 2025-05-31 RX ADMIN — MIDODRINE HYDROCHLORIDE 5 MILLIGRAM(S): 5 TABLET ORAL at 17:52

## 2025-05-31 RX ADMIN — SERTRALINE 25 MILLIGRAM(S): 100 TABLET, FILM COATED ORAL at 13:58

## 2025-05-31 RX ADMIN — Medication 40 MILLIGRAM(S): at 18:29

## 2025-05-31 RX ADMIN — MIDODRINE HYDROCHLORIDE 5 MILLIGRAM(S): 5 TABLET ORAL at 05:27

## 2025-05-31 RX ADMIN — Medication 1 TABLET(S): at 13:59

## 2025-05-31 RX ADMIN — POLYETHYLENE GLYCOL 3350 17 GRAM(S): 17 POWDER, FOR SOLUTION ORAL at 13:59

## 2025-05-31 RX ADMIN — IPRATROPIUM BROMIDE AND ALBUTEROL SULFATE 3 MILLILITER(S): .5; 2.5 SOLUTION RESPIRATORY (INHALATION) at 05:27

## 2025-05-31 RX ADMIN — AMOXICILLIN AND CLAVULANATE POTASSIUM 1 TABLET(S): 500; 125 TABLET, FILM COATED ORAL at 05:27

## 2025-05-31 RX ADMIN — Medication 4 MILLILITER(S): at 17:52

## 2025-05-31 RX ADMIN — HEPARIN SODIUM 5000 UNIT(S): 1000 INJECTION INTRAVENOUS; SUBCUTANEOUS at 05:27

## 2025-05-31 RX ADMIN — HEPARIN SODIUM 5000 UNIT(S): 1000 INJECTION INTRAVENOUS; SUBCUTANEOUS at 13:59

## 2025-05-31 RX ADMIN — MIDODRINE HYDROCHLORIDE 5 MILLIGRAM(S): 5 TABLET ORAL at 13:58

## 2025-05-31 RX ADMIN — Medication 100 MICROGRAM(S): at 05:28

## 2025-05-31 RX ADMIN — IPRATROPIUM BROMIDE AND ALBUTEROL SULFATE 3 MILLILITER(S): .5; 2.5 SOLUTION RESPIRATORY (INHALATION) at 09:54

## 2025-05-31 RX ADMIN — IPRATROPIUM BROMIDE AND ALBUTEROL SULFATE 3 MILLILITER(S): .5; 2.5 SOLUTION RESPIRATORY (INHALATION) at 13:59

## 2025-05-31 RX ADMIN — HEPARIN SODIUM 5000 UNIT(S): 1000 INJECTION INTRAVENOUS; SUBCUTANEOUS at 21:06

## 2025-05-31 RX ADMIN — IPRATROPIUM BROMIDE AND ALBUTEROL SULFATE 3 MILLILITER(S): .5; 2.5 SOLUTION RESPIRATORY (INHALATION) at 17:51

## 2025-05-31 RX ADMIN — AMOXICILLIN AND CLAVULANATE POTASSIUM 1 TABLET(S): 500; 125 TABLET, FILM COATED ORAL at 17:52

## 2025-05-31 RX ADMIN — Medication 4 MILLILITER(S): at 05:27

## 2025-05-31 NOTE — PROGRESS NOTE ADULT - SUBJECTIVE AND OBJECTIVE BOX
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 Pt notes he's been experiencing 1 week of generalized weakness, difficulty ambulating. Also notes he's been experiencing a cough for the past week. Today pt noticed his Tmax 101.5 prompting ED visit. Also endorsed loose stools.    Todd has had 1-2 prior admissions for febrile neutropenia while on taxotere/pred, which has otherwise been successful at lowering his PSA, so he is thought to be responding. Has not yet been reimaged to assess resopnse. with filgrastim his WBC has improved and neutropenia resolved, but he still has cough and is requiring oxygen (there is no prior hx of oxygen dependence). He has also had orthostasis, and both of these issues have held up his discharge to [facility]. A recent echo in the [facility] system to evaluate hypotension was unrevealing      ROS:  Negative except for: cough, weakness    MEDICATIONS  (STANDING):  albuterol/ipratropium for Nebulization 3 milliLiter(s) Nebulizer every 4 hours  amoxicillin  875 milliGRAM(s)/clavulanate 1 Tablet(s) Oral two times a day  heparin   Injectable 5000 Unit(s) SubCutaneous every 8 hours  levothyroxine 100 MICROGram(s) Oral daily  midodrine. 5 milliGRAM(s) Oral three times a day  multivitamin 1 Tablet(s) Oral daily  polyethylene glycol 3350 17 Gram(s) Oral daily  predniSONE   Tablet 5 milliGRAM(s) Oral two times a day  senna 2 Tablet(s) Oral at bedtime  sertraline 25 milliGRAM(s) Oral daily  sodium chloride 3%  Inhalation 4 milliLiter(s) Inhalation every 12 hours    MEDICATIONS  (PRN):  acetaminophen     Tablet .. 650 milliGRAM(s) Oral every 6 hours PRN Temp greater or equal to 38C (100.4F), Mild Pain (1 - 3)  benzonatate 100 milliGRAM(s) Oral every 8 hours PRN Cough  guaifenesin/dextromethorphan Oral Liquid 10 milliLiter(s) Oral every 4 hours PRN Cough      Allergies    No Known Allergies    Intolerances        Vital Signs Last 24 Hrs  T(C): 36.9 (31 May 2025 11:42), Max: 37.1 (30 May 2025 19:37)  T(F): 98.4 (31 May 2025 11:42), Max: 98.7 (30 May 2025 19:37)  HR: 104 (31 May 2025 11:42) (92 - 104)  BP: 128/67 (31 May 2025 11:42) (127/67 - 135/71)  BP(mean): --  RR: 18 (31 May 2025 11:42) (16 - 18)  SpO2: 93% (31 May 2025 11:42) (92% - 93%)    Parameters below as of 31 May 2025 11:42  Patient On (Oxygen Delivery Method): nasal cannula  O2 Flow (L/min): 3      PHYSICAL EXAM:      Constitutional: awake and alert but chronically ill appearing    Eyes: anicteric    ENMT:    Neck:    Lymph nodes:    Respiratory: scattered rhonchi    Cardiovascular: regular    Gastrointestinal: soft, nontender, no HSM    Extremities: edema, slightly more than usual    Skin:                    LABS:                          10.2   5.73  )-----------( 143      ( 31 May 2025 07:25 )             32.6         Mean Cell Volume : 99.4 fl  Mean Cell Hemoglobin : 31.1 pg  Mean Cell Hemoglobin Concentration : 31.3 g/dL  Auto Neutrophil # : x  Auto Lymphocyte # : x  Auto Monocyte # : x  Auto Eosinophil # : x  Auto Basophil # : x  Auto Neutrophil % : x  Auto Lymphocyte % : x  Auto Monocyte % : x  Auto Eosinophil % : x  Auto Basophil % : x    Serial CBC  Hematocrit 32.6  Hemoglobin 10.2  Plat 143  RBC 3.28  WBC 5.73  Serial CBC  Hematocrit 30.4  Hemoglobin 9.7  Plat 110  RBC 3.09  WBC 8.12  Serial CBC  Hematocrit 31.2  Hemoglobin 9.8  Plat 108  RBC 3.14  WBC 12.44  Serial CBC  Hematocrit 31.0  Hemoglobin 9.9  Plat 120  RBC 3.17  WBC 24.92    05-31    136  |  95[L]  |  21  ----------------------------<  104[H]  4.2   |  28  |  0.58    Ca    9.0      31 May 2025 07:25  Phos  3.4     05-31  Mg     2.0     05-31    TPro  5.9[L]  /  Alb  2.5[L]  /  TBili  0.8  /  DBili  x   /  AST  80[H]  /  ALT  15  /  AlkPhos  167[H]  05-31

## 2025-05-31 NOTE — PROGRESS NOTE ADULT - PROBLEM SELECTOR PLAN 11
- Fluids: None  - Electrolytes: Will replete to maintain K>4, Phos>3, and Mag>2  - Nutrition: Regular diet  - Access:  - Activity: Pending PT eval, and home O2 set up  - DVT Prophylaxis: Heparin Sub q  - Stress Ulcer/GI Prophylaxis: N/A  - Disposition: Admit to medicine - Fluids: None  - Electrolytes: Will replete to maintain K>4, Phos>3, and Mag>2  - Nutrition: Regular diet  - Access:  - Activity: Pending [facility] auth and home O2 set up  - DVT Prophylaxis: Heparin Sub q  - Stress Ulcer/GI Prophylaxis: N/A  - Disposition: Admit to medicine

## 2025-05-31 NOTE — PROGRESS NOTE ADULT - SUBJECTIVE AND OBJECTIVE BOX
Patient is a 89y old  Male who presents with a chief complaint of Likely PNA    SUBJECTIVE / OVERNIGHT EVENTS: Patient seen and examined at bedside. No overnight events.    MEDICATIONS  (STANDING):  albuterol/ipratropium for Nebulization 3 milliLiter(s) Nebulizer every 4 hours  amoxicillin  875 milliGRAM(s)/clavulanate 1 Tablet(s) Oral two times a day  heparin   Injectable 5000 Unit(s) SubCutaneous every 8 hours  levothyroxine 100 MICROGram(s) Oral daily  midodrine. 5 milliGRAM(s) Oral three times a day  multivitamin 1 Tablet(s) Oral daily  polyethylene glycol 3350 17 Gram(s) Oral daily  predniSONE   Tablet 5 milliGRAM(s) Oral two times a day  senna 2 Tablet(s) Oral at bedtime  sertraline 25 milliGRAM(s) Oral daily  sodium chloride 3%  Inhalation 4 milliLiter(s) Inhalation every 12 hours    MEDICATIONS  (PRN):  acetaminophen     Tablet .. 650 milliGRAM(s) Oral every 6 hours PRN Temp greater or equal to 38C (100.4F), Mild Pain (1 - 3)  benzonatate 100 milliGRAM(s) Oral every 8 hours PRN Cough  guaifenesin/dextromethorphan Oral Liquid 10 milliLiter(s) Oral every 4 hours PRN Cough    Vital Signs Last 24 Hrs  T(C): 36.9 (31 May 2025 05:03), Max: 37.1 (30 May 2025 19:37)  T(F): 98.5 (31 May 2025 05:03), Max: 98.7 (30 May 2025 19:37)  HR: 92 (31 May 2025 05:03) (92 - 104)  BP: 127/67 (31 May 2025 05:03) (126/67 - 159/66)  BP(mean): --  RR: 18 (31 May 2025 05:03) (16 - 18)  SpO2: 93% (31 May 2025 05:03) (92% - 93%)    Parameters below as of 31 May 2025 05:03  Patient On (Oxygen Delivery Method): nasal cannula  O2 Flow (L/min): 3    CAPILLARY BLOOD GLUCOSE    I&O's Summary    30 May 2025 07:01  -  31 May 2025 07:00  --------------------------------------------------------  IN: 120 mL / OUT: 600 mL / NET: -480 mL    PHYSICAL EXAM:  GENERAL: NAD, well-developed, well-nourished  HEAD: Atraumatic, Normocephalic  EYES: EOMI, PERRLA, conjunctiva and sclera clear  NECK: Supple, No JVD  CHEST/LUNG: Clear to auscultation bilaterally; No wheezes or crackles  HEART: Normal S1/S2; Regular rate and rhythm; No murmurs, rubs, or gallops  ABDOMEN: Soft, Nontender, Nondistended; Bowel sounds present  EXTREMITIES: 2+ Peripheral Pulses; No clubbing, cyanosis, or edema  PSYCH: A&Ox3  NEUROLOGY: no focal neurologic deficit  SKIN: No rashes or lesions    LABS:                        9.7    8.12  )-----------( 110      ( 30 May 2025 06:57 )             30.4     05-30    134[L]  |  96  |  18  ----------------------------<  104[H]  4.2   |  27  |  0.59    Ca    8.7      30 May 2025 06:57  Phos  3.2     05-30  Mg     1.9     05-30    TPro  5.2[L]  /  Alb  2.2[L]  /  TBili  0.7  /  DBili  x   /  AST  76[H]  /  ALT  16  /  AlkPhos  144[H]  05-30    Urinalysis Basic - ( 30 May 2025 06:57 )    Color: x / Appearance: x / SG: x / pH: x  Gluc: 104 mg/dL / Ketone: x  / Bili: x / Urobili: x   Blood: x / Protein: x / Nitrite: x   Leuk Esterase: x / RBC: x / WBC x   Sq Epi: x / Non Sq Epi: x / Bacteria: x    Case Be MD, Internal Medicine Resident Patient is a 89y old  Male who presents with a chief complaint of Likely PNA    SUBJECTIVE / OVERNIGHT EVENTS: Patient seen and examined at bedside. No overnight events. This AM is feeling well without acute concerns. Denies SOB, CP, fever, chills, n/v/d.    MEDICATIONS  (STANDING):  albuterol/ipratropium for Nebulization 3 milliLiter(s) Nebulizer every 4 hours  amoxicillin  875 milliGRAM(s)/clavulanate 1 Tablet(s) Oral two times a day  heparin   Injectable 5000 Unit(s) SubCutaneous every 8 hours  levothyroxine 100 MICROGram(s) Oral daily  midodrine. 5 milliGRAM(s) Oral three times a day  multivitamin 1 Tablet(s) Oral daily  polyethylene glycol 3350 17 Gram(s) Oral daily  predniSONE   Tablet 5 milliGRAM(s) Oral two times a day  senna 2 Tablet(s) Oral at bedtime  sertraline 25 milliGRAM(s) Oral daily  sodium chloride 3%  Inhalation 4 milliLiter(s) Inhalation every 12 hours    MEDICATIONS  (PRN):  acetaminophen     Tablet .. 650 milliGRAM(s) Oral every 6 hours PRN Temp greater or equal to 38C (100.4F), Mild Pain (1 - 3)  benzonatate 100 milliGRAM(s) Oral every 8 hours PRN Cough  guaifenesin/dextromethorphan Oral Liquid 10 milliLiter(s) Oral every 4 hours PRN Cough    Vital Signs Last 24 Hrs  T(C): 36.9 (31 May 2025 05:03), Max: 37.1 (30 May 2025 19:37)  T(F): 98.5 (31 May 2025 05:03), Max: 98.7 (30 May 2025 19:37)  HR: 92 (31 May 2025 05:03) (92 - 104)  BP: 127/67 (31 May 2025 05:03) (126/67 - 159/66)  BP(mean): --  RR: 18 (31 May 2025 05:03) (16 - 18)  SpO2: 93% (31 May 2025 05:03) (92% - 93%)    Parameters below as of 31 May 2025 05:03  Patient On (Oxygen Delivery Method): nasal cannula  O2 Flow (L/min): 3    CAPILLARY BLOOD GLUCOSE    I&O's Summary    30 May 2025 07:01  -  31 May 2025 07:00  --------------------------------------------------------  IN: 120 mL / OUT: 600 mL / NET: -480 mL    PHYSICAL EXAM:  GENERAL: NAD, well-developed, well-nourished  HEAD: Atraumatic, Normocephalic  EYES: EOMI, PERRLA, conjunctiva and sclera clear  NECK: Supple, No JVD  CHEST/LUNG: Clear to auscultation bilaterally; No wheezes or crackles  HEART: Normal S1/S2; Regular rate and rhythm; No murmurs, rubs, or gallops  ABDOMEN: Soft, Nontender, Nondistended; Bowel sounds present  EXTREMITIES: 2+ Peripheral Pulses; No clubbing, cyanosis, or edema  PSYCH: A&Ox3  NEUROLOGY: no focal neurologic deficit  SKIN: No rashes or lesions    LABS:                        9.7    8.12  )-----------( 110      ( 30 May 2025 06:57 )             30.4     05-30    134[L]  |  96  |  18  ----------------------------<  104[H]  4.2   |  27  |  0.59    Ca    8.7      30 May 2025 06:57  Phos  3.2     05-30  Mg     1.9     05-30    TPro  5.2[L]  /  Alb  2.2[L]  /  TBili  0.7  /  DBili  x   /  AST  76[H]  /  ALT  16  /  AlkPhos  144[H]  05-30    Urinalysis Basic - ( 30 May 2025 06:57 )    Color: x / Appearance: x / SG: x / pH: x  Gluc: 104 mg/dL / Ketone: x  / Bili: x / Urobili: x   Blood: x / Protein: x / Nitrite: x   Leuk Esterase: x / RBC: x / WBC x   Sq Epi: x / Non Sq Epi: x / Bacteria: x    Case Be MD, Internal Medicine Resident

## 2025-05-31 NOTE — PROGRESS NOTE ADULT - PROBLEM SELECTOR PLAN 1
- WBC 1.37 Tmax 101.5 at home  - Productive cough  - CT showing upper lobe groundglass opacities  - UA negative  - F/u BC: NGTD  - sputum Cx: Commensal quinn,   - MRSA PCR, legionella/strep negative    Plan  - C/w Augmentin 875mg BID  - Will ask PT for re-evaluation, will need a final dispo for O2 set up  - Oncologist Dr. Lovett, [facility], office notified patient is here on 5/25

## 2025-05-31 NOTE — PROGRESS NOTE ADULT - ASSESSMENT
metastatic prostate cancer with extensive biopsy proven liver involvement, responsive to low dose taxotere (30/m2) q2w with growth factor support  recurrent febrile neutropenia episodes, adm with PNA  still requiring 02      SUGGEST  abx per primary team  please continue prednison 5 mg BID; it is part of his treatment regimen for prostate cancer  eventual rehab  decision to resume chemo, which has been effective but poorly tolerated, will be made after rehab

## 2025-05-31 NOTE — PROGRESS NOTE ADULT - ASSESSMENT
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 CT chest with patchy bilateral upper lobe groundglass opacities. On Augmentin to complete a 10 day course. Pending dispo

## 2025-05-31 NOTE — PROGRESS NOTE ADULT - PROBLEM SELECTOR PLAN 2
- Patient has had multiple bouts of orthostatic hypotension  - S/p 2L LR    Plan  - Compression socks  - Abdominal binder  - Switch to midodrine 5mg TID

## 2025-06-01 LAB
ALBUMIN SERPL ELPH-MCNC: 2.6 G/DL — LOW (ref 3.3–5)
ALP SERPL-CCNC: 167 U/L — HIGH (ref 40–120)
ALT FLD-CCNC: 15 U/L — SIGNIFICANT CHANGE UP (ref 10–45)
ANION GAP SERPL CALC-SCNC: 10 MMOL/L — SIGNIFICANT CHANGE UP (ref 5–17)
AST SERPL-CCNC: 78 U/L — HIGH (ref 10–40)
BASOPHILS # BLD AUTO: 0.03 K/UL — SIGNIFICANT CHANGE UP (ref 0–0.2)
BASOPHILS NFR BLD AUTO: 0.5 % — SIGNIFICANT CHANGE UP (ref 0–2)
BILIRUB SERPL-MCNC: 0.8 MG/DL — SIGNIFICANT CHANGE UP (ref 0.2–1.2)
BUN SERPL-MCNC: 22 MG/DL — SIGNIFICANT CHANGE UP (ref 7–23)
CALCIUM SERPL-MCNC: 9.1 MG/DL — SIGNIFICANT CHANGE UP (ref 8.4–10.5)
CHLORIDE SERPL-SCNC: 98 MMOL/L — SIGNIFICANT CHANGE UP (ref 96–108)
CO2 SERPL-SCNC: 26 MMOL/L — SIGNIFICANT CHANGE UP (ref 22–31)
CREAT SERPL-MCNC: 0.54 MG/DL — SIGNIFICANT CHANGE UP (ref 0.5–1.3)
EGFR: 94 ML/MIN/1.73M2 — SIGNIFICANT CHANGE UP
EGFR: 94 ML/MIN/1.73M2 — SIGNIFICANT CHANGE UP
EOSINOPHIL # BLD AUTO: 0 K/UL — SIGNIFICANT CHANGE UP (ref 0–0.5)
EOSINOPHIL NFR BLD AUTO: 0 % — SIGNIFICANT CHANGE UP (ref 0–6)
GLUCOSE SERPL-MCNC: 102 MG/DL — HIGH (ref 70–99)
HCT VFR BLD CALC: 31.8 % — LOW (ref 39–50)
HGB BLD-MCNC: 9.8 G/DL — LOW (ref 13–17)
IMM GRANULOCYTES NFR BLD AUTO: 0.5 % — SIGNIFICANT CHANGE UP (ref 0–0.9)
LYMPHOCYTES # BLD AUTO: 1.18 K/UL — SIGNIFICANT CHANGE UP (ref 1–3.3)
LYMPHOCYTES # BLD AUTO: 19.8 % — SIGNIFICANT CHANGE UP (ref 13–44)
MAGNESIUM SERPL-MCNC: 2 MG/DL — SIGNIFICANT CHANGE UP (ref 1.6–2.6)
MCHC RBC-ENTMCNC: 30.8 G/DL — LOW (ref 32–36)
MCHC RBC-ENTMCNC: 30.9 PG — SIGNIFICANT CHANGE UP (ref 27–34)
MCV RBC AUTO: 100.3 FL — HIGH (ref 80–100)
MONOCYTES # BLD AUTO: 0.48 K/UL — SIGNIFICANT CHANGE UP (ref 0–0.9)
MONOCYTES NFR BLD AUTO: 8.1 % — SIGNIFICANT CHANGE UP (ref 2–14)
NEUTROPHILS # BLD AUTO: 4.23 K/UL — SIGNIFICANT CHANGE UP (ref 1.8–7.4)
NEUTROPHILS NFR BLD AUTO: 71.1 % — SIGNIFICANT CHANGE UP (ref 43–77)
NRBC BLD AUTO-RTO: 0 /100 WBCS — SIGNIFICANT CHANGE UP (ref 0–0)
PHOSPHATE SERPL-MCNC: 3.7 MG/DL — SIGNIFICANT CHANGE UP (ref 2.5–4.5)
PLATELET # BLD AUTO: 149 K/UL — LOW (ref 150–400)
POTASSIUM SERPL-MCNC: 4.7 MMOL/L — SIGNIFICANT CHANGE UP (ref 3.5–5.3)
POTASSIUM SERPL-SCNC: 4.7 MMOL/L — SIGNIFICANT CHANGE UP (ref 3.5–5.3)
PROT SERPL-MCNC: 5.9 G/DL — LOW (ref 6–8.3)
RBC # BLD: 3.17 M/UL — LOW (ref 4.2–5.8)
RBC # FLD: 17.1 % — HIGH (ref 10.3–14.5)
SODIUM SERPL-SCNC: 134 MMOL/L — LOW (ref 135–145)
WBC # BLD: 5.95 K/UL — SIGNIFICANT CHANGE UP (ref 3.8–10.5)
WBC # FLD AUTO: 5.95 K/UL — SIGNIFICANT CHANGE UP (ref 3.8–10.5)

## 2025-06-01 PROCEDURE — 99232 SBSQ HOSP IP/OBS MODERATE 35: CPT | Mod: GC

## 2025-06-01 RX ORDER — LEVOTHYROXINE SODIUM 300 MCG
1 TABLET ORAL
Qty: 0 | Refills: 0 | DISCHARGE
Start: 2025-06-01

## 2025-06-01 RX ORDER — ONDANSETRON HCL/PF 4 MG/2 ML
8 VIAL (ML) INJECTION ONCE
Refills: 0 | Status: COMPLETED | OUTPATIENT
Start: 2025-06-01 | End: 2025-06-01

## 2025-06-01 RX ORDER — MIDODRINE HYDROCHLORIDE 5 MG/1
1 TABLET ORAL
Qty: 0 | Refills: 0 | DISCHARGE
Start: 2025-06-01

## 2025-06-01 RX ADMIN — POLYETHYLENE GLYCOL 3350 17 GRAM(S): 17 POWDER, FOR SOLUTION ORAL at 10:24

## 2025-06-01 RX ADMIN — Medication 100 MICROGRAM(S): at 05:38

## 2025-06-01 RX ADMIN — MIDODRINE HYDROCHLORIDE 5 MILLIGRAM(S): 5 TABLET ORAL at 05:44

## 2025-06-01 RX ADMIN — HEPARIN SODIUM 5000 UNIT(S): 1000 INJECTION INTRAVENOUS; SUBCUTANEOUS at 21:04

## 2025-06-01 RX ADMIN — HEPARIN SODIUM 5000 UNIT(S): 1000 INJECTION INTRAVENOUS; SUBCUTANEOUS at 05:38

## 2025-06-01 RX ADMIN — PREDNISONE 5 MILLIGRAM(S): 20 TABLET ORAL at 05:38

## 2025-06-01 RX ADMIN — HEPARIN SODIUM 5000 UNIT(S): 1000 INJECTION INTRAVENOUS; SUBCUTANEOUS at 13:35

## 2025-06-01 RX ADMIN — Medication 8 MILLIGRAM(S): at 10:24

## 2025-06-01 NOTE — PROGRESS NOTE ADULT - SUBJECTIVE AND OBJECTIVE BOX
Shorty Ross MD  Available on TEAMS    Patient is a 89y old  Male who presents with a chief complaint of Likely PNA (31 May 2025 14:24)      SUBJECTIVE / OVERNIGHT EVENTS: No acute events overnight. Patient was assessed at bedside.       REVIEW OF SYSTEMS:  ROS negative aside from above    MEDICATIONS  (STANDING):  albuterol/ipratropium for Nebulization 3 milliLiter(s) Nebulizer every 4 hours  heparin   Injectable 5000 Unit(s) SubCutaneous every 8 hours  levothyroxine 100 MICROGram(s) Oral daily  midodrine. 5 milliGRAM(s) Oral three times a day  multivitamin 1 Tablet(s) Oral daily  polyethylene glycol 3350 17 Gram(s) Oral daily  predniSONE   Tablet 5 milliGRAM(s) Oral two times a day  senna 2 Tablet(s) Oral at bedtime  sertraline 25 milliGRAM(s) Oral daily  sodium chloride 3%  Inhalation 4 milliLiter(s) Inhalation every 12 hours    MEDICATIONS  (PRN):  acetaminophen     Tablet .. 650 milliGRAM(s) Oral every 6 hours PRN Temp greater or equal to 38C (100.4F), Mild Pain (1 - 3)  benzonatate 100 milliGRAM(s) Oral every 8 hours PRN Cough  guaifenesin/dextromethorphan Oral Liquid 10 milliLiter(s) Oral every 4 hours PRN Cough      CAPILLARY BLOOD GLUCOSE        I&O's Summary    31 May 2025 07:01  -  01 Jun 2025 07:00  --------------------------------------------------------  IN: 480 mL / OUT: 1500 mL / NET: -1020 mL        Vital Signs Last 24 Hrs  T(C): 36.9 (01 Jun 2025 05:27), Max: 37.1 (31 May 2025 20:18)  T(F): 98.4 (01 Jun 2025 05:27), Max: 98.7 (31 May 2025 20:18)  HR: 89 (01 Jun 2025 05:27) (89 - 104)  BP: 130/70 (01 Jun 2025 05:27) (128/67 - 137/65)  BP(mean): --  RR: 18 (01 Jun 2025 05:27) (16 - 18)  SpO2: 95% (01 Jun 2025 05:27) (93% - 95%)    Parameters below as of 01 Jun 2025 05:27  Patient On (Oxygen Delivery Method): nasal cannula  O2 Flow (L/min): 2      PHYSICAL EXAM:  GENERAL: NAD, well-developed, well-nourished  HEAD: Atraumatic, Normocephalic  EYES: EOMI, PERRLA, conjunctiva and sclera clear  NECK: Supple, No JVD  CHEST/LUNG: Clear to auscultation bilaterally; No wheezes or crackles  HEART: Normal S1/S2; Regular rate and rhythm; No murmurs, rubs, or gallops  ABDOMEN: Soft, Nontender, Nondistended; Bowel sounds present  EXTREMITIES: 2+ Peripheral Pulses; No clubbing, cyanosis, or edema  PSYCH: A&Ox3  NEUROLOGY: no focal neurologic deficit  SKIN: No rashes or lesions    LABS:                        9.8    5.95  )-----------( 149      ( 01 Jun 2025 06:36 )             31.8      06-01    134[L]  |  98  |  22  ----------------------------<  102[H]  4.7   |  26  |  0.54    Ca    9.1      01 Jun 2025 06:36  Phos  3.7     06-01  Mg     2.0     06-01    TPro  5.9[L]  /  Alb  2.6[L]  /  TBili  0.8  /  DBili  x   /  AST  78[H]  /  ALT  15  /  AlkPhos  167[H]  06-01          Urinalysis Basic - ( 01 Jun 2025 06:36 )    Color: x / Appearance: x / SG: x / pH: x  Gluc: 102 mg/dL / Ketone: x  / Bili: x / Urobili: x   Blood: x / Protein: x / Nitrite: x   Leuk Esterase: x / RBC: x / WBC x   Sq Epi: x / Non Sq Epi: x / Bacteria: x        RADIOLOGY & ADDITIONAL TESTS:    Imaging Personally Reviewed:    Consultant(s) Notes Reviewed:      Care Discussed with Consultants/Other Providers:   Shorty Ross MD  Available on TEAMS    Patient is a 89y old  Male who presents with a chief complaint of Likely PNA (31 May 2025 14:24)      SUBJECTIVE / OVERNIGHT EVENTS: No acute events overnight. Patient was assessed at bedside. No acute concerns at bedside      REVIEW OF SYSTEMS:  ROS negative aside from above    MEDICATIONS  (STANDING):  albuterol/ipratropium for Nebulization 3 milliLiter(s) Nebulizer every 4 hours  heparin   Injectable 5000 Unit(s) SubCutaneous every 8 hours  levothyroxine 100 MICROGram(s) Oral daily  midodrine. 5 milliGRAM(s) Oral three times a day  multivitamin 1 Tablet(s) Oral daily  polyethylene glycol 3350 17 Gram(s) Oral daily  predniSONE   Tablet 5 milliGRAM(s) Oral two times a day  senna 2 Tablet(s) Oral at bedtime  sertraline 25 milliGRAM(s) Oral daily  sodium chloride 3%  Inhalation 4 milliLiter(s) Inhalation every 12 hours    MEDICATIONS  (PRN):  acetaminophen     Tablet .. 650 milliGRAM(s) Oral every 6 hours PRN Temp greater or equal to 38C (100.4F), Mild Pain (1 - 3)  benzonatate 100 milliGRAM(s) Oral every 8 hours PRN Cough  guaifenesin/dextromethorphan Oral Liquid 10 milliLiter(s) Oral every 4 hours PRN Cough      CAPILLARY BLOOD GLUCOSE        I&O's Summary    31 May 2025 07:01  -  01 Jun 2025 07:00  --------------------------------------------------------  IN: 480 mL / OUT: 1500 mL / NET: -1020 mL        Vital Signs Last 24 Hrs  T(C): 36.9 (01 Jun 2025 05:27), Max: 37.1 (31 May 2025 20:18)  T(F): 98.4 (01 Jun 2025 05:27), Max: 98.7 (31 May 2025 20:18)  HR: 89 (01 Jun 2025 05:27) (89 - 104)  BP: 130/70 (01 Jun 2025 05:27) (128/67 - 137/65)  BP(mean): --  RR: 18 (01 Jun 2025 05:27) (16 - 18)  SpO2: 95% (01 Jun 2025 05:27) (93% - 95%)    Parameters below as of 01 Jun 2025 05:27  Patient On (Oxygen Delivery Method): nasal cannula  O2 Flow (L/min): 2      PHYSICAL EXAM:  GENERAL: NAD, well-developed, well-nourished  HEAD: Atraumatic, Normocephalic  EYES: EOMI, PERRLA, conjunctiva and sclera clear  NECK: Supple, No JVD  CHEST/LUNG: Clear to auscultation bilaterally; No wheezes or crackles  HEART: Normal S1/S2; Regular rate and rhythm; No murmurs, rubs, or gallops  ABDOMEN: Soft, Nontender, Nondistended; Bowel sounds present  EXTREMITIES: 2+ Peripheral Pulses; No clubbing, cyanosis, or edema  PSYCH: A&Ox3  NEUROLOGY: no focal neurologic deficit  SKIN: No rashes or lesions    LABS:                        9.8    5.95  )-----------( 149      ( 01 Jun 2025 06:36 )             31.8      06-01    134[L]  |  98  |  22  ----------------------------<  102[H]  4.7   |  26  |  0.54    Ca    9.1      01 Jun 2025 06:36  Phos  3.7     06-01  Mg     2.0     06-01    TPro  5.9[L]  /  Alb  2.6[L]  /  TBili  0.8  /  DBili  x   /  AST  78[H]  /  ALT  15  /  AlkPhos  167[H]  06-01          Urinalysis Basic - ( 01 Jun 2025 06:36 )    Color: x / Appearance: x / SG: x / pH: x  Gluc: 102 mg/dL / Ketone: x  / Bili: x / Urobili: x   Blood: x / Protein: x / Nitrite: x   Leuk Esterase: x / RBC: x / WBC x   Sq Epi: x / Non Sq Epi: x / Bacteria: x        RADIOLOGY & ADDITIONAL TESTS:    Imaging Personally Reviewed:    Consultant(s) Notes Reviewed:      Care Discussed with Consultants/Other Providers:

## 2025-06-01 NOTE — PROGRESS NOTE ADULT - ASSESSMENT
metastatic prostate cancer with liver involvement  responding to taxotere and prednisone but frequent hospital admissions due to neutropenia despite dose reductions and growth factor support  neutropenic fever and PNA; ANC now normalized but with persistent cough. On abx  orthostasis    SUGGEST  continue abx per primary team  continue pred 5 BID; this is part of his treatment regimen for prostate cancer  eventual rehab  outpatient decision after rehab re: chemotherapy  daughter updated yesterday

## 2025-06-01 NOTE — PROGRESS NOTE ADULT - PROBLEM SELECTOR PLAN 1
- WBC 1.37 Tmax 101.5 at home  - Productive cough  - CT showing upper lobe groundglass opacities  - UA negative  - F/u BC: NGTD  - sputum Cx: Commensal quinn,   - MRSA PCR, legionella/strep negative    Plan  - C/w Augmentin 875mg BID  - Will ask PT for re-evaluation, will need a final dispo for O2 set up  - Oncologist Dr. Lovett, [facility], office notified patient is here on 5/25 - WBC 1.37 Tmax 101.5 at home  - Productive cough  - CT showing upper lobe groundglass opacities  - UA negative  - F/u BC: NGTD  - sputum Cx: Commensal quinn,   - MRSA PCR, legionella/strep negative    Plan  - C/w Augmentin 875mg jill complete today  - Will ask PT for re-evaluation, will need a final dispo for O2 set up  - Oncologist Dr. Lovett, [facility], office notified patient is here on 5/25

## 2025-06-01 NOTE — PROGRESS NOTE ADULT - PROBLEM SELECTOR PLAN 11
- Fluids: None  - Electrolytes: Will replete to maintain K>4, Phos>3, and Mag>2  - Nutrition: Regular diet  - Access:  - Activity: Pending [facility] auth and home O2 set up  - DVT Prophylaxis: Heparin Sub q  - Stress Ulcer/GI Prophylaxis: N/A  - Disposition: Admit to medicine

## 2025-06-01 NOTE — PROGRESS NOTE ADULT - SUBJECTIVE AND OBJECTIVE BOX
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 Pt notes he's been experiencing 1 week of generalized weakness, difficulty ambulating. Also notes he's been experiencing a cough for the past week. Today pt noticed his Tmax 101.5 prompting ED visit. Also endorsed loose stools.    admitted with chemotherapy related neutropenia and fever, multifocal PNA  has had some orthostasis and 02 requirements; did not need 02 PTA  still with cough    ROS:  Negative except for: cough    MEDICATIONS  (STANDING):  albuterol/ipratropium for Nebulization 3 milliLiter(s) Nebulizer every 4 hours  heparin   Injectable 5000 Unit(s) SubCutaneous every 8 hours  levothyroxine 100 MICROGram(s) Oral daily  midodrine. 5 milliGRAM(s) Oral three times a day  multivitamin 1 Tablet(s) Oral daily  polyethylene glycol 3350 17 Gram(s) Oral daily  predniSONE   Tablet 5 milliGRAM(s) Oral two times a day  senna 2 Tablet(s) Oral at bedtime  sertraline 25 milliGRAM(s) Oral daily  sodium chloride 3%  Inhalation 4 milliLiter(s) Inhalation every 12 hours    MEDICATIONS  (PRN):  acetaminophen     Tablet .. 650 milliGRAM(s) Oral every 6 hours PRN Temp greater or equal to 38C (100.4F), Mild Pain (1 - 3)  benzonatate 100 milliGRAM(s) Oral every 8 hours PRN Cough  guaifenesin/dextromethorphan Oral Liquid 10 milliLiter(s) Oral every 4 hours PRN Cough      Allergies    No Known Allergies    Intolerances        Vital Signs Last 24 Hrs  T(C): 36.7 (01 Jun 2025 11:30), Max: 37.1 (31 May 2025 20:18)  T(F): 98 (01 Jun 2025 11:30), Max: 98.7 (31 May 2025 20:18)  HR: 86 (01 Jun 2025 11:30) (86 - 99)  BP: 136/61 (01 Jun 2025 11:30) (130/70 - 137/65)  BP(mean): --  RR: 18 (01 Jun 2025 11:30) (16 - 18)  SpO2: 94% (01 Jun 2025 11:30) (93% - 95%)    Parameters below as of 01 Jun 2025 11:30  Patient On (Oxygen Delivery Method): nasal cannula  O2 Flow (L/min): 3      PHYSICAL EXAM:      Constitutional: NAD    Eyes: anicteric    ENMT:    Neck:    Lymph nodes:    Respiratory: scattered rhonchi    Cardiovascular:    Gastrointestinal: nontender    Extremities: bilateral edema, symmetric    Skin:                    LABS:                          9.8    5.95  )-----------( 149      ( 01 Jun 2025 06:36 )             31.8         Mean Cell Volume : 100.3 fl  Mean Cell Hemoglobin : 30.9 pg  Mean Cell Hemoglobin Concentration : 30.8 g/dL  Auto Neutrophil # : x  Auto Lymphocyte # : x  Auto Monocyte # : x  Auto Eosinophil # : x  Auto Basophil # : x  Auto Neutrophil % : x  Auto Lymphocyte % : x  Auto Monocyte % : x  Auto Eosinophil % : x  Auto Basophil % : x    Serial CBC  Hematocrit 31.8  Hemoglobin 9.8  Plat 149  RBC 3.17  WBC 5.95  Serial CBC  Hematocrit 32.6  Hemoglobin 10.2  Plat 143  RBC 3.28  WBC 5.73  Serial CBC  Hematocrit 30.4  Hemoglobin 9.7  Plat 110  RBC 3.09  WBC 8.12  Serial CBC  Hematocrit 31.2  Hemoglobin 9.8  Plat 108  RBC 3.14  WBC 12.44    06-01    134[L]  |  98  |  22  ----------------------------<  102[H]  4.7   |  26  |  0.54    Ca    9.1      01 Jun 2025 06:36  Phos  3.7     06-01  Mg     2.0     06-01    TPro  5.9[L]  /  Alb  2.6[L]  /  TBili  0.8  /  DBili  x   /  AST  78[H]  /  ALT  15  /  AlkPhos  167[H]  06-01

## 2025-06-02 PROCEDURE — 99232 SBSQ HOSP IP/OBS MODERATE 35: CPT | Mod: GC

## 2025-06-02 RX ORDER — IPRATROPIUM BROMIDE AND ALBUTEROL SULFATE .5; 2.5 MG/3ML; MG/3ML
3 SOLUTION RESPIRATORY (INHALATION) EVERY 8 HOURS
Refills: 0 | Status: DISCONTINUED | OUTPATIENT
Start: 2025-06-02 | End: 2025-06-04

## 2025-06-02 RX ADMIN — HEPARIN SODIUM 5000 UNIT(S): 1000 INJECTION INTRAVENOUS; SUBCUTANEOUS at 05:58

## 2025-06-02 NOTE — PROGRESS NOTE ADULT - PROBLEM SELECTOR PLAN 2
- Patient has had multiple bouts of orthostatic hypotension  - S/p 2L LR    Plan  - Compression socks  - Abdominal binder  - Switch to midodrine 5mg TID - Patient has had multiple bouts of orthostatic hypotension  - S/p 2L LR  - Compression socks  - Abdominal binder  - Switch to midodrine 5mg TID

## 2025-06-02 NOTE — PROGRESS NOTE ADULT - SUBJECTIVE AND OBJECTIVE BOX
Shorty Ross MD  Available on TEAMS    Patient is a 89y old  Male who presents with a chief complaint of Likely PNA (01 Jun 2025 12:34)      SUBJECTIVE / OVERNIGHT EVENTS: No acute events overnight. Patient was assessed at bedside.       REVIEW OF SYSTEMS:  ROS negative aside from above    MEDICATIONS  (STANDING):  albuterol/ipratropium for Nebulization 3 milliLiter(s) Nebulizer every 4 hours  heparin   Injectable 5000 Unit(s) SubCutaneous every 8 hours  levothyroxine 100 MICROGram(s) Oral daily  midodrine. 5 milliGRAM(s) Oral three times a day  multivitamin 1 Tablet(s) Oral daily  polyethylene glycol 3350 17 Gram(s) Oral daily  predniSONE   Tablet 5 milliGRAM(s) Oral two times a day  senna 2 Tablet(s) Oral at bedtime  sertraline 25 milliGRAM(s) Oral daily  sodium chloride 3%  Inhalation 4 milliLiter(s) Inhalation every 12 hours    MEDICATIONS  (PRN):  acetaminophen     Tablet .. 650 milliGRAM(s) Oral every 6 hours PRN Temp greater or equal to 38C (100.4F), Mild Pain (1 - 3)  benzonatate 100 milliGRAM(s) Oral every 8 hours PRN Cough  guaifenesin/dextromethorphan Oral Liquid 10 milliLiter(s) Oral every 4 hours PRN Cough      CAPILLARY BLOOD GLUCOSE        I&O's Summary    01 Jun 2025 07:01  -  02 Jun 2025 07:00  --------------------------------------------------------  IN: 940 mL / OUT: 950 mL / NET: -10 mL        Vital Signs Last 24 Hrs  T(C): 37.2 (02 Jun 2025 04:32), Max: 37.2 (02 Jun 2025 04:32)  T(F): 98.9 (02 Jun 2025 04:32), Max: 98.9 (02 Jun 2025 04:32)  HR: 93 (02 Jun 2025 04:32) (81 - 93)  BP: 125/60 (02 Jun 2025 04:32) (125/60 - 148/67)  BP(mean): --  RR: 18 (02 Jun 2025 04:32) (18 - 18)  SpO2: 94% (02 Jun 2025 04:32) (93% - 94%)    Parameters below as of 02 Jun 2025 04:32  Patient On (Oxygen Delivery Method): nasal cannula  O2 Flow (L/min): 3      PHYSICAL EXAM:  GENERAL: NAD, well-developed, well-nourished  HEAD: Atraumatic, Normocephalic  EYES: EOMI, PERRLA, conjunctiva and sclera clear  NECK: Supple, No JVD  CHEST/LUNG: Clear to auscultation bilaterally; No wheezes or crackles  HEART: Normal S1/S2; Regular rate and rhythm; No murmurs, rubs, or gallops  ABDOMEN: Soft, Nontender, Nondistended; Bowel sounds present  EXTREMITIES: 2+ Peripheral Pulses; No clubbing, cyanosis, or edema  PSYCH: A&Ox3  NEUROLOGY: no focal neurologic deficit  SKIN: No rashes or lesions    LABS:                        9.8    5.95  )-----------( 149      ( 01 Jun 2025 06:36 )             31.8      06-01    134[L]  |  98  |  22  ----------------------------<  102[H]  4.7   |  26  |  0.54    Ca    9.1      01 Jun 2025 06:36  Phos  3.7     06-01  Mg     2.0     06-01    TPro  5.9[L]  /  Alb  2.6[L]  /  TBili  0.8  /  DBili  x   /  AST  78[H]  /  ALT  15  /  AlkPhos  167[H]  06-01          Urinalysis Basic - ( 01 Jun 2025 06:36 )    Color: x / Appearance: x / SG: x / pH: x  Gluc: 102 mg/dL / Ketone: x  / Bili: x / Urobili: x   Blood: x / Protein: x / Nitrite: x   Leuk Esterase: x / RBC: x / WBC x   Sq Epi: x / Non Sq Epi: x / Bacteria: x        RADIOLOGY & ADDITIONAL TESTS:    Imaging Personally Reviewed:    Consultant(s) Notes Reviewed:      Care Discussed with Consultants/Other Providers:   Shorty Ross MD  Available on TEAMS    Patient is a 89y old  Male who presents with a chief complaint of Likely PNA (01 Jun 2025 12:34)      SUBJECTIVE / OVERNIGHT EVENTS: No acute events overnight. Patient was assessed at bedside. No acute concerns at bedside.      REVIEW OF SYSTEMS:  ROS negative aside from above    MEDICATIONS  (STANDING):  albuterol/ipratropium for Nebulization 3 milliLiter(s) Nebulizer every 4 hours  heparin   Injectable 5000 Unit(s) SubCutaneous every 8 hours  levothyroxine 100 MICROGram(s) Oral daily  midodrine. 5 milliGRAM(s) Oral three times a day  multivitamin 1 Tablet(s) Oral daily  polyethylene glycol 3350 17 Gram(s) Oral daily  predniSONE   Tablet 5 milliGRAM(s) Oral two times a day  senna 2 Tablet(s) Oral at bedtime  sertraline 25 milliGRAM(s) Oral daily  sodium chloride 3%  Inhalation 4 milliLiter(s) Inhalation every 12 hours    MEDICATIONS  (PRN):  acetaminophen     Tablet .. 650 milliGRAM(s) Oral every 6 hours PRN Temp greater or equal to 38C (100.4F), Mild Pain (1 - 3)  benzonatate 100 milliGRAM(s) Oral every 8 hours PRN Cough  guaifenesin/dextromethorphan Oral Liquid 10 milliLiter(s) Oral every 4 hours PRN Cough      CAPILLARY BLOOD GLUCOSE        I&O's Summary    01 Jun 2025 07:01  -  02 Jun 2025 07:00  --------------------------------------------------------  IN: 940 mL / OUT: 950 mL / NET: -10 mL        Vital Signs Last 24 Hrs  T(C): 37.2 (02 Jun 2025 04:32), Max: 37.2 (02 Jun 2025 04:32)  T(F): 98.9 (02 Jun 2025 04:32), Max: 98.9 (02 Jun 2025 04:32)  HR: 93 (02 Jun 2025 04:32) (81 - 93)  BP: 125/60 (02 Jun 2025 04:32) (125/60 - 148/67)  BP(mean): --  RR: 18 (02 Jun 2025 04:32) (18 - 18)  SpO2: 94% (02 Jun 2025 04:32) (93% - 94%)    Parameters below as of 02 Jun 2025 04:32  Patient On (Oxygen Delivery Method): nasal cannula  O2 Flow (L/min): 3      PHYSICAL EXAM:  GENERAL: NAD, well-developed, well-nourished  HEAD: Atraumatic, Normocephalic  EYES: EOMI, PERRLA, conjunctiva and sclera clear  NECK: Supple, No JVD  CHEST/LUNG: Clear to auscultation bilaterally; No wheezes or crackles  HEART: Normal S1/S2; Regular rate and rhythm; No murmurs, rubs, or gallops  ABDOMEN: Soft, Nontender, Nondistended; Bowel sounds present  EXTREMITIES: 2+ Peripheral Pulses; No clubbing, cyanosis, or edema  PSYCH: A&Ox3  NEUROLOGY: no focal neurologic deficit  SKIN: No rashes or lesions    LABS:                        9.8    5.95  )-----------( 149      ( 01 Jun 2025 06:36 )             31.8      06-01    134[L]  |  98  |  22  ----------------------------<  102[H]  4.7   |  26  |  0.54    Ca    9.1      01 Jun 2025 06:36  Phos  3.7     06-01  Mg     2.0     06-01    TPro  5.9[L]  /  Alb  2.6[L]  /  TBili  0.8  /  DBili  x   /  AST  78[H]  /  ALT  15  /  AlkPhos  167[H]  06-01          Urinalysis Basic - ( 01 Jun 2025 06:36 )    Color: x / Appearance: x / SG: x / pH: x  Gluc: 102 mg/dL / Ketone: x  / Bili: x / Urobili: x   Blood: x / Protein: x / Nitrite: x   Leuk Esterase: x / RBC: x / WBC x   Sq Epi: x / Non Sq Epi: x / Bacteria: x        RADIOLOGY & ADDITIONAL TESTS:    Imaging Personally Reviewed:    Consultant(s) Notes Reviewed:      Care Discussed with Consultants/Other Providers:

## 2025-06-02 NOTE — PROGRESS NOTE ADULT - PROBLEM SELECTOR PLAN 1
- WBC 1.37 Tmax 101.5 at home  - Productive cough  - CT showing upper lobe groundglass opacities  - UA negative  - F/u BC: NGTD  - sputum Cx: Commensal quinn,   - MRSA PCR, legionella/strep negative    Plan  - C/w Augmentin 875mg jill complete today  - Will ask PT for re-evaluation, will need a final dispo for O2 set up  - Oncologist Dr. Lovett, [facility], office notified patient is here on 5/25 - WBC 1.37 Tmax 101.5 at home  - Productive cough  - CT showing upper lobe groundglass opacities  - UA negative  - F/u BC: NGTD  - sputum Cx: Commensal quinn,   - MRSA PCR, legionella/strep negative  - Completed Abx course    Plan  - Pending WAYNE authorization  - Oncologist Dr. Lovett, [facility], office notified patient is here on 5/25

## 2025-06-02 NOTE — PROGRESS NOTE ADULT - PROBLEM SELECTOR PLAN 5
S/p prostatectomy in his 50's. Recurred with metastatic disease to liver and lung. S/p chest radiation and wedge resection. Has been on chem for last couple months, last chemo 1 week ago. Follows with Dr. Chase (urology) and oncology.    - Continue home prednisone 5mg BID (clarify indication with outpatient team when possible)  - Monitor CBC  - consult heme/onc, [facility], appreciate recs S/p prostatectomy in his 50's. Recurred with metastatic disease to liver and lung. S/p chest radiation and wedge resection. Has been on chem for last couple months, last chemo 1 week ago. Follows with Dr. Chase (urology) and oncology.  - Continue home prednisone 5mg BID (clarify indication with outpatient team when possible)  - Monitor CBC  - consult heme/onc, [facility], appreciate recs

## 2025-06-02 NOTE — PROGRESS NOTE ADULT - ASSESSMENT
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 Lovett after Rehab    #Febrile neutropenia, PNA  - neutropenia secondary to chemo; now off GCSF  - WBC now normal  - monitor CBC    #anemia/thrombocytopenia- in setting of met prostate cancer, recent chemo and acute illness   - plt improved; hg stable

## 2025-06-02 NOTE — PROGRESS NOTE ADULT - SUBJECTIVE AND OBJECTIVE BOX
Chief Complaint:  FU prostate cancer    History of Present Illness:  no f/c, no cp/dyspnea, denies cough, no n/v/abd pain, tolerating diet; denies any pain; no bleeding reported      MEDICATIONS  (STANDING):  albuterol/ipratropium for Nebulization 3 milliLiter(s) Nebulizer every 8 hours  heparin   Injectable 5000 Unit(s) SubCutaneous every 8 hours  levothyroxine 100 MICROGram(s) Oral daily  midodrine. 5 milliGRAM(s) Oral three times a day  pantoprazole  Injectable 40 milliGRAM(s) IV Push daily  predniSONE   Tablet 5 milliGRAM(s) Oral two times a day  sertraline 25 milliGRAM(s) Oral daily    MEDICATIONS  (PRN):  acetaminophen     Tablet .. 650 milliGRAM(s) Oral every 6 hours PRN Temp greater or equal to 38C (100.4F), Mild Pain (1 - 3)  benzonatate 100 milliGRAM(s) Oral every 8 hours PRN Cough  guaifenesin/dextromethorphan Oral Liquid 10 milliLiter(s) Oral every 4 hours PRN Cough      Allergies    No Known Allergies    Intolerances        Vital Signs Last 24 Hrs  T(C): 36.7 (02 Jun 2025 12:15), Max: 37.2 (02 Jun 2025 04:32)  T(F): 98.1 (02 Jun 2025 12:15), Max: 98.9 (02 Jun 2025 04:32)  HR: 83 (02 Jun 2025 12:15) (81 - 93)  BP: 111/63 (02 Jun 2025 12:15) (111/63 - 148/67)  BP(mean): --  RR: 18 (02 Jun 2025 12:15) (18 - 18)  SpO2: 94% (02 Jun 2025 12:15) (93% - 94%)    Parameters below as of 02 Jun 2025 12:15  Patient On (Oxygen Delivery Method): nasal cannula  O2 Flow (L/min): 3      PHYSICAL EXAM  General: adult in NAD  HEENT: clear oropharynx, anicteric sclera, pink conjunctiva  Neck: supple  CV: normal S1/S2   Lungs: clear to auscultation  Abdomen: soft non-tender non-distended, positive bowel sounds  Ext: no clubbing cyanosis or edema  Skin: no rashes and no petechiae  Lymph Nodes: No LAD in neck  Neuro: alert and oriented X 3    LABS:                          9.8    5.95  )-----------( 149      ( 01 Jun 2025 06:36 )             31.8         Mean Cell Volume : 100.3 fl  Mean Cell Hemoglobin : 30.9 pg  Mean Cell Hemoglobin Concentration : 30.8 g/dL  Auto Neutrophil # : x  Auto Lymphocyte # : x  Auto Monocyte # : x  Auto Eosinophil # : x  Auto Basophil # : x  Auto Neutrophil % : x  Auto Lymphocyte % : x  Auto Monocyte % : x  Auto Eosinophil % : x  Auto Basophil % : x      Serial CBC's  06-01 @ 06:36  Hct-31.8 / Hgb-9.8 / Plat-149 / RBC-3.17 / WBC-5.95  Serial CBC's  05-31 @ 07:25  Hct-32.6 / Hgb-10.2 / Plat-143 / RBC-3.28 / WBC-5.73  Serial CBC's  05-30 @ 06:57  Hct-30.4 / Hgb-9.7 / Plat-110 / RBC-3.09 / WBC-8.12      06-01    134[L]  |  98  |  22  ----------------------------<  102[H]  4.7   |  26  |  0.54    Ca    9.1      01 Jun 2025 06:36  Phos  3.7     06-01  Mg     2.0     06-01    TPro  5.9[L]  /  Alb  2.6[L]  /  TBili  0.8  /  DBili  x   /  AST  78[H]  /  ALT  15  /  AlkPhos  167[H]  06-01                      Radiology:

## 2025-06-02 NOTE — PROGRESS NOTE ADULT - PROBLEM SELECTOR PLAN 10
Needs confirmation of med rec as pt did not know meds and noted daughter knows all his meds - Fluids: None  - Electrolytes: Will replete to maintain K>4, Phos>3, and Mag>2  - Nutrition: Regular diet  - Access:  - Activity: Pending WAYNE auth and home O2 set up  - DVT Prophylaxis: Heparin Sub q  - Stress Ulcer/GI Prophylaxis: N/A  - Disposition: Admit to medicine

## 2025-06-03 LAB
ALBUMIN SERPL ELPH-MCNC: 2.5 G/DL — LOW (ref 3.3–5)
ALP SERPL-CCNC: 179 U/L — HIGH (ref 40–120)
ALT FLD-CCNC: 17 U/L — SIGNIFICANT CHANGE UP (ref 10–45)
ANION GAP SERPL CALC-SCNC: 13 MMOL/L — SIGNIFICANT CHANGE UP (ref 5–17)
AST SERPL-CCNC: 76 U/L — HIGH (ref 10–40)
BASOPHILS # BLD AUTO: 0.02 K/UL — SIGNIFICANT CHANGE UP (ref 0–0.2)
BASOPHILS NFR BLD AUTO: 0.4 % — SIGNIFICANT CHANGE UP (ref 0–2)
BILIRUB SERPL-MCNC: 0.7 MG/DL — SIGNIFICANT CHANGE UP (ref 0.2–1.2)
BUN SERPL-MCNC: 23 MG/DL — SIGNIFICANT CHANGE UP (ref 7–23)
CALCIUM SERPL-MCNC: 9.2 MG/DL — SIGNIFICANT CHANGE UP (ref 8.4–10.5)
CHLORIDE SERPL-SCNC: 96 MMOL/L — SIGNIFICANT CHANGE UP (ref 96–108)
CO2 SERPL-SCNC: 27 MMOL/L — SIGNIFICANT CHANGE UP (ref 22–31)
CREAT SERPL-MCNC: 0.64 MG/DL — SIGNIFICANT CHANGE UP (ref 0.5–1.3)
EGFR: 89 ML/MIN/1.73M2 — SIGNIFICANT CHANGE UP
EGFR: 89 ML/MIN/1.73M2 — SIGNIFICANT CHANGE UP
EOSINOPHIL # BLD AUTO: 0 K/UL — SIGNIFICANT CHANGE UP (ref 0–0.5)
EOSINOPHIL NFR BLD AUTO: 0 % — SIGNIFICANT CHANGE UP (ref 0–6)
GLUCOSE SERPL-MCNC: 84 MG/DL — SIGNIFICANT CHANGE UP (ref 70–99)
HCT VFR BLD CALC: 33.1 % — LOW (ref 39–50)
HGB BLD-MCNC: 10.4 G/DL — LOW (ref 13–17)
IMM GRANULOCYTES NFR BLD AUTO: 0.4 % — SIGNIFICANT CHANGE UP (ref 0–0.9)
LYMPHOCYTES # BLD AUTO: 1.35 K/UL — SIGNIFICANT CHANGE UP (ref 1–3.3)
LYMPHOCYTES # BLD AUTO: 28.1 % — SIGNIFICANT CHANGE UP (ref 13–44)
MAGNESIUM SERPL-MCNC: 1.9 MG/DL — SIGNIFICANT CHANGE UP (ref 1.6–2.6)
MCHC RBC-ENTMCNC: 31.4 G/DL — LOW (ref 32–36)
MCHC RBC-ENTMCNC: 31.6 PG — SIGNIFICANT CHANGE UP (ref 27–34)
MCV RBC AUTO: 100.6 FL — HIGH (ref 80–100)
MONOCYTES # BLD AUTO: 0.32 K/UL — SIGNIFICANT CHANGE UP (ref 0–0.9)
MONOCYTES NFR BLD AUTO: 6.7 % — SIGNIFICANT CHANGE UP (ref 2–14)
NEUTROPHILS # BLD AUTO: 3.1 K/UL — SIGNIFICANT CHANGE UP (ref 1.8–7.4)
NEUTROPHILS NFR BLD AUTO: 64.4 % — SIGNIFICANT CHANGE UP (ref 43–77)
NRBC BLD AUTO-RTO: 0 /100 WBCS — SIGNIFICANT CHANGE UP (ref 0–0)
PHOSPHATE SERPL-MCNC: 3 MG/DL — SIGNIFICANT CHANGE UP (ref 2.5–4.5)
PLATELET # BLD AUTO: 148 K/UL — LOW (ref 150–400)
POTASSIUM SERPL-MCNC: 4.5 MMOL/L — SIGNIFICANT CHANGE UP (ref 3.5–5.3)
POTASSIUM SERPL-SCNC: 4.5 MMOL/L — SIGNIFICANT CHANGE UP (ref 3.5–5.3)
PROT SERPL-MCNC: 6.1 G/DL — SIGNIFICANT CHANGE UP (ref 6–8.3)
RBC # BLD: 3.29 M/UL — LOW (ref 4.2–5.8)
RBC # FLD: 17.3 % — HIGH (ref 10.3–14.5)
SODIUM SERPL-SCNC: 136 MMOL/L — SIGNIFICANT CHANGE UP (ref 135–145)
TROPONIN T, HIGH SENSITIVITY RESULT: 45 NG/L — SIGNIFICANT CHANGE UP (ref 0–51)
WBC # BLD: 4.81 K/UL — SIGNIFICANT CHANGE UP (ref 3.8–10.5)
WBC # FLD AUTO: 4.81 K/UL — SIGNIFICANT CHANGE UP (ref 3.8–10.5)

## 2025-06-03 PROCEDURE — 93010 ELECTROCARDIOGRAM REPORT: CPT

## 2025-06-03 PROCEDURE — 99232 SBSQ HOSP IP/OBS MODERATE 35: CPT | Mod: GC

## 2025-06-03 RX ORDER — MAGNESIUM, ALUMINUM HYDROXIDE 200-200 MG
30 TABLET,CHEWABLE ORAL EVERY 4 HOURS
Refills: 0 | Status: DISCONTINUED | OUTPATIENT
Start: 2025-06-03 | End: 2025-06-04

## 2025-06-03 RX ADMIN — MIDODRINE HYDROCHLORIDE 5 MILLIGRAM(S): 5 TABLET ORAL at 17:04

## 2025-06-03 RX ADMIN — IPRATROPIUM BROMIDE AND ALBUTEROL SULFATE 3 MILLILITER(S): .5; 2.5 SOLUTION RESPIRATORY (INHALATION) at 21:24

## 2025-06-03 RX ADMIN — PREDNISONE 5 MILLIGRAM(S): 20 TABLET ORAL at 17:04

## 2025-06-03 NOTE — PROGRESS NOTE ADULT - TIME BILLING
Review of tests, imaging, labs, documents, medical management, coordination of care and counseling. Excludes time teaching residents.
Review of tests, imaging, labs, documents, medical management, coordination of care and counseling.

## 2025-06-03 NOTE — PROGRESS NOTE ADULT - ASSESSMENT
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

## 2025-06-03 NOTE — PROVIDER CONTACT NOTE (OTHER) - ASSESSMENT
vss, pt is alert and oriented x4. pt refusing 12pm medications, protonix, midodrine and zoloft. when asked why patient states "because I don't want to". educated on importance and verbalized understanding

## 2025-06-03 NOTE — PROGRESS NOTE ADULT - PROBLEM SELECTOR PLAN 3
Assessment/Plan:  Vomiting diarrhea colicky  Periumbilical sharp pain currently asymptomatic physical exam is unremarkable will get a workup to take H2 blocker for now follow-up in 1 week to the ER if he worsens       Diagnoses and all orders for this visit:    Essential hypertension    Acute renal failure superimposed on stage 3 chronic kidney disease, unspecified acute renal failure type ([facility] )    Hypertensive chronic kidney disease, unspecified CKD stage    History of renal transplant  -     CT abdomen pelvis wo contrast; Future  -     CBC and differential; Future  -     Comprehensive metabolic panel; Future  -     Lipase; Future  -     Lipid panel; Future  -     Hemoglobin A1C; Future  -     UA w Reflex to Microscopic w Reflex to Culture  -     TSH, 3rd generation; Future  -     Sedimentation rate, automated; Future    Renal transplant recipient    Severe episode of recurrent major depressive disorder, without psychotic features ([place] [id number] )    Periumbilical abdominal pain  -     CT abdomen pelvis wo contrast; Future  -     CBC and differential; Future  -     Comprehensive metabolic panel; Future  -     Lipase; Future  -     Lipid panel; Future  -     Hemoglobin A1C; Future  -     UA w Reflex to Microscopic w Reflex to Culture  -     TSH, 3rd generation; Future  -     Sedimentation rate, automated; Future    Nausea and vomiting, intractability of vomiting not specified, unspecified vomiting type  -     CT abdomen pelvis wo contrast; Future  -     CBC and differential; Future  -     Comprehensive metabolic panel; Future  -     Lipase; Future  -     Lipid panel; Future  -     Hemoglobin A1C; Future  -     UA w Reflex to Microscopic w Reflex to Culture  -     TSH, 3rd generation; Future  -     Sedimentation rate, automated; Future    Diarrhea, unspecified type  -     CT abdomen pelvis wo contrast; Future  -     CBC and differential; Future  -     Comprehensive metabolic panel; Future  -     Lipase;  Future  -     Lipid panel; Future  -     Hemoglobin A1C; Future  -     UA w Reflex to Microscopic w Reflex to Culture  -     TSH, 3rd generation; Future  -     Sedimentation rate, automated; Future        No problem-specific Assessment & Plan notes found for this encounter  BMI Counseling: Body mass index is 26 01 kg/m²  The BMI is above normal  Nutrition recommendations include decreasing portion sizes  Exercise recommendations include exercising 3-5 times per week  Subjective:      Patient ID: Ishan Pace is a 55 y o  male  He began to have episodes of sharp periumbilical pain with abrupt onset of nausea vomiting stomach contents and watery diarrhea a month ago  Him symptoms are brought on by eating solid food or by exertion the pain is localized does not radiate last about an hour  No coffee-grounds or blood in the emesis no black stools or blood in the diarrhea  He feels okay between episodes without nausea or chronic abdominal pain he has lost 15 lb  He has resisted going to the ER getting medical care at the urging of his wife  No fever chills  No blood in the urine he has a kidney transplant on antirejection drugs followed closely by nephrology 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  History of depression ADD has had shock treatments and is followed closely by Psychiatry his mood has been stable      The following portions of the patient's history were reviewed and updated as appropriate:   He has a past medical history of ADD (attention deficit disorder), Chronic kidney disease, Depression, DVT (deep venous thrombosis) ([facility] ), H/O immunosuppressive therapy, History of kidney disease, Hypertension, and Nephropathy, IgA ,  does not have any pertinent problems on file  ,   has a past surgical history that includes Nephrectomy transplanted organ ,  family history includes Deep vein thrombosis in his father and paternal grandfather; Transient ischemic attack in his mother  ,   reports that he has quit smoking   He quit after 4 00 years of use  He has never used smokeless tobacco  He reports that he drank alcohol  He reports that he has current or past drug history  Drug: Marijuana  ,  is allergic to penicillins     Current Outpatient Medications   Medication Sig Dispense Refill    amphetamine-dextroamphetamine (ADDERALL) 20 mg tablet TAKE 1 TABLET BY MOUTH TWICE A DAY(ONGOING TREATMENT)  0    betamethasone dipropionate (DIPROSONE) 0 05 % cream Apply topically 2 (two) times a day 45 g 3    betamethasone, augmented, (DIPROLENE) 0 05 % ointment Apply topically 2 (two) times a day To rash on lower leg till resolved 45 g 1    Cholecalciferol (VITAMIN D-3) 1000 units CAPS Take 2,000 Units by mouth daily      diltiazem (CARDIZEM) 120 MG tablet TAKE 1 TABLET TWICE DAILY      LATUDA 60 MG TABS Take 80 mg by mouth daily   2    mycophenolate (CELLCEPT) 250 mg capsule Take 2 in the AM, 1 in the PM  90 capsule 5    sertraline (ZOLOFT) 50 mg tablet Take 100 mg by mouth every morning   0    tacrolimus (PROGRAF) 1 mg capsule Take 1 capsule (1 mg total) by mouth 2 (two) times a day 60 capsule 5    warfarin (COUMADIN) 1 mg tablet Take 1 tablet by mouth daily as directed 90 tablet 3    warfarin (COUMADIN) 5 mg tablet TAKE 1 TO 2 TABLETS BY MOUTH EVERY DAY 60 tablet 5    warfarin (COUMADIN) 7 5 mg tablet Take 1 tablet (7 5 mg total) by mouth daily       No current facility-administered medications for this visit  Review of Systems   Constitutional: Negative for activity change, appetite change, chills, diaphoresis, fatigue, fever and unexpected weight change  HENT: Negative for congestion, dental problem, drooling, ear discharge, ear pain, facial swelling, hearing loss, nosebleeds, postnasal drip, rhinorrhea, sinus pressure, sinus pain, sneezing, sore throat, tinnitus, trouble swallowing and voice change  Eyes: Negative for photophobia, pain, discharge, redness, itching and visual disturbance     Respiratory: Negative for apnea, cough, choking, chest tightness, shortness of breath, wheezing and stridor  Cardiovascular: Negative for chest pain, palpitations and leg swelling  Gastrointestinal: Positive for abdominal pain, diarrhea and nausea  Negative for abdominal distention, anal bleeding, blood in stool, constipation, rectal pain and vomiting  Endocrine: Negative for cold intolerance, heat intolerance, polydipsia, polyphagia and polyuria  Genitourinary: Negative for decreased urine volume, difficulty urinating, dysuria, enuresis, flank pain, frequency, genital sores, hematuria and urgency  Musculoskeletal: Negative for arthralgias, back pain, gait problem, joint swelling, myalgias, neck pain and neck stiffness  Skin: Negative for color change, pallor, rash and wound  Neurological: Negative for dizziness, tremors, seizures, syncope, facial asymmetry, speech difficulty, weakness, light-headedness, numbness and headaches  Hematological: Negative for adenopathy  Does not bruise/bleed easily  Psychiatric/Behavioral: Negative for agitation, behavioral problems, confusion, decreased concentration, dysphoric mood, hallucinations, self-injury, sleep disturbance and suicidal ideas  The patient is not nervous/anxious and is not hyperactive  Objective:  Vitals:    01/21/20 0845   BP: 102/76   Pulse: (!) 112   Temp: (!) 97 2 °F (36 2 °C)   SpO2: 96%   Weight: 74 2 kg (163 lb 9 6 oz)   Height: 5' 6 5" (1 689 m)     Body mass index is 26 01 kg/m²  Physical Exam   Constitutional: He is oriented to person, place, and time  He appears well-developed  HENT:   Head: Normocephalic  Right Ear: External ear normal    Left Ear: External ear normal    Mouth/Throat: Oropharynx is clear and moist    Eyes: Pupils are equal, round, and reactive to light  Conjunctivae are normal    Neck: Neck supple  No JVD present  No thyromegaly present  Cardiovascular: Normal rate, regular rhythm, normal heart sounds and intact distal pulses  Pulmonary/Chest: Effort normal and breath sounds normal    Abdominal: Soft  Bowel sounds are normal  He exhibits no distension and no mass  There is no tenderness  There is no rebound and no guarding  No hernia  Musculoskeletal: Normal range of motion  He exhibits no edema  Lymphadenopathy:     He has no cervical adenopathy  Neurological: He is alert and oriented to person, place, and time  He has normal reflexes  Skin: Skin is warm and dry  No rash noted  No erythema  No pallor  Psychiatric: He has a normal mood and affect  His behavior is normal  Judgment and thought content normal    Vitals reviewed  CT showing upper lobe groundglass opacities  Productive cough  - sputum Cx: Commensal quinn,   - MRSA PCR, legionella/strep negative

## 2025-06-03 NOTE — PROGRESS NOTE ADULT - PROBLEM SELECTOR PLAN 2
- Patient has had multiple bouts of orthostatic hypotension  - S/p 2L LR  - Compression socks  - Abdominal binder  - Switch to midodrine 5mg TID

## 2025-06-03 NOTE — PROGRESS NOTE ADULT - SUBJECTIVE AND OBJECTIVE BOX
Shorty Ross MD  Available on TEAMS    Patient is a 89y old  Male who presents with a chief complaint of Likely PNA (02 Jun 2025 15:23)      SUBJECTIVE / OVERNIGHT EVENTS: No acute events overnight. Patient was assessed at bedside.       REVIEW OF SYSTEMS:  ROS negative aside from above    MEDICATIONS  (STANDING):  albuterol/ipratropium for Nebulization 3 milliLiter(s) Nebulizer every 8 hours  heparin   Injectable 5000 Unit(s) SubCutaneous every 8 hours  levothyroxine 100 MICROGram(s) Oral daily  midodrine. 5 milliGRAM(s) Oral three times a day  pantoprazole  Injectable 40 milliGRAM(s) IV Push daily  predniSONE   Tablet 5 milliGRAM(s) Oral two times a day  sertraline 25 milliGRAM(s) Oral daily    MEDICATIONS  (PRN):  acetaminophen     Tablet .. 650 milliGRAM(s) Oral every 6 hours PRN Temp greater or equal to 38C (100.4F), Mild Pain (1 - 3)  aluminum hydroxide/magnesium hydroxide/simethicone Suspension 30 milliLiter(s) Oral every 4 hours PRN Dyspepsia  benzonatate 100 milliGRAM(s) Oral every 8 hours PRN Cough  guaifenesin/dextromethorphan Oral Liquid 10 milliLiter(s) Oral every 4 hours PRN Cough      CAPILLARY BLOOD GLUCOSE        I&O's Summary    02 Jun 2025 07:01  -  03 Jun 2025 07:00  --------------------------------------------------------  IN: 960 mL / OUT: 1275 mL / NET: -315 mL        Vital Signs Last 24 Hrs  T(C): 36.3 (03 Jun 2025 04:59), Max: 36.9 (02 Jun 2025 20:43)  T(F): 97.4 (03 Jun 2025 04:59), Max: 98.4 (02 Jun 2025 20:43)  HR: 80 (03 Jun 2025 04:59) (80 - 85)  BP: 126/60 (03 Jun 2025 04:59) (111/63 - 128/67)  BP(mean): --  RR: 18 (03 Jun 2025 04:59) (18 - 18)  SpO2: 95% (03 Jun 2025 04:59) (94% - 96%)    Parameters below as of 03 Jun 2025 04:59  Patient On (Oxygen Delivery Method): nasal cannula  O2 Flow (L/min): 3      PHYSICAL EXAM:  GENERAL: NAD, well-developed, well-nourished  HEAD: Atraumatic, Normocephalic  EYES: EOMI, PERRLA, conjunctiva and sclera clear  NECK: Supple, No JVD  CHEST/LUNG: Clear to auscultation bilaterally; No wheezes or crackles  HEART: Normal S1/S2; Regular rate and rhythm; No murmurs, rubs, or gallops  ABDOMEN: Soft, Nontender, Nondistended; Bowel sounds present  EXTREMITIES: 2+ Peripheral Pulses; No clubbing, cyanosis, or edema  PSYCH: A&Ox3  NEUROLOGY: no focal neurologic deficit  SKIN: No rashes or lesions    LABS:                     RADIOLOGY & ADDITIONAL TESTS:    Imaging Personally Reviewed:    Consultant(s) Notes Reviewed:      Care Discussed with Consultants/Other Providers:   Shorty Ross MD  Available on TEAMS    Patient is a 89y old  Male who presents with a chief complaint of Likely PNA (02 Jun 2025 15:23)      SUBJECTIVE / OVERNIGHT EVENTS: No acute events overnight. Patient was assessed at bedside. No acute concerns at bedside.      REVIEW OF SYSTEMS:  ROS negative aside from above    MEDICATIONS  (STANDING):  albuterol/ipratropium for Nebulization 3 milliLiter(s) Nebulizer every 8 hours  heparin   Injectable 5000 Unit(s) SubCutaneous every 8 hours  levothyroxine 100 MICROGram(s) Oral daily  midodrine. 5 milliGRAM(s) Oral three times a day  pantoprazole  Injectable 40 milliGRAM(s) IV Push daily  predniSONE   Tablet 5 milliGRAM(s) Oral two times a day  sertraline 25 milliGRAM(s) Oral daily    MEDICATIONS  (PRN):  acetaminophen     Tablet .. 650 milliGRAM(s) Oral every 6 hours PRN Temp greater or equal to 38C (100.4F), Mild Pain (1 - 3)  aluminum hydroxide/magnesium hydroxide/simethicone Suspension 30 milliLiter(s) Oral every 4 hours PRN Dyspepsia  benzonatate 100 milliGRAM(s) Oral every 8 hours PRN Cough  guaifenesin/dextromethorphan Oral Liquid 10 milliLiter(s) Oral every 4 hours PRN Cough      CAPILLARY BLOOD GLUCOSE        I&O's Summary    02 Jun 2025 07:01  -  03 Jun 2025 07:00  --------------------------------------------------------  IN: 960 mL / OUT: 1275 mL / NET: -315 mL        Vital Signs Last 24 Hrs  T(C): 36.3 (03 Jun 2025 04:59), Max: 36.9 (02 Jun 2025 20:43)  T(F): 97.4 (03 Jun 2025 04:59), Max: 98.4 (02 Jun 2025 20:43)  HR: 80 (03 Jun 2025 04:59) (80 - 85)  BP: 126/60 (03 Jun 2025 04:59) (111/63 - 128/67)  BP(mean): --  RR: 18 (03 Jun 2025 04:59) (18 - 18)  SpO2: 95% (03 Jun 2025 04:59) (94% - 96%)    Parameters below as of 03 Jun 2025 04:59  Patient On (Oxygen Delivery Method): nasal cannula  O2 Flow (L/min): 3      PHYSICAL EXAM:  GENERAL: NAD, well-developed, well-nourished  HEAD: Atraumatic, Normocephalic  EYES: EOMI, PERRLA, conjunctiva and sclera clear  NECK: Supple, No JVD  CHEST/LUNG: Clear to auscultation bilaterally; No wheezes or crackles  HEART: Normal S1/S2; Regular rate and rhythm; No murmurs, rubs, or gallops  ABDOMEN: Soft, Nontender, Nondistended; Bowel sounds present  EXTREMITIES: 2+ Peripheral Pulses; No clubbing, cyanosis, or edema  PSYCH: A&Ox3  NEUROLOGY: no focal neurologic deficit  SKIN: No rashes or lesions    LABS:                     RADIOLOGY & ADDITIONAL TESTS:    Imaging Personally Reviewed:    Consultant(s) Notes Reviewed:      Care Discussed with Consultants/Other Providers:

## 2025-06-03 NOTE — PROGRESS NOTE ADULT - PROBLEM SELECTOR PLAN 1
- WBC 1.37 Tmax 101.5 at home  - Productive cough  - CT showing upper lobe groundglass opacities  - UA negative  - F/u BC: NGTD  - sputum Cx: Commensal quinn,   - MRSA PCR, legionella/strep negative  - Completed Abx course    Plan  - Pending WAYNE authorization  - Oncologist Dr. Lovett, [facility], office notified patient is here on 5/25

## 2025-06-04 ENCOUNTER — TRANSCRIPTION ENCOUNTER (OUTPATIENT)
Age: 89
End: 2025-06-04

## 2025-06-04 VITALS
OXYGEN SATURATION: 98 % | SYSTOLIC BLOOD PRESSURE: 112 MMHG | DIASTOLIC BLOOD PRESSURE: 63 MMHG | HEART RATE: 82 BPM | TEMPERATURE: 98 F | RESPIRATION RATE: 18 BRPM

## 2025-06-04 LAB — PHOSPHATE SERPL-MCNC: 4 MG/DL — SIGNIFICANT CHANGE UP (ref 2.5–4.5)

## 2025-06-04 PROCEDURE — 96375 TX/PRO/DX INJ NEW DRUG ADDON: CPT

## 2025-06-04 PROCEDURE — 87637 SARSCOV2&INF A&B&RSV AMP PRB: CPT

## 2025-06-04 PROCEDURE — 85027 COMPLETE CBC AUTOMATED: CPT

## 2025-06-04 PROCEDURE — 97116 GAIT TRAINING THERAPY: CPT

## 2025-06-04 PROCEDURE — 83605 ASSAY OF LACTIC ACID: CPT

## 2025-06-04 PROCEDURE — 82435 ASSAY OF BLOOD CHLORIDE: CPT

## 2025-06-04 PROCEDURE — 87040 BLOOD CULTURE FOR BACTERIA: CPT

## 2025-06-04 PROCEDURE — 87640 STAPH A DNA AMP PROBE: CPT

## 2025-06-04 PROCEDURE — 82947 ASSAY GLUCOSE BLOOD QUANT: CPT

## 2025-06-04 PROCEDURE — 81003 URINALYSIS AUTO W/O SCOPE: CPT

## 2025-06-04 PROCEDURE — 84100 ASSAY OF PHOSPHORUS: CPT

## 2025-06-04 PROCEDURE — 36415 COLL VENOUS BLD VENIPUNCTURE: CPT

## 2025-06-04 PROCEDURE — 80053 COMPREHEN METABOLIC PANEL: CPT

## 2025-06-04 PROCEDURE — 97110 THERAPEUTIC EXERCISES: CPT

## 2025-06-04 PROCEDURE — 94640 AIRWAY INHALATION TREATMENT: CPT

## 2025-06-04 PROCEDURE — 83735 ASSAY OF MAGNESIUM: CPT

## 2025-06-04 PROCEDURE — 87899 AGENT NOS ASSAY W/OPTIC: CPT

## 2025-06-04 PROCEDURE — 97161 PT EVAL LOW COMPLEX 20 MIN: CPT

## 2025-06-04 PROCEDURE — 85610 PROTHROMBIN TIME: CPT

## 2025-06-04 PROCEDURE — 87070 CULTURE OTHR SPECIMN AEROBIC: CPT

## 2025-06-04 PROCEDURE — 82803 BLOOD GASES ANY COMBINATION: CPT

## 2025-06-04 PROCEDURE — 87641 MR-STAPH DNA AMP PROBE: CPT

## 2025-06-04 PROCEDURE — 84484 ASSAY OF TROPONIN QUANT: CPT

## 2025-06-04 PROCEDURE — 71275 CT ANGIOGRAPHY CHEST: CPT

## 2025-06-04 PROCEDURE — 85014 HEMATOCRIT: CPT

## 2025-06-04 PROCEDURE — 85025 COMPLETE CBC W/AUTO DIFF WBC: CPT

## 2025-06-04 PROCEDURE — 0225U NFCT DS DNA&RNA 21 SARSCOV2: CPT

## 2025-06-04 PROCEDURE — 85018 HEMOGLOBIN: CPT

## 2025-06-04 PROCEDURE — 96367 TX/PROPH/DG ADDL SEQ IV INF: CPT

## 2025-06-04 PROCEDURE — 84132 ASSAY OF SERUM POTASSIUM: CPT

## 2025-06-04 PROCEDURE — 93005 ELECTROCARDIOGRAM TRACING: CPT

## 2025-06-04 PROCEDURE — 85730 THROMBOPLASTIN TIME PARTIAL: CPT

## 2025-06-04 PROCEDURE — 84145 PROCALCITONIN (PCT): CPT

## 2025-06-04 PROCEDURE — 87205 SMEAR GRAM STAIN: CPT

## 2025-06-04 PROCEDURE — 96365 THER/PROPH/DIAG IV INF INIT: CPT

## 2025-06-04 PROCEDURE — 97530 THERAPEUTIC ACTIVITIES: CPT

## 2025-06-04 PROCEDURE — 99239 HOSP IP/OBS DSCHRG MGMT >30: CPT | Mod: GC

## 2025-06-04 PROCEDURE — 82330 ASSAY OF CALCIUM: CPT

## 2025-06-04 PROCEDURE — 71045 X-RAY EXAM CHEST 1 VIEW: CPT

## 2025-06-04 PROCEDURE — 99285 EMERGENCY DEPT VISIT HI MDM: CPT | Mod: 25

## 2025-06-04 PROCEDURE — 84295 ASSAY OF SERUM SODIUM: CPT

## 2025-06-04 RX ADMIN — HEPARIN SODIUM 5000 UNIT(S): 1000 INJECTION INTRAVENOUS; SUBCUTANEOUS at 05:20

## 2025-06-04 RX ADMIN — MIDODRINE HYDROCHLORIDE 5 MILLIGRAM(S): 5 TABLET ORAL at 11:47

## 2025-06-04 RX ADMIN — MIDODRINE HYDROCHLORIDE 5 MILLIGRAM(S): 5 TABLET ORAL at 05:16

## 2025-06-04 RX ADMIN — IPRATROPIUM BROMIDE AND ALBUTEROL SULFATE 3 MILLILITER(S): .5; 2.5 SOLUTION RESPIRATORY (INHALATION) at 05:16

## 2025-06-04 RX ADMIN — Medication 100 MICROGRAM(S): at 05:16

## 2025-06-04 RX ADMIN — PREDNISONE 5 MILLIGRAM(S): 20 TABLET ORAL at 05:16

## 2025-06-04 RX ADMIN — SERTRALINE 25 MILLIGRAM(S): 100 TABLET, FILM COATED ORAL at 11:47

## 2025-06-04 NOTE — PROGRESS NOTE ADULT - PROBLEM SELECTOR PROBLEM 1
Neutropenic sepsis

## 2025-06-04 NOTE — PROGRESS NOTE ADULT - PROBLEM SELECTOR PLAN 1
- WBC 1.37 Tmax 101.5 at home  - Productive cough  - CT showing upper lobe groundglass opacities  - UA negative  - F/u BC: NGTD  - sputum Cx: Commensal quinn,   - MRSA PCR, legionella/strep negative  - Completed Abx course    Plan  - Pending WAYNE authorization  - Oncologist Dr. Lovett, [facility], office notified patient is here on 5/25

## 2025-06-04 NOTE — PROGRESS NOTE ADULT - PROVIDER SPECIALTY LIST ADULT
Heme/Onc
Internal Medicine
Heme/Onc
Infectious Disease
Heme/Onc
Infectious Disease
Internal Medicine

## 2025-06-04 NOTE — PROGRESS NOTE ADULT - SUBJECTIVE AND OBJECTIVE BOX
Shorty Ross MD  Available on TEAMS    Patient is a 89y old  Male who presents with a chief complaint of Likely PNA (03 Jun 2025 11:32)      SUBJECTIVE / OVERNIGHT EVENTS: No acute events overnight. Patient was assessed at bedside.       REVIEW OF SYSTEMS:  ROS negative aside from above    MEDICATIONS  (STANDING):  albuterol/ipratropium for Nebulization 3 milliLiter(s) Nebulizer every 8 hours  heparin   Injectable 5000 Unit(s) SubCutaneous every 8 hours  levothyroxine 100 MICROGram(s) Oral daily  midodrine. 5 milliGRAM(s) Oral three times a day  pantoprazole  Injectable 40 milliGRAM(s) IV Push daily  predniSONE   Tablet 5 milliGRAM(s) Oral two times a day  sertraline 25 milliGRAM(s) Oral daily    MEDICATIONS  (PRN):  acetaminophen     Tablet .. 650 milliGRAM(s) Oral every 6 hours PRN Temp greater or equal to 38C (100.4F), Mild Pain (1 - 3)  aluminum hydroxide/magnesium hydroxide/simethicone Suspension 30 milliLiter(s) Oral every 4 hours PRN Dyspepsia  benzonatate 100 milliGRAM(s) Oral every 8 hours PRN Cough  guaifenesin/dextromethorphan Oral Liquid 10 milliLiter(s) Oral every 4 hours PRN Cough      CAPILLARY BLOOD GLUCOSE        I&O's Summary    03 Jun 2025 07:01  -  04 Jun 2025 07:00  --------------------------------------------------------  IN: 720 mL / OUT: 1140 mL / NET: -420 mL        Vital Signs Last 24 Hrs  T(C): 36.4 (04 Jun 2025 05:02), Max: 37 (03 Jun 2025 12:11)  T(F): 97.6 (04 Jun 2025 05:02), Max: 98.6 (03 Jun 2025 12:11)  HR: 72 (04 Jun 2025 05:02) (72 - 91)  BP: 113/60 (04 Jun 2025 05:02) (113/60 - 123/66)  BP(mean): --  RR: 18 (04 Jun 2025 05:02) (18 - 18)  SpO2: 98% (04 Jun 2025 05:02) (95% - 98%)    Parameters below as of 04 Jun 2025 05:02  Patient On (Oxygen Delivery Method): nasal cannula  O2 Flow (L/min): 3      PHYSICAL EXAM:  GENERAL: NAD, well-developed, well-nourished  HEAD: Atraumatic, Normocephalic  EYES: EOMI, PERRLA, conjunctiva and sclera clear  NECK: Supple, No JVD  CHEST/LUNG: Clear to auscultation bilaterally; No wheezes or crackles  HEART: Normal S1/S2; Regular rate and rhythm; No murmurs, rubs, or gallops  ABDOMEN: Soft, Nontender, Nondistended; Bowel sounds present  EXTREMITIES: 2+ Peripheral Pulses; No clubbing, cyanosis, or edema  PSYCH: A&Ox3  NEUROLOGY: no focal neurologic deficit  SKIN: No rashes or lesions    LABS:                        10.4   4.81  )-----------( 148      ( 03 Jun 2025 07:13 )             33.1      06-03    136  |  96  |  23  ----------------------------<  84  4.5   |  27  |  0.64    Ca    9.2      03 Jun 2025 07:13  Phos  3.0     06-03  Mg     1.9     06-03    TPro  6.1  /  Alb  2.5[L]  /  TBili  0.7  /  DBili  x   /  AST  76[H]  /  ALT  17  /  AlkPhos  179[H]  06-03          Urinalysis Basic - ( 03 Jun 2025 07:13 )    Color: x / Appearance: x / SG: x / pH: x  Gluc: 84 mg/dL / Ketone: x  / Bili: x / Urobili: x   Blood: x / Protein: x / Nitrite: x   Leuk Esterase: x / RBC: x / WBC x   Sq Epi: x / Non Sq Epi: x / Bacteria: x        RADIOLOGY & ADDITIONAL TESTS:    Imaging Personally Reviewed:    Consultant(s) Notes Reviewed:      Care Discussed with Consultants/Other Providers:   Shorty Ross MD  Available on TEAMS    Patient is a 89y old  Male who presents with a chief complaint of Likely PNA (03 Jun 2025 11:32)      SUBJECTIVE / OVERNIGHT EVENTS: No acute events overnight. Patient was assessed at bedside. No acute concerns at bedside      REVIEW OF SYSTEMS:  ROS negative aside from above    MEDICATIONS  (STANDING):  albuterol/ipratropium for Nebulization 3 milliLiter(s) Nebulizer every 8 hours  heparin   Injectable 5000 Unit(s) SubCutaneous every 8 hours  levothyroxine 100 MICROGram(s) Oral daily  midodrine. 5 milliGRAM(s) Oral three times a day  pantoprazole  Injectable 40 milliGRAM(s) IV Push daily  predniSONE   Tablet 5 milliGRAM(s) Oral two times a day  sertraline 25 milliGRAM(s) Oral daily    MEDICATIONS  (PRN):  acetaminophen     Tablet .. 650 milliGRAM(s) Oral every 6 hours PRN Temp greater or equal to 38C (100.4F), Mild Pain (1 - 3)  aluminum hydroxide/magnesium hydroxide/simethicone Suspension 30 milliLiter(s) Oral every 4 hours PRN Dyspepsia  benzonatate 100 milliGRAM(s) Oral every 8 hours PRN Cough  guaifenesin/dextromethorphan Oral Liquid 10 milliLiter(s) Oral every 4 hours PRN Cough      CAPILLARY BLOOD GLUCOSE        I&O's Summary    03 Jun 2025 07:01  -  04 Jun 2025 07:00  --------------------------------------------------------  IN: 720 mL / OUT: 1140 mL / NET: -420 mL        Vital Signs Last 24 Hrs  T(C): 36.4 (04 Jun 2025 05:02), Max: 37 (03 Jun 2025 12:11)  T(F): 97.6 (04 Jun 2025 05:02), Max: 98.6 (03 Jun 2025 12:11)  HR: 72 (04 Jun 2025 05:02) (72 - 91)  BP: 113/60 (04 Jun 2025 05:02) (113/60 - 123/66)  BP(mean): --  RR: 18 (04 Jun 2025 05:02) (18 - 18)  SpO2: 98% (04 Jun 2025 05:02) (95% - 98%)    Parameters below as of 04 Jun 2025 05:02  Patient On (Oxygen Delivery Method): nasal cannula  O2 Flow (L/min): 3      PHYSICAL EXAM:  GENERAL: NAD, well-developed, well-nourished  HEAD: Atraumatic, Normocephalic  EYES: EOMI, PERRLA, conjunctiva and sclera clear  NECK: Supple, No JVD  CHEST/LUNG: Clear to auscultation bilaterally; No wheezes or crackles  HEART: Normal S1/S2; Regular rate and rhythm; No murmurs, rubs, or gallops  ABDOMEN: Soft, Nontender, Nondistended; Bowel sounds present  EXTREMITIES: 2+ Peripheral Pulses; No clubbing, cyanosis, or edema  PSYCH: A&Ox3  NEUROLOGY: no focal neurologic deficit  SKIN: No rashes or lesions    LABS:                        10.4   4.81  )-----------( 148      ( 03 Jun 2025 07:13 )             33.1      06-03    136  |  96  |  23  ----------------------------<  84  4.5   |  27  |  0.64    Ca    9.2      03 Jun 2025 07:13  Phos  3.0     06-03  Mg     1.9     06-03    TPro  6.1  /  Alb  2.5[L]  /  TBili  0.7  /  DBili  x   /  AST  76[H]  /  ALT  17  /  AlkPhos  179[H]  06-03          Urinalysis Basic - ( 03 Jun 2025 07:13 )    Color: x / Appearance: x / SG: x / pH: x  Gluc: 84 mg/dL / Ketone: x  / Bili: x / Urobili: x   Blood: x / Protein: x / Nitrite: x   Leuk Esterase: x / RBC: x / WBC x   Sq Epi: x / Non Sq Epi: x / Bacteria: x        RADIOLOGY & ADDITIONAL TESTS:    Imaging Personally Reviewed:    Consultant(s) Notes Reviewed:      Care Discussed with Consultants/Other Providers:

## 2025-06-04 NOTE — DISCHARGE NOTE NURSING/CASE MANAGEMENT/SOCIAL WORK - NSDCPEFALRISK_GEN_ALL_CORE
For information on Fall & Injury Prevention, visit: [email or web address] OR  [email or web address] OR  https://www.cdc.gov/steadi/patient.html

## 2025-06-04 NOTE — DISCHARGE NOTE NURSING/CASE MANAGEMENT/SOCIAL WORK - FINANCIAL ASSISTANCE
[facility] provides services at a reduced cost to those who are determined to be eligible through [facility]’s financial assistance program. Information regarding [facility]’s financial assistance program can be found by going to [email or web address] or by calling [phone].

## 2025-06-04 NOTE — PROGRESS NOTE ADULT - ATTENDING COMMENTS
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 ID consulted, patient completed course of Cefepime and transitioned to Augmentin PO. Due to neutropenia patient was started on Zarxio however, neutropenia has now resolved so Zarxio has been discontinued. PT rec WAYNE-for discharge today. Rest of plan as above. Total time spent discharge planning 49 minutes.
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 ID consulted, continue Cefepime. Due to neutropenia patient was started on Zarxio however, neutropenia has now resolved so Zarxio has been discontinued. Continue to wean oxygen as tolerated. Final PT recs pending. Rest of plan as above.
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 ID consulted, patient completed course of Cefepime and transitioned to Augmentin PO. Due to neutropenia patient was started on Zarxio however, neutropenia has now resolved so Zarxio has been discontinued. PT rec WAYNE-medically ready pending insurance authorization. Rest of plan as above.
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    Neutropenic, tachycardic, fever (at home) + lactic acidosis; meets severe sepsis criteria  ct imaging shows patchy bl upper lobe ggo suggestive of inflammation/infection    s/p ~2 L NS bolus + vancomycin and cefepime + ofirmev in ER  CTA no PE, but Patchy bilateral upper lobe groundglass opacities, suggestive of   nonspecific inflammation/infection.  full rvp, urine legionella and strep negative.  f/u blood cx, urine cx,  mrsa screen; gi pcr   c/w cefepime  appreciate ID consult.   duonebs + oxygen supplementation as needed   aggressive pulmonary toilet/hygiene with incentive spirometry, chest pt,   symptomatic relief with antitussives, mucolytics, antipyretics  ivf PRN  f/u Heme/Onc recs
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 ID consulted, transitioned from Cefepime to Augmentin. Due to neutropenia patient was started on Zarxio however, neutropenia has now resolved so Zarxio has been discontinued. Continue to wean oxygen as tolerated. Final PT recs pending. Rest of plan as above.
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 ID consulted, transitioned from Cefepime to Augmentin. Due to neutropenia patient was started on Zarxio however, neutropenia has now resolved so Zarxio has been discontinued. Continue to wean oxygen as tolerated. PT rec WAYNE-medically ready pending acceptance/auth. Rest of plan as above.
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    Neutropenic, tachycardic, fever (at home) + lactic acidosis; met severe sepsis criteria on admission   ct imaging shows patchy bl upper lobe ggo suggestive of inflammation/infection    Plan:  s/p ~2 L NS bolus + vancomycin and cefepime + ofirmev in ER  CTA no PE, but Patchy bilateral upper lobe groundglass opacities, suggestive of   nonspecific inflammation/infection.  full rvp, urine legionella and strep negative.  f/u blood cx NTD, mrsa screen Negative   f/u  gi pcr (ordered)  c/w cefepime  appreciate ID consult.   duonebs + oxygen supplementation as needed   aggressive pulmonary toilet/hygiene with incentive spirometry, chest pt,   symptomatic relief with antitussives, mucolytics, antipyretics  wean O2 as tolerated  ivf PRN  f/u Heme/Onc recs .
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 ID consulted, transitioned from Cefepime to Augmentin. Due to neutropenia patient was started on Zarxio however, neutropenia has now resolved so Zarxio has been discontinued. Continue to wean oxygen as tolerated. PT rec WAYNE-medically ready pending acceptance/auth. Rest of plan as above.
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 ID consulted, transitioned from Cefepime to Augmentin. Due to neutropenia patient was started on Zarxio however, neutropenia has now resolved so Zarxio has been discontinued. Continue to wean oxygen as tolerated. Final PT recs pending as patient severely orthostatic-will give compression stockings, abdominal binder and Midodrine. Rest of plan as above.

## 2025-06-04 NOTE — PROGRESS NOTE ADULT - REASON FOR ADMISSION
Likely PNA

## 2025-06-04 NOTE — DISCHARGE NOTE NURSING/CASE MANAGEMENT/SOCIAL WORK - PATIENT PORTAL LINK FT
You can access the FollowMyHealth Patient Portal offered by [facility] by registering at the following website: [email or web address]. By joining [facility]’s FollowMyHealth portal, you will also be able to view your health information using other applications (apps) compatible with our system.

## 2025-06-04 NOTE — DISCHARGE NOTE NURSING/CASE MANAGEMENT/SOCIAL WORK - NSDCFUADDAPPT_GEN_ALL_CORE_FT
APPTS ARE READY TO BE MADE: [X] YES    Best Family or Patient Contact (if needed):  Jessee Gallegos: [phone]  Additional Information about above appointments (if needed):    1: Please follow up with your PCP within 2 weeks of discharge  2:   3:     Other comments or requests:

## 2025-06-07 NOTE — DISCHARGE NOTE NURSING/CASE MANAGEMENT/SOCIAL WORK - NSFLUVACAGEDISCH_IMM_ALL_CORE
Fetal Alert  This patient participates in the [employer] ECHO/EFFECT study (PI Dr. Elizabeth). Please notify the MFM fellow (days) or night float OB resident (nights) upon admission for delivery     Adult

## 2025-06-09 ENCOUNTER — APPOINTMENT (OUTPATIENT)
Dept: INTERNAL MEDICINE | Facility: CLINIC | Age: 89
End: 2025-06-09

## 2025-07-08 RX ORDER — LEUPROLIDE ACETATE 30 MG/.5ML
30 INJECTION, SUSPENSION, EXTENDED RELEASE SUBCUTANEOUS DAILY
Qty: 1 | Refills: 0 | Status: ACTIVE | OUTPATIENT
Start: 2025-07-08

## 2025-07-09 ENCOUNTER — APPOINTMENT (OUTPATIENT)
Dept: UROLOGY | Facility: CLINIC | Age: 89
End: 2025-07-09

## 2025-07-09 ENCOUNTER — OUTPATIENT (OUTPATIENT)
Dept: OUTPATIENT SERVICES | Facility: HOSPITAL | Age: 89
LOS: 1 days | End: 2025-07-09
Payer: MEDICARE

## 2025-07-09 DIAGNOSIS — Z98.89 OTHER SPECIFIED POSTPROCEDURAL STATES: Chronic | ICD-10-CM

## 2025-07-09 DIAGNOSIS — R35.0 FREQUENCY OF MICTURITION: ICD-10-CM

## 2025-07-09 PROCEDURE — 96402U: CUSTOM | Mod: NC

## 2025-07-09 PROCEDURE — 96402 CHEMO HORMON ANTINEOPL SQ/IM: CPT

## 2025-07-09 RX ORDER — LEUPROLIDE ACETATE 45 MG/.375ML
45 INJECTION, SUSPENSION, EXTENDED RELEASE SUBCUTANEOUS
Refills: 0 | Status: COMPLETED | OUTPATIENT
Start: 2025-07-09

## 2025-07-09 RX ADMIN — LEUPROLIDE ACETATE 0 MG: 30 INJECTION, SUSPENSION, EXTENDED RELEASE SUBCUTANEOUS at 00:00

## 2025-07-10 DIAGNOSIS — C61 MALIGNANT NEOPLASM OF PROSTATE: ICD-10-CM

## 2025-07-22 ENCOUNTER — OUTPATIENT (OUTPATIENT)
Dept: OUTPATIENT SERVICES | Facility: HOSPITAL | Age: 89
LOS: 1 days | End: 2025-07-22
Payer: MEDICARE

## 2025-07-22 ENCOUNTER — APPOINTMENT (OUTPATIENT)
Dept: INTERNAL MEDICINE | Facility: CLINIC | Age: 89
End: 2025-07-22
Payer: MEDICARE

## 2025-07-22 VITALS — DIASTOLIC BLOOD PRESSURE: 62 MMHG | SYSTOLIC BLOOD PRESSURE: 132 MMHG

## 2025-07-22 VITALS — HEIGHT: 66 IN | BODY MASS INDEX: 24.91 KG/M2 | WEIGHT: 155 LBS | OXYGEN SATURATION: 96 % | HEART RATE: 93 BPM

## 2025-07-22 DIAGNOSIS — K59.00 CONSTIPATION, UNSPECIFIED: ICD-10-CM

## 2025-07-22 DIAGNOSIS — F32.A ANXIETY DISORDER, UNSPECIFIED: ICD-10-CM

## 2025-07-22 DIAGNOSIS — B18.1 CHRONIC VIRAL HEPATITIS B W/OUT DELTA-AGENT: ICD-10-CM

## 2025-07-22 DIAGNOSIS — I10 ESSENTIAL (PRIMARY) HYPERTENSION: ICD-10-CM

## 2025-07-22 DIAGNOSIS — D64.9 ANEMIA, UNSPECIFIED: ICD-10-CM

## 2025-07-22 DIAGNOSIS — E03.9 HYPOTHYROIDISM, UNSPECIFIED: ICD-10-CM

## 2025-07-22 DIAGNOSIS — F41.9 ANXIETY DISORDER, UNSPECIFIED: ICD-10-CM

## 2025-07-22 DIAGNOSIS — F32.A DEPRESSION, UNSPECIFIED: ICD-10-CM

## 2025-07-22 DIAGNOSIS — Z98.89 OTHER SPECIFIED POSTPROCEDURAL STATES: Chronic | ICD-10-CM

## 2025-07-22 DIAGNOSIS — B18.1 CHRONIC VIRAL HEPATITIS B WITHOUT DELTA-AGENT: ICD-10-CM

## 2025-07-22 DIAGNOSIS — D75.89 OTHER SPECIFIED DISEASES OF BLOOD AND BLOOD-FORMING ORGANS: ICD-10-CM

## 2025-07-22 DIAGNOSIS — C61 MALIGNANT NEOPLASM OF PROSTATE: ICD-10-CM

## 2025-07-22 PROCEDURE — G0463: CPT

## 2025-07-22 PROCEDURE — G2211 COMPLEX E/M VISIT ADD ON: CPT

## 2025-07-22 PROCEDURE — 99215 OFFICE O/P EST HI 40 MIN: CPT

## 2025-07-22 RX ORDER — PREDNISONE 5 MG/1
5 TABLET ORAL
Refills: 0 | Status: ACTIVE | COMMUNITY
Start: 2025-07-22

## 2025-07-22 RX ORDER — ENTECAVIR 0.5 MG/1
0.5 TABLET, FILM COATED ORAL
Refills: 0 | Status: ACTIVE | COMMUNITY
Start: 2025-07-22

## 2025-07-24 LAB
ESTIMATED AVERAGE GLUCOSE: 105 MG/DL
FERRITIN SERPL-MCNC: 121 NG/ML
HBA1C MFR BLD HPLC: 5.3 %
HCT VFR BLD CALC: 38 %
HGB BLD-MCNC: 12.1 G/DL
IRON SATN MFR SERPL: 16 %
IRON SERPL-MCNC: 50 UG/DL
MCHC RBC-ENTMCNC: 31.8 G/DL
MCHC RBC-ENTMCNC: 32.8 PG
MCV RBC AUTO: 103 FL
PLATELET # BLD AUTO: 149 K/UL
RBC # BLD: 3.69 M/UL
RBC # FLD: 15.4 %
TIBC SERPL-MCNC: 315 UG/DL
UIBC SERPL-MCNC: 265 UG/DL
WBC # FLD AUTO: 4.66 K/UL

## 2025-08-13 DIAGNOSIS — K21.9 GASTRO-ESOPHAGEAL REFLUX DISEASE W/OUT ESOPHAGITIS: ICD-10-CM

## 2025-08-13 RX ORDER — PANTOPRAZOLE 40 MG/1
40 TABLET, DELAYED RELEASE ORAL DAILY
Qty: 90 | Refills: 3 | Status: ACTIVE | COMMUNITY
Start: 2025-08-13 | End: 1900-01-01

## 2025-09-02 ENCOUNTER — OUTPATIENT (OUTPATIENT)
Dept: OUTPATIENT SERVICES | Facility: HOSPITAL | Age: 89
LOS: 1 days | End: 2025-09-02
Payer: MEDICARE

## 2025-09-02 ENCOUNTER — APPOINTMENT (OUTPATIENT)
Dept: INTERNAL MEDICINE | Facility: CLINIC | Age: 89
End: 2025-09-02

## 2025-09-02 VITALS
HEART RATE: 86 BPM | HEIGHT: 66 IN | WEIGHT: 158 LBS | DIASTOLIC BLOOD PRESSURE: 58 MMHG | OXYGEN SATURATION: 94 % | SYSTOLIC BLOOD PRESSURE: 114 MMHG | BODY MASS INDEX: 25.39 KG/M2

## 2025-09-02 DIAGNOSIS — I10 ESSENTIAL (PRIMARY) HYPERTENSION: ICD-10-CM

## 2025-09-02 DIAGNOSIS — E03.9 HYPOTHYROIDISM, UNSPECIFIED: ICD-10-CM

## 2025-09-02 DIAGNOSIS — Z23 ENCOUNTER FOR IMMUNIZATION: ICD-10-CM

## 2025-09-02 DIAGNOSIS — C61 MALIGNANT NEOPLASM OF PROSTATE: ICD-10-CM

## 2025-09-02 DIAGNOSIS — Z00.00 ENCOUNTER FOR GENERAL ADULT MEDICAL EXAMINATION W/OUT ABNORMAL FINDINGS: ICD-10-CM

## 2025-09-02 DIAGNOSIS — Z98.89 OTHER SPECIFIED POSTPROCEDURAL STATES: Chronic | ICD-10-CM

## 2025-09-02 PROCEDURE — 99214 OFFICE O/P EST MOD 30 MIN: CPT

## 2025-09-02 PROCEDURE — G2211 COMPLEX E/M VISIT ADD ON: CPT

## 2025-09-02 PROCEDURE — G0463: CPT

## 2025-09-02 PROCEDURE — G0008: CPT

## 2025-09-02 PROCEDURE — 90662 IIV NO PRSV INCREASED AG IM: CPT

## 2025-09-02 RX ORDER — LISINOPRIL 20 MG/1
20 TABLET ORAL
Qty: 90 | Refills: 3 | Status: ACTIVE | COMMUNITY
Start: 2025-09-02 | End: 1900-01-01

## 2025-09-08 DIAGNOSIS — Z23 ENCOUNTER FOR IMMUNIZATION: ICD-10-CM

## 2025-09-08 DIAGNOSIS — C61 MALIGNANT NEOPLASM OF PROSTATE: ICD-10-CM

## 2025-09-08 DIAGNOSIS — E03.9 HYPOTHYROIDISM, UNSPECIFIED: ICD-10-CM

## 2025-09-08 DIAGNOSIS — Z00.00 ENCOUNTER FOR GENERAL ADULT MEDICAL EXAMINATION WITHOUT ABNORMAL FINDINGS: ICD-10-CM

## (undated) DEVICE — NDL INJ BONEE BLADDER 22GAX4MM

## (undated) DEVICE — ACMI SELF-SEALING SEAL UP TO 7FR

## (undated) DEVICE — FOLEY TRAY 16FR 5CC LTX UMETER CLOSED

## (undated) DEVICE — PACK CYSTO

## (undated) DEVICE — VENODYNE/SCD SLEEVE CALF LARGE

## (undated) DEVICE — FOLEY CATH 3-WAY 22FR 5CC LATEX LUBRICATH

## (undated) DEVICE — FOLEY HOLDER STATLOCK 2 WAY ADULT

## (undated) DEVICE — TUBING RANGER FLUID IRRIGATION SET DISP

## (undated) DEVICE — FOLEY CATH 3-WAY 22FR 30CC LATEX COUDE HOTTER

## (undated) DEVICE — GLV 8 PROTEXIS (WHITE)

## (undated) DEVICE — SYR IRRIGATION PISTON 60CC

## (undated) DEVICE — ELCTR PLASMA LOOP MEDIUM ANGLED 24FR 12-30 DEG

## (undated) DEVICE — SOL IRR BAG NS 0.9% 3000ML

## (undated) DEVICE — GLV 7 PROTEXIS (WHITE)

## (undated) DEVICE — GLV 8.5 PROTEXIS (WHITE)

## (undated) DEVICE — SYR ASEPTO

## (undated) DEVICE — WARMING BLANKET UPPER ADULT

## (undated) DEVICE — ELCTR PLASMA BUTTON OVAL 24FR 12-30 DEG

## (undated) DEVICE — GLV 6.5 PROTEXIS (WHITE)

## (undated) DEVICE — SOL IRR POUR H2O 1500ML

## (undated) DEVICE — GLV 7.5 PROTEXIS (WHITE)

## (undated) DEVICE — GOWN TRIMAX LG

## (undated) DEVICE — SOL IRR BAG H2O 3000ML

## (undated) DEVICE — POSITIONER FOAM EGG CRATE ULNAR 2PCS (PINK)

## (undated) DEVICE — SPECIMEN CONTAINER 100ML